# Patient Record
Sex: FEMALE | Race: WHITE | NOT HISPANIC OR LATINO | Employment: OTHER | URBAN - METROPOLITAN AREA
[De-identification: names, ages, dates, MRNs, and addresses within clinical notes are randomized per-mention and may not be internally consistent; named-entity substitution may affect disease eponyms.]

---

## 2017-01-05 ENCOUNTER — OFFICE VISIT (OUTPATIENT)
Dept: MEDICAL GROUP | Facility: CLINIC | Age: 67
End: 2017-01-05
Payer: MEDICARE

## 2017-01-05 VITALS
WEIGHT: 272 LBS | DIASTOLIC BLOOD PRESSURE: 80 MMHG | OXYGEN SATURATION: 94 % | RESPIRATION RATE: 16 BRPM | BODY MASS INDEX: 45.32 KG/M2 | TEMPERATURE: 96.8 F | HEIGHT: 65 IN | SYSTOLIC BLOOD PRESSURE: 130 MMHG | HEART RATE: 63 BPM

## 2017-01-05 DIAGNOSIS — M47.816 LUMBAR FACET ARTHROPATHY: ICD-10-CM

## 2017-01-05 DIAGNOSIS — M35.3 POLYMYALGIA RHEUMATICA (HCC): ICD-10-CM

## 2017-01-05 DIAGNOSIS — Z23 NEED FOR INFLUENZA VACCINATION: ICD-10-CM

## 2017-01-05 DIAGNOSIS — Z12.11 COLON CANCER SCREENING: ICD-10-CM

## 2017-01-05 DIAGNOSIS — K59.09 CHRONIC CONSTIPATION: ICD-10-CM

## 2017-01-05 DIAGNOSIS — M15.9 PRIMARY OSTEOARTHRITIS INVOLVING MULTIPLE JOINTS: ICD-10-CM

## 2017-01-05 DIAGNOSIS — G89.4 CHRONIC PAIN SYNDROME: ICD-10-CM

## 2017-01-05 DIAGNOSIS — K76.0 NONALCOHOLIC FATTY LIVER DISEASE: ICD-10-CM

## 2017-01-05 DIAGNOSIS — K21.9 GASTROESOPHAGEAL REFLUX DISEASE WITHOUT ESOPHAGITIS: ICD-10-CM

## 2017-01-05 DIAGNOSIS — M54.16 LUMBAR RADICULOPATHY: ICD-10-CM

## 2017-01-05 DIAGNOSIS — F33.41 RECURRENT MAJOR DEPRESSIVE DISORDER, IN PARTIAL REMISSION (HCC): ICD-10-CM

## 2017-01-05 DIAGNOSIS — I10 ESSENTIAL HYPERTENSION: ICD-10-CM

## 2017-01-05 DIAGNOSIS — G43.009 MIGRAINE WITHOUT AURA AND WITHOUT STATUS MIGRAINOSUS, NOT INTRACTABLE: ICD-10-CM

## 2017-01-05 DIAGNOSIS — E03.4 HYPOTHYROIDISM DUE TO ACQUIRED ATROPHY OF THYROID: ICD-10-CM

## 2017-01-05 DIAGNOSIS — G47.33 OBSTRUCTIVE SLEEP APNEA SYNDROME: ICD-10-CM

## 2017-01-05 DIAGNOSIS — G62.89 OTHER POLYNEUROPATHY: ICD-10-CM

## 2017-01-05 DIAGNOSIS — Z00.00 MEDICARE ANNUAL WELLNESS VISIT, SUBSEQUENT: ICD-10-CM

## 2017-01-05 DIAGNOSIS — G47.34 NOCTURNAL HYPOXIA: ICD-10-CM

## 2017-01-05 DIAGNOSIS — R70.0 ELEVATED SED RATE: ICD-10-CM

## 2017-01-05 DIAGNOSIS — M48.02 CERVICAL STENOSIS OF SPINAL CANAL: ICD-10-CM

## 2017-01-05 DIAGNOSIS — J96.11 CHRONIC RESPIRATORY FAILURE WITH HYPOXIA (HCC): ICD-10-CM

## 2017-01-05 DIAGNOSIS — Z23 NEED FOR PNEUMOCOCCAL VACCINATION: ICD-10-CM

## 2017-01-05 DIAGNOSIS — G56.22 ULNAR NEUROPATHY OF LEFT UPPER EXTREMITY: ICD-10-CM

## 2017-01-05 PROCEDURE — G0439 PPPS, SUBSEQ VISIT: HCPCS | Mod: 25 | Performed by: FAMILY MEDICINE

## 2017-01-05 PROCEDURE — G0009 ADMIN PNEUMOCOCCAL VACCINE: HCPCS | Performed by: FAMILY MEDICINE

## 2017-01-05 PROCEDURE — 90670 PCV13 VACCINE IM: CPT | Performed by: FAMILY MEDICINE

## 2017-01-05 PROCEDURE — 90662 IIV NO PRSV INCREASED AG IM: CPT | Performed by: FAMILY MEDICINE

## 2017-01-05 PROCEDURE — G0008 ADMIN INFLUENZA VIRUS VAC: HCPCS | Performed by: FAMILY MEDICINE

## 2017-01-05 RX ORDER — TRAMADOL HYDROCHLORIDE 50 MG/1
50-100 TABLET ORAL EVERY 4 HOURS PRN
Qty: 180 TAB | Refills: 5 | Status: SHIPPED | OUTPATIENT
Start: 2017-01-05 | End: 2017-06-30 | Stop reason: SDUPTHER

## 2017-01-05 ASSESSMENT — PATIENT HEALTH QUESTIONNAIRE - PHQ9: CLINICAL INTERPRETATION OF PHQ2 SCORE: 1

## 2017-01-05 NOTE — MR AVS SNAPSHOT
"        Ashly Meyer   2017 1:00 PM   Office Visit   MRN: [id number]    Department:  [facility]   Dept Phone:  [phone]    Description:  Female : 1950   Provider:  Connie Ahn M.D.           Reason for Visit     Annual Exam           Allergies as of 2017     Allergen Noted Reactions    Ace Inhibitors 2009       Cough      Butalbital-Acetaminophen 2016       Dizzy, can't walk, hard to focus    Cefaclor 2007       ceclor    Diphenhydramine 2007   Hives    Iodine 2009       Labored breathing    Lamictal 2011   Rash, Swelling    \"hot\", unable to function    Morphine 2011   Itching    redness    Penicillins 2007   Itching, Swelling    Savella [Milnacipran Hcl] 2013       Muscle aches    Sulfa Drugs 2007   Hives, Anxiety    Hard breathing    Tape 2016       Paper tape is ok    Tizanidine Hcl 2014       dizziness      You were diagnosed with     Medicare annual wellness visit, subsequent   [017166]       Chronic respiratory failure with hypoxia (HCC)   [828052]       Chronic pain syndrome   [338.4.ICD-9-CM]       Recurrent major depressive disorder, in partial remission (HCC)   [8466637]       Elevated sed rate   [420153]       Essential hypertension   [1560028]       Hypothyroidism due to acquired atrophy of thyroid   [8311726]       Lumbar facet arthropathy (HCC)   [147779]       Lumbar radiculopathy   [397460]       Migraine without aura and without status migrainosus, not intractable   [374227]       Nocturnal hypoxia   [209465]       Primary osteoarthritis involving multiple joints   [9036211]       Other polyneuropathy (HCC)   [3338937]       Polymyalgia rheumatica (HCC)   [725.ICD-9-CM]       Obstructive sleep apnea syndrome   [022830]       Need for pneumococcal vaccination   [551746]       Need for influenza vaccination   [936084]       Colon cancer screening   [391440]       Gastroesophageal reflux " "disease without esophagitis   [044764]       Chronic constipation   [031410]       Nonalcoholic fatty liver disease   [605128]       Ulnar neuropathy of left upper extremity   [725215]       Cervical stenosis of spinal canal   [628602]         Vital Signs     Blood Pressure Pulse Temperature Respirations Height Weight    130/80 mmHg 63 36 °C (96.8 °F) 16 1.651 m (5' 5\") 123.378 kg (272 lb)    Body Mass Index Oxygen Saturation Smoking Status             45.26 kg/m2 94% Former Smoker         Basic Information     Date Of Birth Sex Race Ethnicity Preferred Language    1950 Female White Non- English      Problem List              ICD-10-CM Priority Class Noted - Resolved    Essential hypertension I10   5/20/2009 - Present    Hypothyroidism due to acquired atrophy of thyroid E03.4   5/20/2009 - Present    Depression F32.9   5/20/2009 - Present    Eczema, dyshidrotic L30.1   8/3/2009 - Present    Osteoarthritis of multiple joints M15.9   4/13/2010 - Present    GERD (gastroesophageal reflux disease) K21.9   12/10/2010 - Present    Polymyalgia rheumatica (HCC) M35.3   5/6/2011 - Present    Seasonal allergies J30.2   7/8/2011 - Present    Carpal tunnel syndrome G56.00   9/5/2011 - Present    Ulnar neuropathy G56.20   9/5/2011 - Present    Migraine without aura G43.009   Unknown - Present    Cervical stenosis of spinal canal M48.02   11/1/2011 - Present    Lumbar radiculopathy M54.16   11/1/2011 - Present    Nocturnal hypoxia G47.34   Unknown - Present    Sleep apnea syndrome G47.30   Unknown - Present    Chronic constipation K59.09   10/2/2013 - Present    Rectocele N81.6   11/25/2013 - Present    Pelvic floor dysfunction N81.84   Unknown - Present    H/O fibromyalgia Z87.39   3/11/2014 - Present    Sleep apnea G47.30   3/11/2014 - Present    Obesity E66.9   3/11/2014 - Present    Menopause Z78.0   3/11/2014 - Present    Peripheral neuropathy (HCC) G62.9   Unknown - Present    Mild intermittent asthma without " complication J45.20   Unknown - Present    Elevated sed rate R70.0   12/9/2014 - Present    Candidal intertrigo B37.2   6/1/2015 - Present    Severe right groin pain R10.30   11/17/2015 - Present    Chronic pain G89.29   11/18/2015 - Present    Lumbar facet arthropathy (HCC) M12.88   12/9/2015 - Present    Anal fissure K60.2   4/14/2016 - Present    Menopausal symptoms N95.1   4/28/2016 - Present    Deviated nasal septum J34.2   8/1/2016 - Present    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   10/3/2016 - Present    Atrophic rhinitis J31.0   10/3/2016 - Present    Nonalcoholic fatty liver disease K76.0   1/5/2017 - Present      Health Maintenance        Date Due Completion Dates    IMM DTaP/Tdap/Td Vaccine (1 - Tdap) 5/4/1969 ---    IMM ZOSTER VACCINE 5/4/2010 ---    COLONOSCOPY 11/1/2016 11/1/2011 (Postponed)    Override on 11/1/2011: Postponed (money is very tight)    MAMMOGRAM 5/13/2018 5/13/2016, 10/24/2014, 5/28/2013, 5/24/2012, 1/28/2011, 10/16/2009, 10/16/2009, 3/24/2006    IMM PNEUMOCOCCAL 65+ (ADULT) LOW/MEDIUM RISK SERIES (2 of 2 - PPSV23) 1/16/2019 1/5/2017, 1/16/2014    BONE DENSITY 9/10/2020 9/10/2015            Current Immunizations     13-VALENT PCV PREVNAR 1/5/2017    Influenza TIV (IM) 1/16/2014, 11/20/2012    Influenza Vaccine Adult HD 1/5/2017    Influenza Vaccine Quad Inj (Pf) 10/15/2014    Pneumococcal polysaccharide vaccine (PPSV-23) 1/16/2014      Below and/or attached are the medications your provider expects you to take. Review all of your home medications and newly ordered medications with your provider and/or pharmacist. Follow medication instructions as directed by your provider and/or pharmacist. Please keep your medication list with you and share with your provider. Update the information when medications are discontinued, doses are changed, or new medications (including over-the-counter products) are added; and carry medication information at all times in the event of emergency situations      Allergies:  ACE INHIBITORS - (reactions not documented)     BUTALBITAL-ACETAMINOPHEN - (reactions not documented)     CEFACLOR - (reactions not documented)     DIPHENHYDRAMINE - Hives     IODINE - (reactions not documented)     LAMICTAL - Rash,Swelling     MORPHINE - Itching     PENICILLINS - Itching,Swelling     SAVELLA - (reactions not documented)     SULFA DRUGS - Hives,Anxiety     TAPE - (reactions not documented)     TIZANIDINE HCL - (reactions not documented)               Medications  Valid as of: January 05, 2017 -  5:03 PM    Generic Name Brand Name Tablet Size Instructions for use    Carvedilol (Tab) COREG 12.5 MG TAKE 1 TABLET BY MOUTH TWICE A DAY WITH MEALS        Fluocinolone Acetonide (Oil) Fluocinolone Acetonide 0.01 % Place  in ear 2 Times a Day.        Furosemide (Tab) LASIX 20 MG TAKE 1 TABLET BY MOUTH EVERY DAY        Levothyroxine Sodium (Tab) SYNTHROID 150 MCG TAKE 1 TABLET BY MOUTH EVERY DAY        Misc. Devices (Misc) Misc. Devices  This is an order for overnight pulse on on room air  Dx:   J96.11,  G47.33, G47.34         SYLVIE 99 months   NPI [id number]        Nabumetone (Tab) RELAFEN 750 MG TAKE 1 TABLET BY MOUTH TWICE A DAY WITH MEALS        Oxygen-Helium   Inhale 2-3 L by mouth as needed.        Pantoprazole Sodium (Tablet Delayed Response) PROTONIX 40 MG Take 1 Tab by mouth every day.        Polyethyl Glycol-Propyl Glycol   by Ophthalmic route 4 times a day as needed.        Potassium Chloride Klarissa CR (Tab CR) K-DUR 20 MEQ TAKE 1 TABLET BY MOUTH TWICE A DAY        Sertraline HCl (Tab) ZOLOFT 100 MG TAKE 1 TABLET BY MOUTH EVERY DAY        Spironolactone (Tab) ALDACTONE 50 MG Take 1 Tab by mouth every day.        Temazepam (Cap) RESTORIL 15 MG Take 1 Cap by mouth at bedtime as needed for Sleep.        TraMADol HCl (Tab) ULTRAM 50 MG Take 1-2 Tabs by mouth every four hours as needed for Moderate Pain.        .                 Medicines prescribed today were sent to:     [id number] -  [place], [place] - [address]    [address] [place] [address]    Phone: [phone] Fax: [phone]    Open 24 Hours?: No      Medication refill instructions:       If your prescription bottle indicates you have medication refills left, it is not necessary to call your provider’s office. Please contact your pharmacy and they will refill your medication.    If your prescription bottle indicates you do not have any refills left, you may request refills at any time through one of the following ways: The online [facility] system (except Urgent Care), by calling your provider’s office, or by asking your pharmacy to contact your provider’s office with a refill request. Medication refills are processed only during regular business hours and may not be available until the next business day. Your provider may request additional information or to have a follow-up visit with you prior to refilling your medication.   *Please Note: Medication refills are assigned a new Rx number when refilled electronically. Your pharmacy may indicate that no refills were authorized even though a new prescription for the same medication is available at the pharmacy. Please request the medicine by name with the pharmacy before contacting your provider for a refill.        Referral     A referral request has been sent to our patient care coordination department. Please allow 3-5 business days for us to process this request and contact you either by phone or mail. If you do not hear from us by the 5th business day, please call us at [phone].           [facility] Access Code: Activation code not generated  Current [facility] Status: Active

## 2017-01-05 NOTE — PROGRESS NOTES
Chief Complaint   Patient presents with   • Annual Exam         HPI:  Ashly is a 66 y.o. here for Medicare Annual Wellness Visit.  She is living alone.  Her daughter lives locally.  She is now on Medicare B and state skilled nursing, problematic insurance.      Patient Active Problem List    Diagnosis Date Noted   • Disease of accessory sinus 10/03/2016   • Atrophic rhinitis 10/03/2016   • Deviated nasal septum 08/01/2016   • Menopausal symptoms 04/28/2016   • Large stool 04/14/2016   • Anal fissure 04/14/2016   • Lumbar facet arthropathy (HCC) 12/09/2015   • Chronic pain 11/18/2015   • Severe right groin pain 11/17/2015   • Candidal intertrigo 06/01/2015   • Elevated sed rate 12/09/2014   • Mild intermittent asthma without complication    • H/O fibromyalgia 03/11/2014   • Sleep apnea 03/11/2014   • Obesity 03/11/2014   • Menopause 03/11/2014   • Peripheral neuropathy ([facility])    • Rectocele 11/25/2013   • Pelvic floor dysfunction    • Chronic constipation 10/02/2013   • Nocturnal hypoxia    • Sleep apnea syndrome    • Cervical stenosis of spinal canal 11/01/2011   • Lumbar radiculopathy 11/01/2011   • Migraine without aura    • Carpal tunnel syndrome 09/05/2011   • Ulnar neuropathy 09/05/2011   • Seasonal allergies 07/08/2011   • Polymyalgia rheumatica (HCC) 05/06/2011   • GERD (gastroesophageal reflux disease) 12/10/2010   • Osteoarthritis of multiple joints 04/13/2010   • Eczema, dyshidrotic 08/03/2009   • Essential hypertension 05/20/2009   • Hypothyroidism due to acquired atrophy of thyroid 05/20/2009   • Depression 05/20/2009       Current Outpatient Prescriptions   Medication Sig Dispense Refill   • sertraline (ZOLOFT) 100 MG Tab TAKE 1 TABLET BY MOUTH EVERY DAY 90 Tab 6   • nabumetone (RELAFEN) 750 MG Tab TAKE 1 TABLET BY MOUTH TWICE A DAY WITH MEALS 60 Tab 6   • levothyroxine (SYNTHROID) 150 MCG Tab TAKE 1 TABLET BY MOUTH EVERY DAY 90 Tab 2   • carvedilol (COREG) 12.5 MG Tab TAKE 1 TABLET BY MOUTH TWICE A DAY  WITH MEALS 180 Tab 2   • potassium chloride SA (K-DUR) 20 MEQ Tab CR TAKE 1 TABLET BY MOUTH TWICE A  Tab 2   • OXYGEN-HELIUM INH Inhale 2-3 L by mouth as needed.     • Fluocinolone Acetonide 0.01 % Oil Place  in ear 2 Times a Day.     • Polyethyl Glycol-Propyl Glycol (SYSTANE OP) by Ophthalmic route 4 times a day as needed.     • tramadol (ULTRAM) 50 MG Tab Take 1-2 Tabs by mouth every four hours as needed for Moderate Pain. 180 Tab 5   • furosemide (LASIX) 20 MG Tab TAKE 1 TABLET BY MOUTH EVERY DAY 90 Tab 3   • spironolactone (ALDACTONE) 50 MG Tab Take 1 Tab by mouth every day. 90 Tab 3   • pantoprazole (PROTONIX) 40 MG Tablet Delayed Response Take 1 Tab by mouth every day. 90 Tab 3   • temazepam (RESTORIL) 15 MG Cap Take 1 Cap by mouth at bedtime as needed for Sleep. 30 Cap 3     No current facility-administered medications for this visit.            Current supplements as per medication list.       Allergies: Ace inhibitors; Butalbital-acetaminophen; Cefaclor; Diphenhydramine; Iodine; Lamictal; Morphine; Penicillins; Savella; Sulfa drugs; Tape; and Tizanidine hcl    Current social contact/activities: limited by her numerous medical problems.     She  reports that she quit smoking about 9 years ago. She has never used smokeless tobacco. She reports that she does not drink alcohol or use illicit drugs.  Counseling given: Yes        DPA/Advanced directive: Patient do not have an advanced directive. If not on file, instructed to bring in a copy to scan into their chart. If no advanced directive exists, a packet and workshop information was given on advanced directives.  Completed one in the past, she will try to find it.    ROS:    Gait: Uses no assistive device, cane sometimes  Ostomy: no   Other tubes: no   Amputations: no   Chronic oxygen use yes, nocturnal  Last eye exam about a year, needs glasses but the eyes were healthy other than incipient cataracts   : Reports occasional stress urinary  incontinence.       Screening:  Discussed    Depression Screening    Little interest or pleasure in doing things?  0 - not at all  Feeling down, depressed , or hopeless? 1 - several days  Trouble falling or staying asleep, or sleeping too much?   2  Feeling tired or having little energy?   2  Poor appetite or overeating?   0  Feeling bad about yourself - or that you are a failure or have let yourself or your family down?  0  Trouble concentrating on things, such as reading the newspaper or watching television?  1  Moving or speaking so slowly that other people could have noticed.  Or the opposite - being so fidgety or restless that you have been moving around a lot more than usual?   0  Thoughts that you would be better off dead, or of hurting yourself?   0  Patient Health Questionnaire Score:  6    If depressive symptoms identified deferred to follow up visit unless specifically addressed in assesment and plan.      Screening for Cognitive Impairment    Three Minute Recall (banana, sunrise, fence)  2/3    Draw clock face with all 12 numbers set to the hand to show 10 minutes past 11 o'clock  1  5/5  Cognitive concerns identified defferred for follow up unless specifically addressed in assesment and plan.    Fall Risk Assessment    Has the patient had two or more falls in the last year or any fall with injury in the last year?  No    Safety Assessment    Throw rugs on floor.  No  Handrails on all stairs.  No  Good lighting in all hallways.  Yes  Difficulty hearing.  No  Patient counseled about all safety risks that were identified.    Functional Assessment ADLs    Are there any barriers preventing you from cooking for yourself or meeting nutritional needs?  No.    Are there any barriers preventing you from driving safely or obtaining transportation?  No.    Are there any barriers preventing you from using a telephone or calling for help?  No.    Are there any barriers preventing you from shopping?  No.    Are there  any barriers preventing you from taking care of your own finances?  No.    Are there any barriers preventing you from managing your medications?  No.    Are currently engaing any exercise or physical activity?  No.       Health Maintenance Summary                Annual Wellness Visit Overdue 1950     IMM DTaP/Tdap/Td Vaccine Overdue 5/4/1969     IMM ZOSTER VACCINE Overdue 5/4/2010     IMM PNEUMOCOCCAL 65+ (ADULT) LOW/MEDIUM RISK SERIES Overdue 5/4/2015      Done 1/16/2014 Imm Admin: Pneumococcal polysaccharide vaccine (PPSV-23)    IMM INFLUENZA Overdue 9/1/2016      Done 10/15/2014 Imm Admin: Influenza Vaccine Quad Inj (Pf)     Patient has more history with this topic...    COLONOSCOPY Overdue 11/1/2016      Postponed 11/1/2011 money is very tight    MAMMOGRAM Next Due 5/13/2018      Done 5/13/2016 MA DIAGNOSTIC MAMMO W/CAD-BILAT     Patient has more history with this topic...    BONE DENSITY Next Due 9/10/2020      Done 9/10/2015 DS-BONE DENSITY STUDY (DEXA)          Patient Care Team:  Connie Ahn M.D. as PCP - General   Hetal Ann M.D. as Consulting Physician (Rheumatology)  Jeffy Botello M.D. as Consulting Physician (Neurology)      Social History   Substance Use Topics   • Smoking status: Former Smoker     Quit date: 03/01/2007   • Smokeless tobacco: Never Used      Comment: 1 ppd >20yrs quit 12/07   • Alcohol Use: No     Family History   Problem Relation Age of Onset   • Arthritis Sister      gout and lupus   • Cancer Mother      bladder   • Heart Disease Mother    • Other Father      unknown   • GI Sister      celiac   • Cancer Maternal Aunt      bladder   • GI Mother      bile duct problem     She  has a past medical history of Degenerative disc disease; GOUT (5/20/2009); Fibromyalgia; Arthritis; GERD (gastroesophageal reflux disease); Pleomorphic adenoma (HCC); Hypothyroidism (5/20/2009); Mild intermittent asthma without complication; Hypertension; Carotid artery stenosis, unilateral;  "Migraine without aura, without mention of intractable migraine without mention of status migrainosus; Aphasia; Nocturnal hypoxia; Sleep apnea syndrome; Polymyalgia rheumatica (HCC); Pelvic floor dysfunction; Peripheral neuropathy (HCC); S/P tonsillectomy; H/O foot surgery; Seasonal allergies; Snoring; Hemorrhagic disorder (HCC); Cancer (HCC) (1990's); Hiatus hernia syndrome; Cataract; Urinary incontinence; Bowel habit changes; Indigestion; Heart burn; Breath shortness; Depression (5/20/2009); and Anesthesia.   Past Surgical History   Procedure Laterality Date   • [facility] stat fine needle aspiration  11/16/2009     Performed by DA CALLOWAY at ENDOSCOPY [facility] ORS   • Tonsillectomy  1953   • Bladder suspension  1983   • Other orthopedic surgery  1962/1980     foot surgery    • Other abdominal surgery       Cholysestectomy   • Hysterectomy, vaginal  1983     ovaries are present, excessive bleeding   • Colonoscopy  2006     ? unclear date   • Septoplasty N/A 8/1/2016     Procedure: SEPTOPLASTY;  Surgeon: PIETER Dickson M.D.;  Location: SURGERY SAME DAY [facility] ORS;  Service:    • Turbinoplasty Bilateral 8/1/2016     Procedure: TURBINOPLASTY;  Surgeon: PIETER Dickson M.D.;  Location: SURGERY SAME DAY [facility] ORS;  Service:    • Nasal fracture reduction open N/A 8/1/2016     Procedure: SEPTAL FRACTURE REDUCTION OPEN;  Surgeon: PIETER Dickson M.D.;  Location: SURGERY SAME DAY [facility] ORS;  Service:    • Septal reconstruction  10/3/2016     Procedure: SEPTAL RECONSTRUCTION with  grafts ;  Surgeon: PIETER Dickson M.D.;  Location: SURGERY SAME DAY [facility] ORS;  Service:        Exam:     Blood pressure 130/80, pulse 63, temperature 36 °C (96.8 °F), resp. rate 16, height 1.651 m (5' 5\"), weight 123.378 kg (272 lb), SpO2 94 %. Body mass index is 45.26 kg/(m^2).    Hearing good.    Dentition fair  Alert, oriented in no acute distress.  Eye contact is good, speech goal directed, affect " calm  EOMI, PERRLA, oropharynx is well-hydrated without exudate  Neck shows full range of motion with no JVD or carotid bruits appreciated. Thyroid is nonpalpable. No neck masses appreciated.  Lungs clear to auscultation A&P with good air movement.  Heart regular rate and rhythm normal S1-S2 without murmur appreciated  Abdomen very obese, soft, no hepatosplenomegaly or mass appreciated.  She has multiple tender points in her musculature. There is no question but that the left arm is quite tender to palpation and there seems to be hyperesthesia. No edema or heat is appreciated. Her feet are mildly swollen but nonpitting. There is slight dependent rubor.        Assessment and Plan. The following treatment and monitoring plan is recommended:      1. Medicare annual wellness visit, subsequent  She has multiple serious medical problems. She has seen numerous specialists. She is overall stable.    2. Chronic respiratory failure with hypoxia (HCC)  She continues her nocturnal oxygen for documented nocturnal hypoxemia. She is due for a repeat overnight pulse ox.  - Misc. Devices Misc; This is an order for overnight pulse on on room air  Dx:   J96.11,  G47.33, G47.34         SYLVIE 99 months   NPI [id number]  Dispense: 1 Each; Refill: 0    3. Chronic pain syndrome  She has multiple triggers for this including muscle aches and chronic neuropathy. The neuropathy has been very difficult to treat with poor response to medications. She had help from physical medicine and rehab in the past. Unfortunately quite stable.    4. Recurrent major depressive disorder, in partial remission (HCC)  She continues in partial remission. She feels the current regimen of sertraline is at least partly helpful. She did try to discontinue it and found that she was feeling a lot worse. She denies intrusive thoughts, suicidal thoughts or plan of self-harm. She denies delusions or hallucinations. Stable problem.    5. Elevated sed rate  This has been  quite high ranging from the 30s to the 60s or 80s. She has been treated with multiple medications with no success. She has seen rheumatology and neurology all agree that she has something going on and at one point she was on enormous doses of steroids to treat presumed polymyalgia rheumatica and her symptoms did not deya. Her symptoms have remained the same whether on or off the steroids. The Relafen has been only partially helpful. However, it is been the most helpful so far. The tramadol also helps her for her chronic pain. Stable problem.    6. Essential hypertension  Denies chest pain, chest pressure, palpitations or exertional shortness of breath. Her blood pressure remains fairly well controlled on the spironolactone and carvedilol combination. She states she checks her blood pressure about once a month and it is typically between the 120s to 130s. Stable problem.    7. Hypothyroidism due to acquired atrophy of thyroid  She continues her thyroid regimen. She feels her energy level has been stable on this. She denies increase in insomnia or tremor. Stable problem.    8. Lumbar facet arthropathy (HCC)  She has multiple level arthritis in the back. She did have some improvement many years ago from the physical medicine and rehabilitation specialist, Dr. Livingston. She would like to try this again as that is one of the few things she identifies that has been helpful. Referral is discussed and placed. Stable problem unfortunately.  - REFERRAL TO PHYSICIAL MEDICINE REHAB    9. Lumbar radiculopathy  She has persistent radiating lumbar pain. This waxes and wanes. She is assessing her furniture and seeing if this is contributing to the problem.  - REFERRAL TO PHYSICIAL MEDICINE REHAB    10. Migraine without aura and without status migrainosus, not intractable  She has continued episodic migraine. Butalbital, Lamictal and Savella have been not tolerable.  She feels the Relafen has been somewhat helpful. She had no relief  from the Imitrex. The tramadol is only mildly helpful. She has seen neurology for this. She is considering seeing them again. Stable problem.    11. Nocturnal hypoxia  She is due for a follow-up order. Discussed and placed  - Misc. Devices Misc; This is an order for overnight pulse on on room air  Dx:   J96.11,  G47.33, G47.34         SYLVIE 99 months   NPI [id number]  Dispense: 1 Each; Refill: 0    12. Primary osteoarthritis involving multiple joints  The tramadol continues to be somewhat helpful for the arthritis. She feels it is better tolerated than the other pain medication she is taking. She has far less somnolence on this. Stable regimen.  - tramadol (ULTRAM) 50 MG Tab; Take 1-2 Tabs by mouth every four hours as needed for Moderate Pain.  Dispense: 180 Tab; Refill: 5  - REFERRAL TO PHYSICIAL MEDICINE REHAB    13. Other polyneuropathy (HCC)  She has documented polyneuropathy but it is unclear what the etiology is. She did respond to Lamictal but got a very severe rash which made it impossible to continue. She states that she was unable to tolerate the gabapentin in the past to even though that is not well documented. Stable problem.    14. Polymyalgia rheumatica (HCC)  This has been a diagnosis from rheumatology. However, high-dose steroids did not help. She may indeed have a resistant form of this. Discussed increased exercise and stretching and weight loss. She is not confident that this is achievable. Discussed possible referrals to dietitian etc. She will consider. Stable.    15. Obstructive sleep apnea syndrome  She was indeed diagnosed with obstructive sleep apnea. I feel this is a contributor to many of her problems especially her migraines. She however was unable to tolerate C Pap and is unwilling to try it again. It appears she gave it up approximately a 3 month try with no ability to tolerate. Stable problem.  - Misc. Devices Misc; This is an order for overnight pulse on on room air  Dx:   J96.11,   G47.33, G47.34         SYLVIE 99 months   NPI [id number]  Dispense: 1 Each; Refill: 0    16. Need for pneumococcal vaccination  Administered today  - PNEUMOCOCCAL CONJUGATE VACCINE 13-VALENT    17. Need for influenza vaccination  Administered today  - INFLUENZA VACCINE, HIGH DOSE (65+ ONLY)    18. Colon cancer screening  Referral discussed and placed  - REFERRAL TO GASTROENTEROLOGY    19. Gastroesophageal reflux disease without esophagitis  Referral is discussed and placed. She does continue the pantoprazole. She denies dysphagia, hematemesis or vomiting. Denies blood or mucus in the stool. Stable persistent problem.  - REFERRAL TO GASTROENTEROLOGY    20. Chronic constipation  This is also stable persistent problem. She denied blood or mucus in the stool. She is due for follow-up.  - REFERRAL TO GASTROENTEROLOGY    21. Nonalcoholic fatty liver disease  Discussed lowPlease increase your exercise as much as possible. Please avoid starches and sugars. Excess starch and sugar in the diet that is not burned off with exercise increases triglyceride levels. Triglycerides are soluble fats in the blood stream that increase heart disease risk. They also contribute to fatty liver. An low sugar diet. She feels she follows this fairly well.  - REFERRAL TO GASTROENTEROLOGY    22. Ulnar neuropathy of left upper extremity  This was documented on EMG. She would like to see if there is a nerve block or nerve injection that could be helpful. Referral was placed.  - REFERRAL TO PHYSICIAL MEDICINE REHAB    23. Cervical stenosis of spinal canal  This is a chronic multilevel problems which may be contributing to many problems including her arm pain. Referral discussed and placed. Stable problem overall.  - REFERRAL TO PHYSICIAL MEDICINE REHAB      Services needed: as per orders if indicated.  Health Care Screening: Age-appropriate preventive services Medicare covers discussed today and ordered if indicated.    Referrals offered:  Community-based lifestyle interventions to reduce health risks and promote self-management and wellness, fall prevention, nutrition, physical activity, tobacco-use cessation, weight loss, and mental health services as per orders if indicated.    Discussion today about general wellness and lifestyle habits:  discussed, numerous challenges  · Prevent falls and reduce trip hazards; Cautioned about securing or removing rugs.  · Have a working fire alarm and carbon monoxide detector; discussed  · Engage in regular physical activity and social activities       Follow-up: three months

## 2017-01-06 DIAGNOSIS — G47.33 OBSTRUCTIVE SLEEP APNEA SYNDROME: ICD-10-CM

## 2017-01-06 DIAGNOSIS — R70.0 ELEVATED SED RATE: ICD-10-CM

## 2017-01-06 DIAGNOSIS — E78.5 DYSLIPIDEMIA, GOAL LDL BELOW 130: ICD-10-CM

## 2017-01-06 DIAGNOSIS — K21.9 GASTROESOPHAGEAL REFLUX DISEASE WITHOUT ESOPHAGITIS: ICD-10-CM

## 2017-01-06 DIAGNOSIS — G62.89 OTHER POLYNEUROPATHY: ICD-10-CM

## 2017-01-06 DIAGNOSIS — I10 ESSENTIAL HYPERTENSION: ICD-10-CM

## 2017-01-06 DIAGNOSIS — E03.4 HYPOTHYROIDISM DUE TO ACQUIRED ATROPHY OF THYROID: ICD-10-CM

## 2017-01-06 DIAGNOSIS — Z11.59 NEED FOR HEPATITIS C SCREENING TEST: ICD-10-CM

## 2017-01-26 RX ORDER — ACYCLOVIR 400 MG/1
800 TABLET ORAL 2 TIMES DAILY
Qty: 20 TAB | Refills: 0 | Status: SHIPPED | OUTPATIENT
Start: 2017-01-26 | End: 2017-01-31

## 2017-02-01 ENCOUNTER — HOSPITAL ENCOUNTER (OUTPATIENT)
Dept: LAB | Facility: MEDICAL CENTER | Age: 67
End: 2017-02-01
Attending: INTERNAL MEDICINE
Payer: MEDICARE

## 2017-02-01 PROCEDURE — 83516 IMMUNOASSAY NONANTIBODY: CPT | Mod: 91

## 2017-02-01 PROCEDURE — 36415 COLL VENOUS BLD VENIPUNCTURE: CPT

## 2017-02-01 PROCEDURE — 82784 ASSAY IGA/IGD/IGG/IGM EACH: CPT

## 2017-02-03 DIAGNOSIS — Z01.812 PRE-PROCEDURAL LABORATORY EXAMINATION: ICD-10-CM

## 2017-02-03 DIAGNOSIS — Z01.810 PRE-OPERATIVE CARDIOVASCULAR EXAMINATION: ICD-10-CM

## 2017-02-03 LAB
ANION GAP SERPL CALC-SCNC: 10 MMOL/L (ref 0–11.9)
BUN SERPL-MCNC: 20 MG/DL (ref 8–22)
CALCIUM SERPL-MCNC: 9.3 MG/DL (ref 8.5–10.5)
CHLORIDE SERPL-SCNC: 104 MMOL/L (ref 96–112)
CO2 SERPL-SCNC: 25 MMOL/L (ref 20–33)
CREAT SERPL-MCNC: 0.92 MG/DL (ref 0.5–1.4)
EKG IMPRESSION: NORMAL
ERYTHROCYTE [DISTWIDTH] IN BLOOD BY AUTOMATED COUNT: 50 FL (ref 35.9–50)
GFR SERPL CREATININE-BSD FRML MDRD: >60 ML/MIN/1.73 M 2
GLUCOSE SERPL-MCNC: 176 MG/DL (ref 65–99)
HCT VFR BLD AUTO: 40.9 % (ref 37–47)
HGB BLD-MCNC: 12.6 G/DL (ref 12–16)
IGA SERPL-MCNC: 308 MG/DL (ref 68–408)
MCH RBC QN AUTO: 26.7 PG (ref 27–33)
MCHC RBC AUTO-ENTMCNC: 30.8 G/DL (ref 33.6–35)
MCV RBC AUTO: 86.7 FL (ref 81.4–97.8)
PLATELET # BLD AUTO: 201 K/UL (ref 164–446)
PMV BLD AUTO: 10.9 FL (ref 9–12.9)
POTASSIUM SERPL-SCNC: 3.9 MMOL/L (ref 3.6–5.5)
RBC # BLD AUTO: 4.72 M/UL (ref 4.2–5.4)
SODIUM SERPL-SCNC: 139 MMOL/L (ref 135–145)
TTG IGA SER IA-ACNC: 1 U/ML (ref 0–3)
WBC # BLD AUTO: 11 K/UL (ref 4.8–10.8)

## 2017-02-03 PROCEDURE — 80048 BASIC METABOLIC PNL TOTAL CA: CPT

## 2017-02-03 PROCEDURE — 85027 COMPLETE CBC AUTOMATED: CPT

## 2017-02-03 PROCEDURE — 36415 COLL VENOUS BLD VENIPUNCTURE: CPT

## 2017-02-03 RX ORDER — POLYETHYLENE GLYCOL 3350 17 G/17G
17 POWDER, FOR SOLUTION ORAL 3 TIMES DAILY
COMMUNITY
End: 2018-03-29

## 2017-02-03 RX ORDER — KETOCONAZOLE 20 MG/G
CREAM TOPICAL DAILY
COMMUNITY
End: 2017-07-27 | Stop reason: SDUPTHER

## 2017-02-03 RX ORDER — METRONIDAZOLE 7.5 MG/G
GEL TOPICAL 2 TIMES DAILY
COMMUNITY
End: 2018-01-19 | Stop reason: SDUPTHER

## 2017-02-06 ENCOUNTER — HOSPITAL ENCOUNTER (OUTPATIENT)
Facility: MEDICAL CENTER | Age: 67
End: 2017-02-06
Attending: INTERNAL MEDICINE | Admitting: INTERNAL MEDICINE
Payer: MEDICARE

## 2017-02-06 VITALS
TEMPERATURE: 98.6 F | WEIGHT: 273.59 LBS | RESPIRATION RATE: 20 BRPM | HEIGHT: 66 IN | SYSTOLIC BLOOD PRESSURE: 160 MMHG | HEART RATE: 72 BPM | DIASTOLIC BLOOD PRESSURE: 89 MMHG | BODY MASS INDEX: 43.97 KG/M2 | OXYGEN SATURATION: 95 %

## 2017-02-06 PROBLEM — K21.9 GASTROESOPHAGEAL REFLUX DISEASE: Status: ACTIVE | Noted: 2017-02-06

## 2017-02-06 PROCEDURE — 160036 HCHG PACU - EA ADDL 30 MINS PHASE I: Performed by: INTERNAL MEDICINE

## 2017-02-06 PROCEDURE — 160002 HCHG RECOVERY MINUTES (STAT): Performed by: INTERNAL MEDICINE

## 2017-02-06 PROCEDURE — 160048 HCHG OR STATISTICAL LEVEL 1-5: Performed by: INTERNAL MEDICINE

## 2017-02-06 PROCEDURE — 160035 HCHG PACU - 1ST 60 MINS PHASE I: Performed by: INTERNAL MEDICINE

## 2017-02-06 PROCEDURE — 110371 HCHG SHELL REV 272: Performed by: INTERNAL MEDICINE

## 2017-02-06 PROCEDURE — A4606 OXYGEN PROBE USED W OXIMETER: HCPCS | Performed by: INTERNAL MEDICINE

## 2017-02-06 PROCEDURE — 700101 HCHG RX REV CODE 250

## 2017-02-06 PROCEDURE — 502240 HCHG MISC OR SUPPLY RC 0272: Performed by: INTERNAL MEDICINE

## 2017-02-06 PROCEDURE — 160209 HCHG ENDO MINUTES - EA ADDL 1 MIN LEVEL 5: Performed by: INTERNAL MEDICINE

## 2017-02-06 PROCEDURE — 88312 SPECIAL STAINS GROUP 1: CPT

## 2017-02-06 PROCEDURE — 700111 HCHG RX REV CODE 636 W/ 250 OVERRIDE (IP)

## 2017-02-06 PROCEDURE — 700102 HCHG RX REV CODE 250 W/ 637 OVERRIDE(OP)

## 2017-02-06 PROCEDURE — 500066 HCHG BITE BLOCK, ECT: Performed by: INTERNAL MEDICINE

## 2017-02-06 PROCEDURE — A9270 NON-COVERED ITEM OR SERVICE: HCPCS

## 2017-02-06 PROCEDURE — 88305 TISSUE EXAM BY PATHOLOGIST: CPT | Mod: 59

## 2017-02-06 PROCEDURE — B4082 ENTERAL NG TUBING W/O STYLET: HCPCS | Performed by: INTERNAL MEDICINE

## 2017-02-06 PROCEDURE — 110382 HCHG SHELL REV 271: Performed by: INTERNAL MEDICINE

## 2017-02-06 PROCEDURE — 160009 HCHG ANES TIME/MIN: Performed by: INTERNAL MEDICINE

## 2017-02-06 PROCEDURE — 160204 HCHG ENDO MINUTES - 1ST 30 MINS LEVEL 5: Performed by: INTERNAL MEDICINE

## 2017-02-06 RX ORDER — OXYCODONE HCL 5 MG/5 ML
SOLUTION, ORAL ORAL
Status: COMPLETED
Start: 2017-02-06 | End: 2017-02-06

## 2017-02-06 RX ORDER — TRAMADOL HYDROCHLORIDE 50 MG/1
TABLET ORAL
Status: COMPLETED
Start: 2017-02-06 | End: 2017-02-06

## 2017-02-06 RX ORDER — SODIUM CHLORIDE, SODIUM LACTATE, POTASSIUM CHLORIDE, CALCIUM CHLORIDE 600; 310; 30; 20 MG/100ML; MG/100ML; MG/100ML; MG/100ML
1000 INJECTION, SOLUTION INTRAVENOUS ONCE
Status: COMPLETED | OUTPATIENT
Start: 2017-02-06 | End: 2017-02-06

## 2017-02-06 RX ADMIN — TRAMADOL HYDROCHLORIDE 50 MG: 50 TABLET, FILM COATED ORAL at 14:07

## 2017-02-06 RX ADMIN — OXYCODONE HYDROCHLORIDE 10 MG: 5 SOLUTION ORAL at 12:05

## 2017-02-06 RX ADMIN — SODIUM CHLORIDE, SODIUM LACTATE, POTASSIUM CHLORIDE, CALCIUM CHLORIDE 1000 ML: 600; 310; 30; 20 INJECTION, SOLUTION INTRAVENOUS at 07:00

## 2017-02-06 ASSESSMENT — PAIN SCALES - GENERAL
PAINLEVEL_OUTOF10: 5
PAINLEVEL_OUTOF10: 0
PAINLEVEL_OUTOF10: 8
PAINLEVEL_OUTOF10: 2
PAINLEVEL_OUTOF10: 0
PAINLEVEL_OUTOF10: 5
PAINLEVEL_OUTOF10: 0
PAINLEVEL_OUTOF10: 8
PAINLEVEL_OUTOF10: 5

## 2017-02-06 NOTE — OR NURSING
RECEIVED FROM OR WITH DR CABRERA.  ORAL AIRWAY IN PLACE.  VSS  AIRWAY DC'D WITHOUT PROBLEM.  SLEEPY  1030  CONTINUES TO SLEEP  1040  DR CABRERA AT BEDSIDE TO ASSESS STATUS. DENIES PAIN/NAUSEA  1055 EASILY AWAKENED.  GIVEN SIPS OF WATER.  1145 AWAKE.  NEEDS TO VOID.  TAKEN TO THE BATHROOM VIA GUERNEY.

## 2017-02-06 NOTE — OR NURSING
RECEIVED FROM OR WITH DR CABRERA.  ORAL AIRWAY IN PLACE.  VSS  AIRWAY DC'D WITHOUT PROBLEM.  SLEEPY  1030  CONTINUES TO SLEEP  1040  DR CABRERA AT BEDSIDE TO ASSESS STATUS. DENIES PAIN/NAUSEA  1055 EASILY AWAKENED.  GIVEN SIPS OF WATER.  1145 AWAKE.  NEEDS TO VOID.  TAKEN TO THE BATHROOM VIA GUERNEY. VOID, DRESSED AND IN RECLINER.  C/O PAIN IN EARS AND HEAD 8/10  GIVEN CRACKERS AND PAIN MEDICATION.    1200  DAUGHTER CALLED RE STATUS.  GIVEN UPDATE.  WILL BE HERE IN ABOUT AN HOUR.  1245 ON ROOM AIR.  STILL CONTINUES TO HAVE HEADACHE AND EAR PAIN.  ICE PACK TO FOREHEAD.  1400 REQUESTS TRAMADOL.  ORDERS RECEIVED FROM DR CABRERA.  PATIENT GIVEN SAME.  DAUGHTER HERE.    1420 DISCHARGE INSTRUCTIONS TO PATIENT AND DAUGHTER.  ALL QUESTIONS ANSWERED.  DISCHARGED TO HOME.

## 2017-02-06 NOTE — DISCHARGE INSTRUCTIONS
GASTROSCOPY OR ERCP  1. Don't eat or drink anything for about an hour after the test. You can then resume your regular diet.   2. Don't drive or drink alcohol for 24 hours. The medication you received will make you too drowsy.  3. Don't take any coffee, tea, or aspirin products until after you see your doctor. These can harm the lining of your stomach.  4. If you begin to vomit bloody material, or develop black or bloody stools, call your doctor as soon as possible.   5. If you have any neck, chest, abdominal pain or temp over 100 degrees call your doctor.   6. See your doctor on: ***  7. Additional instructions:***  8. Prescriptions:***    COLONOSCOPY OR FLEXIBLE SIGMOIDOSCOPY  1. If you received a barium enema, take a mild laxative such as dulcolax to clean out the barium.   2. Drink plenty of fluids. Eat a diet high in fiber; such foods as whole-grain breads, fresh fruit and vegetables, nuts are recommended.  3. You may notice a few drops of blood with your first bowel movement. If you develop any large amount of bleeding, black stools, a fever, or abdominal pain, call your doctor right away.   4. Call your doctor for test results in *** days.  5. Don't drive or drink alcohol for 24 hours. The medication you received will make you too drowsy.   6. No heavy lifting, ASA products or ASA x 5 days ***      ACTIVITY: Rest and take it easy for the first 24 hours.  A responsible adult is recommended to remain with you during that time.  It is normal to feel sleepy.  We encourage you to not do anything that requires balance, judgment or coordination.    MILD FLU-LIKE SYMPTOMS ARE NORMAL. YOU MAY EXPERIENCE GENERALIZED MUSCLE ACHES, THROAT IRRITATION, HEADACHE AND/OR SOME NAUSEA.    FOR 24 HOURS DO NOT:  Drive, operate machinery or run household appliances.  Drink beer or alcoholic beverages.   Make important decisions or sign legal documents.    SPECIAL INSTRUCTIONS: *OXYCODONE GIVEN AT 12:05  OK  PAIN MEDICATION AT  4:00 P.M.  TRAMADOL TAKEN AT 2:OO P.M. MAY TAKE TRAMADOL AT 6:00 P.M.**    DIET: To avoid nausea, slowly advance diet as tolerated, avoiding spicy or greasy foods for the first day.  Add more substantial food to your diet according to your physician's instructions.  Babies can be fed formula or breast milk as soon as they are hungry.  INCREASE FLUIDS AND FIBER TO AVOID CONSTIPATION.    SURGICAL DRESSING/BATHING: ***    FOLLOW-UP APPOINTMENT:  A follow-up appointment should be arranged with your doctor; call to schedule.    You should CALL YOUR PHYSICIAN if you develop:  Fever greater than 101 degrees F.  Pain not relieved by medication, or persistent nausea or vomiting.  Excessive bleeding (blood soaking through dressing) or unexpected drainage from the wound.  Extreme redness or swelling around the incision site, drainage of pus or foul smelling drainage.  Inability to urinate or empty your bladder within 8 hours.  Problems with breathing or chest pain.    You should call 911 if you develop problems with breathing or chest pain.  If you are unable to contact your doctor or surgical center, you should go to the nearest emergency room or urgent care center.    Physician's telephone #: *[phone]**    If any questions arise, call your doctor.  If your doctor is not available, please feel free to call the Surgical Center at [phone].  The Center is open Monday through Friday from 7AM to 7PM.  You can also call the "[employer] HOTLINE open 24 hours/day, 7 days/week and speak to a nurse at [phone], or toll free at [phone].    A registered nurse may call you a few days after your surgery to see how you are doing after your procedure.    MEDICATIONS: Resume taking daily medication.  Take prescribed pain medication with food.  If no medication is prescribed, you may take non-aspirin pain medication if needed.  PAIN MEDICATION CAN BE VERY CONSTIPATING.  Take a stool softener or laxative such as senokot, pericolace,  or milk of magnesia if needed.        If your physician has prescribed pain medication that includes Acetaminophen (Tylenol), do not take additional Acetaminophen (Tylenol) while taking the prescribed medication.    Depression / Suicide Risk    As you are discharged from this [facility] facility, it is important to learn how to keep safe from harming yourself.    Recognize the warning signs:  · Abrupt changes in personality, positive or negative- including increase in energy   · Giving away possessions  · Change in eating patterns- significant weight changes-  positive or negative  · Change in sleeping patterns- unable to sleep or sleeping all the time   · Unwillingness or inability to communicate  · Depression  · Unusual sadness, discouragement and loneliness  · Talk of wanting to die  · Neglect of personal appearance   · Rebelliousness- reckless behavior  · Withdrawal from people/activities they love  · Confusion- inability to concentrate     If you or a loved one observes any of these behaviors or has concerns about self-harm, here's what you can do:  · Talk about it- your feelings and reasons for harming yourself  · Remove any means that you might use to hurt yourself (examples: pills, rope, extension cords, firearm)  · Get professional help from the community (Mental Health, Substance Abuse, psychological counseling)  · Do not be alone:Call your Safe Contact- someone whom you trust who will be there for you.  · Call your local CRISIS HOTLINE 105-1556 or 624-298-3327  · Call your local Children's Mobile Crisis Response Team [place] (492) 328-7675 or www.[employer]  · Call the toll free National Suicide Prevention Hotlines   · National Suicide Prevention Lifeline 528-475-OKZI (4454)  National Hope Line Network 800-SUICIDE (896-7173)

## 2017-02-06 NOTE — IP AVS SNAPSHOT
2/6/2017          Ashly Meyer  [address]  [place] [address]    Dear Ashly:    [facility] wants to ensure your discharge home is safe and you or your loved ones have had all your questions answered regarding your care after you leave the hospital.    You may receive a telephone call within two days of your discharge.  This call is to make certain you understand your discharge instructions as well as ensure we provided you with the best care possible during your stay with us.     The call will only last approximately 3-5 minutes and will be done by a nurse.    Once again, we want to ensure your discharge home is safe and that you have a clear understanding of any next steps in your care.  If you have any questions or concerns, please do not hesitate to contact us, we are here for you.  Thank you for choosing [facility] for your healthcare needs.    Sincerely,    Edgar Orourke    [facility]

## 2017-02-06 NOTE — IP AVS SNAPSHOT
" Home Care Instructions                                                                                                                Name:Ashly Meyer  Medical Record Number:[id number]  CSN: [id number]    YOB: 1950   Age: 66 y.o.  Sex: female  HT:1.676 m (5' 6\") WT: 124.1 kg (273 lb 9.5 oz)          Admit Date: 2/6/2017     Discharge Date:   Today's Date: 2/6/2017  Attending Doctor:  Pau Foster M.D.                  Allergies:  Ace inhibitors; Butalbital-acetaminophen; Cefaclor; Diphenhydramine; Iodine; Lamictal; Morphine; Penicillins; Savella; Sulfa drugs; Tape; and Tizanidine hcl                Discharge Instructions           GASTROSCOPY OR ERCP  1. Don't eat or drink anything for about an hour after the test. You can then resume your regular diet.   2. Don't drive or drink alcohol for 24 hours. The medication you received will make you too drowsy.  3. Don't take any coffee, tea, or aspirin products until after you see your doctor. These can harm the lining of your stomach.  4. If you begin to vomit bloody material, or develop black or bloody stools, call your doctor as soon as possible.   5. If you have any neck, chest, abdominal pain or temp over 100 degrees call your doctor.   6. See your doctor on: ***  7. Additional instructions:***  8. Prescriptions:***    COLONOSCOPY OR FLEXIBLE SIGMOIDOSCOPY  1. If you received a barium enema, take a mild laxative such as dulcolax to clean out the barium.   2. Drink plenty of fluids. Eat a diet high in fiber; such foods as whole-grain breads, fresh fruit and vegetables, nuts are recommended.  3. You may notice a few drops of blood with your first bowel movement. If you develop any large amount of bleeding, black stools, a fever, or abdominal pain, call your doctor right away.   4. Call your doctor for test results in *** days.  5. Don't drive or drink alcohol for 24 hours. The medication you received will make you too drowsy.   6. No heavy lifting, " ASA products or ASA x 5 days ***      ACTIVITY: Rest and take it easy for the first 24 hours.  A responsible adult is recommended to remain with you during that time.  It is normal to feel sleepy.  We encourage you to not do anything that requires balance, judgment or coordination.    MILD FLU-LIKE SYMPTOMS ARE NORMAL. YOU MAY EXPERIENCE GENERALIZED MUSCLE ACHES, THROAT IRRITATION, HEADACHE AND/OR SOME NAUSEA.    FOR 24 HOURS DO NOT:  Drive, operate machinery or run household appliances.  Drink beer or alcoholic beverages.   Make important decisions or sign legal documents.    SPECIAL INSTRUCTIONS: *OXYCODONE GIVEN AT 12:05  OK  PAIN MEDICATION AT 4:00 P.M.  TRAMADOL TAKEN AT 2:OO P.M. MAY TAKE TRAMADOL AT 6:00 P.M.**    DIET: To avoid nausea, slowly advance diet as tolerated, avoiding spicy or greasy foods for the first day.  Add more substantial food to your diet according to your physician's instructions.  Babies can be fed formula or breast milk as soon as they are hungry.  INCREASE FLUIDS AND FIBER TO AVOID CONSTIPATION.    SURGICAL DRESSING/BATHING: ***    FOLLOW-UP APPOINTMENT:  A follow-up appointment should be arranged with your doctor; call to schedule.    You should CALL YOUR PHYSICIAN if you develop:  Fever greater than 101 degrees F.  Pain not relieved by medication, or persistent nausea or vomiting.  Excessive bleeding (blood soaking through dressing) or unexpected drainage from the wound.  Extreme redness or swelling around the incision site, drainage of pus or foul smelling drainage.  Inability to urinate or empty your bladder within 8 hours.  Problems with breathing or chest pain.    You should call 911 if you develop problems with breathing or chest pain.  If you are unable to contact your doctor or surgical center, you should go to the nearest emergency room or urgent care center.    Physician's telephone #: *[phone]**    If any questions arise, call your doctor.  If your doctor is not available,  please feel free to call the Surgical Center at [phone].  The Center is open Monday through Friday from 7AM to 7PM.  You can also call the HEALTH HOTLINE open 24 hours/day, 7 days/week and speak to a nurse at [phone], or toll free at [phone].    A registered nurse may call you a few days after your surgery to see how you are doing after your procedure.    MEDICATIONS: Resume taking daily medication.  Take prescribed pain medication with food.  If no medication is prescribed, you may take non-aspirin pain medication if needed.  PAIN MEDICATION CAN BE VERY CONSTIPATING.  Take a stool softener or laxative such as senokot, pericolace, or milk of magnesia if needed.        If your physician has prescribed pain medication that includes Acetaminophen (Tylenol), do not take additional Acetaminophen (Tylenol) while taking the prescribed medication.    Depression / Suicide Risk    As you are discharged from this [facility] facility, it is important to learn how to keep safe from harming yourself.    Recognize the warning signs:  · Abrupt changes in personality, positive or negative- including increase in energy   · Giving away possessions  · Change in eating patterns- significant weight changes-  positive or negative  · Change in sleeping patterns- unable to sleep or sleeping all the time   · Unwillingness or inability to communicate  · Depression  · Unusual sadness, discouragement and loneliness  · Talk of wanting to die  · Neglect of personal appearance   · Rebelliousness- reckless behavior  · Withdrawal from people/activities they love  · Confusion- inability to concentrate     If you or a loved one observes any of these behaviors or has concerns about self-harm, here's what you can do:  · Talk about it- your feelings and reasons for harming yourself  · Remove any means that you might use to hurt yourself (examples: pills, rope, extension cords, firearm)  · Get professional help from the community (Mental  Health, Substance Abuse, psychological counseling)  · Do not be alone:Call your Safe Contact- someone whom you trust who will be there for you.  · Call your local CRISIS HOTLINE 570-4118 or 121-811-9979  · Call your local Children's Mobile Crisis Response Team [place] (085) 734-3655 or www.[employer]  · Call the toll free National Suicide Prevention Hotlines   · National Suicide Prevention Lifeline 506-686-ONCQ (6667)  [facility] Line Network 800-SUICIDE (924-1851)    Additional Information     Discharge Education for patients on KERRI (Obstructive Sleep Apnea) Protocol    Prior to receiving sedation or anesthesia, we screen all patients for Obstructive Sleep Apnea.  During your screening, you were identified as having suspected, but not confirmed Obstructive Sleep Apnea(KERRI).    What is Obstructive Sleep Apnea?  Sleep apnea (AP-ne-ah) is a common disorder which involves breathing pauses that occur during sleep.  These can last from 10 seconds to a minute or longer.  Normal breathing resumes often with a loud snort or choking sound.    Sleep apnea occurs in all age groups and both genders but is more common in men and people over 40 years of age.  It has been estimated that as many as 18 million Americans have sleep apnea.  Most people who have sleep apnea don’t know they have it because it only occurs during sleep.  A family member and/or bed partner may first notice the signs of sleep apnea.  Sleep apnea is a chronic (ongoing) condition that disrupts the quality and quantity of your sleep repeatedly throughout the night.  This often results in excessive daytime sleepiness or fatigue during the day.  It may also contribute to high blood pressure, heart problems, and complications following medications used for surgery and procedures.    To establish a definitive diagnosis, further testing from a specialist would be needed.  We recommend that you follow up with your primary care physician.    We  recommend that you should be with an adult observer for at least 24 hours after your sedation/anesthesia.  If you have a CPAP machine, you should wear it during any sleep period (day or night) for the week following your procedure.  We encourage you to sleep on your side or in a sitting position, even with napping.  Lying flat on your back increases the risk of apnea and airway obstruction during your post procedure recovery period.    It is important to prevent over-sedation that could increase your risk for apnea.  Please take all pain medication as directed by your physician.  If you are not getting pain relief, please contact your physician to discuss possible approaches to relieving pain while minimizing medications that can affect your breathing and oxygen levels.                 Medication List      ASK your doctor about these medications        Instructions    carvedilol 12.5 MG Tabs   Commonly known as:  COREG    TAKE 1 TABLET BY MOUTH TWICE A DAY WITH MEALS       FIBER SELECT GUMMIES PO    Take  by mouth every day. Pt takes 2 once a day.       Fluocinolone Acetonide 0.01 % Oil    Place  in ear 2 Times a Day.       furosemide 20 MG Tabs   Commonly known as:  LASIX    TAKE 1 TABLET BY MOUTH EVERY DAY       ketoconazole 2 % Crea   Commonly known as:  NIZORAL    Apply  to affected area(s) every day.       levothyroxine 150 MCG Tabs   Commonly known as:  SYNTHROID    TAKE 1 TABLET BY MOUTH EVERY DAY       metronidazole 0.75 % gel   Commonly known as:  METROGEL    Apply  to affected area(s) 2 times a day.       nabumetone 750 MG Tabs   Commonly known as:  RELAFEN    TAKE 1 TABLET BY MOUTH TWICE A DAY WITH MEALS       OXYGEN-HELIUM INH    Inhale 2-3 L by mouth as needed.   Dose:  2-3 L       pantoprazole 40 MG Tbec   Commonly known as:  PROTONIX    Take 1 Tab by mouth every day.   Dose:  40 mg       polyethylene glycol/lytes Pack   Commonly known as:  MIRALAX    Take 17 g by mouth 3 times a day.   Dose:  17 g         potassium chloride SA 20 MEQ Tbcr   Commonly known as:  Kdur    TAKE 1 TABLET BY MOUTH TWICE A DAY       sertraline 100 MG Tabs   Commonly known as:  ZOLOFT    TAKE 1 TABLET BY MOUTH EVERY DAY       spironolactone 50 MG Tabs   Commonly known as:  ALDACTONE    Take 1 Tab by mouth every day.   Dose:  50 mg       temazepam 15 MG Caps   Commonly known as:  RESTORIL    Doctor's comments:  She is now off all narcotics   Take 1 Cap by mouth at bedtime as needed for Sleep.   Dose:  15 mg       tramadol 50 MG Tabs   Commonly known as:  ULTRAM    Doctor's comments:  [facility] board of pharmacy interface checked today,  shows no inconsistencies.  Seen 1/5/17, renewal of chronic medication   Take 1-2 Tabs by mouth every four hours as needed for Moderate Pain.   Dose:   mg               Medication Information     Above and/or attached are the medications your physician expects you to take upon discharge. Review all of your home medications and newly ordered medications with your doctor and/or pharmacist. Follow medication instructions as directed by your doctor and/or pharmacist. Please keep your medication list with you and share with your physician. Update the information when medications are discontinued, doses are changed, or new medications (including over-the-counter products) are added; and carry medication information at all times in the event of emergency situations.        Resources     Quit Smoking / Tobacco Use:    I understand the use of any tobacco products increases my chance of suffering from future heart disease or stroke and could cause other illnesses which may shorten my life. Quitting the use of tobacco products is the single most important thing I can do to improve my health. For further information on smoking / tobacco cessation call a Toll Free Quit Line at 1-257.304.8608 (*[employer]) or 1-121.725.3470 (American Lung Association) or you can access the web based program at  www.lungusa.org.    [place] Tobacco Users Help Line:  (808) 195-9047       Toll Free: 1-479.737.1992  Quit Tobacco Program [facility] (064)446-0876    Crisis Hotline:    [facility] Crisis Hotline:  7-935-UJEKZOV or 1-650.249.1887    [place] Crisis Hotline:    1-955.111.7577 or 539-902-5958    Discharge Survey:   Thank you for choosing [facility]. We hope we did everything we could to make your hospital stay a pleasant one. You may be receiving a survey and we would appreciate your time and participation in answering the questions. Your input is very valuable to us in our efforts to improve our service to our patients and their families.            Signatures     My signature on this form indicates that:    1. I acknowledge receipt and understanding of these Home Care Instruction.  2. My questions regarding this information have been answered to my satisfaction.  3. I have formulated a plan with my discharge nurse to obtain my prescribed medications for home.    __________________________________      __________________________________                   Patient Signature                                 Guardian/Responsible Adult Signature      __________________________________                 __________       ________                       Nurse Signature                                               Date                 Time

## 2017-02-06 NOTE — OP REPORT
Operative Note    Service date: 2/6/2017     PreOp Diagnosis: GERD, Constipation, right abd pain    PostOp Diagnosis:   Gastritis  Duodenitis  Mild pan-diverticulosis  Sigmoid polyp X 1  Rectal polyps X 2    Procedure(s):  GASTROSCOPY -  COLONOSCOPY    Surgeon(s):  Pau Foster M.D.    Anesthesiologist/Type of Anesthesia:  Anesthesiologist: Timbo Black M.D./General    Surgical Staff:  Circulator: Liliana Quarles R.N.; Kelly Calabrese R.N.  Endoscopy Technician: Axel Augustin    Specimen:   1. Duodenal bx r/o Brunner's gland hyperplasia  2. Gastric antrum r/o gastritis  3. GE junction r/o esophagitis  4. Right Colon bx r/o colitis  5. Sigmoid polyp X 1 6 mm s/p biopsy polypectomy  6. Rectal polyp X 2 3 mm each s/p biopsy polypectomy    Estimated Blood Loss: minimal    Anesthesia/Medications:  see anesthesia note     COMPLICATIONS:  No immediate complications.    PROCEDURE IN DETAIL:      -Prior to the procedure, a History and Physical was performed, and patient medications and allergies were reviewed. The patient’s tolerance of previous anesthesia was also reviewed. The risks and benefits of the procedure and the sedation options and risks were discussed with the patient. All questions were answered, and informed consent was obtained. The patient was deemed in satisfactory condition to undergo the procedure.    -Prior Anticoagulants: the patient has taken no previous anticoagulant or antiplatelet agents.    -ASA Grade Assessment: III    -The patient was placed in the left lateral decubitus position. The scope was passed under direct vision. Continuous oxygen was provided via ET tube and intravenous sedation was administered in divided doses throughout the procedure. The patient’s blood pressure, pulse, and oxygen saturation were monitored continuously throughout the procedure.    -The gastroscope was gently advanced under direct visualization over the tongue, down the esophagus, through the stomach and  into the 2nd portion of the duodenum. The color, texture, mucosa and anatomy of esophagus, stomach and duodenum were carefully examined with the scope. The scope was then withdrawn from the patient and the procedure terminated.     -The colonoscope was gently introduced through the anus and advanced to the cecum, identified by appendiceal orifice & ileocecal valve. The scope was then fully withdrawn while examining the color, texture, anatomy and integrity of the mucosa from the cecum to the anal canal. The scope was completely retrieved upon exiting the anal canal and the procedure was terminated. The colonoscopy was performed with great difficulty due to redundant colon. Counter-pressure was applied. The patient tolerated the procedure well. The quality of the bowel preparation was good.    -The patient tolerated the procedure well and there were no immediate complications. The patient was then transferred to the recovery room in stable condition.    Findings and ENDOSCOPIC DIAGNOSIS:    EGD:  1. Duodenitis, mild, duodenal bulb, s/p biopsy at bulb and 2nd portion  2. Gastritis, antrum, mild, s/p biopsy  3. Normal GE junction at 37 cm, s/p biopsy    COL:   1. Slightly edematous right colon, s/p biopsy  2. Sigmoid colon polyp, 6 mm, s/p biopsy polypectomy  3. Rectal polyps X 2, 3 mm, s/p biopsy polypectomy    RECOMMENDATIONS:    1. Ok to discharge home  2. A letter will be sent in 2 weeks  3. Continue Miralax  4. F/U with Dr. Foster at [facility] in 6 weeks    2/6/2017 8:33 AM Pau Foster

## 2017-03-16 ENCOUNTER — HOSPITAL ENCOUNTER (OUTPATIENT)
Dept: RADIOLOGY | Facility: MEDICAL CENTER | Age: 67
End: 2017-03-16
Attending: PHYSICAL MEDICINE & REHABILITATION
Payer: MEDICARE

## 2017-03-16 VITALS
RESPIRATION RATE: 16 BRPM | OXYGEN SATURATION: 90 % | WEIGHT: 278 LBS | HEIGHT: 66 IN | TEMPERATURE: 98 F | DIASTOLIC BLOOD PRESSURE: 78 MMHG | BODY MASS INDEX: 44.68 KG/M2 | HEART RATE: 64 BPM | SYSTOLIC BLOOD PRESSURE: 167 MMHG

## 2017-03-16 DIAGNOSIS — M54.2 NECK PAIN: ICD-10-CM

## 2017-03-16 DIAGNOSIS — M54.6 THORACIC SPINE PAIN: ICD-10-CM

## 2017-03-16 DIAGNOSIS — M54.5 LOW BACK PAIN, UNSPECIFIED BACK PAIN LATERALITY, UNSPECIFIED CHRONICITY, WITH SCIATICA PRESENCE UNSPECIFIED: ICD-10-CM

## 2017-03-16 PROCEDURE — 72148 MRI LUMBAR SPINE W/O DYE: CPT

## 2017-03-16 PROCEDURE — 72141 MRI NECK SPINE W/O DYE: CPT

## 2017-03-16 PROCEDURE — 700111 HCHG RX REV CODE 636 W/ 250 OVERRIDE (IP)

## 2017-03-16 PROCEDURE — 01922 ANES N-INVAS IMG/RADJ THER: CPT

## 2017-03-16 PROCEDURE — 72146 MRI CHEST SPINE W/O DYE: CPT

## 2017-03-16 ASSESSMENT — PAIN SCALES - GENERAL
PAINLEVEL_OUTOF10: 0

## 2017-03-16 NOTE — IP AVS SNAPSHOT
" <p align=\"LEFT\"><IMG SRC=\"//[email or web address]\" alt=\"Image\" WIDTH=\"50%\" HEIGHT=\"200\" BORDER=\"\"></p>      `           Ashly Meyer   MRN: [id number]    Department:  [facility] [address]   Date of Visit:              `  Discharge Instructions       MRI ADULT DISCHARGE INSTRUCTIONS    You have been medicated today for your scan. Please follow the instructions below to ensure your safe recovery. If you have any questions or problems, feel free to call us at [phone] or [phone].     1.   Have someone stay with you to assist you as needed.    2.   Do not drive or operate any mechanical devices.    3.   Do not perform any activity that requires concentration. Make no major decisions over the next 24 hours.     4.   Be careful changing positions from laying down to sitting or standing, as you may become dizzy.     5.   Do not drink alcohol for 48 hours.    6.   There are no restrictions for eating your normal meals. Drink fluids.    7.   You may continue your usual medications for pain, tranquilizers, muscle relaxants or sedatives when awake.     8.   Tomorrow, you may continue your normal daily activities.     9.   Pressure dressing on 10 - 15 minutes. If swelling or bleeding occurs when removed, continue placing direct pressure on injection site for another 5 minutes, or until bleeding stops.     I have been informed of and understand the above discharge instructions.      `       Diet / Nutrition:    Follow any diet instructions given to you by your doctor or the dietician, including how much salt (sodium) you are allowed each day.    If you are overweight, talk to your doctor about a weight reduction plan.    Activity:    Remain physically active following your doctor's instructions about exercise and activity.    Rest often.     Any time you become even a little tired or short of breath, SIT DOWN and rest.    Worsening Symptoms:    Report any of the following signs and symptoms to the " doctor's office immediately:    *Pain of jaw, arm, or neck  *Chest pain not relieved by medication                               *Dizziness or loss of consciousness  *Difficulty breathing even when at rest   *More tired than usual                                       *Bleeding drainage or swelling of surgical site  *Swelling of feet, ankles, legs or stomach                 *Fever (>100ºF)  *Pink or blood tinged sputum  *Weight gain (3lbs/day or 5lbs /week)           *Shock from internal defibrillator (if applicable)  *Palpitations or irregular heartbeats                *Cool and/or numb extremities    Stroke Awareness    Common Risk Factors for Stroke include:    Age  Atrial Fibrillation  Carotid Artery Stenosis  Diabetes Mellitus  Excessive alcohol consumption  High blood pressure  Overweight   Physical inactivity  Smoking    Warning signs and symptoms of a stroke include:    *Sudden numbness or weakness of the face, arm or leg (especially on one side of the body).  *Sudden confusion, trouble speaking or understanding.  *Sudden trouble seeing in one or both eyes.  *Sudden trouble walking, dizziness, loss of balance or coordination.Sudden severe headache with no known cause.    It is very important to get treatment quickly when a stroke occurs. If you experience any of the above warning signs, call 911 immediately.                    `     Quit Smoking / Tobacco Use:    I understand the use of any tobacco products increases my chance of suffering from future heart disease or stroke and could cause other illnesses which may shorten my life. Quitting the use of tobacco products is the single most important thing I can do to improve my health. For further information on smoking / tobacco cessation call a Toll Free Quit Line at 1-274.784.3989 (*[employer] [place]) or 1-886.472.6931 (American Lung Association) or you can access the web based program at www.lungusa.org.    [place] Tobacco Users Help Line:  (985)  873-3956       Toll Free: 1-356-622-4361  Quit Tobacco Program [facility] (608)461-7338    Crisis Hotline:    [place] Crisis Hotline:  2-754-ILDRHEF or 1-252.550.2996    [place] Crisis Hotline:    1-410.766.7820 or 730-414-0192    Discharge Survey:   Thank you for choosing [facility]. We hope we did everything we could to make your hospital stay a pleasant one. You may be receiving a phone survey and we would appreciate your time and participation in answering the questions. Your input is very valuable to us in our efforts to improve our service to our patients and their families.        My signature on this form indicates that:    1. I have reviewed and understand the above information.  2. My questions regarding this information have been answered to my satisfaction.  3. I have formulated a plan with my discharge nurse to obtain my prescribed medications for home.                   `           Patient or Caregiver Signature:  ____________________________________________________________    Date:  ____________________________________________________________       `

## 2017-03-16 NOTE — DISCHARGE INSTRUCTIONS
MRI ADULT DISCHARGE INSTRUCTIONS    You have been medicated today for your scan. Please follow the instructions below to ensure your safe recovery. If you have any questions or problems, feel free to call us at [phone] or [phone].     1.   Have someone stay with you to assist you as needed.    2.   Do not drive or operate any mechanical devices.    3.   Do not perform any activity that requires concentration. Make no major decisions over the next 24 hours.     4.   Be careful changing positions from laying down to sitting or standing, as you may become dizzy.     5.   Do not drink alcohol for 48 hours.    6.   There are no restrictions for eating your normal meals. Drink fluids.    7.   You may continue your usual medications for pain, tranquilizers, muscle relaxants or sedatives when awake.     8.   Tomorrow, you may continue your normal daily activities.     9.   Pressure dressing on 10 - 15 minutes. If swelling or bleeding occurs when removed, continue placing direct pressure on injection site for another 5 minutes, or until bleeding stops.     I have been informed of and understand the above discharge instructions.

## 2017-03-30 ENCOUNTER — TELEPHONE (OUTPATIENT)
Dept: MEDICAL GROUP | Facility: CLINIC | Age: 67
End: 2017-03-30

## 2017-03-30 DIAGNOSIS — G47.33 OBSTRUCTIVE SLEEP APNEA SYNDROME: ICD-10-CM

## 2017-03-30 NOTE — TELEPHONE ENCOUNTER
"1. Caller Name: Ashly Meyer                      Call Back Number: [phone] (home)     2. Message: pt called her insurance requires her to have a \"titration sleep study\" to have her CPAP approve please order and let her know where she can go for it.     3. Patient approves office to leave a detailed voicemail/MyChart message: yes      "

## 2017-04-17 ENCOUNTER — TELEPHONE (OUTPATIENT)
Dept: MEDICAL GROUP | Facility: CLINIC | Age: 67
End: 2017-04-17

## 2017-04-17 ENCOUNTER — OFFICE VISIT (OUTPATIENT)
Dept: MEDICAL GROUP | Facility: CLINIC | Age: 67
End: 2017-04-17
Payer: MEDICARE

## 2017-04-17 VITALS
RESPIRATION RATE: 16 BRPM | SYSTOLIC BLOOD PRESSURE: 124 MMHG | HEIGHT: 66 IN | WEIGHT: 282 LBS | OXYGEN SATURATION: 92 % | BODY MASS INDEX: 45.32 KG/M2 | HEART RATE: 78 BPM | TEMPERATURE: 97.6 F | DIASTOLIC BLOOD PRESSURE: 70 MMHG

## 2017-04-17 DIAGNOSIS — L08.9 SKIN INFECTION: ICD-10-CM

## 2017-04-17 PROCEDURE — 3014F SCREEN MAMMO DOC REV: CPT | Performed by: NURSE PRACTITIONER

## 2017-04-17 PROCEDURE — 1036F TOBACCO NON-USER: CPT | Performed by: NURSE PRACTITIONER

## 2017-04-17 PROCEDURE — 99213 OFFICE O/P EST LOW 20 MIN: CPT | Performed by: NURSE PRACTITIONER

## 2017-04-17 PROCEDURE — 4040F PNEUMOC VAC/ADMIN/RCVD: CPT | Performed by: NURSE PRACTITIONER

## 2017-04-17 PROCEDURE — 1101F PT FALLS ASSESS-DOCD LE1/YR: CPT | Performed by: NURSE PRACTITIONER

## 2017-04-17 PROCEDURE — G8432 DEP SCR NOT DOC, RNG: HCPCS | Performed by: NURSE PRACTITIONER

## 2017-04-17 PROCEDURE — G8419 CALC BMI OUT NRM PARAM NOF/U: HCPCS | Performed by: NURSE PRACTITIONER

## 2017-04-17 RX ORDER — DOXYCYCLINE HYCLATE 100 MG
100 TABLET ORAL 2 TIMES DAILY
Qty: 14 TAB | Refills: 0 | Status: SHIPPED | OUTPATIENT
Start: 2017-04-17 | End: 2017-04-24

## 2017-04-17 RX ORDER — DULOXETIN HYDROCHLORIDE 60 MG/1
60 CAPSULE, DELAYED RELEASE ORAL 2 TIMES DAILY
COMMUNITY
End: 2017-07-21

## 2017-04-17 ASSESSMENT — ENCOUNTER SYMPTOMS
CHILLS: 0
FEVER: 0

## 2017-04-17 NOTE — MR AVS SNAPSHOT
"        Ashly Mckenzie Betsy   2017 3:20 PM   Office Visit   MRN: [id number]    Department:  [facility]   Dept Phone:  [phone]    Description:  Female : 1950   Provider:  VALARIE York           Reason for Visit     Toe Pain Right second toe pain and swelling x 4 days       Allergies as of 2017     Allergen Noted Reactions    Ace Inhibitors 2009       Cough      Butalbital-Acetaminophen 2016       Dizzy, can't walk, hard to focus    Cefaclor 2007       ceclor    Diphenhydramine 2007   Hives    Iodine 2009       Labored breathing    Lamictal 2011   Rash, Swelling    \"hot\", unable to function    Morphine 2011   Itching    redness    Penicillins 2007   Itching, Swelling    Savella [Milnacipran Hcl] 2013       Muscle aches    Sulfa Drugs 2007   Hives, Anxiety    Hard breathing    Tape 2016       Paper tape is ok    Tizanidine Hcl 2014       dizziness      You were diagnosed with     Skin infection   [908558]         Vital Signs     Blood Pressure Pulse Temperature Respirations Height Weight    124/70 mmHg 78 36.4 °C (97.6 °F) 16 1.676 m (5' 6\") 127.914 kg (282 lb)    Body Mass Index Oxygen Saturation Smoking Status             45.54 kg/m2 92% Former Smoker         Basic Information     Date Of Birth Sex Race Ethnicity Preferred Language    1950 Female White Non- English      Problem List              ICD-10-CM Priority Class Noted - Resolved    Essential hypertension I10   2009 - Present    Hypothyroidism due to acquired atrophy of thyroid E03.4   2009 - Present    Depression F32.9   2009 - Present    Eczema, dyshidrotic L30.1   8/3/2009 - Present    Osteoarthritis of multiple joints M15.9   2010 - Present    GERD (gastroesophageal reflux disease) K21.9   12/10/2010 - Present    Polymyalgia rheumatica (CMS-HCC) M35.3   2011 - Present    Seasonal allergies J30.2   2011 - " Present    Carpal tunnel syndrome G56.00   9/5/2011 - Present    Ulnar neuropathy G56.20   9/5/2011 - Present    Migraine without aura G43.009   Unknown - Present    Cervical stenosis of spinal canal M48.02   11/1/2011 - Present    Lumbar radiculopathy M54.16   11/1/2011 - Present    Nocturnal hypoxia G47.34   Unknown - Present    Sleep apnea syndrome G47.30   Unknown - Present    Chronic constipation K59.09   10/2/2013 - Present    Rectocele N81.6   11/25/2013 - Present    Pelvic floor dysfunction N81.84   Unknown - Present    H/O fibromyalgia Z87.39   3/11/2014 - Present    Sleep apnea G47.30   3/11/2014 - Present    Obesity E66.9   3/11/2014 - Present    Menopause Z78.0   3/11/2014 - Present    Peripheral neuropathy (CMS-HCC) G62.9   Unknown - Present    Mild intermittent asthma without complication J45.20   Unknown - Present    Elevated sed rate R70.0   12/9/2014 - Present    Candidal intertrigo B37.2   6/1/2015 - Present    Severe right groin pain R10.30   11/17/2015 - Present    Chronic pain G89.29   11/18/2015 - Present    Lumbar facet arthropathy (HCC) M12.88   12/9/2015 - Present    Anal fissure K60.2   4/14/2016 - Present    Menopausal symptoms N95.1   4/28/2016 - Present    Deviated nasal septum J34.2   8/1/2016 - Present    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   10/3/2016 - Present    Atrophic rhinitis J31.0   10/3/2016 - Present    Nonalcoholic fatty liver disease K76.0   1/5/2017 - Present    Dyslipidemia, goal LDL below 130 E78.5   1/6/2017 - Present    Gastroesophageal reflux disease K21.9   2/6/2017 - Present      Health Maintenance        Date Due Completion Dates    IMM DTaP/Tdap/Td Vaccine (1 - Tdap) 5/4/1969 ---    IMM ZOSTER VACCINE 5/4/2010 ---    MAMMOGRAM 5/13/2018 5/13/2016, 10/24/2014, 5/28/2013, 5/24/2012, 1/28/2011, 10/16/2009, 10/16/2009, 3/24/2006    IMM PNEUMOCOCCAL 65+ (ADULT) LOW/MEDIUM RISK SERIES (2 of 2 - PPSV23) 1/16/2019 1/5/2017, 1/16/2014    BONE DENSITY 9/10/2020 9/10/2015     COLONOSCOPY 2/10/2022 2/10/2017, 2/6/2017            Current Immunizations     13-VALENT PCV PREVNAR 1/5/2017    Influenza TIV (IM) 1/16/2014, 11/20/2012    Influenza Vaccine Adult HD 1/5/2017    Influenza Vaccine Quad Inj (Pf) 10/15/2014    Pneumococcal polysaccharide vaccine (PPSV-23) 1/16/2014      Below and/or attached are the medications your provider expects you to take. Review all of your home medications and newly ordered medications with your provider and/or pharmacist. Follow medication instructions as directed by your provider and/or pharmacist. Please keep your medication list with you and share with your provider. Update the information when medications are discontinued, doses are changed, or new medications (including over-the-counter products) are added; and carry medication information at all times in the event of emergency situations     Allergies:  ACE INHIBITORS - (reactions not documented)     BUTALBITAL-ACETAMINOPHEN - (reactions not documented)     CEFACLOR - (reactions not documented)     DIPHENHYDRAMINE - Hives     IODINE - (reactions not documented)     LAMICTAL - Rash,Swelling     MORPHINE - Itching     PENICILLINS - Itching,Swelling     SAVELLA - (reactions not documented)     SULFA DRUGS - Hives,Anxiety     TAPE - (reactions not documented)     TIZANIDINE HCL - (reactions not documented)               Medications  Valid as of: April 17, 2017 -  5:09 PM    Generic Name Brand Name Tablet Size Instructions for use    Carvedilol (Tab) COREG 12.5 MG TAKE 1 TABLET BY MOUTH TWICE A DAY WITH MEALS        Doxycycline Hyclate (Tab) VIBRAMYCIN 100 MG Take 1 Tab by mouth 2 times a day for 7 days. Start taking antibiotic if not feeling better in the next 1-2 days.        DULoxetine HCl (Cap DR Particles) CYMBALTA 60 MG Take 60 mg by mouth 2 times a day.        Fiber   Take  by mouth every day. Pt takes 2 once a day.        Fluocinolone Acetonide (Oil) Fluocinolone Acetonide 0.01 % Place  in ear 2  Times a Day.        Furosemide (Tab) LASIX 20 MG TAKE 1 TABLET BY MOUTH EVERY DAY        Ketoconazole (Cream) NIZORAL 2 % Apply  to affected area(s) every day.        Levothyroxine Sodium (Tab) SYNTHROID 150 MCG TAKE 1 TABLET BY MOUTH EVERY DAY        MetroNIDAZOLE (Gel) METROGEL 0.75 % Apply  to affected area(s) 2 times a day.        Nabumetone (Tab) RELAFEN 750 MG TAKE 1 TABLET BY MOUTH TWICE A DAY WITH MEALS        Oxygen-Helium   Inhale 2-3 L by mouth as needed.        Pantoprazole Sodium (Tablet Delayed Response) PROTONIX 40 MG Take 1 Tab by mouth every day.        Polyethylene Glycol 3350 (Pack) MIRALAX  Take 17 g by mouth 3 times a day.        Potassium Chloride Klarissa CR (Tab CR) Kdur 20 MEQ TAKE 1 TABLET BY MOUTH TWICE A DAY        Sertraline HCl (Tab) ZOLOFT 100 MG TAKE 1 TABLET BY MOUTH EVERY DAY        Spironolactone (Tab) ALDACTONE 50 MG Take 1 Tab by mouth every day.        Temazepam (Cap) RESTORIL 15 MG Take 1 Cap by mouth at bedtime as needed for Sleep.        TraMADol HCl (Tab) ULTRAM 50 MG Take 1-2 Tabs by mouth every four hours as needed for Moderate Pain.        .                 Medicines prescribed today were sent to:     [id number] [place], [place] - [address] [place] [id number]    Phone: [phone] Fax: [phone]    Open 24 Hours?: No      Medication refill instructions:       If your prescription bottle indicates you have medication refills left, it is not necessary to call your provider’s office. Please contact your pharmacy and they will refill your medication.    If your prescription bottle indicates you do not have any refills left, you may request refills at any time through one of the following ways: The online [employer] system (except Urgent Care), by calling your provider’s office, or by asking your pharmacy to contact your provider’s office with a refill request. Medication refills are processed only during regular business hours and may not  be available until the next business day. Your provider may request additional information or to have a follow-up visit with you prior to refilling your medication.   *Please Note: Medication refills are assigned a new Rx number when refilled electronically. Your pharmacy may indicate that no refills were authorized even though a new prescription for the same medication is available at the pharmacy. Please request the medicine by name with the pharmacy before contacting your provider for a refill.           [employer] Access Code: Activation code not generated  Current [employer] Status: Active

## 2017-04-17 NOTE — PROGRESS NOTES
Chief Complaint   Patient presents with   • Toe Pain     Right second toe pain and swelling x 4 days        HISTORY OF PRESENT ILLNESS: Patient is a 66 y.o. female established patient who presents today due to right second toe pain and swelling for 4 days. Pt reports that due to her neuropathy that she can not feel too much outside but she can feels the pain from inside of her right second toe. She reports improved swelling but redness has been the same and the pain seems to be worsening. Pt denied fever or chills. No open wound or discharge noted.       Patient Active Problem List    Diagnosis Date Noted   • Gastroesophageal reflux disease 02/06/2017   • Dyslipidemia, goal LDL below 130 01/06/2017   • Nonalcoholic fatty liver disease 01/05/2017   • Disease of accessory sinus 10/03/2016   • Atrophic rhinitis 10/03/2016   • Deviated nasal septum 08/01/2016   • Menopausal symptoms 04/28/2016   • Anal fissure 04/14/2016   • Lumbar facet arthropathy (HCC) 12/09/2015   • Chronic pain 11/18/2015   • Severe right groin pain 11/17/2015   • Candidal intertrigo 06/01/2015   • Elevated sed rate 12/09/2014   • Mild intermittent asthma without complication    • H/O fibromyalgia 03/11/2014   • Sleep apnea 03/11/2014   • Obesity 03/11/2014   • Menopause 03/11/2014   • Peripheral neuropathy (CMS-HCC)    • Rectocele 11/25/2013   • Pelvic floor dysfunction    • Chronic constipation 10/02/2013   • Nocturnal hypoxia    • Sleep apnea syndrome    • Cervical stenosis of spinal canal 11/01/2011   • Lumbar radiculopathy 11/01/2011   • Migraine without aura    • Carpal tunnel syndrome 09/05/2011   • Ulnar neuropathy 09/05/2011   • Seasonal allergies 07/08/2011   • Polymyalgia rheumatica (CMS-HCC) 05/06/2011   • GERD (gastroesophageal reflux disease) 12/10/2010   • Osteoarthritis of multiple joints 04/13/2010   • Eczema, dyshidrotic 08/03/2009   • Essential hypertension 05/20/2009   • Hypothyroidism due to acquired atrophy of thyroid  05/20/2009   • Depression 05/20/2009       Allergies:Ace inhibitors; Butalbital-acetaminophen; Cefaclor; Diphenhydramine; Iodine; Lamictal; Morphine; Penicillins; Savella; Sulfa drugs; Tape; and Tizanidine hcl    Current Outpatient Prescriptions   Medication Sig Dispense Refill   • duloxetine (CYMBALTA) 60 MG Cap DR Particles delayed-release capsule Take 60 mg by mouth 2 times a day.     • ketoconazole (NIZORAL) 2 % Cream Apply  to affected area(s) every day.     • metronidazole (METROGEL) 0.75 % gel Apply  to affected area(s) 2 times a day.     • polyethylene glycol/lytes (MIRALAX) Pack Take 17 g by mouth 3 times a day.     • tramadol (ULTRAM) 50 MG Tab Take 1-2 Tabs by mouth every four hours as needed for Moderate Pain. 180 Tab 5   • nabumetone (RELAFEN) 750 MG Tab TAKE 1 TABLET BY MOUTH TWICE A DAY WITH MEALS 60 Tab 6   • levothyroxine (SYNTHROID) 150 MCG Tab TAKE 1 TABLET BY MOUTH EVERY DAY 90 Tab 2   • carvedilol (COREG) 12.5 MG Tab TAKE 1 TABLET BY MOUTH TWICE A DAY WITH MEALS 180 Tab 2   • potassium chloride SA (K-DUR) 20 MEQ Tab CR TAKE 1 TABLET BY MOUTH TWICE A  Tab 2   • OXYGEN-HELIUM INH Inhale 2-3 L by mouth as needed.     • Fluocinolone Acetonide 0.01 % Oil Place  in ear 2 Times a Day.     • furosemide (LASIX) 20 MG Tab TAKE 1 TABLET BY MOUTH EVERY DAY 90 Tab 3   • spironolactone (ALDACTONE) 50 MG Tab Take 1 Tab by mouth every day. 90 Tab 3   • pantoprazole (PROTONIX) 40 MG Tablet Delayed Response Take 1 Tab by mouth every day. 90 Tab 3   • temazepam (RESTORIL) 15 MG Cap Take 1 Cap by mouth at bedtime as needed for Sleep. 30 Cap 3   • FIBER SELECT GUMMIES PO Take  by mouth every day. Pt takes 2 once a day.     • sertraline (ZOLOFT) 100 MG Tab TAKE 1 TABLET BY MOUTH EVERY DAY 90 Tab 6     No current facility-administered medications for this visit.       Social History   Substance Use Topics   • Smoking status: Former Smoker -- 2.00 packs/day for 42 years     Types: Cigarettes     Quit date:  "2007   • Smokeless tobacco: Never Used      Comment: 1 ppd >20yrs quit    • Alcohol Use: No       Family Status   Relation Status Death Age   • Mother     • Father       Family History   Problem Relation Age of Onset   • Arthritis Sister      gout and lupus   • Cancer Mother      bladder   • Heart Disease Mother    • Other Father      unknown   • GI Sister      celiac   • Cancer Maternal Aunt      bladder   • GI Mother      bile duct problem         ROS:  Review of Systems   Constitutional: Negative for fever and chills.   Skin:        Redness and swelling to her right second toe.         Objective:     Blood pressure 124/70, pulse 78, temperature 36.4 °C (97.6 °F), resp. rate 16, height 1.676 m (5' 6\"), weight 127.914 kg (282 lb), SpO2 92 %.     Physical Exam:  Physical Exam   Constitutional: She is oriented to person, place, and time and well-developed, well-nourished, and in no distress.   HENT:   Head: Normocephalic and atraumatic.   Eyes: Conjunctivae are normal.   Neck: Neck supple. No JVD present.   Cardiovascular: Normal rate.    Pulmonary/Chest: Effort normal.   Musculoskeletal: Normal range of motion. She exhibits no edema.   Neurological: She is alert and oriented to person, place, and time.   Skin: Skin is warm. There is erythema.   A very small blister noted   Vitals reviewed.        Assessment/Plan:  1. Skin infection to right second toe  - doxycycline (VIBRAMYCIN) 100 MG Tab; Take 1 Tab by mouth 2 times a day for 7 days. Start taking antibiotic if not feeling better in the next 1-2 days.  Dispense: 14 Tab; Refill: 0  - call if worsening symptoms after completing antibiotic or new symptoms. Pt verbalized understanding.     "

## 2017-04-17 NOTE — TELEPHONE ENCOUNTER
VOICEMAIL  1. Caller Name: Ashly Meyer                      Call Back Number: [phone] (home)       2. Message: Patient left voicemail on Saturday stating her rt second toe was swollen and was concern with possible neuropathy.  Called patient back and left message letting her know that she will need to be assess and call [phone] for same day appointments or UC if her toe was not better.    3. Patient approves office to leave a detailed voicemail/MyChart message: yes

## 2017-05-03 ENCOUNTER — TELEPHONE (OUTPATIENT)
Dept: MEDICAL GROUP | Facility: CLINIC | Age: 67
End: 2017-05-03

## 2017-05-03 NOTE — TELEPHONE ENCOUNTER
ESTABLISHED PATIENT PRE-VISIT PLANNING     Note: Patient will not be contacted if there is no indication to call.     1.  Reviewed note from last office visit with PCP and/or other med group provider: Yes    2.  If any orders were placed at last visit, do we have Results/Consult Notes?        •  Labs - Labs were not ordered at last office visit.       •  Imaging - Imaging was not ordered at last office visit.       •  Referrals - No referrals were ordered at last office visit.    3.  Immunizations were updated in [facility] using Web[facility]?: Yes       •  Web Iz Recommendations: HEPATITIS A  HEPATITIS B TDAP ZOSTAVAX (Shingles)    4.  Patient is due for the following Health Maintenance Topics:   Health Maintenance Due   Topic Date Due   • IMM DTaP/Tdap/Td Vaccine (1 - Tdap) 05/04/1969   • IMM ZOSTER VACCINE  05/04/2010           5.  Patient was not informed to arrive 15 min prior to their scheduled appointment and bring in their medication bottles.

## 2017-05-04 ENCOUNTER — OFFICE VISIT (OUTPATIENT)
Dept: MEDICAL GROUP | Facility: CLINIC | Age: 67
End: 2017-05-04
Payer: MEDICARE

## 2017-05-04 ENCOUNTER — HOSPITAL ENCOUNTER (OUTPATIENT)
Dept: LAB | Facility: MEDICAL CENTER | Age: 67
End: 2017-05-04
Attending: FAMILY MEDICINE
Payer: MEDICARE

## 2017-05-04 VITALS
HEIGHT: 66 IN | WEIGHT: 282 LBS | OXYGEN SATURATION: 93 % | HEART RATE: 66 BPM | DIASTOLIC BLOOD PRESSURE: 60 MMHG | SYSTOLIC BLOOD PRESSURE: 118 MMHG | TEMPERATURE: 96.6 F | BODY MASS INDEX: 45.32 KG/M2 | RESPIRATION RATE: 16 BRPM

## 2017-05-04 DIAGNOSIS — M15.9 PRIMARY OSTEOARTHRITIS INVOLVING MULTIPLE JOINTS: ICD-10-CM

## 2017-05-04 DIAGNOSIS — Z11.59 NEED FOR HEPATITIS C SCREENING TEST: ICD-10-CM

## 2017-05-04 DIAGNOSIS — M70.62 TROCHANTERIC BURSITIS OF BOTH HIPS: ICD-10-CM

## 2017-05-04 DIAGNOSIS — F51.04 PSYCHOPHYSIOLOGICAL INSOMNIA: ICD-10-CM

## 2017-05-04 DIAGNOSIS — R73.09 ELEVATED GLUCOSE: ICD-10-CM

## 2017-05-04 DIAGNOSIS — E03.4 HYPOTHYROIDISM DUE TO ACQUIRED ATROPHY OF THYROID: ICD-10-CM

## 2017-05-04 DIAGNOSIS — Z23 NEED FOR SHINGLES VACCINE: ICD-10-CM

## 2017-05-04 DIAGNOSIS — M70.61 TROCHANTERIC BURSITIS OF BOTH HIPS: ICD-10-CM

## 2017-05-04 LAB
ALBUMIN SERPL BCP-MCNC: 4.1 G/DL (ref 3.2–4.9)
ALBUMIN/GLOB SERPL: 1.1 G/DL
ALP SERPL-CCNC: 126 U/L (ref 30–99)
ALT SERPL-CCNC: 16 U/L (ref 2–50)
ANION GAP SERPL CALC-SCNC: 10 MMOL/L (ref 0–11.9)
AST SERPL-CCNC: 14 U/L (ref 12–45)
BILIRUB SERPL-MCNC: 0.5 MG/DL (ref 0.1–1.5)
BUN SERPL-MCNC: 21 MG/DL (ref 8–22)
CALCIUM SERPL-MCNC: 9.1 MG/DL (ref 8.5–10.5)
CHLORIDE SERPL-SCNC: 103 MMOL/L (ref 96–112)
CHOLEST SERPL-MCNC: 172 MG/DL (ref 100–199)
CO2 SERPL-SCNC: 25 MMOL/L (ref 20–33)
CREAT SERPL-MCNC: 1.05 MG/DL (ref 0.5–1.4)
EST. AVERAGE GLUCOSE BLD GHB EST-MCNC: 134 MG/DL
GFR SERPL CREATININE-BSD FRML MDRD: 52 ML/MIN/1.73 M 2
GLOBULIN SER CALC-MCNC: 3.8 G/DL (ref 1.9–3.5)
GLUCOSE SERPL-MCNC: 126 MG/DL (ref 65–99)
HBA1C MFR BLD: 6.3 % (ref 0–5.6)
HCV AB SER QL: NEGATIVE
HDLC SERPL-MCNC: 53 MG/DL
LDLC SERPL CALC-MCNC: 97 MG/DL
POTASSIUM SERPL-SCNC: 4.3 MMOL/L (ref 3.6–5.5)
PROT SERPL-MCNC: 7.9 G/DL (ref 6–8.2)
SODIUM SERPL-SCNC: 138 MMOL/L (ref 135–145)
T4 FREE SERPL-MCNC: 1.16 NG/DL (ref 0.53–1.43)
TRIGL SERPL-MCNC: 112 MG/DL (ref 0–149)
TSH SERPL DL<=0.005 MIU/L-ACNC: 6.51 UIU/ML (ref 0.3–3.7)

## 2017-05-04 PROCEDURE — 99214 OFFICE O/P EST MOD 30 MIN: CPT | Mod: 25 | Performed by: FAMILY MEDICINE

## 2017-05-04 PROCEDURE — 86803 HEPATITIS C AB TEST: CPT

## 2017-05-04 PROCEDURE — 90736 HZV VACCINE LIVE SUBQ: CPT | Performed by: FAMILY MEDICINE

## 2017-05-04 PROCEDURE — 1101F PT FALLS ASSESS-DOCD LE1/YR: CPT | Performed by: FAMILY MEDICINE

## 2017-05-04 PROCEDURE — 4040F PNEUMOC VAC/ADMIN/RCVD: CPT | Performed by: FAMILY MEDICINE

## 2017-05-04 PROCEDURE — 83036 HEMOGLOBIN GLYCOSYLATED A1C: CPT

## 2017-05-04 PROCEDURE — 3014F SCREEN MAMMO DOC REV: CPT | Performed by: FAMILY MEDICINE

## 2017-05-04 PROCEDURE — G8419 CALC BMI OUT NRM PARAM NOF/U: HCPCS | Performed by: FAMILY MEDICINE

## 2017-05-04 PROCEDURE — G8432 DEP SCR NOT DOC, RNG: HCPCS | Performed by: FAMILY MEDICINE

## 2017-05-04 PROCEDURE — 80061 LIPID PANEL: CPT | Mod: GA

## 2017-05-04 PROCEDURE — 84443 ASSAY THYROID STIM HORMONE: CPT

## 2017-05-04 PROCEDURE — 80053 COMPREHEN METABOLIC PANEL: CPT

## 2017-05-04 PROCEDURE — 90471 IMMUNIZATION ADMIN: CPT | Performed by: FAMILY MEDICINE

## 2017-05-04 PROCEDURE — 36415 COLL VENOUS BLD VENIPUNCTURE: CPT

## 2017-05-04 PROCEDURE — 84439 ASSAY OF FREE THYROXINE: CPT

## 2017-05-04 PROCEDURE — 1036F TOBACCO NON-USER: CPT | Performed by: FAMILY MEDICINE

## 2017-05-04 RX ORDER — NABUMETONE 750 MG/1
750 TABLET, FILM COATED ORAL 2 TIMES DAILY WITH MEALS
Qty: 60 TAB | Refills: 6 | Status: SHIPPED | OUTPATIENT
Start: 2017-05-04 | End: 2017-06-30 | Stop reason: SDUPTHER

## 2017-05-04 RX ORDER — TEMAZEPAM 15 MG/1
15 CAPSULE ORAL NIGHTLY PRN
Qty: 30 CAP | Refills: 3 | Status: SHIPPED | OUTPATIENT
Start: 2017-05-04 | End: 2020-01-23

## 2017-05-04 NOTE — MR AVS SNAPSHOT
"        Ashly Meyer   2017 9:20 AM   Office Visit   MRN: [id number]    Department:  [facility]   Dept Phone:  [phone]    Description:  Female : 1950   Provider:  Connie Ahn M.D.           Reason for Visit     Leg Pain     Hip Pain     Medication Management           Allergies as of 2017     Allergen Noted Reactions    Ace Inhibitors 2009       Cough      Butalbital-Acetaminophen 2016       Dizzy, can't walk, hard to focus    Cefaclor 2007       ceclor    Diphenhydramine 2007   Hives    Iodine 2009       Labored breathing    Lamictal 2011   Rash, Swelling    \"hot\", unable to function    Morphine 2011   Itching    redness    Penicillins 2007   Itching, Swelling    Savella [Milnacipran Hcl] 2013       Muscle aches    Sulfa Drugs 2007   Hives, Anxiety    Hard breathing    Tape 2016       Paper tape is ok    Tizanidine Hcl 2014       dizziness      You were diagnosed with     Elevated glucose   [244596]       Need for shingles vaccine   [224283]       Trochanteric bursitis of both hips   [192057]       Primary osteoarthritis involving multiple joints   [6089475]       Hypothyroidism due to acquired atrophy of thyroid   [5389854]       Need for hepatitis C screening test   [560347]       Psychophysiological insomnia   [419160]         Vital Signs     Blood Pressure Pulse Temperature Respirations Height Weight    118/60 mmHg 66 35.9 °C (96.6 °F) 16 1.676 m (5' 5.98\") 127.914 kg (282 lb)    Body Mass Index Oxygen Saturation Smoking Status             45.54 kg/m2 93% Former Smoker         Basic Information     Date Of Birth Sex Race Ethnicity Preferred Language    1950 Female White Non- English      Your appointments     May 18, 2017 10:20 AM   Established Patient with Connie Ahn M.D.   [address] [id number] [phone]           You " will be receiving a confirmation call a few days before your appointment from our automated call confirmation system.              Problem List              ICD-10-CM Priority Class Noted - Resolved    Essential hypertension I10   5/20/2009 - Present    Hypothyroidism due to acquired atrophy of thyroid E03.4   5/20/2009 - Present    Depression F32.9   5/20/2009 - Present    Eczema, dyshidrotic L30.1   8/3/2009 - Present    Osteoarthritis of multiple joints M15.9   4/13/2010 - Present    GERD (gastroesophageal reflux disease) K21.9   12/10/2010 - Present    Polymyalgia rheumatica (CMS-[facility]) M35.3   5/6/2011 - Present    Seasonal allergies J30.2   7/8/2011 - Present    Carpal tunnel syndrome G56.00   9/5/2011 - Present    Ulnar neuropathy G56.20   9/5/2011 - Present    Migraine without aura G43.009   Unknown - Present    Cervical stenosis of spinal canal M48.02   11/1/2011 - Present    Lumbar radiculopathy M54.16   11/1/2011 - Present    Nocturnal hypoxia G47.34   Unknown - Present    Sleep apnea syndrome G47.30   Unknown - Present    Chronic constipation K59.09   10/2/2013 - Present    Rectocele N81.6   11/25/2013 - Present    Pelvic floor dysfunction M62.89   Unknown - Present    H/O fibromyalgia Z87.39   3/11/2014 - Present    Sleep apnea G47.30   3/11/2014 - Present    Obesity E66.9   3/11/2014 - Present    Menopause Z78.0   3/11/2014 - Present    Peripheral neuropathy G62.9   Unknown - Present    Mild intermittent asthma without complication J45.20   Unknown - Present    Elevated sed rate R70.0   12/9/2014 - Present    Candidal intertrigo B37.2   6/1/2015 - Present    Severe right groin pain R10.30   11/17/2015 - Present    Chronic pain G89.29   11/18/2015 - Present    Lumbar facet arthropathy (HCC) M12.88   12/9/2015 - Present    Anal fissure K60.2   4/14/2016 - Present    Menopausal symptoms N95.1   4/28/2016 - Present    Deviated nasal septum J34.2   8/1/2016 - Present    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    10/3/2016 - Present    Atrophic rhinitis J31.0   10/3/2016 - Present    Nonalcoholic fatty liver disease K76.0   1/5/2017 - Present    Dyslipidemia, goal LDL below 130 E78.5   1/6/2017 - Present    Gastroesophageal reflux disease K21.9   2/6/2017 - Present      Health Maintenance        Date Due Completion Dates    IMM DTaP/Tdap/Td Vaccine (1 - Tdap) 5/4/1969 ---    MAMMOGRAM 5/13/2018 5/13/2016, 10/24/2014, 5/28/2013, 5/24/2012, 1/28/2011, 10/16/2009, 10/16/2009, 3/24/2006    IMM PNEUMOCOCCAL 65+ (ADULT) LOW/MEDIUM RISK SERIES (2 of 2 - PPSV23) 1/16/2019 1/5/2017, 1/16/2014    BONE DENSITY 9/10/2020 9/10/2015    COLONOSCOPY 2/10/2022 2/10/2017, 2/6/2017            Current Immunizations     13-VALENT PCV PREVNAR 1/5/2017    Influenza TIV (IM) 1/16/2014, 11/20/2012    Influenza Vaccine Adult HD 1/5/2017    Influenza Vaccine Quad Inj (Pf) 10/15/2014    Pneumococcal polysaccharide vaccine (PPSV-23) 1/16/2014    SHINGLES VACCINE 5/4/2017      Below and/or attached are the medications your provider expects you to take. Review all of your home medications and newly ordered medications with your provider and/or pharmacist. Follow medication instructions as directed by your provider and/or pharmacist. Please keep your medication list with you and share with your provider. Update the information when medications are discontinued, doses are changed, or new medications (including over-the-counter products) are added; and carry medication information at all times in the event of emergency situations     Allergies:  ACE INHIBITORS - (reactions not documented)     BUTALBITAL-ACETAMINOPHEN - (reactions not documented)     CEFACLOR - (reactions not documented)     DIPHENHYDRAMINE - Hives     IODINE - (reactions not documented)     LAMICTAL - Rash,Swelling     MORPHINE - Itching     PENICILLINS - Itching,Swelling     SAVELLA - (reactions not documented)     SULFA DRUGS - Hives,Anxiety     TAPE - (reactions not documented)      TIZANIDINE HCL - (reactions not documented)               Medications  Valid as of: May 04, 2017 -  9:48 AM    Generic Name Brand Name Tablet Size Instructions for use    Carvedilol (Tab) COREG 12.5 MG TAKE 1 TABLET BY MOUTH TWICE A DAY WITH MEALS        DULoxetine HCl (Cap DR Particles) CYMBALTA 60 MG Take 60 mg by mouth 2 times a day.        Fiber   Take  by mouth every day. Pt takes 2 once a day.        Fluocinolone Acetonide (Oil) Fluocinolone Acetonide 0.01 % Place  in ear 2 Times a Day.        Furosemide (Tab) LASIX 20 MG TAKE 1 TABLET BY MOUTH EVERY DAY        Ketoconazole (Cream) NIZORAL 2 % Apply  to affected area(s) every day.        Levothyroxine Sodium (Tab) SYNTHROID 150 MCG TAKE 1 TABLET BY MOUTH EVERY DAY        MetroNIDAZOLE (Gel) METROGEL 0.75 % Apply  to affected area(s) 2 times a day.        Nabumetone (Tab) RELAFEN 750 MG Take 1 Tab by mouth 2 times a day, with meals.        Oxygen-Helium   Inhale 2-3 L by mouth as needed.        Pantoprazole Sodium (Tablet Delayed Response) PROTONIX 40 MG Take 1 Tab by mouth every day.        Polyethylene Glycol 3350 (Pack) MIRALAX  Take 17 g by mouth 3 times a day.        Potassium Chloride Klarissa CR (Tab CR) Kdur 20 MEQ TAKE 1 TABLET BY MOUTH TWICE A DAY        Sertraline HCl (Tab) ZOLOFT 100 MG TAKE 1 TABLET BY MOUTH EVERY DAY        Spironolactone (Tab) ALDACTONE 50 MG Take 1 Tab by mouth every day.        Temazepam (Cap) RESTORIL 15 MG Take 1 Cap by mouth at bedtime as needed for Sleep.        TraMADol HCl (Tab) ULTRAM 50 MG Take 1-2 Tabs by mouth every four hours as needed for Moderate Pain.        .                 Medicines prescribed today were sent to:     [id number] - [place], [place] - [address] [place] [address]    Phone: [phone] Fax: [phone]    Open 24 Hours?: No      Medication refill instructions:       If your prescription bottle indicates you have medication refills left, it is not necessary to  call your provider’s office. Please contact your pharmacy and they will refill your medication.    If your prescription bottle indicates you do not have any refills left, you may request refills at any time through one of the following ways: The online [facility] system (except Urgent Care), by calling your provider’s office, or by asking your pharmacy to contact your provider’s office with a refill request. Medication refills are processed only during regular business hours and may not be available until the next business day. Your provider may request additional information or to have a follow-up visit with you prior to refilling your medication.   *Please Note: Medication refills are assigned a new Rx number when refilled electronically. Your pharmacy may indicate that no refills were authorized even though a new prescription for the same medication is available at the pharmacy. Please request the medicine by name with the pharmacy before contacting your provider for a refill.        Your To Do List     Future Labs/Procedures Complete By Expires    COMP METABOLIC PANEL  As directed 5/5/2018    HEMOGLOBIN A1C  As directed 5/5/2018    HEP C VIRUS ANTIBODY  As directed 5/4/2018    LIPID PROFILE  As directed 5/5/2018    TSH WITH REFLEX TO FT4  As directed 5/4/2018      Referral     A referral request has been sent to our patient care coordination department. Please allow 3-5 business days for us to process this request and contact you either by phone or mail. If you do not hear from us by the 5th business day, please call us at [phone].           [facility] Access Code: Activation code not generated  Current [facility] Status: Active

## 2017-05-04 NOTE — PROGRESS NOTES
CC: Arthritis, hip pain, hypothyroidism, elevated glucose, health maintenance    HPI:   Ashly presents today with the following.    1. Elevated glucose  This has been moderate, there is a question whether she is developing diabetes. She has had the neuropathy for very long time so I doubt that that is diabetes related. She feels the institution of [facility] by Dr. Rasmussen has been extremely helpful. Patient has not taken the pain away but has done a great deal for her.    2. Need for shingles vaccine  She is due    3. Trochanteric bursitis of both hips  She points to bilateral trochanteric bursae and says she has hip pain. Discussed with her the difference between this and hip joint pain. She does have a thick pad on her mattress which helped for a while but the pain has worsened. Her current regimen is not doing very much for that.    4. Primary osteoarthritis involving multiple joints  She does have arthritis of multiple joints.  She continues on the anti-inflammatory Relafen which she has been on for many years.  She feels this is somewhat helpful though she has stiff overall. She has continued to gain weight. She is working on a new weight loss regimen. Discussed that certainly a great deal of her joint pain is worsened by her overweight. She voices that she is well aware.    5. Hypothyroidism due to acquired atrophy of thyroid  She continues to take her thyroid medication regularly but she is taking it with all her morning meds. Discussed taking it separately on an empty stomach and she states that that will not be possible. She is due for a recheck of her medication and we will adjust the medication to the patient as needed.    6. Need for hepatitis C screening test  She falls in the age range    7. Psychophysiological insomnia  She has continued insomnia. The temazepam continues to be somewhat helpful and she asks for renewal.  Denies rebound anxiety from the medication or rebound insomnia.    States the  duloxetine prescribed by Dr. Rasmussen is helping a lot at 120 mg daily.      Patient Active Problem List    Diagnosis Date Noted   • Gastroesophageal reflux disease 02/06/2017   • Dyslipidemia, goal LDL below 130 01/06/2017   • Nonalcoholic fatty liver disease 01/05/2017   • Disease of accessory sinus 10/03/2016   • Atrophic rhinitis 10/03/2016   • Deviated nasal septum 08/01/2016   • Menopausal symptoms 04/28/2016   • Anal fissure 04/14/2016   • Lumbar facet arthropathy (HCC) 12/09/2015   • Chronic pain 11/18/2015   • Severe right groin pain 11/17/2015   • Candidal intertrigo 06/01/2015   • Elevated sed rate 12/09/2014   • Mild intermittent asthma without complication    • H/O fibromyalgia 03/11/2014   • Sleep apnea 03/11/2014   • Obesity 03/11/2014   • Menopause 03/11/2014   • Peripheral neuropathy    • Rectocele 11/25/2013   • Pelvic floor dysfunction    • Chronic constipation 10/02/2013   • Nocturnal hypoxia    • Sleep apnea syndrome    • Cervical stenosis of spinal canal 11/01/2011   • Lumbar radiculopathy 11/01/2011   • Migraine without aura    • Carpal tunnel syndrome 09/05/2011   • Ulnar neuropathy 09/05/2011   • Seasonal allergies 07/08/2011   • Polymyalgia rheumatica (CMS-HCC) 05/06/2011   • GERD (gastroesophageal reflux disease) 12/10/2010   • Osteoarthritis of multiple joints 04/13/2010   • Eczema, dyshidrotic 08/03/2009   • Essential hypertension 05/20/2009   • Hypothyroidism due to acquired atrophy of thyroid 05/20/2009   • Depression 05/20/2009       Current Outpatient Prescriptions   Medication Sig Dispense Refill   • nabumetone (RELAFEN) 750 MG Tab Take 1 Tab by mouth 2 times a day, with meals. 60 Tab 6   • temazepam (RESTORIL) 15 MG Cap Take 1 Cap by mouth at bedtime as needed for Sleep. 30 Cap 3   • duloxetine (CYMBALTA) 60 MG Cap DR Particles delayed-release capsule Take 60 mg by mouth 2 times a day.     • ketoconazole (NIZORAL) 2 % Cream Apply  to affected area(s) every day.     • metronidazole  "(METROGEL) 0.75 % gel Apply  to affected area(s) 2 times a day.     • polyethylene glycol/lytes (MIRALAX) Pack Take 17 g by mouth 3 times a day.     • FIBER SELECT GUMMIES PO Take  by mouth every day. Pt takes 2 once a day.     • tramadol (ULTRAM) 50 MG Tab Take 1-2 Tabs by mouth every four hours as needed for Moderate Pain. 180 Tab 5   • sertraline (ZOLOFT) 100 MG Tab TAKE 1 TABLET BY MOUTH EVERY DAY 90 Tab 6   • levothyroxine (SYNTHROID) 150 MCG Tab TAKE 1 TABLET BY MOUTH EVERY DAY 90 Tab 2   • carvedilol (COREG) 12.5 MG Tab TAKE 1 TABLET BY MOUTH TWICE A DAY WITH MEALS 180 Tab 2   • potassium chloride SA (K-DUR) 20 MEQ Tab CR TAKE 1 TABLET BY MOUTH TWICE A  Tab 2   • OXYGEN-HELIUM INH Inhale 2-3 L by mouth as needed.     • Fluocinolone Acetonide 0.01 % Oil Place  in ear 2 Times a Day.     • furosemide (LASIX) 20 MG Tab TAKE 1 TABLET BY MOUTH EVERY DAY 90 Tab 3   • spironolactone (ALDACTONE) 50 MG Tab Take 1 Tab by mouth every day. 90 Tab 3   • pantoprazole (PROTONIX) 40 MG Tablet Delayed Response Take 1 Tab by mouth every day. 90 Tab 3     No current facility-administered medications for this visit.         Allergies as of 05/04/2017 - Thomas as Reviewed 05/04/2017   Allergen Reaction Noted   • Ace inhibitors  05/20/2009   • Butalbital-acetaminophen  07/27/2016   • Cefaclor  08/03/2007   • Diphenhydramine Hives 08/03/2007   • Iodine  05/20/2009   • Lamictal Rash and Swelling 12/27/2011   • Morphine Itching 03/11/2011   • Penicillins Itching and Swelling 08/03/2007   • Savella [milnacipran hcl]  05/24/2013   • Sulfa drugs Hives and Anxiety 08/03/2007   • Tape  07/27/2016   • Tizanidine hcl  03/11/2014        ROS: As per HPI.    /60 mmHg  Pulse 66  Temp(Src) 35.9 °C (96.6 °F)  Resp 16  Ht 1.676 m (5' 5.98\")  Wt 127.914 kg (282 lb)  BMI 45.54 kg/m2  SpO2 93%    Physical Exam:  Gen:         Alert and oriented, No apparent distress.  Neck:        Range of motion is somewhat limited in extension and " flexion with moderate posterior cervical spasm. No cervical adenopathy appreciated. No neck masses appreciated. No thyromegaly or JVD appreciated.   Lungs:     Clear to auscultation bilaterally  CV:          Regular rate and rhythm. No murmurs, rubs or gallops.               Ext:          No clubbing, cyanosis, edema. Both trochanteric bursa are moderately tender to palpation. No heat is appreciated. No significant edema appreciated.      Assessment and Plan.   67 y.o. female with the following issues.    1. Elevated glucose  Laboratory studies discussed and placed  - COMP METABOLIC PANEL; Future  - LIPID PROFILE; Future  - HEMOGLOBIN A1C; Future    2. Need for shingles vaccine  Administered today  - VARICELLA ZOSTER VACCINE SQ    3. Trochanteric bursitis of both hips  Referral for bilateral trochanteric bursitis evaluation and treatment is placed  - REFERRAL TO ORTHOPEDICS    4. Primary osteoarthritis involving multiple joints  This continues to be helpful and relatively well-tolerated and is renewed.  - nabumetone (RELAFEN) 750 MG Tab; Take 1 Tab by mouth 2 times a day, with meals.  Dispense: 60 Tab; Refill: 6    5. Hypothyroidism due to acquired atrophy of thyroid  Laboratory discussed and placed  - TSH WITH REFLEX TO FT4; Future    6. Need for hepatitis C screening test  Order placed  - HEP C VIRUS ANTIBODY; Future    7. Psychophysiological insomnia  Renewal placed of medication which she tolerates quite well.  - temazepam (RESTORIL) 15 MG Cap; Take 1 Cap by mouth at bedtime as needed for Sleep.  Dispense: 30 Cap; Refill: 3

## 2017-05-06 ENCOUNTER — PATIENT MESSAGE (OUTPATIENT)
Dept: MEDICAL GROUP | Facility: CLINIC | Age: 67
End: 2017-05-06

## 2017-05-06 DIAGNOSIS — E03.4 HYPOTHYROIDISM DUE TO ACQUIRED ATROPHY OF THYROID: ICD-10-CM

## 2017-05-08 RX ORDER — LEVOTHYROXINE SODIUM 175 UG/1
175 TABLET ORAL
Qty: 90 TAB | Refills: 2 | Status: SHIPPED | OUTPATIENT
Start: 2017-05-08 | End: 2017-10-23 | Stop reason: SDUPTHER

## 2017-05-16 DIAGNOSIS — I10 ESSENTIAL HYPERTENSION: ICD-10-CM

## 2017-05-17 ENCOUNTER — TELEPHONE (OUTPATIENT)
Dept: MEDICAL GROUP | Facility: CLINIC | Age: 67
End: 2017-05-17

## 2017-05-17 RX ORDER — SPIRONOLACTONE 50 MG/1
TABLET, FILM COATED ORAL
Qty: 90 TAB | Refills: 3 | Status: SHIPPED | OUTPATIENT
Start: 2017-05-17 | End: 2018-06-05 | Stop reason: SDUPTHER

## 2017-05-17 NOTE — TELEPHONE ENCOUNTER
ESTABLISHED PATIENT PRE-VISIT PLANNING     Note: Patient will not be contacted if there is no indication to call.     1.  Reviewed note from last office visit with PCP and/or other med group provider: Yes    2.  If any orders were placed at last visit, do we have Results/Consult Notes?        •  Labs - Labs ordered, completed and results are in chart.       •  Imaging - Imaging was not ordered at last office visit.       •  Referrals - Referral ordered, patient has NOT been seen. SCHED TO BE SEEN 6/6/17  AT [facility]    3.  Immunizations were updated in [facility] using [facility]?: Yes       •  [employer] Recommendations: Hep B, adult Tdap Hep A, adult      4.  Patient is due for the following Health Maintenance Topics:   Health Maintenance Due   Topic Date Due   • IMM DTaP/Tdap/Td Vaccine (1 - Tdap) 05/04/1969           5.  Patient was not informed to arrive 15 min prior to their scheduled appointment and bring in their medication bottles.

## 2017-05-18 ENCOUNTER — OFFICE VISIT (OUTPATIENT)
Dept: MEDICAL GROUP | Facility: CLINIC | Age: 67
End: 2017-05-18
Payer: MEDICARE

## 2017-05-18 VITALS
BODY MASS INDEX: 45.8 KG/M2 | RESPIRATION RATE: 14 BRPM | OXYGEN SATURATION: 93 % | TEMPERATURE: 97.5 F | WEIGHT: 285 LBS | HEIGHT: 66 IN | SYSTOLIC BLOOD PRESSURE: 114 MMHG | HEART RATE: 68 BPM | DIASTOLIC BLOOD PRESSURE: 68 MMHG

## 2017-05-18 DIAGNOSIS — Z23 NEED FOR TDAP VACCINATION: ICD-10-CM

## 2017-05-18 DIAGNOSIS — I10 ESSENTIAL HYPERTENSION: ICD-10-CM

## 2017-05-18 DIAGNOSIS — M48.02 NEURAL FORAMINAL STENOSIS OF CERVICAL SPINE: ICD-10-CM

## 2017-05-18 DIAGNOSIS — H93.13 TINNITUS OF BOTH EARS: ICD-10-CM

## 2017-05-18 PROCEDURE — 90471 IMMUNIZATION ADMIN: CPT | Performed by: FAMILY MEDICINE

## 2017-05-18 PROCEDURE — 90715 TDAP VACCINE 7 YRS/> IM: CPT | Performed by: FAMILY MEDICINE

## 2017-05-18 PROCEDURE — 4040F PNEUMOC VAC/ADMIN/RCVD: CPT | Performed by: FAMILY MEDICINE

## 2017-05-18 PROCEDURE — G8432 DEP SCR NOT DOC, RNG: HCPCS | Performed by: FAMILY MEDICINE

## 2017-05-18 PROCEDURE — 99214 OFFICE O/P EST MOD 30 MIN: CPT | Mod: 25 | Performed by: FAMILY MEDICINE

## 2017-05-18 PROCEDURE — 1101F PT FALLS ASSESS-DOCD LE1/YR: CPT | Performed by: FAMILY MEDICINE

## 2017-05-18 PROCEDURE — 3014F SCREEN MAMMO DOC REV: CPT | Performed by: FAMILY MEDICINE

## 2017-05-18 PROCEDURE — G8419 CALC BMI OUT NRM PARAM NOF/U: HCPCS | Performed by: FAMILY MEDICINE

## 2017-05-18 PROCEDURE — 1036F TOBACCO NON-USER: CPT | Performed by: FAMILY MEDICINE

## 2017-05-18 RX ORDER — CARVEDILOL 6.25 MG/1
6.25 TABLET ORAL 2 TIMES DAILY WITH MEALS
Qty: 60 TAB | Refills: 6 | Status: SHIPPED | OUTPATIENT
Start: 2017-05-18 | End: 2017-12-28 | Stop reason: SDUPTHER

## 2017-05-18 RX ORDER — GABAPENTIN 100 MG/1
100 CAPSULE ORAL 2 TIMES DAILY
Qty: 60 CAP | Refills: 6 | Status: SHIPPED | OUTPATIENT
Start: 2017-05-18 | End: 2017-06-30 | Stop reason: SDUPTHER

## 2017-05-18 NOTE — PROGRESS NOTES
CC: Neck pain, arm pain, tinnitus, hypertension, health maintenance    HPI:   Ashly presents today with the following.    1. Neural foraminal stenosis of cervical spine  She has multiple level stenosis due to advanced arthritis and degenerative change in the cervical spine. She relates that at times her arms feel heavy and that there is pain traveling down the arms. She denies significant tremor or weakness though she has had tremor episodically in the past.    2. Tinnitus of both ears  She has bilateral relatively mild tinnitus. She has tried white noise in the past which she says is actually made things worse. Discussed possible audiology or ENT referral. She would like to consider this but would like to straighten out her insurance situation first.    3. Essential hypertension  Patient reports she is compliant with blood pressure medications. The medications are carvedilol, spironolactone and furosemide. She states her leg edema greatly increases if she discontinues the spironolactone or furosemide. Reports she monitors Blood pressures weekly with results of high 90s to 110 over 60s. She complains of some dizziness and unsteadiness. This reflects over control. Patient denies increased cramping or cough from the medications. Denies symptoms of chest pain or pressure, shortness of breath, orthopnea or lower extremity edema. Pertinent information as follows:  Exercise; she is not exercising.  Alcohol consumption zero drinks per week.      4. Need for Tdap vaccination  She is due        Patient Active Problem List    Diagnosis Date Noted   • Neural foraminal stenosis of cervical spine 05/18/2017   • Tinnitus of both ears 05/18/2017   • Gastroesophageal reflux disease 02/06/2017   • Dyslipidemia, goal LDL below 130 01/06/2017   • Nonalcoholic fatty liver disease 01/05/2017   • Disease of accessory sinus 10/03/2016   • Atrophic rhinitis 10/03/2016   • Deviated nasal septum 08/01/2016   • Menopausal symptoms  04/28/2016   • Anal fissure 04/14/2016   • Lumbar facet arthropathy (HCC) 12/09/2015   • Chronic pain 11/18/2015   • Candidal intertrigo 06/01/2015   • Elevated sed rate 12/09/2014   • Mild intermittent asthma without complication    • H/O fibromyalgia 03/11/2014   • Sleep apnea 03/11/2014   • Obesity 03/11/2014   • Menopause 03/11/2014   • Peripheral neuropathy    • Rectocele 11/25/2013   • Pelvic floor dysfunction    • Chronic constipation 10/02/2013   • Nocturnal hypoxia    • Sleep apnea syndrome    • Cervical stenosis of spinal canal 11/01/2011   • Lumbar radiculopathy 11/01/2011   • Migraine without aura    • Carpal tunnel syndrome 09/05/2011   • Ulnar neuropathy 09/05/2011   • Seasonal allergies 07/08/2011   • Polymyalgia rheumatica (CMS-HCC) 05/06/2011   • GERD (gastroesophageal reflux disease) 12/10/2010   • Osteoarthritis of multiple joints 04/13/2010   • Eczema, dyshidrotic 08/03/2009   • Essential hypertension 05/20/2009   • Hypothyroidism due to acquired atrophy of thyroid 05/20/2009   • Depression 05/20/2009       Current Outpatient Prescriptions   Medication Sig Dispense Refill   • polyethyl glycol-propyl glycol (SYSTANE) 0.4-0.3 % Solution Place 1 Drop in both eyes as needed.     • carvedilol (COREG) 6.25 MG Tab Take 1 Tab by mouth 2 times a day, with meals. 60 Tab 6   • spironolactone (ALDACTONE) 50 MG Tab TAKE 1 TABLET BY MOUTH EVERY DAY 90 Tab 3   • levothyroxine (SYNTHROID) 175 MCG Tab Take 1 Tab by mouth every day. 90 Tab 2   • nabumetone (RELAFEN) 750 MG Tab Take 1 Tab by mouth 2 times a day, with meals. 60 Tab 6   • temazepam (RESTORIL) 15 MG Cap Take 1 Cap by mouth at bedtime as needed for Sleep. 30 Cap 3   • duloxetine (CYMBALTA) 60 MG Cap DR Particles delayed-release capsule Take 60 mg by mouth 2 times a day.     • polyethylene glycol/lytes (MIRALAX) Pack Take 17 g by mouth 3 times a day.     • tramadol (ULTRAM) 50 MG Tab Take 1-2 Tabs by mouth every four hours as needed for Moderate Pain.  "180 Tab 5   • potassium chloride SA (K-DUR) 20 MEQ Tab CR TAKE 1 TABLET BY MOUTH TWICE A  Tab 2   • OXYGEN-HELIUM INH Inhale 2-3 L by mouth as needed.     • furosemide (LASIX) 20 MG Tab TAKE 1 TABLET BY MOUTH EVERY DAY 90 Tab 3   • pantoprazole (PROTONIX) 40 MG Tablet Delayed Response Take 1 Tab by mouth every day. 90 Tab 3   • ketoconazole (NIZORAL) 2 % Cream Apply  to affected area(s) every day.     • metronidazole (METROGEL) 0.75 % gel Apply  to affected area(s) 2 times a day.     • FIBER SELECT GUMMIES PO Take  by mouth every day. Pt takes 2 once a day.     • sertraline (ZOLOFT) 100 MG Tab TAKE 1 TABLET BY MOUTH EVERY DAY 90 Tab 6   • Fluocinolone Acetonide 0.01 % Oil Place  in ear 2 Times a Day.       No current facility-administered medications for this visit.         Allergies as of 05/18/2017 - Thomas as Reviewed 05/18/2017   Allergen Reaction Noted   • Ace inhibitors  05/20/2009   • Butalbital-acetaminophen  07/27/2016   • Cefaclor  08/03/2007   • Diphenhydramine Hives 08/03/2007   • Iodine  05/20/2009   • Lamictal Rash and Swelling 12/27/2011   • Morphine Itching 03/11/2011   • Penicillins Itching and Swelling 08/03/2007   • Savella [milnacipran hcl]  05/24/2013   • Sulfa drugs Hives and Anxiety 08/03/2007   • Tape  07/27/2016   • Tizanidine hcl  03/11/2014        ROS: As per HPI.    /68 mmHg  Pulse 68  Temp(Src) 36.4 °C (97.5 °F)  Resp 14  Ht 1.676 m (5' 5.98\")  Wt 129.275 kg (285 lb)  BMI 46.02 kg/m2  SpO2 93%    Physical Exam:  Gen:         Alert and oriented, No apparent distress.  Lucid and fluent.  HEENT:   EOMI, PERRLA.  Both canals and TMs appear normal to inspection.   Neck:       Marked posterior cervical spasm. Limitation to both extension and flexion. Moderate stable diffuse thyromegaly is appreciated without palpable nodularity. No cervical adenopathy appreciated.   Lungs:     Clear to auscultation bilaterally  CV:          Regular rate and rhythm. No murmurs, rubs or " gallops.               Ext:          No clubbing, cyanosis, no peripheral edema.      Assessment and Plan.   67 y.o. female with the following issues.    1. Neural foraminal stenosis of cervical spine  Discussed trying gabapentin again. Will start with low dose. The radicular symptoms are quite frustrating for the patient.  - gabapentin (NEURONTIN) 100 MG Cap; Take 1 Cap by mouth 2 Times a Day.  Dispense: 60 Cap; Refill: 6    2. Tinnitus of both ears  Discussed evaluation of hearing. She would like to get the insurance situation sorted out first.    3. Essential hypertension  May be a little over treated, reduced dose of the carvedilol. Discussed with patient  - carvedilol (COREG) 6.25 MG Tab; Take 1 Tab by mouth 2 times a day, with meals.  Dispense: 60 Tab; Refill: 6    4. Need for Tdap vaccination  Persistent confusion regarding her insurance. This is discussed with the patient. Patient aware of possible difficulties in coverage. Administered today.  - TDAP VACCINE =>8YO IM

## 2017-05-18 NOTE — MR AVS SNAPSHOT
"        Ashly DURAN Betsy   2017 10:20 AM   Office Visit   MRN: [id number]    Department:  [facility]   Dept Phone:  [phone]    Description:  Female : 1950   Provider:  Connie Ahn M.D.           Reason for Visit     Other balance problem    Hypertension     Ringing in Ear           Allergies as of 2017     Allergen Noted Reactions    Ace Inhibitors 2009       Cough      Butalbital-Acetaminophen 2016       Dizzy, can't walk, hard to focus    Cefaclor 2007       ceclor    Diphenhydramine 2007   Hives    Iodine 2009       Labored breathing    Lamictal 2011   Rash, Swelling    \"hot\", unable to function    Morphine 2011   Itching    redness    Penicillins 2007   Itching, Swelling    Savella [Milnacipran Hcl] 2013       Muscle aches    Sulfa Drugs 2007   Hives, Anxiety    Hard breathing    Tape 2016       Paper tape is ok    Tizanidine Hcl 2014       dizziness      You were diagnosed with     Neural foraminal stenosis of cervical spine   [801505]       Tinnitus of both ears   [863442]       Essential hypertension   [0411295]       Need for Tdap vaccination   [774108]         Vital Signs     Blood Pressure Pulse Temperature Respirations Height Weight    114/68 mmHg 68 36.4 °C (97.5 °F) 14 1.676 m (5' 5.98\") 129.275 kg (285 lb)    Body Mass Index Oxygen Saturation Smoking Status             46.02 kg/m2 93% Former Smoker         Basic Information     Date Of Birth Sex Race Ethnicity Preferred Language    1950 Female White Non- English      Your appointments     2017 11:20 AM   Established Patient with Connie Ahn M.D.   [address] [address] [phone]           You will be receiving a confirmation call a few days before your appointment from our automated call confirmation system.              Problem List              ICD-10-CM " Priority Class Noted - Resolved    Essential hypertension I10   5/20/2009 - Present    Hypothyroidism due to acquired atrophy of thyroid E03.4   5/20/2009 - Present    Depression F32.9   5/20/2009 - Present    Eczema, dyshidrotic L30.1   8/3/2009 - Present    Osteoarthritis of multiple joints M15.9   4/13/2010 - Present    GERD (gastroesophageal reflux disease) K21.9   12/10/2010 - Present    Polymyalgia rheumatica (CMS-HCC) M35.3   5/6/2011 - Present    Seasonal allergies J30.2   7/8/2011 - Present    Carpal tunnel syndrome G56.00   9/5/2011 - Present    Ulnar neuropathy G56.20   9/5/2011 - Present    Migraine without aura G43.009   Unknown - Present    Cervical stenosis of spinal canal M48.02   11/1/2011 - Present    Lumbar radiculopathy M54.16   11/1/2011 - Present    Nocturnal hypoxia G47.34   Unknown - Present    Sleep apnea syndrome G47.30   Unknown - Present    Chronic constipation K59.09   10/2/2013 - Present    Rectocele N81.6   11/25/2013 - Present    Pelvic floor dysfunction M62.89   Unknown - Present    H/O fibromyalgia Z87.39   3/11/2014 - Present    Sleep apnea G47.30   3/11/2014 - Present    Obesity E66.9   3/11/2014 - Present    Menopause Z78.0   3/11/2014 - Present    Peripheral neuropathy G62.9   Unknown - Present    Mild intermittent asthma without complication J45.20   Unknown - Present    Elevated sed rate R70.0   12/9/2014 - Present    Candidal intertrigo B37.2   6/1/2015 - Present    Chronic pain G89.29   11/18/2015 - Present    Lumbar facet arthropathy (HCC) M12.88   12/9/2015 - Present    Anal fissure K60.2   4/14/2016 - Present    Menopausal symptoms N95.1   4/28/2016 - Present    Deviated nasal septum J34.2   8/1/2016 - Present    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   10/3/2016 - Present    Atrophic rhinitis J31.0   10/3/2016 - Present    Nonalcoholic fatty liver disease K76.0   1/5/2017 - Present    Dyslipidemia, goal LDL below 130 E78.5   1/6/2017 - Present    Gastroesophageal reflux  disease K21.9   2/6/2017 - Present    Neural foraminal stenosis of cervical spine M99.81   5/18/2017 - Present    Tinnitus of both ears H93.13   5/18/2017 - Present      Health Maintenance        Date Due Completion Dates    IMM DTaP/Tdap/Td Vaccine (1 - Tdap) 5/4/1969 ---    MAMMOGRAM 5/13/2018 5/13/2016, 10/24/2014, 5/28/2013, 5/24/2012, 1/28/2011, 10/16/2009, 10/16/2009, 3/24/2006    IMM PNEUMOCOCCAL 65+ (ADULT) LOW/MEDIUM RISK SERIES (2 of 2 - PPSV23) 1/16/2019 1/5/2017, 1/16/2014    BONE DENSITY 9/10/2020 9/10/2015    COLONOSCOPY 2/10/2022 2/10/2017, 2/6/2017            Current Immunizations     13-VALENT PCV PREVNAR 1/5/2017    Influenza TIV (IM) 1/16/2014, 11/20/2012    Influenza Vaccine Adult HD 1/5/2017    Influenza Vaccine Quad Inj (Pf) 10/15/2014    Pneumococcal polysaccharide vaccine (PPSV-23) 1/16/2014    SHINGLES VACCINE 5/4/2017    Tdap Vaccine 5/18/2017      Below and/or attached are the medications your provider expects you to take. Review all of your home medications and newly ordered medications with your provider and/or pharmacist. Follow medication instructions as directed by your provider and/or pharmacist. Please keep your medication list with you and share with your provider. Update the information when medications are discontinued, doses are changed, or new medications (including over-the-counter products) are added; and carry medication information at all times in the event of emergency situations     Allergies:  ACE INHIBITORS - (reactions not documented)     BUTALBITAL-ACETAMINOPHEN - (reactions not documented)     CEFACLOR - (reactions not documented)     DIPHENHYDRAMINE - Hives     IODINE - (reactions not documented)     LAMICTAL - Rash,Swelling     MORPHINE - Itching     PENICILLINS - Itching,Swelling     SAVELLA - (reactions not documented)     SULFA DRUGS - Hives,Anxiety     TAPE - (reactions not documented)     TIZANIDINE HCL - (reactions not documented)               Medications   Valid as of: May 18, 2017 - 11:24 AM    Generic Name Brand Name Tablet Size Instructions for use    Carvedilol (Tab) COREG 6.25 MG Take 1 Tab by mouth 2 times a day, with meals.        DULoxetine HCl (Cap DR Particles) CYMBALTA 60 MG Take 60 mg by mouth 2 times a day.        Fiber   Take  by mouth every day. Pt takes 2 once a day.        Fluocinolone Acetonide (Oil) Fluocinolone Acetonide 0.01 % Place  in ear 2 Times a Day.        Furosemide (Tab) LASIX 20 MG TAKE 1 TABLET BY MOUTH EVERY DAY        Gabapentin (Cap) NEURONTIN 100 MG Take 1 Cap by mouth 2 Times a Day.        Ketoconazole (Cream) NIZORAL 2 % Apply  to affected area(s) every day.        Levothyroxine Sodium (Tab) SYNTHROID 175 MCG Take 1 Tab by mouth every day.        MetroNIDAZOLE (Gel) METROGEL 0.75 % Apply  to affected area(s) 2 times a day.        Nabumetone (Tab) RELAFEN 750 MG Take 1 Tab by mouth 2 times a day, with meals.        Oxygen-Helium   Inhale 2-3 L by mouth as needed.        Pantoprazole Sodium (Tablet Delayed Response) PROTONIX 40 MG Take 1 Tab by mouth every day.        Polyethyl Glycol-Propyl Glycol (Solution) SYSTANE 0.4-0.3 % Place 1 Drop in both eyes as needed.        Polyethylene Glycol 3350 (Pack) MIRALAX  Take 17 g by mouth 3 times a day.        Potassium Chloride Klarissa CR (Tab CR) Kdur 20 MEQ TAKE 1 TABLET BY MOUTH TWICE A DAY        Sertraline HCl (Tab) ZOLOFT 100 MG TAKE 1 TABLET BY MOUTH EVERY DAY        Spironolactone (Tab) ALDACTONE 50 MG TAKE 1 TABLET BY MOUTH EVERY DAY        Temazepam (Cap) RESTORIL 15 MG Take 1 Cap by mouth at bedtime as needed for Sleep.        TraMADol HCl (Tab) ULTRAM 50 MG Take 1-2 Tabs by mouth every four hours as needed for Moderate Pain.        .                 Medicines prescribed today were sent to:     [id number] - [place], [place] - [address] [address]    Phone: [phone] Fax: [phone]    Open 24 Hours?: No      Medication refill  instructions:       If your prescription bottle indicates you have medication refills left, it is not necessary to call your provider’s office. Please contact your pharmacy and they will refill your medication.    If your prescription bottle indicates you do not have any refills left, you may request refills at any time through one of the following ways: The online [employer] system (except Urgent Care), by calling your provider’s office, or by asking your pharmacy to contact your provider’s office with a refill request. Medication refills are processed only during regular business hours and may not be available until the next business day. Your provider may request additional information or to have a follow-up visit with you prior to refilling your medication.   *Please Note: Medication refills are assigned a new Rx number when refilled electronically. Your pharmacy may indicate that no refills were authorized even though a new prescription for the same medication is available at the pharmacy. Please request the medicine by name with the pharmacy before contacting your provider for a refill.           [employer] Access Code: Activation code not generated  Current [employer] Status: Active

## 2017-05-31 ENCOUNTER — OFFICE VISIT (OUTPATIENT)
Dept: PULMONOLOGY | Facility: HOSPICE | Age: 67
End: 2017-05-31
Payer: MEDICARE

## 2017-05-31 VITALS
BODY MASS INDEX: 45 KG/M2 | WEIGHT: 280 LBS | DIASTOLIC BLOOD PRESSURE: 88 MMHG | TEMPERATURE: 97.2 F | HEIGHT: 66 IN | RESPIRATION RATE: 16 BRPM | SYSTOLIC BLOOD PRESSURE: 128 MMHG | OXYGEN SATURATION: 93 % | HEART RATE: 95 BPM

## 2017-05-31 DIAGNOSIS — E66.2 OBESITY HYPOVENTILATION SYNDROME (HCC): ICD-10-CM

## 2017-05-31 PROCEDURE — G8419 CALC BMI OUT NRM PARAM NOF/U: HCPCS | Performed by: NURSE PRACTITIONER

## 2017-05-31 PROCEDURE — 3014F SCREEN MAMMO DOC REV: CPT | Performed by: NURSE PRACTITIONER

## 2017-05-31 PROCEDURE — G8432 DEP SCR NOT DOC, RNG: HCPCS | Performed by: NURSE PRACTITIONER

## 2017-05-31 PROCEDURE — 99213 OFFICE O/P EST LOW 20 MIN: CPT | Performed by: NURSE PRACTITIONER

## 2017-05-31 PROCEDURE — 4040F PNEUMOC VAC/ADMIN/RCVD: CPT | Performed by: NURSE PRACTITIONER

## 2017-05-31 PROCEDURE — 1101F PT FALLS ASSESS-DOCD LE1/YR: CPT | Performed by: NURSE PRACTITIONER

## 2017-05-31 PROCEDURE — 94761 N-INVAS EAR/PLS OXIMETRY MLT: CPT | Performed by: NURSE PRACTITIONER

## 2017-05-31 PROCEDURE — 1036F TOBACCO NON-USER: CPT | Performed by: NURSE PRACTITIONER

## 2017-05-31 NOTE — MR AVS SNAPSHOT
"        Ashly DURAN Betsy   2017 11:00 AM   Office Visit   MRN: [id number]    Department:  [facility]   Dept Phone:  [phone]    Description:  Female : 1950   Provider:  FIORELLA Grhaam           Reason for Visit     Establish Care Ref from Jimy/ KERRI/ Sleep Eval/ needs Titrations      Allergies as of 2017     Allergen Noted Reactions    Ace Inhibitors 2009       Cough      Butalbital-Acetaminophen 2016       Dizzy, can't walk, hard to focus    Cefaclor 2007       ceclor    Diphenhydramine 2007   Hives    Iodine 2009       Labored breathing    Lamictal 2011   Rash, Swelling    \"hot\", unable to function    Morphine 2011   Itching    redness    Penicillins 2007   Itching, Swelling    Savella [Milnacipran Hcl] 2013       Muscle aches    Sulfa Drugs 2007   Hives, Anxiety    Hard breathing    Tape 2016       Paper tape is ok    Tizanidine Hcl 2014       dizziness      You were diagnosed with     Obesity hypoventilation syndrome (CMS-HCC)   [278.03.ICD-9-CM]         Vital Signs     Blood Pressure Pulse Temperature Respirations Height Weight    128/88 mmHg 95 36.2 °C (97.2 °F) 16 1.676 m (5' 5.98\") 127.007 kg (280 lb)    Body Mass Index Oxygen Saturation Smoking Status             45.21 kg/m2 93% Former Smoker         Basic Information     Date Of Birth Sex Race Ethnicity Preferred Language    1950 Female White Non- English      Your appointments     2017  9:00 AM   PROCEDURE 30 with Alin Rasmussen M.D.   [facility] (--)    [address] [place] [id number]   [phone]           Your procedure is scheduled at Special Procedures at [facility] located at [address] [place] [address] just east of the main campus. Please check in at the front lobby desk 1 hour prior to your appointment time. For your safety, please have a ride home with a responsible adult.             2017  9:00 AM   "   PROCEDURE 30 with Alfredo Mercedes M.D.   [facility] (--)    [address] [facility]  [place] [address]   [phone]           Your procedure is scheduled at Special Procedures at [facility] located at [address] [facility] just east of the main campus. Please check in at the front lobby desk 1 hour prior to your appointment time. For your safety, please have a ride home with a responsible adult.             Jun 29, 2017  1:20 PM   Established Patient with Connie Ahn M.D.   [facility] ([facility])    [address] [facility] [address]  [place] [address]   [phone]           You will be receiving a confirmation call a few days before your appointment from our automated call confirmation system.            Jul 22, 2017  7:05 PM   Sleep Study with SLEEP TECH   [facility] Sleep [facility] (--)    [address] [facility] [address]  [place] [address] [phone]            Aug 01, 2017  1:20 PM   Established Patient Pul with FIORELLA Graham   [facility] Pulmonary Medicine (--)    [address]  [address]  [place] [address] [phone]              Problem List              ICD-10-CM Priority Class Noted - Resolved    Essential hypertension I10   5/20/2009 - Present    Hypothyroidism due to acquired atrophy of thyroid E03.4   5/20/2009 - Present    Depression F32.9   5/20/2009 - Present    Eczema, dyshidrotic L30.1   8/3/2009 - Present    Osteoarthritis of multiple joints M15.9   4/13/2010 - Present    GERD (gastroesophageal reflux disease) K21.9   12/10/2010 - Present    Polymyalgia rheumatica (CMS-[facility]) M35.3   5/6/2011 - Present    Seasonal allergies J30.2   7/8/2011 - Present    Carpal tunnel syndrome G56.00   9/5/2011 - Present    Ulnar neuropathy G56.20   9/5/2011 - Present    Migraine without aura G43.009   Unknown - Present    Cervical stenosis of spinal canal M48.02   11/1/2011 - Present    Lumbar radiculopathy M54.16   11/1/2011 - Present    Nocturnal hypoxia G47.34    Unknown - Present    Chronic constipation K59.09   10/2/2013 - Present    Rectocele N81.6   11/25/2013 - Present    Pelvic floor dysfunction M62.89   Unknown - Present    H/O fibromyalgia Z87.39   3/11/2014 - Present    Sleep apnea G47.30   3/11/2014 - Present    Obesity E66.9   3/11/2014 - Present    Menopause Z78.0   3/11/2014 - Present    Peripheral neuropathy G62.9   Unknown - Present    Mild intermittent asthma without complication J45.20   Unknown - Present    Elevated sed rate R70.0   12/9/2014 - Present    Candidal intertrigo B37.2   6/1/2015 - Present    Chronic pain G89.29   11/18/2015 - Present    Lumbar facet arthropathy (HCC) M12.88   12/9/2015 - Present    Anal fissure K60.2   4/14/2016 - Present    Menopausal symptoms N95.1   4/28/2016 - Present    Deviated nasal septum J34.2   8/1/2016 - Present    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   10/3/2016 - Present    Atrophic rhinitis J31.0   10/3/2016 - Present    Nonalcoholic fatty liver disease K76.0   1/5/2017 - Present    Dyslipidemia, goal LDL below 130 E78.5   1/6/2017 - Present    Gastroesophageal reflux disease K21.9   2/6/2017 - Present    Neural foraminal stenosis of cervical spine M99.81   5/18/2017 - Present    Tinnitus of both ears H93.13   5/18/2017 - Present    Obesity hypoventilation syndrome (CMS-HCC) E66.2   5/31/2017 - Present      Health Maintenance        Date Due Completion Dates    MAMMOGRAM 5/13/2018 5/13/2016, 10/24/2014, 5/28/2013, 5/24/2012, 1/28/2011, 10/16/2009, 10/16/2009, 3/24/2006    IMM PNEUMOCOCCAL 65+ (ADULT) LOW/MEDIUM RISK SERIES (2 of 2 - PPSV23) 1/16/2019 1/5/2017, 1/16/2014    BONE DENSITY 9/10/2020 9/10/2015    COLONOSCOPY 2/10/2022 2/10/2017, 2/6/2017    IMM DTaP/Tdap/Td Vaccine (2 - Td) 5/18/2027 5/18/2017            Current Immunizations     13-VALENT PCV PREVNAR 1/5/2017    Influenza TIV (IM) 1/16/2014, 11/20/2012    Influenza Vaccine Adult HD 1/5/2017    Influenza Vaccine Quad Inj (Pf) 10/15/2014    Pneumococcal  polysaccharide vaccine (PPSV-23) 1/16/2014    SHINGLES VACCINE 5/4/2017    Tdap Vaccine 5/18/2017      Below and/or attached are the medications your provider expects you to take. Review all of your home medications and newly ordered medications with your provider and/or pharmacist. Follow medication instructions as directed by your provider and/or pharmacist. Please keep your medication list with you and share with your provider. Update the information when medications are discontinued, doses are changed, or new medications (including over-the-counter products) are added; and carry medication information at all times in the event of emergency situations     Allergies:  ACE INHIBITORS - (reactions not documented)     BUTALBITAL-ACETAMINOPHEN - (reactions not documented)     CEFACLOR - (reactions not documented)     DIPHENHYDRAMINE - Hives     IODINE - (reactions not documented)     LAMICTAL - Rash,Swelling     MORPHINE - Itching     PENICILLINS - Itching,Swelling     SAVELLA - (reactions not documented)     SULFA DRUGS - Hives,Anxiety     TAPE - (reactions not documented)     TIZANIDINE HCL - (reactions not documented)               Medications  Valid as of: May 31, 2017 - 12:08 PM    Generic Name Brand Name Tablet Size Instructions for use    Carvedilol (Tab) COREG 6.25 MG Take 1 Tab by mouth 2 times a day, with meals.        DULoxetine HCl (Cap DR Particles) CYMBALTA 60 MG Take 60 mg by mouth 2 times a day.        Fiber   Take  by mouth every day. Pt takes 2 once a day.        Fluocinolone Acetonide (Oil) Fluocinolone Acetonide 0.01 % Place  in ear 2 Times a Day.        Furosemide (Tab) LASIX 20 MG TAKE 1 TABLET BY MOUTH EVERY DAY        Gabapentin (Cap) NEURONTIN 100 MG Take 1 Cap by mouth 2 Times a Day.        Ketoconazole (Cream) NIZORAL 2 % Apply  to affected area(s) every day.        Levothyroxine Sodium (Tab) SYNTHROID 175 MCG Take 1 Tab by mouth every day.        MetroNIDAZOLE (Gel) METROGEL 0.75 % Apply   to affected area(s) 2 times a day.        Nabumetone (Tab) RELAFEN 750 MG Take 1 Tab by mouth 2 times a day, with meals.        Oxygen-Helium   Inhale 2-3 L by mouth as needed.        Pantoprazole Sodium (Tablet Delayed Response) PROTONIX 40 MG Take 1 Tab by mouth every day.        Polyethyl Glycol-Propyl Glycol (Solution) SYSTANE 0.4-0.3 % Place 1 Drop in both eyes as needed.        Polyethylene Glycol 3350 (Pack) MIRALAX  Take 17 g by mouth 3 times a day.        Potassium Chloride Klarissa CR (Tab CR) Kdur 20 MEQ TAKE 1 TABLET BY MOUTH TWICE A DAY        Sertraline HCl (Tab) ZOLOFT 100 MG TAKE 1 TABLET BY MOUTH EVERY DAY        Spironolactone (Tab) ALDACTONE 50 MG TAKE 1 TABLET BY MOUTH EVERY DAY        Temazepam (Cap) RESTORIL 15 MG Take 1 Cap by mouth at bedtime as needed for Sleep.        TraMADol HCl (Tab) ULTRAM 50 MG Take 1-2 Tabs by mouth every four hours as needed for Moderate Pain.        .                 Medicines prescribed today were sent to:     [id number] - [place], [place] - [address] [address]    Phone: [phone] Fax: [phone]    Open 24 Hours?: No      Medication refill instructions:       If your prescription bottle indicates you have medication refills left, it is not necessary to call your provider’s office. Please contact your pharmacy and they will refill your medication.    If your prescription bottle indicates you do not have any refills left, you may request refills at any time through one of the following ways: The online [employer] system (except Urgent Care), by calling your provider’s office, or by asking your pharmacy to contact your provider’s office with a refill request. Medication refills are processed only during regular business hours and may not be available until the next business day. Your provider may request additional information or to have a follow-up visit with you prior to refilling your medication.   *Please Note:  Medication refills are assigned a new Rx number when refilled electronically. Your pharmacy may indicate that no refills were authorized even though a new prescription for the same medication is available at the pharmacy. Please request the medicine by name with the pharmacy before contacting your provider for a refill.        Your To Do List     Future Labs/Procedures Complete By Expires    AMB MULTIPLE OXIMETRY  As directed 5/31/2018    POLYSOMNOGRAPHY, 4 OR MORE  As directed 5/31/2018    Comments:    Patient previously on CPAP 10 cm with unknown amount of oxygen.      Instructions    1. Polysomnogram. Patient previously on CPAP 10 cm with unknown amount of oxygen.  2. Patient will bring on temazepam 15 mg tablets. Due to her severely erratic sleep cycle currently I would recommend starting with 2 tablets.  3. Patient is cautioned on driving and taking care of small children she is feeling sleepy.  4. Continue oxygen at 2 L/m with exertion and 4 L while sleeping until sleep study can be performed.  5. Recommend obtaining oximeter. Keep oxygen saturations 90-94 percent.               [email or web address] Access Code: Activation code not generated  Current [email or web address] Status: Active

## 2017-05-31 NOTE — PROCEDURES
Multi-Ox Readings  Multi Ox #1 Room air   O2 sat % at rest 88   O2 sat % on exertion 93   O2 sat average on exertion     Multi Ox #2 2 LPM   O2 sat % at rest 97   O2 sat % on exertion 93   O2 sat average on exertion       Oxygen Use 3.5   Oxygen Frequency With exertion and nocturnal with BIPAP   Duration of need     Is the patient mobile within the home?     CPAP Use? not using   BIPAP Use?     Servo Titration

## 2017-05-31 NOTE — Clinical Note
FIORELLA Graham  [facility] Pulmonary Medicine   [address] [place] [address] [place] [address]  Phone: [phone] - Fax: [phone]           Encounter Date: 5/31/2017  Provider: FIORELLA Graham  Location of Care: [facility] PUL[facility]      Patient:   Ashly Meyer   MR Number: [id number]   YOB: 1950     PROGRESS NOTE:  Chief Complaint   Patient presents with   • Establish Care     Ref from Jimy/ KERRI/ Sleep Eval/ needs Titrations         HPI:  This is a 67 y.o. female who was kindly referred by Connie Ahn MD for a history of obstructive sleep apnea and insomnia. Previous polysomnogram indicate AHI 34.4 and a minimum saturation 81%. The patient was placed on AutoPap and compliance card indicates an average pressure of 10 with adequate oxygenation on overnight oximetry. Patient was intolerant to CPAP and thus was started on oxygen. Patient reports being tired all the time. She has a history of snoring, witnessed apneas, significant insomnia, and possibly some early morning headaches. Her Mallampati airway is 3. Her BMI is 45.22. Patient reports she sleeps 7-22 hours in a 24-hour period that very disrupted. She sleeps more during the daytime. She does at night. In light of her symptoms and her sleeping patterns I feel she really needs stress restart airway pressure station therapy.    She currently uses oxygen. She has hypoxic today at 88% on room air with exertion, she was 92% at rest on room air. Patient was placed on 2 L of oxygen. Her oxygen recovered to 97% at rest and 93% with exertion. She currently uses oxygen 3.5 L/M at night. She will benefit from oxygen therapy.    The patient currently is taking temazepam 15 mg at night for insomnia. She only takes one or so pills every 6 months    Past Medical History   Diagnosis Date   • Degenerative disc disease    • GOUT 5/20/2009   • Fibromyalgia      confirmed by Dr. Ann   • GERD  (gastroesophageal reflux disease)      hiatal hernia   • Pleomorphic adenoma    • Hypothyroidism 5/20/2009   • Mild intermittent asthma without complication    • Hypertension    • Carotid artery stenosis, unilateral      moderate left carotid artery stenosis   • Migraine without aura, without mention of intractable migraine without mention of status migrainosus    • Aphasia      Written word recognition/useage   • Nocturnal hypoxia      severe, to 69% O2 sat   • Sleep apnea syndrome      she is not using CPAP, claustrophobia   • Polymyalgia rheumatica (CMS-HCC)      Dr. Ann   • Pelvic floor dysfunction    • Peripheral neuropathy    • S/P tonsillectomy    • H/O foot surgery    • Seasonal allergies    • Snoring    • Hemorrhagic disorder (CMS-HCC)      nose bleeds   • Cancer (CMS-HCC) 1990's     skin   • Hiatus hernia syndrome    • Urinary incontinence      will not wear a pad   • Depression 5/20/2009   • Incipient cataract of both eyes    • Mixed dyslipidemia    • Arthritis      Everywhere.    • Controlled type 2 diabetes mellitus without complication (CMS-HCC)    • Chickenpox    • Turkish measles    • Influenza    • Mumps    • Tonsillitis    • Obesity hypoventilation syndrome (CMS-HCC) 5/31/2017       Past Surgical History   Procedure Laterality Date   • [facility] stat fine needle aspiration  11/16/2009     Performed by DA CALLOWAY at ENDOSCOPY [facility] ORS   • Tonsillectomy  1953   • Bladder suspension  1983   • Other orthopedic surgery  1962/1980     foot surgery    • Other abdominal surgery       Cholysestectomy   • Hysterectomy, vaginal  1983     ovaries are present, excessive bleeding   • Colonoscopy  2006     ? unclear date   • Septoplasty N/A 8/1/2016     Procedure: SEPTOPLASTY;  Surgeon: PIETER Dickson M.D.;  Location: SURGERY SAME DAY [facility];  Service:    • Turbinoplasty Bilateral 8/1/2016     Procedure: TURBINOPLASTY;  Surgeon: PIETER Dickson M.D.;  Location: SURGERY SAME DAY  "[facility];  Service:    • Nasal fracture reduction open N/A 8/1/2016     Procedure: SEPTAL FRACTURE REDUCTION OPEN;  Surgeon: PIETER Dickson M.D.;  Location: SURGERY SAME DAY [facility];  Service:    • Septal reconstruction  10/3/2016     Procedure: SEPTAL RECONSTRUCTION with  grafts ;  Surgeon: PIETER Dickson M.D.;  Location: SURGERY SAME DAY [facility] ORS;  Service:    • Gastroscopy  2/6/2017     Procedure: GASTROSCOPY;  Surgeon: Pau Foster M.D.;  Location: SURGERY SAME DAY [facility];  Service:    • Colonoscopy  2/6/2017     Procedure: COLONOSCOPY;  Surgeon: Pau Foster M.D.;  Location: SURGERY SAME DAY [facility];  Service:        Social History   Substance Use Topics   • Smoking status: Former Smoker -- 2.00 packs/day for 42 years     Types: Cigarettes     Quit date: 03/01/2007   • Smokeless tobacco: Never Used      Comment: 1 ppd >20yrs quit 12/07   • Alcohol Use: No       ROS:   Constitutional: Denies fevers, chills, sweats, fatigue, and weight loss.  Eyes: Denies glasses.  Ears/nose/mouth/throat: Denies injury.  Cardiovascular: Denies chest pain, tightness.  Respiratory: Denies shortness of breath, cough, sputum, wheezing, hemoptysis.  GI: Denies heartburn, difficulty swallowing, nausea, and vomiting.  Neurological: Denies frequent headaches, dizziness, weakness.  Sleep: See history of present illness.    Vitals:  Filed Vitals:    05/31/17 1057   Height: 1.676 m (5' 5.98\")   Weight: 127.007 kg (280 lb)   Weight % change since last entry.: 0 %   BP: 128/88   Pulse: 95   BMI (Calculated): 45.22   Resp: 16   Temp: 36.2 °C (97.2 °F)   O2 sat % room air: 93 %   O2 Flow Rate (L/min): 3.5     Allergies:  Ace inhibitors; Butalbital-acetaminophen; Cefaclor; Diphenhydramine; Iodine; Lamictal; Morphine; Penicillins; Savella; Sulfa drugs; Tape; and Tizanidine hcl    Medications.  Current Outpatient Prescriptions   Medication Sig Dispense Refill   • polyethyl glycol-propyl glycol " (SYSTANE) 0.4-0.3 % Solution Place 1 Drop in both eyes as needed.     • carvedilol (COREG) 6.25 MG Tab Take 1 Tab by mouth 2 times a day, with meals. 60 Tab 6   • gabapentin (NEURONTIN) 100 MG Cap Take 1 Cap by mouth 2 Times a Day. 60 Cap 6   • spironolactone (ALDACTONE) 50 MG Tab TAKE 1 TABLET BY MOUTH EVERY DAY 90 Tab 3   • levothyroxine (SYNTHROID) 175 MCG Tab Take 1 Tab by mouth every day. 90 Tab 2   • nabumetone (RELAFEN) 750 MG Tab Take 1 Tab by mouth 2 times a day, with meals. 60 Tab 6   • temazepam (RESTORIL) 15 MG Cap Take 1 Cap by mouth at bedtime as needed for Sleep. 30 Cap 3   • duloxetine (CYMBALTA) 60 MG Cap DR Particles delayed-release capsule Take 60 mg by mouth 2 times a day.     • ketoconazole (NIZORAL) 2 % Cream Apply  to affected area(s) every day.     • metronidazole (METROGEL) 0.75 % gel Apply  to affected area(s) 2 times a day.     • polyethylene glycol/lytes (MIRALAX) Pack Take 17 g by mouth 3 times a day.     • FIBER SELECT GUMMIES PO Take  by mouth every day. Pt takes 2 once a day.     • tramadol (ULTRAM) 50 MG Tab Take 1-2 Tabs by mouth every four hours as needed for Moderate Pain. 180 Tab 5   • sertraline (ZOLOFT) 100 MG Tab TAKE 1 TABLET BY MOUTH EVERY DAY 90 Tab 6   • potassium chloride SA (K-DUR) 20 MEQ Tab CR TAKE 1 TABLET BY MOUTH TWICE A  Tab 2   • OXYGEN-HELIUM INH Inhale 2-3 L by mouth as needed.     • Fluocinolone Acetonide 0.01 % Oil Place  in ear 2 Times a Day.     • furosemide (LASIX) 20 MG Tab TAKE 1 TABLET BY MOUTH EVERY DAY 90 Tab 3   • pantoprazole (PROTONIX) 40 MG Tablet Delayed Response Take 1 Tab by mouth every day. 90 Tab 3     No current facility-administered medications for this visit.       PHYSICAL EXAM:  Appearance: Well-developed, well-nourished, no acute distress.  Eyes. PERRL.  Hearing: Grossly intact.  Oropharynx: Tongue normal, posterior pharynx without erythema or exudate.  Respiratory effort: No intercostal retractions or use of accessory  muscles.  Lung auscultation: No crackles, wheezing.  Heart auscultation: No murmur, gallop, or rub. Regular rate and rhythm.  Extremities: No cyanosis or edema.  Gait and Station: Normal  Orientation: Oriented to time, place, and person.    Assessment:  1. Obesity hypoventilation syndrome (CMS-HCC)  AMB MULTIPLE OXIMETRY    AMB MULTIPLE OXIMETRY         Plan:  1. Polysomnogram. Patient previously on CPAP 10 cm with unknown amount of oxygen.  2. Patient will bring on temazepam 15 mg tablets. Due to her severely erratic sleep cycle currently I would recommend starting with 2 tablets.  3. Patient is cautioned on driving and taking care of small children she is feeling sleepy.  4. Continue oxygen at 2 L/m with exertion and 4 L while sleeping until sleep study can be performed.  5. Recommend obtaining oximeter. Keep oxygen saturations 90-94%.     Return in about 6 weeks (around 7/12/2017) for With results, With ADRYAN Hicks.          Electronically signed by МАРИНА GrahamPCHAPARRITA  on 05/31/2017    Connie Ahn M.D.  [address] [facility] [id number]  [place] [address]  VIA In Basket

## 2017-05-31 NOTE — PROGRESS NOTES
Chief Complaint   Patient presents with   • Establish Care     Ref from [facility]y/ [facility]/ Sleep Eval/ needs Titrations         HPI:  This is a 67 y.o. female who was kindly referred by Connie Ahn MD for a history of obstructive sleep apnea and insomnia. Previous polysomnogram indicate AHI 34.4 and a minimum saturation 81%. The patient was placed on AutoPap and compliance card indicates an average pressure of 10 with adequate oxygenation on overnight oximetry. Patient was intolerant to CPAP and thus was started on oxygen. Patient reports being tired all the time. She has a history of snoring, witnessed apneas, significant insomnia, and possibly some early morning headaches. Her Mallampati airway is 3. Her BMI is 45.22. Patient reports she sleeps 7-22 hours in a 24-hour period that very disrupted. She sleeps more during the daytime. She does at night. In light of her symptoms and her sleeping patterns I feel she really needs stress restart airway pressure station therapy.    She currently uses oxygen. She has hypoxic today at 88% on room air with exertion, she was 92% at rest on room air. Patient was placed on 2 L of oxygen. Her oxygen recovered to 97% at rest and 93% with exertion. She currently uses oxygen 3.5 L/M at night. She will benefit from oxygen therapy.    The patient currently is taking temazepam 15 mg at night for insomnia. She only takes one or so pills every 6 months    Past Medical History   Diagnosis Date   • Degenerative disc disease    • GOUT 5/20/2009   • Fibromyalgia      confirmed by Dr. Ann   • GERD (gastroesophageal reflux disease)      hiatal hernia   • Pleomorphic adenoma    • Hypothyroidism 5/20/2009   • Mild intermittent asthma without complication    • Hypertension    • Carotid artery stenosis, unilateral      moderate left carotid artery stenosis   • Migraine without aura, without mention of intractable migraine without mention of status migrainosus    • Aphasia      Written word  recognition/useage   • Nocturnal hypoxia      severe, to 69% O2 sat   • Sleep apnea syndrome      she is not using CPAP, claustrophobia   • Polymyalgia rheumatica (CMS-HCC)      Dr. Ann   • Pelvic floor dysfunction    • Peripheral neuropathy    • S/P tonsillectomy    • H/O foot surgery    • Seasonal allergies    • Snoring    • Hemorrhagic disorder (CMS-HCC)      nose bleeds   • Cancer (CMS-HCC) 1990's     skin   • Hiatus hernia syndrome    • Urinary incontinence      will not wear a pad   • Depression 5/20/2009   • Incipient cataract of both eyes    • Mixed dyslipidemia    • Arthritis      Everywhere.    • Controlled type 2 diabetes mellitus without complication (CMS-HCC)    • Chickenpox    • Maltese measles    • Influenza    • Mumps    • Tonsillitis    • Obesity hypoventilation syndrome (CMS-HCC) 5/31/2017       Past Surgical History   Procedure Laterality Date   • [facility] stat fine needle aspiration  11/16/2009     Performed by DA CALLOWAY at ENDOSCOPY [facility] ORS   • Tonsillectomy  1953   • Bladder suspension  1983   • Other orthopedic surgery  1962/1980     foot surgery    • Other abdominal surgery       Cholysestectomy   • Hysterectomy, vaginal  1983     ovaries are present, excessive bleeding   • Colonoscopy  2006     ? unclear date   • Septoplasty N/A 8/1/2016     Procedure: SEPTOPLASTY;  Surgeon: PIETER Dickson M.D.;  Location: SURGERY SAME DAY [facility] ORS;  Service:    • Turbinoplasty Bilateral 8/1/2016     Procedure: TURBINOPLASTY;  Surgeon: PIETER Dickson M.D.;  Location: SURGERY SAME DAY [facility] ORS;  Service:    • Nasal fracture reduction open N/A 8/1/2016     Procedure: SEPTAL FRACTURE REDUCTION OPEN;  Surgeon: PIETER Dickson M.D.;  Location: SURGERY SAME DAY [facility] ORS;  Service:    • Septal reconstruction  10/3/2016     Procedure: SEPTAL RECONSTRUCTION with  grafts ;  Surgeon: PIETER Dickson M.D.;  Location: SURGERY SAME DAY [facility] ORS;  Service:    •  "Gastroscopy  2/6/2017     Procedure: GASTROSCOPY;  Surgeon: Pau Foster M.D.;  Location: SURGERY SAME DAY [facility] ORS;  Service:    • Colonoscopy  2/6/2017     Procedure: COLONOSCOPY;  Surgeon: Pau Foster M.D.;  Location: SURGERY SAME DAY [facility] ORS;  Service:        Social History   Substance Use Topics   • Smoking status: Former Smoker -- 2.00 packs/day for 42 years     Types: Cigarettes     Quit date: 03/01/2007   • Smokeless tobacco: Never Used      Comment: 1 ppd >20yrs quit 12/07   • Alcohol Use: No       ROS:   Constitutional: Denies fevers, chills, sweats, fatigue, and weight loss.  Eyes: Denies glasses.  Ears/nose/mouth/throat: Denies injury.  Cardiovascular: Denies chest pain, tightness.  Respiratory: Denies shortness of breath, cough, sputum, wheezing, hemoptysis.  GI: Denies heartburn, difficulty swallowing, nausea, and vomiting.  Neurological: Denies frequent headaches, dizziness, weakness.  Sleep: See history of present illness.    Vitals:  Filed Vitals:    05/31/17 1057   Height: 1.676 m (5' 5.98\")   Weight: 127.007 kg (280 lb)   Weight % change since last entry.: 0 %   BP: 128/88   Pulse: 95   BMI (Calculated): 45.22   Resp: 16   Temp: 36.2 °C (97.2 °F)   O2 sat % room air: 93 %   O2 Flow Rate (L/min): 3.5     Allergies:  Ace inhibitors; Butalbital-acetaminophen; Cefaclor; Diphenhydramine; Iodine; Lamictal; Morphine; Penicillins; Savella; Sulfa drugs; Tape; and Tizanidine hcl    Medications.  Current Outpatient Prescriptions   Medication Sig Dispense Refill   • polyethyl glycol-propyl glycol (SYSTANE) 0.4-0.3 % Solution Place 1 Drop in both eyes as needed.     • carvedilol (COREG) 6.25 MG Tab Take 1 Tab by mouth 2 times a day, with meals. 60 Tab 6   • gabapentin (NEURONTIN) 100 MG Cap Take 1 Cap by mouth 2 Times a Day. 60 Cap 6   • spironolactone (ALDACTONE) 50 MG Tab TAKE 1 TABLET BY MOUTH EVERY DAY 90 Tab 3   • levothyroxine (SYNTHROID) 175 MCG Tab Take 1 Tab by mouth every day. 90 Tab " 2   • nabumetone (RELAFEN) 750 MG Tab Take 1 Tab by mouth 2 times a day, with meals. 60 Tab 6   • temazepam (RESTORIL) 15 MG Cap Take 1 Cap by mouth at bedtime as needed for Sleep. 30 Cap 3   • duloxetine (CYMBALTA) 60 MG Cap DR Particles delayed-release capsule Take 60 mg by mouth 2 times a day.     • ketoconazole (NIZORAL) 2 % Cream Apply  to affected area(s) every day.     • metronidazole (METROGEL) 0.75 % gel Apply  to affected area(s) 2 times a day.     • polyethylene glycol/lytes (MIRALAX) Pack Take 17 g by mouth 3 times a day.     • FIBER SELECT GUMMIES PO Take  by mouth every day. Pt takes 2 once a day.     • tramadol (ULTRAM) 50 MG Tab Take 1-2 Tabs by mouth every four hours as needed for Moderate Pain. 180 Tab 5   • sertraline (ZOLOFT) 100 MG Tab TAKE 1 TABLET BY MOUTH EVERY DAY 90 Tab 6   • potassium chloride SA (K-DUR) 20 MEQ Tab CR TAKE 1 TABLET BY MOUTH TWICE A  Tab 2   • OXYGEN-HELIUM INH Inhale 2-3 L by mouth as needed.     • Fluocinolone Acetonide 0.01 % Oil Place  in ear 2 Times a Day.     • furosemide (LASIX) 20 MG Tab TAKE 1 TABLET BY MOUTH EVERY DAY 90 Tab 3   • pantoprazole (PROTONIX) 40 MG Tablet Delayed Response Take 1 Tab by mouth every day. 90 Tab 3     No current facility-administered medications for this visit.       PHYSICAL EXAM:  Appearance: Well-developed, well-nourished, no acute distress.  Eyes. PERRL.  Hearing: Grossly intact.  Oropharynx: Tongue normal, posterior pharynx without erythema or exudate.  Respiratory effort: No intercostal retractions or use of accessory muscles.  Lung auscultation: No crackles, wheezing.  Heart auscultation: No murmur, gallop, or rub. Regular rate and rhythm.  Extremities: No cyanosis or edema.  Gait and Station: Normal  Orientation: Oriented to time, place, and person.    Assessment:  1. Obesity hypoventilation syndrome (CMS-HCC)  AMB MULTIPLE OXIMETRY    AMB MULTIPLE OXIMETRY         Plan:  1. Polysomnogram. Patient previously on CPAP 10 cm  with unknown amount of oxygen.  2. Patient will bring on temazepam 15 mg tablets. Due to her severely erratic sleep cycle currently I would recommend starting with 2 tablets.  3. Patient is cautioned on driving and taking care of small children she is feeling sleepy.  4. Continue oxygen at 2 L/m with exertion and 4 L while sleeping until sleep study can be performed.  5. Recommend obtaining oximeter. Keep oxygen saturations 90-94%.     Return in about 6 weeks (around 7/12/2017) for With results, With ADRYAN Hicks.

## 2017-05-31 NOTE — PATIENT INSTRUCTIONS
1. Polysomnogram. Patient previously on CPAP 10 cm with unknown amount of oxygen.  2. Patient will bring on temazepam 15 mg tablets. Due to her severely erratic sleep cycle currently I would recommend starting with 2 tablets.  3. Patient is cautioned on driving and taking care of small children she is feeling sleepy.  4. Continue oxygen at 2 L/m with exertion and 4 L while sleeping until sleep study can be performed.  5. Recommend obtaining oximeter. Keep oxygen saturations 90-94 percent.

## 2017-06-02 ENCOUNTER — HOSPITAL ENCOUNTER (OUTPATIENT)
Dept: PAIN MANAGEMENT | Facility: REHABILITATION | Age: 67
End: 2017-06-02
Attending: PHYSICAL MEDICINE & REHABILITATION
Payer: MEDICARE

## 2017-06-02 ENCOUNTER — HOSPITAL ENCOUNTER (OUTPATIENT)
Dept: RADIOLOGY | Facility: REHABILITATION | Age: 67
End: 2017-06-02
Attending: PHYSICAL MEDICINE & REHABILITATION
Payer: MEDICARE

## 2017-06-02 VITALS
DIASTOLIC BLOOD PRESSURE: 77 MMHG | HEART RATE: 69 BPM | OXYGEN SATURATION: 92 % | SYSTOLIC BLOOD PRESSURE: 135 MMHG | RESPIRATION RATE: 18 BRPM | HEIGHT: 67 IN | WEIGHT: 279.98 LBS | BODY MASS INDEX: 43.94 KG/M2 | TEMPERATURE: 97.3 F

## 2017-06-02 PROCEDURE — 99152 MOD SED SAME PHYS/QHP 5/>YRS: CPT

## 2017-06-02 PROCEDURE — 700101 HCHG RX REV CODE 250

## 2017-06-02 PROCEDURE — 700117 HCHG RX CONTRAST REV CODE 255

## 2017-06-02 PROCEDURE — A9579 GAD-BASE MR CONTRAST NOS,1ML: HCPCS

## 2017-06-02 PROCEDURE — 700111 HCHG RX REV CODE 636 W/ 250 OVERRIDE (IP)

## 2017-06-02 PROCEDURE — 64493 INJ PARAVERT F JNT L/S 1 LEV: CPT

## 2017-06-02 PROCEDURE — 64494 INJ PARAVERT F JNT L/S 2 LEV: CPT

## 2017-06-02 RX ORDER — MIDAZOLAM HYDROCHLORIDE 1 MG/ML
INJECTION INTRAMUSCULAR; INTRAVENOUS
Status: COMPLETED
Start: 2017-06-02 | End: 2017-06-02

## 2017-06-02 RX ORDER — TRIAMCINOLONE ACETONIDE 40 MG/ML
INJECTION, SUSPENSION INTRA-ARTICULAR; INTRAMUSCULAR
Status: COMPLETED
Start: 2017-06-02 | End: 2017-06-02

## 2017-06-02 RX ORDER — LIDOCAINE HYDROCHLORIDE 20 MG/ML
INJECTION, SOLUTION EPIDURAL; INFILTRATION; INTRACAUDAL; PERINEURAL
Status: COMPLETED
Start: 2017-06-02 | End: 2017-06-02

## 2017-06-02 RX ADMIN — TRIAMCINOLONE ACETONIDE 80 MG: 40 INJECTION, SUSPENSION INTRA-ARTICULAR; INTRAMUSCULAR at 09:21

## 2017-06-02 RX ADMIN — GADODIAMIDE 4 ML: 287 INJECTION INTRAVENOUS at 09:17

## 2017-06-02 RX ADMIN — MIDAZOLAM HYDROCHLORIDE 1 MG: 1 INJECTION, SOLUTION INTRAMUSCULAR; INTRAVENOUS at 09:10

## 2017-06-02 RX ADMIN — LIDOCAINE HYDROCHLORIDE 1 ML: 20 INJECTION, SOLUTION EPIDURAL; INFILTRATION; INTRACAUDAL; PERINEURAL at 09:21

## 2017-06-02 RX ADMIN — FENTANYL CITRATE 50 MCG: 50 INJECTION, SOLUTION INTRAMUSCULAR; INTRAVENOUS at 09:10

## 2017-06-02 RX ADMIN — LIDOCAINE HYDROCHLORIDE 4 ML: 20 INJECTION, SOLUTION EPIDURAL; INFILTRATION; INTRACAUDAL; PERINEURAL at 09:12

## 2017-06-02 ASSESSMENT — PAIN SCALES - GENERAL
PAINLEVEL_OUTOF10: 0
PAINLEVEL_OUTOF10: 0
PAINLEVEL_OUTOF10: 8

## 2017-06-02 NOTE — PROGRESS NOTES
Patient positioned by RN  CNA. Ankle supported on pillow. Arms supported by stool at head of the bed.

## 2017-06-02 NOTE — PROGRESS NOTES
Current meds. See medication reconciliation form. Reviewed with pt. Pt denies taking ASA,other blood thinners or anti-inflammatories. Pt has a ride post-procedure (Adrianne harman is ). Printed and verbal discharge instructions given to pt who verbalized understanding.

## 2017-06-03 NOTE — PROCEDURES
DATE OF SERVICE:  06/02/2017    NAME OF PROCEDURE:  1.  Left L4-L5 and L5-S1 facet joint injections with 20 mg of Kenalog under   fluoroscopic guidance.  2.  Right L4-L5 and L5-S1 facet joint injections with 20 mg of Kenalog under   fluoroscopic guidance.  3.  Intravenous conscious sedation to improve safety by improving tolerance   and decreasing unwanted movement.    INDICATION:  The patient is a patient of mine with low back pain felt to be   due to facet joint inflammation.  For this reason, facet injections were   chosen for today's procedure.    DESCRIPTION OF PROCEDURE:  After appropriate informed consent was obtained,   the patient was placed prone on the table.  She received IV conscious sedation   in the form of 1 mL equal to 1 mg of midazolam along with 1 mL equal to 50   mcg of fentanyl throughout the course of procedure.  Her vital signs including   blood pressure, pulse and pulse oximetry were monitored throughout the   procedure by a trained staff member and these remained at acceptable ranges.    In addition, she remained conscious throughout the procedure, able to report   on her status, but relaxed with no unwanted movement.  Her skin was thoroughly   cleansed with a ChloraPrep swab, then the subcutaneous, intramuscular,   interligamentous region was anesthetized with lidocaine.  A 5-inch 22-gauge   spinal needle was directed under intermittent fluoroscopic guidance at left   L4-L5 and L5-S1 facet joints followed by the right L4-L5 and L5-S1 facet   joints.    ARTHROGRAM:  Once the joints were felt to have been cannulated, a small amount   of Omniscan was placed into the joints.  This confirmed needle tip placement   by darkening the joint lines.  A solution of 20 mg of Kenalog along with 2%   lidocaine for up to 1 mL of injected solution was placed into each of the   joints.  She tolerated the procedure well without procedure complications.    She will be referred to the recovery and followup in  the office in the next   week to monitor the response to injection.       ____________________________________     JENNIFER HILLMAN MD    AT / KO    DD:  06/02/2017 09:26:34  DT:  06/02/2017 20:24:57    D#:  [id number]  Job#:  [id number]

## 2017-06-09 RX ORDER — FUROSEMIDE 20 MG/1
TABLET ORAL
Qty: 90 TAB | Refills: 0 | Status: SHIPPED | OUTPATIENT
Start: 2017-06-09 | End: 2017-09-16 | Stop reason: SDUPTHER

## 2017-06-09 RX ORDER — POTASSIUM CHLORIDE 20 MEQ/1
TABLET, EXTENDED RELEASE ORAL
Qty: 180 TAB | Refills: 0 | Status: SHIPPED | OUTPATIENT
Start: 2017-06-09 | End: 2017-09-16 | Stop reason: SDUPTHER

## 2017-06-28 ENCOUNTER — TELEPHONE (OUTPATIENT)
Dept: MEDICAL GROUP | Facility: CLINIC | Age: 67
End: 2017-06-28

## 2017-06-28 NOTE — TELEPHONE ENCOUNTER
ESTABLISHED PATIENT PRE-VISIT PLANNING     Note: Patient will not be contacted if there is no indication to call.     1.  Reviewed notes from the last few office visits within the medical group: Yes    2.  If any orders were placed at last visit or intended to be done for this visit (i.e. 6 mos follow-up), do we have Results/Consult Notes?        •  Labs - Labs ordered, completed and results are in chart.       •  Imaging - Imaging was not ordered at last office visit.       •  Referrals - Referral ordered, patient was seen and consult notes are in chart. Care Teams updated  YES.    3. Is this appointment scheduled as a Hospital Follow-Up? No    4.  Immunizations were updated in Epic using WebIZ?: Epic matches WebIZ       •  Web Iz Recommendations: Td (adult), adsorbed Hep A, adult      5.  Patient is due for the following Health Maintenance Topics:   There are no preventive care reminders to display for this patient.        6.  Patient was NOT informed to arrive 15 min prior to their scheduled appointment and bring in their medication bottles.

## 2017-06-29 ENCOUNTER — HOSPITAL ENCOUNTER (OUTPATIENT)
Dept: PAIN MANAGEMENT | Facility: REHABILITATION | Age: 67
End: 2017-06-29
Attending: PHYSICAL MEDICINE & REHABILITATION
Payer: MEDICARE

## 2017-06-29 ENCOUNTER — HOSPITAL ENCOUNTER (OUTPATIENT)
Dept: RADIOLOGY | Facility: REHABILITATION | Age: 67
End: 2017-06-29
Attending: PHYSICAL MEDICINE & REHABILITATION
Payer: MEDICARE

## 2017-06-29 VITALS
TEMPERATURE: 97 F | OXYGEN SATURATION: 97 % | HEART RATE: 73 BPM | RESPIRATION RATE: 18 BRPM | SYSTOLIC BLOOD PRESSURE: 146 MMHG | DIASTOLIC BLOOD PRESSURE: 87 MMHG | WEIGHT: 270.73 LBS | BODY MASS INDEX: 42.49 KG/M2 | HEIGHT: 67 IN

## 2017-06-29 PROCEDURE — 700117 HCHG RX CONTRAST REV CODE 255

## 2017-06-29 PROCEDURE — 700111 HCHG RX REV CODE 636 W/ 250 OVERRIDE (IP)

## 2017-06-29 PROCEDURE — 64490 INJ PARAVERT F JNT C/T 1 LEV: CPT

## 2017-06-29 PROCEDURE — 99152 MOD SED SAME PHYS/QHP 5/>YRS: CPT

## 2017-06-29 PROCEDURE — A9579 GAD-BASE MR CONTRAST NOS,1ML: HCPCS

## 2017-06-29 PROCEDURE — 64491 INJ PARAVERT F JNT C/T 2 LEV: CPT

## 2017-06-29 PROCEDURE — 700101 HCHG RX REV CODE 250

## 2017-06-29 RX ORDER — LIDOCAINE HYDROCHLORIDE 10 MG/ML
INJECTION, SOLUTION EPIDURAL; INFILTRATION; INTRACAUDAL; PERINEURAL
Status: COMPLETED
Start: 2017-06-29 | End: 2017-06-29

## 2017-06-29 RX ORDER — MIDAZOLAM HYDROCHLORIDE 1 MG/ML
INJECTION INTRAMUSCULAR; INTRAVENOUS
Status: COMPLETED
Start: 2017-06-29 | End: 2017-06-29

## 2017-06-29 RX ORDER — DEXAMETHASONE SODIUM PHOSPHATE 10 MG/ML
INJECTION, SOLUTION INTRAMUSCULAR; INTRAVENOUS
Status: COMPLETED
Start: 2017-06-29 | End: 2017-06-29

## 2017-06-29 RX ORDER — LIDOCAINE HYDROCHLORIDE 20 MG/ML
INJECTION, SOLUTION EPIDURAL; INFILTRATION; INTRACAUDAL; PERINEURAL
Status: COMPLETED
Start: 2017-06-29 | End: 2017-06-29

## 2017-06-29 RX ADMIN — LIDOCAINE HYDROCHLORIDE 3 ML: 20 INJECTION, SOLUTION EPIDURAL; INFILTRATION; INTRACAUDAL; PERINEURAL at 09:15

## 2017-06-29 RX ADMIN — MIDAZOLAM HYDROCHLORIDE 1 MG: 1 INJECTION, SOLUTION INTRAMUSCULAR; INTRAVENOUS at 09:10

## 2017-06-29 RX ADMIN — SODIUM BICARBONATE 1 MEQ: 0.2 INJECTION, SOLUTION INTRAVENOUS at 09:15

## 2017-06-29 RX ADMIN — DEXAMETHASONE SODIUM PHOSPHATE 10 MG: 10 INJECTION, SOLUTION INTRAMUSCULAR; INTRAVENOUS at 09:25

## 2017-06-29 RX ADMIN — LIDOCAINE HYDROCHLORIDE 2 ML: 20 INJECTION, SOLUTION EPIDURAL; INFILTRATION; INTRACAUDAL; PERINEURAL at 09:25

## 2017-06-29 RX ADMIN — GADODIAMIDE 4 ML: 287 INJECTION INTRAVENOUS at 09:22

## 2017-06-29 RX ADMIN — FENTANYL CITRATE 50 MCG: 50 INJECTION, SOLUTION INTRAMUSCULAR; INTRAVENOUS at 09:11

## 2017-06-29 RX ADMIN — LIDOCAINE HYDROCHLORIDE 5 ML: 10 INJECTION, SOLUTION EPIDURAL; INFILTRATION; INTRACAUDAL; PERINEURAL at 09:15

## 2017-06-29 ASSESSMENT — PAIN SCALES - GENERAL
PAINLEVEL_OUTOF10: 2
PAINLEVEL_OUTOF10: 5

## 2017-06-29 NOTE — PROGRESS NOTES
Current meds. See medication reconciliation form. Reviewed with pt.Pt has been off Advil for 3 days Pt denies taking ASA,other blood thinners or anti-inflammatories. Dr. Mercedes made aware pre-procedure.Pt has a ride post-procedure (dtr Adrianne is ). Printed and verbal discharge instructions given to pt who verbalized understanding.

## 2017-06-29 NOTE — PROGRESS NOTES
Patient positioned by RN  CNA    X- ray Tech. Head supported on towel . Ankle supported on pillow. Arms tucked on both hips.

## 2017-06-30 ENCOUNTER — OFFICE VISIT (OUTPATIENT)
Dept: MEDICAL GROUP | Facility: CLINIC | Age: 67
End: 2017-06-30
Payer: MEDICARE

## 2017-06-30 VITALS
WEIGHT: 267 LBS | SYSTOLIC BLOOD PRESSURE: 130 MMHG | DIASTOLIC BLOOD PRESSURE: 80 MMHG | OXYGEN SATURATION: 91 % | HEIGHT: 66 IN | BODY MASS INDEX: 42.91 KG/M2 | RESPIRATION RATE: 14 BRPM | HEART RATE: 74 BPM | TEMPERATURE: 97.2 F

## 2017-06-30 DIAGNOSIS — M48.02 NEURAL FORAMINAL STENOSIS OF CERVICAL SPINE: ICD-10-CM

## 2017-06-30 DIAGNOSIS — E66.2 OBESITY HYPOVENTILATION SYNDROME (HCC): ICD-10-CM

## 2017-06-30 DIAGNOSIS — M15.9 PRIMARY OSTEOARTHRITIS INVOLVING MULTIPLE JOINTS: ICD-10-CM

## 2017-06-30 DIAGNOSIS — H61.91 SKIN LESION OF RIGHT EAR: ICD-10-CM

## 2017-06-30 DIAGNOSIS — E03.4 HYPOTHYROIDISM DUE TO ACQUIRED ATROPHY OF THYROID: ICD-10-CM

## 2017-06-30 DIAGNOSIS — E66.01 MORBID OBESITY WITH BMI OF 40.0-44.9, ADULT (HCC): ICD-10-CM

## 2017-06-30 DIAGNOSIS — K59.09 CHRONIC CONSTIPATION: ICD-10-CM

## 2017-06-30 PROCEDURE — 99214 OFFICE O/P EST MOD 30 MIN: CPT | Performed by: FAMILY MEDICINE

## 2017-06-30 RX ORDER — POLYETHYLENE GLYCOL 3350 17 G/17G
17 POWDER, FOR SOLUTION ORAL DAILY
Qty: 1 BOTTLE | Refills: 6 | Status: SHIPPED | OUTPATIENT
Start: 2017-06-30 | End: 2018-03-29 | Stop reason: SDUPTHER

## 2017-06-30 RX ORDER — NABUMETONE 750 MG/1
750 TABLET, FILM COATED ORAL 2 TIMES DAILY WITH MEALS
Qty: 180 TAB | Refills: 3 | Status: SHIPPED | OUTPATIENT
Start: 2017-06-30 | End: 2018-08-14 | Stop reason: SDUPTHER

## 2017-06-30 RX ORDER — TRAMADOL HYDROCHLORIDE 50 MG/1
50-100 TABLET ORAL EVERY 4 HOURS PRN
Qty: 180 TAB | Refills: 5 | Status: SHIPPED | OUTPATIENT
Start: 2017-06-30 | End: 2017-12-28 | Stop reason: SDUPTHER

## 2017-06-30 RX ORDER — GABAPENTIN 100 MG/1
100 CAPSULE ORAL 2 TIMES DAILY
Qty: 180 CAP | Refills: 3 | Status: SHIPPED | OUTPATIENT
Start: 2017-06-30 | End: 2017-07-21

## 2017-06-30 ASSESSMENT — PATIENT HEALTH QUESTIONNAIRE - PHQ9: CLINICAL INTERPRETATION OF PHQ2 SCORE: 0

## 2017-06-30 NOTE — PROGRESS NOTES
CC: Obesity, hypoventilation, hypothyroidism, chronic constipation, neural foraminal stenosis, osteoarthritis    HPI:   Ashly presents today with the following.    1. Morbid obesity with BMI of 40.0-44.9, adult (CMS-HCC)  Discussed referral to nutrition services. She has had good weight loss changing her diet and cutting out bread. However, she is not sure that these numbers are actually correct. She has been starting to journal her food intake. She is feeling better with the oxygen.  - REFERRAL TO NUTRITION SERVICES  - Patient identified as having weight management issue.  Appropriate orders and counseling given.    2. Obesity hypoventilation syndrome (CMS-HCC)  She is following with pulmonology, the oxygen has been helpful. Discussed today the need to have the oxygen checked on the ear or on a nail that has no polish    3. Hypothyroidism due to acquired atrophy of thyroid  Patient reports good energy level on the medication. Patient denies insomnia, tremor or change in appetite.  Patient is taking the medication on an empty stomach in the morning and waiting at least 30 minutes before eating.  Last TSH in May was above target. Dose adjustment was made and she needs a recheck. Have asked her to do this towards the end of July or early August.  - TSH; Future    4. Chronic constipation  The MiraLAX has been helpful but too helpful. She is now incontinent of stool and is having explosive diarrhea. She is using it twice daily. 3 times a day she could hardly get out of the house. Have discussed going down to once a day. If she is still having fecal incontinence and explosive diarrhea she then needs to reduce to 3 times a week.  - polyethylene glycol 3350 (MIRALAX) Powder; Take 17 g by mouth every day.  Dispense: 1 Bottle; Refill: 6    5. Neural foraminal stenosis of cervical spine  The gabapentin continues to be helpful for the pain. We are gradually increasing this as tolerated.  - gabapentin (NEURONTIN) 100 MG Cap;  Take 1 Cap by mouth 2 Times a Day.  Dispense: 180 Cap; Refill: 3    6. Primary osteoarthritis involving multiple joints  The Relafen has been helpful for very long time with the tramadol for rescue.  - nabumetone (RELAFEN) 750 MG Tab; Take 1 Tab by mouth 2 times a day, with meals.  Dispense: 180 Tab; Refill: 3  - tramadol (ULTRAM) 50 MG Tab; Take 1-2 Tabs by mouth every four hours as needed for Moderate Pain.  Dispense: 180 Tab; Refill: 5      Patient Active Problem List    Diagnosis Date Noted   • Obesity hypoventilation syndrome (CMS-HCC) 05/31/2017   • Neural foraminal stenosis of cervical spine 05/18/2017   • Tinnitus of both ears 05/18/2017   • Gastroesophageal reflux disease 02/06/2017   • Dyslipidemia, goal LDL below 130 01/06/2017   • Nonalcoholic fatty liver disease 01/05/2017   • Disease of accessory sinus 10/03/2016   • Atrophic rhinitis 10/03/2016   • Deviated nasal septum 08/01/2016   • Menopausal symptoms 04/28/2016   • Anal fissure 04/14/2016   • Lumbar facet arthropathy (HCC) 12/09/2015   • Chronic pain 11/18/2015   • Candidal intertrigo 06/01/2015   • Elevated sed rate 12/09/2014   • Mild intermittent asthma without complication    • H/O fibromyalgia 03/11/2014   • Sleep apnea 03/11/2014   • Obesity 03/11/2014   • Menopause 03/11/2014   • Peripheral neuropathy    • Rectocele 11/25/2013   • Pelvic floor dysfunction    • Chronic constipation 10/02/2013   • Nocturnal hypoxia    • Cervical stenosis of spinal canal 11/01/2011   • Lumbar radiculopathy 11/01/2011   • Migraine without aura    • Carpal tunnel syndrome 09/05/2011   • Ulnar neuropathy 09/05/2011   • Seasonal allergies 07/08/2011   • Polymyalgia rheumatica (CMS-HCC) 05/06/2011   • GERD (gastroesophageal reflux disease) 12/10/2010   • Osteoarthritis of multiple joints 04/13/2010   • Eczema, dyshidrotic 08/03/2009   • Essential hypertension 05/20/2009   • Hypothyroidism due to acquired atrophy of thyroid 05/20/2009   • Depression 05/20/2009        Current Outpatient Prescriptions   Medication Sig Dispense Refill   • potassium chloride SA (KDUR) 20 MEQ Tab CR TAKE 1 TABLET BY MOUTH TWICE A  Tab 0   • furosemide (LASIX) 20 MG Tab TAKE 1 TABLET BY MOUTH EVERY DAY 90 Tab 0   • polyethyl glycol-propyl glycol (SYSTANE) 0.4-0.3 % Solution Place 1 Drop in both eyes as needed.     • carvedilol (COREG) 6.25 MG Tab Take 1 Tab by mouth 2 times a day, with meals. 60 Tab 6   • gabapentin (NEURONTIN) 100 MG Cap Take 1 Cap by mouth 2 Times a Day. 60 Cap 6   • spironolactone (ALDACTONE) 50 MG Tab TAKE 1 TABLET BY MOUTH EVERY DAY 90 Tab 3   • levothyroxine (SYNTHROID) 175 MCG Tab Take 1 Tab by mouth every day. 90 Tab 2   • nabumetone (RELAFEN) 750 MG Tab Take 1 Tab by mouth 2 times a day, with meals. 60 Tab 6   • temazepam (RESTORIL) 15 MG Cap Take 1 Cap by mouth at bedtime as needed for Sleep. 30 Cap 3   • duloxetine (CYMBALTA) 60 MG Cap DR Particles delayed-release capsule Take 60 mg by mouth 2 times a day.     • ketoconazole (NIZORAL) 2 % Cream Apply  to affected area(s) every day.     • metronidazole (METROGEL) 0.75 % gel Apply  to affected area(s) 2 times a day.     • polyethylene glycol/lytes (MIRALAX) Pack Take 17 g by mouth 3 times a day.     • tramadol (ULTRAM) 50 MG Tab Take 1-2 Tabs by mouth every four hours as needed for Moderate Pain. 180 Tab 5   • sertraline (ZOLOFT) 100 MG Tab TAKE 1 TABLET BY MOUTH EVERY DAY 90 Tab 6   • OXYGEN-HELIUM INH Inhale 2-3 L by mouth as needed.     • Fluocinolone Acetonide 0.01 % Oil Place  in ear 2 Times a Day.     • pantoprazole (PROTONIX) 40 MG Tablet Delayed Response Take 1 Tab by mouth every day. (Patient taking differently: Take 40 mg by mouth 2 times a day.) 90 Tab 3     No current facility-administered medications for this visit.         Allergies as of 06/30/2017 - Thomas as Reviewed 06/30/2017   Allergen Reaction Noted   • Ace inhibitors  05/20/2009   • Butalbital-acetaminophen  07/27/2016   • Cefaclor  08/03/2007  "  • Diphenhydramine Hives 08/03/2007   • Iodine  05/20/2009   • Lamictal Rash and Swelling 12/27/2011   • Morphine Itching 03/11/2011   • Penicillins Itching and Swelling 08/03/2007   • Savella [milnacipran hcl]  05/24/2013   • Sulfa drugs Hives and Anxiety 08/03/2007   • Tape  07/27/2016   • Tizanidine hcl  03/11/2014        ROS: As per HPI.    /80 mmHg  Pulse 74  Temp(Src) 36.2 °C (97.2 °F)  Resp 14  Ht 1.689 m (5' 6.5\")  Wt 121.11 kg (267 lb)  BMI 42.45 kg/m2  SpO2 91% at rest after 20 minutes.  86% on room air walking after 3 minutes.    Physical Exam:  Gen:         Alert and oriented, No apparent distress.  Lucid and fluent.  Neck:       Posterior neck pain, no JVD or carotid bruits.  Mild retractions.   Lungs:     Clear to auscultation bilaterally  CV:          Regular rate and rhythm. No murmurs, rubs or gallops.               Ext:          No clubbing, cyanosis, no peripheral edema.      Assessment and Plan.   67 y.o. female with the following issues.      1. Morbid obesity with BMI of 40.0-44.9, adult (CMS-HCC)  She has reduced her carbohydrates a great deal.  Has increased vegetables.  - REFERRAL TO NUTRITION SERVICES  - Patient identified as having weight management issue.  Appropriate orders and counseling given.    2. Obesity hypoventilation syndrome (CMS-HCC)  She will continue oxygen 24 7 and follow with pulmonology    3. Hypothyroidism due to acquired atrophy of thyroid  She is due for recheck, tolerating well  - TSH; Future    4. Chronic constipation  Reduced as discussed above to once daily. If still incontinent of stool and having explosive diarrhea she will reduce to 3 times a week.  - polyethylene glycol 3350 (MIRALAX) Powder; Take 17 g by mouth every day.  Dispense: 1 Bottle; Refill: 6    5. Neural foraminal stenosis of cervical spine  The medication has been very helpful and well tolerated and is renewed  - gabapentin (NEURONTIN) 100 MG Cap; Take 1 Cap by mouth 2 Times a Day.  " Dispense: 180 Cap; Refill: 3    6. Primary osteoarthritis involving multiple joints  The tramadol continues to be helpful. The nabumetone has been helpful for many years and is renewed.  - nabumetone (RELAFEN) 750 MG Tab; Take 1 Tab by mouth 2 times a day, with meals.  Dispense: 180 Tab; Refill: 3  - tramadol (ULTRAM) 50 MG Tab; Take 1-2 Tabs by mouth every four hours as needed for Moderate Pain.  Dispense: 180 Tab; Refill: 5

## 2017-06-30 NOTE — MR AVS SNAPSHOT
"        Ashly Meyer   2017 10:00 AM   Office Visit   MRN: [id number]    Department:  [facility]   Dept Phone:  [phone]    Description:  Female : 1950   Provider:  Connie Ahn M.D.           Reason for Visit     Follow-Up     Neck Pain     Memory Loss     Dysuria           Allergies as of 2017     Allergen Noted Reactions    Ace Inhibitors 2009       Cough      Butalbital-Acetaminophen 2016       Dizzy, can't walk, hard to focus    Cefaclor 2007       ceclor    Diphenhydramine 2007   Hives    Iodine 2009       Labored breathing    Lamictal 2011   Rash, Swelling    \"hot\", unable to function    Morphine 2011   Itching    redness    Penicillins 2007   Itching, Swelling    Savella [Milnacipran Hcl] 2013       Muscle aches    Sulfa Drugs 2007   Hives, Anxiety    Hard breathing    Tape 2016       Paper tape is ok    Tizanidine Hcl 2014       dizziness      You were diagnosed with     Morbid obesity with BMI of 40.0-44.9, adult (CMS-[facility])   [881766]       Obesity hypoventilation syndrome (CMS-[facility])   [278.03.ICD-9-CM]       Hypothyroidism due to acquired atrophy of thyroid   [9626842]       Chronic constipation   [977262]       Neural foraminal stenosis of cervical spine   [050180]       Primary osteoarthritis involving multiple joints   [4202498]       Skin lesion of right ear   [0081786]         Vital Signs     Blood Pressure Pulse Temperature Respirations Height Weight    130/80 mmHg 74 36.2 °C (97.2 °F) 14 1.689 m (5' 6.5\") 121.11 kg (267 lb)    Body Mass Index Oxygen Saturation Smoking Status             42.45 kg/m2 91% Former Smoker         Basic Information     Date Of Birth Sex Race Ethnicity Preferred Language    1950 Female White Non- English      Your appointments     2017  7:05 PM   Sleep Study with [employer]   [facility] Sleep Medicine (--)    [address] [place] [address]  [place] [address]   [phone]            Aug 01, 2017  1:20 PM   Established Patient Pul with FIORELLA Graham   [facility] Pulmonary Medicine (--)    [address]  [address]  [place] [address]   [phone]              Problem List              ICD-10-CM Priority Class Noted - Resolved    Essential hypertension I10   5/20/2009 - Present    Hypothyroidism due to acquired atrophy of thyroid E03.4   5/20/2009 - Present    Depression F32.9   5/20/2009 - Present    Eczema, dyshidrotic L30.1   8/3/2009 - Present    Osteoarthritis of multiple joints M15.9   4/13/2010 - Present    GERD (gastroesophageal reflux disease) K21.9   12/10/2010 - Present    Polymyalgia rheumatica (CMS-HCC) M35.3   5/6/2011 - Present    Seasonal allergies J30.2   7/8/2011 - Present    Carpal tunnel syndrome G56.00   9/5/2011 - Present    Ulnar neuropathy G56.20   9/5/2011 - Present    Migraine without aura G43.009   Unknown - Present    Cervical stenosis of spinal canal M48.02   11/1/2011 - Present    Lumbar radiculopathy M54.16   11/1/2011 - Present    Nocturnal hypoxia G47.34   Unknown - Present    Chronic constipation K59.09   10/2/2013 - Present    Rectocele N81.6   11/25/2013 - Present    Pelvic floor dysfunction M62.89   Unknown - Present    H/O fibromyalgia Z87.39   3/11/2014 - Present    Sleep apnea G47.30   3/11/2014 - Present    Obesity E66.9   3/11/2014 - Present    Menopause Z78.0   3/11/2014 - Present    Peripheral neuropathy G62.9   Unknown - Present    Mild intermittent asthma without complication J45.20   Unknown - Present    Elevated sed rate R70.0   12/9/2014 - Present    Candidal intertrigo B37.2   6/1/2015 - Present    Chronic pain G89.29   11/18/2015 - Present    Lumbar facet arthropathy (HCC) M12.88   12/9/2015 - Present    Anal fissure K60.2   4/14/2016 - Present    Menopausal symptoms N95.1   4/28/2016 - Present    Deviated nasal septum J34.2   8/1/2016 - Present    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    10/3/2016 - Present    Atrophic rhinitis J31.0   10/3/2016 - Present    Nonalcoholic fatty liver disease K76.0   1/5/2017 - Present    Dyslipidemia, goal LDL below 130 E78.5   1/6/2017 - Present    Gastroesophageal reflux disease K21.9   2/6/2017 - Present    Neural foraminal stenosis of cervical spine M99.81   5/18/2017 - Present    Tinnitus of both ears H93.13   5/18/2017 - Present    Obesity hypoventilation syndrome (CMS-HCC) E66.2   5/31/2017 - Present      Health Maintenance        Date Due Completion Dates    MAMMOGRAM 5/13/2018 5/13/2016, 10/24/2014, 5/28/2013, 5/24/2012, 1/28/2011, 10/16/2009, 10/16/2009, 3/24/2006    IMM PNEUMOCOCCAL 65+ (ADULT) LOW/MEDIUM RISK SERIES (2 of 2 - PPSV23) 1/16/2019 1/5/2017, 1/16/2014    BONE DENSITY 9/10/2020 9/10/2015    COLONOSCOPY 2/10/2022 2/10/2017, 2/6/2017    IMM DTaP/Tdap/Td Vaccine (2 - Td) 5/18/2027 5/18/2017            Current Immunizations     13-VALENT PCV PREVNAR 1/5/2017    Influenza TIV (IM) 1/16/2014, 11/20/2012    Influenza Vaccine Adult HD 1/5/2017    Influenza Vaccine Quad Inj (Pf) 10/15/2014    Pneumococcal polysaccharide vaccine (PPSV-23) 1/16/2014    SHINGLES VACCINE 5/4/2017    Tdap Vaccine 5/18/2017      Below and/or attached are the medications your provider expects you to take. Review all of your home medications and newly ordered medications with your provider and/or pharmacist. Follow medication instructions as directed by your provider and/or pharmacist. Please keep your medication list with you and share with your provider. Update the information when medications are discontinued, doses are changed, or new medications (including over-the-counter products) are added; and carry medication information at all times in the event of emergency situations     Allergies:  ACE INHIBITORS - (reactions not documented)     BUTALBITAL-ACETAMINOPHEN - (reactions not documented)     CEFACLOR - (reactions not documented)     DIPHENHYDRAMINE - Hives     IODINE -  (reactions not documented)     LAMICTAL - Rash,Swelling     MORPHINE - Itching     PENICILLINS - Itching,Swelling     SAVELLA - (reactions not documented)     SULFA DRUGS - Hives,Anxiety     TAPE - (reactions not documented)     TIZANIDINE HCL - (reactions not documented)               Medications  Valid as of: June 30, 2017 - 11:31 AM    Generic Name Brand Name Tablet Size Instructions for use    Carvedilol (Tab) COREG 6.25 MG Take 1 Tab by mouth 2 times a day, with meals.        DULoxetine HCl (Cap DR Particles) CYMBALTA 60 MG Take 60 mg by mouth 2 times a day.        Fluocinolone Acetonide (Oil) Fluocinolone Acetonide 0.01 % Place  in ear 2 Times a Day.        Furosemide (Tab) LASIX 20 MG TAKE 1 TABLET BY MOUTH EVERY DAY        Gabapentin (Cap) NEURONTIN 100 MG Take 1 Cap by mouth 2 Times a Day.        Ketoconazole (Cream) NIZORAL 2 % Apply  to affected area(s) every day.        Levothyroxine Sodium (Tab) SYNTHROID 175 MCG Take 1 Tab by mouth every day.        MetroNIDAZOLE (Gel) METROGEL 0.75 % Apply  to affected area(s) 2 times a day.        Nabumetone (Tab) RELAFEN 750 MG Take 1 Tab by mouth 2 times a day, with meals.        Oxygen-Helium   Inhale 2-3 L by mouth as needed.        Pantoprazole Sodium (Tablet Delayed Response) PROTONIX 40 MG Take 1 Tab by mouth every day.        Polyethyl Glycol-Propyl Glycol (Solution) SYSTANE 0.4-0.3 % Place 1 Drop in both eyes as needed.        Polyethylene Glycol 3350 (Pack) MIRALAX  Take 17 g by mouth 3 times a day.        Polyethylene Glycol 3350 (Powder) MIRALAX  Take 17 g by mouth every day.        Potassium Chloride Klarissa CR (Tab CR) Kdur 20 MEQ TAKE 1 TABLET BY MOUTH TWICE A DAY        Sertraline HCl (Tab) ZOLOFT 100 MG TAKE 1 TABLET BY MOUTH EVERY DAY        Spironolactone (Tab) ALDACTONE 50 MG TAKE 1 TABLET BY MOUTH EVERY DAY        Temazepam (Cap) RESTORIL 15 MG Take 1 Cap by mouth at bedtime as needed for Sleep.        TraMADol HCl (Tab) ULTRAM 50 MG Take 1-2 Tabs  by mouth every four hours as needed for Moderate Pain.        .                 Medicines prescribed today were sent to:     [id number] - [place], [place] - [address] [place] [address]    Phone: [phone] Fax: [phone]    Open 24 Hours?: No      Medication refill instructions:       If your prescription bottle indicates you have medication refills left, it is not necessary to call your provider’s office. Please contact your pharmacy and they will refill your medication.    If your prescription bottle indicates you do not have any refills left, you may request refills at any time through one of the following ways: The online [employer] system (except Urgent Care), by calling your provider’s office, or by asking your pharmacy to contact your provider’s office with a refill request. Medication refills are processed only during regular business hours and may not be available until the next business day. Your provider may request additional information or to have a follow-up visit with you prior to refilling your medication.   *Please Note: Medication refills are assigned a new Rx number when refilled electronically. Your pharmacy may indicate that no refills were authorized even though a new prescription for the same medication is available at the pharmacy. Please request the medicine by name with the pharmacy before contacting your provider for a refill.        Your To Do List     Future Labs/Procedures Complete By Expires    TSH  As directed 7/1/2018      Referral     A referral request has been sent to our patient care coordination department. Please allow 3-5 business days for us to process this request and contact you either by phone or mail. If you do not hear from us by the 5th business day, please call us at [phone].           [employer] Access Code: Activation code not generated  Current [employer] Status: Active

## 2017-07-10 ENCOUNTER — OFFICE VISIT (OUTPATIENT)
Dept: MEDICAL GROUP | Facility: CLINIC | Age: 67
End: 2017-07-10
Payer: MEDICARE

## 2017-07-10 VITALS
SYSTOLIC BLOOD PRESSURE: 124 MMHG | BODY MASS INDEX: 42.14 KG/M2 | WEIGHT: 268.5 LBS | DIASTOLIC BLOOD PRESSURE: 74 MMHG | RESPIRATION RATE: 14 BRPM | HEART RATE: 74 BPM | HEIGHT: 67 IN | OXYGEN SATURATION: 94 % | TEMPERATURE: 97.5 F

## 2017-07-10 DIAGNOSIS — R23.3 ABNORMAL BRUISING: ICD-10-CM

## 2017-07-10 DIAGNOSIS — R31.0 GROSS HEMATURIA: ICD-10-CM

## 2017-07-10 LAB
APPEARANCE UR: NORMAL
BILIRUB UR STRIP-MCNC: NEGATIVE MG/DL
COLOR UR AUTO: YELLOW
GLUCOSE UR STRIP.AUTO-MCNC: NEGATIVE MG/DL
KETONES UR STRIP.AUTO-MCNC: NEGATIVE MG/DL
LEUKOCYTE ESTERASE UR QL STRIP.AUTO: NORMAL
NITRITE UR QL STRIP.AUTO: NORMAL
PH UR STRIP.AUTO: 7 [PH] (ref 5–8)
PROT UR QL STRIP: NORMAL MG/DL
RBC UR QL AUTO: NORMAL
SP GR UR STRIP.AUTO: 1
UROBILINOGEN UR STRIP-MCNC: NORMAL MG/DL

## 2017-07-10 PROCEDURE — 81002 URINALYSIS NONAUTO W/O SCOPE: CPT | Performed by: FAMILY MEDICINE

## 2017-07-10 PROCEDURE — 99213 OFFICE O/P EST LOW 20 MIN: CPT | Performed by: FAMILY MEDICINE

## 2017-07-10 RX ORDER — CIPROFLOXACIN 500 MG/1
500 TABLET, FILM COATED ORAL 2 TIMES DAILY
Qty: 10 TAB | Refills: 0 | Status: SHIPPED | OUTPATIENT
Start: 2017-07-10 | End: 2017-07-15

## 2017-07-10 NOTE — MR AVS SNAPSHOT
"        Ashly Meyer   7/10/2017 4:20 PM   Appointment   MRN: [id number]    Department:  [facility]   Dept Phone:  [phone]    Description:  Female : 1950   Provider:  Connie Ahn M.D.           Allergies as of 7/10/2017     Allergen Noted Reactions    Ace Inhibitors 2009       Cough      Butalbital-Acetaminophen 2016       Dizzy, can't walk, hard to focus    Cefaclor 2007       ceclor    Diphenhydramine 2007   Hives    Iodine 2009       Labored breathing    Lamictal 2011   Rash, Swelling    \"hot\", unable to function    Morphine 2011   Itching    redness    Penicillins 2007   Itching, Swelling    Savella [Milnacipran Hcl] 2013       Muscle aches    Sulfa Drugs 2007   Hives, Anxiety    Hard breathing    Tape 2016       Paper tape is ok    Tizanidine Hcl 2014       dizziness      Vital Signs     Smoking Status                   Former Smoker           Basic Information     Date Of Birth Sex Race Ethnicity Preferred Language    1950 Female White Non- English      Your appointments     2017  7:05 PM   Sleep Study with SLEEP TECH   [facility] Sleep Medicine (--)    [address] [place]  [place] [address] [phone]            Aug 01, 2017  1:20 PM   Established Patient Pul with FIORELLA Graham   [facility] Pulmonary Medicine (--)    [address]  [address]  [place] [address] [phone]            Sep 08, 2017  9:20 AM   Established Patient with Connie Ahn M.D.   [facility])    [address]  [place] [address] [phone]           You will be receiving a confirmation call a few days before your appointment from our automated call confirmation system.              Problem List              ICD-10-CM Priority Class Noted - Resolved    Essential hypertension I10   2009 - Present    Hypothyroidism due to acquired " atrophy of thyroid E03.4   5/20/2009 - Present    Depression F32.9   5/20/2009 - Present    Eczema, dyshidrotic L30.1   8/3/2009 - Present    Osteoarthritis of multiple joints M15.9   4/13/2010 - Present    GERD (gastroesophageal reflux disease) K21.9   12/10/2010 - Present    Polymyalgia rheumatica (CMS-HCC) M35.3   5/6/2011 - Present    Seasonal allergies J30.2   7/8/2011 - Present    Carpal tunnel syndrome G56.00   9/5/2011 - Present    Ulnar neuropathy G56.20   9/5/2011 - Present    Migraine without aura G43.009   Unknown - Present    Cervical stenosis of spinal canal M48.02   11/1/2011 - Present    Lumbar radiculopathy M54.16   11/1/2011 - Present    Nocturnal hypoxia G47.34   Unknown - Present    Chronic constipation K59.09   10/2/2013 - Present    Rectocele N81.6   11/25/2013 - Present    Pelvic floor dysfunction M62.89   Unknown - Present    H/O fibromyalgia Z87.39   3/11/2014 - Present    Sleep apnea G47.30   3/11/2014 - Present    Obesity E66.9   3/11/2014 - Present    Menopause Z78.0   3/11/2014 - Present    Peripheral neuropathy G62.9   Unknown - Present    Mild intermittent asthma without complication J45.20   Unknown - Present    Elevated sed rate R70.0   12/9/2014 - Present    Candidal intertrigo B37.2   6/1/2015 - Present    Chronic pain G89.29   11/18/2015 - Present    Lumbar facet arthropathy (HCC) M12.88   12/9/2015 - Present    Anal fissure K60.2   4/14/2016 - Present    Menopausal symptoms N95.1   4/28/2016 - Present    Deviated nasal septum J34.2   8/1/2016 - Present    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   10/3/2016 - Present    Atrophic rhinitis J31.0   10/3/2016 - Present    Nonalcoholic fatty liver disease K76.0   1/5/2017 - Present    Dyslipidemia, goal LDL below 130 E78.5   1/6/2017 - Present    Gastroesophageal reflux disease K21.9   2/6/2017 - Present    Neural foraminal stenosis of cervical spine M99.81   5/18/2017 - Present    Tinnitus of both ears H93.13   5/18/2017 - Present    Obesity  hypoventilation syndrome (CMS-[facility]) E66.2   5/31/2017 - Present      Health Maintenance        Date Due Completion Dates    IMM INFLUENZA (1) 9/1/2017 1/5/2017, 10/15/2014, 1/16/2014, 11/20/2012    MAMMOGRAM 5/13/2018 5/13/2016, 10/24/2014, 5/28/2013, 5/24/2012, 1/28/2011, 10/16/2009, 10/16/2009, 3/24/2006    IMM PNEUMOCOCCAL 65+ (ADULT) LOW/MEDIUM RISK SERIES (2 of 2 - PPSV23) 1/16/2019 1/5/2017, 1/16/2014    BONE DENSITY 9/10/2020 9/10/2015    COLONOSCOPY 2/10/2022 2/10/2017, 2/6/2017    IMM DTaP/Tdap/Td Vaccine (2 - Td) 5/18/2027 5/18/2017            Current Immunizations     13-VALENT PCV PREVNAR 1/5/2017    Influenza TIV (IM) 1/16/2014, 11/20/2012    Influenza Vaccine Adult HD 1/5/2017    Influenza Vaccine Quad Inj (Pf) 10/15/2014    Pneumococcal polysaccharide vaccine (PPSV-23) 1/16/2014    SHINGLES VACCINE 5/4/2017    Tdap Vaccine 5/18/2017      Below and/or attached are the medications your provider expects you to take. Review all of your home medications and newly ordered medications with your provider and/or pharmacist. Follow medication instructions as directed by your provider and/or pharmacist. Please keep your medication list with you and share with your provider. Update the information when medications are discontinued, doses are changed, or new medications (including over-the-counter products) are added; and carry medication information at all times in the event of emergency situations     Allergies:  ACE INHIBITORS - (reactions not documented)     BUTALBITAL-ACETAMINOPHEN - (reactions not documented)     CEFACLOR - (reactions not documented)     DIPHENHYDRAMINE - Hives     IODINE - (reactions not documented)     LAMICTAL - Rash,Swelling     MORPHINE - Itching     PENICILLINS - Itching,Swelling     SAVELLA - (reactions not documented)     SULFA DRUGS - Hives,Anxiety     TAPE - (reactions not documented)     TIZANIDINE HCL - (reactions not documented)               Medications  Valid as of: July 10,  2017 -  5:18 PM    Generic Name Brand Name Tablet Size Instructions for use    Carvedilol (Tab) COREG 6.25 MG Take 1 Tab by mouth 2 times a day, with meals.        DULoxetine HCl (Cap DR Particles) CYMBALTA 60 MG Take 60 mg by mouth 2 times a day.        Fluocinolone Acetonide (Oil) Fluocinolone Acetonide 0.01 % Place  in ear 2 Times a Day.        Furosemide (Tab) LASIX 20 MG TAKE 1 TABLET BY MOUTH EVERY DAY        Gabapentin (Cap) NEURONTIN 100 MG Take 1 Cap by mouth 2 Times a Day.        Ketoconazole (Cream) NIZORAL 2 % Apply  to affected area(s) every day.        Levothyroxine Sodium (Tab) SYNTHROID 175 MCG Take 1 Tab by mouth every day.        MetroNIDAZOLE (Gel) METROGEL 0.75 % Apply  to affected area(s) 2 times a day.        Nabumetone (Tab) RELAFEN 750 MG Take 1 Tab by mouth 2 times a day, with meals.        Oxygen-Helium   Inhale 2-3 L by mouth as needed.        Pantoprazole Sodium (Tablet Delayed Response) PROTONIX 40 MG Take 1 Tab by mouth every day.        Polyethyl Glycol-Propyl Glycol (Solution) SYSTANE 0.4-0.3 % Place 1 Drop in both eyes as needed.        Polyethylene Glycol 3350 (Pack) MIRALAX  Take 17 g by mouth 3 times a day.        Polyethylene Glycol 3350 (Powder) MIRALAX  Take 17 g by mouth every day.        Potassium Chloride Klarissa CR (Tab CR) Kdur 20 MEQ TAKE 1 TABLET BY MOUTH TWICE A DAY        Sertraline HCl (Tab) ZOLOFT 100 MG TAKE 1 TABLET BY MOUTH EVERY DAY        Spironolactone (Tab) ALDACTONE 50 MG TAKE 1 TABLET BY MOUTH EVERY DAY        Temazepam (Cap) RESTORIL 15 MG Take 1 Cap by mouth at bedtime as needed for Sleep.        TraMADol HCl (Tab) ULTRAM 50 MG Take 1-2 Tabs by mouth every four hours as needed for Moderate Pain.        .                 Medicines prescribed today were sent to:     [id number] - [place], [place] - [address] [address]    Phone: [phone] Fax: [phone]    Open 24 Hours?: No      Medication refill  instructions:       If your prescription bottle indicates you have medication refills left, it is not necessary to call your provider’s office. Please contact your pharmacy and they will refill your medication.    If your prescription bottle indicates you do not have any refills left, you may request refills at any time through one of the following ways: The online [employer] system (except Urgent Care), by calling your provider’s office, or by asking your pharmacy to contact your provider’s office with a refill request. Medication refills are processed only during regular business hours and may not be available until the next business day. Your provider may request additional information or to have a follow-up visit with you prior to refilling your medication.   *Please Note: Medication refills are assigned a new Rx number when refilled electronically. Your pharmacy may indicate that no refills were authorized even though a new prescription for the same medication is available at the pharmacy. Please request the medicine by name with the pharmacy before contacting your provider for a refill.           [employer] Access Code: Activation code not generated  Current [employer] Status: Active

## 2017-07-11 NOTE — PROGRESS NOTES
CC: hematuria, clots, bruise    HPI:   Ashly presents today with the following.    1. Gross hematuria  Clots, frequent burning urination.  Some aching.  Feels feverish but no fever now.  Had an episode of heavy sweats today.    2. Abnormal bruising  She has a large bruise left anterior abdomen.      Patient Active Problem List    Diagnosis Date Noted   • Obesity hypoventilation syndrome (CMS-HCC) 05/31/2017   • Neural foraminal stenosis of cervical spine 05/18/2017   • Tinnitus of both ears 05/18/2017   • Gastroesophageal reflux disease 02/06/2017   • Dyslipidemia, goal LDL below 130 01/06/2017   • Nonalcoholic fatty liver disease 01/05/2017   • Disease of accessory sinus 10/03/2016   • Atrophic rhinitis 10/03/2016   • Deviated nasal septum 08/01/2016   • Menopausal symptoms 04/28/2016   • Anal fissure 04/14/2016   • Lumbar facet arthropathy (HCC) 12/09/2015   • Chronic pain 11/18/2015   • Candidal intertrigo 06/01/2015   • Elevated sed rate 12/09/2014   • Mild intermittent asthma without complication    • H/O fibromyalgia 03/11/2014   • Sleep apnea 03/11/2014   • Obesity 03/11/2014   • Menopause 03/11/2014   • Peripheral neuropathy    • Rectocele 11/25/2013   • Pelvic floor dysfunction    • Chronic constipation 10/02/2013   • Nocturnal hypoxia    • Cervical stenosis of spinal canal 11/01/2011   • Lumbar radiculopathy 11/01/2011   • Migraine without aura    • Carpal tunnel syndrome 09/05/2011   • Ulnar neuropathy 09/05/2011   • Seasonal allergies 07/08/2011   • Polymyalgia rheumatica (CMS-HCC) 05/06/2011   • GERD (gastroesophageal reflux disease) 12/10/2010   • Osteoarthritis of multiple joints 04/13/2010   • Eczema, dyshidrotic 08/03/2009   • Essential hypertension 05/20/2009   • Hypothyroidism due to acquired atrophy of thyroid 05/20/2009   • Depression 05/20/2009       Current Outpatient Prescriptions   Medication Sig Dispense Refill   • polyethylene glycol 3350 (MIRALAX) Powder Take 17 g by mouth every day. 1  Bottle 6   • gabapentin (NEURONTIN) 100 MG Cap Take 1 Cap by mouth 2 Times a Day. 180 Cap 3   • nabumetone (RELAFEN) 750 MG Tab Take 1 Tab by mouth 2 times a day, with meals. 180 Tab 3   • tramadol (ULTRAM) 50 MG Tab Take 1-2 Tabs by mouth every four hours as needed for Moderate Pain. 180 Tab 5   • potassium chloride SA (KDUR) 20 MEQ Tab CR TAKE 1 TABLET BY MOUTH TWICE A  Tab 0   • furosemide (LASIX) 20 MG Tab TAKE 1 TABLET BY MOUTH EVERY DAY 90 Tab 0   • polyethyl glycol-propyl glycol (SYSTANE) 0.4-0.3 % Solution Place 1 Drop in both eyes as needed.     • carvedilol (COREG) 6.25 MG Tab Take 1 Tab by mouth 2 times a day, with meals. 60 Tab 6   • spironolactone (ALDACTONE) 50 MG Tab TAKE 1 TABLET BY MOUTH EVERY DAY 90 Tab 3   • levothyroxine (SYNTHROID) 175 MCG Tab Take 1 Tab by mouth every day. 90 Tab 2   • temazepam (RESTORIL) 15 MG Cap Take 1 Cap by mouth at bedtime as needed for Sleep. 30 Cap 3   • duloxetine (CYMBALTA) 60 MG Cap DR Particles delayed-release capsule Take 60 mg by mouth 2 times a day.     • ketoconazole (NIZORAL) 2 % Cream Apply  to affected area(s) every day.     • metronidazole (METROGEL) 0.75 % gel Apply  to affected area(s) 2 times a day.     • polyethylene glycol/lytes (MIRALAX) Pack Take 17 g by mouth 3 times a day.     • sertraline (ZOLOFT) 100 MG Tab TAKE 1 TABLET BY MOUTH EVERY DAY 90 Tab 6   • OXYGEN-HELIUM INH Inhale 2-3 L by mouth as needed.     • Fluocinolone Acetonide 0.01 % Oil Place  in ear 2 Times a Day.     • pantoprazole (PROTONIX) 40 MG Tablet Delayed Response Take 1 Tab by mouth every day. (Patient taking differently: Take 40 mg by mouth 2 times a day.) 90 Tab 3     No current facility-administered medications for this visit.         Allergies as of 07/10/2017 - Thomas as Reviewed 07/10/2017   Allergen Reaction Noted   • Ace inhibitors  05/20/2009   • Butalbital-acetaminophen  07/27/2016   • Cefaclor  08/03/2007   • Diphenhydramine Hives 08/03/2007   • Iodine  05/20/2009  "  • Lamictal Rash and Swelling 12/27/2011   • Morphine Itching 03/11/2011   • Penicillins Itching and Swelling 08/03/2007   • Savella [milnacipran hcl]  05/24/2013   • Sulfa drugs Hives and Anxiety 08/03/2007   • Tape  07/27/2016   • Tizanidine hcl  03/11/2014        ROS: As per HPI.    /74 mmHg  Pulse 74  Temp(Src) 36.4 °C (97.5 °F)  Resp 14  Ht 1.689 m (5' 6.5\")  Wt 121.791 kg (268 lb 8 oz)  BMI 42.69 kg/m2  SpO2 94%    Physical Exam:  Gen:         Alert and oriented, No apparent distress.  Lucid and fluent.  Neck:       No Lymphadenopathy or Bruits.  Lungs:     Clear to auscultation bilaterally  CV:          Regular rate and rhythm. No murmurs, rubs or gallops.               Ext:          No clubbing, cyanosis, no peripheral edema.  Skin:        4 cm bruise left anterior abomen    Nitrate positive, blood 3+, leukocyte positive      Assessment and Plan.   67 y.o. female with the following issues.    1. Gross hematuria  She is having symptoms consistent with UTI.   POC is positive for nitrate and leukocytes.  No culture performed because not enough urine.  Clinically I do not think it is prudent to wait for obtaining more urine to start the antibiotics. I like her to start the antibiotic right away.   - CBC WITH DIFFERENTIAL; Future  - ciprofloxacin (CIPRO) 500 MG Tab; Take 1 Tab by mouth 2 times a day for 5 days.  Dispense: 10 Tab; Refill: 0  - POCT Urinalysis    2. Abnormal bruising  Discussed blood tests ordered  - CBC WITH DIFFERENTIAL; Future        "

## 2017-07-21 ENCOUNTER — OFFICE VISIT (OUTPATIENT)
Dept: MEDICAL GROUP | Facility: CLINIC | Age: 67
End: 2017-07-21
Payer: MEDICARE

## 2017-07-21 VITALS
TEMPERATURE: 97.2 F | SYSTOLIC BLOOD PRESSURE: 122 MMHG | OXYGEN SATURATION: 99 % | WEIGHT: 274 LBS | DIASTOLIC BLOOD PRESSURE: 66 MMHG | HEIGHT: 67 IN | BODY MASS INDEX: 43 KG/M2 | HEART RATE: 80 BPM | RESPIRATION RATE: 16 BRPM

## 2017-07-21 DIAGNOSIS — R42 DIZZINESS: ICD-10-CM

## 2017-07-21 DIAGNOSIS — M48.02 NEURAL FORAMINAL STENOSIS OF CERVICAL SPINE: ICD-10-CM

## 2017-07-21 DIAGNOSIS — R11.0 NAUSEA: ICD-10-CM

## 2017-07-21 DIAGNOSIS — M62.838 CERVICAL PARASPINAL MUSCLE SPASM: ICD-10-CM

## 2017-07-21 DIAGNOSIS — G60.3 IDIOPATHIC PROGRESSIVE NEUROPATHY: ICD-10-CM

## 2017-07-21 PROCEDURE — 99213 OFFICE O/P EST LOW 20 MIN: CPT | Performed by: FAMILY MEDICINE

## 2017-07-21 NOTE — PROGRESS NOTES
CC: She has continued dizziness and nausea and neuropathic pain.    HPI:   Ashly presents today with the following.    1. Dizziness  The dizziness was initially attributed to her hypoxia. Her oxygen levels are now very good on 4 L but her dizziness is no better. She has gained another 6 pounds in the last 11 days. She feels there is something in her medication regimen that is making her ravenous. She thinks it might be the gabapentin. The gabapentin and Cymbalta were recently added by her physical medicine and rehabilitation physician. She states she feels shaky and somewhat sedated.  She is very frustrated to by her severe weight gain.    2. Nausea  She is having persistent nausea. She is taking both the sertraline and the Cymbalta. She is feeling quite shaky. She denies any seizures.    3. Idiopathic progressive neuropathy  She is having persistent pain from the neuropathy. She does not feel either the gabapentin or the Cymbalta has made any difference. She does not feel that injections have made any difference    4. Cervical paraspinal muscle spasm/neural foraminal stenosis of lumbar spine  She has chronic neck pain. She and Dr. Rasmussen have been working on this. Dr. Rasmussen has not done any injections in the neck yet though she says she will be doing that. Cervical MRI showed bilateral cervical foraminal stenosis.      Patient Active Problem List    Diagnosis Date Noted   • Obesity hypoventilation syndrome (CMS-HCC) 05/31/2017   • Neural foraminal stenosis of cervical spine 05/18/2017   • Tinnitus of both ears 05/18/2017   • Gastroesophageal reflux disease 02/06/2017   • Dyslipidemia, goal LDL below 130 01/06/2017   • Nonalcoholic fatty liver disease 01/05/2017   • Disease of accessory sinus 10/03/2016   • Atrophic rhinitis 10/03/2016   • Deviated nasal septum 08/01/2016   • Menopausal symptoms 04/28/2016   • Anal fissure 04/14/2016   • Lumbar facet arthropathy (HCC) 12/09/2015   • Chronic pain 11/18/2015   •  Candidal intertrigo 06/01/2015   • Elevated sed rate 12/09/2014   • Mild intermittent asthma without complication    • H/O fibromyalgia 03/11/2014   • Sleep apnea 03/11/2014   • Obesity 03/11/2014   • Menopause 03/11/2014   • Peripheral neuropathy    • Rectocele 11/25/2013   • Pelvic floor dysfunction    • Chronic constipation 10/02/2013   • Nocturnal hypoxia    • Cervical stenosis of spinal canal 11/01/2011   • Lumbar radiculopathy 11/01/2011   • Migraine without aura    • Carpal tunnel syndrome 09/05/2011   • Ulnar neuropathy 09/05/2011   • Seasonal allergies 07/08/2011   • Polymyalgia rheumatica (CMS-[facility]) 05/06/2011   • GERD (gastroesophageal reflux disease) 12/10/2010   • Osteoarthritis of multiple joints 04/13/2010   • Eczema, dyshidrotic 08/03/2009   • Essential hypertension 05/20/2009   • Hypothyroidism due to acquired atrophy of thyroid 05/20/2009   • Depression 05/20/2009       Current Outpatient Prescriptions   Medication Sig Dispense Refill   • polyethylene glycol 3350 (MIRALAX) Powder Take 17 g by mouth every day. 1 Bottle 6   • gabapentin (NEURONTIN) 100 MG Cap Take 1 Cap by mouth 2 Times a Day. 180 Cap 3   • nabumetone (RELAFEN) 750 MG Tab Take 1 Tab by mouth 2 times a day, with meals. 180 Tab 3   • tramadol (ULTRAM) 50 MG Tab Take 1-2 Tabs by mouth every four hours as needed for Moderate Pain. 180 Tab 5   • potassium chloride SA (KDUR) 20 MEQ Tab CR TAKE 1 TABLET BY MOUTH TWICE A  Tab 0   • furosemide (LASIX) 20 MG Tab TAKE 1 TABLET BY MOUTH EVERY DAY 90 Tab 0   • polyethyl glycol-propyl glycol (SYSTANE) 0.4-0.3 % Solution Place 1 Drop in both eyes as needed.     • carvedilol (COREG) 6.25 MG Tab Take 1 Tab by mouth 2 times a day, with meals. 60 Tab 6   • spironolactone (ALDACTONE) 50 MG Tab TAKE 1 TABLET BY MOUTH EVERY DAY 90 Tab 3   • levothyroxine (SYNTHROID) 175 MCG Tab Take 1 Tab by mouth every day. 90 Tab 2   • temazepam (RESTORIL) 15 MG Cap Take 1 Cap by mouth at bedtime as needed for  "Sleep. 30 Cap 3   • ketoconazole (NIZORAL) 2 % Cream Apply  to affected area(s) every day.     • metronidazole (METROGEL) 0.75 % gel Apply  to affected area(s) 2 times a day.     • polyethylene glycol/lytes (MIRALAX) Pack Take 17 g by mouth 3 times a day.     • sertraline (ZOLOFT) 100 MG Tab TAKE 1 TABLET BY MOUTH EVERY DAY 90 Tab 6   • OXYGEN-HELIUM INH Inhale 2-3 L by mouth as needed.     • Fluocinolone Acetonide 0.01 % Oil Place  in ear 2 Times a Day.     • pantoprazole (PROTONIX) 40 MG Tablet Delayed Response Take 1 Tab by mouth every day. (Patient taking differently: Take 40 mg by mouth 2 times a day.) 90 Tab 3     No current facility-administered medications for this visit.         Allergies as of 07/21/2017 - Thomas as Reviewed 07/21/2017   Allergen Reaction Noted   • Ace inhibitors  05/20/2009   • Butalbital-acetaminophen  07/27/2016   • Cefaclor  08/03/2007   • Diphenhydramine Hives 08/03/2007   • Iodine  05/20/2009   • Lamictal Rash and Swelling 12/27/2011   • Morphine Itching 03/11/2011   • Penicillins Itching and Swelling 08/03/2007   • Savella [milnacipran hcl]  05/24/2013   • Sulfa drugs Hives and Anxiety 08/03/2007   • Tape  07/27/2016   • Tizanidine hcl  03/11/2014        ROS: As per HPI.    /66 mmHg  Pulse 80  Temp(Src) 36.2 °C (97.2 °F)  Resp 16  Ht 1.689 m (5' 6.5\")  Wt 124.286 kg (274 lb)  BMI 43.57 kg/m2  SpO2 99% on 4 liters NP    Physical Exam:  Gen:         Alert and oriented, No apparent distress.  Lucid and fluent.  Wearing her oxygen.  Neck:       Marked posterior cervical spasm and tender cervical muscles.  Extension is limited and head is held very far forward.3  No JVD or carotid bruits appreciated.    Lungs:     Clear to auscultation bilaterally, good air movement  CV:          Regular rate and rhythm. No murmurs, rubs or gallops.               Ext:          No clubbing, cyanosis, no peripheral edema.      Assessment and Plan.   67 y.o. female with the following " issues.    1. Dizziness  I believe this may be medication related. We discussed the different medications. I am having her stop the Cymbalta as I feel that may be interacting with the sertraline as both are serotonin reuptake inhibitor compounds.  I am also having her stop the gabapentin. She has a follow-up with me in 6 weeks.    2. Nausea  She feels the nausea was much worse starting the Cymbalta. She has multiple possible reasons for the nausea. She is on chronic Relafen which has been consistently most helpful for her but could be causing gastritis. She continues the Protonix on a regular basis.    3. Idiopathic progressive neuropathy  She does indeed have progressive neuropathy which was exhaustively worked up by neurology. She responded very well to Lamictal which made a very good difference for her until it created a terrific rash which did not deya. We had to stop the medication. Reintroduction of the medication causes severe rash again and she is truly very allergic to this.    4. Cervical paraspinal muscle spasm  The muscle spasm as a severe component of her pain. She will continue to work with physiatry on this problem.    She will see me again in 6 weeks.

## 2017-07-21 NOTE — MR AVS SNAPSHOT
"        Ashly Meyer   2017 1:20 PM   Office Visit   MRN: [id number]    Department:  [facility]   Dept Phone:  [phone]    Description:  Female : 1950   Provider:  Connie Ahn M.D.           Reason for Visit     Dizziness     Nausea           Allergies as of 2017     Allergen Noted Reactions    Ace Inhibitors 2009       Cough      Butalbital-Acetaminophen 2016       Dizzy, can't walk, hard to focus    Cefaclor 2007       ceclor    Diphenhydramine 2007   Hives    Iodine 2009       Labored breathing    Lamictal 2011   Rash, Swelling    \"hot\", unable to function    Morphine 2011   Itching    redness    Penicillins 2007   Itching, Swelling    Savella [Milnacipran Hcl] 2013       Muscle aches    Sulfa Drugs 2007   Hives, Anxiety    Hard breathing    Tape 2016       Paper tape is ok    Tizanidine Hcl 2014       dizziness      You were diagnosed with     Dizziness   [671881]       Nausea   [749770]       Idiopathic progressive neuropathy   [821839]       Cervical paraspinal muscle spasm   [760346]         Vital Signs     Blood Pressure Pulse Temperature Respirations Height Weight    122/66 mmHg 80 36.2 °C (97.2 °F) 16 1.689 m (5' 6.5\") 124.286 kg (274 lb)    Body Mass Index Oxygen Saturation Smoking Status             43.57 kg/m2 99% Former Smoker         Basic Information     Date Of Birth Sex Race Ethnicity Preferred Language    1950 Female White Non- English      Your appointments     2017  7:05 PM   Sleep Study Diagnostic with SLEEP TECH   [facility] Sleep Medicine (--)    [address]  [place] [id number] [phone]            Aug 01, 2017  1:20 PM   Established Patient Pul with FIORELLA Graham   [facility] Pulmonary Medicine (--)    [address]  [address]  [place] [id number] [phone]            Aug 08, 2017  1:00 PM   Intensive Behavioral " Therapy with Etahn Sutherland RD   [facility])    [address]  [place] [address]   [phone]            Sep 08, 2017  9:20 AM   Established Patient with Connie Ahn M.D.   [facility])    [address]  [place] [address]   [phone]           You will be receiving a confirmation call a few days before your appointment from our automated call confirmation system.              Problem List              ICD-10-CM Priority Class Noted - Resolved    Essential hypertension I10   5/20/2009 - Present    Hypothyroidism due to acquired atrophy of thyroid E03.4   5/20/2009 - Present    Depression F32.9   5/20/2009 - Present    Eczema, dyshidrotic L30.1   8/3/2009 - Present    Osteoarthritis of multiple joints M15.9   4/13/2010 - Present    GERD (gastroesophageal reflux disease) K21.9   12/10/2010 - Present    Polymyalgia rheumatica (CMS-HCC) M35.3   5/6/2011 - Present    Seasonal allergies J30.2   7/8/2011 - Present    Carpal tunnel syndrome G56.00   9/5/2011 - Present    Ulnar neuropathy G56.20   9/5/2011 - Present    Migraine without aura G43.009   Unknown - Present    Cervical stenosis of spinal canal M48.02   11/1/2011 - Present    Lumbar radiculopathy M54.16   11/1/2011 - Present    Nocturnal hypoxia G47.34   Unknown - Present    Chronic constipation K59.09   10/2/2013 - Present    Rectocele N81.6   11/25/2013 - Present    Pelvic floor dysfunction M62.89   Unknown - Present    H/O fibromyalgia Z87.39   3/11/2014 - Present    Sleep apnea G47.30   3/11/2014 - Present    Obesity E66.9   3/11/2014 - Present    Menopause Z78.0   3/11/2014 - Present    Peripheral neuropathy G62.9   Unknown - Present    Mild intermittent asthma without complication J45.20   Unknown - Present    Elevated sed rate R70.0   12/9/2014 - Present    Candidal intertrigo B37.2   6/1/2015 - Present    Chronic pain G89.29   11/18/2015 - Present    Lumbar facet arthropathy (HCC)  M12.88   12/9/2015 - Present    Anal fissure K60.2   4/14/2016 - Present    Menopausal symptoms N95.1   4/28/2016 - Present    Deviated nasal septum J34.2   8/1/2016 - Present    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   10/3/2016 - Present    Atrophic rhinitis J31.0   10/3/2016 - Present    Nonalcoholic fatty liver disease K76.0   1/5/2017 - Present    Dyslipidemia, goal LDL below 130 E78.5   1/6/2017 - Present    Gastroesophageal reflux disease K21.9   2/6/2017 - Present    Neural foraminal stenosis of cervical spine M99.81   5/18/2017 - Present    Tinnitus of both ears H93.13   5/18/2017 - Present    Obesity hypoventilation syndrome (CMS-HCC) E66.2   5/31/2017 - Present    Idiopathic progressive neuropathy G60.3   7/21/2017 - Present      Health Maintenance        Date Due Completion Dates    IMM INFLUENZA (1) 9/1/2017 1/5/2017, 10/15/2014, 1/16/2014, 11/20/2012    MAMMOGRAM 5/13/2018 5/13/2016, 10/24/2014, 5/28/2013, 5/24/2012, 1/28/2011, 10/16/2009, 10/16/2009, 3/24/2006    IMM PNEUMOCOCCAL 65+ (ADULT) LOW/MEDIUM RISK SERIES (2 of 2 - PPSV23) 1/16/2019 1/5/2017, 1/16/2014    BONE DENSITY 9/10/2020 9/10/2015    COLONOSCOPY 2/10/2022 2/10/2017, 2/6/2017    IMM DTaP/Tdap/Td Vaccine (2 - Td) 5/18/2027 5/18/2017            Current Immunizations     13-VALENT PCV PREVNAR 1/5/2017    Influenza TIV (IM) 1/16/2014, 11/20/2012    Influenza Vaccine Adult HD 1/5/2017    Influenza Vaccine Quad Inj (Pf) 10/15/2014    Pneumococcal polysaccharide vaccine (PPSV-23) 1/16/2014    SHINGLES VACCINE 5/4/2017    Tdap Vaccine 5/18/2017      Below and/or attached are the medications your provider expects you to take. Review all of your home medications and newly ordered medications with your provider and/or pharmacist. Follow medication instructions as directed by your provider and/or pharmacist. Please keep your medication list with you and share with your provider. Update the information when medications are discontinued, doses are changed,  or new medications (including over-the-counter products) are added; and carry medication information at all times in the event of emergency situations     Allergies:  ACE INHIBITORS - (reactions not documented)     BUTALBITAL-ACETAMINOPHEN - (reactions not documented)     CEFACLOR - (reactions not documented)     DIPHENHYDRAMINE - Hives     IODINE - (reactions not documented)     LAMICTAL - Rash,Swelling     MORPHINE - Itching     PENICILLINS - Itching,Swelling     SAVELLA - (reactions not documented)     SULFA DRUGS - Hives,Anxiety     TAPE - (reactions not documented)     TIZANIDINE HCL - (reactions not documented)               Medications  Valid as of: July 21, 2017 -  3:37 PM    Generic Name Brand Name Tablet Size Instructions for use    Carvedilol (Tab) COREG 6.25 MG Take 1 Tab by mouth 2 times a day, with meals.        Fluocinolone Acetonide (Oil) Fluocinolone Acetonide 0.01 % Place  in ear 2 Times a Day.        Furosemide (Tab) LASIX 20 MG TAKE 1 TABLET BY MOUTH EVERY DAY        Ketoconazole (Cream) NIZORAL 2 % Apply  to affected area(s) every day.        Levothyroxine Sodium (Tab) SYNTHROID 175 MCG Take 1 Tab by mouth every day.        MetroNIDAZOLE (Gel) METROGEL 0.75 % Apply  to affected area(s) 2 times a day.        Nabumetone (Tab) RELAFEN 750 MG Take 1 Tab by mouth 2 times a day, with meals.        Oxygen-Helium   Inhale 2-3 L by mouth as needed.        Pantoprazole Sodium (Tablet Delayed Response) PROTONIX 40 MG Take 1 Tab by mouth every day.        Polyethyl Glycol-Propyl Glycol (Solution) SYSTANE 0.4-0.3 % Place 1 Drop in both eyes as needed.        Polyethylene Glycol 3350 (Pack) MIRALAX  Take 17 g by mouth 3 times a day.        Polyethylene Glycol 3350 (Powder) MIRALAX  Take 17 g by mouth every day.        Potassium Chloride Klarissa CR (Tab CR) Kdur 20 MEQ TAKE 1 TABLET BY MOUTH TWICE A DAY        Sertraline HCl (Tab) ZOLOFT 100 MG TAKE 1 TABLET BY MOUTH EVERY DAY        Spironolactone (Tab)  ALDACTONE 50 MG TAKE 1 TABLET BY MOUTH EVERY DAY        Temazepam (Cap) RESTORIL 15 MG Take 1 Cap by mouth at bedtime as needed for Sleep.        TraMADol HCl (Tab) ULTRAM 50 MG Take 1-2 Tabs by mouth every four hours as needed for Moderate Pain.        .                 Medicines prescribed today were sent to:     [id number] - [place], [place] - [address] [place] [address]    Phone: [phone] Fax: [phone]    Open 24 Hours?: No      Medication refill instructions:       If your prescription bottle indicates you have medication refills left, it is not necessary to call your provider’s office. Please contact your pharmacy and they will refill your medication.    If your prescription bottle indicates you do not have any refills left, you may request refills at any time through one of the following ways: The online [employer] system (except Urgent Care), by calling your provider’s office, or by asking your pharmacy to contact your provider’s office with a refill request. Medication refills are processed only during regular business hours and may not be available until the next business day. Your provider may request additional information or to have a follow-up visit with you prior to refilling your medication.   *Please Note: Medication refills are assigned a new Rx number when refilled electronically. Your pharmacy may indicate that no refills were authorized even though a new prescription for the same medication is available at the pharmacy. Please request the medicine by name with the pharmacy before contacting your provider for a refill.        Instructions    Stop the cymbalta.  Stop the gabapentin.  If the pain increases resume the cymbalta and let me know.            Matomy Markett Access Code: Activation code not generated  Current [employer] Status: Active

## 2017-07-21 NOTE — PATIENT INSTRUCTIONS
Stop the cymbalta.  Stop the gabapentin.  If the pain increases resume the cymbalta and let me know.

## 2017-07-22 ENCOUNTER — SLEEP STUDY (OUTPATIENT)
Dept: SLEEP MEDICINE | Facility: MEDICAL CENTER | Age: 67
End: 2017-07-22
Attending: NURSE PRACTITIONER
Payer: MEDICARE

## 2017-07-22 DIAGNOSIS — E66.2 OBESITY HYPOVENTILATION SYNDROME (HCC): ICD-10-CM

## 2017-07-22 PROCEDURE — 95811 POLYSOM 6/>YRS CPAP 4/> PARM: CPT | Performed by: INTERNAL MEDICINE

## 2017-07-25 ENCOUNTER — HOSPITAL ENCOUNTER (OUTPATIENT)
Dept: LAB | Facility: MEDICAL CENTER | Age: 67
End: 2017-07-25
Attending: FAMILY MEDICINE
Payer: MEDICARE

## 2017-07-25 DIAGNOSIS — R31.0 GROSS HEMATURIA: ICD-10-CM

## 2017-07-25 DIAGNOSIS — R23.3 ABNORMAL BRUISING: ICD-10-CM

## 2017-07-25 DIAGNOSIS — E03.4 HYPOTHYROIDISM DUE TO ACQUIRED ATROPHY OF THYROID: ICD-10-CM

## 2017-07-25 LAB
BASOPHILS # BLD AUTO: 0.6 % (ref 0–1.8)
BASOPHILS # BLD: 0.07 K/UL (ref 0–0.12)
EOSINOPHIL # BLD AUTO: 0.19 K/UL (ref 0–0.51)
EOSINOPHIL NFR BLD: 1.6 % (ref 0–6.9)
ERYTHROCYTE [DISTWIDTH] IN BLOOD BY AUTOMATED COUNT: 50.4 FL (ref 35.9–50)
HCT VFR BLD AUTO: 44.3 % (ref 37–47)
HGB BLD-MCNC: 14.3 G/DL (ref 12–16)
IMM GRANULOCYTES # BLD AUTO: 0.07 K/UL (ref 0–0.11)
IMM GRANULOCYTES NFR BLD AUTO: 0.6 % (ref 0–0.9)
LYMPHOCYTES # BLD AUTO: 2.35 K/UL (ref 1–4.8)
LYMPHOCYTES NFR BLD: 19.3 % (ref 22–41)
MCH RBC QN AUTO: 29.5 PG (ref 27–33)
MCHC RBC AUTO-ENTMCNC: 32.3 G/DL (ref 33.6–35)
MCV RBC AUTO: 91.5 FL (ref 81.4–97.8)
MONOCYTES # BLD AUTO: 0.97 K/UL (ref 0–0.85)
MONOCYTES NFR BLD AUTO: 8 % (ref 0–13.4)
NEUTROPHILS # BLD AUTO: 8.52 K/UL (ref 2–7.15)
NEUTROPHILS NFR BLD: 69.9 % (ref 44–72)
NRBC # BLD AUTO: 0 K/UL
NRBC BLD AUTO-RTO: 0 /100 WBC
PLATELET # BLD AUTO: 212 K/UL (ref 164–446)
PMV BLD AUTO: 11.6 FL (ref 9–12.9)
RBC # BLD AUTO: 4.84 M/UL (ref 4.2–5.4)
TSH SERPL DL<=0.005 MIU/L-ACNC: 0.98 UIU/ML (ref 0.3–3.7)
WBC # BLD AUTO: 12.2 K/UL (ref 4.8–10.8)

## 2017-07-25 PROCEDURE — 84443 ASSAY THYROID STIM HORMONE: CPT

## 2017-07-25 PROCEDURE — 36415 COLL VENOUS BLD VENIPUNCTURE: CPT

## 2017-07-25 PROCEDURE — 85025 COMPLETE CBC W/AUTO DIFF WBC: CPT

## 2017-07-26 NOTE — PROCEDURES
CLINICAL COMMENTS:  The patient underwent a split night polysomnogram with a CPAP titration using the standard montage for measurement of parameters of sleep, respiratory events, movement abnormalities, heart rate and rhythm. A microphone was used to monitor snoring.    ANALYSIS: The diagnostic recording time was 196.2 minutes with a sleep period of 173.3 minutes.  Total sleep time was 127.8 minutes with a sleep efficiency of 65.1%.  The sleep latency was 22.9 minutes, and REM latency was 10.0 minutes.  The patient had 44 arousals in total, for an arousal index of 20.7.        RESPIRATORY: The patient had 1 apneas in total.  Of these, 1 were obstructive apneas, and 0 were central apneas.  This resulted in an apnea index (AI) of 0.5.  The patient had 41 hypopneas in total, which resulted in a hypopnea index of 19.3.  The overall AHI was 19.7, while the AHI during REM was 41.9.  The supine AHI = 14.1.    OXIMETRY: Oxygen saturation monitoring showed a mean SpO2 of 91.7% for the diagnostic part of the study, with a minimum oxygen saturation of 74.0%.  Oxygen saturations were below 89% for 39.4% of sleep time.    CARDIAC: The highest heart rate for the first part of the study was 112.0 beats per minute.  The average heart rate during sleep was 78.9 bpm, while the highest heart rate was 94.0 bpm.    LIMB MOVEMENTS: There were a total of 2 periodic limb movements during sleep, of which 0 were PLMS arousals.  This resulted in a PLMS index of 0.9 and a PLMS arousal index of 0.0.    TREATMENT    Treatment recording time was 279.7 minutes with a total sleep time of 215.0min.  The patient had an arousal index of 10.9.      RESPIRATORY: The patient had 2 obstructive apneas, 15 central apneas, and 36 hypopneas for an overall AHI was 14.8.    OXIMETRY: The mean SpO2 during treatment was 91.4%, with a minimum oxygen saturation of 75.0%.        Interpretation:    This is a split-night study.    The diagnostic phase there is  significant fragmentation of sleep with increased wake after sleep onset time and an elevated arousal and awakening index.  Significant REM sleep time and slow-wave sleep time is recorded.  There are rare periodic limb movements that do not interfere with sleep continuity.  The apnea hypopnea index is 19.4 events per hour, consisting of hypopnea episodes with a single obstructive apnea.  Climbs to 41.9 events per hour in rem sleep and to 31.8 events per hour in supine sleep.  There are number of respiratory effort related arousals and the respiratory disturbance index is 23.9 events per hour.  The lowest arterial oxygen saturation is 74% on room air and she spends 30% of the diagnostic time with a saturation below 90%.    In the treatment phase there is some improvement in sleep continuity although the increased wake after sleep time persists.  CPAP was titrated across a pressure range of 5-12 cm water.  In the final 2 pressure stages, the apnea hypopnea index was 4.8 events per hour or less with a mean arterial oxygen saturation of 92% and a minimum saturation of 88% on room air.  Those steps in the titration included 30 minutes of REM sleep time and some time in the supine body position.    Assessment:   Moderate obstructive sleep apnea hypopnea with an apnea hypopnea index of 19.7 events per hour and a lowest arterial oxygen saturation of 74% on room air.  Fragmentation of sleep related in part to the breathing events, and improved on therapy.  There is an excellent response to CPAP therapy at a pressure of that higher than with the patient is currently using.    Recommendations:  CPAP at 12 cm water pressure.  She did best with a small Eson mask but might require a chinstrap.

## 2017-07-28 RX ORDER — KETOCONAZOLE 20 MG/G
CREAM TOPICAL
Qty: 30 G | Refills: 0 | Status: SHIPPED | OUTPATIENT
Start: 2017-07-28 | End: 2017-10-23

## 2017-08-01 ENCOUNTER — OFFICE VISIT (OUTPATIENT)
Dept: PULMONOLOGY | Facility: HOSPICE | Age: 67
End: 2017-08-01
Payer: MEDICARE

## 2017-08-01 VITALS
OXYGEN SATURATION: 95 % | TEMPERATURE: 97.3 F | RESPIRATION RATE: 16 BRPM | HEART RATE: 85 BPM | HEIGHT: 66 IN | BODY MASS INDEX: 45 KG/M2 | DIASTOLIC BLOOD PRESSURE: 70 MMHG | SYSTOLIC BLOOD PRESSURE: 116 MMHG | WEIGHT: 280 LBS

## 2017-08-01 DIAGNOSIS — J45.20 MILD INTERMITTENT ASTHMA WITHOUT COMPLICATION: ICD-10-CM

## 2017-08-01 DIAGNOSIS — E66.2 OBESITY HYPOVENTILATION SYNDROME (HCC): ICD-10-CM

## 2017-08-01 DIAGNOSIS — G47.33 OSA (OBSTRUCTIVE SLEEP APNEA): ICD-10-CM

## 2017-08-01 PROCEDURE — 99213 OFFICE O/P EST LOW 20 MIN: CPT | Performed by: NURSE PRACTITIONER

## 2017-08-01 NOTE — PROGRESS NOTES
Chief Complaint   Patient presents with   • Apnea     Sleep Study results         HPI:  This is a 67 y.o. female with a history of obstructive sleep apnea.  Polysomnogram indicates AHI 19.7 and minimum saturation 74%. The patient was tested on 5-12 cm. at 11 and 12 cm the patient had REM sleep and AHI was less than 5. Basal saturation was over 90%. The patient had been using CPAP 10 cm. She found it difficult to tolerate the mask the night of the study. She is going to continue using her older machine with a mask fit and then new supplies area and once she has acclimated to therapy and meeting complaints we can order a new machine if she would prefer.    Past Medical History   Diagnosis Date   • Degenerative disc disease    • GOUT 5/20/2009   • Fibromyalgia      confirmed by Dr. Ann   • GERD (gastroesophageal reflux disease)      hiatal hernia   • Pleomorphic adenoma    • Hypothyroidism 5/20/2009   • Mild intermittent asthma without complication    • Hypertension    • Carotid artery stenosis, unilateral      moderate left carotid artery stenosis   • Migraine without aura, without mention of intractable migraine without mention of status migrainosus    • Aphasia      Written word recognition/useage   • Nocturnal hypoxia      severe, to 69% O2 sat   • Sleep apnea syndrome      she is not using CPAP, claustrophobia   • Polymyalgia rheumatica (CMS-HCC)      Dr. Ann   • Pelvic floor dysfunction    • Peripheral neuropathy    • S/P tonsillectomy    • H/O foot surgery    • Seasonal allergies    • Snoring    • Hemorrhagic disorder (CMS-HCC)      nose bleeds   • Cancer (CMS-HCC) 1990's     skin   • Hiatus hernia syndrome    • Urinary incontinence      will not wear a pad   • Depression 5/20/2009   • Incipient cataract of both eyes    • Mixed dyslipidemia    • Arthritis      Everywhere.    • Controlled type 2 diabetes mellitus without complication (CMS-HCC)    • Chickenpox    • [place] measles    • Influenza    • Mumps     • Tonsillitis    • Obesity hypoventilation syndrome (CMS-HCC) 5/31/2017   • KERRI (obstructive sleep apnea) 3/11/2014     A.  CPAP         Past Surgical History   Procedure Laterality Date   • [facility] stat fine needle aspiration  11/16/2009     Performed by DA CALLOWAY at ENDOSCOPY [facility] ORS   • Tonsillectomy  1953   • Bladder suspension  1983   • Other orthopedic surgery  1962/1980     foot surgery    • Other abdominal surgery       Cholysestectomy   • Hysterectomy, vaginal  1983     ovaries are present, excessive bleeding   • Colonoscopy  2006     ? unclear date   • Septoplasty N/A 8/1/2016     Procedure: SEPTOPLASTY;  Surgeon: PIETER Dickson M.D.;  Location: SURGERY SAME DAY [facility] ORS;  Service:    • Turbinoplasty Bilateral 8/1/2016     Procedure: TURBINOPLASTY;  Surgeon: PIETER Dickson M.D.;  Location: SURGERY SAME DAY [facility] ORS;  Service:    • Nasal fracture reduction open N/A 8/1/2016     Procedure: SEPTAL FRACTURE REDUCTION OPEN;  Surgeon: PIETER Dickson M.D.;  Location: SURGERY SAME DAY [facility] ORS;  Service:    • Septal reconstruction  10/3/2016     Procedure: SEPTAL RECONSTRUCTION with  grafts ;  Surgeon: PIETER Dickson M.D.;  Location: SURGERY SAME DAY [facility] ORS;  Service:    • Gastroscopy  2/6/2017     Procedure: GASTROSCOPY;  Surgeon: Pau Foster M.D.;  Location: SURGERY SAME DAY [facility] ORS;  Service:    • Colonoscopy  2/6/2017     Procedure: COLONOSCOPY;  Surgeon: Pau Foster M.D.;  Location: SURGERY SAME DAY [facility] ORS;  Service:        Social History   Substance Use Topics   • Smoking status: Former Smoker -- 2.00 packs/day for 42 years     Types: Cigarettes     Quit date: 03/01/2007   • Smokeless tobacco: Never Used      Comment: 1 ppd >20yrs quit 12/07   • Alcohol Use: No       ROS:   Constitutional: Denies fevers, chills, sweats, fatigue, and weight loss.  Eyes: Denies glasses.  Ears/nose/mouth/throat: Denies injury.  Cardiovascular:  "Denies chest pain, tightness.  Respiratory: Denies shortness of breath, cough, sputum, wheezing, hemoptysis.  GI: Denies heartburn, difficulty swallowing, nausea, and vomiting.  Neurological: Denies frequent headaches, dizziness, weakness.  Sleep: See history of present illness.    Vitals:  Filed Vitals:    08/01/17 1324   Height: 1.676 m (5' 5.98\")   Weight: 127.007 kg (280 lb)   Weight % change since last entry.: 0 %   BP: 116/70   Pulse: 85   BMI (Calculated): 45.22   Resp: 16   Temp: 36.3 °C (97.3 °F)   O2 sat % on O2: 94 %   O2 Flow Rate (L/min): 4     Allergies:  Ace inhibitors; Butalbital-acetaminophen; Cefaclor; Diphenhydramine; Iodine; Lamictal; Morphine; Penicillins; Savella; Sulfa drugs; Tape; and Tizanidine hcl    Medications.  Current Outpatient Prescriptions   Medication Sig Dispense Refill   • ketoconazole (NIZORAL) 2 % Cream APPLY ONCE DAILY AT BEDTIME, INTERTRIGINOUS REGIONS 30 g 0   • polyethylene glycol 3350 (MIRALAX) Powder Take 17 g by mouth every day. 1 Bottle 6   • nabumetone (RELAFEN) 750 MG Tab Take 1 Tab by mouth 2 times a day, with meals. 180 Tab 3   • tramadol (ULTRAM) 50 MG Tab Take 1-2 Tabs by mouth every four hours as needed for Moderate Pain. 180 Tab 5   • potassium chloride SA (KDUR) 20 MEQ Tab CR TAKE 1 TABLET BY MOUTH TWICE A  Tab 0   • furosemide (LASIX) 20 MG Tab TAKE 1 TABLET BY MOUTH EVERY DAY 90 Tab 0   • polyethyl glycol-propyl glycol (SYSTANE) 0.4-0.3 % Solution Place 1 Drop in both eyes as needed.     • carvedilol (COREG) 6.25 MG Tab Take 1 Tab by mouth 2 times a day, with meals. 60 Tab 6   • spironolactone (ALDACTONE) 50 MG Tab TAKE 1 TABLET BY MOUTH EVERY DAY 90 Tab 3   • levothyroxine (SYNTHROID) 175 MCG Tab Take 1 Tab by mouth every day. 90 Tab 2   • temazepam (RESTORIL) 15 MG Cap Take 1 Cap by mouth at bedtime as needed for Sleep. 30 Cap 3   • metronidazole (METROGEL) 0.75 % gel Apply  to affected area(s) 2 times a day.     • polyethylene glycol/lytes (MIRALAX) " Pack Take 17 g by mouth 3 times a day.     • sertraline (ZOLOFT) 100 MG Tab TAKE 1 TABLET BY MOUTH EVERY DAY 90 Tab 6   • OXYGEN-HELIUM INH Inhale 2-3 L by mouth as needed.     • Fluocinolone Acetonide 0.01 % Oil Place  in ear 2 Times a Day.     • pantoprazole (PROTONIX) 40 MG Tablet Delayed Response Take 1 Tab by mouth every day. (Patient taking differently: Take 40 mg by mouth 2 times a day.) 90 Tab 3     No current facility-administered medications for this visit.       PHYSICAL EXAM:  Appearance: Well-developed, well-nourished, no acute distress.  Eyes. PERRL.  Hearing: Grossly intact.  Oropharynx: Tongue normal, posterior pharynx without erythema or exudate.  Respiratory effort: No intercostal retractions or use of accessory muscles.  Lung auscultation: No crackles, wheezing.  Heart auscultation: No murmur, gallop, or rub. Regular rate and rhythm.  Extremities: No cyanosis or edema.  Gait and Station: Normal  Orientation: Oriented to time, place, and person.    Assessment:  1. Mild intermittent asthma without complication     2. Obesity hypoventilation syndrome (CMS-HCC)     3. KERRI (obstructive sleep apnea)           Plan:  1. [place] CPAP at 12 cm with heated humidification.  2. Mask fit and mask and supplies to [facility].  3. Continue oxygen at 2 L/m continuous flow and 2 L on pulsed oxygen.    Return in about 2 months (around 10/1/2017) for With ADRYAN Hicks.

## 2017-08-01 NOTE — PATIENT INSTRUCTIONS
1. [place] CPAP at 12 cm with heated humidification.  2. Mask fit and mask and supplies to [facility].  3. Continue oxygen at 2 L/m continuous flow and pulse flow.

## 2017-08-01 NOTE — MR AVS SNAPSHOT
"        Ashly Meyer   2017 1:20 PM   Office Visit   MRN: [id number]    Department:  [facility]   Dept Phone:  [phone]    Description:  Female : 1950   Provider:  FIORELLA Graham           Reason for Visit     Apnea Sleep Study results      Allergies as of 2017     Allergen Noted Reactions    Ace Inhibitors 2009       Cough      Butalbital-Acetaminophen 2016       Dizzy, can't walk, hard to focus    Cefaclor 2007       ceclor    Diphenhydramine 2007   Hives    Iodine 2009       Labored breathing    Lamictal 2011   Rash, Swelling    \"hot\", unable to function    Morphine 2011   Itching    redness    Penicillins 2007   Itching, Swelling    Savella [Milnacipran Hcl] 2013       Muscle aches    Sulfa Drugs 2007   Hives, Anxiety    Hard breathing    Tape 2016       Paper tape is ok    Tizanidine Hcl 2014       dizziness      You were diagnosed with     Mild intermittent asthma without complication   [533132]       Obesity hypoventilation syndrome (CMS-HCC)   [278.03.ICD-9-CM]       KERRI (obstructive sleep apnea)   [073900]         Vital Signs     Blood Pressure Pulse Temperature Respirations Height Weight    116/70 mmHg 85 36.3 °C (97.3 °F) 16 1.676 m (5' 5.98\") 127.007 kg (280 lb)    Body Mass Index Oxygen Saturation Smoking Status             45.21 kg/m2 95% Former Smoker         Basic Information     Date Of Birth Sex Race Ethnicity Preferred Language    1950 Female White Non- English      Your appointments     Aug 08, 2017  1:00 PM   Intensive Behavioral Therapy with Ethan Sutherland RD   [address]  [place] [id number] [phone]            Sep 08, 2017  9:20 AM   Established Patient with Connie Ahn M.D.   [address]  [place] [id number] [phone]           You will be receiving a " confirmation call a few days before your appointment from our automated call confirmation system.            Oct 06, 2017 10:20 AM   Established Patient Pul with FIORELLA Graham   [facility] (--)    [address]  [address]  [place] [id number] [phone]              Problem List              ICD-10-CM Priority Class Noted - Resolved    Essential hypertension I10   5/20/2009 - Present    Hypothyroidism due to acquired atrophy of thyroid E03.4   5/20/2009 - Present    Depression F32.9   5/20/2009 - Present    Eczema, dyshidrotic L30.1   8/3/2009 - Present    Osteoarthritis of multiple joints M15.9   4/13/2010 - Present    GERD (gastroesophageal reflux disease) K21.9   12/10/2010 - Present    Polymyalgia rheumatica (CMS-HCC) M35.3   5/6/2011 - Present    Seasonal allergies J30.2   7/8/2011 - Present    Carpal tunnel syndrome G56.00   9/5/2011 - Present    Ulnar neuropathy G56.20   9/5/2011 - Present    Migraine without aura G43.009   Unknown - Present    Cervical stenosis of spinal canal M48.02   11/1/2011 - Present    Lumbar radiculopathy M54.16   11/1/2011 - Present    Nocturnal hypoxia G47.34   Unknown - Present    Chronic constipation K59.09   10/2/2013 - Present    Rectocele N81.6   11/25/2013 - Present    Pelvic floor dysfunction M62.89   Unknown - Present    H/O fibromyalgia Z87.39   3/11/2014 - Present    KERRI (obstructive sleep apnea) G47.33   3/11/2014 - Present    Obesity E66.9   3/11/2014 - Present    Menopause Z78.0   3/11/2014 - Present    Peripheral neuropathy G62.9   Unknown - Present    Mild intermittent asthma without complication J45.20   Unknown - Present    Elevated sed rate R70.0   12/9/2014 - Present    Candidal intertrigo B37.2   6/1/2015 - Present    Chronic pain G89.29   11/18/2015 - Present    Lumbar facet arthropathy (HCC) M12.88   12/9/2015 - Present    Anal fissure K60.2   4/14/2016 - Present    Menopausal symptoms N95.1   4/28/2016 - Present    Deviated  nasal septum J34.2   8/1/2016 - Present    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   10/3/2016 - Present    Atrophic rhinitis J31.0   10/3/2016 - Present    Nonalcoholic fatty liver disease K76.0   1/5/2017 - Present    Dyslipidemia, goal LDL below 130 E78.5   1/6/2017 - Present    Gastroesophageal reflux disease K21.9   2/6/2017 - Present    Neural foraminal stenosis of cervical spine M99.81   5/18/2017 - Present    Tinnitus of both ears H93.13   5/18/2017 - Present    Obesity hypoventilation syndrome (CMS-HCC) E66.2   5/31/2017 - Present    Idiopathic progressive neuropathy G60.3   7/21/2017 - Present      Health Maintenance        Date Due Completion Dates    IMM INFLUENZA (1) 9/1/2017 1/5/2017, 10/15/2014, 1/16/2014, 11/20/2012    MAMMOGRAM 5/13/2018 5/13/2016, 10/24/2014, 5/28/2013, 5/24/2012, 1/28/2011, 10/16/2009, 10/16/2009, 3/24/2006    IMM PNEUMOCOCCAL 65+ (ADULT) LOW/MEDIUM RISK SERIES (2 of 2 - PPSV23) 1/16/2019 1/5/2017, 1/16/2014    BONE DENSITY 9/10/2020 9/10/2015    COLONOSCOPY 2/10/2022 2/10/2017, 2/6/2017    IMM DTaP/Tdap/Td Vaccine (2 - Td) 5/18/2027 5/18/2017            Current Immunizations     13-VALENT PCV PREVNAR 1/5/2017    Influenza TIV (IM) 1/16/2014, 11/20/2012    Influenza Vaccine Adult HD 1/5/2017    Influenza Vaccine Quad Inj (Pf) 10/15/2014    Pneumococcal polysaccharide vaccine (PPSV-23) 1/16/2014    SHINGLES VACCINE 5/4/2017    Tdap Vaccine 5/18/2017      Below and/or attached are the medications your provider expects you to take. Review all of your home medications and newly ordered medications with your provider and/or pharmacist. Follow medication instructions as directed by your provider and/or pharmacist. Please keep your medication list with you and share with your provider. Update the information when medications are discontinued, doses are changed, or new medications (including over-the-counter products) are added; and carry medication information at all times in the event of  emergency situations     Allergies:  ACE INHIBITORS - (reactions not documented)     BUTALBITAL-ACETAMINOPHEN - (reactions not documented)     CEFACLOR - (reactions not documented)     DIPHENHYDRAMINE - Hives     IODINE - (reactions not documented)     LAMICTAL - Rash,Swelling     MORPHINE - Itching     PENICILLINS - Itching,Swelling     SAVELLA - (reactions not documented)     SULFA DRUGS - Hives,Anxiety     TAPE - (reactions not documented)     TIZANIDINE HCL - (reactions not documented)               Medications  Valid as of: August 01, 2017 -  2:00 PM    Generic Name Brand Name Tablet Size Instructions for use    Carvedilol (Tab) COREG 6.25 MG Take 1 Tab by mouth 2 times a day, with meals.        Fluocinolone Acetonide (Oil) Fluocinolone Acetonide 0.01 % Place  in ear 2 Times a Day.        Furosemide (Tab) LASIX 20 MG TAKE 1 TABLET BY MOUTH EVERY DAY        Ketoconazole (Cream) NIZORAL 2 % APPLY ONCE DAILY AT BEDTIME, INTERTRIGINOUS REGIONS        Levothyroxine Sodium (Tab) SYNTHROID 175 MCG Take 1 Tab by mouth every day.        MetroNIDAZOLE (Gel) METROGEL 0.75 % Apply  to affected area(s) 2 times a day.        Nabumetone (Tab) RELAFEN 750 MG Take 1 Tab by mouth 2 times a day, with meals.        Oxygen-Helium   Inhale 2-3 L by mouth as needed.        Pantoprazole Sodium (Tablet Delayed Response) PROTONIX 40 MG Take 1 Tab by mouth every day.        Polyethyl Glycol-Propyl Glycol (Solution) SYSTANE 0.4-0.3 % Place 1 Drop in both eyes as needed.        Polyethylene Glycol 3350 (Pack) MIRALAX  Take 17 g by mouth 3 times a day.        Polyethylene Glycol 3350 (Powder) MIRALAX  Take 17 g by mouth every day.        Potassium Chloride Klarissa CR (Tab CR) Kdur 20 MEQ TAKE 1 TABLET BY MOUTH TWICE A DAY        Sertraline HCl (Tab) ZOLOFT 100 MG TAKE 1 TABLET BY MOUTH EVERY DAY        Spironolactone (Tab) ALDACTONE 50 MG TAKE 1 TABLET BY MOUTH EVERY DAY        Temazepam (Cap) RESTORIL 15 MG Take 1 Cap by mouth at bedtime as  needed for Sleep.        TraMADol HCl (Tab) ULTRAM 50 MG Take 1-2 Tabs by mouth every four hours as needed for Moderate Pain.        .                 Medicines prescribed today were sent to:     [id number] - [place], [place] - [id number] [facility] [place] [address]    Phone: [phone] Fax: [phone]    Open 24 Hours?: No      Medication refill instructions:       If your prescription bottle indicates you have medication refills left, it is not necessary to call your provider’s office. Please contact your pharmacy and they will refill your medication.    If your prescription bottle indicates you do not have any refills left, you may request refills at any time through one of the following ways: The online [employer] system (except Urgent Care), by calling your provider’s office, or by asking your pharmacy to contact your provider’s office with a refill request. Medication refills are processed only during regular business hours and may not be available until the next business day. Your provider may request additional information or to have a follow-up visit with you prior to refilling your medication.   *Please Note: Medication refills are assigned a new Rx number when refilled electronically. Your pharmacy may indicate that no refills were authorized even though a new prescription for the same medication is available at the pharmacy. Please request the medicine by name with the pharmacy before contacting your provider for a refill.        Instructions    1. [place] CPAP at 12 cm with heated humidification.  2. Mask fit and mask and supplies to [facility].  3. Continue oxygen at 2 L/m continuous flow and pulse flow.                [employer] Access Code: Activation code not generated  Current [employer] Status: Active

## 2017-08-08 ENCOUNTER — NON-PROVIDER VISIT (OUTPATIENT)
Dept: MEDICAL GROUP | Facility: CLINIC | Age: 67
End: 2017-08-08
Payer: MEDICARE

## 2017-08-08 VITALS — BODY MASS INDEX: 43.82 KG/M2 | WEIGHT: 279.2 LBS | HEIGHT: 67 IN

## 2017-08-08 PROCEDURE — G0447 BEHAVIOR COUNSEL OBESITY 15M: HCPCS | Performed by: FAMILY MEDICINE

## 2017-08-08 NOTE — MR AVS SNAPSHOT
"Ashly Meyer   2017 1:00 PM   Appointment   MRN: [id number]    Department:  [facility]   Dept Phone:  [phone]    Description:  Female : 1950   Provider:  Ethan Sutherland RD           Allergies as of 2017     Allergen Noted Reactions    Ace Inhibitors 2009       Cough      Butalbital-Acetaminophen 2016       Dizzy, can't walk, hard to focus    Cefaclor 2007       ceclor    Diphenhydramine 2007   Hives    Iodine 2009       Labored breathing    Lamictal 2011   Rash, Swelling    \"hot\", unable to function    Morphine 2011   Itching    redness    Penicillins 2007   Itching, Swelling    Savella [Milnacipran Hcl] 2013       Muscle aches    Sulfa Drugs 2007   Hives, Anxiety    Hard breathing    Tape 2016       Paper tape is ok    Tizanidine Hcl 2014       dizziness      Vital Signs     Smoking Status                   Former Smoker           Basic Information     Date Of Birth Sex Race Ethnicity Preferred Language    1950 Female White Non- English      Your appointments     Aug 15, 2017  1:00 PM   Intensive Behavioral Therapy with Ethan Sutherland RD   [address] [address]   [phone]            Aug 18, 2017  9:30 AM   PROCEDURE 30 with Tawanda Dumont M.D.   PAIN MANAGEMENT  (--)    [address] [place] [id number]   [phone]           Your procedure is scheduled at [facility] at [facility] located at [address] [place] [address] just east of the main [place]. Please check in at the front lobby desk 1 hour prior to your appointment time. For your safety, please have a ride home with a responsible adult.             Aug 29, 2017  1:00 PM   Intensive Behavioral Therapy with Ethan Sutherland RD   [address] [address]   [phone]            Sep 01, 2017 10:45 AM   MR BRAIN " W/O 30 with [address] [facility] 2   [facility] - MRI - [address]    [address]  [place] [address]   [phone]            Sep 05, 2017  2:40 PM   Intensive Behavioral Therapy with Ethan Sutherland RD   [facility])    [address]  [place] [address] [phone]            Sep 08, 2017  9:20 AM   Established Patient with Connie Ahn M.D.   [facility])    [address]  [place] [address] [phone]           You will be receiving a confirmation call a few days before your appointment from our automated call confirmation system.            Oct 06, 2017 10:20 AM   Established Patient Pul with FIORELLA Graham   [facility] (--)    [address]  [address]  [place] [address] [phone]              Problem List              ICD-10-CM Priority Class Noted - Resolved    Essential hypertension I10   5/20/2009 - Present    Hypothyroidism due to acquired atrophy of thyroid E03.4   5/20/2009 - Present    Depression F32.9   5/20/2009 - Present    Eczema, dyshidrotic L30.1   8/3/2009 - Present    Osteoarthritis of multiple joints M15.9   4/13/2010 - Present    GERD (gastroesophageal reflux disease) K21.9   12/10/2010 - Present    Polymyalgia rheumatica (CMS-[facility]) M35.3   5/6/2011 - Present    Seasonal allergies J30.2   7/8/2011 - Present    Carpal tunnel syndrome G56.00   9/5/2011 - Present    Ulnar neuropathy G56.20   9/5/2011 - Present    Migraine without aura G43.009   Unknown - Present    Cervical stenosis of spinal canal M48.02   11/1/2011 - Present    Lumbar radiculopathy M54.16   11/1/2011 - Present    Nocturnal hypoxia G47.34   Unknown - Present    Chronic constipation K59.09   10/2/2013 - Present    Rectocele N81.6   11/25/2013 - Present    Pelvic floor dysfunction M62.89   Unknown - Present    H/O fibromyalgia Z87.39   3/11/2014 - Present    KERRI (obstructive sleep apnea) G47.33    3/11/2014 - Present    Obesity E66.9   3/11/2014 - Present    Menopause Z78.0   3/11/2014 - Present    Peripheral neuropathy G62.9   Unknown - Present    Mild intermittent asthma without complication J45.20   Unknown - Present    Elevated sed rate R70.0   12/9/2014 - Present    Candidal intertrigo B37.2   6/1/2015 - Present    Chronic pain G89.29   11/18/2015 - Present    Lumbar facet arthropathy (HCC) M12.88   12/9/2015 - Present    Anal fissure K60.2   4/14/2016 - Present    Menopausal symptoms N95.1   4/28/2016 - Present    Deviated nasal septum J34.2   8/1/2016 - Present    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   10/3/2016 - Present    Atrophic rhinitis J31.0   10/3/2016 - Present    Nonalcoholic fatty liver disease K76.0   1/5/2017 - Present    Dyslipidemia, goal LDL below 130 E78.5   1/6/2017 - Present    Gastroesophageal reflux disease K21.9   2/6/2017 - Present    Neural foraminal stenosis of cervical spine M99.81   5/18/2017 - Present    Tinnitus of both ears H93.13   5/18/2017 - Present    Obesity hypoventilation syndrome (CMS-HCC) E66.2   5/31/2017 - Present    Idiopathic progressive neuropathy G60.3   7/21/2017 - Present      Health Maintenance        Date Due Completion Dates    IMM INFLUENZA (1) 9/1/2017 1/5/2017, 10/15/2014, 1/16/2014, 11/20/2012    MAMMOGRAM 5/13/2018 5/13/2016, 10/24/2014, 5/28/2013, 5/24/2012, 1/28/2011, 10/16/2009, 10/16/2009, 3/24/2006    IMM PNEUMOCOCCAL 65+ (ADULT) LOW/MEDIUM RISK SERIES (2 of 2 - PPSV23) 1/16/2019 1/5/2017, 1/16/2014    BONE DENSITY 9/10/2020 9/10/2015    COLONOSCOPY 2/10/2022 2/10/2017, 2/6/2017    IMM DTaP/Tdap/Td Vaccine (2 - Td) 5/18/2027 5/18/2017            Current Immunizations     13-VALENT PCV PREVNAR 1/5/2017    Influenza TIV (IM) 1/16/2014, 11/20/2012    Influenza Vaccine Adult HD 1/5/2017    Influenza Vaccine Quad Inj (Pf) 10/15/2014    Pneumococcal polysaccharide vaccine (PPSV-23) 1/16/2014    SHINGLES VACCINE 5/4/2017    Tdap Vaccine 5/18/2017         Below and/or attached are the medications your provider expects you to take. Review all of your home medications and newly ordered medications with your provider and/or pharmacist. Follow medication instructions as directed by your provider and/or pharmacist. Please keep your medication list with you and share with your provider. Update the information when medications are discontinued, doses are changed, or new medications (including over-the-counter products) are added; and carry medication information at all times in the event of emergency situations     Allergies:  ACE INHIBITORS - (reactions not documented)     BUTALBITAL-ACETAMINOPHEN - (reactions not documented)     CEFACLOR - (reactions not documented)     DIPHENHYDRAMINE - Hives     IODINE - (reactions not documented)     LAMICTAL - Rash,Swelling     MORPHINE - Itching     PENICILLINS - Itching,Swelling     SAVELLA - (reactions not documented)     SULFA DRUGS - Hives,Anxiety     TAPE - (reactions not documented)     TIZANIDINE HCL - (reactions not documented)               Medications  Valid as of: August 08, 2017 -  1:59 PM    Generic Name Brand Name Tablet Size Instructions for use    Carvedilol (Tab) COREG 6.25 MG Take 1 Tab by mouth 2 times a day, with meals.        Fluocinolone Acetonide (Oil) Fluocinolone Acetonide 0.01 % Place  in ear 2 Times a Day.        Furosemide (Tab) LASIX 20 MG TAKE 1 TABLET BY MOUTH EVERY DAY        Ketoconazole (Cream) NIZORAL 2 % APPLY ONCE DAILY AT BEDTIME, INTERTRIGINOUS REGIONS        Levothyroxine Sodium (Tab) SYNTHROID 175 MCG Take 1 Tab by mouth every day.        MetroNIDAZOLE (Gel) METROGEL 0.75 % Apply  to affected area(s) 2 times a day.        Nabumetone (Tab) RELAFEN 750 MG Take 1 Tab by mouth 2 times a day, with meals.        Oxygen-Helium   Inhale 2-3 L by mouth as needed.        Pantoprazole Sodium (Tablet Delayed Response) PROTONIX 40 MG Take 1 Tab by mouth every day.        Polyethyl Glycol-Propyl Glycol  (Solution) SYSTANE 0.4-0.3 % Place 1 Drop in both eyes as needed.        Polyethylene Glycol 3350 (Pack) MIRALAX  Take 17 g by mouth 3 times a day.        Polyethylene Glycol 3350 (Powder) MIRALAX  Take 17 g by mouth every day.        Potassium Chloride Klarissa CR (Tab CR) Kdur 20 MEQ TAKE 1 TABLET BY MOUTH TWICE A DAY        Sertraline HCl (Tab) ZOLOFT 100 MG TAKE 1 TABLET BY MOUTH EVERY DAY        Spironolactone (Tab) ALDACTONE 50 MG TAKE 1 TABLET BY MOUTH EVERY DAY        Temazepam (Cap) RESTORIL 15 MG Take 1 Cap by mouth at bedtime as needed for Sleep.        TraMADol HCl (Tab) ULTRAM 50 MG Take 1-2 Tabs by mouth every four hours as needed for Moderate Pain.        .                 Medicines prescribed today were sent to:     [id number] - [place], [place] - [address] [address]    Phone: [phone] Fax: [phone]    Open 24 Hours?: No      Medication refill instructions:       If your prescription bottle indicates you have medication refills left, it is not necessary to call your provider’s office. Please contact your pharmacy and they will refill your medication.    If your prescription bottle indicates you do not have any refills left, you may request refills at any time through one of the following ways: The online [employer] system (except Urgent Care), by calling your provider’s office, or by asking your pharmacy to contact your provider’s office with a refill request. Medication refills are processed only during regular business hours and may not be available until the next business day. Your provider may request additional information or to have a follow-up visit with you prior to refilling your medication.   *Please Note: Medication refills are assigned a new Rx number when refilled electronically. Your pharmacy may indicate that no refills were authorized even though a new prescription for the same medication is available at the pharmacy. Please request the  medicine by name with the pharmacy before contacting your provider for a refill.           MyChart Access Code: Activation code not generated  Current MyChart Status: Active

## 2017-08-08 NOTE — PROGRESS NOTES
"IBT INITIAL ASSESMENT    Author: Ethan Sutherland Date & Time created: 8/8/2017  2:34 PM   Visit #: 1  Referring Provider: Connie Ahn M.D.  Patient Age: 67 y.o.  Time in/Out:  6326 - 8641    ASSESS:  Filed Vitals:    08/08/17 1433   Height: 1.689 m (5' 6.5\")   Weight: 126.644 kg (279 lb 3.2 oz)    Body mass index is 44.39 kg/(m^2). Goal Weight:  180#  The patient states improved health as a motivator for weight loss and has tried diets for weight loss in the past with no lasting success.  Comorbidities include Hypothyroidism, KERRI, HTN, Dyslipidemia, DM.       Current Dietary/Exercise Habits  1.  How many servings of fruits and vegetables do you eat in a typical day?  6-10 (mostly fruit)  2. How many servings of whole grains do you eat in a typical day?  0-1  3. How many times in a typical week do you eat foods high in fat or eat at a fast food restaurant?  1-2  4. How many times in a typical week do you eat red meat, pork, or processed meat?  5-7  5. How many days a week do you participate in moderately intense physical activity for 30 minutes?  0  6. How many days a week do you participate in vigorously intense physical activity for 20 minutes?  0  7. How many days a week do you do strength training exercise?  0  Estimated Stage of Change   Preparationas evidenced by the patient stating she would like to change.    ADVISE:    Physical Activity:  Ashly would like to exercise and states that she does not have much stamina so exercising is difficult for her.  We will discuss how she can start exercising to build stamina because she knows that by being inactive it will only get worse.     Dietary Guidelines:  She is trying to follow a low/no CHO diet, per patient, and struggles to follow it because she does not feel well on it.  She states she was told to stop eating any kind of starch and has been eating eggs, meat, some vegetables, cheese, and nuts only.  She is tired of following this and wants to do " something different.    The patient has been advised of how weight management and physical activity impacts their health and will help to reduce complications and health risk factors.    AGREE:  Visit #2 goal:   1.  Keep a 3-5 day food diary of all foods/drinks consumed, the amount you consumed (approximately), and the time it was when consumed.     2.  Eat CHO.    ASSIST:  Following a low/no CHO diet is why she does not feel well and is not helping her with weight loss so she is going to stop following those recommendations.  We will be discussing the basics of nutrition so she understands better why she felt so poor, although we already discussed that BG is energy.  We will be moving into portion control shortly thereafter so she understands how much she can eat.    ARRANGE:     Return for follow-up in 1 week   The patient was assisted in making follow-up appointment per orders.

## 2017-08-15 ENCOUNTER — NON-PROVIDER VISIT (OUTPATIENT)
Dept: MEDICAL GROUP | Facility: CLINIC | Age: 67
End: 2017-08-15
Payer: MEDICARE

## 2017-08-15 VITALS — HEIGHT: 67 IN | WEIGHT: 278.5 LBS | BODY MASS INDEX: 43.71 KG/M2

## 2017-08-15 PROCEDURE — G0447 BEHAVIOR COUNSEL OBESITY 15M: HCPCS | Performed by: FAMILY MEDICINE

## 2017-08-15 NOTE — PROGRESS NOTES
"8/15/2017     Visit #2       Connie Ahn M.D.      67 y.o.           Time in/Out:  1302 - 1319    ASSESS:    Filed Vitals:    08/15/17 1348   Height: 1.689 m (5' 6.5\")   Weight: 126.327 kg (278 lb 8 oz)            Wt Readings from Last 2 Encounters:   08/15/17 126.327 kg (278 lb 8 oz)   08/08/17 126.644 kg (279 lb 3.2 oz)            Body mass index is 44.28 kg/(m^2).       Difference:  - 0.7#     Starting weight:  179.2#     Difference:  - 0.7#     Current Dietary/Exercise Habits:  Ashly states that she started eating CHO and her stomach started hurting, especially after eating breads/pasta.  She is eating one consistent meal per day (breakfast) and then snacks the rest of the day mostly.      Estimated Stage of Change:    Action as evidenced by the patient eating CHO once again.    ADVISE:    Physical Activity:  No goal at this time.  We will go over exercises she can do while seated to build her exercise tolerance.     Dietary Guidelines:  I want Ashly to avoid wheat products for the next two weeks because it sounds like she may some gluten intolerance going on; her sister has a hx of celiac disease so she understands the foods that contain gluten.  I want to eliminate gluten to see if her GI problems clear up, and if they do, we will go over some gluten free options she can try.  I also have challenged her to try to eat two set meals consistently daily rather than breakfast only.  We reviewed the basics of nutrition so she can start to understand what she is putting in her mouth better.    The patient has been advised of how weight management and physical activity impacts their health and will help to reduce complications and health risk factors.      AGREE:  Visit #3 goal:   1.  Eat as many vegetables as you would like (except corn, peas, and potatoes).     2.  Limit added fats to no more than 1-2 tablespoons at each meal.     3.  Eliminate wheat products for the next two weeks.     4.  Eat two " consistent meals daily.    ASSIST:  Ashly knows that her current habits led her to gain weight and she also knows that she is going to need to make changes if she wants to lose weight.  At next f/u we will discuss portions as well as how her gluten-free trial went.  I will also give her 4-5 exercises to do at home while seated to start building her exercise tolerance.    ARRANGE:     Return for follow-up in 2 weeks (schedule)     The patient was assisted in making follow-up appointment per orders.

## 2017-08-15 NOTE — MR AVS SNAPSHOT
"        Ashly DURAN Betsy   8/15/2017 1:00 PM   Non-Provider Visit   MRN: [id number]    Department:  [facility]   Dept Phone:  [phone]    Description:  Female : 1950   Provider:  Ethan Sutherland RD           Reason for Visit     Obesity           Allergies as of 8/15/2017     Allergen Noted Reactions    Ace Inhibitors 2009       Cough      Butalbital-Acetaminophen 2016       Dizzy, can't walk, hard to focus    Cefaclor 2007       ceclor    Diphenhydramine 2007   Hives    Iodine 2009       Labored breathing    Lamictal 2011   Rash, Swelling    \"hot\", unable to function    Morphine 2011   Itching    redness    Penicillins 2007   Itching, Swelling    Savella [Milnacipran Hcl] 2013       Muscle aches    Sulfa Drugs 2007   Hives, Anxiety    Hard breathing    Tape 2016       Paper tape is ok    Tizanidine Hcl 2014       dizziness      You were diagnosed with     Body mass index 40.0-44.9, adult (CMS-HCC)   [V85.41.ICD-9-CM]         Vital Signs     Height Weight Body Mass Index Smoking Status          1.689 m (5' 6.5\") 126.327 kg (278 lb 8 oz) 44.28 kg/m2 Former Smoker        Basic Information     Date Of Birth Sex Race Ethnicity Preferred Language    1950 Female White Non- English      Your appointments     Aug 18, 2017  9:30 AM   PROCEDURE 30 with Tawanda Dumont M.D.   [facility] (--)    [address] [place] [address] [place] [address]   [phone]           Your procedure is scheduled at Special Procedures at [facility] located at [address] [place] [address] just east of the main campus. Please check in at the front lobby desk 1 hour prior to your appointment time. For your safety, please have a ride home with a responsible adult.             Aug 29, 2017  1:00 PM   Intensive Behavioral Therapy with Ethan Sutherland RD   [facility])    [address] [place] [address] [address] [place], [place] [address] [place] [id number] [phone]           " Sep 01, 2017 10:45 AM   MR BRAIN W/O 30 with [facility] MRI 2   [facility] - MRI - [address])    [address]  [place] [address]   [phone]            Sep 05, 2017  2:40 PM   Intensive Behavioral Therapy with Ethan Sutherland RD   [facility])    [address]  [place] [address]   [phone]            Sep 08, 2017  9:20 AM   Established Patient with Connie Ahn M.D.   [facility])    [address]  [place] [address] [phone]           You will be receiving a confirmation call a few days before your appointment from our automated call confirmation system.            Oct 06, 2017 10:20 AM   Established Patient Pul with FIORELLA Graham   [facility] (--)    [address]  [address]  [place] [id number] [phone]              Problem List              ICD-10-CM Priority Class Noted - Resolved    Essential hypertension I10   5/20/2009 - Present    Hypothyroidism due to acquired atrophy of thyroid E03.4   5/20/2009 - Present    Depression F32.9   5/20/2009 - Present    Eczema, dyshidrotic L30.1   8/3/2009 - Present    Osteoarthritis of multiple joints M15.9   4/13/2010 - Present    GERD (gastroesophageal reflux disease) K21.9   12/10/2010 - Present    Polymyalgia rheumatica (CMS-[facility]) M35.3   5/6/2011 - Present    Seasonal allergies J30.2   7/8/2011 - Present    Carpal tunnel syndrome G56.00   9/5/2011 - Present    Ulnar neuropathy G56.20   9/5/2011 - Present    Migraine without aura G43.009   Unknown - Present    Cervical stenosis of spinal canal M48.02   11/1/2011 - Present    Lumbar radiculopathy M54.16   11/1/2011 - Present    Nocturnal hypoxia G47.34   Unknown - Present    Chronic constipation K59.09   10/2/2013 - Present    Rectocele N81.6   11/25/2013 - Present    Pelvic floor dysfunction M62.89   Unknown - Present    H/O fibromyalgia Z87.39   3/11/2014 - Present    KERRI  (obstructive sleep apnea) G47.33   3/11/2014 - Present    Obesity E66.9   3/11/2014 - Present    Menopause Z78.0   3/11/2014 - Present    Peripheral neuropathy G62.9   Unknown - Present    Mild intermittent asthma without complication J45.20   Unknown - Present    Elevated sed rate R70.0   12/9/2014 - Present    Candidal intertrigo B37.2   6/1/2015 - Present    Chronic pain G89.29   11/18/2015 - Present    Lumbar facet arthropathy (HCC) M12.88   12/9/2015 - Present    Anal fissure K60.2   4/14/2016 - Present    Menopausal symptoms N95.1   4/28/2016 - Present    Deviated nasal septum J34.2   8/1/2016 - Present    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   10/3/2016 - Present    Atrophic rhinitis J31.0   10/3/2016 - Present    Nonalcoholic fatty liver disease K76.0   1/5/2017 - Present    Dyslipidemia, goal LDL below 130 E78.5   1/6/2017 - Present    Gastroesophageal reflux disease K21.9   2/6/2017 - Present    Neural foraminal stenosis of cervical spine M99.81   5/18/2017 - Present    Tinnitus of both ears H93.13   5/18/2017 - Present    Obesity hypoventilation syndrome (CMS-HCC) E66.2   5/31/2017 - Present    Idiopathic progressive neuropathy G60.3   7/21/2017 - Present      Health Maintenance        Date Due Completion Dates    IMM INFLUENZA (1) 9/1/2017 1/5/2017, 10/15/2014, 1/16/2014, 11/20/2012    MAMMOGRAM 5/13/2018 5/13/2016, 10/24/2014, 5/28/2013, 5/24/2012, 1/28/2011, 10/16/2009, 10/16/2009, 3/24/2006    IMM PNEUMOCOCCAL 65+ (ADULT) LOW/MEDIUM RISK SERIES (2 of 2 - PPSV23) 1/16/2019 1/5/2017, 1/16/2014    BONE DENSITY 9/10/2020 9/10/2015    COLONOSCOPY 2/10/2022 2/10/2017, 2/6/2017    IMM DTaP/Tdap/Td Vaccine (2 - Td) 5/18/2027 5/18/2017            Current Immunizations     13-VALENT PCV PREVNAR 1/5/2017    Influenza TIV (IM) 1/16/2014, 11/20/2012    Influenza Vaccine Adult HD 1/5/2017    Influenza Vaccine Quad Inj (Pf) 10/15/2014    Pneumococcal polysaccharide vaccine (PPSV-23) 1/16/2014    SHINGLES VACCINE  5/4/2017    Tdap Vaccine 5/18/2017      Below and/or attached are the medications your provider expects you to take. Review all of your home medications and newly ordered medications with your provider and/or pharmacist. Follow medication instructions as directed by your provider and/or pharmacist. Please keep your medication list with you and share with your provider. Update the information when medications are discontinued, doses are changed, or new medications (including over-the-counter products) are added; and carry medication information at all times in the event of emergency situations     Allergies:  ACE INHIBITORS - (reactions not documented)     BUTALBITAL-ACETAMINOPHEN - (reactions not documented)     CEFACLOR - (reactions not documented)     DIPHENHYDRAMINE - Hives     IODINE - (reactions not documented)     LAMICTAL - Rash,Swelling     MORPHINE - Itching     PENICILLINS - Itching,Swelling     SAVELLA - (reactions not documented)     SULFA DRUGS - Hives,Anxiety     TAPE - (reactions not documented)     TIZANIDINE HCL - (reactions not documented)               Medications  Valid as of: August 15, 2017 -  2:42 PM    Generic Name Brand Name Tablet Size Instructions for use    Carvedilol (Tab) COREG 6.25 MG Take 1 Tab by mouth 2 times a day, with meals.        Fluocinolone Acetonide (Oil) Fluocinolone Acetonide 0.01 % Place  in ear 2 Times a Day.        Furosemide (Tab) LASIX 20 MG TAKE 1 TABLET BY MOUTH EVERY DAY        Ketoconazole (Cream) NIZORAL 2 % APPLY ONCE DAILY AT BEDTIME, INTERTRIGINOUS REGIONS        Levothyroxine Sodium (Tab) SYNTHROID 175 MCG Take 1 Tab by mouth every day.        MetroNIDAZOLE (Gel) METROGEL 0.75 % Apply  to affected area(s) 2 times a day.        Nabumetone (Tab) RELAFEN 750 MG Take 1 Tab by mouth 2 times a day, with meals.        Oxygen-Helium   Inhale 2-3 L by mouth as needed.        Pantoprazole Sodium (Tablet Delayed Response) PROTONIX 40 MG Take 1 Tab by mouth every day.         Polyethyl Glycol-Propyl Glycol (Solution) SYSTANE 0.4-0.3 % Place 1 Drop in both eyes as needed.        Polyethylene Glycol 3350 (Pack) MIRALAX  Take 17 g by mouth 3 times a day.        Polyethylene Glycol 3350 (Powder) MIRALAX  Take 17 g by mouth every day.        Potassium Chloride Klarissa CR (Tab CR) Kdur 20 MEQ TAKE 1 TABLET BY MOUTH TWICE A DAY        Sertraline HCl (Tab) ZOLOFT 100 MG TAKE 1 TABLET BY MOUTH EVERY DAY        Spironolactone (Tab) ALDACTONE 50 MG TAKE 1 TABLET BY MOUTH EVERY DAY        Temazepam (Cap) RESTORIL 15 MG Take 1 Cap by mouth at bedtime as needed for Sleep.        TraMADol HCl (Tab) ULTRAM 50 MG Take 1-2 Tabs by mouth every four hours as needed for Moderate Pain.        .                 Medicines prescribed today were sent to:     [id number] - [place], [place] - [address] [address]    Phone: [phone] Fax: [phone]    Open 24 Hours?: No      Medication refill instructions:       If your prescription bottle indicates you have medication refills left, it is not necessary to call your provider’s office. Please contact your pharmacy and they will refill your medication.    If your prescription bottle indicates you do not have any refills left, you may request refills at any time through one of the following ways: The online [employer] system (except Urgent Care), by calling your provider’s office, or by asking your pharmacy to contact your provider’s office with a refill request. Medication refills are processed only during regular business hours and may not be available until the next business day. Your provider may request additional information or to have a follow-up visit with you prior to refilling your medication.   *Please Note: Medication refills are assigned a new Rx number when refilled electronically. Your pharmacy may indicate that no refills were authorized even though a new prescription for the same medication is available  at the pharmacy. Please request the medicine by name with the pharmacy before contacting your provider for a refill.           MyChart Access Code: Activation code not generated  Current MyChart Status: Active

## 2017-08-18 ENCOUNTER — HOSPITAL ENCOUNTER (OUTPATIENT)
Dept: PAIN MANAGEMENT | Facility: REHABILITATION | Age: 67
End: 2017-08-18
Attending: PHYSICAL MEDICINE & REHABILITATION
Payer: MEDICARE

## 2017-08-18 RX ORDER — MIDAZOLAM HYDROCHLORIDE 1 MG/ML
INJECTION INTRAMUSCULAR; INTRAVENOUS
Status: COMPLETED
Start: 2017-08-18 | End: 2017-08-18

## 2017-08-18 RX ORDER — DEXAMETHASONE SODIUM PHOSPHATE 10 MG/ML
INJECTION, SOLUTION INTRAMUSCULAR; INTRAVENOUS
Status: COMPLETED
Start: 2017-08-18 | End: 2017-08-18

## 2017-08-18 RX ORDER — LIDOCAINE HYDROCHLORIDE 10 MG/ML
INJECTION, SOLUTION EPIDURAL; INFILTRATION; INTRACAUDAL; PERINEURAL
Status: COMPLETED
Start: 2017-08-18 | End: 2017-08-18

## 2017-08-18 RX ORDER — LIDOCAINE HYDROCHLORIDE 20 MG/ML
INJECTION, SOLUTION EPIDURAL; INFILTRATION; INTRACAUDAL; PERINEURAL
Status: COMPLETED
Start: 2017-08-18 | End: 2017-08-18

## 2017-08-29 ENCOUNTER — NON-PROVIDER VISIT (OUTPATIENT)
Dept: MEDICAL GROUP | Facility: CLINIC | Age: 67
End: 2017-08-29
Payer: MEDICARE

## 2017-08-29 VITALS — WEIGHT: 280.7 LBS | HEIGHT: 67 IN | BODY MASS INDEX: 44.06 KG/M2

## 2017-08-29 PROCEDURE — G0447 BEHAVIOR COUNSEL OBESITY 15M: HCPCS | Performed by: FAMILY MEDICINE

## 2017-08-29 NOTE — PROGRESS NOTES
"8/29/2017     Visit #3       Connie Ahn M.D.      67 y.o.           Time in/Out:  1301 - 1318    ASSESS:    Vitals:    08/29/17 1325   Weight: (!) 127.3 kg (280 lb 11.2 oz)   Height: 1.689 m (5' 6.5\")            Wt Readings from Last 2 Encounters:   08/29/17 (!) 127.3 kg (280 lb 11.2 oz)   08/15/17 (!) 126.3 kg (278 lb 8 oz)            Body mass index is 44.63 kg/m².       Difference:  + 2.2#     Starting weight:  279.2#     Difference:  + 1.5#     Current Dietary/Exercise Habits:  Ashly did not do well with limiting her gluten-containing foods, as she states \"I don't care.\"  She has a tooth infection that is bothering her and she states she is getting it fixed soon.    Estimated Stage of Change:    Preparation as evidenced by the patient stating she would like to change.    ADVISE:    Physical Activity:  No goal at this time.  She has a lot going on right now and adding an exercise goal at this time would only work to overwhelm her.     Dietary Guidelines:  We reviewed the plate method to help her with portions and balance at meals and she is going to use it to help with her meal planning as well.  I want to move her into eating set meals while getting her away from her grazing, so we have set a goal to eat a set breakfast and a set dinner on a few days/week.    The patient has been advised of how weight management and physical activity impacts their health and will help to reduce complications and health risk factors.      AGREE:  Visit #4 goal:   1.  Use the plate method to help with balance and portions at meals.     2.  Eat a breakfast within one hour of waking on four days/week.      3.  Breakfast should contain CHO with protein.     4.  Eat a dinner by 1900 on three days this week.       ASSIST:  Ashly continued to tell me that she does not eat like how I am asking her to, and I tried to explain to her that her current habits were not leading her to weight loss so she has to make a change if she " wants to lose weight.  My hope is that in a few weeks she is eating more consistently and the grazing is improved; I believe this will help her with eating dinner because she occasionally does not eat dinner because she is not hungry, likely d/t her grazing.    ARRANGE:     Return for follow-up in 1 week     The patient was assisted in making follow-up appointment per orders.

## 2017-09-01 ENCOUNTER — HOSPITAL ENCOUNTER (OUTPATIENT)
Dept: PAIN MANAGEMENT | Facility: REHABILITATION | Age: 67
End: 2017-09-01
Attending: PHYSICAL MEDICINE & REHABILITATION
Payer: MEDICARE

## 2017-09-01 ENCOUNTER — HOSPITAL ENCOUNTER (OUTPATIENT)
Dept: RADIOLOGY | Facility: MEDICAL CENTER | Age: 67
End: 2017-09-01
Attending: PHYSICAL MEDICINE & REHABILITATION
Payer: MEDICARE

## 2017-09-01 DIAGNOSIS — R42 DIZZINESS: ICD-10-CM

## 2017-09-01 PROCEDURE — 70551 MRI BRAIN STEM W/O DYE: CPT

## 2017-09-05 ENCOUNTER — NON-PROVIDER VISIT (OUTPATIENT)
Dept: MEDICAL GROUP | Facility: CLINIC | Age: 67
End: 2017-09-05
Payer: MEDICARE

## 2017-09-05 VITALS — WEIGHT: 280.3 LBS | HEIGHT: 67 IN | BODY MASS INDEX: 43.99 KG/M2

## 2017-09-05 PROCEDURE — G0447 BEHAVIOR COUNSEL OBESITY 15M: HCPCS | Performed by: FAMILY MEDICINE

## 2017-09-05 NOTE — PROGRESS NOTES
"9/5/2017     Visit #4       Connie Ahn M.D.      67 y.o.           Time in/Out:  1442 - 1458    ASSESS:    Vitals:    09/05/17 1517   Weight: (!) 127.1 kg (280 lb 4.8 oz)   Height: 1.689 m (5' 6.5\")            Wt Readings from Last 2 Encounters:   09/05/17 (!) 127.1 kg (280 lb 4.8 oz)   08/29/17 (!) 127.3 kg (280 lb 11.2 oz)            Body mass index is 44.56 kg/m².       Difference:  - 0.4#     Starting weight:  279.2#     Difference:  + 1.1#     Current Dietary/Exercise Habits:  Aslhy states that her mouth hurts a lot and she has been having some GI issues d/t being on Cipro for her mouth.  She is seeing the dentist today and is hoping for some resolution.  She has been eating a set breakfast and set dinner daily now, although breakfast timing varies d/t her sleeping later on different days.      Estimated Stage of Change:    Action as evidenced by the patient eating within one hour of getting up and a set dinner by 1930.    ADVISE:    Physical Activity:  No goal at this time d/t the patient's back \"always killing me.\"     Dietary Guidelines:  I want Ashly to continue doing what she is doing because it is working so far.  She was wearing heavy shoes today (hiking boots) that she does not normally wear and she still lost weight, giving me hope that we have found something that will work for her to lose some weight.    The patient has been advised of how weight management and physical activity impacts their health and will help to reduce complications and health risk factors.      AGREE:  Visit #5 goal:   1.  Follow the meal plan given for ideas.     2.  Continue to eat breakfast within one hour of getting up and dinner by 1730.     3.  Try eating a daily yogurt while on Cipro.    ASSIST:  I think that while Ashly is taking Cipro it would be a good idea for her to eat a daily yogurt so she can consume some probiotics, which may help with her GI problems.  We are going two weeks between appointments so " I gave her an 1800 kcal meal plan to help with some ideas on what to eat and how much to eat.  We went over that the portions are what matter and that she can substitute other foods into the plan if she does not want what the meal plan tells her to eat.  I will see how things went for her at our next appointment.    ARRANGE:     Return for follow-up in 2 weeks     The patient was assisted in making follow-up appointment per orders.

## 2017-09-07 ENCOUNTER — TELEPHONE (OUTPATIENT)
Dept: MEDICAL GROUP | Facility: CLINIC | Age: 67
End: 2017-09-07

## 2017-09-07 NOTE — TELEPHONE ENCOUNTER
Future Appointments       Provider Department Center    9/8/2017 9:20 AM Connie Ahn M.D. [facility]    9/19/2017 1:00 PM Ethan Sutherland, Registered Dietician [facility]    10/6/2017 10:20 AM FIORELLA Graham [facility]           ESTABLISHED PATIENT PRE-VISIT PLANNING     Note: Patient will not be contacted if there is no indication to call. PT was not Contacted.    1.    Reviewed note from last office visit with PCP: YES Last office visit: 7/21/17    2.  If any orders were placed at last visit, do we have Results/Consult Notes?        •  Labs - Labs were not ordered at last office visit.        •  Imaging - Imaging was not ordered at last office visit.        •  Referrals - No referrals were ordered at last office visit.     3.  Immunizations were updated in Epic using WebIZ?: Epic matches WebIZ       •  Web Iz Recommendations:   Td (adult), adsorbed   Influenza w/preserv.         4.  Patient is due for the following Health Maintenance Topics:   Health Maintenance Due   Topic Date Due   • IMM INFLUENZA (1) 09/01/2017           5.  Patient was not informed to arrive 15 min prior to their scheduled appointment and bring in their medication bottles.

## 2017-09-08 ENCOUNTER — OFFICE VISIT (OUTPATIENT)
Dept: MEDICAL GROUP | Facility: CLINIC | Age: 67
End: 2017-09-08
Payer: MEDICARE

## 2017-09-08 VITALS
OXYGEN SATURATION: 95 % | BODY MASS INDEX: 43.95 KG/M2 | HEART RATE: 72 BPM | DIASTOLIC BLOOD PRESSURE: 86 MMHG | HEIGHT: 67 IN | SYSTOLIC BLOOD PRESSURE: 134 MMHG | RESPIRATION RATE: 14 BRPM | TEMPERATURE: 96.6 F | WEIGHT: 280 LBS

## 2017-09-08 DIAGNOSIS — M10.071 ACUTE IDIOPATHIC GOUT OF RIGHT FOOT: ICD-10-CM

## 2017-09-08 DIAGNOSIS — Z23 NEED FOR IMMUNIZATION AGAINST INFLUENZA: ICD-10-CM

## 2017-09-08 PROBLEM — G60.3 IDIOPATHIC PROGRESSIVE NEUROPATHY: Status: RESOLVED | Noted: 2017-07-21 | Resolved: 2017-09-08

## 2017-09-08 PROCEDURE — 90662 IIV NO PRSV INCREASED AG IM: CPT | Performed by: FAMILY MEDICINE

## 2017-09-08 PROCEDURE — 99213 OFFICE O/P EST LOW 20 MIN: CPT | Mod: 25 | Performed by: FAMILY MEDICINE

## 2017-09-08 PROCEDURE — G0008 ADMIN INFLUENZA VIRUS VAC: HCPCS | Performed by: FAMILY MEDICINE

## 2017-09-08 RX ORDER — CIPROFLOXACIN 500 MG/1
TABLET, FILM COATED ORAL
COMMUNITY
Start: 2017-08-29 | End: 2017-10-13

## 2017-09-08 RX ORDER — INDOMETHACIN 50 MG/1
50 CAPSULE ORAL
Qty: 6 CAP | Refills: 0 | Status: SHIPPED | OUTPATIENT
Start: 2017-09-08 | End: 2017-09-10

## 2017-09-08 NOTE — PROGRESS NOTES
Chief Complaint   Patient presents with   • Follow-Up       Subjective:     HPI:   Ashly Meyer presents today with the followin. Acute idiopathic gout of right foot  She has had right great toe, ankle and foot swelling the last week.  There is pain deep inside.  Discussed options.  Start indomethacin for two days.  Stop the nabumetone while taking this.  Discussed resuming allopurinol.  She wants to stay off if possible.    2. Need for immunization against influenza  She is due, needs to be administered today.          Patient Active Problem List    Diagnosis Date Noted   • Obesity hypoventilation syndrome (CMS-HCC) 2017   • Neural foraminal stenosis of cervical spine 2017   • Tinnitus of both ears 2017   • Gastroesophageal reflux disease 2017   • Dyslipidemia, goal LDL below 130 2017   • Nonalcoholic fatty liver disease 2017   • Disease of accessory sinus 10/03/2016   • Atrophic rhinitis 10/03/2016   • Deviated nasal septum 2016   • Menopausal symptoms 2016   • Anal fissure 2016   • Lumbar facet arthropathy (HCC) 2015   • Chronic pain 2015   • Candidal intertrigo 2015   • Elevated sed rate 2014   • Mild intermittent asthma without complication    • H/O fibromyalgia 2014   • KERRI (obstructive sleep apnea) 2014   • Acute idiopathic gout of right foot 2014   • Obesity 2014   • Menopause 2014   • Peripheral neuropathy    • Rectocele 2013   • Pelvic floor dysfunction    • Chronic constipation 10/02/2013   • Nocturnal hypoxia    • Cervical stenosis of spinal canal 2011   • Lumbar radiculopathy 2011   • Migraine without aura    • Carpal tunnel syndrome 2011   • Ulnar neuropathy 2011   • Seasonal allergies 2011   • Polymyalgia rheumatica (CMS-HCC) 2011   • GERD (gastroesophageal reflux disease) 12/10/2010   • Osteoarthritis of multiple joints 2010   •  Eczema, dyshidrotic 08/03/2009   • Essential hypertension 05/20/2009   • Hypothyroidism due to acquired atrophy of thyroid 05/20/2009   • Depression 05/20/2009       Current medicines (including changes today)  Current Outpatient Prescriptions   Medication Sig Dispense Refill   • indomethacin (INDOCIN) 50 MG Cap Take 1 Cap by mouth 3 times a day, with meals for 2 days. 6 Cap 0   • ketoconazole (NIZORAL) 2 % Cream APPLY ONCE DAILY AT BEDTIME, INTERTRIGINOUS REGIONS 30 g 0   • polyethylene glycol 3350 (MIRALAX) Powder Take 17 g by mouth every day. 1 Bottle 6   • nabumetone (RELAFEN) 750 MG Tab Take 1 Tab by mouth 2 times a day, with meals. 180 Tab 3   • tramadol (ULTRAM) 50 MG Tab Take 1-2 Tabs by mouth every four hours as needed for Moderate Pain. 180 Tab 5   • potassium chloride SA (KDUR) 20 MEQ Tab CR TAKE 1 TABLET BY MOUTH TWICE A  Tab 0   • furosemide (LASIX) 20 MG Tab TAKE 1 TABLET BY MOUTH EVERY DAY 90 Tab 0   • carvedilol (COREG) 6.25 MG Tab Take 1 Tab by mouth 2 times a day, with meals. 60 Tab 6   • levothyroxine (SYNTHROID) 175 MCG Tab Take 1 Tab by mouth every day. 90 Tab 2   • pantoprazole (PROTONIX) 40 MG Tablet Delayed Response Take 1 Tab by mouth every day. (Patient taking differently: Take 40 mg by mouth 2 times a day.) 90 Tab 3   • ciprofloxacin (CIPRO) 500 MG Tab      • polyethyl glycol-propyl glycol (SYSTANE) 0.4-0.3 % Solution Place 1 Drop in both eyes as needed.     • spironolactone (ALDACTONE) 50 MG Tab TAKE 1 TABLET BY MOUTH EVERY DAY 90 Tab 3   • temazepam (RESTORIL) 15 MG Cap Take 1 Cap by mouth at bedtime as needed for Sleep. 30 Cap 3   • metronidazole (METROGEL) 0.75 % gel Apply  to affected area(s) 2 times a day.     • polyethylene glycol/lytes (MIRALAX) Pack Take 17 g by mouth 3 times a day.     • OXYGEN-HELIUM INH Inhale 2-3 L by mouth as needed.     • Fluocinolone Acetonide 0.01 % Oil Place  in ear 2 Times a Day.       No current facility-administered medications for this visit.   "      Allergies   Allergen Reactions   • Ace Inhibitors      Cough     • Butalbital-Acetaminophen      Dizzy, can't walk, hard to focus   • Cefaclor      ceclor   • Diphenhydramine Hives   • Iodine      Labored breathing   • Lamictal Rash and Swelling     \"hot\", unable to function   • Morphine Itching     redness   • Penicillins Itching and Swelling   • Savella [Milnacipran Hcl]      Muscle aches   • Sulfa Drugs Hives and Anxiety     Hard breathing   • Tape      Paper tape is ok   • Tizanidine Hcl      dizziness       ROS: As per HPI       Objective:     Blood pressure 134/86, pulse 72, temperature 35.9 °C (96.6 °F), resp. rate 14, height 1.689 m (5' 6.5\"), weight (!) 127 kg (280 lb), SpO2 95 %. Body mass index is 44.52 kg/m².    Physical Exam:  Constitutional: Well-developed and well-nourished. Not diaphoretic. No distress. Lucid and fluent.  Skin: Skin is warm and dry. No rash noted.  Head: Atraumatic without lesions.  Eyes: Conjunctivae and extraocular motions are normal. Pupils are equal, round, and reactive to light. No scleral icterus.   Ears:  External ears unremarkable. Tympanic membranes clear and intact.  Nose: Nares patent. Mucosa without edema or erythema. No discharge. No facial tenderness.  Mouth/Throat: Tongue normal. Oropharynx is clear and moist. Posterior pharynx without erythema or exudates.  Neck: Supple, trachea midline. No thyromegaly present. No cervical or supraclavicular lymphadenopathy. No JVD or carotid bruits appreciated  Cardiovascular: Regular rate and rhythm.  Normal S1, S2 without murmur appreciated.  Chest: Effort normal. Clear to auscultation throughout. No adventitious sounds.   Extremities: No cyanosis, clubbing, erythema, nor edema. Right great toe, foot and ankle with mild violaceous change.  No heat or drainage.  Neurological: Alert and oriented x 3. DTRs 2+/3 and symmetric. No cranial nerve deficit. 5/5 myotomes.  Psychiatric:  Behavior, mood, and affect are appropriate.     "   Assessment and Plan:     67 y.o. female with the following issues:    1. Acute idiopathic gout of right foot  indomethacin (INDOCIN) 50 MG Cap   2. Need for immunization against influenza  INFLUENZA VACCINE, HIGH DOSE (65+ ONLY)     Flu vaccine administered today.  Medication for gout flare sent to pharmacy    Followup: two months

## 2017-09-11 PROBLEM — D22.71 MELANOCYTIC NEVI OF RIGHT LOWER LIMB, INCLUDING HIP: Status: ACTIVE | Noted: 2017-09-11

## 2017-09-11 PROBLEM — D22.61 MELANOCYTIC NEVI OF RIGHT UPPER LIMB, INCLUDING SHOULDER: Status: ACTIVE | Noted: 2017-09-11

## 2017-09-11 PROBLEM — D22.5 MELANOCYTIC NEVI OF TRUNK: Status: ACTIVE | Noted: 2017-09-11

## 2017-09-18 RX ORDER — POTASSIUM CHLORIDE 20 MEQ/1
TABLET, EXTENDED RELEASE ORAL
Qty: 180 TAB | Refills: 0 | Status: SHIPPED | OUTPATIENT
Start: 2017-09-18 | End: 2017-12-26 | Stop reason: SDUPTHER

## 2017-09-18 RX ORDER — FUROSEMIDE 20 MG/1
TABLET ORAL
Qty: 90 TAB | Refills: 0 | Status: SHIPPED | OUTPATIENT
Start: 2017-09-18 | End: 2017-12-20 | Stop reason: SDUPTHER

## 2017-09-19 ENCOUNTER — NON-PROVIDER VISIT (OUTPATIENT)
Dept: MEDICAL GROUP | Facility: CLINIC | Age: 67
End: 2017-09-19
Payer: MEDICARE

## 2017-09-19 VITALS — WEIGHT: 282.7 LBS | HEIGHT: 67 IN | BODY MASS INDEX: 44.37 KG/M2

## 2017-09-19 PROCEDURE — G0447 BEHAVIOR COUNSEL OBESITY 15M: HCPCS | Performed by: FAMILY MEDICINE

## 2017-09-19 NOTE — PROGRESS NOTES
"9/19/2017     Visit #5       Connie Ahn M.D.      67 y.o.           Time in/Out:  1304 - 1318    ASSESS:    Vitals:    09/19/17 1324   Weight: (!) 128.2 kg (282 lb 11.2 oz)   Height: 1.689 m (5' 6.5\")            Wt Readings from Last 2 Encounters:   09/19/17 (!) 128.2 kg (282 lb 11.2 oz)   09/08/17 (!) 127 kg (280 lb)            Body mass index is 44.95 kg/m².       Difference:  + 2.4#     Starting weight:  279.2#     Difference:  + 3.5#     Current Dietary/Exercise Habits:  Ashly is struggling with eating set meals because that is not in her hx and she is not used to doing.  She is frustrated because she thinks I expect her to do it perfectly.  She is not doing anything outside of her ADL's for exercise at this time.    Estimated Stage of Change:    Action as evidenced by the patient trying to eat more regularly.    ADVISE:    Physical Activity:  Ashly and I discussed some small exercises she can do while seated to help increase her physical activity.  She will use cans of food or water bottles as weights and she will also perform leg lifts for exercise at this time.  She understands that she needs to increase her activity to burn more kcal, preserve/build LBM, and improve her pulmonary function.       Dietary Guidelines:  I do not expect Ashly to be perfect ever, and I explained that to her.  We are working on improving the consistency of her meals because her hx of skipping meals was not leading her to improved health nor weight loss.  She understands this better and will continue to work on improving her meal timing.    The patient has been advised of how weight management and physical activity impacts their health and will help to reduce complications and health risk factors.      AGREE:  Visit #6 goal:   1.  Consume dinner by 1900 on 3-4 days each week.     2.  Consume breakfast by 900 or within one hour of waking up daily.     3.  Eat protein with all breakfasts.     4.  Exercise while seated on " at least two days each week.    ASSIST:  Ashly also states that she is constipated at this time, which could be contributing to some weight gain.  As she continues to eat more regularly I believe her metabolism will start burning kcal more efficiently and she will start losing weight with that and improved exercise.    ARRANGE:     Return for follow-up in 2 weeks     The patient was assisted in making follow-up appointment per orders.

## 2017-10-03 ENCOUNTER — NON-PROVIDER VISIT (OUTPATIENT)
Dept: MEDICAL GROUP | Facility: CLINIC | Age: 67
End: 2017-10-03
Payer: MEDICARE

## 2017-10-03 VITALS — BODY MASS INDEX: 44.07 KG/M2 | WEIGHT: 280.8 LBS | HEIGHT: 67 IN

## 2017-10-03 PROCEDURE — G0447 BEHAVIOR COUNSEL OBESITY 15M: HCPCS | Performed by: FAMILY MEDICINE

## 2017-10-03 NOTE — PROGRESS NOTES
"10/3/2017     Visit #6       Colton Ahn M.D.      67 y.o.           Time in/Out:  1320 - 1336    ASSESS:    Vitals:    10/03/17 1338   Weight: (!) 127.4 kg (280 lb 12.8 oz)   Height: 1.689 m (5' 6.5\")            Wt Readings from Last 2 Encounters:   10/03/17 (!) 127.4 kg (280 lb 12.8 oz)   09/19/17 (!) 128.2 kg (282 lb 11.2 oz)            Body mass index is 44.64 kg/m².       Difference:  - 1.9#     Starting weight:  279.2#     Difference:  + 1.6#     Current Dietary/Exercise Habits:  Ashly states that her neck has been very sore so she has not been exercising at all, despite agreeing to do it.  She states that she has been doing a lot of yard work and walks for exercise at the store.  She has been eating two meals per day on most days, although she reports three meals on at least two days each week.  She is eating protein with all breakfasts now and is trying to eat within one hour of waking up.    Estimated Stage of Change:    Action as evidenced by the patient eating within one hour of waking up.    ADVISE:    Physical Activity:  I informed Ashly that walking at the grocery store is not exercise and it is an ADL instead.  She was surprised and surmised that she is \"wasting\" her time by shopping; I redirected her thinking that she is not wasting her time and is instead living her life normally.       Dietary Guidelines:  I reminded Ashly that she is looking for progress and not perfection and she is making progress.  She saw a reasonable amount of weight loss this appointment, which could be d/t eating protein in the morning and eating a little more often.  She is also remembering that she does not need to eat at [id number] like she used to do.    The patient has been advised of how weight management and physical activity impacts their health and will help to reduce complications and health risk factors.      AGREE:  Visit #7 goal:   1.  Continue to eat within one hour of waking up, or by 900.     2.  " Continue to increase the frequency of your meals.     3.  Eat protein with all meals/snacks.     4.  Exercise as tolerated.    ASSIST:  Ashly is using her neck as her excuse for not exercising, so I have only asked her to exercise if she feels able to do so.  I will continue to revisit exercise with her as we continue to meet.  At next f/u I want to have her perform a 24 hour recall to see how she is doing and so I can point out where she can do better.    ARRANGE:     Return for follow-up in 3 weeks     The patient was assisted in making follow-up appointment per orders.

## 2017-10-06 ENCOUNTER — APPOINTMENT (OUTPATIENT)
Dept: PULMONOLOGY | Facility: HOSPICE | Age: 67
End: 2017-10-06
Payer: MEDICARE

## 2017-10-13 ENCOUNTER — HOSPITAL ENCOUNTER (OUTPATIENT)
Dept: RADIOLOGY | Facility: REHABILITATION | Age: 67
End: 2017-10-13
Attending: PHYSICAL MEDICINE & REHABILITATION
Payer: MEDICARE

## 2017-10-13 ENCOUNTER — HOSPITAL ENCOUNTER (OUTPATIENT)
Dept: PAIN MANAGEMENT | Facility: REHABILITATION | Age: 67
End: 2017-10-13
Attending: PHYSICAL MEDICINE & REHABILITATION
Payer: MEDICARE

## 2017-10-13 VITALS
HEART RATE: 60 BPM | TEMPERATURE: 97 F | BODY MASS INDEX: 44.93 KG/M2 | SYSTOLIC BLOOD PRESSURE: 150 MMHG | OXYGEN SATURATION: 95 % | HEIGHT: 66 IN | DIASTOLIC BLOOD PRESSURE: 77 MMHG | WEIGHT: 279.54 LBS | RESPIRATION RATE: 16 BRPM

## 2017-10-13 PROCEDURE — 700117 HCHG RX CONTRAST REV CODE 255

## 2017-10-13 PROCEDURE — 700111 HCHG RX REV CODE 636 W/ 250 OVERRIDE (IP)

## 2017-10-13 PROCEDURE — 700101 HCHG RX REV CODE 250

## 2017-10-13 PROCEDURE — 64491 INJ PARAVERT F JNT C/T 2 LEV: CPT

## 2017-10-13 PROCEDURE — 99153 MOD SED SAME PHYS/QHP EA: CPT

## 2017-10-13 PROCEDURE — 64490 INJ PARAVERT F JNT C/T 1 LEV: CPT

## 2017-10-13 PROCEDURE — A9579 GAD-BASE MR CONTRAST NOS,1ML: HCPCS

## 2017-10-13 PROCEDURE — 99152 MOD SED SAME PHYS/QHP 5/>YRS: CPT

## 2017-10-13 RX ORDER — LIDOCAINE HYDROCHLORIDE 20 MG/ML
INJECTION, SOLUTION EPIDURAL; INFILTRATION; INTRACAUDAL; PERINEURAL
Status: COMPLETED
Start: 2017-10-13 | End: 2017-10-13

## 2017-10-13 RX ORDER — LIDOCAINE HYDROCHLORIDE 10 MG/ML
INJECTION, SOLUTION EPIDURAL; INFILTRATION; INTRACAUDAL; PERINEURAL
Status: COMPLETED
Start: 2017-10-13 | End: 2017-10-13

## 2017-10-13 RX ORDER — DEXAMETHASONE SODIUM PHOSPHATE 10 MG/ML
INJECTION, SOLUTION INTRAMUSCULAR; INTRAVENOUS
Status: COMPLETED
Start: 2017-10-13 | End: 2017-10-13

## 2017-10-13 RX ORDER — MIDAZOLAM HYDROCHLORIDE 1 MG/ML
INJECTION INTRAMUSCULAR; INTRAVENOUS
Status: COMPLETED
Start: 2017-10-13 | End: 2017-10-13

## 2017-10-13 RX ADMIN — FENTANYL CITRATE 50 MCG: 50 INJECTION, SOLUTION INTRAMUSCULAR; INTRAVENOUS at 10:46

## 2017-10-13 RX ADMIN — LIDOCAINE HYDROCHLORIDE 5 ML: 20 INJECTION, SOLUTION EPIDURAL; INFILTRATION; INTRACAUDAL; PERINEURAL at 10:52

## 2017-10-13 RX ADMIN — MIDAZOLAM HYDROCHLORIDE 0.5 MG: 1 INJECTION, SOLUTION INTRAMUSCULAR; INTRAVENOUS at 10:56

## 2017-10-13 RX ADMIN — MIDAZOLAM HYDROCHLORIDE 1 MG: 1 INJECTION, SOLUTION INTRAMUSCULAR; INTRAVENOUS at 10:46

## 2017-10-13 RX ADMIN — DEXAMETHASONE SODIUM PHOSPHATE 10 MG: 10 INJECTION, SOLUTION INTRAMUSCULAR; INTRAVENOUS at 11:02

## 2017-10-13 RX ADMIN — FENTANYL CITRATE 25 MCG: 50 INJECTION, SOLUTION INTRAMUSCULAR; INTRAVENOUS at 10:56

## 2017-10-13 RX ADMIN — GADODIAMIDE 4 ML: 287 INJECTION INTRAVENOUS at 10:58

## 2017-10-13 ASSESSMENT — PAIN SCALES - GENERAL
PAINLEVEL_OUTOF10: 3
PAINLEVEL_OUTOF10: 8
PAINLEVEL_OUTOF10: 4

## 2017-10-13 NOTE — PROGRESS NOTES
Current medications reviewed with pt, see medications reconciliation form. Pt moe taking ASA or other blood thinners or anti-inflammatories.  Pt has a ride post-procedure(Marty to drive).  Printed and verbal discharge instructions given to pt who verbalized understanding.

## 2017-10-13 NOTE — PROCEDURES
DATE OF SERVICE:  10/13/2017    PROCEDURES PERFORMED:  1.  Left C2-C3, C3-C4 facet (zygapophyseal) joint injections with 2.5 mg of   Decadron under fluoroscopic guidance.  2.  Right C2-C3, C3-C4 facet (zygapophyseal) joint injections with 2.5 mg of   Decadron under fluoroscopic guidance.  3.  IV conscious sedation to improve safety by improving tolerance and   decreasing unwanted movement.    INDICATIONS:  The patient is a patient of [facility] with neck   pain and headaches which are felt to be facet mediated.  For this reason,   facet joint injections were chosen for today's procedure.    DESCRIPTION OF PROCEDURE:  After appropriate informed consent was obtained,   she was placed prone on the table.    She received IV conscious sedation in the form of 1.5 mL equivalent to 1.5 mg   of midazolam along with 1.5 mL equivalent to 75 mcg of fentanyl throughout the   course of the procedure.    Her vital signs including blood pressure, pulse and pulse oximetry were   monitored throughout the procedure by a trained staff member and remained   within acceptable limits.    In addition, she remained conscious throughout the procedure, able to report   on her status, but relaxed with no unwanted movement.    Her skin was thoroughly cleansed with ChloraPrep.    Following this, the subcutaneous, intramuscular, and interligamentous region   was anesthetized with lidocaine.    A 5-inch 22-gauge spinal needle was directed under intermittent fluoroscopic   guidance to the bilateral C2-C3 and C3-C4 facet joints.    ARTHROGRAM:  Once the joints were felt to have been cannulated, a small amount   of Omniscan was placed into the joint, which confirmed needle tip placement   by darkening of the joint line.    Decadron 2.5 mg along with 0.25 mL of 2% lidocaine was placed into the C2-C3   and C3-C4 joints bilaterally.    She tolerated the procedure well without procedure complications.    She was referred to the recovery area  and was doing well.  She did feel   improvement in her right-sided neck region pain in particular following the   procedure.  She will follow up with Dr. Hillman in the office in the next 2-3   weeks to monitor response to the injection.       ____________________________________     MD JOE Caceres / KO    DD:  10/13/2017 11:22:16  DT:  10/13/2017 11:45:39    D#:  [id number]  Job#:  [id number]    cc: JENNIFER HILLMAN MD, JAYLON MAY MD

## 2017-10-23 ENCOUNTER — OFFICE VISIT (OUTPATIENT)
Dept: MEDICAL GROUP | Facility: CLINIC | Age: 67
End: 2017-10-23
Payer: MEDICARE

## 2017-10-23 VITALS
OXYGEN SATURATION: 90 % | HEART RATE: 66 BPM | BODY MASS INDEX: 46 KG/M2 | SYSTOLIC BLOOD PRESSURE: 130 MMHG | WEIGHT: 285 LBS | TEMPERATURE: 96.6 F | RESPIRATION RATE: 16 BRPM | DIASTOLIC BLOOD PRESSURE: 80 MMHG

## 2017-10-23 DIAGNOSIS — B37.2 CANDIDAL INTERTRIGO: ICD-10-CM

## 2017-10-23 DIAGNOSIS — G47.34 NOCTURNAL HYPOXIA: ICD-10-CM

## 2017-10-23 DIAGNOSIS — E03.4 HYPOTHYROIDISM DUE TO ACQUIRED ATROPHY OF THYROID: ICD-10-CM

## 2017-10-23 DIAGNOSIS — M54.16 LUMBAR RADICULOPATHY: ICD-10-CM

## 2017-10-23 DIAGNOSIS — E66.2 OBESITY HYPOVENTILATION SYNDROME (HCC): ICD-10-CM

## 2017-10-23 DIAGNOSIS — F33.41 RECURRENT MAJOR DEPRESSIVE DISORDER, IN PARTIAL REMISSION (HCC): ICD-10-CM

## 2017-10-23 DIAGNOSIS — M48.02 CERVICAL STENOSIS OF SPINAL CANAL: ICD-10-CM

## 2017-10-23 DIAGNOSIS — M47.816 LUMBAR FACET ARTHROPATHY: ICD-10-CM

## 2017-10-23 PROCEDURE — 99214 OFFICE O/P EST MOD 30 MIN: CPT | Performed by: FAMILY MEDICINE

## 2017-10-23 RX ORDER — GABAPENTIN 300 MG/1
300 CAPSULE ORAL 3 TIMES DAILY
Qty: 90 CAP | Refills: 6 | Status: SHIPPED | OUTPATIENT
Start: 2017-10-23 | End: 2017-12-28 | Stop reason: SDUPTHER

## 2017-10-23 RX ORDER — SERTRALINE HYDROCHLORIDE 100 MG/1
100 TABLET, FILM COATED ORAL
Qty: 90 TAB | Refills: 3 | Status: SHIPPED | OUTPATIENT
Start: 2017-10-23 | End: 2018-10-29 | Stop reason: SDUPTHER

## 2017-10-23 RX ORDER — LEVOTHYROXINE SODIUM 0.2 MG/1
200 TABLET ORAL
Qty: 90 TAB | Refills: 3 | Status: SHIPPED | OUTPATIENT
Start: 2017-10-23 | End: 2018-10-29 | Stop reason: SDUPTHER

## 2017-10-23 RX ORDER — KETOCONAZOLE 20 MG/G
CREAM TOPICAL
Qty: 30 G | Refills: 2 | Status: SHIPPED | OUTPATIENT
Start: 2017-10-23 | End: 2019-03-27 | Stop reason: SDUPTHER

## 2017-10-23 NOTE — PROGRESS NOTES
Chief Complaint   Patient presents with   • Back Pain   • Headache   • Rash     lower abd       Subjective:     HPI:   Ashly Meyer presents today with the followin. Lumbar facet arthropathy  Multiple level arthritis of the spine.  She continues on relafen.  Pain medication is only mildly helpful.  However, narcotics in the past have not been really helpful.    2. Nocturnal hypoxia  Unable to use her CPAP due to dental problems where the strap hits.  Discussed, she is pursuing treatment with dentist.      3. Obesity hypoventilation syndrome (CMS-HCC)  She is holding her breath as she speaks and walks. She does have to stop and reevaluate. If she remembers to breathe as she walks she gets much less short of breath. When she continues her seat She is less short of breath and less fatigued overall. However, she has not been able to use this in months due to the above dental issues.    4. Hypothyroidism due to acquired atrophy of thyroid  Last TSH in July on 175 was over 6.  Still tired.  Discussed increasing the dose.  Wakes up tired.    5. Recurrent major depressive disorder, in partial remission (CMS-[facility])  Stopping the antidepressant did not help.  She is much more depressed.  The cymbalta has not been helpful for pain or depression.  Discussed resuming the sertraline.  The pain is a little worse off the cymbalta but not enough to matter.    6. Candidal intertrigo  Having redness and itching under her panniculus.      7. Lumbar radiculopathy  Gabapentin did not do well in the past but spent only a small amount of time at full dose.  Discussed trying again.  A lot of her pain appears to be neurological.    8. Cervical stenosis of spinal canal  Recent injection only helped for three days.  Trial of gabapentin again.  She will be seeing Dr. Rasmussen.        Patient Active Problem List    Diagnosis Date Noted   • Obesity hypoventilation syndrome (CMS-HCC) 2017   • Neural foraminal stenosis of cervical  spine 05/18/2017   • Tinnitus of both ears 05/18/2017   • Gastroesophageal reflux disease 02/06/2017   • Dyslipidemia, goal LDL below 130 01/06/2017   • Nonalcoholic fatty liver disease 01/05/2017   • Disease of accessory sinus 10/03/2016   • Atrophic rhinitis 10/03/2016   • Deviated nasal septum 08/01/2016   • Menopausal symptoms 04/28/2016   • Anal fissure 04/14/2016   • Lumbar facet arthropathy 12/09/2015   • Chronic pain 11/18/2015   • Candidal intertrigo 06/01/2015   • Elevated sed rate 12/09/2014   • Mild intermittent asthma without complication    • H/O fibromyalgia 03/11/2014   • KERRI (obstructive sleep apnea) 03/11/2014   • Acute idiopathic gout of right foot 03/11/2014   • Obesity 03/11/2014   • Menopause 03/11/2014   • Peripheral neuropathy (CMS-HCC)    • Rectocele 11/25/2013   • Pelvic floor dysfunction    • Chronic constipation 10/02/2013   • Nocturnal hypoxia    • Cervical stenosis of spinal canal 11/01/2011   • Lumbar radiculopathy 11/01/2011   • Migraine without aura    • Carpal tunnel syndrome 09/05/2011   • Ulnar neuropathy 09/05/2011   • Seasonal allergies 07/08/2011   • Polymyalgia rheumatica (CMS-HCC) 05/06/2011   • GERD (gastroesophageal reflux disease) 12/10/2010   • Osteoarthritis of multiple joints 04/13/2010   • Eczema, dyshidrotic 08/03/2009   • Essential hypertension 05/20/2009   • Hypothyroidism due to acquired atrophy of thyroid 05/20/2009   • Depression 05/20/2009       Current medicines (including changes today)  Current Outpatient Prescriptions   Medication Sig Dispense Refill   • sertraline (ZOLOFT) 100 MG Tab Take 1 Tab by mouth every day. 90 Tab 3   • levothyroxine (SYNTHROID) 200 MCG Tab Take 1 Tab by mouth every day. 90 Tab 3   • ketoconazole (NIZORAL) 2 % Cream Apply to affected area daily 30 g 2   • gabapentin (NEURONTIN) 300 MG Cap Take 1 Cap by mouth 3 times a day. 90 Cap 6   • furosemide (LASIX) 20 MG Tab TAKE 1 TABLET BY MOUTH EVERY DAY 90 Tab 0   • potassium chloride SA  "(KDUR) 20 MEQ Tab CR TAKE 1 TABLET BY MOUTH TWICE A  Tab 0   • polyethylene glycol 3350 (MIRALAX) Powder Take 17 g by mouth every day. 1 Bottle 6   • nabumetone (RELAFEN) 750 MG Tab Take 1 Tab by mouth 2 times a day, with meals. 180 Tab 3   • tramadol (ULTRAM) 50 MG Tab Take 1-2 Tabs by mouth every four hours as needed for Moderate Pain. 180 Tab 5   • polyethyl glycol-propyl glycol (SYSTANE) 0.4-0.3 % Solution Place 1 Drop in both eyes as needed.     • carvedilol (COREG) 6.25 MG Tab Take 1 Tab by mouth 2 times a day, with meals. 60 Tab 6   • spironolactone (ALDACTONE) 50 MG Tab TAKE 1 TABLET BY MOUTH EVERY DAY 90 Tab 3   • temazepam (RESTORIL) 15 MG Cap Take 1 Cap by mouth at bedtime as needed for Sleep. 30 Cap 3   • metronidazole (METROGEL) 0.75 % gel Apply  to affected area(s) 2 times a day.     • polyethylene glycol/lytes (MIRALAX) Pack Take 17 g by mouth 3 times a day.     • OXYGEN-HELIUM INH Inhale 2-3 L by mouth as needed.     • Fluocinolone Acetonide 0.01 % Oil Place  in ear 2 Times a Day.     • pantoprazole (PROTONIX) 40 MG Tablet Delayed Response Take 1 Tab by mouth every day. (Patient taking differently: Take 40 mg by mouth 2 times a day.) 90 Tab 3     No current facility-administered medications for this visit.        Allergies   Allergen Reactions   • Ace Inhibitors      Cough     • Butalbital-Acetaminophen      Dizzy, can't walk, hard to focus   • Cefaclor      ceclor   • Diphenhydramine Hives   • Iodine      Labored breathing   • Lamictal Rash and Swelling     \"hot\", unable to function   • Morphine Itching     redness   • Penicillins Itching and Swelling   • Savella [Milnacipran Hcl]      Muscle aches   • Sulfa Drugs Hives and Anxiety     Hard breathing   • Tape      Paper tape is ok   • Tizanidine Hcl      dizziness       ROS: As per HPI       Objective:     Blood pressure 130/80, pulse 66, temperature 35.9 °C (96.6 °F), resp. rate 16, weight (!) 129.3 kg (285 lb), SpO2 90 %. Body mass index is " 46 kg/m².    Physical Exam:  Constitutional: Well-developed and well-nourished. Not diaphoretic. No distress. Lucid and fluent.  Skin: Skin is warm and dry. Rash under panniculus, mild erythema.  Head: Atraumatic without lesions.  Eyes: Conjunctivae and extraocular motions are normal. Pupils are equal, round, and reactive to light. No scleral icterus.   Ears:  External ears unremarkable. Tympanic membranes clear and intact.  Mouth/Throat: Tongue normal. Oropharynx is clear and moist. Posterior pharynx without erythema or exudates.  Neck: good ROM, very tender cervical musculature, especially on the left, trachea midline. No thyromegaly present. No cervical or supraclavicular lymphadenopathy. No JVD or carotid bruits appreciated  Cardiovascular: Regular rate and rhythm.  Normal S1, S2 without murmur appreciated.  Chest: Effort normal. Clear to auscultation throughout. No adventitious sounds.   Extremities: No cyanosis, clubbing, erythema, nor edema.   Neurological: Alert and oriented x 3. DTRs 2+/3 and symmetric. No cranial nerve deficit.   Psychiatric:  Behavior, mood, and affect are appropriate.       Assessment and Plan:     67 y.o. female with the following issues:    1. Lumbar facet arthropathy     2. Nocturnal hypoxia     3. Obesity hypoventilation syndrome (CMS-HCC)     4. Hypothyroidism due to acquired atrophy of thyroid  levothyroxine (SYNTHROID) 200 MCG Tab   5. Recurrent major depressive disorder, in partial remission (CMS-HCC)  sertraline (ZOLOFT) 100 MG Tab   6. Candidal intertrigo  ketoconazole (NIZORAL) 2 % Cream   7. Lumbar radiculopathy  gabapentin (NEURONTIN) 300 MG Cap   8. Cervical stenosis of spinal canal  gabapentin (NEURONTIN) 300 MG Cap         Followup: Return in about 2 months (around 12/23/2017), or if symptoms worsen or fail to improve.

## 2017-12-06 ENCOUNTER — OFFICE VISIT (OUTPATIENT)
Dept: MEDICAL GROUP | Facility: CLINIC | Age: 67
End: 2017-12-06
Payer: MEDICARE

## 2017-12-06 VITALS
OXYGEN SATURATION: 90 % | SYSTOLIC BLOOD PRESSURE: 140 MMHG | HEIGHT: 66 IN | HEART RATE: 83 BPM | BODY MASS INDEX: 45 KG/M2 | TEMPERATURE: 98.1 F | DIASTOLIC BLOOD PRESSURE: 88 MMHG | WEIGHT: 280 LBS

## 2017-12-06 DIAGNOSIS — L03.031 CELLULITIS OF SECOND TOE OF RIGHT FOOT: ICD-10-CM

## 2017-12-06 DIAGNOSIS — R73.03 PREDIABETES: ICD-10-CM

## 2017-12-06 PROCEDURE — 99213 OFFICE O/P EST LOW 20 MIN: CPT | Performed by: FAMILY MEDICINE

## 2017-12-06 RX ORDER — DOXYCYCLINE HYCLATE 100 MG
100 TABLET ORAL 2 TIMES DAILY
Qty: 14 TAB | Refills: 0 | Status: SHIPPED | OUTPATIENT
Start: 2017-12-06 | End: 2018-02-21 | Stop reason: SDUPTHER

## 2017-12-06 ASSESSMENT — PAIN SCALES - GENERAL: PAINLEVEL: 6=MODERATE PAIN

## 2017-12-07 NOTE — PROGRESS NOTES
Chief Complaint   Patient presents with   • Foot Problem       Subjective:     HPI:   Ashly Meyer presents today with the followin. Prediabetes  Discussed this.  Is trying a new diet.  This involves high vegetable soup twice a day and a normal breakfast.  Discussed.    2. Cellulitis of second toe of right foot  Swelling of foot for two to three days.  The right 2 nd toe is red and swollen.        Patient Active Problem List    Diagnosis Date Noted   • Prediabetes 2017   • Obesity hypoventilation syndrome (CMS-HCC) 2017   • Neural foraminal stenosis of cervical spine 2017   • Tinnitus of both ears 2017   • Gastroesophageal reflux disease 2017   • Dyslipidemia, goal LDL below 130 2017   • Nonalcoholic fatty liver disease 2017   • Disease of accessory sinus 10/03/2016   • Atrophic rhinitis 10/03/2016   • Deviated nasal septum 2016   • Menopausal symptoms 2016   • Anal fissure 2016   • Lumbar facet arthropathy 2015   • Chronic pain 2015   • Candidal intertrigo 2015   • Elevated sed rate 2014   • Mild intermittent asthma without complication    • H/O fibromyalgia 2014   • KERRI (obstructive sleep apnea) 2014   • Acute idiopathic gout of right foot 2014   • Obesity 2014   • Menopause 2014   • Peripheral neuropathy (CMS-HCC)    • Rectocele 2013   • Pelvic floor dysfunction    • Chronic constipation 10/02/2013   • Nocturnal hypoxia    • Cervical stenosis of spinal canal 2011   • Lumbar radiculopathy 2011   • Migraine without aura    • Carpal tunnel syndrome 2011   • Ulnar neuropathy 2011   • Seasonal allergies 2011   • Polymyalgia rheumatica (CMS-HCC) 2011   • GERD (gastroesophageal reflux disease) 12/10/2010   • Osteoarthritis of multiple joints 2010   • Eczema, dyshidrotic 2009   • Essential hypertension 2009   • Hypothyroidism due to  acquired atrophy of thyroid 05/20/2009   • Depression 05/20/2009       Current medicines (including changes today)  Current Outpatient Prescriptions   Medication Sig Dispense Refill   • doxycycline (VIBRAMYCIN) 100 MG Tab Take 1 Tab by mouth 2 times a day for 7 days. 14 Tab 0   • sertraline (ZOLOFT) 100 MG Tab Take 1 Tab by mouth every day. 90 Tab 3   • levothyroxine (SYNTHROID) 200 MCG Tab Take 1 Tab by mouth every day. 90 Tab 3   • ketoconazole (NIZORAL) 2 % Cream Apply to affected area daily 30 g 2   • gabapentin (NEURONTIN) 300 MG Cap Take 1 Cap by mouth 3 times a day. 90 Cap 6   • furosemide (LASIX) 20 MG Tab TAKE 1 TABLET BY MOUTH EVERY DAY 90 Tab 0   • potassium chloride SA (KDUR) 20 MEQ Tab CR TAKE 1 TABLET BY MOUTH TWICE A  Tab 0   • polyethylene glycol 3350 (MIRALAX) Powder Take 17 g by mouth every day. 1 Bottle 6   • nabumetone (RELAFEN) 750 MG Tab Take 1 Tab by mouth 2 times a day, with meals. 180 Tab 3   • tramadol (ULTRAM) 50 MG Tab Take 1-2 Tabs by mouth every four hours as needed for Moderate Pain. 180 Tab 5   • polyethyl glycol-propyl glycol (SYSTANE) 0.4-0.3 % Solution Place 1 Drop in both eyes as needed.     • carvedilol (COREG) 6.25 MG Tab Take 1 Tab by mouth 2 times a day, with meals. 60 Tab 6   • spironolactone (ALDACTONE) 50 MG Tab TAKE 1 TABLET BY MOUTH EVERY DAY 90 Tab 3   • temazepam (RESTORIL) 15 MG Cap Take 1 Cap by mouth at bedtime as needed for Sleep. 30 Cap 3   • metronidazole (METROGEL) 0.75 % gel Apply  to affected area(s) 2 times a day.     • polyethylene glycol/lytes (MIRALAX) Pack Take 17 g by mouth 3 times a day.     • OXYGEN-HELIUM INH Inhale 2-3 L by mouth as needed.     • Fluocinolone Acetonide 0.01 % Oil Place  in ear 2 Times a Day.     • pantoprazole (PROTONIX) 40 MG Tablet Delayed Response Take 1 Tab by mouth every day. (Patient taking differently: Take 40 mg by mouth 2 times a day.) 90 Tab 3     No current facility-administered medications for this visit.   "      Allergies   Allergen Reactions   • Ace Inhibitors      Cough     • Butalbital-Acetaminophen      Dizzy, can't walk, hard to focus   • Cefaclor      ceclor   • Diphenhydramine Hives   • Iodine      Labored breathing   • Lamictal Rash and Swelling     \"hot\", unable to function   • Morphine Itching     redness   • Penicillins Itching and Swelling   • Savella [Milnacipran Hcl]      Muscle aches   • Sulfa Drugs Hives and Anxiety     Hard breathing   • Tape      Paper tape is ok   • Tizanidine Hcl      dizziness       ROS: As per HPI       Objective:     Blood pressure 140/88, pulse 83, temperature 36.7 °C (98.1 °F), height 1.676 m (5' 6\"), weight (!) 127 kg (280 lb), SpO2 90 %, not currently breastfeeding. Body mass index is 45.19 kg/m².    Physical Exam:  Constitutional: Well-developed and well-nourished. Not diaphoretic. No distress. Lucid and fluent.  Skin: Skin is warm and dry. No rash noted.  Head: Atraumatic without lesions.  Eyes: Conjunctivae and extraocular motions are normal. Pupils are equal, round, and reactive to light. No scleral icterus. .  Nose: Nares patent. Mucosa without edema or erythema. No discharge. No facial tenderness.  Mouth/Throat: Tongue normal. Oropharynx is clear and moist. Posterior pharynx without erythema or exudates.  Neck: Supple, trachea midline. No thyromegaly present. No cervical or supraclavicular lymphadenopathy. No JVD or carotid bruits appreciated  Cardiovascular: Regular rate and rhythm.  Normal S1, S2 without murmur appreciated.  Chest: Effort normal. Clear to auscultation throughout. No adventitious sounds.   Abdomen: Soft, non tender, and without distention. Active bowel sounds in all four quadrants. No rebound, guarding, masses or hepatosplenomegaly.  Extremities: No cyanosis, clubbing, erythema, nor edema. Right foot swollen, only mild erythema of midfoot but quite swollen.  Right foot second toe is swollen and moderately erythematous.  No ulceration of the toe or " foot appreciated.  Warm, good CP pulses.  Neurological: Alert and oriented x 3.   Psychiatric:  Behavior, mood, and affect are appropriate.       Assessment and Plan:     67 y.o. female with the following issues:    1. Prediabetes     2. Cellulitis of second toe of right foot  doxycycline (VIBRAMYCIN) 100 MG Tab         Followup: Return in about 3 weeks (around 12/28/2017), or if symptoms worsen or fail to improve.

## 2017-12-20 RX ORDER — FUROSEMIDE 20 MG/1
TABLET ORAL
Qty: 90 TAB | Refills: 2 | Status: SHIPPED | OUTPATIENT
Start: 2017-12-20 | End: 2018-10-02 | Stop reason: SDUPTHER

## 2017-12-26 RX ORDER — POTASSIUM CHLORIDE 20 MEQ/1
TABLET, EXTENDED RELEASE ORAL
Qty: 180 TAB | Refills: 0 | Status: SHIPPED | OUTPATIENT
Start: 2017-12-26 | End: 2017-12-28 | Stop reason: SDUPTHER

## 2017-12-28 ENCOUNTER — OFFICE VISIT (OUTPATIENT)
Dept: MEDICAL GROUP | Facility: CLINIC | Age: 67
End: 2017-12-28
Payer: MEDICARE

## 2017-12-28 VITALS
WEIGHT: 281.1 LBS | HEART RATE: 74 BPM | OXYGEN SATURATION: 91 % | RESPIRATION RATE: 18 BRPM | DIASTOLIC BLOOD PRESSURE: 74 MMHG | SYSTOLIC BLOOD PRESSURE: 130 MMHG | TEMPERATURE: 97.5 F | HEIGHT: 66 IN | BODY MASS INDEX: 45.17 KG/M2

## 2017-12-28 DIAGNOSIS — I10 ESSENTIAL HYPERTENSION: ICD-10-CM

## 2017-12-28 DIAGNOSIS — E87.6 HYPOKALEMIA: ICD-10-CM

## 2017-12-28 DIAGNOSIS — E66.01 MORBID OBESITY WITH BMI OF 45.0-49.9, ADULT (HCC): ICD-10-CM

## 2017-12-28 DIAGNOSIS — E55.9 VITAMIN D DEFICIENCY: ICD-10-CM

## 2017-12-28 DIAGNOSIS — M15.9 PRIMARY OSTEOARTHRITIS INVOLVING MULTIPLE JOINTS: ICD-10-CM

## 2017-12-28 DIAGNOSIS — R05.3 PERSISTENT COUGH: ICD-10-CM

## 2017-12-28 DIAGNOSIS — M35.3 POLYMYALGIA RHEUMATICA (HCC): ICD-10-CM

## 2017-12-28 DIAGNOSIS — M48.02 CERVICAL STENOSIS OF SPINAL CANAL: ICD-10-CM

## 2017-12-28 DIAGNOSIS — R73.03 PREDIABETES: ICD-10-CM

## 2017-12-28 DIAGNOSIS — M54.16 LUMBAR RADICULOPATHY: ICD-10-CM

## 2017-12-28 PROCEDURE — 99214 OFFICE O/P EST MOD 30 MIN: CPT | Performed by: FAMILY MEDICINE

## 2017-12-28 RX ORDER — CARVEDILOL 6.25 MG/1
6.25 TABLET ORAL 2 TIMES DAILY WITH MEALS
Qty: 180 TAB | Refills: 3 | Status: SHIPPED | OUTPATIENT
Start: 2017-12-28 | End: 2019-01-16 | Stop reason: SDUPTHER

## 2017-12-28 RX ORDER — ALBUTEROL SULFATE 90 UG/1
2 AEROSOL, METERED RESPIRATORY (INHALATION) EVERY 6 HOURS PRN
Qty: 8.5 G | Refills: 6 | Status: SHIPPED | OUTPATIENT
Start: 2017-12-28 | End: 2019-01-29 | Stop reason: SDUPTHER

## 2017-12-28 RX ORDER — POTASSIUM CHLORIDE 20 MEQ/1
20 TABLET, EXTENDED RELEASE ORAL 2 TIMES DAILY
Qty: 180 TAB | Refills: 3 | Status: SHIPPED | OUTPATIENT
Start: 2017-12-28 | End: 2018-06-05

## 2017-12-28 RX ORDER — GABAPENTIN 300 MG/1
300 CAPSULE ORAL 3 TIMES DAILY
Qty: 270 CAP | Refills: 3 | Status: SHIPPED | OUTPATIENT
Start: 2017-12-28 | End: 2018-12-03 | Stop reason: SDUPTHER

## 2017-12-28 RX ORDER — TRAMADOL HYDROCHLORIDE 50 MG/1
50-100 TABLET ORAL EVERY 4 HOURS PRN
Qty: 180 TAB | Refills: 5 | Status: SHIPPED | OUTPATIENT
Start: 2017-12-28 | End: 2018-09-15 | Stop reason: SDUPTHER

## 2017-12-28 ASSESSMENT — PAIN SCALES - GENERAL: PAINLEVEL: 5=MODERATE PAIN

## 2017-12-28 NOTE — PROGRESS NOTES
Chief Complaint   Patient presents with   • Arthritis   • Muscle Pain       Subjective:     HPI:   Ashly Meyer presents today with the followin. Primary osteoarthritis involving multiple joints  She has chronic pain from this.  States that the tramadol takes her pain from an 8 to around a 5.  This allows her to complete her ADLs.  Treatment plan discussed.  She is working on weight loss.  Has lost a few pounds so far.  Encouraged to persevere.    2. Polymyalgia rheumatica (CMS-HCC)  Is having continued muscle pain from her polymyalgia. States the tramadol helps this pain as well. Oxygen level continues to be low and I have represented with the patient that it is important for her to wear the nighttime oxygen. She does not typically do that. I believe the low oxygen does contribute to her muscular pain. Patient is in disagreement.    3. Morbid obesity with BMI of 45.0-49.9, adult (CMS-HCC)  Treatment plan discussed.  She is working on weight loss.  Has lost a few pounds so far.  Encouraged to persevere.    4. Persistent cough  She is having persistent coughing and wheezing the last few weeks.  States it began with a cold.  Rare chest tightness but no SOB.  Has run out of her albuterol inhaler.    5. Hypokalemia  She has persistent hypokalemia despite the use of spironolactone. This is partly due to her use of furosemide which she needs to continue. When she tries to stop the furosemide the edema becomes uncontrollable. Potassium prescription is renewed.    6. Essential hypertension  HTN - Chronic condition stable. Currently taking all meds as directed.   She is intermittently taking baby aspirin daily.   She is monitoring BP at home. At times, cuff is small, it does not fit.  Gets systolics of 150s.  Denies symptoms low BP: light-headed, tunnel-vision, unusual fatigue.   Denies symptoms high BP:pounding headache, visual changes, palpitations, flushed face.   Denies medicine side effects: unusual fatigue,  slow heartbeat, foot/leg swelling, cough.    7. Lumbar radiculopathy  The gabapentin continues to be helpful. She is taking this 3 times a day. It no longer makes her sleepy or tired.    8. Cervical stenosis of spinal canal  She has multiple level cervical stenosis. This is mild. She has declined surgery. This does cause weakness and pain. Patient has had precautions from neurosurgery. We have discussed this.        Patient Active Problem List    Diagnosis Date Noted   • Persistent cough 12/28/2017   • Vitamin D deficiency 12/28/2017   • Morbid obesity with BMI of 45.0-49.9, adult (CMS-HCC)    • Prediabetes 12/06/2017   • Obesity hypoventilation syndrome (CMS-HCC) 05/31/2017   • Neural foraminal stenosis of cervical spine 05/18/2017   • Tinnitus of both ears 05/18/2017   • Gastroesophageal reflux disease 02/06/2017   • Dyslipidemia, goal LDL below 130 01/06/2017   • Nonalcoholic fatty liver disease 01/05/2017   • Disease of accessory sinus 10/03/2016   • Atrophic rhinitis 10/03/2016   • Deviated nasal septum 08/01/2016   • Menopausal symptoms 04/28/2016   • Anal fissure 04/14/2016   • Lumbar facet arthropathy 12/09/2015   • Chronic pain 11/18/2015   • Candidal intertrigo 06/01/2015   • Elevated sed rate 12/09/2014   • Mild intermittent asthma without complication    • H/O fibromyalgia 03/11/2014   • KERRI (obstructive sleep apnea) 03/11/2014   • Acute idiopathic gout of right foot 03/11/2014   • Obesity 03/11/2014   • Menopause 03/11/2014   • Peripheral neuropathy (CMS-HCC)    • Rectocele 11/25/2013   • Pelvic floor dysfunction    • Chronic constipation 10/02/2013   • Nocturnal hypoxia    • Cervical stenosis of spinal canal 11/01/2011   • Lumbar radiculopathy 11/01/2011   • Migraine without aura    • Carpal tunnel syndrome 09/05/2011   • Ulnar neuropathy 09/05/2011   • Seasonal allergies 07/08/2011   • Polymyalgia rheumatica (CMS-HCC) 05/06/2011   • GERD (gastroesophageal reflux disease) 12/10/2010   • Osteoarthritis  of multiple joints 04/13/2010   • Eczema, dyshidrotic 08/03/2009   • Essential hypertension 05/20/2009   • Hypothyroidism due to acquired atrophy of thyroid 05/20/2009   • Depression 05/20/2009       Current medicines (including changes today)  Current Outpatient Prescriptions   Medication Sig Dispense Refill   • tramadol (ULTRAM) 50 MG Tab Take 1-2 Tabs by mouth every four hours as needed for Moderate Pain for up to 180 days. 180 Tab 5   • albuterol (PROAIR HFA) 108 (90 Base) MCG/ACT Aero Soln inhalation aerosol Inhale 2 Puffs by mouth every 6 hours as needed for Shortness of Breath. 8.5 g 6   • potassium chloride SA (KDUR) 20 MEQ Tab CR Take 1 Tab by mouth 2 times a day. 180 Tab 3   • carvedilol (COREG) 6.25 MG Tab Take 1 Tab by mouth 2 times a day, with meals. 180 Tab 3   • gabapentin (NEURONTIN) 300 MG Cap Take 1 Cap by mouth 3 times a day. 270 Cap 3   • furosemide (LASIX) 20 MG Tab TAKE 1 TABLET BY MOUTH EVERY DAY 90 Tab 2   • sertraline (ZOLOFT) 100 MG Tab Take 1 Tab by mouth every day. 90 Tab 3   • levothyroxine (SYNTHROID) 200 MCG Tab Take 1 Tab by mouth every day. 90 Tab 3   • ketoconazole (NIZORAL) 2 % Cream Apply to affected area daily 30 g 2   • polyethylene glycol 3350 (MIRALAX) Powder Take 17 g by mouth every day. 1 Bottle 6   • nabumetone (RELAFEN) 750 MG Tab Take 1 Tab by mouth 2 times a day, with meals. 180 Tab 3   • polyethyl glycol-propyl glycol (SYSTANE) 0.4-0.3 % Solution Place 1 Drop in both eyes as needed.     • spironolactone (ALDACTONE) 50 MG Tab TAKE 1 TABLET BY MOUTH EVERY DAY 90 Tab 3   • temazepam (RESTORIL) 15 MG Cap Take 1 Cap by mouth at bedtime as needed for Sleep. 30 Cap 3   • metronidazole (METROGEL) 0.75 % gel Apply  to affected area(s) 2 times a day.     • polyethylene glycol/lytes (MIRALAX) Pack Take 17 g by mouth 3 times a day.     • OXYGEN-HELIUM INH Inhale 2-3 L by mouth as needed.     • Fluocinolone Acetonide 0.01 % Oil Place  in ear 2 Times a Day.     • pantoprazole  "(PROTONIX) 40 MG Tablet Delayed Response Take 1 Tab by mouth every day. (Patient taking differently: Take 40 mg by mouth 2 times a day.) 90 Tab 3     No current facility-administered medications for this visit.        Allergies   Allergen Reactions   • Ace Inhibitors      Cough     • Butalbital-Acetaminophen      Dizzy, can't walk, hard to focus   • Cefaclor      ceclor   • Diphenhydramine Hives   • Iodine      Labored breathing   • Lamictal Rash and Swelling     \"hot\", unable to function   • Morphine Itching     redness   • Penicillins Itching and Swelling   • Savella [Milnacipran Hcl]      Muscle aches   • Sulfa Drugs Hives and Anxiety     Hard breathing   • Tape      Paper tape is ok   • Tizanidine Hcl      dizziness       ROS: As per HPI       Objective:     Blood pressure 130/74, pulse 74, temperature 36.4 °C (97.5 °F), resp. rate 18, height 1.676 m (5' 6\"), weight (!) 127.5 kg (281 lb 1.6 oz), SpO2 91 %, not currently breastfeeding. Body mass index is 45.37 kg/m².    Physical Exam:  Constitutional: Well-developed and well-nourished. Not diaphoretic. No distress. Lucid and fluent.  Skin: Skin is warm and dry. No rash noted.  Head: Atraumatic without lesions.  Eyes: Conjunctivae and extraocular motions are normal. Pupils are equal, round, and reactive to light. No scleral icterus.   Ears:  External ears unremarkable. Tympanic membranes clear and intact.  Nose: Nares patent. Mucosa without edema or erythema. No discharge. No facial tenderness.  Mouth/Throat: Tongue normal. Oropharynx is clear and moist. Posterior pharynx without erythema or exudates.  Neck:  Moderate cervical spasm trachea midline. No thyromegaly present. No cervical or supraclavicular lymphadenopathy. No JVD or carotid bruits appreciated  Cardiovascular: Regular rate and rhythm.  Normal S1, S2 without murmur appreciated.  Chest: Effort normal. Scattered wheezes all lung fields.    Extremities: No cyanosis, clubbing, erythema, nor edema. "   Neurological: Alert and oriented x 3.   Psychiatric:  Behavior, mood, and affect are appropriate.       Assessment and Plan:     67 y.o. female with the following issues:    1. Primary osteoarthritis involving multiple joints  tramadol (ULTRAM) 50 MG Tab    Controlled Substance Treatment Agreement    COMP METABOLIC PANEL   2. Polymyalgia rheumatica (CMS-[facility])  tramadol (ULTRAM) 50 MG Tab    Controlled Substance Treatment Agreement    COMP METABOLIC PANEL    WESTERGREN SED RATE   3. Morbid obesity with BMI of 45.0-49.9, adult (CMS-[facility])  Patient identified as having weight management issue.  Appropriate orders and counseling given.   4. Persistent cough  albuterol (PROAIR HFA) 108 (90 Base) MCG/ACT Aero Soln inhalation aerosol    CBC WITHOUT DIFFERENTIAL    COMP METABOLIC PANEL   5. Hypokalemia  potassium chloride SA (KDUR) 20 MEQ Tab CR    COMP METABOLIC PANEL   6. Essential hypertension  carvedilol (COREG) 6.25 MG Tab    COMP METABOLIC PANEL    LIPID PROFILE   7. Lumbar radiculopathy  tramadol (ULTRAM) 50 MG Tab    gabapentin (NEURONTIN) 300 MG Cap   8. Cervical stenosis of spinal canal  tramadol (ULTRAM) 50 MG Tab    gabapentin (NEURONTIN) 300 MG Cap   9. Prediabetes  COMP METABOLIC PANEL    HEMOGLOBIN A1C   10. Vitamin D deficiency  VITAMIN D,25 HYDROXY         Followup: Return in about 3 months (around 3/28/2018), or if symptoms worsen or fail to improve.

## 2018-01-04 ENCOUNTER — HOSPITAL ENCOUNTER (OUTPATIENT)
Dept: LAB | Facility: MEDICAL CENTER | Age: 68
End: 2018-01-04
Attending: FAMILY MEDICINE
Payer: MEDICARE

## 2018-01-04 DIAGNOSIS — R05.3 PERSISTENT COUGH: ICD-10-CM

## 2018-01-04 DIAGNOSIS — E55.9 VITAMIN D DEFICIENCY: ICD-10-CM

## 2018-01-04 DIAGNOSIS — E78.5 DYSLIPIDEMIA, GOAL LDL BELOW 130: ICD-10-CM

## 2018-01-04 DIAGNOSIS — R73.03 PREDIABETES: ICD-10-CM

## 2018-01-04 DIAGNOSIS — M35.3 POLYMYALGIA RHEUMATICA (HCC): ICD-10-CM

## 2018-01-04 DIAGNOSIS — I10 ESSENTIAL HYPERTENSION: ICD-10-CM

## 2018-01-04 LAB
25(OH)D3 SERPL-MCNC: 13 NG/ML (ref 30–100)
ALBUMIN SERPL BCP-MCNC: 3.7 G/DL (ref 3.2–4.9)
ALBUMIN/GLOB SERPL: 1 G/DL
ALP SERPL-CCNC: 109 U/L (ref 30–99)
ALT SERPL-CCNC: 19 U/L (ref 2–50)
ANION GAP SERPL CALC-SCNC: 9 MMOL/L (ref 0–11.9)
AST SERPL-CCNC: 19 U/L (ref 12–45)
BILIRUB SERPL-MCNC: 0.7 MG/DL (ref 0.1–1.5)
BUN SERPL-MCNC: 21 MG/DL (ref 8–22)
CALCIUM SERPL-MCNC: 9.5 MG/DL (ref 8.5–10.5)
CHLORIDE SERPL-SCNC: 103 MMOL/L (ref 96–112)
CHOLEST SERPL-MCNC: 162 MG/DL (ref 100–199)
CO2 SERPL-SCNC: 25 MMOL/L (ref 20–33)
CREAT SERPL-MCNC: 0.77 MG/DL (ref 0.5–1.4)
ERYTHROCYTE [DISTWIDTH] IN BLOOD BY AUTOMATED COUNT: 49.3 FL (ref 35.9–50)
ERYTHROCYTE [SEDIMENTATION RATE] IN BLOOD BY WESTERGREN METHOD: 76 MM/HOUR (ref 0–30)
EST. AVERAGE GLUCOSE BLD GHB EST-MCNC: 134 MG/DL
GFR SERPL CREATININE-BSD FRML MDRD: >60 ML/MIN/1.73 M 2
GLOBULIN SER CALC-MCNC: 3.8 G/DL (ref 1.9–3.5)
GLUCOSE SERPL-MCNC: 117 MG/DL (ref 65–99)
HBA1C MFR BLD: 6.3 % (ref 0–5.6)
HCT VFR BLD AUTO: 41 % (ref 37–47)
HDLC SERPL-MCNC: 48 MG/DL
HGB BLD-MCNC: 13 G/DL (ref 12–16)
LDLC SERPL CALC-MCNC: 91 MG/DL
MCH RBC QN AUTO: 28 PG (ref 27–33)
MCHC RBC AUTO-ENTMCNC: 31.7 G/DL (ref 33.6–35)
MCV RBC AUTO: 88.2 FL (ref 81.4–97.8)
PLATELET # BLD AUTO: 200 K/UL (ref 164–446)
PMV BLD AUTO: 11.2 FL (ref 9–12.9)
POTASSIUM SERPL-SCNC: 4.2 MMOL/L (ref 3.6–5.5)
PROT SERPL-MCNC: 7.5 G/DL (ref 6–8.2)
RBC # BLD AUTO: 4.65 M/UL (ref 4.2–5.4)
SODIUM SERPL-SCNC: 137 MMOL/L (ref 135–145)
TRIGL SERPL-MCNC: 114 MG/DL (ref 0–149)
WBC # BLD AUTO: 12.2 K/UL (ref 4.8–10.8)

## 2018-01-04 PROCEDURE — 82306 VITAMIN D 25 HYDROXY: CPT

## 2018-01-04 PROCEDURE — 83036 HEMOGLOBIN GLYCOSYLATED A1C: CPT | Mod: GA

## 2018-01-04 PROCEDURE — 85027 COMPLETE CBC AUTOMATED: CPT

## 2018-01-04 PROCEDURE — 85652 RBC SED RATE AUTOMATED: CPT

## 2018-01-04 PROCEDURE — 36415 COLL VENOUS BLD VENIPUNCTURE: CPT | Mod: GA

## 2018-01-04 PROCEDURE — 80061 LIPID PANEL: CPT

## 2018-01-04 PROCEDURE — 80053 COMPREHEN METABOLIC PANEL: CPT

## 2018-01-19 RX ORDER — METRONIDAZOLE 7.5 MG/G
GEL TOPICAL
Qty: 1 TUBE | Refills: 0 | Status: SHIPPED | OUTPATIENT
Start: 2018-01-19 | End: 2019-08-22

## 2018-01-31 ENCOUNTER — TELEPHONE (OUTPATIENT)
Dept: URGENT CARE | Facility: CLINIC | Age: 68
End: 2018-01-31

## 2018-01-31 NOTE — TELEPHONE ENCOUNTER
"VOICEMAIL  Today at 1: 48pm  1. Caller Name: Ashly Meyer                                         Call Back Number: [phone] (home)       Patient approves a detailed voicemail message: yes    2. What are the patient's symptoms (location & severity)? [place] Vision today ongoing for the last 2 hrs     3. Is this a new symptom Yes    4. When did it start? Today 2 hrs prior to calling us.     5. Action taken per Active Symptom Guide:pt called the nursing line was advise to go to Emergency Department recommended. Per pt she told them she will go but she rather have dr. odom do something for her over the phone. \"put her two cents\".     6. Patient does not agree to recommended action per active symptom guide. Patient was advised again of recommendation and verbalized understanding.      "

## 2018-02-01 NOTE — TELEPHONE ENCOUNTER
Patient returned call and was advised of Dr Ahn's message. Patient said the symptoms have resolved but if they happen again she will go straight to ER.

## 2018-02-21 ENCOUNTER — TELEPHONE (OUTPATIENT)
Dept: MEDICAL GROUP | Facility: CLINIC | Age: 68
End: 2018-02-21

## 2018-02-21 DIAGNOSIS — L03.031 CELLULITIS OF SECOND TOE OF RIGHT FOOT: ICD-10-CM

## 2018-02-21 RX ORDER — DOXYCYCLINE HYCLATE 100 MG
100 TABLET ORAL 2 TIMES DAILY
Qty: 14 TAB | Refills: 0 | Status: SHIPPED | OUTPATIENT
Start: 2018-02-21 | End: 2019-11-14 | Stop reason: SDUPTHER

## 2018-02-21 NOTE — TELEPHONE ENCOUNTER
1. Caller Name: Ashly Meyer                                         Call Back Number: [phone] (home)       Patient approves a detailed voicemail message: yes    Patient states her toes, ankles, and foot  is swollen again. She is having pains.   She is asking if she should make an appointment or just ask  for a prescription.     Please Advise

## 2018-03-29 ENCOUNTER — OFFICE VISIT (OUTPATIENT)
Dept: MEDICAL GROUP | Facility: CLINIC | Age: 68
End: 2018-03-29
Payer: MEDICARE

## 2018-03-29 VITALS
DIASTOLIC BLOOD PRESSURE: 76 MMHG | OXYGEN SATURATION: 91 % | WEIGHT: 277 LBS | BODY MASS INDEX: 44.52 KG/M2 | HEIGHT: 66 IN | RESPIRATION RATE: 18 BRPM | TEMPERATURE: 97.5 F | SYSTOLIC BLOOD PRESSURE: 142 MMHG | HEART RATE: 64 BPM

## 2018-03-29 DIAGNOSIS — G62.9 PERIPHERAL POLYNEUROPATHY: ICD-10-CM

## 2018-03-29 DIAGNOSIS — K59.09 CHRONIC CONSTIPATION: ICD-10-CM

## 2018-03-29 DIAGNOSIS — G47.34 NOCTURNAL HYPOXIA: ICD-10-CM

## 2018-03-29 DIAGNOSIS — R70.0 SEDIMENTATION RATE ELEVATION: ICD-10-CM

## 2018-03-29 DIAGNOSIS — Z99.81 CHRONIC RESPIRATORY FAILURE WITH HYPOXIA, ON HOME O2 THERAPY (HCC): ICD-10-CM

## 2018-03-29 DIAGNOSIS — E66.2 OBESITY HYPOVENTILATION SYNDROME (HCC): ICD-10-CM

## 2018-03-29 DIAGNOSIS — E66.01 MORBID OBESITY WITH BMI OF 40.0-44.9, ADULT (HCC): ICD-10-CM

## 2018-03-29 DIAGNOSIS — R73.03 PREDIABETES: ICD-10-CM

## 2018-03-29 DIAGNOSIS — E55.9 VITAMIN D DEFICIENCY: ICD-10-CM

## 2018-03-29 DIAGNOSIS — F33.41 RECURRENT MAJOR DEPRESSIVE DISORDER, IN PARTIAL REMISSION (HCC): ICD-10-CM

## 2018-03-29 DIAGNOSIS — J96.11 CHRONIC RESPIRATORY FAILURE WITH HYPOXIA, ON HOME O2 THERAPY (HCC): ICD-10-CM

## 2018-03-29 PROCEDURE — 99214 OFFICE O/P EST MOD 30 MIN: CPT | Performed by: FAMILY MEDICINE

## 2018-03-29 RX ORDER — POLYETHYLENE GLYCOL 3350 17 G/17G
17 POWDER, FOR SOLUTION ORAL DAILY
Qty: 1 BOTTLE | Refills: 11 | Status: SHIPPED | OUTPATIENT
Start: 2018-03-29 | End: 2018-03-29 | Stop reason: CLARIF

## 2018-03-29 RX ORDER — MULTIVIT-MIN/IRON/FOLIC ACID/K 18-600-40
1 CAPSULE ORAL DAILY
Qty: 30 CAP | Refills: 6 | COMMUNITY
Start: 2018-03-29 | End: 2019-01-30

## 2018-03-29 RX ORDER — POLYETHYLENE GLYCOL 3350 17 G/17G
17 POWDER, FOR SOLUTION ORAL 2 TIMES DAILY
Qty: 1 BOTTLE | Refills: 11 | Status: SHIPPED | OUTPATIENT
Start: 2018-03-29 | End: 2019-01-30

## 2018-03-29 ASSESSMENT — PAIN SCALES - GENERAL: PAINLEVEL: 6=MODERATE PAIN

## 2018-03-29 NOTE — PROGRESS NOTES
Chief Complaint   Patient presents with   • Neurological Problem   • Arthritis   • Muscle Ache   • Breathing Problem       Subjective:     HPI:   Ashyl Meyer presents today with the followin. Peripheral polyneuropathy (CMS-[facility])  Long-standing peripheral polyneuropathy with no good explanation. Patient has had a very thorough and extensive workup with neurology with no definitive answer. Patient has chronic inflammation but it is difficult to determine the source. Her neuropathy symptoms did respond extremely well to Lamictal and then she developed unfortunately a very severe reaction to the medication.    2. Recurrent major depressive disorder, in partial remission (CMS-[facility])  Patient states her regimen keeps her from being suicidal. She does get occasional morbid thoughts. She is frustrated and feels negative all the time. Patient is not working with therapy at this time. Patient states the medications such as the sertraline or only partially effective. Discussed with the patient that medications are always only partially effective and therapy, exercise, the companionship of family and animals are also important. Patient voices understanding.    3. Chronic respiratory failure with hypoxia, on home O2 therapy (CMS-[facility])/Nocturnal hypoxia/Obesity hypoventilation syndrome (CMS-[facility])  Continues to be oxygen dependent throughout the night.  It is unclear with her history of severe sleep apnea if this is actually adequate. I suspect it is not especially since her daytime oxygen levels remain overall relatively low. Have discussed with her seeing pulmonology again and if anything else can be done. Patient rejects this option as she has gone down that road before.    4. Morbid obesity with BMI of 40.0-44.9, adult (CMS-HCC)  Patient has been working hard on trying to lose weight. She has lost a few pounds. She is congratulated. Her daughter keeps trying to help her with this. She has not been having any exercise  as she is in so much pain.    5.  Chronic constipation  Patient has long-standing chronic constipation. States the tramadol does make this a little bit worse but is much more tolerable than the hydrocodone. She feels the tramadol works almost as well for pain for her as the other medications did and has fewer side effects. She has a current prescription, it is not renewed today.    6. Vitamin D deficiency  Discussed the importance of vitamin D supplementation. I do feel it will help a little bit with her muscle pain and weakness if she starts taking it regularly as I have advised. Discussed with patient that multiple things are happening and no one treatment can be expected to really make a difference. She needs to do them all.    7. Sedimentation rate elevation  Her case has been very frustrating for many years. Of course, the frustration is most prominent for the patient. However, she underwent extremely thorough workup both with rheumatology in the past and more recently with neurology. She continues to have elevated sedimentation rate. Many different treatments have been tried. She does have some partial response to anti-inflammatories but has lot of side effects to medication. Her neuropathy itself responded very well to Lamictal and then she developed very severe rash and allergic reactions to the medication. She complains of tender muscles, especially the neck.  The neck is very tender and stiff to palpation. Have discussed with the patient again that we do not know exactly what this is. However, I do feel treatment of her sleep apnea might be helpful overall. Patient cannot tolerate a mask or nasal pillows as they make her feel as if she is suffocating    Discussed at length with patient that she does have prediabetes. Discussed that I really want her to find some way to move even if it's just putting some music on at home and moving around a little bit to it. Discussed not sitting for more than 20 minutes  at a time. Patient is frustrated with her situation.    Patient Active Problem List    Diagnosis Date Noted   • Recurrent major depressive disorder, in partial remission (CMS-HCC) 03/29/2018   • Persistent cough 12/28/2017   • Vitamin D deficiency 12/28/2017   • Morbid obesity with BMI of 40.0-44.9, adult (CMS-HCC)    • Prediabetes 12/06/2017   • Obesity hypoventilation syndrome (CMS-HCC) 05/31/2017   • Neural foraminal stenosis of cervical spine 05/18/2017   • Tinnitus of both ears 05/18/2017   • Gastroesophageal reflux disease 02/06/2017   • Dyslipidemia, goal LDL below 130 01/06/2017   • Nonalcoholic fatty liver disease 01/05/2017   • Disease of accessory sinus 10/03/2016   • Atrophic rhinitis 10/03/2016   • Deviated nasal septum 08/01/2016   • Menopausal symptoms 04/28/2016   • Anal fissure 04/14/2016   • Lumbar facet arthropathy 12/09/2015   • Chronic pain 11/18/2015   • Candidal intertrigo 06/01/2015   • Elevated sed rate 12/09/2014   • Mild intermittent asthma without complication    • H/O fibromyalgia 03/11/2014   • KERRI (obstructive sleep apnea) 03/11/2014   • Acute idiopathic gout of right foot 03/11/2014   • Obesity 03/11/2014   • Menopause 03/11/2014   • Peripheral neuropathy (CMS-HCC)    • Rectocele 11/25/2013   • Pelvic floor dysfunction    • Chronic constipation 10/02/2013   • Nocturnal hypoxia    • Cervical stenosis of spinal canal 11/01/2011   • Lumbar radiculopathy 11/01/2011   • Migraine without aura    • Carpal tunnel syndrome 09/05/2011   • Ulnar neuropathy 09/05/2011   • Seasonal allergies 07/08/2011   • Polymyalgia rheumatica (CMS-HCC) 05/06/2011   • GERD (gastroesophageal reflux disease) 12/10/2010   • Osteoarthritis of multiple joints 04/13/2010   • Eczema, dyshidrotic 08/03/2009   • Essential hypertension 05/20/2009   • Hypothyroidism due to acquired atrophy of thyroid 05/20/2009   • Depression 05/20/2009       Current medicines (including changes today)  Current Outpatient Prescriptions    Medication Sig Dispense Refill   • polyethylene glycol 3350 (MIRALAX) Powder Take 17 g by mouth 2 times a day. 1 Bottle 11   • Cholecalciferol (VITAMIN D) 2000 units Cap Take 1 Cap by mouth every day. 30 Cap 6   • metronidazole (METROGEL) 0.75 % gel APPLY TO AFFECTED AREA TWICE A DAY 1 Tube 0   • tramadol (ULTRAM) 50 MG Tab Take 1-2 Tabs by mouth every four hours as needed for Moderate Pain for up to 180 days. 180 Tab 5   • albuterol (PROAIR HFA) 108 (90 Base) MCG/ACT Aero Soln inhalation aerosol Inhale 2 Puffs by mouth every 6 hours as needed for Shortness of Breath. 8.5 g 6   • potassium chloride SA (KDUR) 20 MEQ Tab CR Take 1 Tab by mouth 2 times a day. 180 Tab 3   • carvedilol (COREG) 6.25 MG Tab Take 1 Tab by mouth 2 times a day, with meals. 180 Tab 3   • gabapentin (NEURONTIN) 300 MG Cap Take 1 Cap by mouth 3 times a day. 270 Cap 3   • furosemide (LASIX) 20 MG Tab TAKE 1 TABLET BY MOUTH EVERY DAY 90 Tab 2   • sertraline (ZOLOFT) 100 MG Tab Take 1 Tab by mouth every day. 90 Tab 3   • levothyroxine (SYNTHROID) 200 MCG Tab Take 1 Tab by mouth every day. 90 Tab 3   • ketoconazole (NIZORAL) 2 % Cream Apply to affected area daily 30 g 2   • nabumetone (RELAFEN) 750 MG Tab Take 1 Tab by mouth 2 times a day, with meals. 180 Tab 3   • polyethyl glycol-propyl glycol (SYSTANE) 0.4-0.3 % Solution Place 1 Drop in both eyes as needed.     • spironolactone (ALDACTONE) 50 MG Tab TAKE 1 TABLET BY MOUTH EVERY DAY 90 Tab 3   • temazepam (RESTORIL) 15 MG Cap Take 1 Cap by mouth at bedtime as needed for Sleep. 30 Cap 3   • OXYGEN-HELIUM INH Inhale 2-3 L by mouth as needed.     • Fluocinolone Acetonide 0.01 % Oil Place  in ear 2 Times a Day.     • pantoprazole (PROTONIX) 40 MG Tablet Delayed Response Take 1 Tab by mouth every day. (Patient taking differently: Take 40 mg by mouth 2 times a day.) 90 Tab 3     No current facility-administered medications for this visit.        Allergies   Allergen Reactions   • Ace Inhibitors       "Cough     • Butalbital-Acetaminophen      Dizzy, can't walk, hard to focus   • Cefaclor      ceclor   • Diphenhydramine Hives   • Iodine      Labored breathing   • Lamictal Rash and Swelling     \"hot\", unable to function   • Morphine Itching     redness   • Penicillins Itching and Swelling   • Savella [Milnacipran Hcl]      Muscle aches   • Sulfa Drugs Hives and Anxiety     Hard breathing   • Tape      Paper tape is ok   • Tizanidine Hcl      dizziness       ROS: As per HPI       Objective:     Blood pressure 142/76, pulse 64, temperature 36.4 °C (97.5 °F), resp. rate 18, height 1.676 m (5' 6\"), weight (!) 125.6 kg (277 lb), SpO2 91 %, not currently breastfeeding. Body mass index is 44.71 kg/m².    Physical Exam:  Constitutional: Well-developed and well-nourished. Not diaphoretic. No distress. Lucid and fluent.  Skin: Skin is warm and dry. No rash noted.  Head: Atraumatic without lesions.  Eyes: Conjunctivae and extraocular motions are normal. Pupils are equal, round, and reactive to light. No scleral icterus.   Mouth/Throat: Tongue normal. Oropharynx is clear and moist. Posterior pharynx without erythema or exudates.  Neck: Limited range of motion with marked neck stiffness and tenderness especially laterally on both sides. Her trachea is midline. No thyromegaly present. No cervical or supraclavicular lymphadenopathy. No JVD or carotid bruits appreciated  Cardiovascular: Regular rate and rhythm.  Normal S1, S2 without murmur appreciated.  Chest: Effort normal. Clear to auscultation throughout. No adventitious sounds.   Abdomen: Soft, non tender, and without distention. Active bowel sounds in all four quadrants. No rebound, guarding, masses or hepatosplenomegaly.  Extremities: No cyanosis, clubbing, erythema, nor edema.   Neurological: Alert and oriented x 3. Mild bilateral tremor, mostly an intention tremor. Sensation variable, many areas of hypersensitivity.  Feet have reduced sensation to touch on both " sides.  Psychiatric:  Behavior, mood, and affect are appropriate.    1/4/18 lab results are reviewed and discussed.     Assessment and Plan:     67 y.o. female with the following issues:    1. Peripheral polyneuropathy (CMS-[facility])     2. Recurrent major depressive disorder, in partial remission (CMS-[facility])     3. Chronic respiratory failure with hypoxia, on home O2 therapy (CMS-[facility])     4. Morbid obesity with BMI of 40.0-44.9, adult (CMS-[facility])     5. Nocturnal hypoxia     6. Obesity hypoventilation syndrome (CMS-[facility])     7. Chronic constipation  polyethylene glycol 3350 (MIRALAX) Powder    DISCONTINUED: polyethylene glycol 3350 (MIRALAX) Powder   8. Vitamin D deficiency  Cholecalciferol (VITAMIN D) 2000 units Cap   9. Sedimentation rate elevation     10. Prediabetes           Followup: Return in about 3 months (around 6/29/2018), or if symptoms worsen or fail to improve.

## 2018-03-30 RX ORDER — POTASSIUM CHLORIDE 20 MEQ/1
TABLET, EXTENDED RELEASE ORAL
Qty: 180 TAB | Refills: 0 | Status: SHIPPED | OUTPATIENT
Start: 2018-03-30 | End: 2018-06-28 | Stop reason: SDUPTHER

## 2018-04-27 DIAGNOSIS — K21.9 GASTROESOPHAGEAL REFLUX DISEASE WITHOUT ESOPHAGITIS: ICD-10-CM

## 2018-04-27 RX ORDER — PANTOPRAZOLE SODIUM 40 MG/1
40 TABLET, DELAYED RELEASE ORAL 2 TIMES DAILY
Qty: 180 TAB | Refills: 0 | Status: SHIPPED | OUTPATIENT
Start: 2018-04-27 | End: 2018-08-06 | Stop reason: SDUPTHER

## 2018-05-30 ENCOUNTER — OFFICE VISIT (OUTPATIENT)
Dept: PULMONOLOGY | Facility: HOSPICE | Age: 68
End: 2018-05-30
Payer: MEDICARE

## 2018-05-30 VITALS
SYSTOLIC BLOOD PRESSURE: 138 MMHG | OXYGEN SATURATION: 94 % | TEMPERATURE: 96.8 F | DIASTOLIC BLOOD PRESSURE: 92 MMHG | HEIGHT: 66 IN | BODY MASS INDEX: 44.2 KG/M2 | RESPIRATION RATE: 16 BRPM | HEART RATE: 62 BPM | WEIGHT: 275 LBS

## 2018-05-30 DIAGNOSIS — G47.33 OSA (OBSTRUCTIVE SLEEP APNEA): ICD-10-CM

## 2018-05-30 DIAGNOSIS — Z87.891 FORMER SMOKER: ICD-10-CM

## 2018-05-30 DIAGNOSIS — G47.34 NOCTURNAL HYPOXIA: ICD-10-CM

## 2018-05-30 DIAGNOSIS — R06.02 SHORTNESS OF BREATH: ICD-10-CM

## 2018-05-30 DIAGNOSIS — E66.2 OBESITY HYPOVENTILATION SYNDROME (HCC): ICD-10-CM

## 2018-05-30 DIAGNOSIS — J96.11 CHRONIC RESPIRATORY FAILURE WITH HYPOXIA (HCC): ICD-10-CM

## 2018-05-30 PROCEDURE — 99214 OFFICE O/P EST MOD 30 MIN: CPT | Performed by: NURSE PRACTITIONER

## 2018-05-30 NOTE — PROGRESS NOTES
Chief Complaint   Patient presents with   • Follow-Up       HPI:  Ashly Meyer is a 68 y.o. year old female here today for follow-up on compliance with oxygen use. She was previously started on daytime oxygen by Niki CORNELIUS. She will continue to benefit from 2-4L oxygen on exertion.     She has only been followed for sleep apnea by our practice. She has a history of KERRI with most recent PSG split night indicating an AHI of 19.7 and a minimum oxygen saturation 74%. Patient is recommended using CPAP 12 cm H2O. Oxygen saturations were adequate on this setting. Prior to this study, PSG indicated AHI 34 and rusty of 81%. She declines using the device due to claustrophobia with mask. We reviewed the pathophysiology of untreated sleep apnea and increased risk of cardiac and neurologic side effects. She is currently using 4L oxygen at night.     Today she notes dyspnea with any exertion and bending over. She notes mild cough with phlegm but no wheezing. She has had a sore throat x1 week and some hoarseness. She believes this is from allergies and PND. She has not seen PCP for this. BMI 44.    ROS: As per HPI and otherwise negative if not stated.    Past Medical History:   Diagnosis Date   • Aphasia     Written word recognition/useage   • Arthritis     Everywhere.    • Cancer (HCC) 1990's    skin   • Carotid artery stenosis, unilateral     moderate left carotid artery stenosis   • Chickenpox    • Controlled type 2 diabetes mellitus without complication (HCC)    • Degenerative disc disease    • Depression 5/20/2009   • Fibromyalgia     confirmed by Dr. Ann   • GERD (gastroesophageal reflux disease)     hiatal hernia   • Mohawk measles    • GOUT 5/20/2009   • H/O foot surgery    • Hemorrhagic disorder (HCC)     nose bleeds   • Hiatus hernia syndrome    • Hypertension    • Hypothyroidism 5/20/2009   • Incipient cataract of both eyes    • Influenza    • Migraine without aura, without mention of intractable migraine  without mention of status migrainosus    • Mild intermittent asthma without complication    • Mixed dyslipidemia    • Morbid obesity with BMI of 45.0-49.9, adult ([facility])    • Mumps    • Nocturnal hypoxia     severe, to 69% O2 sat   • Obesity hypoventilation syndrome ([facility]) 5/31/2017   • KERRI (obstructive sleep apnea) 3/11/2014    A.  CPAP     • Pelvic floor dysfunction    • Peripheral neuropathy ([facility])    • Pleomorphic adenoma    • Polymyalgia rheumatica ([facility])     Dr. Ann   • S/P tonsillectomy    • Seasonal allergies    • Sleep apnea syndrome     she is not using CPAP, claustrophobia   • Snoring    • Tonsillitis    • Urinary incontinence     will not wear a pad       Past Surgical History:   Procedure Laterality Date   • GASTROSCOPY  2/6/2017    Procedure: GASTROSCOPY;  Surgeon: Pau Foster M.D.;  Location: SURGERY SAME DAY [facility];  Service:    • COLONOSCOPY  2/6/2017    Procedure: COLONOSCOPY;  Surgeon: Pau Foster M.D.;  Location: SURGERY SAME DAY [facility];  Service:    • SEPTAL RECONSTRUCTION  10/3/2016    Procedure: SEPTAL RECONSTRUCTION with  grafts ;  Surgeon: PIETER Dickson M.D.;  Location: SURGERY SAME DAY [facility];  Service:    • SEPTOPLASTY N/A 8/1/2016    Procedure: SEPTOPLASTY;  Surgeon: PIETER Dickson M.D.;  Location: SURGERY SAME DAY [facility];  Service:    • TURBINOPLASTY Bilateral 8/1/2016    Procedure: TURBINOPLASTY;  Surgeon: PIETER Dickson M.D.;  Location: SURGERY SAME DAY [facility];  Service:    • NASAL FRACTURE REDUCTION OPEN N/A 8/1/2016    Procedure: SEPTAL FRACTURE REDUCTION OPEN;  Surgeon: PIETER Dickson M.D.;  Location: SURGERY SAME DAY [facility];  Service:    • FINE NEEDLE ASPIRATION  11/16/2009    Performed by DA CALLOWAY at ENDOSCOPY [facility]   • COLONOSCOPY  2006    ? unclear date   • BLADDER SUSPENSION  1983   • HYSTERECTOMY, VAGINAL  1983    ovaries are present, excessive bleeding   • TONSILLECTOMY  1953   •  "OTHER ABDOMINAL SURGERY      Cholysestectomy   • OTHER ORTHOPEDIC SURGERY  1962/1980    foot surgery        Family History   Problem Relation Age of Onset   • Cancer Mother      bladder   • Heart Disease Mother    • GI Mother      bile duct problem   • Other Father      unknown   • Arthritis Sister      gout and lupus   • GI Sister      celiac   • Cancer Maternal Aunt      bladder       Social History     Social History   • Marital status:      Spouse name: N/A   • Number of children: N/A   • Years of education: N/A     Occupational History   • Not on file.     Social History Main Topics   • Smoking status: Former Smoker     Packs/day: 2.00     Years: 42.00     Types: Cigarettes     Quit date: 3/1/2007   • Smokeless tobacco: Never Used      Comment: 1 ppd >20yrs quit 12/07   • Alcohol use 0.0 oz/week      Comment: occ   • Drug use: No   • Sexual activity: No     Other Topics Concern   • Not on file     Social History Narrative   • No narrative on file       Allergies as of 05/30/2018 - Reviewed 05/30/2018   Allergen Reaction Noted   • Ace inhibitors  05/20/2009   • Butalbital-acetaminophen  07/27/2016   • Cefaclor  08/03/2007   • Diphenhydramine Hives 08/03/2007   • Iodine  05/20/2009   • Lamictal Rash and Swelling 12/27/2011   • Morphine Itching 03/11/2011   • Penicillins Itching and Swelling 08/03/2007   • Savella [milnacipran hcl]  05/24/2013   • Sulfa drugs Hives and Anxiety 08/03/2007   • Tape  07/27/2016   • Tizanidine hcl  03/11/2014        @Vital signs for this encounter:  Vitals:    05/30/18 1515   Height: 1.676 m (5' 6\")   Weight: 124.7 kg (275 lb)   Weight % change since last entry.: 0 %   BP: 138/92   Pulse: 62   BMI (Calculated): 44.39   Resp: 16   Temp: 36 °C (96.8 °F)   O2 sat % room air: 94 %       Current medications as of today   Current Outpatient Prescriptions   Medication Sig Dispense Refill   • pantoprazole (PROTONIX) 40 MG Tablet Delayed Response Take 1 Tab by mouth 2 times a day. 180 " Tab 0   • potassium chloride SA (KDUR) 20 MEQ Tab CR TAKE 1 TABLET BY MOUTH TWICE A  Tab 0   • polyethylene glycol 3350 (MIRALAX) Powder Take 17 g by mouth 2 times a day. 1 Bottle 11   • Cholecalciferol (VITAMIN D) 2000 units Cap Take 1 Cap by mouth every day. 30 Cap 6   • metronidazole (METROGEL) 0.75 % gel APPLY TO AFFECTED AREA TWICE A DAY 1 Tube 0   • tramadol (ULTRAM) 50 MG Tab Take 1-2 Tabs by mouth every four hours as needed for Moderate Pain for up to 180 days. 180 Tab 5   • albuterol (PROAIR HFA) 108 (90 Base) MCG/ACT Aero Soln inhalation aerosol Inhale 2 Puffs by mouth every 6 hours as needed for Shortness of Breath. 8.5 g 6   • potassium chloride SA (KDUR) 20 MEQ Tab CR Take 1 Tab by mouth 2 times a day. 180 Tab 3   • carvedilol (COREG) 6.25 MG Tab Take 1 Tab by mouth 2 times a day, with meals. 180 Tab 3   • gabapentin (NEURONTIN) 300 MG Cap Take 1 Cap by mouth 3 times a day. 270 Cap 3   • furosemide (LASIX) 20 MG Tab TAKE 1 TABLET BY MOUTH EVERY DAY 90 Tab 2   • sertraline (ZOLOFT) 100 MG Tab Take 1 Tab by mouth every day. 90 Tab 3   • levothyroxine (SYNTHROID) 200 MCG Tab Take 1 Tab by mouth every day. 90 Tab 3   • ketoconazole (NIZORAL) 2 % Cream Apply to affected area daily 30 g 2   • nabumetone (RELAFEN) 750 MG Tab Take 1 Tab by mouth 2 times a day, with meals. 180 Tab 3   • polyethyl glycol-propyl glycol (SYSTANE) 0.4-0.3 % Solution Place 1 Drop in both eyes as needed.     • spironolactone (ALDACTONE) 50 MG Tab TAKE 1 TABLET BY MOUTH EVERY DAY 90 Tab 3   • temazepam (RESTORIL) 15 MG Cap Take 1 Cap by mouth at bedtime as needed for Sleep. 30 Cap 3   • OXYGEN-HELIUM INH Inhale 2-3 L by mouth as needed.     • Fluocinolone Acetonide 0.01 % Oil Place  in ear 2 Times a Day.       No current facility-administered medications for this visit.          Physical Exam:   Gen:           Alert and oriented, No apparent distress. Mood and affect appropriate, normal interaction with examiner.  Eyes:           PERRL, EOM intact, sclere white, conjunctive moist. Glasses.  Ears:          Not examined.   Hearing:     Grossly intact.  Nose:          Normal, no lesions or deformities.  Dentition:    Good dentition.  Oropharynx:   Tongue normal, posterior pharynx without erythema or exudate.  Mallampati Classification: 3  Neck:        Supple, trachea midline, no masses.  Respiratory Effort: No intercostal retractions or use of accessory muscles.   Lung Auscultation:      Clear to auscultation bilaterally; no rales, rhonchi or wheezing.  CV:            Regular rate and rhythm. No murmurs, rubs or gallops.  Abd:           Not examined.  Lymphadenopathy: Not examined.  Gait and Station: Normal.  Digits and Nails: No clubbing, cyanosis, petechiae, or nodes.   Cranial Nerves: II-XII grossly intact.  Skin:        No rashes, lesions or ulcers noted.               Ext:           No cyanosis or edema.      Assessment:  1. Obesity hypoventilation syndrome (HCC)     2. KERRI (obstructive sleep apnea)     3. Nocturnal hypoxia     4. Former smoker  AMB PULMONARY FUNCTION TEST/LAB    DX-CHEST-2 VIEWS   5. BMI 40.0-44.9, adult (HCC)     6. Shortness of breath  AMB PULMONARY FUNCTION TEST/LAB    DX-CHEST-2 VIEWS   7. Chronic respiratory failure with hypoxia (HCC)  AMB PULMONARY FUNCTION TEST/LAB    DX-CHEST-2 VIEWS       Immunizations:    Flu:9/2017  Pneumovax 23:2014  Prevnar 13:2017    Plan:  1. Patient will continue to benefit from daytime oxygen. Continue 2-4 L oxygen on exertion.  2. Patient is strongly advised restart CPAP therapy but she declines at this time.  3. Encourage weight loss.  4. Discussed sleep hygiene.  5. Patient like to have a formal pulmonary evaluation. We will set the patient with an evaluation for this including a chest x-ray and PFT to be complete at that time.  6. Follow-up sleep center as needed due to patient declining CPAP use. Patient may follow-up with PCP to discuss this.  Follow-up at pulmonary clinic with  new patient evaluation.

## 2018-06-28 RX ORDER — POTASSIUM CHLORIDE 20 MEQ/1
TABLET, EXTENDED RELEASE ORAL
Qty: 180 TAB | Refills: 0 | Status: SHIPPED | OUTPATIENT
Start: 2018-06-28 | End: 2018-10-04 | Stop reason: SDUPTHER

## 2018-06-29 ENCOUNTER — OFFICE VISIT (OUTPATIENT)
Dept: MEDICAL GROUP | Facility: CLINIC | Age: 68
End: 2018-06-29
Payer: MEDICARE

## 2018-06-29 VITALS
BODY MASS INDEX: 45.98 KG/M2 | OXYGEN SATURATION: 93 % | TEMPERATURE: 96.2 F | DIASTOLIC BLOOD PRESSURE: 74 MMHG | WEIGHT: 276 LBS | HEIGHT: 65 IN | RESPIRATION RATE: 16 BRPM | SYSTOLIC BLOOD PRESSURE: 128 MMHG | HEART RATE: 61 BPM

## 2018-06-29 DIAGNOSIS — M35.3 POLYMYALGIA RHEUMATICA (HCC): ICD-10-CM

## 2018-06-29 DIAGNOSIS — M54.16 LUMBAR RADICULOPATHY: ICD-10-CM

## 2018-06-29 DIAGNOSIS — Z12.31 BREAST CANCER SCREENING BY MAMMOGRAM: ICD-10-CM

## 2018-06-29 DIAGNOSIS — E03.4 HYPOTHYROIDISM DUE TO ACQUIRED ATROPHY OF THYROID: ICD-10-CM

## 2018-06-29 DIAGNOSIS — K76.0 NONALCOHOLIC FATTY LIVER DISEASE: ICD-10-CM

## 2018-06-29 DIAGNOSIS — J45.20 MILD INTERMITTENT ASTHMA WITHOUT COMPLICATION: ICD-10-CM

## 2018-06-29 DIAGNOSIS — E55.9 VITAMIN D DEFICIENCY: ICD-10-CM

## 2018-06-29 DIAGNOSIS — Z91.81 AT RISK FOR FALLS: ICD-10-CM

## 2018-06-29 DIAGNOSIS — M48.02 CERVICAL STENOSIS OF SPINAL CANAL: ICD-10-CM

## 2018-06-29 DIAGNOSIS — E66.2 OBESITY HYPOVENTILATION SYNDROME (HCC): ICD-10-CM

## 2018-06-29 DIAGNOSIS — G47.34 NOCTURNAL HYPOXIA: ICD-10-CM

## 2018-06-29 DIAGNOSIS — M47.816 LUMBAR FACET ARTHROPATHY: ICD-10-CM

## 2018-06-29 DIAGNOSIS — R70.0 ELEVATED SED RATE: ICD-10-CM

## 2018-06-29 DIAGNOSIS — M15.9 PRIMARY OSTEOARTHRITIS INVOLVING MULTIPLE JOINTS: ICD-10-CM

## 2018-06-29 DIAGNOSIS — F33.41 RECURRENT MAJOR DEPRESSIVE DISORDER, IN PARTIAL REMISSION (HCC): ICD-10-CM

## 2018-06-29 DIAGNOSIS — G47.33 OSA (OBSTRUCTIVE SLEEP APNEA): ICD-10-CM

## 2018-06-29 DIAGNOSIS — H93.13 TINNITUS OF BOTH EARS: ICD-10-CM

## 2018-06-29 DIAGNOSIS — E66.01 MORBID OBESITY WITH BMI OF 45.0-49.9, ADULT (HCC): ICD-10-CM

## 2018-06-29 DIAGNOSIS — Z00.00 MEDICARE ANNUAL WELLNESS VISIT, SUBSEQUENT: ICD-10-CM

## 2018-06-29 DIAGNOSIS — E78.5 DYSLIPIDEMIA, GOAL LDL BELOW 130: ICD-10-CM

## 2018-06-29 DIAGNOSIS — I10 ESSENTIAL HYPERTENSION: ICD-10-CM

## 2018-06-29 DIAGNOSIS — G62.9 PERIPHERAL POLYNEUROPATHY: ICD-10-CM

## 2018-06-29 PROBLEM — K21.9 GASTROESOPHAGEAL REFLUX DISEASE: Status: RESOLVED | Noted: 2017-02-06 | Resolved: 2018-06-29

## 2018-06-29 PROCEDURE — G0439 PPPS, SUBSEQ VISIT: HCPCS | Performed by: FAMILY MEDICINE

## 2018-06-29 RX ORDER — TRAMADOL HYDROCHLORIDE 50 MG/1
50 TABLET ORAL EVERY 4 HOURS PRN
COMMUNITY
End: 2018-09-15

## 2018-06-29 ASSESSMENT — PATIENT HEALTH QUESTIONNAIRE - PHQ9
SUM OF ALL RESPONSES TO PHQ QUESTIONS 1-9: 23
CLINICAL INTERPRETATION OF PHQ2 SCORE: 6
5. POOR APPETITE OR OVEREATING: 3 - NEARLY EVERY DAY

## 2018-06-29 ASSESSMENT — ACTIVITIES OF DAILY LIVING (ADL): BATHING_REQUIRES_ASSISTANCE: 1

## 2018-06-29 ASSESSMENT — ENCOUNTER SYMPTOMS: GENERAL WELL-BEING: POOR

## 2018-06-29 NOTE — PROGRESS NOTES
Chief Complaint   Patient presents with   • Annual Wellness Visit         HPI:  Ashly is a 68 y.o. here for Medicare Annual Wellness Visit    Patient Active Problem List    Diagnosis Date Noted   • Former smoker 05/30/2018   • Recurrent major depressive disorder, in partial remission ([facility]) 03/29/2018   • Persistent cough 12/28/2017   • Vitamin D deficiency 12/28/2017   • Morbid obesity with BMI of 45.0-49.9, adult ([facility])    • Prediabetes 12/06/2017   • Obesity hypoventilation syndrome ([facility]) 05/31/2017   • Neural foraminal stenosis of cervical spine 05/18/2017   • Tinnitus of both ears 05/18/2017   • Dyslipidemia, goal LDL below 130 01/06/2017   • Nonalcoholic fatty liver disease 01/05/2017   • Disease of accessory sinus 10/03/2016   • Atrophic rhinitis 10/03/2016   • Deviated nasal septum 08/01/2016   • Menopausal symptoms 04/28/2016   • Anal fissure 04/14/2016   • Lumbar facet arthropathy ([facility]) 12/09/2015   • Chronic pain 11/18/2015   • Candidal intertrigo 06/01/2015   • Elevated sed rate 12/09/2014   • Mild intermittent asthma without complication    • H/O fibromyalgia 03/11/2014   • KERRI (obstructive sleep apnea) 03/11/2014   • Acute idiopathic gout of right foot 03/11/2014   • Menopause 03/11/2014   • Peripheral neuropathy ([facility])    • Rectocele 11/25/2013   • Pelvic floor dysfunction    • Chronic constipation 10/02/2013   • Nocturnal hypoxia    • Cervical stenosis of spinal canal 11/01/2011   • Lumbar radiculopathy 11/01/2011   • Migraine without aura    • Carpal tunnel syndrome 09/05/2011   • Ulnar neuropathy 09/05/2011   • Seasonal allergies 07/08/2011   • Polymyalgia rheumatica (HCC) 05/06/2011   • GERD (gastroesophageal reflux disease) 12/10/2010   • Osteoarthritis of multiple joints 04/13/2010   • Eczema, dyshidrotic 08/03/2009   • Essential hypertension 05/20/2009   • Hypothyroidism due to acquired atrophy of thyroid 05/20/2009       Current Outpatient Prescriptions   Medication Sig Dispense Refill   •  tramadol (ULTRAM) 50 MG Tab Take 50 mg by mouth every four hours as needed.     • potassium chloride SA (KDUR) 20 MEQ Tab CR TAKE 1 TABLET BY MOUTH TWICE A  Tab 0   • spironolactone (ALDACTONE) 50 MG Tab TAKE 1 TABLET BY MOUTH EVERY DAY 90 Tab 3   • pantoprazole (PROTONIX) 40 MG Tablet Delayed Response Take 1 Tab by mouth 2 times a day. 180 Tab 0   • Cholecalciferol (VITAMIN D) 2000 units Cap Take 1 Cap by mouth every day. 30 Cap 6   • albuterol (PROAIR HFA) 108 (90 Base) MCG/ACT Aero Soln inhalation aerosol Inhale 2 Puffs by mouth every 6 hours as needed for Shortness of Breath. 8.5 g 6   • carvedilol (COREG) 6.25 MG Tab Take 1 Tab by mouth 2 times a day, with meals. 180 Tab 3   • gabapentin (NEURONTIN) 300 MG Cap Take 1 Cap by mouth 3 times a day. 270 Cap 3   • furosemide (LASIX) 20 MG Tab TAKE 1 TABLET BY MOUTH EVERY DAY 90 Tab 2   • sertraline (ZOLOFT) 100 MG Tab Take 1 Tab by mouth every day. 90 Tab 3   • levothyroxine (SYNTHROID) 200 MCG Tab Take 1 Tab by mouth every day. 90 Tab 3   • nabumetone (RELAFEN) 750 MG Tab Take 1 Tab by mouth 2 times a day, with meals. 180 Tab 3   • temazepam (RESTORIL) 15 MG Cap Take 1 Cap by mouth at bedtime as needed for Sleep. 30 Cap 3   • OXYGEN-HELIUM INH Inhale 2-3 L by mouth as needed.     • polyethylene glycol 3350 (MIRALAX) Powder Take 17 g by mouth 2 times a day. 1 Bottle 11   • metronidazole (METROGEL) 0.75 % gel APPLY TO AFFECTED AREA TWICE A DAY 1 Tube 0   • ketoconazole (NIZORAL) 2 % Cream Apply to affected area daily 30 g 2   • polyethyl glycol-propyl glycol (SYSTANE) 0.4-0.3 % Solution Place 1 Drop in both eyes as needed.     • Fluocinolone Acetonide 0.01 % Oil Place  in ear 2 Times a Day.       No current facility-administered medications for this visit.         Patient is taking medications as noted in medication list.  Current supplements as per medication list.     Allergies: Ace inhibitors; Butalbital-acetaminophen; Cefaclor; Diphenhydramine; Iodine;  Lamictal; Morphine; Penicillins; Savella [milnacipran hcl]; Sulfa drugs; Tape; and Tizanidine hcl    Current social contact/activities: PT reports she lives alone and occasionally visits with family    Is patient current with immunizations? No, due for SHINGRIX (Shingles). Patient is interested in receiving NONE today.    She  reports that she quit smoking about 11 years ago. Her smoking use included Cigarettes. She has a 42.00 pack-year smoking history. She has never used smokeless tobacco. She reports that she does not drink alcohol or use drugs.  Counseling given: Yes        DPA/Advanced directive: Patient does not have an Advanced Directive.  A packet and workshop information was given on Advanced Directives.    ROS:    Gait: Uses a walls and furniture   Ostomy: No   Other tubes: No   Amputations: No   Chronic oxygen use Yes nocturnal 2-4 L PRN during day   Last eye exam 2 years ago   Wears hearing aids: No   : Reports urinary leakage during the last 6 months that has interfered a lot with their daily activities or sleep.   Denies chest pain, pressure, palpitations or exertional SOB.  Denies abdominal pain, pressure.  Denies change in stool pattern.  Denies blood in stool.  Denies blood in urine.  States having intermittent high pitched noises in her ears.  Also acknowledges that her hearing is reducing.    Depression Screening  DEPRESSION     Is patient seeing a counselor, psychiatrist or other healthcare provider regarding their mental health? No, and not interested.      Little interest or pleasure in doing things?  3 - nearly every day  Feeling down, depressed, or hopeless? 3 - nearly every day  Trouble falling or staying asleep, or sleeping too much?  3 - nearly every day  Feeling tired or having little energy?  3 - nearly every day  Poor appetite or overeating?  3 - nearly every day  Feeling bad about yourself - or that you are a failure or have let yourself or your family down? 3 - nearly every  day  Trouble concentrating on things, such as reading the newspaper or watching television? 2 - more than half the days  Moving or speaking so slowly that other people could have noticed.  Or the opposite - being so fidgety or restless that you have been moving around a lot more than usual?  3 - nearly every day  Thoughts that you would be better off dead, or of hurting yourself?  0 - not at all  Patient Health Questionnaire Score: 23      If depressive symptoms identified deferred to follow up visit unless specifically addressed in assessment and plan.    Depression Screen (PHQ-2/PHQ-9) 1/5/2017 6/30/2017 6/29/2018   PHQ-2 Total Score - - -   PHQ-2 Total Score 1 0 6   PHQ-9 Total Score - - -   PHQ-9 Total Score - - -   PHQ-9 Total Score - - 23       Interpretation of PHQ-9 Total Score   Score Severity   1-4 No Depression   5-9 Mild Depression   10-14 Moderate Depression   15-19 Moderately Severe Depression   20-27 Severe Depression    Screening for Cognitive Impairment    Three Minute Recall (leader, season, table)  1/3    Draw clock face with all 12 numbers and set the hands to show 10 past 11.  Yes 4/5  If cognitive concerns identified, deferred for follow up unless specifically addressed in assessment and plan.    Fall Risk Assessment    Has the patient had two or more falls in the last year or any fall with injury in the last year?  Yes  If fall risk identified, deferred for follow up unless specifically addressed in assessment and plan.      Safety Assessment    Throw rugs on floor.  No  Handrails on all stairs.  Yes  Good lighting in all hallways.  Yes  Difficulty hearing.  No  Patient counseled about all safety risks that were identified.    Functional Assessment ADLs    Are there any barriers preventing you from cooking for yourself or meeting nutritional needs?  No.    Are there any barriers preventing you from driving safely or obtaining transportation?  No.    Are there any barriers preventing you from  using a telephone or calling for help?  No.    Are there any barriers preventing you from shopping?  No.    Are there any barriers preventing you from taking care of your own finances?  No.    Are there any barriers preventing you from managing your medications?    No.    Are there any barriers preventing you from showering, bathing or dressing yourself?  Yes. PT reports lack of interest in showering or bathing often goes 1wk before showering. She also states needing to hold the wall for support and balance.   Are you currently engaging in any exercise or physical activity?  No.     What is your perception of your health?  Poor.    Health Maintenance Summary                Annual Wellness Visit Overdue 1/6/2018      Done 1/5/2017 Visit Dx: Medicare annual wellness visit, subsequent    MAMMOGRAM Overdue 5/13/2018      Done 5/13/2016 MA DIAGNOSTIC MAMMO W/CAD-BILAT     Patient has more history with this topic...    IMM PNEUMOCOCCAL 65+ (ADULT) LOW/MEDIUM RISK SERIES Next Due 1/16/2019      Done 1/5/2017 Imm Admin: Pneumococcal Conjugate Vaccine (Prevnar/PCV-13)     Patient has more history with this topic...    BONE DENSITY Next Due 9/10/2020      Done 9/10/2015 DS-BONE DENSITY STUDY (DEXA)    COLONOSCOPY Next Due 2/10/2022      Done 2/10/2017 REFERRAL TO GI FOR COLONOSCOPY     Patient has more history with this topic...    IMM DTaP/Tdap/Td Vaccine Next Due 5/18/2027      Done 5/18/2017 Imm Admin: Tdap Vaccine          Patient Care Team:  Colton Ahn M.D. as PCP - General   Hetal Ann M.D. (Inactive) as Consulting Physician (Rheumatology)  Jeffy Botello M.D. as Consulting Physician (Neurology)  Alin Rasmussen M.D. as Consulting Physician (Phys Med and Rehab)  [facility] as Respiratory  Sam Fontaine M.D. as Consulting Physician (Orthopaedics)      Social History   Substance Use Topics   • Smoking status: Former Smoker     Packs/day: 1.00     Years: 42.00     Types: Cigarettes     Quit date: 3/1/2007    • Smokeless tobacco: Never Used      Comment: Started smoking at age 15   • Alcohol use No     Family History   Problem Relation Age of Onset   • Heart Disease Mother      Arotic vaulve replacement   • GI Mother      bile duct problem   • Bladder cancer Mother    • GI Sister      celiac   • Arthritis Sister    • Other Sister      gout and lupus   • Arthritis Sister    • Kidney Disease Sister    • GI Sister    • Bladder cancer Maternal Aunt    • Arthritis Maternal Grandmother    • Hypertension Maternal Grandmother    • Heart Disease Maternal Grandmother    • Hypertension Maternal Grandfather    • Heart Disease Maternal Grandfather    • Arthritis Maternal Grandfather    • No Known Problems Paternal Grandmother    • No Known Problems Paternal Grandfather    • Cancer Brother      Larynx   • Cancer Daughter      non hopskins limphoma   • GI Daughter    • Bipolar disorder Daughter    • Seizures Daughter    • Bipolar disorder Daughter      She  has a past medical history of Aphasia; Arthritis; Cancer ([facility]) (1990's); Carotid artery stenosis, unilateral; Chickenpox; Controlled type 2 diabetes mellitus without complication ([facility]); Degenerative disc disease; Depression (5/20/2009); Fibromyalgia; GERD (gastroesophageal reflux disease); Guine[place] measles; GOUT (5/20/2009); H/O foot surgery; Hemorrhagic disorder ([facility]); Hiatus hernia syndrome; Hypertension; Hypothyroidism (5/20/2009); Incipient cataract of both eyes; Influenza; Migraine without aura, without mention of intractable migraine without mention of status migrainosus; Mild intermittent asthma without complication; Mixed dyslipidemia; Morbid obesity with BMI of 45.0-49.9, adult ([facility]); Mumps; Nocturnal hypoxia; Obesity hypoventilation syndrome ([facility]) (5/31/2017); KERRI (obstructive sleep apnea) (3/11/2014); Pelvic floor dysfunction; Peripheral neuropathy ([facility]); Pleomorphic adenoma; Polymyalgia rheumatica ([facility]); S/P tonsillectomy; Seasonal allergies; Sleep apnea syndrome;  "Snoring; Tonsillitis; and Urinary incontinence.   Past Surgical History:   Procedure Laterality Date   • GASTROSCOPY  2/6/2017    Procedure: GASTROSCOPY;  Surgeon: Pau Foster M.D.;  Location: SURGERY SAME DAY [facility] ORS;  Service:    • COLONOSCOPY  2/6/2017    Procedure: COLONOSCOPY;  Surgeon: Pau Foster M.D.;  Location: SURGERY SAME DAY [facility] ORS;  Service:    • SEPTAL RECONSTRUCTION  10/3/2016    Procedure: SEPTAL RECONSTRUCTION with  grafts ;  Surgeon: PIETER Dickson M.D.;  Location: SURGERY SAME DAY [facility] ORS;  Service:    • SEPTOPLASTY N/A 8/1/2016    Procedure: SEPTOPLASTY;  Surgeon: PIETER Dickson M.D.;  Location: SURGERY SAME DAY [facility] ORS;  Service:    • TURBINOPLASTY Bilateral 8/1/2016    Procedure: TURBINOPLASTY;  Surgeon: PIETER Dickson M.D.;  Location: SURGERY SAME DAY [facility] ORS;  Service:    • NASAL FRACTURE REDUCTION OPEN N/A 8/1/2016    Procedure: SEPTAL FRACTURE REDUCTION OPEN;  Surgeon: PIETER Dickson M.D.;  Location: SURGERY SAME DAY [facility] ORS;  Service:    • FINE NEEDLE ASPIRATION  11/16/2009    Performed by DA CALLOWAY at ENDOSCOPY [facility]S   • COLONOSCOPY  2006    ? unclear date   • BLADDER SUSPENSION  1983   • HYSTERECTOMY, VAGINAL  1983    ovaries are present, excessive bleeding   • TONSILLECTOMY  1953   • OTHER ABDOMINAL SURGERY      Cholysestectomy   • OTHER ORTHOPEDIC SURGERY  1962/1980    foot surgery          Exam:     Blood pressure 128/74, pulse 61, temperature (!) 35.7 °C (96.2 °F), resp. rate 16, height 1.651 m (5' 5\"), weight (!) 125.2 kg (276 lb), SpO2 93 %. Body mass index is 45.93 kg/m².    Hearing fair.    Dentition fair  Alert, oriented in no acute distress.  Eye contact is good, speech goal directed, affect calm  HEENT:   EOMI, PERRLA.   Oropharynx is well hydrated with normal soft palate motion. No exudate or ulcerations noted. Ear canals are patent, normal cerumen, TMs are pearly grey and quiet in " appearance.  Neck:       ROM is reduced with stiffness and pain, severe posterior cervical spasm and left lateral spasm. No JVD or carotid bruits appreciated. No cervical adenopathy appreciated. No thyromegaly or neck masses appreciated.  No retractions appreciated.  Lungs:     Clear to auscultation A&P with good air movement.   Heart:      Regular rate and rhythm normal S1 and S2 without murmur appreciated.  Strong and symmetric radial and DP pulses.  Abd:        Soft, bowel sounds positive, nontender. No bloating noted. No hepatosplenomegaly or mass appreciated. No pulsatile mass appreciated.  Left pudendal region is quite tender with a diffuse 4 cm fullness.  Ext:         Extremities show symmetric and full range of motion with normal strength. No cyanosis or clubbing appreciated. No peripheral edema appreciated.  Neuro:    Gait is normal. Patient is lucid, fluent and appropriate. No significant tremor appreciated.  Skin:       Exhibit no rashes, pigmented lesions or ulcerations.        Assessment and Plan. The following treatment and monitoring plan is recommended:      1. Medicare annual wellness visit, subsequent  Patient has multiple medical problems which are all generally stable    2. Breast cancer screening by mammogram  She is due for mammogram, order discussed and placed  - MA-MAMMO SCREENING BILAT W/MARIAN W/CAD; Future    3. Polymyalgia rheumatica (HCC)/Elevated sed rate  Patient has had a very thorough rheumatologic workup in the past with Dr. Ann.  She was treated with prednisone for polymyalgia rheumatica.  This greatly improved her symptoms for a while.  Her sedimentation rate normalized.  This was an unusual presentation as her creatine kinase did not elevate.  She had a temporal lobe biopsy to rule out temporal arteritis that was negative.  Unfortunately the prednisone came with very severe weight gain which exacerbated any of her other problems.  She has had a return of the muscle pain now for  the last couple of years with higher sed rate but declines further prednisone as she felt it only worked temporarily and the drawbacks were too high.  Unfortunately this is a stable problem and I believe it is probably still active.  Patient no longer follows with rheumatology.    5. Peripheral polyneuropathy (HCC)  Patient has chronic painful peripheral polyneuropathy.  This is a major source of pain and discomfort in general.  She has had significant testing and multiple attempted treatments for this.  She had an exhaustive workup with the neurologist Dr. Zuñiga.  Unfortunately, no definitive diagnosis could be made.  Treatment with Lamictal was attempted and was initially very successful.  However, she developed a severe progressive rash which unfortunately necessitated cessation of the Lamictal.  In my personal opinion I believe this is somehow tied to her untreated sleep apnea.  I admit I cannot guarantee that.  This problem is unremitting and stable.    6. Lumbar facet arthropathy (HCC)/lumbar radiculopathy  Patient has multilevel arthritis of the lumbar spine.  This is also a source of pain.  Patient has been on numerous treatments in the past including narcotics.  High level narcotics do not help anymore than low-level narcotics.  Therefore she is on tramadol for use as needed.  She uses this episodically only and states she has adequate medication at this time.  It takes her pain from a 9 or 10 down to around a 7 or 6 along with the use of gabapentin for the radicular pain.  Denies somnolence or confusion from the regimen.  Patient denies any change in bowel or bladder continence.  Stable problem overall.    7. Nonalcoholic fatty liver disease  Patient has history of nonalcoholic fatty liver disease.  Biopsy has not been performed.  Patient is negative for hepatitis C. patient reports no alcohol use.  This is likely associated with her obesity and her diabetes.    8. Dyslipidemia, goal LDL below  130  Patient denies chest pain, chest pressure, palpitations or exertional shortness of breath. Patient is not on lipid-lowering medication.  She did have mildly elevated lipids in the past but the last few tests have been superb. Patient is a former smoker, quit over 10 years. Patient takes no aspirin daily. Patient has no history of myocardial infarction, stroke or PVD.  The problem is clinically stable.    9. Mild intermittent asthma without complication  Stable. Currently using inhalers as prescribed.  She denies side effects.  Denies recent or current exacerbation.   Denies increase in current shortness of breath, chest pain, or cough.  She is on oxygen therapy.  This is nighttime and during the day as needed for shortness of breath.    10. KERRI (obstructive sleep apnea)/Obesity hypoventilation syndrome (HCC)  Patient is seeing pulmonology concerning her shortness of breath, asthma and her obstructive sleep apnea.  Have discussed with patient that many of the massive change and there are now some smaller mass that are primarily nasal that may be appropriate for her.  She has follow-up appointments with them and will pursue these problems with pulmonology.  Stable.    11. Nocturnal hypoxia  Patient does have nocturnal hypoxia which is in large part due to apnea.  She is being treated with nasal oxygen which has been inadequate.  Patient was offered various CPAP regimens but was unable to tolerate.  She is revisiting this with pulmonology.  Stable.    12. Primary osteoarthritis involving multiple joints  Patient does have arthritis at multiple levels in the spine and has involvement of her wrists, knees, ankles and hips.  This is a source of chronic pain.    13. Essential hypertension  HTN - Chronic condition stable. Currently taking all meds as directed.   She is not taking baby aspirin daily.   She is monitoring BP at home.  This is episodic.  States her systolic pressures are in the 120s-130s most of the  time.  Denies symptoms low BP: light-headed, tunnel-vision, unusual fatigue.   Denies symptoms high BP: visual changes, palpitations, flushed face.   Denies medicine side effects: unusual fatigue, slow heartbeat, foot/leg swelling, cough.  She does have frequent left temporal headache on awakening.  This is felt to be due to the sleep apnea or associated with it.    14. Hypothyroidism due to acquired atrophy of thyroid  Patient reports good energy level on the medication. Patient denies insomnia, tremor or change in appetite.  Patient is taking the medication on an empty stomach in the morning and waiting at least 30 minutes before eating.  Last TSH in July last year was at target.  Follow-up labs have been placed.  Stable problem.    15. Morbid obesity with BMI of 45.0-49.9, adult ([facility])  Her morbid obesity is a major problem for her.  She did have a meeting with the dietitian but when he began advising her to reduce or eliminate some of her favorite foods she decided she was not going to cooperate with this.  Discussed with patient that I feel that this is very important to pursue.  Patient voices frustration and does not want to address further.  - Patient identified as having weight management issue.  Appropriate orders and counseling given.    16. Recurrent major depressive disorder, in partial remission (HCC)  Patient has chronic depression.  States she feels the sertraline is controlling this adequately but has persistent significant depressive symptoms.  Discussed possibly seeing psychiatry or psychology.  Patient will consider.  Stable.  Patient denies suicidal ideation, thoughts of self-harm or morbid thoughts.  Stable.  - Patient has been identified as being depressed and appropriate orders and counseling have been given    17. At risk for falls  Patient is having increased balance problems.  She is often having headache and fatigue during the day.  I believe some of these problems are related to her sleep  apnea but may also be related to her cervical spinal stenosis.  She is pursuing follow-up with pulmonology.  Declines neurosurgery evaluation or follow-up at this time.  Stable.  - Patient identified as fall risk.  Appropriate orders and counseling given.    18. Cervical stenosis of spinal canal  Patient does have a history of cervical spinal stenosis.  Patient declines further workup with neurology at this time.  Patient does have tingling and pain in her arms and weakness arms and legs.  Denies trouble breathing.  Denies change in urine or bowel continence.  Stable likely slowly progressive.    19. Vitamin D deficiency  Patient does continue vitamin D supplementation.  No history of pathologic fracture.  Stable problem.    21. Tinnitus of both ears  Patient is having high-pitched ringing in whooshing sounds in the ears.  Discussed that this may be due to hearing loss.  We have discussed this before.  Patient states this is not worsened.  She does not want to pursue evaluation.  Stable.    22.  Prediabetes  Patient does have long-standing moderately elevated glycohemoglobin and mildly elevated fasting glucose.  She fits the definition for prediabetes.  Discussed the role of her morbid obesity and this.  Patient acknowledges.  She is not interested in a bariatric consultation at this time.  Unfortunately stable.      Services suggested: No services needed at this time  Health Care Screening recommendations as per orders if indicated.  Referrals offered: PT/OT/Nutrition counseling/Behavioral Health/Smoking cessation as per orders if indicated.    Discussion today about general wellness and lifestyle habits: discussed at length again  · Prevent falls and reduce trip hazards; Cautioned about securing or removing rugs.  · Have a working fire alarm and carbon monoxide detector; yes  · Engage in regular physical activity and social activities       Follow-up: Return in about 4 months (around 10/29/2018), or if symptoms  worsen or fail to improve.

## 2018-07-20 ENCOUNTER — HOSPITAL ENCOUNTER (OUTPATIENT)
Dept: RADIOLOGY | Facility: MEDICAL CENTER | Age: 68
End: 2018-07-20
Attending: FAMILY MEDICINE
Payer: MEDICARE

## 2018-07-20 DIAGNOSIS — Z12.31 BREAST CANCER SCREENING BY MAMMOGRAM: ICD-10-CM

## 2018-07-20 PROCEDURE — 77067 SCR MAMMO BI INCL CAD: CPT

## 2018-07-26 ENCOUNTER — OFFICE VISIT (OUTPATIENT)
Dept: PULMONOLOGY | Facility: HOSPICE | Age: 68
End: 2018-07-26
Payer: MEDICARE

## 2018-07-26 ENCOUNTER — APPOINTMENT (OUTPATIENT)
Dept: RADIOLOGY | Facility: IMAGING CENTER | Age: 68
End: 2018-07-26
Attending: NURSE PRACTITIONER
Payer: MEDICARE

## 2018-07-26 ENCOUNTER — NON-PROVIDER VISIT (OUTPATIENT)
Dept: PULMONOLOGY | Facility: HOSPICE | Age: 68
End: 2018-07-26
Payer: MEDICARE

## 2018-07-26 VITALS
SYSTOLIC BLOOD PRESSURE: 132 MMHG | TEMPERATURE: 98.1 F | BODY MASS INDEX: 46.65 KG/M2 | RESPIRATION RATE: 16 BRPM | HEART RATE: 80 BPM | DIASTOLIC BLOOD PRESSURE: 66 MMHG | HEIGHT: 65 IN | WEIGHT: 280 LBS | OXYGEN SATURATION: 94 %

## 2018-07-26 VITALS — WEIGHT: 280 LBS | HEIGHT: 65 IN | BODY MASS INDEX: 46.65 KG/M2

## 2018-07-26 DIAGNOSIS — R06.02 SHORTNESS OF BREATH: ICD-10-CM

## 2018-07-26 DIAGNOSIS — Z87.891 FORMER SMOKER: ICD-10-CM

## 2018-07-26 DIAGNOSIS — J96.11 CHRONIC RESPIRATORY FAILURE WITH HYPOXIA (HCC): ICD-10-CM

## 2018-07-26 DIAGNOSIS — G47.33 OSA (OBSTRUCTIVE SLEEP APNEA): ICD-10-CM

## 2018-07-26 DIAGNOSIS — R09.02 HYPOXEMIA: ICD-10-CM

## 2018-07-26 PROCEDURE — 94729 DIFFUSING CAPACITY: CPT | Performed by: INTERNAL MEDICINE

## 2018-07-26 PROCEDURE — 99204 OFFICE O/P NEW MOD 45 MIN: CPT | Mod: 25 | Performed by: INTERNAL MEDICINE

## 2018-07-26 PROCEDURE — 94726 PLETHYSMOGRAPHY LUNG VOLUMES: CPT | Performed by: INTERNAL MEDICINE

## 2018-07-26 PROCEDURE — 71046 X-RAY EXAM CHEST 2 VIEWS: CPT | Mod: TC,FY | Performed by: NURSE PRACTITIONER

## 2018-07-26 PROCEDURE — 94060 EVALUATION OF WHEEZING: CPT | Performed by: INTERNAL MEDICINE

## 2018-07-26 RX ORDER — ALBUTEROL SULFATE 90 UG/1
2 AEROSOL, METERED RESPIRATORY (INHALATION) EVERY 6 HOURS PRN
COMMUNITY
End: 2020-01-23

## 2018-07-26 ASSESSMENT — PULMONARY FUNCTION TESTS
FEV1/FVC_PERCENT_CHANGE: 86
FVC: 2.22
FEV1/FVC_PERCENT_PREDICTED: 114
FEV1/FVC_PERCENT_PREDICTED: 112
FVC_LLN: 2.66
FEV1/FVC: 86
FEV1/FVC_PERCENT_PREDICTED: 113
FEV1_PREDICTED: 2.42
FEV1/FVC_PERCENT_LLN: 63
FEV1/FVC: 86
FVC_PERCENT_PREDICTED: 69
FEV1/FVC_PERCENT_CHANGE: 0
FEV1_PERCENT_CHANGE: 6
FEV1_PERCENT_CHANGE: 7
FVC_PREDICTED: 3.18
FEV1/FVC: 85.71
FEV1/FVC_PERCENT_PREDICTED: 114
FEV1_PERCENT_PREDICTED: 84
FEV1: 1.91
FEV1: 2.04
FEV1/FVC: 86
FEV1_LLN: 2.02
FEV1/FVC_PERCENT_PREDICTED: 76
FEV1/FVC_PREDICTED: 76
FEV1_PERCENT_PREDICTED: 79
FVC: 2.38
FVC_PERCENT_PREDICTED: 74

## 2018-07-26 NOTE — PROCEDURES
Multi-Ox Readings  Multi Ox #1 Room air   O2 sat % at rest 95   O2 sat % on exertion 88   O2 sat average on exertion 91   Multi Ox #2 3 LPM   O2 sat % at rest 96   O2 sat % on exertion 93   O2 sat average on exertion 94     Oxygen Use 3   Oxygen Frequency With exertion and nocturnal   Duration of need     Is the patient mobile within the home?     CPAP Use?     BIPAP Use?     Servo Titration

## 2018-07-26 NOTE — PROCEDURES
Technician: NATALIA Raman    Technician Comment:  Good patient effort & cooperation.  The results of this test meet the ATS/ERS standards for acceptability & reproducibility.  Test was performed on the AAMPP Body Plethysmograph-Elite DX system.  Predicted values were [facility]-3 for spirometry, [facility] for DLCO, ITS for Lung Volumes.  The DLCO was uncorrected for Hgb.  A bronchodilator of Ventolin HFA -2puffs via spacer administered.  DLCO performed during dilation period.    Interpretation:    FEV1 is 1.91 L which is 79% predicted.  The FEV1 FVC ratio is 86%.  MVV is 81% of predicted.  There is a 6% improvement in FEV1 after an inhaled bronchodilator.  Total lung capacity is 88% of predicted.  DLCO is 101% of predicted.  Airway resistance is normal.    The mild improvement in FEV1 after an inhaled bronchodilator suggests the possibility of obstructive lung disease with a reversible component.  The FEV1 and FVC may be reduced secondary to the patient's BMI of 46.6.

## 2018-07-26 NOTE — PROGRESS NOTES
Chief Complaint   Patient presents with   • New Patient     Asthma evaluation       HPI:  The patient is a 68-year-old woman with a history of dyspnea with any exertion.  She says that doing the laundry or any work around the house causes her to be very short of breath.  The patient has a history of smoking 1 pack a day for 42 years.  She quit smoking about 11 years ago.  She has multiple allergies and is allergic to cats.  She may have a component of asthma.  She does use albuterol on occasion.  She does not use it daily.  The patient does have obstructive sleep apnea with an AHI of 19.7 and oxygen desaturation down to 74%.  She could not tolerate CPAP.  The patient has been on supplemental oxygen at night for a number of years.  The patient does have multiple other medical problems including diabetes, fibromyalgia, gastroesophageal reflux.  Her BMI is 46.6.    Her echocardiogram has shown diastolic dysfunction with an RVSP of 27-30 mmHg.    In the office today for oxygen saturation was 95% at rest but dropped rapidly to 88% with walking.  She required 3 L of oxygen to achieve a saturation of 93% while walking.  Pulmonary function studies today M stated an FEV1 of 1.91 L which was 79% of predicted.  Her FEV1 FVC ratio is 86%.  MVV was 81% of predicted.  She had a 6% improvement in FEV1 after an inhaled bronchodilator.  Total lung capacity was 88%.  DLCO was 101% of predicted.  Airway resistance was normal.  The mild improvement after an inhaled bronchodilator may indicate some evidence of obstructive lung disease.  The reduction in FEV1 and FVC are most likely due to the patient's BMI of 46.6.    Past Medical History:   Diagnosis Date   • Abdominal pain    • Aphasia     Written word recognition/useage   • Arthritis     Everywhere.    • Back pain    • Bronchitis    • Cancer (HCC) 1990's    skin   • Carotid artery stenosis, unilateral     moderate left carotid artery stenosis   • Chest tightness    • Chickenpox    •  Constipation    • Controlled type 2 diabetes mellitus without complication ([facility])    • Daytime sleepiness    • Degenerative disc disease    • Depression 5/20/2009   • Diarrhea    • Dizziness    • Double vision    • Eye drainage    • Eye pain    • Fatigue    • Fibromyalgia     confirmed by Dr. Ann   • Frequent headaches    • Frequent urination    • GERD (gastroesophageal reflux disease)     hiatal hernia   • Stateless measles    • GOUT 5/20/2009   • H/O foot surgery    • Hearing difficulty    • Heartburn    • Hemorrhagic disorder ([facility])     nose bleeds   • Hiatus hernia syndrome    • Hypertension    • Hypothyroidism 5/20/2009   • Incipient cataract of both eyes    • Influenza    • Insomnia    • Lumps on the skin    • Migraine without aura, without mention of intractable migraine without mention of status migrainosus    • Mild intermittent asthma without complication    • Mixed dyslipidemia    • Morbid obesity with BMI of 45.0-49.9, adult ([facility])    • Morning headache    • Mumps    • Nasal drainage    • Nocturnal hypoxia     severe, to 69% O2 sat   • Obesity    • Obesity hypoventilation syndrome ([facility]) 5/31/2017   • KERRI (obstructive sleep apnea) 3/11/2014    A.  CPAP     • Painful joint    • Pelvic floor dysfunction    • Peripheral neuropathy ([facility])    • Pleomorphic adenoma    • Polymyalgia rheumatica ([facility])     Dr. Ann   • Rash    • Restless leg syndrome    • Ringing in ears    • S/P tonsillectomy    • Seasonal allergies    • Shortness of breath    • Skin change    • Sleep apnea syndrome     she is not using CPAP, claustrophobia   • Snoring    • Sore muscles    • Sweat, sweating, excessive    • Swelling of lower extremity    • Tonsillitis    • Urinary incontinence     will not wear a pad   • Vision loss    • Weakness    • Wears glasses        ROS:   Constitutional: Denies fevers, chills, night sweats, fatigue or weight loss  Eyes: Denies vision loss, pain, drainage, double vision  Ears, Nose, Throat: Denies earache,  tinnitus, hoarseness  Cardiovascular: Denies chest pain, tightness, palpitations  Respiratory: Denies  sputum production, cough, hemoptysis  Sleep: See HPI  GI: Denies abdominal pain, nausea, vomiting, diarrhea  : Denies frequent urination, hematuria, painful urination  Musculoskeletal: Denies back pain, painful joints, sore muscles  Neurological: Denies headaches, seizures  Skin: Denies rashes, color changes  Psychiatric: Denies depression or thoughts of suicide  Hematologic: Denies bleeding tendency or clotting tendency  Allergic/Immunologic: Denies rhinitis, skin sensitivity    Social History     Social History   • Marital status:      Spouse name: N/A   • Number of children: N/A   • Years of education: N/A     Occupational History   • Not on file.     Social History Main Topics   • Smoking status: Former Smoker     Packs/day: 1.00     Years: 42.00     Types: Cigarettes     Quit date: 3/1/2007   • Smokeless tobacco: Never Used      Comment: Started smoking at age 15   • Alcohol use No      Comment: no longer drinks   • Drug use: No      Comment: CBD topical pain cream   • Sexual activity: No     Other Topics Concern   • Not on file     Social History Narrative   • No narrative on file     Butalbital-acetaminophen; Cefaclor; Diphenhydramine; Iodine; Lamictal; Morphine; Penicillins; Savella [milnacipran hcl]; Sulfa drugs; Tizanidine hcl; Ace inhibitors; and Tape  Current Outpatient Prescriptions on File Prior to Visit   Medication Sig Dispense Refill   • tramadol (ULTRAM) 50 MG Tab Take 50 mg by mouth every four hours as needed.     • potassium chloride SA (KDUR) 20 MEQ Tab CR TAKE 1 TABLET BY MOUTH TWICE A  Tab 0   • spironolactone (ALDACTONE) 50 MG Tab TAKE 1 TABLET BY MOUTH EVERY DAY 90 Tab 3   • pantoprazole (PROTONIX) 40 MG Tablet Delayed Response Take 1 Tab by mouth 2 times a day. 180 Tab 0   • polyethylene glycol 3350 (MIRALAX) Powder Take 17 g by mouth 2 times a day. 1 Bottle 11   •  "Cholecalciferol (VITAMIN D) 2000 units Cap Take 1 Cap by mouth every day. 30 Cap 6   • metronidazole (METROGEL) 0.75 % gel APPLY TO AFFECTED AREA TWICE A DAY 1 Tube 0   • carvedilol (COREG) 6.25 MG Tab Take 1 Tab by mouth 2 times a day, with meals. 180 Tab 3   • gabapentin (NEURONTIN) 300 MG Cap Take 1 Cap by mouth 3 times a day. 270 Cap 3   • furosemide (LASIX) 20 MG Tab TAKE 1 TABLET BY MOUTH EVERY DAY 90 Tab 2   • sertraline (ZOLOFT) 100 MG Tab Take 1 Tab by mouth every day. 90 Tab 3   • levothyroxine (SYNTHROID) 200 MCG Tab Take 1 Tab by mouth every day. 90 Tab 3   • ketoconazole (NIZORAL) 2 % Cream Apply to affected area daily 30 g 2   • nabumetone (RELAFEN) 750 MG Tab Take 1 Tab by mouth 2 times a day, with meals. 180 Tab 3   • temazepam (RESTORIL) 15 MG Cap Take 1 Cap by mouth at bedtime as needed for Sleep. 30 Cap 3   • albuterol (PROAIR HFA) 108 (90 Base) MCG/ACT Aero Soln inhalation aerosol Inhale 2 Puffs by mouth every 6 hours as needed for Shortness of Breath. 8.5 g 6   • polyethyl glycol-propyl glycol (SYSTANE) 0.4-0.3 % Solution Place 1 Drop in both eyes as needed.     • OXYGEN-HELIUM INH Inhale 2-3 L by mouth as needed.     • Fluocinolone Acetonide 0.01 % Oil Place  in ear 2 Times a Day.       No current facility-administered medications on file prior to visit.      Blood pressure 132/66, pulse 80, temperature 36.7 °C (98.1 °F), resp. rate 16, height 1.651 m (5' 5\"), weight (!) 127 kg (280 lb), SpO2 94 %.  Family History   Problem Relation Age of Onset   • Heart Disease Mother         Arotic vaulve replacement   • GI Mother         bile duct problem   • Bladder cancer Mother    • GI Sister         celiac   • Arthritis Sister    • Other Sister         gout and lupus   • Arthritis Sister    • Kidney Disease Sister    • GI Sister    • Bladder cancer Maternal Aunt    • Arthritis Maternal Grandmother    • Hypertension Maternal Grandmother    • Heart Disease Maternal Grandmother    • Hypertension Maternal " Grandfather    • Heart Disease Maternal Grandfather    • Arthritis Maternal Grandfather    • No Known Problems Paternal Grandmother    • No Known Problems Paternal Grandfather    • Cancer Brother         Larynx   • Cancer Daughter         non hopskins limphoma   • GI Daughter    • Bipolar disorder Daughter    • Seizures Daughter    • Bipolar disorder Daughter        Physical Exam:  BMI 46.6  HEENT: PERRLA, EOMI, no scleral icterus, no nasal or oral lesions  Neck: No thyromegaly, no adenopathy, no bruits  Mallampatti: Grade IV  Lungs: Equal breath sounds, no wheezes or crackles  Heart: Regular rate and rhythm, no gallops or murmurs  Abdomen: Soft, benign, no organomegaly  Extremities: No clubbing, cyanosis, or edema  Neurologic: Cranial nerve, motor, and sensory exam are normal    1. Shortness of breath    2. Former smoker    3. Hypoxemia    4. KERRI (obstructive sleep apnea)    5. BMI 40.0-44.9, adult (HCC)        This woman's dyspnea on exertion is multifactorial.  She has some degree of COPD with a reversible component.  She has some restriction because of her BMI.  She has diastolic dysfunction.  She is deconditioned.  She has at least mild pulmonary hypertension.  I do not believe that the addition of an inhaled steroid or long-acting bronchodilators would help her significantly.  I doubt that she will be able to lose significant weight.  We will continue her on albuterol as needed.  Obviously she needs to continue on supplemental oxygen.  She states that she would rather be followed in pulmonary clinic than sleep clinic.  We will see her back in 6 months or sooner if she has issues.

## 2018-09-14 DIAGNOSIS — M54.16 LUMBAR RADICULOPATHY: ICD-10-CM

## 2018-09-14 DIAGNOSIS — M35.3 POLYMYALGIA RHEUMATICA (HCC): ICD-10-CM

## 2018-09-14 DIAGNOSIS — M15.9 PRIMARY OSTEOARTHRITIS INVOLVING MULTIPLE JOINTS: ICD-10-CM

## 2018-09-14 DIAGNOSIS — M48.02 CERVICAL STENOSIS OF SPINAL CANAL: ICD-10-CM

## 2018-09-15 RX ORDER — TRAMADOL HYDROCHLORIDE 50 MG/1
TABLET ORAL
Qty: 180 TAB | Refills: 0
Start: 2018-09-15 | End: 2018-10-15

## 2018-09-15 RX ORDER — TRAMADOL HYDROCHLORIDE 50 MG/1
50-100 TABLET ORAL EVERY 4 HOURS PRN
Qty: 180 TAB | Refills: 0 | Status: SHIPPED
Start: 2018-09-15 | End: 2018-10-02 | Stop reason: SDUPTHER

## 2018-09-15 RX ORDER — TRAMADOL HYDROCHLORIDE 50 MG/1
50-100 TABLET ORAL EVERY 4 HOURS PRN
Qty: 180 TAB | Refills: 5 | Status: SHIPPED | OUTPATIENT
Start: 2018-09-15 | End: 2018-09-15 | Stop reason: SDUPTHER

## 2018-10-02 ENCOUNTER — HOSPITAL ENCOUNTER (OUTPATIENT)
Dept: LAB | Facility: MEDICAL CENTER | Age: 68
End: 2018-10-02
Attending: FAMILY MEDICINE
Payer: MEDICARE

## 2018-10-02 ENCOUNTER — OFFICE VISIT (OUTPATIENT)
Dept: MEDICAL GROUP | Facility: MEDICAL CENTER | Age: 68
End: 2018-10-02
Payer: MEDICARE

## 2018-10-02 VITALS
BODY MASS INDEX: 46.82 KG/M2 | RESPIRATION RATE: 16 BRPM | OXYGEN SATURATION: 91 % | DIASTOLIC BLOOD PRESSURE: 60 MMHG | HEIGHT: 65 IN | SYSTOLIC BLOOD PRESSURE: 120 MMHG | TEMPERATURE: 96.7 F | HEART RATE: 69 BPM | WEIGHT: 281 LBS

## 2018-10-02 DIAGNOSIS — M60.9: ICD-10-CM

## 2018-10-02 DIAGNOSIS — R70.0 ELEVATED SEDIMENTATION RATE: ICD-10-CM

## 2018-10-02 DIAGNOSIS — E03.4 HYPOTHYROIDISM DUE TO ACQUIRED ATROPHY OF THYROID: ICD-10-CM

## 2018-10-02 DIAGNOSIS — I10 ESSENTIAL HYPERTENSION: ICD-10-CM

## 2018-10-02 DIAGNOSIS — E78.5 DYSLIPIDEMIA, GOAL LDL BELOW 130: ICD-10-CM

## 2018-10-02 DIAGNOSIS — M35.3 POLYMYALGIA RHEUMATICA (HCC): ICD-10-CM

## 2018-10-02 DIAGNOSIS — M15.9 PRIMARY OSTEOARTHRITIS INVOLVING MULTIPLE JOINTS: ICD-10-CM

## 2018-10-02 DIAGNOSIS — R60.9 PERIPHERAL EDEMA: ICD-10-CM

## 2018-10-02 DIAGNOSIS — R73.03 PREDIABETES: ICD-10-CM

## 2018-10-02 DIAGNOSIS — E55.9 VITAMIN D DEFICIENCY: ICD-10-CM

## 2018-10-02 DIAGNOSIS — K21.9 GASTROESOPHAGEAL REFLUX DISEASE WITHOUT ESOPHAGITIS: ICD-10-CM

## 2018-10-02 DIAGNOSIS — M54.16 LUMBAR RADICULOPATHY: ICD-10-CM

## 2018-10-02 DIAGNOSIS — G89.29 CHRONIC RIGHT SHOULDER PAIN: ICD-10-CM

## 2018-10-02 DIAGNOSIS — K76.0 NONALCOHOLIC FATTY LIVER DISEASE: ICD-10-CM

## 2018-10-02 DIAGNOSIS — Z23 NEED FOR IMMUNIZATION AGAINST INFLUENZA: ICD-10-CM

## 2018-10-02 DIAGNOSIS — M48.02 CERVICAL STENOSIS OF SPINAL CANAL: ICD-10-CM

## 2018-10-02 DIAGNOSIS — M25.511 CHRONIC RIGHT SHOULDER PAIN: ICD-10-CM

## 2018-10-02 LAB
25(OH)D3 SERPL-MCNC: 21 NG/ML (ref 30–100)
ALBUMIN SERPL BCP-MCNC: 4.1 G/DL (ref 3.2–4.9)
ALBUMIN/GLOB SERPL: 1 G/DL
ALP SERPL-CCNC: 120 U/L (ref 30–99)
ALT SERPL-CCNC: 28 U/L (ref 2–50)
ANION GAP SERPL CALC-SCNC: 10 MMOL/L (ref 0–11.9)
AST SERPL-CCNC: 26 U/L (ref 12–45)
BILIRUB SERPL-MCNC: 0.6 MG/DL (ref 0.1–1.5)
BUN SERPL-MCNC: 21 MG/DL (ref 8–22)
CALCIUM SERPL-MCNC: 9.8 MG/DL (ref 8.5–10.5)
CHLORIDE SERPL-SCNC: 105 MMOL/L (ref 96–112)
CHOLEST SERPL-MCNC: 181 MG/DL (ref 100–199)
CO2 SERPL-SCNC: 26 MMOL/L (ref 20–33)
CREAT SERPL-MCNC: 0.92 MG/DL (ref 0.5–1.4)
ERYTHROCYTE [DISTWIDTH] IN BLOOD BY AUTOMATED COUNT: 50.2 FL (ref 35.9–50)
ERYTHROCYTE [SEDIMENTATION RATE] IN BLOOD BY WESTERGREN METHOD: 58 MM/HOUR (ref 0–30)
GLOBULIN SER CALC-MCNC: 4.1 G/DL (ref 1.9–3.5)
GLUCOSE SERPL-MCNC: 105 MG/DL (ref 65–99)
HCT VFR BLD AUTO: 43.7 % (ref 37–47)
HDLC SERPL-MCNC: 44 MG/DL
HGB BLD-MCNC: 13.4 G/DL (ref 12–16)
IRON SATN MFR SERPL: 15 % (ref 15–55)
IRON SERPL-MCNC: 62 UG/DL (ref 40–170)
LDLC SERPL CALC-MCNC: 104 MG/DL
MCH RBC QN AUTO: 27.6 PG (ref 27–33)
MCHC RBC AUTO-ENTMCNC: 30.7 G/DL (ref 33.6–35)
MCV RBC AUTO: 89.9 FL (ref 81.4–97.8)
PLATELET # BLD AUTO: 196 K/UL (ref 164–446)
PMV BLD AUTO: 11.3 FL (ref 9–12.9)
POTASSIUM SERPL-SCNC: 3.9 MMOL/L (ref 3.6–5.5)
PROT SERPL-MCNC: 8.2 G/DL (ref 6–8.2)
RBC # BLD AUTO: 4.86 M/UL (ref 4.2–5.4)
SODIUM SERPL-SCNC: 141 MMOL/L (ref 135–145)
TIBC SERPL-MCNC: 413 UG/DL (ref 250–450)
TRIGL SERPL-MCNC: 165 MG/DL (ref 0–149)
TSH SERPL DL<=0.005 MIU/L-ACNC: 1.68 UIU/ML (ref 0.38–5.33)
VIT B12 SERPL-MCNC: 292 PG/ML (ref 211–911)
WBC # BLD AUTO: 11.3 K/UL (ref 4.8–10.8)

## 2018-10-02 PROCEDURE — 82306 VITAMIN D 25 HYDROXY: CPT

## 2018-10-02 PROCEDURE — 90662 IIV NO PRSV INCREASED AG IM: CPT | Performed by: FAMILY MEDICINE

## 2018-10-02 PROCEDURE — G0008 ADMIN INFLUENZA VIRUS VAC: HCPCS | Performed by: FAMILY MEDICINE

## 2018-10-02 PROCEDURE — 85652 RBC SED RATE AUTOMATED: CPT

## 2018-10-02 PROCEDURE — 83550 IRON BINDING TEST: CPT

## 2018-10-02 PROCEDURE — 82607 VITAMIN B-12: CPT

## 2018-10-02 PROCEDURE — 83036 HEMOGLOBIN GLYCOSYLATED A1C: CPT | Mod: GA

## 2018-10-02 PROCEDURE — 84443 ASSAY THYROID STIM HORMONE: CPT

## 2018-10-02 PROCEDURE — 99214 OFFICE O/P EST MOD 30 MIN: CPT | Mod: 25 | Performed by: FAMILY MEDICINE

## 2018-10-02 PROCEDURE — 80061 LIPID PANEL: CPT

## 2018-10-02 PROCEDURE — 85027 COMPLETE CBC AUTOMATED: CPT

## 2018-10-02 PROCEDURE — 80053 COMPREHEN METABOLIC PANEL: CPT

## 2018-10-02 PROCEDURE — 83540 ASSAY OF IRON: CPT

## 2018-10-02 PROCEDURE — 36415 COLL VENOUS BLD VENIPUNCTURE: CPT

## 2018-10-02 RX ORDER — TRAMADOL HYDROCHLORIDE 50 MG/1
50 TABLET ORAL EVERY 4 HOURS PRN
Qty: 180 TAB | Refills: 2 | Status: SHIPPED | OUTPATIENT
Start: 2018-10-12 | End: 2019-04-01 | Stop reason: SDUPTHER

## 2018-10-02 RX ORDER — FUROSEMIDE 20 MG/1
20 TABLET ORAL
Qty: 90 TAB | Refills: 2 | Status: SHIPPED | OUTPATIENT
Start: 2018-10-02 | End: 2019-04-01 | Stop reason: SDUPTHER

## 2018-10-02 NOTE — PROGRESS NOTES
Chief Complaint   Patient presents with   • Shoulder Pain   • Muscle Pain   • Angioedema   • Hypertension       Subjective:     HPI:   Ashly Meyer presents today with the followin. Chronic right shoulder pain  Patient continues to have chronic musculoskeletal symptoms including right shoulder pain.  She has a history of elevated sed rate and a past diagnosis by rheumatology of  polymyalgia rheumatica.    Her patient in the right shoulder is continuing to worsen.  This is now bothering her a great deal at nighttime interfering with sleep.  She is finding it very difficult to reach for anything overhead, to the side or behind her.  These symptoms have been going on for several months but are  worsening.  Discussed reimaging and reevaluation of the shoulder.  MRI order discussed and placed.    2. Myositis of right upper extremity, unspecified myositis type  Patient has myositis episodically and currently her brachial radialis muscle on the right is quite swollen and tender.  Sedimentation rate order is discussed and renewed    3. Elevated sedimentation rate  Follow-up labs discussed and order placed.    4. Polymyalgia rheumatica (HCC)  Needs follow-up on her lab testing and possible retreatment with prednisone though she did not tolerate that well before.  Her creatine kinase was actually normal in the past.  The diagnosis is somewhat in doubt.    5. Need for immunization against influenza  Administered today    6. Essential hypertension  HTN - Chronic condition stable. Currently taking all meds as directed.  Blood pressures when checked here in the office have been in very good control.    She is not taking baby aspirin daily.   She is not monitoring BP at home.   Denies symptoms low BP: light-headed, tunnel-vision, unusual fatigue.   Denies symptoms high BP:pounding headache, visual changes, palpitations, flushed face.   Denies medicine side effects: unusual fatigue, slow heartbeat, foot/leg swelling,  cough.  Lab orders are discussed and placed.    7. Hypothyroidism due to acquired atrophy of thyroid  She is due for recheck, lab orders discussed and placed.  States she is always tired and cannot really tell whether the thyroid is working well for her or not.    8. Gastroesophageal reflux disease without esophagitis  She feels the current medication regimen of pantoprazole is controlling the gastroesophageal reflux symptoms well most of the time. Denies dysphagia, reflux symptoms, acidity, abdominal pain or visible blood or mucus in the stool. Denies vomiting or hematemesis.  She continues to have burping or abdominal bloating. Patient still takes Relafen twice daily.  Takes this with food and water.  Finds she cannot function well if she avoids it. Avoids heavy meals or eating within 2 hours of bedtime.      9. Peripheral edema  This is moderate but persistent.  Continues the furosemide.  Needs follow-up lab orders which are discussed and placed.      10. Primary osteoarthritis involving multiple joints  She has osteoarthritic involvement of multiple joints including knees, spine and shoulders.  She takes Relafen daily to treat the pain but it is insufficient.  She also uses tramadol as needed.  Review of [facility] board of pharmacy interface shows no inconsistencies.  The medication is renewed.    11. Lumbar radiculopathy  Patient has long-standing lumbar radiculopathy and is treating with gabapentin.  Denies somnolence or confusion from the medication.    12. Cervical stenosis of spinal canal  Patient has cervical spinal stenosis but denies any weakness in the arms at this time though she does have considerable pain in the right shoulder and right forearm.  This does not sound radicular but it could be.  Patient denies positional vertigo.    13. Dyslipidemia, goal LDL below 130  Patient denies chest pain, chest pressure, palpitations or exertional shortness of breath. Patient is not on lipid-lowering medication.   Lipid levels have been mildly elevated only.  Risk ratio has been reasonable.  Patient is a former smoker. Patient takes no aspirin daily. Patient has no history of myocardial infarction, stroke or PVD.  Lab orders are discussed and placed.    14. Vitamin D deficiency  Patient is not taking the vitamin D consistently though she has started doing that the last few weeks.  Discussed that I feel this is important overall to her especially with a history of fatty liver.  Needs follow-up lab testing on this, order discussed and placed.    15. Prediabetes  Lab orders are discussed and placed.  Possible dietary changes are discussed.    16. Nonalcoholic fatty liver disease  Dietary changes are discussed.  Orders discussed and placed        Patient Active Problem List    Diagnosis Date Noted   • Former smoker 05/30/2018   • Recurrent major depressive disorder, in partial remission (HCC) 03/29/2018   • Persistent cough 12/28/2017   • Vitamin D deficiency 12/28/2017   • Morbid obesity with BMI of 45.0-49.9, adult (HCC)    • Prediabetes 12/06/2017   • Obesity hypoventilation syndrome (HCC) 05/31/2017   • Neural foraminal stenosis of cervical spine 05/18/2017   • Tinnitus of both ears 05/18/2017   • Dyslipidemia, goal LDL below 130 01/06/2017   • Nonalcoholic fatty liver disease 01/05/2017   • Disease of accessory sinus 10/03/2016   • Atrophic rhinitis 10/03/2016   • Deviated nasal septum 08/01/2016   • Menopausal symptoms 04/28/2016   • Anal fissure 04/14/2016   • Lumbar facet arthropathy 12/09/2015   • Chronic pain 11/18/2015   • Candidal intertrigo 06/01/2015   • Elevated sed rate 12/09/2014   • Mild intermittent asthma without complication    • H/O fibromyalgia 03/11/2014   • KERRI (obstructive sleep apnea) 03/11/2014   • Acute idiopathic gout of right foot 03/11/2014   • Menopause 03/11/2014   • Peripheral neuropathy    • Rectocele 11/25/2013   • Pelvic floor dysfunction    • Chronic constipation 10/02/2013   • Nocturnal  hypoxia    • Cervical stenosis of spinal canal 11/01/2011   • Lumbar radiculopathy 11/01/2011   • Migraine without aura    • Carpal tunnel syndrome 09/05/2011   • Ulnar neuropathy 09/05/2011   • Seasonal allergies 07/08/2011   • Polymyalgia rheumatica (HCC) 05/06/2011   • GERD (gastroesophageal reflux disease) 12/10/2010   • Osteoarthritis of multiple joints 04/13/2010   • Eczema, dyshidrotic 08/03/2009   • Essential hypertension 05/20/2009   • Hypothyroidism due to acquired atrophy of thyroid 05/20/2009       Current medicines (including changes today)  Current Outpatient Prescriptions   Medication Sig Dispense Refill   • furosemide (LASIX) 20 MG Tab Take 1 Tab by mouth every day. 90 Tab 2   • [START ON 10/12/2018] tramadol (ULTRAM) 50 MG Tab Take 1 Tab by mouth every four hours as needed for Moderate Pain for up to 90 days. 180 Tab 2   • nabumetone (RELAFEN) 750 MG Tab TAKE 1 TABLET BY MOUTH TWICE A DAY WITH MEALS 180 Tab 2   • pantoprazole (PROTONIX) 40 MG Tablet Delayed Response TAKE 1 TABLET BY MOUTH TWICE A  Tab 0   • albuterol (VENTOLIN HFA) 108 (90 Base) MCG/ACT Aero Soln inhalation aerosol Inhale 2 Puffs by mouth every 6 hours as needed for Shortness of Breath.     • potassium chloride SA (KDUR) 20 MEQ Tab CR TAKE 1 TABLET BY MOUTH TWICE A  Tab 0   • spironolactone (ALDACTONE) 50 MG Tab TAKE 1 TABLET BY MOUTH EVERY DAY 90 Tab 3   • Cholecalciferol (VITAMIN D) 2000 units Cap Take 1 Cap by mouth every day. 30 Cap 6   • metronidazole (METROGEL) 0.75 % gel APPLY TO AFFECTED AREA TWICE A DAY 1 Tube 0   • carvedilol (COREG) 6.25 MG Tab Take 1 Tab by mouth 2 times a day, with meals. 180 Tab 3   • gabapentin (NEURONTIN) 300 MG Cap Take 1 Cap by mouth 3 times a day. 270 Cap 3   • sertraline (ZOLOFT) 100 MG Tab Take 1 Tab by mouth every day. 90 Tab 3   • levothyroxine (SYNTHROID) 200 MCG Tab Take 1 Tab by mouth every day. 90 Tab 3   • ketoconazole (NIZORAL) 2 % Cream Apply to affected area daily 30 g 2  "  • polyethyl glycol-propyl glycol (SYSTANE) 0.4-0.3 % Solution Place 1 Drop in both eyes as needed.     • temazepam (RESTORIL) 15 MG Cap Take 1 Cap by mouth at bedtime as needed for Sleep. 30 Cap 3   • OXYGEN-HELIUM INH Inhale 2-3 L by mouth as needed.     • Fluocinolone Acetonide 0.01 % Oil Place  in ear 2 Times a Day.     • polyethylene glycol 3350 (MIRALAX) Powder Take 17 g by mouth 2 times a day. 1 Bottle 11   • albuterol (PROAIR HFA) 108 (90 Base) MCG/ACT Aero Soln inhalation aerosol Inhale 2 Puffs by mouth every 6 hours as needed for Shortness of Breath. 8.5 g 6     No current facility-administered medications for this visit.        Allergies   Allergen Reactions   • Butalbital-Acetaminophen Unspecified     Dizzy, can't walk, hard to focus   • Cefaclor Unspecified     Ceclor causes light headedness and dizziness   • Diphenhydramine Hives     Full body hives   • Iodine Unspecified     Labored breathing   • Lamictal Rash and Swelling     \"hot\", unable to function   • Morphine Itching     redness   • Penicillins Itching and Swelling     Skin itching and redness   • Savella [Milnacipran Hcl] Unspecified     Muscle aches, feet swelling   • Sulfa Drugs Hives and Anxiety     Hard breathing   • Tizanidine Hcl Unspecified     dizziness   • Ace Inhibitors Cough     Cough     • Tape Unspecified     Paper tape is ok       ROS: As per HPI       Objective:     Blood pressure 120/60, pulse 69, temperature 35.9 °C (96.7 °F), resp. rate 16, height 1.651 m (5' 5\"), weight (!) 127.5 kg (281 lb), SpO2 91 %, not currently breastfeeding. Body mass index is 46.76 kg/m².    Physical Exam:  Constitutional: Well-developed and well-nourished. Not diaphoretic. No distress. Lucid and fluent.  Skin: Skin is warm and dry. No rash noted.  Head: Atraumatic without lesions.  Eyes: Conjunctivae and extraocular motions are normal. Pupils are equal, round, and reactive to light. No scleral icterus.   Ears:  External ears unremarkable. Tympanic " membranes clear and intact.  Nose: Nares patent. Mucosa without edema or erythema. No discharge. No facial tenderness.  Mouth/Throat: Tongue normal. Oropharynx is clear and moist. Posterior pharynx without erythema or exudates.  Neck: Supple, trachea midline. No thyromegaly present. No cervical or supraclavicular lymphadenopathy. No JVD or carotid bruits appreciated  Cardiovascular: Regular rate and rhythm.  Normal S1, S2 without murmur appreciated.  Chest: Effort normal. Clear to auscultation throughout. No adventitious sounds.   Abdomen: Soft, non tender, and without distention. Active bowel sounds in all four quadrants. No rebound, guarding, masses or hepatosplenomegaly.  Extremities: No cyanosis, clubbing, erythema, nor edema.  Muscles are tender even to gentle touch virtually everywhere.  This  Neurological: Alert and oriented x 3.  Gait is very broad-based and somewhat rolling.  Patient does lurch and seems mildly unsteady to me though she says she does not feel unsteady.  Psychiatric:  Behavior, mood, and affect are appropriate.       Assessment and Plan:     68 y.o. female with the following issues:    1. Chronic right shoulder pain  MR-SHOULDER-W/O RIGHT   2. Myositis of right upper extremity, unspecified myositis type  WESTERGREN SED RATE    CBC WITHOUT DIFFERENTIAL   3. Elevated sedimentation rate  CBC WITHOUT DIFFERENTIAL   4. Polymyalgia rheumatica (HCC)  tramadol (ULTRAM) 50 MG Tab    WESTERGREN SED RATE    COMP METABOLIC PANEL   5. Need for immunization against influenza  Influenza Vaccine, High Dose (65+ Only)   6. Essential hypertension  COMP METABOLIC PANEL   7. Hypothyroidism due to acquired atrophy of thyroid  TSH   8. Gastroesophageal reflux disease without esophagitis  IRON/TOTAL IRON BIND    VITAMIN B12    COMP METABOLIC PANEL    CBC WITHOUT DIFFERENTIAL   9. Peripheral edema  furosemide (LASIX) 20 MG Tab    COMP METABOLIC PANEL    CBC WITHOUT DIFFERENTIAL   10. Primary osteoarthritis  involving multiple joints  tramadol (ULTRAM) 50 MG Tab    Consent for Opiate Prescription   11. Lumbar radiculopathy  tramadol (ULTRAM) 50 MG Tab    Consent for Opiate Prescription   12. Cervical stenosis of spinal canal  tramadol (ULTRAM) 50 MG Tab    Consent for Opiate Prescription   13. Dyslipidemia, goal LDL below 130  LIPID PROFILE   14. Vitamin D deficiency  VITAMIN D,25 HYDROXY   15. Prediabetes  HEMOGLOBIN A1C   16. Nonalcoholic fatty liver disease  IRON/TOTAL IRON BIND    COMP METABOLIC PANEL         Followup: Return in about 3 months (around 1/2/2019), or if symptoms worsen or fail to improve.

## 2018-10-03 LAB
EST. AVERAGE GLUCOSE BLD GHB EST-MCNC: 151 MG/DL
HBA1C MFR BLD: 6.9 % (ref 0–5.6)

## 2018-10-05 RX ORDER — POTASSIUM CHLORIDE 20 MEQ/1
20 TABLET, EXTENDED RELEASE ORAL 2 TIMES DAILY
Qty: 180 TAB | Refills: 3 | Status: SHIPPED | OUTPATIENT
Start: 2018-10-05 | End: 2019-10-23 | Stop reason: SDUPTHER

## 2018-10-05 RX ORDER — POTASSIUM CHLORIDE 20 MEQ/1
20 TABLET, EXTENDED RELEASE ORAL 2 TIMES DAILY
Qty: 180 TAB | Refills: 2 | Status: SHIPPED | OUTPATIENT
Start: 2018-10-05 | End: 2018-12-21

## 2018-10-05 NOTE — TELEPHONE ENCOUNTER
From: Ashly Meyer  Sent: 10/4/2018 11:22 PM PDT  Subject: Medication Renewal Request    Ashly Meyer would like a refill of the following medications:     potassium chloride SA (KDUR) 20 MEQ Tab CR [Colton Ahn M.D.]    Preferred pharmacy: [id number] [place], [place] - [address]

## 2018-10-06 ENCOUNTER — HOSPITAL ENCOUNTER (OUTPATIENT)
Dept: RADIOLOGY | Facility: MEDICAL CENTER | Age: 68
End: 2018-10-06
Attending: FAMILY MEDICINE
Payer: MEDICARE

## 2018-10-06 DIAGNOSIS — G89.29 CHRONIC RIGHT SHOULDER PAIN: ICD-10-CM

## 2018-10-06 DIAGNOSIS — M25.511 CHRONIC RIGHT SHOULDER PAIN: ICD-10-CM

## 2018-10-06 PROCEDURE — 73221 MRI JOINT UPR EXTREM W/O DYE: CPT | Mod: RT

## 2018-10-10 ENCOUNTER — OFFICE VISIT (OUTPATIENT)
Dept: MEDICAL GROUP | Facility: MEDICAL CENTER | Age: 68
End: 2018-10-10
Payer: MEDICARE

## 2018-10-10 ENCOUNTER — HOSPITAL ENCOUNTER (OUTPATIENT)
Facility: MEDICAL CENTER | Age: 68
End: 2018-10-10
Attending: FAMILY MEDICINE
Payer: MEDICARE

## 2018-10-10 VITALS
RESPIRATION RATE: 14 BRPM | DIASTOLIC BLOOD PRESSURE: 84 MMHG | WEIGHT: 277.34 LBS | OXYGEN SATURATION: 94 % | HEART RATE: 64 BPM | BODY MASS INDEX: 46.21 KG/M2 | SYSTOLIC BLOOD PRESSURE: 148 MMHG | TEMPERATURE: 97.2 F | HEIGHT: 65 IN

## 2018-10-10 DIAGNOSIS — N34.3 DYSURIA-FREQUENCY SYNDROME: ICD-10-CM

## 2018-10-10 PROCEDURE — 87077 CULTURE AEROBIC IDENTIFY: CPT

## 2018-10-10 PROCEDURE — 87086 URINE CULTURE/COLONY COUNT: CPT

## 2018-10-10 PROCEDURE — 87186 SC STD MICRODIL/AGAR DIL: CPT

## 2018-10-10 PROCEDURE — 99213 OFFICE O/P EST LOW 20 MIN: CPT | Performed by: FAMILY MEDICINE

## 2018-10-10 RX ORDER — CIPROFLOXACIN 500 MG/1
500 TABLET, FILM COATED ORAL 2 TIMES DAILY
Qty: 14 TAB | Refills: 0 | Status: SHIPPED | OUTPATIENT
Start: 2018-10-10 | End: 2018-10-17

## 2018-10-11 NOTE — PROGRESS NOTES
Chief Complaint   Patient presents with   • Dysuria   • Urinary Frequency       Subjective:     HPI:   Ashly Meyer presents today with the followin. Dysuria-frequency syndrome  This began 10/5/18, burning on urination, urgency, feels generally fatigued and unwell.  She denies fever or chills.  Mild left flank pain, intermittent.        Patient Active Problem List    Diagnosis Date Noted   • Elevated sedimentation rate    • Former smoker 2018   • Recurrent major depressive disorder, in partial remission ([facility]) 2018   • Persistent cough 2017   • Vitamin D deficiency 2017   • Morbid obesity with BMI of 45.0-49.9, adult ([facility])    • Prediabetes 2017   • Obesity hypoventilation syndrome ([facility]) 2017   • Neural foraminal stenosis of cervical spine 2017   • Tinnitus of both ears 2017   • Dyslipidemia, goal LDL below 130 2017   • Nonalcoholic fatty liver disease 2017   • Disease of accessory sinus 10/03/2016   • Atrophic rhinitis 10/03/2016   • Deviated nasal septum 2016   • Menopausal symptoms 2016   • Anal fissure 2016   • Lumbar facet arthropathy 2015   • Chronic pain 2015   • Candidal intertrigo 2015   • Elevated sed rate 2014   • Mild intermittent asthma without complication    • H/O fibromyalgia 2014   • KERRI (obstructive sleep apnea) 2014   • Acute idiopathic gout of right foot 2014   • Menopause 2014   • Peripheral neuropathy    • Rectocele 2013   • Pelvic floor dysfunction    • Chronic constipation 10/02/2013   • Nocturnal hypoxia    • Cervical stenosis of spinal canal 2011   • Lumbar radiculopathy 2011   • Migraine without aura    • Carpal tunnel syndrome 2011   • Ulnar neuropathy 2011   • Seasonal allergies 2011   • Polymyalgia rheumatica (HCC) 2011   • GERD (gastroesophageal reflux disease) 12/10/2010   • Osteoarthritis of multiple joints  04/13/2010   • Eczema, dyshidrotic 08/03/2009   • Essential hypertension 05/20/2009   • Hypothyroidism due to acquired atrophy of thyroid 05/20/2009       Current medicines (including changes today)  Current Outpatient Prescriptions   Medication Sig Dispense Refill   • ciprofloxacin (CIPRO) 500 MG Tab Take 1 Tab by mouth 2 times a day for 7 days. 14 Tab 0   • potassium chloride SA (KDUR) 20 MEQ Tab CR Take 1 Tab by mouth 2 times a day. 180 Tab 3   • potassium chloride SA (KDUR) 20 MEQ Tab CR Take 1 Tab by mouth 2 times a day. 180 Tab 2   • furosemide (LASIX) 20 MG Tab Take 1 Tab by mouth every day. 90 Tab 2   • [START ON 10/12/2018] tramadol (ULTRAM) 50 MG Tab Take 1 Tab by mouth every four hours as needed for Moderate Pain for up to 90 days. 180 Tab 2   • nabumetone (RELAFEN) 750 MG Tab TAKE 1 TABLET BY MOUTH TWICE A DAY WITH MEALS 180 Tab 2   • pantoprazole (PROTONIX) 40 MG Tablet Delayed Response TAKE 1 TABLET BY MOUTH TWICE A  Tab 0   • albuterol (VENTOLIN HFA) 108 (90 Base) MCG/ACT Aero Soln inhalation aerosol Inhale 2 Puffs by mouth every 6 hours as needed for Shortness of Breath.     • spironolactone (ALDACTONE) 50 MG Tab TAKE 1 TABLET BY MOUTH EVERY DAY 90 Tab 3   • polyethylene glycol 3350 (MIRALAX) Powder Take 17 g by mouth 2 times a day. 1 Bottle 11   • Cholecalciferol (VITAMIN D) 2000 units Cap Take 1 Cap by mouth every day. 30 Cap 6   • metronidazole (METROGEL) 0.75 % gel APPLY TO AFFECTED AREA TWICE A DAY 1 Tube 0   • albuterol (PROAIR HFA) 108 (90 Base) MCG/ACT Aero Soln inhalation aerosol Inhale 2 Puffs by mouth every 6 hours as needed for Shortness of Breath. 8.5 g 6   • carvedilol (COREG) 6.25 MG Tab Take 1 Tab by mouth 2 times a day, with meals. 180 Tab 3   • gabapentin (NEURONTIN) 300 MG Cap Take 1 Cap by mouth 3 times a day. 270 Cap 3   • sertraline (ZOLOFT) 100 MG Tab Take 1 Tab by mouth every day. 90 Tab 3   • levothyroxine (SYNTHROID) 200 MCG Tab Take 1 Tab by mouth every day. 90 Tab 3  "  • ketoconazole (NIZORAL) 2 % Cream Apply to affected area daily 30 g 2   • polyethyl glycol-propyl glycol (SYSTANE) 0.4-0.3 % Solution Place 1 Drop in both eyes as needed.     • temazepam (RESTORIL) 15 MG Cap Take 1 Cap by mouth at bedtime as needed for Sleep. 30 Cap 3   • OXYGEN-HELIUM INH Inhale 2-3 L by mouth as needed.     • Fluocinolone Acetonide 0.01 % Oil Place  in ear 2 Times a Day.       No current facility-administered medications for this visit.        Allergies   Allergen Reactions   • Butalbital-Acetaminophen Unspecified     Dizzy, can't walk, hard to focus   • Cefaclor Unspecified     Ceclor causes light headedness and dizziness   • Diphenhydramine Hives     Full body hives   • Iodine Unspecified     Labored breathing   • Lamictal Rash and Swelling     \"hot\", unable to function   • Morphine Itching     redness   • Penicillins Itching and Swelling     Skin itching and redness   • Savella [Milnacipran Hcl] Unspecified     Muscle aches, feet swelling   • Sulfa Drugs Hives and Anxiety     Hard breathing   • Tizanidine Hcl Unspecified     dizziness   • Ace Inhibitors Cough     Cough     • Tape Unspecified     Paper tape is ok       ROS: As per HPI       Objective:     Blood pressure 148/84, pulse 64, temperature 36.2 °C (97.2 °F), resp. rate 14, height 1.651 m (5' 5\"), weight (!) 125.8 kg (277 lb 5.4 oz), SpO2 94 %, not currently breastfeeding. Body mass index is 46.15 kg/m².    Physical Exam:  Constitutional: Well-developed and well-nourished. Not diaphoretic. No distress. Lucid and fluent.  Skin: Skin is warm and dry. No rash noted.  Head: Atraumatic without lesions.  Eyes: Conjunctivae and extraocular motions are normal. Pupils are equal, round, and reactive to light. No scleral icterus.   Neck: Supple, trachea midline. No thyromegaly present. No cervical or supraclavicular lymphadenopathy. No JVD or carotid bruits appreciated  Cardiovascular: Regular rate and rhythm.  Normal S1, S2 without murmur " appreciated.  Chest: Effort normal. Clear to auscultation throughout. No adventitious sounds.  Left CVAT, mild  Extremities: No cyanosis, clubbing, erythema, nor edema.   Neurological: Alert and oriented x 3.   Psychiatric:  Behavior, mood, and affect are appropriate.    Glucose negative, bilirubin negative, ketones negative, Sp Gravity 1.020, blood trace intact.  PH 6.0, protein negative, urobilinogen 0.2, nitrite positive, leukocytes small.        Assessment and Plan:     68 y.o. female with the following issues:    1. Dysuria-frequency syndrome  POCT Urinalysis    URINE CULTURE(NEW)    ciprofloxacin (CIPRO) 500 MG Tab         Followup: Return in about 3 months (around 1/10/2019), or if symptoms worsen or fail to improve.

## 2018-10-13 LAB
BACTERIA UR CULT: ABNORMAL
BACTERIA UR CULT: ABNORMAL
SIGNIFICANT IND 70042: ABNORMAL
SITE SITE: ABNORMAL
SOURCE SOURCE: ABNORMAL

## 2018-10-29 DIAGNOSIS — E03.4 HYPOTHYROIDISM DUE TO ACQUIRED ATROPHY OF THYROID: ICD-10-CM

## 2018-10-29 DIAGNOSIS — F33.41 RECURRENT MAJOR DEPRESSIVE DISORDER, IN PARTIAL REMISSION (HCC): ICD-10-CM

## 2018-10-29 RX ORDER — LEVOTHYROXINE SODIUM 0.2 MG/1
TABLET ORAL
Qty: 90 TAB | Refills: 2 | Status: SHIPPED | OUTPATIENT
Start: 2018-10-29 | End: 2019-04-01 | Stop reason: SDUPTHER

## 2018-10-29 RX ORDER — SERTRALINE HYDROCHLORIDE 100 MG/1
TABLET, FILM COATED ORAL
Qty: 90 TAB | Refills: 2 | Status: SHIPPED | OUTPATIENT
Start: 2018-10-29 | End: 2018-12-03 | Stop reason: SDUPTHER

## 2018-11-14 DIAGNOSIS — K21.9 GASTROESOPHAGEAL REFLUX DISEASE WITHOUT ESOPHAGITIS: ICD-10-CM

## 2018-11-14 RX ORDER — PANTOPRAZOLE SODIUM 40 MG/1
TABLET, DELAYED RELEASE ORAL
Qty: 180 TAB | Refills: 2 | Status: SHIPPED | OUTPATIENT
Start: 2018-11-14 | End: 2019-08-28 | Stop reason: SDUPTHER

## 2018-12-03 ENCOUNTER — TELEPHONE (OUTPATIENT)
Dept: PULMONOLOGY | Facility: HOSPICE | Age: 68
End: 2018-12-03

## 2018-12-03 ENCOUNTER — OFFICE VISIT (OUTPATIENT)
Dept: MEDICAL GROUP | Facility: MEDICAL CENTER | Age: 68
End: 2018-12-03
Payer: MEDICARE

## 2018-12-03 VITALS
BODY MASS INDEX: 44.2 KG/M2 | HEART RATE: 60 BPM | SYSTOLIC BLOOD PRESSURE: 128 MMHG | RESPIRATION RATE: 12 BRPM | DIASTOLIC BLOOD PRESSURE: 64 MMHG | TEMPERATURE: 97.8 F | WEIGHT: 275 LBS | HEIGHT: 66 IN | OXYGEN SATURATION: 94 %

## 2018-12-03 DIAGNOSIS — M79.631 RIGHT FOREARM PAIN: ICD-10-CM

## 2018-12-03 DIAGNOSIS — M54.16 LUMBAR RADICULOPATHY: ICD-10-CM

## 2018-12-03 DIAGNOSIS — K21.9 GASTROESOPHAGEAL REFLUX DISEASE WITHOUT ESOPHAGITIS: ICD-10-CM

## 2018-12-03 DIAGNOSIS — F33.41 RECURRENT MAJOR DEPRESSIVE DISORDER, IN PARTIAL REMISSION (HCC): ICD-10-CM

## 2018-12-03 DIAGNOSIS — R70.0 ELEVATED SEDIMENTATION RATE: ICD-10-CM

## 2018-12-03 DIAGNOSIS — Z01.818 PREOPERATIVE CLEARANCE: ICD-10-CM

## 2018-12-03 DIAGNOSIS — M48.02 CERVICAL STENOSIS OF SPINAL CANAL: ICD-10-CM

## 2018-12-03 DIAGNOSIS — E66.01 MORBID OBESITY WITH BMI OF 45.0-49.9, ADULT (HCC): ICD-10-CM

## 2018-12-03 DIAGNOSIS — R73.03 PREDIABETES: ICD-10-CM

## 2018-12-03 DIAGNOSIS — G47.33 OSA (OBSTRUCTIVE SLEEP APNEA): ICD-10-CM

## 2018-12-03 PROBLEM — R05.3 PERSISTENT COUGH: Status: RESOLVED | Noted: 2017-12-28 | Resolved: 2018-12-03

## 2018-12-03 PROCEDURE — 99214 OFFICE O/P EST MOD 30 MIN: CPT | Performed by: FAMILY MEDICINE

## 2018-12-03 RX ORDER — SERTRALINE HYDROCHLORIDE 100 MG/1
50 TABLET, FILM COATED ORAL
Qty: 90 TAB | Refills: 0
Start: 2018-12-03 | End: 2019-03-07

## 2018-12-03 RX ORDER — GABAPENTIN 300 MG/1
600 CAPSULE ORAL 3 TIMES DAILY
Qty: 540 CAP | Refills: 3 | Status: SHIPPED | OUTPATIENT
Start: 2018-12-03 | End: 2019-12-08 | Stop reason: SDUPTHER

## 2018-12-03 RX ORDER — VENLAFAXINE HYDROCHLORIDE 37.5 MG/1
37.5 CAPSULE, EXTENDED RELEASE ORAL DAILY
Qty: 30 CAP | Refills: 2 | Status: SHIPPED | OUTPATIENT
Start: 2018-12-03 | End: 2019-03-06 | Stop reason: SDUPTHER

## 2018-12-03 NOTE — TELEPHONE ENCOUNTER
Pt came into the office and dropped off a form for surgical clearance.  Advised her she will get a call from from us to either pick the form or make an appointment for surgical clearance. Pt has an appointment on 1/25/18 that says BRO per DANISH. The form is in the MA tray at the .

## 2018-12-03 NOTE — PROGRESS NOTES
Chief Complaint   Patient presents with   • Medical Clearance   • Elevated Glucose   • Leg Pain   • Apnea       Subjective:     HPI:   Ashly Meyer presents today with the followin. Preoperative clearance  A right shoulder arthroscopy is planned due to her chronic degenerative shoulder problems which have worsened to constant pain.  She also has carpal tunnel on that side and will have her her carpal tunnel surgery performed at the same time.  She will be getting a preop cardiology clearance.    2. Prediabetes  Most recent A1c is 6.9%, certainly a diabetic level but her fasting glucose continues to be below 126.  She needs a glycohemoglobin less than 8 for surgery and she is at that level.  There is no contraindication from a diabetic standpoint.    3. KERRI (obstructive sleep apnea)  Patient has severe obstructive sleep apnea but has been unable to tolerate any of the CPAP mask or nasal pillows.  She refuses to try again.  This does put her at increased risk for surgery especially anesthesia.  Patient voices understanding.  She has had several surgeries in the past and believes she will be fine.     4. Gastroesophageal reflux disease without esophagitis  She feels the current medication regimen pantoprazole is controlling the gastroesophageal reflux symptoms well. Denies dysphagia, reflux symptoms, acidity, abdominal pain or visible blood or mucus in the stool. Denies vomiting or hematemesis. Denies burping or abdominal bloating. Patient avoids nonsteroidal anti-inflammatory drugs. Avoids heavy meals or eating within 2 hours of bedtime.    5. Morbid obesity with BMI of 45.0-49.9, adult (HCC)  This is a long-standing problem.  Patient continues to gradually lose weight.  Her daughter has been helping her with diet.    6. Elevated sedimentation rate  Patient has chronic high elevated sed rate.  She was felt by Dr. Ann to probably have polymyalgia rheumatica but CPK was not consistently elevated and she  did not respond to conventional treatment.  Dr. Ann did a thorough workup and Dr. Zuñiga the neurologist did an extremely thorough workup.  We have been unable to pin this down.  She was given a trial of Lamictal which helped some of her neurologic symptoms considerably but patient developed a very severe rash.  The patient does not have temporal arteritis, biopsy was negative.    7. Recurrent major depressive disorder, in partial remission ([facility])  Patient does have long-standing depression.  States the current SSRI she is on is not effective.  We will wean her down on the sertraline and start a trial of venlafaxine.  Patient denies suicidal ideation or recurring thoughts of self-harm.  Patient denies plan of suicide or self-harm.    8. Right forearm pain  Patient has significant right forearm pain in a region where she leans the forearm on a chair arm.  Patient insists we need to do an x-ray.  I think that is not unreasonable in the order is put.    9. Lumbar radiculopathy  Patient has long-standing lumbar radiculopathy.  The gabapentin is somewhat helpful but she would like to increase the dose.  She is still at relatively low dose and the increase is placed.    10. Cervical stenosis of spinal canal  Patient has multiple level degenerative cervical pain and has cervical stenosis with episodic radiculopathy and myelopathy of the arms.  Planning to do        Patient Active Problem List    Diagnosis Date Noted   • Dry eye syndrome, bilateral    • Rotator cuff syndrome of right shoulder and allied disorders 10/10/2018   • Elevated sedimentation rate    • Former smoker 05/30/2018   • Recurrent major depressive disorder, in partial remission ([facility]) 03/29/2018   • Vitamin D deficiency 12/28/2017   • Morbid obesity with BMI of 45.0-49.9, adult ([facility])    • Obesity hypoventilation syndrome ([facility]) 05/31/2017   • Neural foraminal stenosis of cervical spine 05/18/2017   • Tinnitus of both ears 05/18/2017   • Dyslipidemia, goal  LDL below 130 01/06/2017   • Nonalcoholic fatty liver disease 01/05/2017   • Disease of accessory sinus 10/03/2016   • Atrophic rhinitis 10/03/2016   • Deviated nasal septum 08/01/2016   • Menopausal symptoms 04/28/2016   • Anal fissure 04/14/2016   • Lumbar facet arthropathy 12/09/2015   • Chronic pain 11/18/2015   • Candidal intertrigo 06/01/2015   • Elevated sed rate 12/09/2014   • Mild intermittent asthma without complication    • H/O fibromyalgia 03/11/2014   • KERRI (obstructive sleep apnea) 03/11/2014   • Acute idiopathic gout of right foot 03/11/2014   • Menopause 03/11/2014   • Peripheral neuropathy    • Rectocele 11/25/2013   • Pelvic floor dysfunction    • Chronic constipation 10/02/2013   • Nocturnal hypoxia    • Cervical stenosis of spinal canal 11/01/2011   • Lumbar radiculopathy 11/01/2011   • Migraine without aura    • Carpal tunnel syndrome 09/05/2011   • Ulnar neuropathy 09/05/2011   • Seasonal allergies 07/08/2011   • Polymyalgia rheumatica (HCC) 05/06/2011   • GERD (gastroesophageal reflux disease) 12/10/2010   • Osteoarthritis of multiple joints 04/13/2010   • Eczema, dyshidrotic 08/03/2009   • Essential hypertension 05/20/2009   • Hypothyroidism due to acquired atrophy of thyroid 05/20/2009       Current medicines (including changes today)  Current Outpatient Prescriptions   Medication Sig Dispense Refill   • venlafaxine XR (EFFEXOR XR) 37.5 MG CAPSULE SR 24 HR Take 1 Cap by mouth every day. 30 Cap 2   • gabapentin (NEURONTIN) 300 MG Cap Take 2 Caps by mouth 3 times a day. 540 Cap 3   • sertraline (ZOLOFT) 100 MG Tab Take 0.5 Tabs by mouth every day. 90 Tab 0   • pantoprazole (PROTONIX) 40 MG Tablet Delayed Response TAKE ONE TABLET BY MOUTH TWICE DAILY  180 Tab 2   • levothyroxine (SYNTHROID) 200 MCG Tab TAKE 1 TABLET BY MOUTH EVERY DAY 90 Tab 2   • potassium chloride SA (KDUR) 20 MEQ Tab CR Take 1 Tab by mouth 2 times a day. 180 Tab 3   • potassium chloride SA (KDUR) 20 MEQ Tab CR Take 1 Tab by  "mouth 2 times a day. 180 Tab 2   • furosemide (LASIX) 20 MG Tab Take 1 Tab by mouth every day. 90 Tab 2   • tramadol (ULTRAM) 50 MG Tab Take 1 Tab by mouth every four hours as needed for Moderate Pain for up to 90 days. 180 Tab 2   • nabumetone (RELAFEN) 750 MG Tab TAKE 1 TABLET BY MOUTH TWICE A DAY WITH MEALS 180 Tab 2   • albuterol (VENTOLIN HFA) 108 (90 Base) MCG/ACT Aero Soln inhalation aerosol Inhale 2 Puffs by mouth every 6 hours as needed for Shortness of Breath.     • spironolactone (ALDACTONE) 50 MG Tab TAKE 1 TABLET BY MOUTH EVERY DAY 90 Tab 3   • polyethylene glycol 3350 (MIRALAX) Powder Take 17 g by mouth 2 times a day. 1 Bottle 11   • Cholecalciferol (VITAMIN D) 2000 units Cap Take 1 Cap by mouth every day. 30 Cap 6   • metronidazole (METROGEL) 0.75 % gel APPLY TO AFFECTED AREA TWICE A DAY 1 Tube 0   • albuterol (PROAIR HFA) 108 (90 Base) MCG/ACT Aero Soln inhalation aerosol Inhale 2 Puffs by mouth every 6 hours as needed for Shortness of Breath. 8.5 g 6   • carvedilol (COREG) 6.25 MG Tab Take 1 Tab by mouth 2 times a day, with meals. 180 Tab 3   • ketoconazole (NIZORAL) 2 % Cream Apply to affected area daily 30 g 2   • polyethyl glycol-propyl glycol (SYSTANE) 0.4-0.3 % Solution Place 1 Drop in both eyes as needed.     • temazepam (RESTORIL) 15 MG Cap Take 1 Cap by mouth at bedtime as needed for Sleep. 30 Cap 3   • OXYGEN-HELIUM INH Inhale 2-3 L by mouth as needed.     • Fluocinolone Acetonide 0.01 % Oil Place  in ear 2 Times a Day.       No current facility-administered medications for this visit.        Allergies   Allergen Reactions   • Butalbital-Acetaminophen Unspecified     Dizzy, can't walk, hard to focus   • Cefaclor Unspecified     Ceclor causes light headedness and dizziness   • Diphenhydramine Hives     Full body hives   • Iodine Unspecified     Labored breathing   • Lamictal Rash and Swelling     \"hot\", unable to function   • Morphine Itching     redness   • Penicillins Itching and Swelling " "    Skin itching and redness   • Savella [Milnacipran Hcl] Unspecified     Muscle aches, feet swelling   • Sulfa Drugs Hives and Anxiety     Hard breathing   • Tizanidine Hcl Unspecified     dizziness   • Ace Inhibitors Cough     Cough     • Tape Unspecified     Paper tape is ok       ROS: As per HPI       Objective:     Blood pressure 128/64, pulse 60, temperature 36.6 °C (97.8 °F), resp. rate 12, height 1.683 m (5' 6.25\"), weight 124.7 kg (275 lb), SpO2 94 %, not currently breastfeeding. Body mass index is 44.05 kg/m².    Physical Exam:  Constitutional: Well-developed and well-nourished. Not diaphoretic. No distress. Lucid and fluent.  Skin: Skin is warm and dry. No rash noted.  Head: Atraumatic without lesions.  Eyes: Conjunctivae and extraocular motions are normal. Pupils are equal, round, and reactive to light. No scleral icterus.   Nose: Nares patent. Mucosa without edema or erythema. No discharge. No facial tenderness.  Mouth/Throat: Tongue normal. Oropharynx is clear and moist. Posterior pharynx without erythema or exudates.  Neck: Supple, trachea midline. No thyromegaly present. No cervical or supraclavicular lymphadenopathy. No JVD or carotid bruits appreciated  Cardiovascular: Regular rate and rhythm.  Normal S1, S2 without murmur appreciated.  Chest: Effort normal. Clear to auscultation throughout. No adventitious sounds.   Abdomen: Soft, non tender, and without distention. Active bowel sounds in all four quadrants. No rebound, guarding, masses or hepatosplenomegaly.  Extremities: No cyanosis, clubbing, erythema, nor edema.   Neurological: Alert and oriented x 3.  Gait continues to be relatively normal.  Psychiatric:  Behavior, mood, and affect are appropriate.       Assessment and Plan:     68 y.o. female with the following issues:    1. Preoperative clearance     2. Prediabetes     3. KERRI (obstructive sleep apnea)     4. Gastroesophageal reflux disease without esophagitis     5. Morbid obesity with BMI " of 45.0-49.9, adult (HCC)     6. Elevated sedimentation rate     7. Recurrent major depressive disorder, in partial remission (HCC)  venlafaxine XR (EFFEXOR XR) 37.5 MG CAPSULE SR 24 HR    sertraline (ZOLOFT) 100 MG Tab   8. Right forearm pain  DX-FOREARM RIGHT   9. Lumbar radiculopathy  gabapentin (NEURONTIN) 300 MG Cap   10. Cervical stenosis of spinal canal  gabapentin (NEURONTIN) 300 MG Cap     Form is completed for renal orthopedics and faxed.    Followup: Return if symptoms worsen or fail to improve.

## 2018-12-04 NOTE — TELEPHONE ENCOUNTER
Signed form. She follows up for KERRI prn now. She declines therapy. Will continue to have pulmonary follow.

## 2018-12-06 ENCOUNTER — HOSPITAL ENCOUNTER (OUTPATIENT)
Dept: RADIOLOGY | Facility: MEDICAL CENTER | Age: 68
End: 2018-12-06
Attending: FAMILY MEDICINE
Payer: MEDICARE

## 2018-12-06 DIAGNOSIS — M79.631 RIGHT FOREARM PAIN: ICD-10-CM

## 2018-12-06 PROCEDURE — 73090 X-RAY EXAM OF FOREARM: CPT | Mod: RT

## 2018-12-12 ENCOUNTER — TELEPHONE (OUTPATIENT)
Dept: RADIOLOGY | Facility: IMAGING CENTER | Age: 68
End: 2018-12-12

## 2018-12-12 ENCOUNTER — TELEPHONE (OUTPATIENT)
Dept: PULMONOLOGY | Facility: HOSPICE | Age: 68
End: 2018-12-12

## 2018-12-12 DIAGNOSIS — Z01.818 PREOPERATIVE CLEARANCE: ICD-10-CM

## 2018-12-12 NOTE — TELEPHONE ENCOUNTER
1. Caller Name: Ashly Meyer                 Call Back Number: [phone] (home)       Patient approves a detailed voicemail message: yes    2.  What is the procedure: Shoulder surgery, (doesn't know what procedure is called).  Carpal Tunnel Surgery on the same arm.    3.  When is the procedure scheduled: not scheduled    4.  Who is the Surgeon: Damaris Knutson    5. Has the patient completed any screening tests for the procedure: yes    6. Has [facility] received a written request from Surgeon: yes -     7. Patient scheduled for an appointment for this clearance?:  no    8. Last OV: 7/26/2018    9. Next OV: 1/25/2019    10. Last CXR: 7/26/2018    11. Last PFT: 7/26/2018    12. Last CT none    Current Medications  Current Outpatient Prescriptions   Medication Sig Dispense Refill   • venlafaxine XR (EFFEXOR XR) 37.5 MG CAPSULE SR 24 HR Take 1 Cap by mouth every day. 30 Cap 2   • gabapentin (NEURONTIN) 300 MG Cap Take 2 Caps by mouth 3 times a day. 540 Cap 3   • sertraline (ZOLOFT) 100 MG Tab Take 0.5 Tabs by mouth every day. 90 Tab 0   • pantoprazole (PROTONIX) 40 MG Tablet Delayed Response TAKE ONE TABLET BY MOUTH TWICE DAILY  180 Tab 2   • levothyroxine (SYNTHROID) 200 MCG Tab TAKE 1 TABLET BY MOUTH EVERY DAY 90 Tab 2   • potassium chloride SA (KDUR) 20 MEQ Tab CR Take 1 Tab by mouth 2 times a day. 180 Tab 3   • potassium chloride SA (KDUR) 20 MEQ Tab CR Take 1 Tab by mouth 2 times a day. 180 Tab 2   • furosemide (LASIX) 20 MG Tab Take 1 Tab by mouth every day. 90 Tab 2   • tramadol (ULTRAM) 50 MG Tab Take 1 Tab by mouth every four hours as needed for Moderate Pain for up to 90 days. 180 Tab 2   • nabumetone (RELAFEN) 750 MG Tab TAKE 1 TABLET BY MOUTH TWICE A DAY WITH MEALS 180 Tab 2   • albuterol (VENTOLIN HFA) 108 (90 Base) MCG/ACT Aero Soln inhalation aerosol Inhale 2 Puffs by mouth every 6 hours as needed for Shortness of Breath.     • spironolactone (ALDACTONE) 50 MG Tab TAKE 1 TABLET BY MOUTH  EVERY DAY 90 Tab 3   • polyethylene glycol 3350 (MIRALAX) Powder Take 17 g by mouth 2 times a day. 1 Bottle 11   • Cholecalciferol (VITAMIN D) 2000 units Cap Take 1 Cap by mouth every day. 30 Cap 6   • metronidazole (METROGEL) 0.75 % gel APPLY TO AFFECTED AREA TWICE A DAY 1 Tube 0   • albuterol (PROAIR HFA) 108 (90 Base) MCG/ACT Aero Soln inhalation aerosol Inhale 2 Puffs by mouth every 6 hours as needed for Shortness of Breath. 8.5 g 6   • carvedilol (COREG) 6.25 MG Tab Take 1 Tab by mouth 2 times a day, with meals. 180 Tab 3   • ketoconazole (NIZORAL) 2 % Cream Apply to affected area daily 30 g 2   • polyethyl glycol-propyl glycol (SYSTANE) 0.4-0.3 % Solution Place 1 Drop in both eyes as needed.     • temazepam (RESTORIL) 15 MG Cap Take 1 Cap by mouth at bedtime as needed for Sleep. 30 Cap 3   • OXYGEN-HELIUM INH Inhale 2-3 L by mouth as needed.     • Fluocinolone Acetonide 0.01 % Oil Place  in ear 2 Times a Day.       No current facility-administered medications for this visit.        Please advise.

## 2018-12-13 ENCOUNTER — APPOINTMENT (OUTPATIENT)
Dept: RADIOLOGY | Facility: IMAGING CENTER | Age: 68
End: 2018-12-13
Payer: MEDICARE

## 2018-12-13 ENCOUNTER — APPOINTMENT (OUTPATIENT)
Dept: PULMONOLOGY | Facility: HOSPICE | Age: 68
End: 2018-12-13
Payer: MEDICARE

## 2018-12-21 ENCOUNTER — OFFICE VISIT (OUTPATIENT)
Dept: CARDIOLOGY | Facility: MEDICAL CENTER | Age: 68
End: 2018-12-21
Payer: MEDICARE

## 2018-12-21 VITALS
BODY MASS INDEX: 46.48 KG/M2 | WEIGHT: 279 LBS | OXYGEN SATURATION: 92 % | HEART RATE: 60 BPM | DIASTOLIC BLOOD PRESSURE: 64 MMHG | HEIGHT: 65 IN | SYSTOLIC BLOOD PRESSURE: 112 MMHG

## 2018-12-21 DIAGNOSIS — Z87.891 FORMER SMOKER: ICD-10-CM

## 2018-12-21 DIAGNOSIS — Z01.810 PRE-OPERATIVE CARDIOVASCULAR EXAMINATION: ICD-10-CM

## 2018-12-21 DIAGNOSIS — G47.33 OSA (OBSTRUCTIVE SLEEP APNEA): ICD-10-CM

## 2018-12-21 DIAGNOSIS — E66.2 OBESITY HYPOVENTILATION SYNDROME (HCC): ICD-10-CM

## 2018-12-21 DIAGNOSIS — I10 ESSENTIAL HYPERTENSION: ICD-10-CM

## 2018-12-21 DIAGNOSIS — I20.89 STABLE ANGINA PECTORIS: ICD-10-CM

## 2018-12-21 DIAGNOSIS — I10 ESSENTIAL HYPERTENSION, BENIGN: ICD-10-CM

## 2018-12-21 DIAGNOSIS — R70.0 ELEVATED SED RATE: ICD-10-CM

## 2018-12-21 DIAGNOSIS — E78.5 DYSLIPIDEMIA, GOAL LDL BELOW 130: ICD-10-CM

## 2018-12-21 DIAGNOSIS — R70.0 ELEVATED SEDIMENTATION RATE: ICD-10-CM

## 2018-12-21 DIAGNOSIS — K76.0 NONALCOHOLIC FATTY LIVER DISEASE: ICD-10-CM

## 2018-12-21 LAB — EKG IMPRESSION: NORMAL

## 2018-12-21 PROCEDURE — 93000 ELECTROCARDIOGRAM COMPLETE: CPT | Performed by: INTERNAL MEDICINE

## 2018-12-21 PROCEDURE — 99204 OFFICE O/P NEW MOD 45 MIN: CPT | Performed by: INTERNAL MEDICINE

## 2018-12-21 ASSESSMENT — ENCOUNTER SYMPTOMS
CARDIOVASCULAR NEGATIVE: 1
RESPIRATORY NEGATIVE: 1
BRUISES/BLEEDS EASILY: 0
CLAUDICATION: 0
GASTROINTESTINAL NEGATIVE: 1
COUGH: 0
MUSCULOSKELETAL NEGATIVE: 1
SPUTUM PRODUCTION: 0
FEVER: 0
NEUROLOGICAL NEGATIVE: 1
LOSS OF CONSCIOUSNESS: 0
DIZZINESS: 0
HEMOPTYSIS: 0
EYES NEGATIVE: 1
ORTHOPNEA: 0
STRIDOR: 0
WEAKNESS: 0
SORE THROAT: 0
PND: 0
SHORTNESS OF BREATH: 0
WHEEZING: 0
CHILLS: 0
CONSTITUTIONAL NEGATIVE: 1
PALPITATIONS: 0

## 2018-12-21 NOTE — PROGRESS NOTES
Chief Complaint   Patient presents with   • Medical Clearance     NEW PATIENT       Subjective:   Ashly Meyer is a 68 y.o. female who presents today For preop risk medication prior to a shoulder surgery.  She is a 60-year-old female with a past medical history of morbid obesity hypertension hyperlipidemia.  She does not take medications for lipids.  She is having no chest pain palpitations PND orthopnea.  She is very physically deconditioned and cannot complete greater than 4 mets.  She is never had any cardiac issues.  Her ECG today is normal.    Past Medical History:   Diagnosis Date   • Aphasia     Written word recognition/useage   • Arthritis     Everywhere.    • Back pain    • Carotid artery stenosis, unilateral     moderate left carotid artery stenosis   • Constipation    • Controlled type 2 diabetes mellitus without complication ([facility])    • Daytime sleepiness    • Degenerative disc disease    • Depression 5/20/2009   • Diarrhea    • Dizziness    • Dry eye syndrome, bilateral    • Elevated sedimentation rate     history of negative temporal artery biopsy around 2006   • Fatigue    • Fibromyalgia     confirmed by Dr. Ann   • Frequent headaches    • GERD (gastroesophageal reflux disease)     hiatal hernia   • GOUT 5/20/2009   • H/O foot surgery    • Hearing difficulty    • Hiatus hernia syndrome    • Hypertension    • Hypothyroidism 5/20/2009   • Incipient cataract of both eyes    • Insomnia    • Migraine without aura, without mention of intractable migraine without mention of status migrainosus    • Mild intermittent asthma without complication    • Mixed dyslipidemia    • Morbid obesity with BMI of 45.0-49.9, adult ([facility])    • Morning headache    • Mumps    • Nasal drainage    • Nocturnal hypoxia     severe, to 69% O2 sat   • Obesity    • Obesity hypoventilation syndrome (HCC) 5/31/2017   • Pelvic floor dysfunction    • Peripheral neuropathy    • Pleomorphic adenoma    • Polymyalgia rheumatica ([facility])      Dr. Ann   • Restless leg syndrome    • Ringing in ears    • Rotator cuff syndrome of right shoulder and allied disorders 10/10/2018   • S/P tonsillectomy    • Seasonal allergies    • Skin cancer 1990's    skin   • Sleep apnea syndrome     she is not using CPAP, claustrophobia   • Snoring    • Sore muscles    • Sweat, sweating, excessive    • Swelling of lower extremity    • Urinary incontinence     will not wear a pad   • Vision loss    • Weakness      Past Surgical History:   Procedure Laterality Date   • GASTROSCOPY  2/6/2017    Procedure: GASTROSCOPY;  Surgeon: Pau Foster M.D.;  Location: SURGERY SAME DAY [facility] ORS;  Service:    • COLONOSCOPY  2/6/2017    Procedure: COLONOSCOPY;  Surgeon: Pau Foster M.D.;  Location: SURGERY SAME DAY [facility] ORS;  Service:    • SEPTAL RECONSTRUCTION  10/3/2016    Procedure: SEPTAL RECONSTRUCTION with  grafts ;  Surgeon: PIETER Dickson M.D.;  Location: SURGERY SAME DAY [facility] OR[facility];  Service:    • SEPTOPLASTY N/A 8/1/2016    Procedure: SEPTOPLASTY;  Surgeon: PIETER Dickson M.D.;  Location: SURGERY SAME DAY [facility] OR[facility];  Service:    • TURBINOPLASTY Bilateral 8/1/2016    Procedure: TURBINOPLASTY;  Surgeon: PIETER Dickson M.D.;  Location: SURGERY SAME DAY [facility];  Service:    • NASAL FRACTURE REDUCTION OPEN N/A 8/1/2016    Procedure: SEPTAL FRACTURE REDUCTION OPEN;  Surgeon: PIETER Dickson M.D.;  Location: SURGERY SAME DAY [facility];  Service:    • FINE NEEDLE ASPIRATION  11/16/2009    Performed by DA CALLOWAY at ENDOSCOPY [facility]S   • COLONOSCOPY  2006    ? unclear date   • BLADDER SUSPENSION  1983   • HYSTERECTOMY, VAGINAL  1983    ovaries are present, excessive bleeding   • TONSILLECTOMY  1953   • HYSTERECTOMY LAPAROSCOPY     • OTHER ABDOMINAL SURGERY      Cholysestectomy   • OTHER ORTHOPEDIC SURGERY  1962/1980    foot surgery    • TONSILLECTOMY       Family History   Problem Relation Age of Onset   • Heart  "Disease Mother         Arotic vaulve replacement   • GI Mother         bile duct problem   • Bladder cancer Mother    • GI Sister         celiac   • Arthritis Sister    • Other Sister         gout and lupus   • Arthritis Sister    • Kidney Disease Sister    • GI Sister    • Bladder cancer Maternal Aunt    • Arthritis Maternal Grandmother    • Hypertension Maternal Grandmother    • Heart Disease Maternal Grandmother    • Hypertension Maternal Grandfather    • Heart Disease Maternal Grandfather    • Arthritis Maternal Grandfather    • No Known Problems Paternal Grandmother    • No Known Problems Paternal Grandfather    • Cancer Brother         Larynx   • Cancer Daughter         non hopskins limphoma   • GI Daughter    • Bipolar disorder Daughter    • Seizures Daughter    • Bipolar disorder Daughter      Social History     Social History   • Marital status:      Spouse name: N/A   • Number of children: N/A   • Years of education: N/A     Occupational History   • Not on file.     Social History Main Topics   • Smoking status: Former Smoker     Packs/day: 1.00     Years: 42.00     Types: Cigarettes     Quit date: 3/1/2007   • Smokeless tobacco: Never Used      Comment: Started smoking at age 15   • Alcohol use No      Comment: no longer drinks   • Drug use: No      Comment: CBD topical pain cream   • Sexual activity: No     Other Topics Concern   • Not on file     Social History Narrative   • No narrative on file     Allergies   Allergen Reactions   • Butalbital-Acetaminophen Unspecified     Dizzy, can't walk, hard to focus   • Cefaclor Unspecified     Ceclor causes light headedness and dizziness   • Diphenhydramine Hives     Full body hives   • Iodine Unspecified     Labored breathing   • Lamictal Rash and Swelling     \"hot\", unable to function   • Morphine Itching     redness   • Penicillins Itching and Swelling     Skin itching and redness   • Savella [Milnacipran Hcl] Unspecified     Muscle aches, feet " swelling   • Sulfa Drugs Hives and Anxiety     Hard breathing   • Tizanidine Hcl Unspecified     dizziness   • Ace Inhibitors Cough     Cough     • Tape Unspecified     Paper tape is ok     Outpatient Encounter Prescriptions as of 12/21/2018   Medication Sig Dispense Refill   • venlafaxine XR (EFFEXOR XR) 37.5 MG CAPSULE SR 24 HR Take 1 Cap by mouth every day. 30 Cap 2   • gabapentin (NEURONTIN) 300 MG Cap Take 2 Caps by mouth 3 times a day. 540 Cap 3   • sertraline (ZOLOFT) 100 MG Tab Take 0.5 Tabs by mouth every day. 90 Tab 0   • pantoprazole (PROTONIX) 40 MG Tablet Delayed Response TAKE ONE TABLET BY MOUTH TWICE DAILY  180 Tab 2   • levothyroxine (SYNTHROID) 200 MCG Tab TAKE 1 TABLET BY MOUTH EVERY DAY 90 Tab 2   • potassium chloride SA (KDUR) 20 MEQ Tab CR Take 1 Tab by mouth 2 times a day. 180 Tab 3   • furosemide (LASIX) 20 MG Tab Take 1 Tab by mouth every day. 90 Tab 2   • tramadol (ULTRAM) 50 MG Tab Take 1 Tab by mouth every four hours as needed for Moderate Pain for up to 90 days. 180 Tab 2   • nabumetone (RELAFEN) 750 MG Tab TAKE 1 TABLET BY MOUTH TWICE A DAY WITH MEALS 180 Tab 2   • spironolactone (ALDACTONE) 50 MG Tab TAKE 1 TABLET BY MOUTH EVERY DAY 90 Tab 3   • carvedilol (COREG) 6.25 MG Tab Take 1 Tab by mouth 2 times a day, with meals. 180 Tab 3   • OXYGEN-HELIUM INH Inhale 2-3 L by mouth as needed.     • aspirin EC (ECOTRIN) 81 MG Tablet Delayed Response Take 81 mg by mouth every day.     • [DISCONTINUED] potassium chloride SA (KDUR) 20 MEQ Tab CR Take 1 Tab by mouth 2 times a day. (Patient not taking: Reported on 12/21/2018) 180 Tab 2   • albuterol (VENTOLIN HFA) 108 (90 Base) MCG/ACT Aero Soln inhalation aerosol Inhale 2 Puffs by mouth every 6 hours as needed for Shortness of Breath.     • polyethylene glycol 3350 (MIRALAX) Powder Take 17 g by mouth 2 times a day. (Patient not taking: Reported on 12/21/2018) 1 Bottle 11   • Cholecalciferol (VITAMIN D) 2000 units Cap Take 1 Cap by mouth every day.  "30 Cap 6   • metronidazole (METROGEL) 0.75 % gel APPLY TO AFFECTED AREA TWICE A DAY 1 Tube 0   • albuterol (PROAIR HFA) 108 (90 Base) MCG/ACT Aero Soln inhalation aerosol Inhale 2 Puffs by mouth every 6 hours as needed for Shortness of Breath. 8.5 g 6   • ketoconazole (NIZORAL) 2 % Cream Apply to affected area daily 30 g 2   • polyethyl glycol-propyl glycol (SYSTANE) 0.4-0.3 % Solution Place 1 Drop in both eyes as needed.     • temazepam (RESTORIL) 15 MG Cap Take 1 Cap by mouth at bedtime as needed for Sleep. 30 Cap 3   • Fluocinolone Acetonide 0.01 % Oil Place  in ear 2 Times a Day.       No facility-administered encounter medications on file as of 12/21/2018.      Review of Systems   Constitutional: Negative.  Negative for chills, fever and malaise/fatigue.   HENT: Negative.  Negative for sore throat.    Eyes: Negative.    Respiratory: Negative.  Negative for cough, hemoptysis, sputum production, shortness of breath, wheezing and stridor.    Cardiovascular: Negative.  Negative for chest pain, palpitations, orthopnea, claudication, leg swelling and PND.   Gastrointestinal: Negative.    Genitourinary: Negative.    Musculoskeletal: Negative.    Skin: Negative.    Neurological: Negative.  Negative for dizziness, loss of consciousness and weakness.   Endo/Heme/Allergies: Negative.  Does not bruise/bleed easily.   All other systems reviewed and are negative.       Objective:   /64 (BP Location: Left arm, Patient Position: Sitting, BP Cuff Size: Large adult)   Pulse 60   Ht 1.651 m (5' 5\")   Wt (!) 126.6 kg (279 lb)   SpO2 92%   BMI 46.43 kg/m²     Physical Exam   Constitutional: She appears well-developed and well-nourished. No distress.   HENT:   Head: Normocephalic and atraumatic.   Right Ear: External ear normal.   Left Ear: External ear normal.   Nose: Nose normal.   Mouth/Throat: No oropharyngeal exudate.   Eyes: Pupils are equal, round, and reactive to light. Conjunctivae and EOM are normal. Right eye " exhibits no discharge. Left eye exhibits no discharge. No scleral icterus.   Neck: Neck supple. No JVD present.   Cardiovascular: Normal rate, regular rhythm and intact distal pulses.  Exam reveals no gallop and no friction rub.    No murmur heard.  Pulmonary/Chest: Effort normal. No stridor. No respiratory distress. She has no wheezes. She has no rales. She exhibits no tenderness.   Abdominal: Soft. She exhibits no distension. There is no guarding.   Musculoskeletal: Normal range of motion. She exhibits no edema, tenderness or deformity.   Neurological: She is alert. She has normal reflexes. She displays normal reflexes. No cranial nerve deficit. She exhibits normal muscle tone. Coordination normal.   Skin: Skin is warm and dry. No rash noted. She is not diaphoretic. No erythema. No pallor.   Psychiatric: She has a normal mood and affect. Her behavior is normal. Judgment and thought content normal.   Nursing note and vitals reviewed.    EKG: Personally viewed by myself showing normal sinus rhythm otherwise normal EKG.  Assessment:     1. Essential hypertension, benign  EKG   2. Pre-operative cardiovascular examination     3. Nonalcoholic fatty liver disease     4. Obesity hypoventilation syndrome (HCC)     5. KERRI (obstructive sleep apnea)     6. Former smoker     7. Essential hypertension     8. Elevated sedimentation rate     9. Elevated sed rate     10. Dyslipidemia, goal LDL below 130     11. Stable angina pectoris (HCC)  NM-CARDIAC STRESS TEST       Medical Decision Making:  Today's Assessment / Status / Plan:   68-year-old female for preoperative stratification.  At this point I think she is going to moderate risk for moderate risk surgery.  We will get a nuclear stress test that she cannot complete greater than 4 METs of physical activity.  If this is unremarkable or low risk we will simply give her a moderate risk otherwise we can talk to her further.    Thank for you allowing me to take part in your  patient's care, please call should you have any questions or would like to discuss this patient.

## 2018-12-21 NOTE — LETTER
[facility]   [address], [facility] [address]  [place] [address]  Phone: [phone]  Fax: [phone]              Ashly Meyer  1950    Encounter Date: 12/21/2018    Gonsalo Delaney M.D.          PROGRESS NOTE:  Chief Complaint   Patient presents with   • Medical Clearance     NEW PATIENT       Subjective:   Ashly Meyer is a 68 y.o. female who presents today For preop risk medication prior to a shoulder surgery.  She is a 60-year-old female with a past medical history of morbid obesity hypertension hyperlipidemia.  She does not take medications for lipids.  She is having no chest pain palpitations PND orthopnea.  She is very physically deconditioned and cannot complete greater than 4 mets.  She is never had any cardiac issues.  Her ECG today is normal.    Past Medical History:   Diagnosis Date   • Aphasia     Written word recognition/useage   • Arthritis     Everywhere.    • Back pain    • Carotid artery stenosis, unilateral     moderate left carotid artery stenosis   • Constipation    • Controlled type 2 diabetes mellitus without complication ([facility])    • Daytime sleepiness    • Degenerative disc disease    • Depression 5/20/2009   • Diarrhea    • Dizziness    • Dry eye syndrome, bilateral    • Elevated sedimentation rate     history of negative temporal artery biopsy around 2006   • Fatigue    • Fibromyalgia     confirmed by Dr. Ann   • Frequent headaches    • GERD (gastroesophageal reflux disease)     hiatal hernia   • GOUT 5/20/2009   • H/O foot surgery    • Hearing difficulty    • Hiatus hernia syndrome    • Hypertension    • Hypothyroidism 5/20/2009   • Incipient cataract of both eyes    • Insomnia    • Migraine without aura, without mention of intractable migraine without mention of status migrainosus    • Mild intermittent asthma without complication    • Mixed dyslipidemia    • Morbid obesity with BMI of 45.0-49.9, adult ([facility])    • Morning  headache    • Mumps    • Nasal drainage    • Nocturnal hypoxia     severe, to 69% O2 sat   • Obesity    • Obesity hypoventilation syndrome ([facility]) 5/31/2017   • Pelvic floor dysfunction    • Peripheral neuropathy    • Pleomorphic adenoma    • Polymyalgia rheumatica ([facility])     Dr. Ann   • Restless leg syndrome    • Ringing in ears    • Rotator cuff syndrome of right shoulder and allied disorders 10/10/2018   • S/P tonsillectomy    • Seasonal allergies    • Skin cancer 1990's    skin   • Sleep apnea syndrome     she is not using CPAP, claustrophobia   • Snoring    • Sore muscles    • Sweat, sweating, excessive    • Swelling of lower extremity    • Urinary incontinence     will not wear a pad   • Vision loss    • Weakness      Past Surgical History:   Procedure Laterality Date   • GASTROSCOPY  2/6/2017    Procedure: GASTROSCOPY;  Surgeon: Pau Foster M.D.;  Location: SURGERY SAME DAY [facility];  Service:    • COLONOSCOPY  2/6/2017    Procedure: COLONOSCOPY;  Surgeon: Pau Foster M.D.;  Location: SURGERY SAME DAY [facility];  Service:    • SEPTAL RECONSTRUCTION  10/3/2016    Procedure: SEPTAL RECONSTRUCTION with  grafts ;  Surgeon: PIETER Dickson M.D.;  Location: SURGERY SAME DAY [facility];  Service:    • SEPTOPLASTY N/A 8/1/2016    Procedure: SEPTOPLASTY;  Surgeon: PIETER Dickson M.D.;  Location: SURGERY SAME DAY [facility];  Service:    • TURBINOPLASTY Bilateral 8/1/2016    Procedure: TURBINOPLASTY;  Surgeon: PIETER Dicskon M.D.;  Location: SURGERY SAME DAY [facility];  Service:    • NASAL FRACTURE REDUCTION OPEN N/A 8/1/2016    Procedure: SEPTAL FRACTURE REDUCTION OPEN;  Surgeon: PIETER Dickson M.D.;  Location: SURGERY SAME DAY [facility];  Service:    • FINE NEEDLE ASPIRATION  11/16/2009    Performed by DA CALLOWAY at ENDOSCOPY [facility] ORS   • COLONOSCOPY  2006    ? unclear date   • BLADDER SUSPENSION  1983   • HYSTERECTOMY, VAGINAL  1983    ovaries are  present, excessive bleeding   • TONSILLECTOMY  1953   • HYSTERECTOMY LAPAROSCOPY     • OTHER ABDOMINAL SURGERY      Cholysestectomy   • OTHER ORTHOPEDIC SURGERY  1962/1980    foot surgery    • TONSILLECTOMY       Family History   Problem Relation Age of Onset   • Heart Disease Mother         Arotic vaulve replacement   • GI Mother         bile duct problem   • Bladder cancer Mother    • GI Sister         celiac   • Arthritis Sister    • Other Sister         gout and lupus   • Arthritis Sister    • Kidney Disease Sister    • GI Sister    • Bladder cancer Maternal Aunt    • Arthritis Maternal Grandmother    • Hypertension Maternal Grandmother    • Heart Disease Maternal Grandmother    • Hypertension Maternal Grandfather    • Heart Disease Maternal Grandfather    • Arthritis Maternal Grandfather    • No Known Problems Paternal Grandmother    • No Known Problems Paternal Grandfather    • Cancer Brother         Larynx   • Cancer Daughter         non hopskins limphoma   • GI Daughter    • Bipolar disorder Daughter    • Seizures Daughter    • Bipolar disorder Daughter      Social History     Social History   • Marital status:      Spouse name: N/A   • Number of children: N/A   • Years of education: N/A     Occupational History   • Not on file.     Social History Main Topics   • Smoking status: Former Smoker     Packs/day: 1.00     Years: 42.00     Types: Cigarettes     Quit date: 3/1/2007   • Smokeless tobacco: Never Used      Comment: Started smoking at age 15   • Alcohol use No      Comment: no longer drinks   • Drug use: No      Comment: CBD topical pain cream   • Sexual activity: No     Other Topics Concern   • Not on file     Social History Narrative   • No narrative on file     Allergies   Allergen Reactions   • Butalbital-Acetaminophen Unspecified     Dizzy, can't walk, hard to focus   • Cefaclor Unspecified     Ceclor causes light headedness and dizziness   • Diphenhydramine Hives     Full body hives   •  "Iodine Unspecified     Labored breathing   • Lamictal Rash and Swelling     \"hot\", unable to function   • Morphine Itching     redness   • Penicillins Itching and Swelling     Skin itching and redness   • Savella [Milnacipran Hcl] Unspecified     Muscle aches, feet swelling   • Sulfa Drugs Hives and Anxiety     Hard breathing   • Tizanidine Hcl Unspecified     dizziness   • Ace Inhibitors Cough     Cough     • Tape Unspecified     Paper tape is ok     Outpatient Encounter Prescriptions as of 12/21/2018   Medication Sig Dispense Refill   • venlafaxine XR (EFFEXOR XR) 37.5 MG CAPSULE SR 24 HR Take 1 Cap by mouth every day. 30 Cap 2   • gabapentin (NEURONTIN) 300 MG Cap Take 2 Caps by mouth 3 times a day. 540 Cap 3   • sertraline (ZOLOFT) 100 MG Tab Take 0.5 Tabs by mouth every day. 90 Tab 0   • pantoprazole (PROTONIX) 40 MG Tablet Delayed Response TAKE ONE TABLET BY MOUTH TWICE DAILY  180 Tab 2   • levothyroxine (SYNTHROID) 200 MCG Tab TAKE 1 TABLET BY MOUTH EVERY DAY 90 Tab 2   • potassium chloride SA (KDUR) 20 MEQ Tab CR Take 1 Tab by mouth 2 times a day. 180 Tab 3   • furosemide (LASIX) 20 MG Tab Take 1 Tab by mouth every day. 90 Tab 2   • tramadol (ULTRAM) 50 MG Tab Take 1 Tab by mouth every four hours as needed for Moderate Pain for up to 90 days. 180 Tab 2   • nabumetone (RELAFEN) 750 MG Tab TAKE 1 TABLET BY MOUTH TWICE A DAY WITH MEALS 180 Tab 2   • spironolactone (ALDACTONE) 50 MG Tab TAKE 1 TABLET BY MOUTH EVERY DAY 90 Tab 3   • carvedilol (COREG) 6.25 MG Tab Take 1 Tab by mouth 2 times a day, with meals. 180 Tab 3   • OXYGEN-HELIUM INH Inhale 2-3 L by mouth as needed.     • aspirin EC (ECOTRIN) 81 MG Tablet Delayed Response Take 81 mg by mouth every day.     • [DISCONTINUED] potassium chloride SA (KDUR) 20 MEQ Tab CR Take 1 Tab by mouth 2 times a day. (Patient not taking: Reported on 12/21/2018) 180 Tab 2   • albuterol (VENTOLIN HFA) 108 (90 Base) MCG/ACT Aero Soln inhalation aerosol Inhale 2 Puffs by mouth " "every 6 hours as needed for Shortness of Breath.     • polyethylene glycol 3350 (MIRALAX) Powder Take 17 g by mouth 2 times a day. (Patient not taking: Reported on 12/21/2018) 1 Bottle 11   • Cholecalciferol (VITAMIN D) 2000 units Cap Take 1 Cap by mouth every day. 30 Cap 6   • metronidazole (METROGEL) 0.75 % gel APPLY TO AFFECTED AREA TWICE A DAY 1 Tube 0   • albuterol (PROAIR HFA) 108 (90 Base) MCG/ACT Aero Soln inhalation aerosol Inhale 2 Puffs by mouth every 6 hours as needed for Shortness of Breath. 8.5 g 6   • ketoconazole (NIZORAL) 2 % Cream Apply to affected area daily 30 g 2   • polyethyl glycol-propyl glycol (SYSTANE) 0.4-0.3 % Solution Place 1 Drop in both eyes as needed.     • temazepam (RESTORIL) 15 MG Cap Take 1 Cap by mouth at bedtime as needed for Sleep. 30 Cap 3   • Fluocinolone Acetonide 0.01 % Oil Place  in ear 2 Times a Day.       No facility-administered encounter medications on file as of 12/21/2018.      Review of Systems   Constitutional: Negative.  Negative for chills, fever and malaise/fatigue.   HENT: Negative.  Negative for sore throat.    Eyes: Negative.    Respiratory: Negative.  Negative for cough, hemoptysis, sputum production, shortness of breath, wheezing and stridor.    Cardiovascular: Negative.  Negative for chest pain, palpitations, orthopnea, claudication, leg swelling and PND.   Gastrointestinal: Negative.    Genitourinary: Negative.    Musculoskeletal: Negative.    Skin: Negative.    Neurological: Negative.  Negative for dizziness, loss of consciousness and weakness.   Endo/Heme/Allergies: Negative.  Does not bruise/bleed easily.   All other systems reviewed and are negative.       Objective:   /64 (BP Location: Left arm, Patient Position: Sitting, BP Cuff Size: Large adult)   Pulse 60   Ht 1.651 m (5' 5\")   Wt (!) 126.6 kg (279 lb)   SpO2 92%   BMI 46.43 kg/m²      Physical Exam   Constitutional: She appears well-developed and well-nourished. No distress.   HENT:   "   Head: Normocephalic and atraumatic.   Right Ear: External ear normal.   Left Ear: External ear normal.   Nose: Nose normal.   Mouth/Throat: No oropharyngeal exudate.   Eyes: Pupils are equal, round, and reactive to light. Conjunctivae and EOM are normal. Right eye exhibits no discharge. Left eye exhibits no discharge. No scleral icterus.   Neck: Neck supple. No JVD present.   Cardiovascular: Normal rate, regular rhythm and intact distal pulses.  Exam reveals no gallop and no friction rub.    No murmur heard.  Pulmonary/Chest: Effort normal. No stridor. No respiratory distress. She has no wheezes. She has no rales. She exhibits no tenderness.   Abdominal: Soft. She exhibits no distension. There is no guarding.   Musculoskeletal: Normal range of motion. She exhibits no edema, tenderness or deformity.   Neurological: She is alert. She has normal reflexes. She displays normal reflexes. No cranial nerve deficit. She exhibits normal muscle tone. Coordination normal.   Skin: Skin is warm and dry. No rash noted. She is not diaphoretic. No erythema. No pallor.   Psychiatric: She has a normal mood and affect. Her behavior is normal. Judgment and thought content normal.   Nursing note and vitals reviewed.    EKG: Personally viewed by myself showing normal sinus rhythm otherwise normal EKG.  Assessment:     1. Essential hypertension, benign  EKG   2. Pre-operative cardiovascular examination     3. Nonalcoholic fatty liver disease     4. Obesity hypoventilation syndrome (HCC)     5. KERRI (obstructive sleep apnea)     6. Former smoker     7. Essential hypertension     8. Elevated sedimentation rate     9. Elevated sed rate     10. Dyslipidemia, goal LDL below 130     11. Stable angina pectoris (HCC)  NM-CARDIAC STRESS TEST       Medical Decision Making:  Today's Assessment / Status / Plan:   68-year-old female for preoperative stratification.  At this point I think she is going to moderate risk for moderate risk surgery.  We  will get a nuclear stress test that she cannot complete greater than 4 METs of physical activity.  If this is unremarkable or low risk we will simply give her a moderate risk otherwise we can talk to her further.    Thank for you allowing me to take part in your patient's care, please call should you have any questions or would like to discuss this patient.        Colton Ahn M.D.  [address] [place] [id number]  VIA In Basket

## 2018-12-31 ENCOUNTER — HOSPITAL ENCOUNTER (OUTPATIENT)
Dept: RADIOLOGY | Facility: MEDICAL CENTER | Age: 68
End: 2018-12-31
Attending: INTERNAL MEDICINE
Payer: MEDICARE

## 2018-12-31 DIAGNOSIS — I20.89 STABLE ANGINA PECTORIS: ICD-10-CM

## 2018-12-31 PROCEDURE — 78452 HT MUSCLE IMAGE SPECT MULT: CPT | Mod: 26 | Performed by: INTERNAL MEDICINE

## 2018-12-31 PROCEDURE — 93018 CV STRESS TEST I&R ONLY: CPT | Performed by: INTERNAL MEDICINE

## 2018-12-31 PROCEDURE — 700111 HCHG RX REV CODE 636 W/ 250 OVERRIDE (IP)

## 2018-12-31 PROCEDURE — A9502 TC99M TETROFOSMIN: HCPCS

## 2018-12-31 RX ORDER — REGADENOSON 0.08 MG/ML
INJECTION, SOLUTION INTRAVENOUS
Status: COMPLETED
Start: 2018-12-31 | End: 2018-12-31

## 2018-12-31 RX ADMIN — REGADENOSON 0.4 MG: 0.08 INJECTION, SOLUTION INTRAVENOUS at 11:53

## 2019-01-16 DIAGNOSIS — I10 ESSENTIAL HYPERTENSION: ICD-10-CM

## 2019-01-17 RX ORDER — CARVEDILOL 6.25 MG/1
TABLET ORAL
Qty: 180 TAB | Refills: 2 | Status: SHIPPED | OUTPATIENT
Start: 2019-01-17 | End: 2019-11-01 | Stop reason: SDUPTHER

## 2019-01-19 ENCOUNTER — HOSPITAL ENCOUNTER (OUTPATIENT)
Dept: RADIOLOGY | Facility: MEDICAL CENTER | Age: 69
End: 2019-01-19
Attending: NURSE PRACTITIONER
Payer: MEDICARE

## 2019-01-19 ENCOUNTER — OFFICE VISIT (OUTPATIENT)
Dept: URGENT CARE | Facility: PHYSICIAN GROUP | Age: 69
End: 2019-01-19
Payer: MEDICARE

## 2019-01-19 VITALS
WEIGHT: 274 LBS | HEIGHT: 67 IN | OXYGEN SATURATION: 94 % | TEMPERATURE: 98.3 F | HEART RATE: 61 BPM | DIASTOLIC BLOOD PRESSURE: 80 MMHG | RESPIRATION RATE: 16 BRPM | BODY MASS INDEX: 43 KG/M2 | SYSTOLIC BLOOD PRESSURE: 140 MMHG

## 2019-01-19 DIAGNOSIS — S99.912A LEFT ANKLE INJURY, INITIAL ENCOUNTER: ICD-10-CM

## 2019-01-19 DIAGNOSIS — S93.402A SPRAIN OF LEFT ANKLE, UNSPECIFIED LIGAMENT, INITIAL ENCOUNTER: ICD-10-CM

## 2019-01-19 PROCEDURE — 99214 OFFICE O/P EST MOD 30 MIN: CPT | Performed by: NURSE PRACTITIONER

## 2019-01-19 PROCEDURE — 73610 X-RAY EXAM OF ANKLE: CPT | Mod: LT

## 2019-01-19 ASSESSMENT — ENCOUNTER SYMPTOMS
WEAKNESS: 0
BRUISES/BLEEDS EASILY: 0
CHILLS: 0
MYALGIAS: 1
TINGLING: 0
FALLS: 0
SENSORY CHANGE: 0

## 2019-01-19 NOTE — PROGRESS NOTES
"Subjective:      Ashly Meyer is a 68 y.o. female who presents with Ankle Pain (L ankle pain & swelling x 3days)            HPI  States slipped on snow \"twice\" 3 days ago. States left ankle pain, swelling, pain with weight bearing and non weight bearing. Using Tramadol at night. H/o neuropathy.    PMH:  has a past medical history of Aphasia; Arthritis; Back pain; Carotid artery stenosis, unilateral; Constipation; Controlled type 2 diabetes mellitus without complication ([facility]); Daytime sleepiness; Degenerative disc disease; Depression (5/20/2009); Diarrhea; Dizziness; Dry eye syndrome, bilateral; Elevated sedimentation rate; Fatigue; Fibromyalgia; Frequent headaches; GERD (gastroesophageal reflux disease); GOUT (5/20/2009); H/O foot surgery; Hearing difficulty; Hiatus hernia syndrome; Hypertension; Hypothyroidism (5/20/2009); Incipient cataract of both eyes; Insomnia; Migraine without aura, without mention of intractable migraine without mention of status migrainosus; Mild intermittent asthma without complication; Mixed dyslipidemia; Morbid obesity with BMI of 45.0-49.9, adult ([facility]); Morning headache; Mumps; Nasal drainage; Nocturnal hypoxia; Obesity; Obesity hypoventilation syndrome (HCC) (5/31/2017); Pelvic floor dysfunction; Peripheral neuropathy; Pleomorphic adenoma; Polymyalgia rheumatica ([facility]); Restless leg syndrome; Ringing in ears; Rotator cuff syndrome of right shoulder and allied disorders (10/10/2018); S/P tonsillectomy; Seasonal allergies; Skin cancer (1990's); Sleep apnea syndrome; Snoring; Sore muscles; Sweat, sweating, excessive; Swelling of lower extremity; Urinary incontinence; Vision loss; and Weakness.  MEDS:   Current Outpatient Prescriptions:   •  carvedilol (COREG) 6.25 MG Tab, TAKE 1 TABLET BY MOUTH TWICE A DAY WITH MEALS, Disp: 180 Tab, Rfl: 2  •  venlafaxine XR (EFFEXOR XR) 37.5 MG CAPSULE SR 24 HR, Take 1 Cap by mouth every day., Disp: 30 Cap, Rfl: 2  •  gabapentin (NEURONTIN) 300 MG Cap, " Take 2 Caps by mouth 3 times a day., Disp: 540 Cap, Rfl: 3  •  sertraline (ZOLOFT) 100 MG Tab, Take 0.5 Tabs by mouth every day., Disp: 90 Tab, Rfl: 0  •  pantoprazole (PROTONIX) 40 MG Tablet Delayed Response, TAKE ONE TABLET BY MOUTH TWICE DAILY , Disp: 180 Tab, Rfl: 2  •  levothyroxine (SYNTHROID) 200 MCG Tab, TAKE 1 TABLET BY MOUTH EVERY DAY, Disp: 90 Tab, Rfl: 2  •  potassium chloride SA (KDUR) 20 MEQ Tab CR, Take 1 Tab by mouth 2 times a day., Disp: 180 Tab, Rfl: 3  •  furosemide (LASIX) 20 MG Tab, Take 1 Tab by mouth every day., Disp: 90 Tab, Rfl: 2  •  nabumetone (RELAFEN) 750 MG Tab, TAKE 1 TABLET BY MOUTH TWICE A DAY WITH MEALS, Disp: 180 Tab, Rfl: 2  •  spironolactone (ALDACTONE) 50 MG Tab, TAKE 1 TABLET BY MOUTH EVERY DAY, Disp: 90 Tab, Rfl: 3  •  metronidazole (METROGEL) 0.75 % gel, APPLY TO AFFECTED AREA TWICE A DAY, Disp: 1 Tube, Rfl: 0  •  ketoconazole (NIZORAL) 2 % Cream, Apply to affected area daily, Disp: 30 g, Rfl: 2  •  polyethyl glycol-propyl glycol (SYSTANE) 0.4-0.3 % Solution, Place 1 Drop in both eyes as needed., Disp: , Rfl:   •  OXYGEN-HELIUM INH, Inhale 2-3 L by mouth as needed., Disp: , Rfl:   •  aspirin EC (ECOTRIN) 81 MG Tablet Delayed Response, Take 81 mg by mouth every day., Disp: , Rfl:   •  albuterol (VENTOLIN HFA) 108 (90 Base) MCG/ACT Aero Soln inhalation aerosol, Inhale 2 Puffs by mouth every 6 hours as needed for Shortness of Breath., Disp: , Rfl:   •  polyethylene glycol 3350 (MIRALAX) Powder, Take 17 g by mouth 2 times a day. (Patient not taking: Reported on 12/21/2018), Disp: 1 Bottle, Rfl: 11  •  Cholecalciferol (VITAMIN D) 2000 units Cap, Take 1 Cap by mouth every day., Disp: 30 Cap, Rfl: 6  •  albuterol (PROAIR HFA) 108 (90 Base) MCG/ACT Aero Soln inhalation aerosol, Inhale 2 Puffs by mouth every 6 hours as needed for Shortness of Breath., Disp: 8.5 g, Rfl: 6  •  temazepam (RESTORIL) 15 MG Cap, Take 1 Cap by mouth at bedtime as needed for Sleep., Disp: 30 Cap, Rfl: 3  •   "Fluocinolone Acetonide 0.01 % Oil, Place  in ear 2 Times a Day., Disp: , Rfl:   ALLERGIES:   Allergies   Allergen Reactions   • Butalbital-Acetaminophen Unspecified     Dizzy, can't walk, hard to focus   • Cefaclor Unspecified     Ceclor causes light headedness and dizziness   • Diphenhydramine Hives     Full body hives   • Iodine Unspecified     Labored breathing   • Lamictal Rash and Swelling     \"hot\", unable to function   • Morphine Itching     redness   • Penicillins Itching and Swelling     Skin itching and redness   • Savella [Milnacipran Hcl] Unspecified     Muscle aches, feet swelling   • Sulfa Drugs Hives and Anxiety     Hard breathing   • Tizanidine Hcl Unspecified     dizziness   • Ace Inhibitors Cough     Cough     • Tape Unspecified     Paper tape is ok     SURGHX:   Past Surgical History:   Procedure Laterality Date   • GASTROSCOPY  2/6/2017    Procedure: GASTROSCOPY;  Surgeon: Pau Foster M.D.;  Location: SURGERY SAME DAY [facility];  Service:    • COLONOSCOPY  2/6/2017    Procedure: COLONOSCOPY;  Surgeon: Pau Foster M.D.;  Location: SURGERY SAME DAY [facility]S;  Service:    • SEPTAL RECONSTRUCTION  10/3/2016    Procedure: SEPTAL RECONSTRUCTION with  grafts ;  Surgeon: PIETER Dickson M.D.;  Location: SURGERY SAME DAY [facility] ORS;  Service:    • SEPTOPLASTY N/A 8/1/2016    Procedure: SEPTOPLASTY;  Surgeon: PIETER Dickson M.D.;  Location: SURGERY SAME DAY [facility] ORS;  Service:    • TURBINOPLASTY Bilateral 8/1/2016    Procedure: TURBINOPLASTY;  Surgeon: PIETER Dickson M.D.;  Location: SURGERY SAME DAY [facility] ORS;  Service:    • NASAL FRACTURE REDUCTION OPEN N/A 8/1/2016    Procedure: SEPTAL FRACTURE REDUCTION OPEN;  Surgeon: PIETER Dickson M.D.;  Location: SURGERY SAME DAY [facility] ORS;  Service:    • FINE NEEDLE ASPIRATION  11/16/2009    Performed by DA CALLOWAY at ENDOSCOPY [facility] ORS   • COLONOSCOPY  2006    ? unclear date   • BLADDER " "SUSPENSION  1983   • HYSTERECTOMY, VAGINAL  1983    ovaries are present, excessive bleeding   • TONSILLECTOMY  1953   • HYSTERECTOMY LAPAROSCOPY     • OTHER ABDOMINAL SURGERY      Cholysestectomy   • OTHER ORTHOPEDIC SURGERY  1962/1980    foot surgery    • TONSILLECTOMY       SOCHX:  reports that she quit smoking about 11 years ago. Her smoking use included Cigarettes. She has a 42.00 pack-year smoking history. She has never used smokeless tobacco. She reports that she does not drink alcohol or use drugs.  FH: Family history was reviewed, no pertinent findings to report    Review of Systems   Constitutional: Negative for chills and malaise/fatigue.   Cardiovascular: Negative for leg swelling.   Musculoskeletal: Positive for joint pain and myalgias. Negative for falls.   Skin: Negative for itching and rash.   Neurological: Negative for tingling, sensory change and weakness.   Endo/Heme/Allergies: Does not bruise/bleed easily.   All other systems reviewed and are negative.         Objective:     /80 (BP Location: Left arm, Patient Position: Sitting, BP Cuff Size: Adult)   Pulse 61   Temp 36.8 °C (98.3 °F) (Temporal)   Resp 16   Ht 1.689 m (5' 6.5\")   Wt 124.3 kg (274 lb)   SpO2 94%   BMI 43.56 kg/m²      Physical Exam   Constitutional: She is oriented to person, place, and time. Vital signs are normal. She appears well-developed and well-nourished. She is active and cooperative.  Non-toxic appearance. She does not have a sickly appearance. She does not appear ill. No distress.   HENT:   Head: Normocephalic.   Eyes: Pupils are equal, round, and reactive to light. EOM are normal.   Cardiovascular: Normal rate.    Pulmonary/Chest: Effort normal.   Musculoskeletal: She exhibits edema and tenderness. She exhibits no deformity.        Left ankle: She exhibits decreased range of motion and swelling. She exhibits no ecchymosis, no deformity, no laceration and normal pulse. Tenderness. Lateral malleolus and AITFL " tenderness found. Achilles tendon normal.        Left foot: There is decreased range of motion. There is no deformity.   Feet:   Left Foot:   Protective Sensation: 10 sites tested. 10 sites sensed.   Skin Integrity: Positive for callus and dry skin. Negative for ulcer, blister, skin breakdown, erythema or warmth.   Neurological: She is alert and oriented to person, place, and time.   Skin: Skin is warm and dry. No rash noted. She is not diaphoretic. No erythema.   Vitals reviewed.    Ankle xray:FINDINGS:  There is no evidence of fracture or dislocation.  The ankle mortise is well-maintained. The talar dome is preserved.  There is soft tissue swelling about the ankle. There is minimal spurring of the calcaneus at the insertion of the Achilles tendon.     MA applied walking boot   Declines in clinic IM pain med  Assessment/Plan:     1. Left ankle injury, initial encounter    - DX-ANKLE 3+ VIEWS LEFT; Future  - REFERRAL TO ORTHOPEDICS    2. Sprain of left ankle, unspecified ligament, initial encounter    - REFERRAL TO ORTHOPEDICS    May use NSAID prn for pain/swelling  May use cool compresses for swelling prn  May utilize RICE method prn  Avoid excessive weight bearing to avoid further injury  May apply topical analgesics prn  Perform proper body mechanics with lifting, twisting, bending and walking  Monitor for deformity, numbness/tingling in toes, decreased ROM with weight bearing- need re-evaluation  May f/u with your established ortho

## 2019-01-22 ENCOUNTER — TELEPHONE (OUTPATIENT)
Dept: CARDIOLOGY | Facility: MEDICAL CENTER | Age: 69
End: 2019-01-22

## 2019-01-23 NOTE — TELEPHONE ENCOUNTER
----- Message from Ricardo Hendricks, Med Ass't sent at 1/22/2019  4:19 PM PST -----  Regarding: Surgical clearance form   CHARBEL Hoffman pt calling to find out the status of her Surgical Clearance form for shoulder/carple tunnel surgery with Dr Knutson. She would like a call back at the earliest convenience.     Thanks!  Ricardo

## 2019-01-23 NOTE — TELEPHONE ENCOUNTER
Clearance letter created per Dr. Delaney's last office note and faxed to [facility] at [phone].

## 2019-01-25 ENCOUNTER — OFFICE VISIT (OUTPATIENT)
Dept: PULMONOLOGY | Facility: HOSPICE | Age: 69
End: 2019-01-25
Payer: MEDICARE

## 2019-01-25 ENCOUNTER — APPOINTMENT (OUTPATIENT)
Dept: PULMONOLOGY | Facility: HOSPICE | Age: 69
End: 2019-01-25
Payer: MEDICARE

## 2019-01-25 VITALS
TEMPERATURE: 97.8 F | HEIGHT: 66 IN | OXYGEN SATURATION: 93 % | BODY MASS INDEX: 44.84 KG/M2 | SYSTOLIC BLOOD PRESSURE: 126 MMHG | WEIGHT: 279 LBS | RESPIRATION RATE: 16 BRPM | HEART RATE: 61 BPM | DIASTOLIC BLOOD PRESSURE: 70 MMHG

## 2019-01-25 DIAGNOSIS — G47.34 NOCTURNAL HYPOXIA: ICD-10-CM

## 2019-01-25 DIAGNOSIS — K21.9 GASTROESOPHAGEAL REFLUX DISEASE WITHOUT ESOPHAGITIS: ICD-10-CM

## 2019-01-25 DIAGNOSIS — G47.33 OSA (OBSTRUCTIVE SLEEP APNEA): ICD-10-CM

## 2019-01-25 PROCEDURE — 99213 OFFICE O/P EST LOW 20 MIN: CPT | Performed by: PHYSICIAN ASSISTANT

## 2019-01-25 NOTE — PATIENT INSTRUCTIONS
1-continue albuterol inhaler as needed, spacer provided     2-uses pantoprazole 2 x day  3-do not recline for 2-3 hours after meal   4-follow up appointment  6 months, consider ambulating oxygen check

## 2019-01-29 ENCOUNTER — OFFICE VISIT (OUTPATIENT)
Dept: MEDICAL GROUP | Facility: MEDICAL CENTER | Age: 69
End: 2019-01-29
Payer: MEDICARE

## 2019-01-29 VITALS
BODY MASS INDEX: 43.63 KG/M2 | DIASTOLIC BLOOD PRESSURE: 70 MMHG | RESPIRATION RATE: 12 BRPM | HEIGHT: 67 IN | TEMPERATURE: 99.3 F | WEIGHT: 278 LBS | OXYGEN SATURATION: 95 % | HEART RATE: 65 BPM | SYSTOLIC BLOOD PRESSURE: 128 MMHG

## 2019-01-29 DIAGNOSIS — S96.912D STRAIN OF LEFT ANKLE, SUBSEQUENT ENCOUNTER: ICD-10-CM

## 2019-01-29 DIAGNOSIS — J45.20 MILD INTERMITTENT ASTHMA WITHOUT COMPLICATION: ICD-10-CM

## 2019-01-29 PROCEDURE — 99213 OFFICE O/P EST LOW 20 MIN: CPT | Performed by: FAMILY MEDICINE

## 2019-01-29 RX ORDER — ALBUTEROL SULFATE 90 UG/1
2 AEROSOL, METERED RESPIRATORY (INHALATION) EVERY 6 HOURS PRN
Qty: 1 INHALER | Refills: 6 | Status: SHIPPED | OUTPATIENT
Start: 2019-01-29 | End: 2020-01-30 | Stop reason: SDUPTHER

## 2019-01-29 NOTE — PROGRESS NOTES
CC: Shortness of breath    HPI:  Ashly Meyer is a 68 y.o. year old female here today for follow-up on shortness of breath.  Patient does use her rescue inhaler about 10 times a month.  Reports being under a lot of stress.  Patient is a former smoker who quit in 2007 with 43-pack-year history.  Continued abstinence advised.  Reviewed in office vitals including blood pressure 126/70, heart rate of 60, O2 sat 93 room air, BMI of 45.03.  Reports upcoming surgery on her right shoulder and right wrist.  Patient had requested an order for x-ray in December for surgical clearance, however did not request clearance at this time and did not obtain updated chest x-ray.  Last PFT was in July 2027 FEV1 of 1.91 L or 79% predicted, FEV1/FVC ratio is 86%, total lung capacity of 8% predicted and DLCO of 101% predicted.  Normal airway received.  Mild improvement in FEV1 after inhaled bronchodilator possibility of obstructive lung disease with a reversible component.  Reduced FEV1 and FVC may be secondary to patient BMI of 46.6.    Last x-ray obtained 7/26/2018 demonstrated slight hypoinflation, mild bibasilar atelectasis, no effusion.  She did have been ambulating multi oximetry check in July as well and needed 3 L oxygen with exertion.  Patient had a sleep study in 7/22/2017 with CPAP of 12 advised.  Patient is describing breakthrough symptoms for GERD on pantoprazole 2 times a day.  However she likes to lie down after eating which is contributory.  Advised not to recline for 2-3 hours during her meal.      ROS:   Constitutional: Reports day and night sweats, increased edema fever chills unintentional weight loss  Skin: No rashes, hair or nail changes, lumps or sores  Eyes: Wears glasses, no new or sudden vision changes  ENT: History of nasal surgery x2, history of lung, sinus infections, persistent hoarseness, intermittent sore throat, earaches, nasal congestion and runny nose, no dentures  GI: History of heartburn/reflux on  Protonix, breakthrough symptoms, no trouble swallowing  Respiratory: Primarily dry cough occasionally productive of yellow sputum, occasional wheezing, mild shortness of breath at rest moderate + Shortness of breath with activity, history of bronchitis, no history of pneumonia, TB or occupational exposure  CV: Reports heart is okay, no murmurs, pressure, irregular heartbeat or orthopnea, edema  Sleep: Patient reports daytime sleepiness and morning headache    Past Medical History:   Diagnosis Date   • Aphasia     Written word recognition/useage   • Arthritis     Everywhere.    • Back pain    • Carotid artery stenosis, unilateral     moderate left carotid artery stenosis   • Constipation    • Controlled type 2 diabetes mellitus without complication ([facility])    • Daytime sleepiness    • Degenerative disc disease    • Depression 5/20/2009   • Diarrhea    • Dizziness    • Dry eye syndrome, bilateral    • Elevated sedimentation rate     history of negative temporal artery biopsy around 2006   • Fatigue    • Fibromyalgia     confirmed by Dr. Ann   • Frequent headaches    • GERD (gastroesophageal reflux disease)     hiatal hernia   • GOUT 5/20/2009   • H/O foot surgery    • Hearing difficulty    • Hiatus hernia syndrome    • Hypertension    • Hypothyroidism 5/20/2009   • Incipient cataract of both eyes    • Insomnia    • Migraine without aura, without mention of intractable migraine without mention of status migrainosus    • Mild intermittent asthma without complication    • Mixed dyslipidemia    • Morbid obesity with BMI of 45.0-49.9, adult ([facility])    • Morning headache    • Mumps    • Nasal drainage    • Nocturnal hypoxia     severe, to 69% O2 sat   • Obesity    • Obesity hypoventilation syndrome ([facility]) 5/31/2017   • Pelvic floor dysfunction    • Peripheral neuropathy    • Pleomorphic adenoma    • Polymyalgia rheumatica ([facility])     Dr. Ann   • Restless leg syndrome    • Ringing in ears    • Rotator cuff syndrome of right  shoulder and allied disorders 10/10/2018   • S/P tonsillectomy    • Seasonal allergies    • Skin cancer 1990's    skin   • Sleep apnea syndrome     she is not using CPAP, claustrophobia   • Snoring    • Sore muscles    • Sweat, sweating, excessive    • Swelling of lower extremity    • Urinary incontinence     will not wear a pad   • Vision loss    • Weakness        Past Surgical History:   Procedure Laterality Date   • GASTROSCOPY  2/6/2017    Procedure: GASTROSCOPY;  Surgeon: Pau Foster M.D.;  Location: SURGERY SAME DAY [facility] ORS;  Service:    • COLONOSCOPY  2/6/2017    Procedure: COLONOSCOPY;  Surgeon: Pau Foster M.D.;  Location: SURGERY SAME DAY [facility];  Service:    • SEPTAL RECONSTRUCTION  10/3/2016    Procedure: SEPTAL RECONSTRUCTION with  grafts ;  Surgeon: PIETER Dickson M.D.;  Location: SURGERY SAME DAY [facility];  Service:    • SEPTOPLASTY N/A 8/1/2016    Procedure: SEPTOPLASTY;  Surgeon: PIETER Dickson M.D.;  Location: SURGERY SAME DAY [facility];  Service:    • TURBINOPLASTY Bilateral 8/1/2016    Procedure: TURBINOPLASTY;  Surgeon: PIETER Dickson M.D.;  Location: SURGERY SAME DAY [facility];  Service:    • NASAL FRACTURE REDUCTION OPEN N/A 8/1/2016    Procedure: SEPTAL FRACTURE REDUCTION OPEN;  Surgeon: PIETER Dickson M.D.;  Location: SURGERY SAME DAY [facility];  Service:    • FINE NEEDLE ASPIRATION  11/16/2009    Performed by DA CALLOWAY at ENDOSCOPY [facility]   • COLONOSCOPY  2006    ? unclear date   • BLADDER SUSPENSION  1983   • HYSTERECTOMY, VAGINAL  1983    ovaries are present, excessive bleeding   • TONSILLECTOMY  1953   • HYSTERECTOMY LAPAROSCOPY     • OTHER ABDOMINAL SURGERY      Cholysestectomy   • OTHER ORTHOPEDIC SURGERY  1962/1980    foot surgery    • TONSILLECTOMY         Family History   Problem Relation Age of Onset   • Heart Disease Mother         Arotic vaulve replacement   • GI Mother         bile duct problem   •  Bladder cancer Mother    • GI Sister         celiac   • Arthritis Sister    • Other Sister         gout and lupus   • Arthritis Sister    • Kidney Disease Sister    • GI Sister    • Bladder cancer Maternal Aunt    • Arthritis Maternal Grandmother    • Hypertension Maternal Grandmother    • Heart Disease Maternal Grandmother    • Hypertension Maternal Grandfather    • Heart Disease Maternal Grandfather    • Arthritis Maternal Grandfather    • No Known Problems Paternal Grandmother    • No Known Problems Paternal Grandfather    • Cancer Brother         Larynx   • Cancer Daughter         non hopskins limphoma   • GI Daughter    • Bipolar disorder Daughter    • Seizures Daughter    • Bipolar disorder Daughter        Social History     Social History   • Marital status:      Spouse name: N/A   • Number of children: N/A   • Years of education: N/A     Occupational History   • Not on file.     Social History Main Topics   • Smoking status: Former Smoker     Packs/day: 1.00     Years: 43.00     Types: Cigarettes     Start date: 1962     Quit date: 3/1/2007   • Smokeless tobacco: Never Used      Comment: Started smoking at age 15, continued abstinance    • Alcohol use No      Comment: no longer drinks   • Drug use: No      Comment: CBD topical pain cream   • Sexual activity: No     Other Topics Concern   • Not on file     Social History Narrative   • No narrative on file       Allergies as of 01/25/2019 - Reviewed 01/25/2019   Allergen Reaction Noted   • Butalbital-acetaminophen Unspecified 07/27/2016   • Cefaclor Unspecified 08/03/2007   • Diphenhydramine Hives 08/03/2007   • Iodine Unspecified 05/20/2009   • Lamictal Rash and Swelling 12/27/2011   • Morphine Itching 03/11/2011   • Penicillins Itching and Swelling 08/03/2007   • Savella [milnacipran hcl] Unspecified 05/24/2013   • Sulfa drugs Hives and Anxiety 08/03/2007   • Tizanidine hcl Unspecified 03/11/2014   • Ace inhibitors Cough 05/20/2009   • Tape  "Unspecified 07/27/2016        @Vital signs for this encounter:  Vitals:    01/25/19 1136 01/25/19 1139   Height: 1.676 m (5' 6\")    Weight: (!) 126.6 kg (279 lb)    Weight % change since last entry.: 0 %    BP: 126/70    Pulse: 61    BMI (Calculated): 45.03    Resp: 16    Temp: 36.6 °C (97.8 °F)    TempSrc: Oral    O2 sat % room air:  93 %       Current medications as of today   Current Outpatient Prescriptions   Medication Sig Dispense Refill   • carvedilol (COREG) 6.25 MG Tab TAKE 1 TABLET BY MOUTH TWICE A DAY WITH MEALS 180 Tab 2   • aspirin EC (ECOTRIN) 81 MG Tablet Delayed Response Take 81 mg by mouth every day.     • venlafaxine XR (EFFEXOR XR) 37.5 MG CAPSULE SR 24 HR Take 1 Cap by mouth every day. 30 Cap 2   • gabapentin (NEURONTIN) 300 MG Cap Take 2 Caps by mouth 3 times a day. 540 Cap 3   • sertraline (ZOLOFT) 100 MG Tab Take 0.5 Tabs by mouth every day. 90 Tab 0   • pantoprazole (PROTONIX) 40 MG Tablet Delayed Response TAKE ONE TABLET BY MOUTH TWICE DAILY  180 Tab 2   • levothyroxine (SYNTHROID) 200 MCG Tab TAKE 1 TABLET BY MOUTH EVERY DAY 90 Tab 2   • potassium chloride SA (KDUR) 20 MEQ Tab CR Take 1 Tab by mouth 2 times a day. 180 Tab 3   • furosemide (LASIX) 20 MG Tab Take 1 Tab by mouth every day. 90 Tab 2   • nabumetone (RELAFEN) 750 MG Tab TAKE 1 TABLET BY MOUTH TWICE A DAY WITH MEALS 180 Tab 2   • albuterol (VENTOLIN HFA) 108 (90 Base) MCG/ACT Aero Soln inhalation aerosol Inhale 2 Puffs by mouth every 6 hours as needed for Shortness of Breath.     • spironolactone (ALDACTONE) 50 MG Tab TAKE 1 TABLET BY MOUTH EVERY DAY 90 Tab 3   • polyethylene glycol 3350 (MIRALAX) Powder Take 17 g by mouth 2 times a day. (Patient not taking: Reported on 12/21/2018) 1 Bottle 11   • Cholecalciferol (VITAMIN D) 2000 units Cap Take 1 Cap by mouth every day. 30 Cap 6   • metronidazole (METROGEL) 0.75 % gel APPLY TO AFFECTED AREA TWICE A DAY 1 Tube 0   • albuterol (PROAIR HFA) 108 (90 Base) MCG/ACT Aero Soln inhalation " aerosol Inhale 2 Puffs by mouth every 6 hours as needed for Shortness of Breath. 8.5 g 6   • ketoconazole (NIZORAL) 2 % Cream Apply to affected area daily 30 g 2   • polyethyl glycol-propyl glycol (SYSTANE) 0.4-0.3 % Solution Place 1 Drop in both eyes as needed.     • temazepam (RESTORIL) 15 MG Cap Take 1 Cap by mouth at bedtime as needed for Sleep. 30 Cap 3   • OXYGEN-HELIUM INH Inhale 2-3 L by mouth as needed.     • Fluocinolone Acetonide 0.01 % Oil Place  in ear 2 Times a Day.       No current facility-administered medications for this visit.          Physical Exam:  Gen:           Alert and oriented, No apparent distress. Mood and affect appropriate, normal interaction with provider.  Eyes:          sclere white, conjunctive moist.  Hearing:     Grossly intact.  Dentition:    Good dentition.  Oropharynx:   Tongue normal, posterior pharynx without erythema or exudate.  Mallampati Classification: III  Neck:        Supple, trachea midline, no masses.  Respiratory Effort: No intercostal retractions or use of accessory muscles.   Lung Auscultation:     Crackle bases otherwise clear; no rales, rhonchi or wheezing.  CV:            Regular rate and rhythm. No murmurs, rubs or gallops appreciated, 2+ pretibial edema  Digits, Nails, Ext: No clubbing, cyanosis, petechiae, or nodes.   Skin:        No rashes, lesions or ulcers noted on echo exposed skin surfaces                    Assessment:  1. Nocturnal hypoxia   continue oxygen at night and with exertion   2. KERRI (obstructive sleep apnea)   continue CPAP   3. BMI 45.0-49.9, adult ([facility])   advise him to activity, portion control and judicious menu choices   4. Gastroesophageal reflux disease without esophagitis   continue pantoprazole, reflux precautions       Immunizations:    Flu: 10/2/2018  Pneumovax 23: 1/16/2014  Prevnar 13: 1/5/2017    Plan:    1-continue albuterol inhaler as needed, spacer provided     2-uses pantoprazole 2 x day  3-do not recline for 2-3 hours  after meal   4-follow up appointment  6 months, consider ambulating oxygen check   This dictation was created using voice recognition software. The accuracy of the dictation is limited to the abilities of the software. I expect there may be some errors of grammar and possibly content.

## 2019-01-30 DIAGNOSIS — Z01.812 PRE-OPERATIVE LABORATORY EXAMINATION: ICD-10-CM

## 2019-01-30 LAB
ALBUMIN SERPL BCP-MCNC: 4.3 G/DL (ref 3.2–4.9)
ALBUMIN/GLOB SERPL: 1.1 G/DL
ALP SERPL-CCNC: 108 U/L (ref 30–99)
ALT SERPL-CCNC: 19 U/L (ref 2–50)
ANION GAP SERPL CALC-SCNC: 9 MMOL/L (ref 0–11.9)
AST SERPL-CCNC: 21 U/L (ref 12–45)
BILIRUB SERPL-MCNC: 0.5 MG/DL (ref 0.1–1.5)
BUN SERPL-MCNC: 24 MG/DL (ref 8–22)
CALCIUM SERPL-MCNC: 9.5 MG/DL (ref 8.5–10.5)
CHLORIDE SERPL-SCNC: 104 MMOL/L (ref 96–112)
CO2 SERPL-SCNC: 25 MMOL/L (ref 20–33)
CREAT SERPL-MCNC: 0.9 MG/DL (ref 0.5–1.4)
ERYTHROCYTE [DISTWIDTH] IN BLOOD BY AUTOMATED COUNT: 51.6 FL (ref 35.9–50)
GLOBULIN SER CALC-MCNC: 3.8 G/DL (ref 1.9–3.5)
GLUCOSE SERPL-MCNC: 101 MG/DL (ref 65–99)
HCT VFR BLD AUTO: 45.7 % (ref 37–47)
HGB BLD-MCNC: 13.8 G/DL (ref 12–16)
MCH RBC QN AUTO: 28.2 PG (ref 27–33)
MCHC RBC AUTO-ENTMCNC: 30.2 G/DL (ref 33.6–35)
MCV RBC AUTO: 93.3 FL (ref 81.4–97.8)
PLATELET # BLD AUTO: 197 K/UL (ref 164–446)
PMV BLD AUTO: 11 FL (ref 9–12.9)
POTASSIUM SERPL-SCNC: 4.7 MMOL/L (ref 3.6–5.5)
PROT SERPL-MCNC: 8.1 G/DL (ref 6–8.2)
RBC # BLD AUTO: 4.9 M/UL (ref 4.2–5.4)
SODIUM SERPL-SCNC: 138 MMOL/L (ref 135–145)
WBC # BLD AUTO: 11.9 K/UL (ref 4.8–10.8)

## 2019-01-30 PROCEDURE — 85027 COMPLETE CBC AUTOMATED: CPT

## 2019-01-30 PROCEDURE — 80053 COMPREHEN METABOLIC PANEL: CPT

## 2019-01-30 PROCEDURE — 36415 COLL VENOUS BLD VENIPUNCTURE: CPT

## 2019-01-30 RX ORDER — IBUPROFEN 200 MG
400-600 TABLET ORAL EVERY 6 HOURS PRN
COMMUNITY
End: 2019-07-22

## 2019-01-30 RX ORDER — TRAMADOL HYDROCHLORIDE 50 MG/1
100 TABLET ORAL EVERY 4 HOURS PRN
COMMUNITY
End: 2019-04-30

## 2019-01-30 NOTE — PROGRESS NOTES
Chief Complaint   Patient presents with   • Ankle Injury   • Asthma       Subjective:     HPI:   Ashly Meyer presents today with the followin. Strain of left ankle, subsequent encounter  Swelling and pain are better, not yet gone.  Walking better.  Only used the boot a couple of days she says.    2. Mild intermittent asthma without complication  Rare wheezing, generally doing well she feels.  Good air movement.  Needs albuterol renewed, sent to pharmacy.  Denies fever or chills.  Denies productive cough.  Having surgery this Friday.  Declines pneumonia vaccine today.        Patient Active Problem List    Diagnosis Date Noted   • Pre-operative cardiovascular examination 2018   • Dry eye syndrome, bilateral    • Rotator cuff syndrome of right shoulder and allied disorders 10/10/2018   • Elevated sedimentation rate    • Former smoker 2018   • Recurrent major depressive disorder, in partial remission (HCC) 2018   • Vitamin D deficiency 2017   • Morbid obesity with BMI of 45.0-49.9, adult ([facility])    • Obesity hypoventilation syndrome ([facility]) 2017   • Neural foraminal stenosis of cervical spine 2017   • Tinnitus of both ears 2017   • Dyslipidemia, goal LDL below 130 2017   • Nonalcoholic fatty liver disease 2017   • Disease of accessory sinus 10/03/2016   • Atrophic rhinitis 10/03/2016   • Deviated nasal septum 2016   • Menopausal symptoms 2016   • Anal fissure 2016   • Lumbar facet arthropathy 2015   • Chronic pain 2015   • Candidal intertrigo 2015   • Elevated sed rate 2014   • Mild intermittent asthma without complication    • H/O fibromyalgia 2014   • KERRI (obstructive sleep apnea) 2014   • Acute idiopathic gout of right foot 2014   • Menopause 2014   • Peripheral neuropathy    • Rectocele 2013   • Pelvic floor dysfunction    • Chronic constipation 10/02/2013   • Nocturnal hypoxia     • Cervical stenosis of spinal canal 11/01/2011   • Lumbar radiculopathy 11/01/2011   • Migraine without aura    • Carpal tunnel syndrome 09/05/2011   • Ulnar neuropathy 09/05/2011   • Seasonal allergies 07/08/2011   • Polymyalgia rheumatica (HCC) 05/06/2011   • GERD (gastroesophageal reflux disease) 12/10/2010   • Osteoarthritis of multiple joints 04/13/2010   • Eczema, dyshidrotic 08/03/2009   • Essential hypertension 05/20/2009   • Hypothyroidism due to acquired atrophy of thyroid 05/20/2009       Current medicines (including changes today)  Current Outpatient Prescriptions   Medication Sig Dispense Refill   • albuterol (PROAIR HFA) 108 (90 Base) MCG/ACT Aero Soln inhalation aerosol Inhale 2 Puffs by mouth every 6 hours as needed for Shortness of Breath. 1 Inhaler 6   • carvedilol (COREG) 6.25 MG Tab TAKE 1 TABLET BY MOUTH TWICE A DAY WITH MEALS 180 Tab 2   • aspirin EC (ECOTRIN) 81 MG Tablet Delayed Response Take 81 mg by mouth every day.     • venlafaxine XR (EFFEXOR XR) 37.5 MG CAPSULE SR 24 HR Take 1 Cap by mouth every day. 30 Cap 2   • gabapentin (NEURONTIN) 300 MG Cap Take 2 Caps by mouth 3 times a day. 540 Cap 3   • sertraline (ZOLOFT) 100 MG Tab Take 0.5 Tabs by mouth every day. 90 Tab 0   • pantoprazole (PROTONIX) 40 MG Tablet Delayed Response TAKE ONE TABLET BY MOUTH TWICE DAILY  180 Tab 2   • levothyroxine (SYNTHROID) 200 MCG Tab TAKE 1 TABLET BY MOUTH EVERY DAY 90 Tab 2   • potassium chloride SA (KDUR) 20 MEQ Tab CR Take 1 Tab by mouth 2 times a day. 180 Tab 3   • furosemide (LASIX) 20 MG Tab Take 1 Tab by mouth every day. 90 Tab 2   • nabumetone (RELAFEN) 750 MG Tab TAKE 1 TABLET BY MOUTH TWICE A DAY WITH MEALS 180 Tab 2   • albuterol (VENTOLIN HFA) 108 (90 Base) MCG/ACT Aero Soln inhalation aerosol Inhale 2 Puffs by mouth every 6 hours as needed for Shortness of Breath.     • spironolactone (ALDACTONE) 50 MG Tab TAKE 1 TABLET BY MOUTH EVERY DAY 90 Tab 3   • polyethylene glycol 3350 (MIRALAX) Powder  "Take 17 g by mouth 2 times a day. (Patient not taking: Reported on 12/21/2018) 1 Bottle 11   • Cholecalciferol (VITAMIN D) 2000 units Cap Take 1 Cap by mouth every day. 30 Cap 6   • metronidazole (METROGEL) 0.75 % gel APPLY TO AFFECTED AREA TWICE A DAY 1 Tube 0   • ketoconazole (NIZORAL) 2 % Cream Apply to affected area daily 30 g 2   • polyethyl glycol-propyl glycol (SYSTANE) 0.4-0.3 % Solution Place 1 Drop in both eyes as needed.     • temazepam (RESTORIL) 15 MG Cap Take 1 Cap by mouth at bedtime as needed for Sleep. 30 Cap 3   • OXYGEN-HELIUM INH Inhale 2-3 L by mouth as needed.     • Fluocinolone Acetonide 0.01 % Oil Place  in ear 2 Times a Day.       No current facility-administered medications for this visit.        Allergies   Allergen Reactions   • Butalbital-Acetaminophen Unspecified     Dizzy, can't walk, hard to focus   • Cefaclor Unspecified     Ceclor causes light headedness and dizziness   • Diphenhydramine Hives     Full body hives   • Iodine Unspecified     Labored breathing   • Lamictal Rash and Swelling     \"hot\", unable to function   • Morphine Itching     redness   • Penicillins Itching and Swelling     Skin itching and redness   • Savella [Milnacipran Hcl] Unspecified     Muscle aches, feet swelling   • Sulfa Drugs Hives and Anxiety     Hard breathing   • Tizanidine Hcl Unspecified     dizziness   • Ace Inhibitors Cough     Cough     • Tape Unspecified     Paper tape is ok       ROS: As per HPI       Objective:     Blood pressure 128/70, pulse 65, temperature 37.4 °C (99.3 °F), resp. rate 12, height 1.689 m (5' 6.5\"), weight (!) 126.1 kg (278 lb), SpO2 95 %, not currently breastfeeding. Body mass index is 44.2 kg/m².    Physical Exam:  Constitutional: Well-developed and well-nourished. Not diaphoretic. No distress. Lucid and fluent.  Skin: Skin is warm and dry. No rash noted.  Head: Atraumatic without lesions.  Eyes: Conjunctivae and extraocular motions are normal. Pupils are equal, round, and " reactive to light. No scleral icterus.   Mouth/Throat: Tongue normal. Oropharynx is clear and moist. Posterior pharynx without erythema or exudates.  Neck: Supple, trachea midline. No thyromegaly present. No cervical or supraclavicular lymphadenopathy. No JVD or carotid bruits appreciated  Cardiovascular: Regular rate and rhythm.  Normal S1, S2 without murmur appreciated.  Chest: Effort normal. Clear to auscultation throughout. No adventitious sounds. No rales today.  Good air movement.  Extremities: No cyanosis, clubbing, erythema, nor edema. Left ankle still violaceous and swollen, much less swelling than the pictures she shows me.  Neurological: Alert and oriented x 3.   Psychiatric:  Behavior, mood, and affect are appropriate.       Assessment and Plan:     68 y.o. female with the following issues:    1. Strain of left ankle, subsequent encounter     2. Mild intermittent asthma without complication  albuterol (PROAIR HFA) 108 (90 Base) MCG/ACT Aero Soln inhalation aerosol         Followup: Return in about 3 months (around 4/29/2019), or if symptoms worsen or fail to improve.

## 2019-02-01 ENCOUNTER — HOSPITAL ENCOUNTER (OUTPATIENT)
Facility: MEDICAL CENTER | Age: 69
End: 2019-02-01
Attending: ORTHOPAEDIC SURGERY | Admitting: ORTHOPAEDIC SURGERY
Payer: MEDICARE

## 2019-02-01 VITALS
BODY MASS INDEX: 45.71 KG/M2 | RESPIRATION RATE: 18 BRPM | SYSTOLIC BLOOD PRESSURE: 142 MMHG | HEIGHT: 66 IN | WEIGHT: 284.39 LBS | OXYGEN SATURATION: 96 % | HEART RATE: 65 BPM | TEMPERATURE: 97.2 F | DIASTOLIC BLOOD PRESSURE: 70 MMHG

## 2019-02-01 LAB — GLUCOSE BLD-MCNC: 129 MG/DL (ref 65–99)

## 2019-02-01 PROCEDURE — 700101 HCHG RX REV CODE 250

## 2019-02-01 PROCEDURE — 160041 HCHG SURGERY MINUTES - EA ADDL 1 MIN LEVEL 4: Performed by: ORTHOPAEDIC SURGERY

## 2019-02-01 PROCEDURE — 502576 HCHG PACK, HAND: Performed by: ORTHOPAEDIC SURGERY

## 2019-02-01 PROCEDURE — A6222 GAUZE <=16 IN NO W/SAL W/O B: HCPCS | Performed by: ORTHOPAEDIC SURGERY

## 2019-02-01 PROCEDURE — 82962 GLUCOSE BLOOD TEST: CPT

## 2019-02-01 PROCEDURE — 501838 HCHG SUTURE GENERAL: Performed by: ORTHOPAEDIC SURGERY

## 2019-02-01 PROCEDURE — 160036 HCHG PACU - EA ADDL 30 MINS PHASE I: Performed by: ORTHOPAEDIC SURGERY

## 2019-02-01 PROCEDURE — 160009 HCHG ANES TIME/MIN: Performed by: ORTHOPAEDIC SURGERY

## 2019-02-01 PROCEDURE — 700102 HCHG RX REV CODE 250 W/ 637 OVERRIDE(OP): Performed by: ANESTHESIOLOGY

## 2019-02-01 PROCEDURE — 160046 HCHG PACU - 1ST 60 MINS PHASE II: Performed by: ORTHOPAEDIC SURGERY

## 2019-02-01 PROCEDURE — A9270 NON-COVERED ITEM OR SERVICE: HCPCS | Performed by: ANESTHESIOLOGY

## 2019-02-01 PROCEDURE — 160025 RECOVERY II MINUTES (STATS): Performed by: ORTHOPAEDIC SURGERY

## 2019-02-01 PROCEDURE — 160048 HCHG OR STATISTICAL LEVEL 1-5: Performed by: ORTHOPAEDIC SURGERY

## 2019-02-01 PROCEDURE — 160035 HCHG PACU - 1ST 60 MINS PHASE I: Performed by: ORTHOPAEDIC SURGERY

## 2019-02-01 PROCEDURE — 160029 HCHG SURGERY MINUTES - 1ST 30 MINS LEVEL 4: Performed by: ORTHOPAEDIC SURGERY

## 2019-02-01 PROCEDURE — A4450 NON-WATERPROOF TAPE: HCPCS | Performed by: ORTHOPAEDIC SURGERY

## 2019-02-01 PROCEDURE — 500423 HCHG DRESSING, ABD COMBINE: Performed by: ORTHOPAEDIC SURGERY

## 2019-02-01 PROCEDURE — 502581 HCHG PACK, SHOULDER ARTHROSCOPY: Performed by: ORTHOPAEDIC SURGERY

## 2019-02-01 PROCEDURE — 700111 HCHG RX REV CODE 636 W/ 250 OVERRIDE (IP)

## 2019-02-01 PROCEDURE — 700111 HCHG RX REV CODE 636 W/ 250 OVERRIDE (IP): Performed by: ANESTHESIOLOGY

## 2019-02-01 PROCEDURE — 160002 HCHG RECOVERY MINUTES (STAT): Performed by: ORTHOPAEDIC SURGERY

## 2019-02-01 RX ORDER — EPINEPHRINE 1 MG/ML(1)
AMPUL (ML) INJECTION
Status: DISCONTINUED | OUTPATIENT
Start: 2019-02-01 | End: 2019-02-01 | Stop reason: HOSPADM

## 2019-02-01 RX ORDER — HYDROMORPHONE HYDROCHLORIDE 1 MG/ML
0.2 INJECTION, SOLUTION INTRAMUSCULAR; INTRAVENOUS; SUBCUTANEOUS
Status: DISCONTINUED | OUTPATIENT
Start: 2019-02-01 | End: 2019-02-01 | Stop reason: HOSPADM

## 2019-02-01 RX ORDER — MEPERIDINE HYDROCHLORIDE 25 MG/ML
6.25 INJECTION INTRAMUSCULAR; INTRAVENOUS; SUBCUTANEOUS
Status: DISCONTINUED | OUTPATIENT
Start: 2019-02-01 | End: 2019-02-01 | Stop reason: HOSPADM

## 2019-02-01 RX ORDER — HYDRALAZINE HYDROCHLORIDE 20 MG/ML
5 INJECTION INTRAMUSCULAR; INTRAVENOUS
Status: DISCONTINUED | OUTPATIENT
Start: 2019-02-01 | End: 2019-02-01 | Stop reason: HOSPADM

## 2019-02-01 RX ORDER — HYDROMORPHONE HYDROCHLORIDE 1 MG/ML
0.4 INJECTION, SOLUTION INTRAMUSCULAR; INTRAVENOUS; SUBCUTANEOUS
Status: DISCONTINUED | OUTPATIENT
Start: 2019-02-01 | End: 2019-02-01 | Stop reason: HOSPADM

## 2019-02-01 RX ORDER — ONDANSETRON 2 MG/ML
4 INJECTION INTRAMUSCULAR; INTRAVENOUS
Status: DISCONTINUED | OUTPATIENT
Start: 2019-02-01 | End: 2019-02-01 | Stop reason: HOSPADM

## 2019-02-01 RX ORDER — OXYCODONE HYDROCHLORIDE AND ACETAMINOPHEN 5; 325 MG/1; MG/1
2 TABLET ORAL
Status: COMPLETED | OUTPATIENT
Start: 2019-02-01 | End: 2019-02-01

## 2019-02-01 RX ORDER — SODIUM CHLORIDE, SODIUM LACTATE, POTASSIUM CHLORIDE, CALCIUM CHLORIDE 600; 310; 30; 20 MG/100ML; MG/100ML; MG/100ML; MG/100ML
INJECTION, SOLUTION INTRAVENOUS CONTINUOUS
Status: DISCONTINUED | OUTPATIENT
Start: 2019-02-01 | End: 2019-02-01 | Stop reason: HOSPADM

## 2019-02-01 RX ORDER — BUPIVACAINE HYDROCHLORIDE AND EPINEPHRINE 2.5; 5 MG/ML; UG/ML
INJECTION, SOLUTION EPIDURAL; INFILTRATION; INTRACAUDAL; PERINEURAL
Status: DISCONTINUED | OUTPATIENT
Start: 2019-02-01 | End: 2019-02-01 | Stop reason: HOSPADM

## 2019-02-01 RX ORDER — OXYCODONE HYDROCHLORIDE AND ACETAMINOPHEN 5; 325 MG/1; MG/1
1 TABLET ORAL
Status: COMPLETED | OUTPATIENT
Start: 2019-02-01 | End: 2019-02-01

## 2019-02-01 RX ORDER — HALOPERIDOL 5 MG/ML
1 INJECTION INTRAMUSCULAR
Status: DISCONTINUED | OUTPATIENT
Start: 2019-02-01 | End: 2019-02-01 | Stop reason: HOSPADM

## 2019-02-01 RX ORDER — HYDROMORPHONE HYDROCHLORIDE 1 MG/ML
0.1 INJECTION, SOLUTION INTRAMUSCULAR; INTRAVENOUS; SUBCUTANEOUS
Status: DISCONTINUED | OUTPATIENT
Start: 2019-02-01 | End: 2019-02-01 | Stop reason: HOSPADM

## 2019-02-01 RX ORDER — SODIUM CHLORIDE, SODIUM LACTATE, POTASSIUM CHLORIDE, CALCIUM CHLORIDE 600; 310; 30; 20 MG/100ML; MG/100ML; MG/100ML; MG/100ML
1000 INJECTION, SOLUTION INTRAVENOUS CONTINUOUS
Status: DISCONTINUED | OUTPATIENT
Start: 2019-02-01 | End: 2019-02-01 | Stop reason: HOSPADM

## 2019-02-01 RX ADMIN — OXYCODONE HYDROCHLORIDE AND ACETAMINOPHEN 1 TABLET: 5; 325 TABLET ORAL at 09:29

## 2019-02-01 RX ADMIN — FENTANYL CITRATE 25 MCG: 50 INJECTION, SOLUTION INTRAMUSCULAR; INTRAVENOUS at 09:47

## 2019-02-01 RX ADMIN — FENTANYL CITRATE 25 MCG: 50 INJECTION, SOLUTION INTRAMUSCULAR; INTRAVENOUS at 09:29

## 2019-02-01 NOTE — DISCHARGE INSTRUCTIONS
ACTIVITY: Rest and take it easy for the first 24 hours.  A responsible adult is recommended to remain with you during that time.  It is normal to feel sleepy.  We encourage you to not do anything that requires balance, judgment or coordination.    MILD FLU-LIKE SYMPTOMS ARE NORMAL. YOU MAY EXPERIENCE GENERALIZED MUSCLE ACHES, THROAT IRRITATION, HEADACHE AND/OR SOME NAUSEA.    FOR 24 HOURS DO NOT:  Drive, operate machinery or run household appliances.  Drink beer or alcoholic beverages.   Make important decisions or sign legal documents.    SPECIAL INSTRUCTIONS:     Post-Operative Shoulder Instructions     Dressing and wound care: Keep your shoulder dressing clean and dry after surgery.  Be aware that some leaking of blood or fluid from your dressing can occur and is normal. You may remove your dressing 3 days after the operation.  Notice that you have a single incision and/or several small incisions that have been closed with stitches.  Cover each of these incisions with a light dressing or band-aids.  This keeps the surgical incisions clean, as well as preventing your clothes from spotting with blood or fluid.  Change band-aids or light dressing daily.     ** KEEP CARPAL TUNNEL SPLINT ON AND DRY.  Cover it for showers.  It will be removed at your first post-op appointment.     Shower / bathing: Keep the shoulder dry for 3 days after your surgery.  Then, you may shower. You may let soap and water run over skin incisions, but do not immerse your shoulder in water.  No swimming pools, hot tubs, or baths are recommended until at least 3 weeks after surgery.     ** If you have had a shoulder replacement, then please wait until your staples are removed at 7-14 days post-op before allowing your incision to get wet.       Ice: Apply an ice pack to your shoulder (15 minutes on the shoulder, 15 minutes off the shoulder), as you feel necessary to help with the pain and swelling.         Sling / Shoulder Immobilizer: The  sling should be on at all times, except when bathing and doing your demonstrated exercises.     Activity: It is important to move your shoulder, as well as your elbow, wrist, and hand several times daily, starting the day after surgery.  You may do pendulum exercises with your operative arm starting the day after surgery.  Pendulum (dangling Kenaitze) exercises are encouraged 2-3 times daily.  The sling will need to be removed for pendulum exercises.     Pain medication: Take your pain medicine, as needed and prescribed.  Do not drive or operate machinery while taking narcotic pain medication.   You may start or resume anti-inflammatory medication (i.e. ibuprofen, naproxen) anytime after surgery, which should be taken with food to avoid stomach irritation.     Problems:     If you are having problems with your shoulder (unexpected pain, excessive bleeding or discharge from your incisions, fevers/chills) do not hesitate to call the office or visit the nearest emergency room for evaluation.     Follow-up:     Make sure that you have an appointment 7-14 days following surgery.  Your stitches will be removed, and your procedure/rehabilitation will be discussed at that time.   Physical therapy may be prescribed at that time.     Damaris Knutson MD   [facility]   [phone]    DIET: To avoid nausea, slowly advance diet as tolerated, avoiding spicy or greasy foods for the first day.  Add more substantial food to your diet according to your physician's instructions.  INCREASE FLUIDS AND FIBER TO AVOID CONSTIPATION.    FOLLOW-UP APPOINTMENT:  A follow-up appointment should be arranged with your doctor in 7-14 days; call to schedule.    You should CALL YOUR PHYSICIAN if you develop:  Fever greater than 101 degrees F.  Pain not relieved by medication, or persistent nausea or vomiting.  Excessive bleeding (blood soaking through dressing) or unexpected drainage from the wound.  Extreme redness or swelling around the  incision site, drainage of pus or foul smelling drainage.  Inability to urinate or empty your bladder within 8 hours.  Problems with breathing or chest pain.    You should call 911 if you develop problems with breathing or chest pain.  If you are unable to contact your doctor or surgical center, you should go to the nearest emergency room or urgent care center.  Dr. Knutson's telephone #: [phone]    If any questions arise, call your doctor.  If your doctor is not available, please feel free to call the Surgical Center at [phone].  The Center is open Monday through Friday from 7AM to 7PM.  You can also call the HEALTH HOTLINE open 24 hours/day, 7 days/week and speak to a nurse at [phone], or toll free at [phone].    A registered nurse may call you a few days after your surgery to see how you are doing after your procedure.    MEDICATIONS: Resume taking daily medication.  Take prescribed pain medication with food.  If no medication is prescribed, you may take non-aspirin pain medication if needed.  PAIN MEDICATION CAN BE VERY CONSTIPATING.  Take a stool softener or laxative such as senokot, pericolace, or milk of magnesia if needed.    Prescription given for pre-operatively.  Last pain medication given at 9:29am 5/325mg Percocet.    If your physician has prescribed pain medication that includes Acetaminophen (Tylenol), do not take additional Acetaminophen (Tylenol) while taking the prescribed medication.    Depression / Suicide Risk    As you are discharged from this [facility] facility, it is important to learn how to keep safe from harming yourself.    Recognize the warning signs:  · Abrupt changes in personality, positive or negative- including increase in energy   · Giving away possessions  · Change in eating patterns- significant weight changes-  positive or negative  · Change in sleeping patterns- unable to sleep or sleeping all the time   · Unwillingness or inability to  communicate  · Depression  · Unusual sadness, discouragement and loneliness  · Talk of wanting to die  · Neglect of personal appearance   · Rebelliousness- reckless behavior  · Withdrawal from people/activities they love  · Confusion- inability to concentrate     If you or a loved one observes any of these behaviors or has concerns about self-harm, here's what you can do:  · Talk about it- your feelings and reasons for harming yourself  · Remove any means that you might use to hurt yourself (examples: pills, rope, extension cords, firearm)  · Get professional help from the community (Mental Health, Substance Abuse, psychological counseling)  · Do not be alone:Call your Safe Contact- someone whom you trust who will be there for you.  · Call your local CRISIS HOTLINE 710-1608 or 570-264-7830  · Call your local Children's Mobile Crisis Response Team [place] (683) 183-4698 or www.[employer]  · Call the toll free National Suicide Prevention Hotlines   · National Suicide Prevention Lifeline 176-488-VCUS (1953)  · National Hope Line Network 800-SUICIDE (973-9019)

## 2019-02-01 NOTE — OR SURGEON
Immediate Post OP Note    PreOp Diagnosis:RIGHT shoulder impingement, possible SLAP, possible RCT and RIGHT CTS    PostOp Diagnosis: RIGHT shoulder impingement, global labral fraying and RIGHT CTS     Procedure(s):  RIGHT   SHOULDER DECOMPRESSION ARTHROSCOPIC - SUBACROMIAL, LABRAL DEBRIDEMENT - Wound Class: Clean  SHOULDER ARTHROSCOPY W/ BICEPS TENOTOMY - Wound Class: Clean  RIGHT CARPAL TUNNEL RELEASE - Wound Class: Clean    Surgeon(s):  Damaris Knutson M.D.    Anesthesiologist/Type of Anesthesia:  Anesthesiologist: Joesph Majano M.D./General    Surgical Staff:  Circulator: Parul Fry R.N.  Scrub Person: Joan Archibald  Private Scrub: Joesph Petty CFranklinNMICHAEL    Specimens removed if any:  * No specimens in log *    Estimated Blood Loss: min    Findings: as above    Complications: none    TT 10         2/1/2019 8:43 AM Damaris Knutson M.D.

## 2019-02-01 NOTE — OP REPORT
DATE OF SERVICE:  02/01/2019    PREOPERATIVE DIAGNOSES:  1.  Right shoulder impingement syndrome, possible SLAP lesion, possible   rotator cuff tear.  2.  Right carpal tunnel syndrome.    POSTOPERATIVE DIAGNOSES:  1.  Right shoulder impingement syndrome, global labral fraying, with proximal   biceps tendinopathy.  2.  Right carpal tunnel syndrome.    PROCEDURES PERFORMED:  1.  Right shoulder arthroscopy, subacromial decompression, biceps tenotomy,   labral debridement.  2.  Right carpal tunnel release.    SURGEON:  Damaris Knutson MD    ASSISTANT:  Joesph Petty CST FA    SECOND ASSISTANT:  Ang Chauhan MD, (orthopedic fellow)    ANESTHESIOLOGIST:  Joesph Majano MD    TYPE OF ANESTHESIA:  General.    IV FLUIDS:  1 liter crystalloid.    ESTIMATED BLOOD LOSS:  Minimal.    DRAINS:  None.    SPECIMENS:  None.    COMPLICATIONS:  None.    IMPLANTS:  None.    REASON FOR PROCEDURE:  The patient is a 68-year-old female with right shoulder   pain that has been bothering her intermittently, and has thus far not   responded well to nonoperative treatment measures.  Also, she had an EMG, with   persistent findings of carpal tunnel syndrome.  We decided to proceed with a   right shoulder arthroscopy as well as a right carpal tunnel release.    DESCRIPTION OF OPERATION:  The patient was taken to the operating room.  She   was not given an interscalene nerve block due to her lung capacity and oxygen   requirement.  First, she was placed supine on the operating room table.  A   nonsterile tourniquet was placed around her right upper arm.  The right upper   extremity was then prepped with ChloraPrep and draped in standard sterile   fashion.  An Esmarch was used and the tourniquet was inflated to 250 mmHg.  An   incision was made in line with the radial border of the fourth metacarpal.  A   skin incision was made.  The palmar fascia was dissected bluntly with a pair   of Metzenbaum scissors.  An inside knife was then used to  localize the distal   fibers of the transverse carpal ligament.  These were incised, revealing the   underlying median nerve.  A [place] elevator was then placed with the knife used   to cut down on the freer.  The wrist was held in slight flexion, and a pair   of blunt-tipped scissors was used to release the carpal tunnel proximal to the   wrist crease.  The wound was irrigated.  The skin was then closed with 4-0   nylon.  A sterile dressing was applied and the tourniquet was deflated.  Next,   the patient was placed in the lateral decubitus position.  The right upper   extremity was then prepped with ChloraPrep and draped in standard sterile   fashion.  Notably, it was examined before the prep and drape, with good range   of motion and no instability noted.  The right upper extremity was prepped   with ChloraPrep and draped in standard sterile fashion.  Bony landmarks were   drawn and a standard posterior portal was established.  The arthroscope was   then inserted into the glenohumeral joint.  An anterosuperior cannula was   placed in the rotator interval, just beneath the biceps tendon.  A probe was   inserted.  There were minimal degenerative changes noted on the humeral head   and glenoid.  There was global labral fraying.  There was synovitis.  The   subscapularis tendon was noted to be intact.  A global labral debridement took   place followed by a biceps tenotomy in order to remove the biceps as a pain   generator from the joint.  The undersurface of the rotator cuff was inspected.    There was some yellowish degeneration noted, but no significant footprint   peel off or partial tear.  Portals were switched and the posterior labrum was   debrided.  The arthroscope was inserted into the subacromial space.  A lateral   portal was established.  There was a copious amount of thickened, inflamed   bursal tissue.  This was removed with the 4.0 aggressive shaver.  The borders   of the acromion were defined.  The 5.5  mm resector was used to remove the   anterior curve as well as lateral edge.  Portals were switched.  Using a   standard cutting block technique from posterior to anterior, a subacromial   decompression was completed.  This created a nice smooth surface to the   undersurface of the acromion.  Attention was turned to the rotator cuff below.    There were some erythematous spots on the rotator cuff consistent with   impingement, but no significant tear.  It was probed extensively.  A near   complete subdeltoid bursectomy took place.  The lateral aspect of the   acromioplasty was completed.  All instruments were then removed.  Portals were   closed with 3-0 nylon suture.  A sterile dressing was applied.  All drapes   were removed.  The arm was carefully taken out of traction and placed into a   simple sling.  Also, a small volar splint was placed.  A total of 50 mL of   0.25% Marcaine with epinephrine was injected into the subacromial space and   portals prior to application of the dressing.  With the patient in a simple   sling, she was placed supine on a stretcher and taken to the recovery room in   stable condition.       ____________________________________     MD RADHA CHRISTIANSEN / KO    DD:  02/01/2019 08:42:46  DT:  02/01/2019 09:02:30    D#:  [id number]  Job#:  [id number]

## 2019-02-20 ENCOUNTER — PHYSICAL THERAPY (OUTPATIENT)
Dept: PHYSICAL THERAPY | Facility: REHABILITATION | Age: 69
End: 2019-02-20
Attending: ORTHOPAEDIC SURGERY
Payer: MEDICARE

## 2019-02-20 DIAGNOSIS — Z98.890 S/P ARTHROSCOPY OF RIGHT SHOULDER: ICD-10-CM

## 2019-02-20 DIAGNOSIS — Z98.890 STATUS POST CARPAL TUNNEL RELEASE: ICD-10-CM

## 2019-02-20 PROCEDURE — 97162 PT EVAL MOD COMPLEX 30 MIN: CPT

## 2019-02-20 PROCEDURE — 97110 THERAPEUTIC EXERCISES: CPT

## 2019-02-20 PROCEDURE — 97014 ELECTRIC STIMULATION THERAPY: CPT

## 2019-02-20 SDOH — ECONOMIC STABILITY: GENERAL: QUALITY OF LIFE: GOOD

## 2019-02-20 ASSESSMENT — ENCOUNTER SYMPTOMS
QUALITY: SHARP
PAIN TIMING: CONSTANT
PAIN SCALE: 2
PAIN SCALE AT HIGHEST: 10
QUALITY: ACHING
PAIN SCALE AT LOWEST: 2
EXACERBATED BY: ACTIVITY
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
## 2019-02-25 ENCOUNTER — PHYSICAL THERAPY (OUTPATIENT)
Dept: PHYSICAL THERAPY | Facility: REHABILITATION | Age: 69
End: 2019-02-25
Attending: ORTHOPAEDIC SURGERY
Payer: MEDICARE

## 2019-02-25 DIAGNOSIS — Z98.890 S/P ARTHROSCOPY OF RIGHT SHOULDER: ICD-10-CM

## 2019-02-25 DIAGNOSIS — Z98.890 STATUS POST CARPAL TUNNEL RELEASE: ICD-10-CM

## 2019-02-25 PROCEDURE — 97014 ELECTRIC STIMULATION THERAPY: CPT

## 2019-02-25 PROCEDURE — 97110 THERAPEUTIC EXERCISES: CPT

## 2019-02-25 NOTE — OP THERAPY DAILY TREATMENT
Outpatient Physical Therapy  DAILY TREATMENT     [facility] [address].  [address]  [place] [address]  Phone:  [phone]  Fax:  [phone]    Date: 02/25/2019    Patient: Ashly Meyer  YOB: 1950  MRN: [id number]     Time Calculation  Start time: 1030  Stop time: 1115 Time Calculation (min): 45 minutes     Chief Complaint: No chief complaint on file.    Visit #: 2    SUBJECTIVE:  Feels like her shoulder is less stiff.     OBJECTIVE:  Current objective measures:     L shoulder AROM  Flexion 0-90 deg  Abduction 0-50 deg  ER WFL with pain          Therapeutic Exercises (CPT 61507):     1. Flexion pulley, 5 min    2. Wall wall walk shoulder flexion AAROM, 10 x 2, added to HEP    3. Seated tendon gliding, reviewed from HEP    4. Scapular adduction/depression, 10 x 2, 5 sec. hold added to HEP    5. Wall walk shoudler scaption AROM, Pain at 30 deg., not added to program    6. PROM R shoulder abduction , 5 min      Therapeutic Exercise Summary: Therapeutic Exercises (CPT 59234):     2. Wrist flex/ext AROM, x10 ea    3. Tendon glides, 10x0     5. Putty , yellow: , pulling/feathering    Therapeutic Exercise Summary: Pt provided with handout and putty, reviewed handout for proper completion    Therapeutic Treatments and Modalities:     1. E Stim Unattended (CPT 71159), R shoulder IFC w/mhp 15'    Time-based treatments/modalities:  Therapeutic exercise minutes (CPT 47170): 25 minutes         ASSESSMENT:   Response to treatment: Steadily improving ROM. Patient is independent with HEP at end of session. R scapular elevation with UE activity contributes to RUT pain as well as symptom provocation with abduction>flexion.    PLAN/RECOMMENDATIONS:   Plan for treatment: therapy treatment to continue next visit.  Planned interventions for next visit: continue with current treatment.

## 2019-02-28 ENCOUNTER — PHYSICAL THERAPY (OUTPATIENT)
Dept: PHYSICAL THERAPY | Facility: REHABILITATION | Age: 69
End: 2019-02-28
Attending: ORTHOPAEDIC SURGERY
Payer: MEDICARE

## 2019-02-28 DIAGNOSIS — Z98.890 STATUS POST CARPAL TUNNEL RELEASE: ICD-10-CM

## 2019-02-28 DIAGNOSIS — Z98.890 S/P ARTHROSCOPY OF RIGHT SHOULDER: ICD-10-CM

## 2019-02-28 PROCEDURE — 97110 THERAPEUTIC EXERCISES: CPT

## 2019-03-01 NOTE — OP THERAPY DAILY TREATMENT
Outpatient Physical Therapy  DAILY TREATMENT     [facility] [address]  [address]  [place] [address]  Phone:  [phone]  Fax:  [phone]    Date: 02/28/2019    Patient: Ashly Meyer  YOB: 1950  MRN: [id number]     Time Calculation  Start time: 1600  Stop time: 1630 Time Calculation (min): 30 minutes     Chief Complaint: No chief complaint on file.    Visit #: 3    SUBJECTIVE:  Has been performing the exercises. Used her R arm quite a bit the other day cleaning up a large spill of liquid laundry detergent. Arm was sore afterwards.     OBJECTIVE:  Current objective measures:   R shoulder  AROM Flexion/Abduction 0-80 deg with pain  PROM Flexion/Abduction 0-150 deg with pain          Therapeutic Exercises (CPT 78119):     1. Flexion pulley, 5 min    2. Wall wall walk shoulder flexion AAROM, 10 x 2    3. Scapular adduction/depression, 20 x 2    5. Side laying shoulder abduction AROM, 10 x 2    6. Supine shoulder ER AROM, 10 x 2      Therapeutic Exercise Summary: Therapeutic Exercises (CPT 28975):     2. Wrist flex/ext AROM, x10 ea    3. Tendon glides, 10x0     5. Putty , yellow: , pulling/feathering    Therapeutic Exercise Summary: Pt provided with handout and putty, reviewed handout for proper completion      Time-based treatments/modalities:  Therapeutic exercise minutes (CPT 32951): 28 minutes           ASSESSMENT:   Response to treatment: PROM/AAROM continues to improve. Continue with ROM focus    PLAN/RECOMMENDATIONS:   Plan for treatment: therapy treatment to continue next visit.  Planned interventions for next visit: continue with current treatment. Further address wrist/hand. Isometric strengthening.

## 2019-03-04 NOTE — OP THERAPY DAILY TREATMENT
"  Outpatient Physical Therapy  DAILY TREATMENT     [facility] [address]  [address]  [place] [address]  Phone:  [phone]  Fax:  [phone]    Date: 03/06/2019    Patient: Ashly Meyer  YOB: 1950  MRN: [id number]     Time Calculation  Start time: 1338 (patient arrived late)  Stop time: 1420 Time Calculation (min): 42 minutes     Chief Complaint: No chief complaint on file.    Visit #: 4    SUBJECTIVE:  Did not perform the side laying activities but did perform the others. No pain at baseline during the day (e.g. When resting and with low level activites) but pain at night and with \"wrong\" movement.     OBJECTIVE:  Current objective measures:   AROM R shoulder  Flexion 0 - 120 deg (40 deg. Improvement)  Abduction 0 - 80 deg (limited by pain at pectoralis and anterior proximal arm) after wall walks - 0-110 deg.  ER at 0 deg abduction 0-60 deg.  Extension 0-65 deg          Therapeutic Exercises (CPT 26844):     1. Performed all HEP as below:      Therapeutic Exercise Summary: Access Code: [email or web address]   URL: [email or web address]   Date: 03/06/2019   Prepared by: Ericka Alvarez      Exercises  · Standing Shoulder Scaption Wall Walk - 10 reps - 1 sets - 5x daily - 7x weekly  · Seated Shoulder Flexion Full Range - 10 reps - 3 sets - 1x daily - 7x weekly  · Seated Shoulder External Rotation - 10 reps - 1 sets - 5x daily - 7x weekly  · Standing Scapular Depression - 10 reps - 3 sets - 1x daily - 7x weekly  · Seated Scapular Depression and Elevation with Arm Overhead - 10 reps - 3 sets - 1x daily - 7x weekly  · Wrist Flexion Extension AROM - Palms Down - 10 reps - 1 sets - 5x daily - 7x weekly  · Wrist AROM Radial and Ulnar deviation - 10 reps - 1 sets - 5x daily - 7x weekly  · Wrist Tendon Gliding - 10 reps - 1 sets - 5x daily - 7x weekly        Therapeutic Treatments and Modalities:     1. E Stim Unattended (CPT 93658), R shoulder and mid trap ifc w/mhp " 15'    Time-based treatments/modalities:  Therapeutic exercise minutes (CPT 13322): 23 minutes             ASSESSMENT:   Response to treatment: Steadily improving AROM. Patient is progressing well.     PLAN/RECOMMENDATIONS:   Plan for treatment: therapy treatment to continue next visit.  Planned interventions for next visit: continue with current treatment.

## 2019-03-06 ENCOUNTER — PHYSICAL THERAPY (OUTPATIENT)
Dept: PHYSICAL THERAPY | Facility: REHABILITATION | Age: 69
End: 2019-03-06
Attending: ORTHOPAEDIC SURGERY
Payer: MEDICARE

## 2019-03-06 DIAGNOSIS — F33.41 RECURRENT MAJOR DEPRESSIVE DISORDER, IN PARTIAL REMISSION (HCC): ICD-10-CM

## 2019-03-06 DIAGNOSIS — Z98.890 S/P ARTHROSCOPY OF RIGHT SHOULDER: ICD-10-CM

## 2019-03-06 DIAGNOSIS — Z98.890 STATUS POST CARPAL TUNNEL RELEASE: ICD-10-CM

## 2019-03-06 PROCEDURE — 97110 THERAPEUTIC EXERCISES: CPT

## 2019-03-06 PROCEDURE — 97014 ELECTRIC STIMULATION THERAPY: CPT

## 2019-03-07 RX ORDER — VENLAFAXINE HYDROCHLORIDE 37.5 MG/1
CAPSULE, EXTENDED RELEASE ORAL
Qty: 30 CAP | Refills: 2 | Status: SHIPPED | OUTPATIENT
Start: 2019-03-07 | End: 2019-04-01 | Stop reason: SDUPTHER

## 2019-03-08 ENCOUNTER — HOSPITAL ENCOUNTER (OUTPATIENT)
Dept: LAB | Facility: MEDICAL CENTER | Age: 69
End: 2019-03-08
Attending: INTERNAL MEDICINE
Payer: MEDICARE

## 2019-03-08 ENCOUNTER — PHYSICAL THERAPY (OUTPATIENT)
Dept: PHYSICAL THERAPY | Facility: REHABILITATION | Age: 69
End: 2019-03-08
Attending: ORTHOPAEDIC SURGERY
Payer: MEDICARE

## 2019-03-08 ENCOUNTER — OFFICE VISIT (OUTPATIENT)
Dept: MEDICAL GROUP | Facility: MEDICAL CENTER | Age: 69
End: 2019-03-08
Payer: MEDICARE

## 2019-03-08 VITALS
BODY MASS INDEX: 45 KG/M2 | HEART RATE: 66 BPM | DIASTOLIC BLOOD PRESSURE: 82 MMHG | WEIGHT: 280 LBS | SYSTOLIC BLOOD PRESSURE: 130 MMHG | TEMPERATURE: 98.7 F | OXYGEN SATURATION: 93 % | HEIGHT: 66 IN | RESPIRATION RATE: 16 BRPM

## 2019-03-08 DIAGNOSIS — M79.671 RIGHT FOOT PAIN: ICD-10-CM

## 2019-03-08 DIAGNOSIS — Z98.890 S/P ARTHROSCOPY OF RIGHT SHOULDER: ICD-10-CM

## 2019-03-08 DIAGNOSIS — Z98.890 STATUS POST CARPAL TUNNEL RELEASE: ICD-10-CM

## 2019-03-08 PROBLEM — Z01.810 PRE-OPERATIVE CARDIOVASCULAR EXAMINATION: Status: RESOLVED | Noted: 2018-12-21 | Resolved: 2019-03-08

## 2019-03-08 LAB — URATE SERPL-MCNC: 9.9 MG/DL (ref 1.9–8.2)

## 2019-03-08 PROCEDURE — 97014 ELECTRIC STIMULATION THERAPY: CPT

## 2019-03-08 PROCEDURE — 99214 OFFICE O/P EST MOD 30 MIN: CPT | Performed by: INTERNAL MEDICINE

## 2019-03-08 PROCEDURE — 97110 THERAPEUTIC EXERCISES: CPT

## 2019-03-08 PROCEDURE — 36415 COLL VENOUS BLD VENIPUNCTURE: CPT

## 2019-03-08 PROCEDURE — 84550 ASSAY OF BLOOD/URIC ACID: CPT

## 2019-03-08 RX ORDER — METHYLPREDNISOLONE 4 MG/1
TABLET ORAL
Qty: 21 TAB | Refills: 0 | Status: SHIPPED | OUTPATIENT
Start: 2019-03-08 | End: 2019-04-01

## 2019-03-08 NOTE — PROGRESS NOTES
CC: Foot pain and swelling    HPI:   Ashly presents today with the following.    1. Right foot pain  Presents with a questionable history of gout complaining of right foot pain and swelling.  She was seen in urgent care a month ago x-rays did not show anything but soft tissue swelling.  She reports pain over her anterior foot and great toe somewhat rating up to the ankle.  Denies any recent injury.  She did improve after urgent care only for symptoms to return in the last week.  Pain is 6 out of 10 in intensity difficulty walking she does report some redness.  No fevers or chills and again no swelling of the leg no chest pain or shortness of breath.      Patient Active Problem List    Diagnosis Date Noted   • Pre-operative cardiovascular examination 12/21/2018   • Dry eye syndrome, bilateral    • Rotator cuff syndrome of right shoulder and allied disorders 10/10/2018   • Elevated sedimentation rate    • Former smoker 05/30/2018   • Recurrent major depressive disorder, in partial remission (Formerly [facility]) 03/29/2018   • Vitamin D deficiency 12/28/2017   • Morbid obesity with BMI of 45.0-49.9, adult (Formerly [facility])    • Obesity hypoventilation syndrome (Formerly [facility]) 05/31/2017   • Neural foraminal stenosis of cervical spine 05/18/2017   • Tinnitus of both ears 05/18/2017   • Dyslipidemia, goal LDL below 130 01/06/2017   • Nonalcoholic fatty liver disease 01/05/2017   • Disease of accessory sinus 10/03/2016   • Atrophic rhinitis 10/03/2016   • Deviated nasal septum 08/01/2016   • Menopausal symptoms 04/28/2016   • Anal fissure 04/14/2016   • Lumbar facet arthropathy 12/09/2015   • Chronic pain 11/18/2015   • Candidal intertrigo 06/01/2015   • Elevated sed rate 12/09/2014   • Mild intermittent asthma without complication    • H/O fibromyalgia 03/11/2014   • KERRI (obstructive sleep apnea) 03/11/2014   • Acute idiopathic gout of right foot 03/11/2014   • Menopause 03/11/2014   • Peripheral neuropathy    • Rectocele 11/25/2013   • Pelvic floor  dysfunction    • Chronic constipation 10/02/2013   • Nocturnal hypoxia    • Cervical stenosis of spinal canal 11/01/2011   • Lumbar radiculopathy 11/01/2011   • Migraine without aura    • Carpal tunnel syndrome 09/05/2011   • Ulnar neuropathy 09/05/2011   • Seasonal allergies 07/08/2011   • Polymyalgia rheumatica (HCC) 05/06/2011   • GERD (gastroesophageal reflux disease) 12/10/2010   • Osteoarthritis of multiple joints 04/13/2010   • Eczema, dyshidrotic 08/03/2009   • Essential hypertension 05/20/2009   • Hypothyroidism due to acquired atrophy of thyroid 05/20/2009       Current Outpatient Prescriptions   Medication Sig Dispense Refill   • MethylPREDNISolone (MEDROL DOSEPAK) 4 MG Tablet Therapy Pack Per package insert. 21 Tab 0   • venlafaxine XR (EFFEXOR XR) 37.5 MG CAPSULE SR 24 HR TAKE ONE CAPSULE BY MOUTH ONE TIME DAILY  30 Cap 2   • ibuprofen (MOTRIN) 200 MG Tab Take 400-600 mg by mouth every 6 hours as needed.     • Polyethyl Glycol-Propyl Glycol (SYSTANE OP) 1 Drop by Ophthalmic route 4 times a day.     • tramadol (ULTRAM) 50 MG Tab Take 100 mg by mouth every four hours as needed.     • albuterol (PROAIR HFA) 108 (90 Base) MCG/ACT Aero Soln inhalation aerosol Inhale 2 Puffs by mouth every 6 hours as needed for Shortness of Breath. 1 Inhaler 6   • carvedilol (COREG) 6.25 MG Tab TAKE 1 TABLET BY MOUTH TWICE A DAY WITH MEALS 180 Tab 2   • gabapentin (NEURONTIN) 300 MG Cap Take 2 Caps by mouth 3 times a day. 540 Cap 3   • pantoprazole (PROTONIX) 40 MG Tablet Delayed Response TAKE ONE TABLET BY MOUTH TWICE DAILY  180 Tab 2   • levothyroxine (SYNTHROID) 200 MCG Tab TAKE 1 TABLET BY MOUTH EVERY DAY 90 Tab 2   • potassium chloride SA (KDUR) 20 MEQ Tab CR Take 1 Tab by mouth 2 times a day. 180 Tab 3   • furosemide (LASIX) 20 MG Tab Take 1 Tab by mouth every day. 90 Tab 2   • nabumetone (RELAFEN) 750 MG Tab TAKE 1 TABLET BY MOUTH TWICE A DAY WITH MEALS 180 Tab 2   • albuterol (VENTOLIN HFA) 108 (90 Base) MCG/ACT Aero  "Soln inhalation aerosol Inhale 2 Puffs by mouth every 6 hours as needed for Shortness of Breath.     • spironolactone (ALDACTONE) 50 MG Tab TAKE 1 TABLET BY MOUTH EVERY DAY 90 Tab 3   • metronidazole (METROGEL) 0.75 % gel APPLY TO AFFECTED AREA TWICE A DAY 1 Tube 0   • ketoconazole (NIZORAL) 2 % Cream Apply to affected area daily 30 g 2   • polyethyl glycol-propyl glycol (SYSTANE) 0.4-0.3 % Solution Place 1 Drop in both eyes as needed.     • temazepam (RESTORIL) 15 MG Cap Take 1 Cap by mouth at bedtime as needed for Sleep. 30 Cap 3   • OXYGEN-HELIUM INH Inhale 2-3 L by mouth as needed.     • Fluocinolone Acetonide 0.01 % Oil Place  in ear 2 Times a Day.       No current facility-administered medications for this visit.          Allergies as of 03/08/2019 - Reviewed 03/08/2019   Allergen Reaction Noted   • Butalbital-acetaminophen Unspecified 07/27/2016   • Cefaclor Unspecified 08/03/2007   • Diphenhydramine Hives 08/03/2007   • Iodine Unspecified 05/20/2009   • Lamictal Rash and Swelling 12/27/2011   • Morphine Itching 03/11/2011   • Penicillins Itching and Swelling 08/03/2007   • Savella [milnacipran hcl] Unspecified 05/24/2013   • Sulfa drugs Hives and Anxiety 08/03/2007   • Tizanidine hcl Unspecified 03/11/2014   • Amlactin Rash 01/30/2019   • Savella [kdc:milnacipran+ci pigment blue 63]  01/30/2019   • Ace inhibitors Cough 05/20/2009   • Tape Unspecified 07/27/2016        ROS: Denies Chest pain, SOB, LE edema.    /82 (BP Location: Left arm)   Pulse 66   Temp 37.1 °C (98.7 °F)   Resp 16   Ht 1.676 m (5' 6\")   Wt (!) 127 kg (280 lb)   SpO2 93% Comment: 3-4 ltrs at night, 3 during the day PRN  BMI 45.19 kg/m²     Physical Exam:  Gen:         Alert and oriented, No apparent distress.  Neck:        No Lymphadenopathy or Bruits.  Lungs:     Clear to auscultation bilaterally  CV:          Regular rate and rhythm. No murmurs, rubs or gallops.               Ext:          No clubbing, cyanosis, " edema.      Assessment and Plan.   68 y.o. female with the following issues.    1. Right foot pain  Swelling but no edema above the ankle.  Suspicion for possible gout have placed on a Medrol Dosepak and sent for uric acid level.  She does have an orthopedist she will be seen next week to discuss will await blood work if should be a recurrent theme will consider going back on allopurinol.  - URIC ACID; Future  - REFERRAL TO ORTHOPEDICS  - MethylPREDNISolone (MEDROL DOSEPAK) 4 MG Tablet Therapy Pack; Per package insert.  Dispense: 21 Tab; Refill: 0

## 2019-03-08 NOTE — OP THERAPY DAILY TREATMENT
Outpatient Physical Therapy  DAILY TREATMENT     [address]  [address]  [place] [address]  Phone:  [phone]  Fax:  [phone]    Date: 03/08/2019    Patient: Ashly Meyer  YOB: 1950  MRN: [id number]     Time Calculation  Start time: 1050  Stop time: 1150 Time Calculation (min): 60 minutes     Chief Complaint: Shoulder Problem; Wrist Problem; and Post-Op Pain    Visit #: 5    SUBJECTIVE:  Saw her  Today about her foot pain. May be starting a dose pack for this.     OBJECTIVE:  Current objective measures:   AROM R shoulder  Flexion 0 - 120 deg (40 deg. Improvement)  ER at 0 deg abduction 0-60 deg.  Extension 0-65 deg    Wrist AROM  And PROM Symmetric in flexion/radial deviation/ulnar deviation, slightly decreased R wrist in extension.  strength grossly symmetric. Test with dynamometer at next follow up.           Therapeutic Exercises (CPT 65773):     1. Seated, Scapular adduction/depression 10 x 2, with white band:, ER partial range 15 x 2, HEP, Flexion to below 90 8 x 2, HEP, Elbow flexion 15 x 2, HEP    6. Hooklying, Isometric flexion, extension, horizontal abduction, horizontal adduction at 90 deg. shoudler elevation resistance form therapist 10 ea. w/5 sec. hold, 0 deg. elevation in neutral rotation: ER/IR isometric hold 10 with 5 sec. ea. with resistance from therapist      Therapeutic Exercise Summary: Access Code: [email or web address]   URL: [email or web address]         Therapeutic Treatments and Modalities:     1. E Stim Unattended (CPT 38067), R shoulder ifc w/mhp 15'    Time-based treatments/modalities:  Therapeutic exercise minutes (CPT 03469): 42 minutes             ASSESSMENT:   Response to treatment: Patient tolerated progression to light loading  - isometric and partial range. Progressing well in PT.     PLAN/RECOMMENDATIONS:   Plan for treatment: therapy treatment to continue next visit.  Planned interventions for next visit:  continue with current treatment.

## 2019-03-12 ENCOUNTER — PHYSICAL THERAPY (OUTPATIENT)
Dept: PHYSICAL THERAPY | Facility: REHABILITATION | Age: 69
End: 2019-03-12
Attending: SURGERY
Payer: MEDICARE

## 2019-03-12 DIAGNOSIS — Z98.890 S/P ARTHROSCOPY OF RIGHT SHOULDER: ICD-10-CM

## 2019-03-12 DIAGNOSIS — Z98.890 STATUS POST CARPAL TUNNEL RELEASE: ICD-10-CM

## 2019-03-12 PROCEDURE — 97110 THERAPEUTIC EXERCISES: CPT

## 2019-03-12 PROCEDURE — 97014 ELECTRIC STIMULATION THERAPY: CPT

## 2019-03-12 PROCEDURE — 97140 MANUAL THERAPY 1/> REGIONS: CPT

## 2019-03-12 NOTE — OP THERAPY DAILY TREATMENT
Outpatient Physical Therapy  DAILY TREATMENT     [facility] Physical [address].  [address]  [place] [address]  Phone:  [phone]  Fax:  [phone]    Date: 03/12/2019    Patient: Ashly Meyer  YOB: 1950  MRN: [id number]     Time Calculation  Start time: 1430  Stop time: 1515 Time Calculation (min): 45 minutes     Chief Complaint: Shoulder Problem; Wrist Problem; and Post-Op Pain    Visit #: 6    SUBJECTIVE:  R shoulder and forearm a bit sore today.     OBJECTIVE:  Current objective measures:                  Therapeutic Exercises (CPT 65843):     6. Hooklying, Isometric flexion, extension, horizontal abduction, horizontal adduction at 90 deg. shoudler elevation resistance form therapist 10 ea. w/5 sec. hold, 0 deg. elevation in neutral rotation: ER/IR isometric hold 10 with 5 sec. ea. with resistance from therapist    8. Elbow flexion in neutral and suppinated forearm, 10 ea. w/1#    9. Supine shoulder flexion AROM to 90 deg., 10 x 1 no weight pain at anterior shoulder      Therapeutic Exercise Summary: Access Code: [email or web address]   URL: [email or web address]         Therapeutic Treatments and Modalities:     1. E Stim Unattended (CPT 49701), R shoulder ifc w/cold pack 15'    2. Manual Therapy (CPT 61973), IASTM L deltoid, biceps    Time-based treatments/modalities:  Manual therapy minutes (CPT 64536): 10 minutes  Therapeutic exercise minutes (CPT 96532): 18 minutes             ASSESSMENT:   Response to treatment: Regressed light loading to tolerance. Will perform without band at home as AROM.     PLAN/RECOMMENDATIONS:   Plan for treatment: therapy treatment to continue next visit.  Planned interventions for next visit: continue with current treatment.

## 2019-03-15 ENCOUNTER — PHYSICAL THERAPY (OUTPATIENT)
Dept: PHYSICAL THERAPY | Facility: REHABILITATION | Age: 69
End: 2019-03-15
Attending: ORTHOPAEDIC SURGERY
Payer: MEDICARE

## 2019-03-15 DIAGNOSIS — Z98.890 S/P ARTHROSCOPY OF RIGHT SHOULDER: ICD-10-CM

## 2019-03-15 DIAGNOSIS — Z98.890 STATUS POST CARPAL TUNNEL RELEASE: ICD-10-CM

## 2019-03-15 PROCEDURE — 97110 THERAPEUTIC EXERCISES: CPT

## 2019-03-15 PROCEDURE — 97014 ELECTRIC STIMULATION THERAPY: CPT

## 2019-03-15 NOTE — OP THERAPY DAILY TREATMENT
Outpatient Physical Therapy  DAILY TREATMENT     [facility] Physical [address]  [address]  [place] [address]  Phone:  [phone]  Fax:  [phone]    Date: 03/15/2019    Patient: Ashly Meyer  YOB: 1950  MRN: [id number]     Time Calculation  Start time: 1100  Stop time: 1145 Time Calculation (min): 45 minutes     Chief Complaint: Shoulder Problem; Wrist Problem; and Post-Op Pain    Visit #: 7    SUBJECTIVE:  Saw the surgeon this morning.      OBJECTIVE:  Current objective measures:   AROM R shoulder flexion/abduction 0-110 deg.  PROM WFL               Therapeutic Exercises (CPT 53032):     1. Scapular adduction/retraction, 10 x 2    2. Cervical retraction to balloon at wall, 10 with 5 sec. hold    3. Isometric cervical retraction hold to balloon with AROM R shoulder, Flexion 10 x 2, Scaption 10 x 2    5. Education: scar massage L hand       Therapeutic Exercise Summary: Access Code: [email or web address]   URL: [email or web address]         Therapeutic Treatments and Modalities:     1. E Stim Unattended (CPT 35554), R shoulder ifc w/cold pack 15'    2. Manual Therapy (CPT 01111), Grade II long axis distraction, posterior glide, PROM ER with glide, reduced anterior shoulder pain with glide    Time-based treatments/modalities:  Therapeutic exercise minutes (CPT 54236): 28 minutes             ASSESSMENT:   Response to treatment: Soreness from last visit reduced with d/c of band for elevation activities. Improving form with AROM with corresponding reduced UT overuse and reduced R side neck pain.     PLAN/RECOMMENDATIONS:   Plan for treatment: therapy treatment to continue next visit.  Planned interventions for next visit: continue with current treatment.

## 2019-03-19 ENCOUNTER — PHYSICAL THERAPY (OUTPATIENT)
Dept: PHYSICAL THERAPY | Facility: REHABILITATION | Age: 69
End: 2019-03-19
Attending: ORTHOPAEDIC SURGERY
Payer: MEDICARE

## 2019-03-19 DIAGNOSIS — Z98.890 S/P ARTHROSCOPY OF RIGHT SHOULDER: ICD-10-CM

## 2019-03-19 DIAGNOSIS — Z98.890 STATUS POST CARPAL TUNNEL RELEASE: ICD-10-CM

## 2019-03-19 PROCEDURE — 97110 THERAPEUTIC EXERCISES: CPT

## 2019-03-19 PROCEDURE — 97014 ELECTRIC STIMULATION THERAPY: CPT

## 2019-03-19 PROCEDURE — 97140 MANUAL THERAPY 1/> REGIONS: CPT

## 2019-03-19 NOTE — OP THERAPY DAILY TREATMENT
Outpatient Physical Therapy  DAILY TREATMENT     [facility] Physical [address].  [address]  [place] [address]  Phone:  [phone]  Fax:  [phone]    Date: 03/19/2019    Patient: Ashly Meyer  YOB: 1950  MRN: [id number]     Time Calculation  Start time: 1130  Stop time: 1215 Time Calculation (min): 45 minutes     Chief Complaint: No chief complaint on file.    Visit #: 8    SUBJECTIVE:  Increased generalized soreness in shoulder and hand after washing her dogs yesterday.     OBJECTIVE:  Current objective measures:   R shoulder AROM  Flexion 0-150 deg  Abduction 0-130 deg                 Therapeutic Exercises (CPT 16819):     1. Scapular adduction/retraction, 10 x 2    2. Supine AAROM flexion , 20 x 1    3. Supine AAROM abduction, 20 x 1    4. Seated AROM Flexion , 5 x 2, 0-150 deg.    5. Seated AROM Abduction, 5 x 2, 0-140 deg.    6. Composite finger flexion (pillowcase pull), 15 x 1    7. Unilateral finger extension , 5 x 1 ea.    8. Wrist extensor stretch, 30 sec. x 2    9. Closed chain AP rock flexion/extension, hands on plinth 10 x 2      Therapeutic Exercise Summary: Access Code: [email or web address]   URL: [email or web address]         Therapeutic Treatments and Modalities:     1. E Stim Unattended (CPT 26577), R a/l/p deltoid Russian 10/10, cold pack R forearm 2 layers 15'    2. Manual Therapy (CPT 97334), IASTM R palmar surface hand    Time-based treatments/modalities:  Manual therapy minutes (CPT 80786): 8 minutes  Therapeutic exercise minutes (CPT 17643): 20 minutes             ASSESSMENT:   Response to treatment: Additional improvement in AROM R shoulder from last visit. Partial symptom relief hand pain with IASTM. Closed chain and additional stability progression at next visit.     PLAN/RECOMMENDATIONS:   Plan for treatment: therapy treatment to continue next visit.  Planned interventions for next visit: continue with current treatment.

## 2019-03-21 ENCOUNTER — PHYSICAL THERAPY (OUTPATIENT)
Dept: PHYSICAL THERAPY | Facility: REHABILITATION | Age: 69
End: 2019-03-21
Attending: ORTHOPAEDIC SURGERY
Payer: MEDICARE

## 2019-03-21 DIAGNOSIS — Z98.890 S/P ARTHROSCOPY OF RIGHT SHOULDER: ICD-10-CM

## 2019-03-21 PROCEDURE — 97110 THERAPEUTIC EXERCISES: CPT

## 2019-03-21 PROCEDURE — 97014 ELECTRIC STIMULATION THERAPY: CPT

## 2019-03-21 PROCEDURE — 97140 MANUAL THERAPY 1/> REGIONS: CPT

## 2019-03-21 NOTE — OP THERAPY DAILY TREATMENT
Outpatient Physical Therapy  DAILY TREATMENT     [facility] Physical [address].  [address]  [place] [address]  Phone:  [phone]  Fax:  [phone]    Date: 03/21/2019    Patient: Ashly Meyer  YOB: 1950  MRN: [id number]     Time Calculation  Start time: 1105  Stop time: 1150 Time Calculation (min): 45 minutes     Chief Complaint: No chief complaint on file.    Visit #: 9    SUBJECTIVE:  Feeling sore everywhere today, with flare up of gout and fibromyalgia.     OBJECTIVE:  Current objective measures:               Therapeutic Exercises (CPT 44601):     1. Scapular adduction/retraction, 10 x 2    2. Supine pendulums, 20 x 1    3. Supine Isometric ER, 20 x 1    4. Supine Isometric IR, 20 x 1    5. Supine isometric flexion, 20 x 2    6. Supine AAROM flexion, abduction, 20 x 1    7. Flexion pulleys, 4 min      Therapeutic Exercise Summary: Access Code: [email or web address]   URL: [email or web address]         Therapeutic Treatments and Modalities:     1. E Stim Unattended (CPT 77731), R a/l/p deltoid Russian 10/10, cold pack R forearm 2 layers 15'    2. Manual Therapy (CPT 40367), Grade II-III GH distraction, contract relax ER/IR at 45 deg. abduction in supine    Time-based treatments/modalities:  Manual therapy minutes (CPT 97602): 15 minutes  Therapeutic exercise minutes (CPT 24357): 12 minutes       ASSESSMENT:   Response to treatment: Reduced tolerance to loading at this session due to more generalized myalgias. Will continue to perform HEP over weekend. Progress at next visit.     PLAN/RECOMMENDATIONS:   Plan for treatment: therapy treatment to continue next visit.  Planned interventions for next visit: continue with current treatment. Table closed chain. Ball [place] at wall. Serratus slide on roller. ER with band. AROM seated.

## 2019-03-25 ENCOUNTER — TELEPHONE (OUTPATIENT)
Dept: SCHEDULING | Facility: IMAGING CENTER | Age: 69
End: 2019-03-25

## 2019-03-26 ENCOUNTER — APPOINTMENT (OUTPATIENT)
Dept: PHYSICAL THERAPY | Facility: REHABILITATION | Age: 69
End: 2019-03-26
Attending: ORTHOPAEDIC SURGERY
Payer: MEDICARE

## 2019-03-27 DIAGNOSIS — B37.2 CANDIDAL INTERTRIGO: ICD-10-CM

## 2019-03-28 ENCOUNTER — APPOINTMENT (OUTPATIENT)
Dept: PHYSICAL THERAPY | Facility: REHABILITATION | Age: 69
End: 2019-03-28
Attending: ORTHOPAEDIC SURGERY
Payer: MEDICARE

## 2019-03-28 RX ORDER — KETOCONAZOLE 20 MG/G
CREAM TOPICAL
Qty: 30 G | Refills: 1 | Status: SHIPPED | OUTPATIENT
Start: 2019-03-28 | End: 2020-01-05

## 2019-04-01 ENCOUNTER — OFFICE VISIT (OUTPATIENT)
Dept: MEDICAL GROUP | Facility: MEDICAL CENTER | Age: 69
End: 2019-04-01
Payer: MEDICARE

## 2019-04-01 VITALS
WEIGHT: 280 LBS | BODY MASS INDEX: 43.95 KG/M2 | OXYGEN SATURATION: 92 % | HEART RATE: 65 BPM | SYSTOLIC BLOOD PRESSURE: 126 MMHG | HEIGHT: 67 IN | RESPIRATION RATE: 12 BRPM | TEMPERATURE: 99.6 F | DIASTOLIC BLOOD PRESSURE: 78 MMHG

## 2019-04-01 DIAGNOSIS — E03.4 HYPOTHYROIDISM DUE TO ACQUIRED ATROPHY OF THYROID: ICD-10-CM

## 2019-04-01 DIAGNOSIS — M54.16 LUMBAR RADICULOPATHY: ICD-10-CM

## 2019-04-01 DIAGNOSIS — M48.02 CERVICAL STENOSIS OF SPINAL CANAL: ICD-10-CM

## 2019-04-01 DIAGNOSIS — M15.9 PRIMARY OSTEOARTHRITIS INVOLVING MULTIPLE JOINTS: ICD-10-CM

## 2019-04-01 DIAGNOSIS — M35.3 POLYMYALGIA RHEUMATICA (HCC): ICD-10-CM

## 2019-04-01 DIAGNOSIS — F33.41 RECURRENT MAJOR DEPRESSIVE DISORDER, IN PARTIAL REMISSION (HCC): ICD-10-CM

## 2019-04-01 DIAGNOSIS — R60.9 PERIPHERAL EDEMA: ICD-10-CM

## 2019-04-01 PROCEDURE — 99213 OFFICE O/P EST LOW 20 MIN: CPT | Performed by: FAMILY MEDICINE

## 2019-04-01 RX ORDER — FUROSEMIDE 20 MG/1
20 TABLET ORAL
Qty: 90 TAB | Refills: 3 | Status: SHIPPED | OUTPATIENT
Start: 2019-04-01 | End: 2020-03-19 | Stop reason: SDUPTHER

## 2019-04-01 RX ORDER — VENLAFAXINE HYDROCHLORIDE 75 MG/1
75 CAPSULE, EXTENDED RELEASE ORAL DAILY
Qty: 30 CAP | Refills: 3 | Status: SHIPPED | OUTPATIENT
Start: 2019-04-01 | End: 2019-04-30 | Stop reason: SDUPTHER

## 2019-04-01 RX ORDER — TRAMADOL HYDROCHLORIDE 50 MG/1
50 TABLET ORAL EVERY 4 HOURS PRN
Qty: 180 TAB | Refills: 2 | Status: SHIPPED | OUTPATIENT
Start: 2019-04-01 | End: 2019-06-30

## 2019-04-01 RX ORDER — NABUMETONE 750 MG/1
750 TABLET, FILM COATED ORAL 2 TIMES DAILY WITH MEALS
Qty: 180 TAB | Refills: 3 | Status: SHIPPED | OUTPATIENT
Start: 2019-04-01 | End: 2020-03-19 | Stop reason: SDUPTHER

## 2019-04-01 RX ORDER — LEVOTHYROXINE SODIUM 0.2 MG/1
200 TABLET ORAL
Qty: 90 TAB | Refills: 3 | Status: SHIPPED | OUTPATIENT
Start: 2019-04-01 | End: 2020-03-19 | Stop reason: SDUPTHER

## 2019-04-02 NOTE — PROGRESS NOTES
Chief Complaint   Patient presents with   • Arthritis   • Muscle Pain   • Neurological Problem   • Depression   • Hypothyroidism       Subjective:     HPI:   Ashly Meyer presents today with the followin. Primary osteoarthritis involving multiple joints  She continues to have frequent joint pain and stiffness.  The tramadol does help somewhat per patient.  Denies significant sedation. Tramadol is renewed.  No aberrant or addictive use of medications has been observed.  No adverse events have been reported.  Patient counseled to not add Tylenol to current regimen.  Patient counseled to keep medications locked up or under personal control.  [facility] [employer] [facility] of pharmacy interface is reviewed.  No inconsistencies are found.  Her maximum MME is 30.  This is within CDC guideline for chronic pain prescribing by primary care.    2. Polymyalgia rheumatica (HCC)  She has this diagnosis, has not responded to steroid treatment.    3. Lumbar radiculopathy  This is a long-standing problem which has been very difficult to treat.  She was diagnosed by neurology but has been intolerant of many medications.  The one medication that did seem to work quite well she was severely allergic to.  She continues gabapentin which is somewhat helpful.    4. Cervical stenosis of spinal canal  Multiple level degenerative neck disease.  Continues the nabumetone.      5. Recurrent major depressive disorder, in partial remission (HCC)  Feels the venlafaxine is not doing much.  Continues to have sed thoughts.  Denies suicidal ideation or plan.  Discussed, have increased the dose of the venlafaxine.      6. Hypothyroidism due to acquired atrophy of thyroid  Patient reports good energy level on the medication. Patient denies insomnia, tremor or change in appetite.  Patient is taking the medication on an empty stomach in the morning and waiting at least 30 minutes before eating.  Last TSH in October was at target.    7. Peripheral edema  This  continues to be problematic off and on.  It is actually not too bad today at all.  The furosemide continues to be available to her for use as needed.        Patient Active Problem List    Diagnosis Date Noted   • Dry eye syndrome, bilateral    • Rotator cuff syndrome of right shoulder and allied disorders 10/10/2018   • Elevated sedimentation rate    • Former smoker 05/30/2018   • Recurrent major depressive disorder, in partial remission ([facility]) 03/29/2018   • Vitamin D deficiency 12/28/2017   • Morbid obesity with BMI of 45.0-49.9, adult ([facility])    • Obesity hypoventilation syndrome ([facility]) 05/31/2017   • Neural foraminal stenosis of cervical spine 05/18/2017   • Tinnitus of both ears 05/18/2017   • Dyslipidemia, goal LDL below 130 01/06/2017   • Nonalcoholic fatty liver disease 01/05/2017   • Disease of accessory sinus 10/03/2016   • Atrophic rhinitis 10/03/2016   • Deviated nasal septum 08/01/2016   • Menopausal symptoms 04/28/2016   • Anal fissure 04/14/2016   • Lumbar facet arthropathy 12/09/2015   • Chronic pain 11/18/2015   • Candidal intertrigo 06/01/2015   • Elevated sed rate 12/09/2014   • Mild intermittent asthma without complication    • H/O fibromyalgia 03/11/2014   • KERRI (obstructive sleep apnea) 03/11/2014   • Acute idiopathic gout of right foot 03/11/2014   • Menopause 03/11/2014   • Peripheral neuropathy    • Rectocele 11/25/2013   • Pelvic floor dysfunction    • Chronic constipation 10/02/2013   • Nocturnal hypoxia    • Cervical stenosis of spinal canal 11/01/2011   • Lumbar radiculopathy 11/01/2011   • Migraine without aura    • Carpal tunnel syndrome 09/05/2011   • Ulnar neuropathy 09/05/2011   • Seasonal allergies 07/08/2011   • Polymyalgia rheumatica (HCC) 05/06/2011   • GERD (gastroesophageal reflux disease) 12/10/2010   • Osteoarthritis of multiple joints 04/13/2010   • Eczema, dyshidrotic 08/03/2009   • Essential hypertension 05/20/2009   • Hypothyroidism due to acquired atrophy of thyroid  05/20/2009       Current medicines (including changes today)  Current Outpatient Prescriptions   Medication Sig Dispense Refill   • venlafaxine XR (EFFEXOR XR) 75 MG CAPSULE SR 24 HR Take 1 Cap by mouth every day. 30 Cap 3   • tramadol (ULTRAM) 50 MG Tab Take 1 Tab by mouth every four hours as needed for Moderate Pain for up to 90 days. 180 Tab 2   • levothyroxine (SYNTHROID) 200 MCG Tab Take 1 Tab by mouth Every morning on an empty stomach. 90 Tab 3   • nabumetone (RELAFEN) 750 MG Tab Take 1 Tab by mouth 2 times a day, with meals. 180 Tab 3   • furosemide (LASIX) 20 MG Tab Take 1 Tab by mouth every day. 90 Tab 3   • ketoconazole (NIZORAL) 2 % Cream APPLY TO AFFECTED AREA DAILY 30 g 1   • ibuprofen (MOTRIN) 200 MG Tab Take 400-600 mg by mouth every 6 hours as needed.     • Polyethyl Glycol-Propyl Glycol (SYSTANE OP) 1 Drop by Ophthalmic route 4 times a day.     • tramadol (ULTRAM) 50 MG Tab Take 100 mg by mouth every four hours as needed.     • albuterol (PROAIR HFA) 108 (90 Base) MCG/ACT Aero Soln inhalation aerosol Inhale 2 Puffs by mouth every 6 hours as needed for Shortness of Breath. 1 Inhaler 6   • carvedilol (COREG) 6.25 MG Tab TAKE 1 TABLET BY MOUTH TWICE A DAY WITH MEALS 180 Tab 2   • gabapentin (NEURONTIN) 300 MG Cap Take 2 Caps by mouth 3 times a day. 540 Cap 3   • pantoprazole (PROTONIX) 40 MG Tablet Delayed Response TAKE ONE TABLET BY MOUTH TWICE DAILY  180 Tab 2   • potassium chloride SA (KDUR) 20 MEQ Tab CR Take 1 Tab by mouth 2 times a day. 180 Tab 3   • albuterol (VENTOLIN HFA) 108 (90 Base) MCG/ACT Aero Soln inhalation aerosol Inhale 2 Puffs by mouth every 6 hours as needed for Shortness of Breath.     • spironolactone (ALDACTONE) 50 MG Tab TAKE 1 TABLET BY MOUTH EVERY DAY 90 Tab 3   • metronidazole (METROGEL) 0.75 % gel APPLY TO AFFECTED AREA TWICE A DAY 1 Tube 0   • polyethyl glycol-propyl glycol (SYSTANE) 0.4-0.3 % Solution Place 1 Drop in both eyes as needed.     • temazepam (RESTORIL) 15 MG  "Cap Take 1 Cap by mouth at bedtime as needed for Sleep. 30 Cap 3   • OXYGEN-HELIUM INH Inhale 2-3 L by mouth as needed.     • Fluocinolone Acetonide 0.01 % Oil Place  in ear 2 Times a Day.       No current facility-administered medications for this visit.        Allergies   Allergen Reactions   • Butalbital-Acetaminophen Unspecified     Dizzy, can't walk, hard to focus   • Cefaclor Unspecified     Ceclor causes light headedness and dizziness   • Diphenhydramine Hives     Full body hives   • Iodine Unspecified     Labored breathing   • Lamictal Rash and Swelling     \"hot\", unable to function   • Morphine Itching     redness   • Penicillins Itching and Swelling     Skin itching and redness   • Savella [Milnacipran Hcl] Unspecified     Muscle aches, feet swelling   • Sulfa Drugs Hives and Anxiety     Hard breathing   • Tizanidine Hcl Unspecified     dizziness   • Amlactin Rash     Lotion causes itching, redness and burnng   • Savella [Kdc:Milnacipran+Ci Pigment Blue 63]      Swelling, bone and muscle pain, sweating, nausea, dizziness, sleeplessness, anxiety   • Ace Inhibitors Cough     Cough     • Tape Unspecified     Paper tape is ok       ROS: As per HPI       Objective:     Blood pressure 126/78, pulse 65, temperature 37.6 °C (99.6 °F), resp. rate 12, height 1.689 m (5' 6.5\"), weight (!) 127 kg (280 lb), SpO2 92 %, not currently breastfeeding. Body mass index is 44.52 kg/m².    Physical Exam:  Constitutional: Well-developed and well-nourished. Not diaphoretic. No distress. Lucid and fluent.  Skin: Skin is warm and dry. No rash noted.  Head: Atraumatic without lesions.  Eyes: Conjunctivae and extraocular motions are normal. Pupils are equal, round, and reactive to light. No scleral icterus.   Ears:  External ears unremarkable. Tympanic membranes clear and intact.  Normal to inspection.  Nose: Nares patent. Mucosa without edema or erythema. No discharge. No facial tenderness.  Mouth/Throat: Tongue normal. Oropharynx " is clear and moist. Posterior pharynx without erythema or exudates.  Neck: Supple, trachea midline. No thyromegaly present. No cervical or supraclavicular lymphadenopathy. No JVD or carotid bruits appreciated  Cardiovascular: Regular rate and rhythm.  Normal S1, S2 without murmur appreciated.  Chest: Effort normal. Clear to auscultation throughout. No adventitious sounds.   Abdomen: Soft, non tender, and without distention. Active bowel sounds in all four quadrants. No rebound, guarding, masses or hepatosplenomegaly.  Extremities: No cyanosis, clubbing, erythema, nor edema.   Neurological: Alert and oriented x 3. No significant tremor appreciated.  Psychiatric:  Behavior, mood, and affect are appropriate.       Assessment and Plan:     68 y.o. female with the following issues:    1. Primary osteoarthritis involving multiple joints  tramadol (ULTRAM) 50 MG Tab    nabumetone (RELAFEN) 750 MG Tab   2. Polymyalgia rheumatica (HCC)  tramadol (ULTRAM) 50 MG Tab   3. Lumbar radiculopathy  tramadol (ULTRAM) 50 MG Tab   4. Cervical stenosis of spinal canal  tramadol (ULTRAM) 50 MG Tab   5. Recurrent major depressive disorder, in partial remission (HCC)  venlafaxine XR (EFFEXOR XR) 75 MG CAPSULE SR 24 HR   6. Hypothyroidism due to acquired atrophy of thyroid  levothyroxine (SYNTHROID) 200 MCG Tab   7. Peripheral edema  furosemide (LASIX) 20 MG Tab         Followup: Return in about 3 months (around 7/1/2019), or if symptoms worsen or fail to improve.

## 2019-04-30 ENCOUNTER — OFFICE VISIT (OUTPATIENT)
Dept: MEDICAL GROUP | Facility: MEDICAL CENTER | Age: 69
End: 2019-04-30
Payer: MEDICARE

## 2019-04-30 VITALS
TEMPERATURE: 97.9 F | HEART RATE: 62 BPM | BODY MASS INDEX: 45.16 KG/M2 | RESPIRATION RATE: 16 BRPM | OXYGEN SATURATION: 92 % | WEIGHT: 281 LBS | SYSTOLIC BLOOD PRESSURE: 118 MMHG | DIASTOLIC BLOOD PRESSURE: 72 MMHG | HEIGHT: 66 IN

## 2019-04-30 DIAGNOSIS — H53.8 BLURRED VISION, BILATERAL: ICD-10-CM

## 2019-04-30 DIAGNOSIS — R63.1 POLYDIPSIA: ICD-10-CM

## 2019-04-30 DIAGNOSIS — F33.41 RECURRENT MAJOR DEPRESSIVE DISORDER, IN PARTIAL REMISSION (HCC): ICD-10-CM

## 2019-04-30 DIAGNOSIS — R73.09 ELEVATED GLUCOSE LEVEL: ICD-10-CM

## 2019-04-30 DIAGNOSIS — M1A.0790 CHRONIC GOUT OF FOOT, UNSPECIFIED CAUSE, UNSPECIFIED LATERALITY: ICD-10-CM

## 2019-04-30 DIAGNOSIS — E03.4 HYPOTHYROIDISM DUE TO ACQUIRED ATROPHY OF THYROID: ICD-10-CM

## 2019-04-30 DIAGNOSIS — E55.9 VITAMIN D DEFICIENCY: ICD-10-CM

## 2019-04-30 DIAGNOSIS — K21.9 GASTROESOPHAGEAL REFLUX DISEASE WITHOUT ESOPHAGITIS: ICD-10-CM

## 2019-04-30 DIAGNOSIS — K76.0 NONALCOHOLIC FATTY LIVER DISEASE: ICD-10-CM

## 2019-04-30 DIAGNOSIS — E78.5 DYSLIPIDEMIA, GOAL LDL BELOW 130: ICD-10-CM

## 2019-04-30 PROCEDURE — 99214 OFFICE O/P EST MOD 30 MIN: CPT | Performed by: FAMILY MEDICINE

## 2019-04-30 RX ORDER — ALLOPURINOL 300 MG/1
300 TABLET ORAL DAILY
Qty: 90 TAB | Refills: 3 | Status: SHIPPED | OUTPATIENT
Start: 2019-04-30 | End: 2020-03-19 | Stop reason: SDUPTHER

## 2019-04-30 RX ORDER — VENLAFAXINE HYDROCHLORIDE 150 MG/1
150 CAPSULE, EXTENDED RELEASE ORAL DAILY
Qty: 30 CAP | Refills: 6 | Status: SHIPPED | OUTPATIENT
Start: 2019-04-30 | End: 2019-10-29

## 2019-04-30 NOTE — PROGRESS NOTES
Chief Complaint   Patient presents with   • Polydipsia   • Elevated Glucose   • Blurred Vision   • Hyperlipidemia   • Hypothyroidism       Subjective:     HPI:   Ashly Meyer presents today with the followin. Elevated glucose level/Polydipsia/Blurred vision, bilateral  Patient is having increased thirst.  She notes she is urinating more often.  She also notices bilateral blurred vision and wonders if she is developing diabetes.  She has been prediabetic for a while.  Lab orders are discussed and placed.    2. Nonalcoholic fatty liver disease  I discussed with the patient again that a high-fiber high vegetable more healthy diet would probably be of assistance in terms of her fatty liver disease.  Follow-up lab orders discussed and placed.    3. Dyslipidemia, goal LDL below 130  Patient denies chest pain, chest pressure, palpitations or exertional shortness of breath. Patient is not on lipid-lowering medication.  Lipid levels have been generally favorable with good risk ratio and sometimes mild triglyceride elevation. Patient is a former smoker. Patient takes no aspirin daily. Patient has no history of myocardial infarction, stroke or PVD.  Lab orders discussed and placed.    4. Vitamin D deficiency  No history of pathologic fracture, follow-up lab order discussed and placed    5. Hypothyroidism due to acquired atrophy of thyroid  Patient reports good energy level on the medication. Patient denies insomnia, tremor or change in appetite.  Patient is taking the medication on an empty stomach in the morning and waiting at least 30 minutes before eating.  Last TSH in October last year was at target.  She is very fatigued, follow-up lab order discussed and placed.    6. Gastroesophageal reflux disease without esophagitis  Patient feels the current medication regimen of pantoprazole is controlling the gastroesophageal reflux symptoms well. Denies dysphagia, reflux symptoms, acidity, abdominal pain or visible  blood or mucus in the stool. Denies vomiting or hematemesis. Denies burping or abdominal bloating. Patient is still taking daily nabumetone.  She does add episodic ibuprofen though that is not recommended. Avoids heavy meals or eating within 2 hours of bedtime.    7. Recurrent major depressive disorder, in partial remission (HCC)  Patient states the 75 mg venlafaxine has been moderately helpful.  She would like to increase the dose.  She feels she is more energetic and is getting more accomplished.  She states her family notes a difference as well.  Denies suicidal ideation or plan or thoughts of self-harm.  Dose is increased to 150 mg    8. Chronic gout of foot, unspecified cause, unspecified laterality  Uric acid level has generally been high.  Sometimes her foot pain has features of gout though most of her foot pain is actually neuropathic.  Discussed starting allopurinol.  Prescription is sent to the pharmacy.        Patient Active Problem List    Diagnosis Date Noted   • Elevated glucose level 04/30/2019   • Chronic gout of foot 04/30/2019   • Dry eye syndrome, bilateral    • Rotator cuff syndrome of right shoulder and allied disorders 10/10/2018   • Elevated sedimentation rate    • Former smoker 05/30/2018   • Recurrent major depressive disorder, in partial remission (HCC) 03/29/2018   • Vitamin D deficiency 12/28/2017   • Morbid obesity with BMI of 45.0-49.9, adult ([facility])    • Obesity hypoventilation syndrome ([facility]) 05/31/2017   • Neural foraminal stenosis of cervical spine 05/18/2017   • Tinnitus of both ears 05/18/2017   • Dyslipidemia, goal LDL below 130 01/06/2017   • Nonalcoholic fatty liver disease 01/05/2017   • Disease of accessory sinus 10/03/2016   • Atrophic rhinitis 10/03/2016   • Deviated nasal septum 08/01/2016   • Menopausal symptoms 04/28/2016   • Anal fissure 04/14/2016   • Lumbar facet arthropathy 12/09/2015   • Chronic pain 11/18/2015   • Candidal intertrigo 06/01/2015   • Elevated sed rate  12/09/2014   • Mild intermittent asthma without complication    • H/O fibromyalgia 03/11/2014   • KERRI (obstructive sleep apnea) 03/11/2014   • Acute idiopathic gout of right foot 03/11/2014   • Menopause 03/11/2014   • Peripheral neuropathy    • Rectocele 11/25/2013   • Pelvic floor dysfunction    • Chronic constipation 10/02/2013   • Nocturnal hypoxia    • Cervical stenosis of spinal canal 11/01/2011   • Lumbar radiculopathy 11/01/2011   • Migraine without aura    • Carpal tunnel syndrome 09/05/2011   • Ulnar neuropathy 09/05/2011   • Seasonal allergies 07/08/2011   • Polymyalgia rheumatica (HCC) 05/06/2011   • GERD (gastroesophageal reflux disease) 12/10/2010   • Osteoarthritis of multiple joints 04/13/2010   • Eczema, dyshidrotic 08/03/2009   • Essential hypertension 05/20/2009   • Hypothyroidism due to acquired atrophy of thyroid 05/20/2009       Current medicines (including changes today)  Current Outpatient Prescriptions   Medication Sig Dispense Refill   • venlafaxine XR (EFFEXOR-XR) 150 MG extended-release capsule Take 1 Cap by mouth every day. 30 Cap 6   • allopurinol (ZYLOPRIM) 300 MG Tab Take 1 Tab by mouth every day. 90 Tab 3   • tramadol (ULTRAM) 50 MG Tab Take 1 Tab by mouth every four hours as needed for Moderate Pain for up to 90 days. 180 Tab 2   • levothyroxine (SYNTHROID) 200 MCG Tab Take 1 Tab by mouth Every morning on an empty stomach. 90 Tab 3   • nabumetone (RELAFEN) 750 MG Tab Take 1 Tab by mouth 2 times a day, with meals. 180 Tab 3   • furosemide (LASIX) 20 MG Tab Take 1 Tab by mouth every day. 90 Tab 3   • ketoconazole (NIZORAL) 2 % Cream APPLY TO AFFECTED AREA DAILY 30 g 1   • ibuprofen (MOTRIN) 200 MG Tab Take 400-600 mg by mouth every 6 hours as needed.     • Polyethyl Glycol-Propyl Glycol (SYSTANE OP) 1 Drop by Ophthalmic route 4 times a day.     • albuterol (PROAIR HFA) 108 (90 Base) MCG/ACT Aero Soln inhalation aerosol Inhale 2 Puffs by mouth every 6 hours as needed for Shortness of  "Breath. 1 Inhaler 6   • carvedilol (COREG) 6.25 MG Tab TAKE 1 TABLET BY MOUTH TWICE A DAY WITH MEALS 180 Tab 2   • gabapentin (NEURONTIN) 300 MG Cap Take 2 Caps by mouth 3 times a day. 540 Cap 3   • pantoprazole (PROTONIX) 40 MG Tablet Delayed Response TAKE ONE TABLET BY MOUTH TWICE DAILY  180 Tab 2   • potassium chloride SA (KDUR) 20 MEQ Tab CR Take 1 Tab by mouth 2 times a day. 180 Tab 3   • albuterol (VENTOLIN HFA) 108 (90 Base) MCG/ACT Aero Soln inhalation aerosol Inhale 2 Puffs by mouth every 6 hours as needed for Shortness of Breath.     • spironolactone (ALDACTONE) 50 MG Tab TAKE 1 TABLET BY MOUTH EVERY DAY 90 Tab 3   • metronidazole (METROGEL) 0.75 % gel APPLY TO AFFECTED AREA TWICE A DAY 1 Tube 0   • polyethyl glycol-propyl glycol (SYSTANE) 0.4-0.3 % Solution Place 1 Drop in both eyes as needed.     • temazepam (RESTORIL) 15 MG Cap Take 1 Cap by mouth at bedtime as needed for Sleep. 30 Cap 3   • Fluocinolone Acetonide 0.01 % Oil Place  in ear 2 Times a Day.       No current facility-administered medications for this visit.        Allergies   Allergen Reactions   • Butalbital-Acetaminophen Unspecified     Dizzy, can't walk, hard to focus   • Cefaclor Unspecified     Ceclor causes light headedness and dizziness   • Diphenhydramine Hives     Full body hives   • Iodine Unspecified     Labored breathing   • Lamictal Rash and Swelling     \"hot\", unable to function   • Morphine Itching     redness   • Penicillins Itching and Swelling     Skin itching and redness   • Savella [Milnacipran Hcl] Unspecified     Muscle aches, feet swelling   • Sulfa Drugs Hives and Anxiety     Hard breathing   • Tizanidine Hcl Unspecified     dizziness   • Amlactin Rash     Lotion causes itching, redness and burnng   • Savella [Kdc:Milnacipran+Ci Pigment Blue 63]      Swelling, bone and muscle pain, sweating, nausea, dizziness, sleeplessness, anxiety   • Ace Inhibitors Cough     Cough     • Tape Unspecified     Paper tape is ok " "      ROS: As per HPI       Objective:     /72   Pulse 62   Temp 36.6 °C (97.9 °F)   Resp 16   Ht 1.676 m (5' 6\")   Wt (!) 127.5 kg (281 lb)   SpO2 92%  Body mass index is 45.35 kg/m².    Physical Exam:  Constitutional: Well-developed and well-nourished. Not diaphoretic. No distress. Lucid and fluent.  Skin: Skin is warm and dry. No rash noted.  Head: Atraumatic without lesions.  Eyes: Conjunctivae and extraocular motions are normal. Pupils are equal, round, and reactive to light. No scleral icterus.   Nose: Nares patent. Mucosa without edema or erythema. No discharge. No facial tenderness.  Mouth/Throat: Tongue normal. Oropharynx is clear and moist. Posterior pharynx without erythema or exudates.  Neck: Supple, trachea midline. No thyromegaly present. No cervical or supraclavicular lymphadenopathy. No JVD or carotid bruits appreciated  Cardiovascular: Regular rate and rhythm.  Normal S1, S2 without murmur appreciated.  Chest: Effort normal. Clear to auscultation throughout. No adventitious sounds.   Abdomen: Soft, epigastrium tender, without distention. Active bowel sounds in all four quadrants. No rebound, guarding, masses or hepatosplenomegaly.  Extremities: No cyanosis, clubbing, erythema, nor edema.   Neurological: Alert and oriented x 3. Gait broad based.  Currently no tremor.  Psychiatric:  Behavior, mood, and affect are appropriate.       Assessment and Plan:     68 y.o. female with the following issues:    1. Elevated glucose level  HEMOGLOBIN A1C    Comp Metabolic Panel   2. Polydipsia  Comp Metabolic Panel   3. Blurred vision, bilateral  Comp Metabolic Panel   4. Nonalcoholic fatty liver disease  Comp Metabolic Panel    CBC WITHOUT DIFFERENTIAL   5. Dyslipidemia, goal LDL below 130  Comp Metabolic Panel    Lipid Profile   6. Vitamin D deficiency  VITAMIN D,25 HYDROXY   7. Hypothyroidism due to acquired atrophy of thyroid  TSH   8. Gastroesophageal reflux disease without esophagitis  CBC " WITHOUT DIFFERENTIAL   9. Recurrent major depressive disorder, in partial remission (HCC)  venlafaxine XR (EFFEXOR-XR) 150 MG extended-release capsule   10. Chronic gout of foot, unspecified cause, unspecified laterality  allopurinol (ZYLOPRIM) 300 MG Tab         Followup: Return in about 3 months (around 7/30/2019), or if symptoms worsen or fail to improve.

## 2019-05-01 ENCOUNTER — HOSPITAL ENCOUNTER (OUTPATIENT)
Dept: LAB | Facility: MEDICAL CENTER | Age: 69
End: 2019-05-01
Attending: FAMILY MEDICINE
Payer: MEDICARE

## 2019-05-01 DIAGNOSIS — R73.09 ELEVATED GLUCOSE LEVEL: ICD-10-CM

## 2019-05-01 DIAGNOSIS — R63.1 POLYDIPSIA: ICD-10-CM

## 2019-05-01 DIAGNOSIS — K76.0 NONALCOHOLIC FATTY LIVER DISEASE: ICD-10-CM

## 2019-05-01 DIAGNOSIS — H53.8 BLURRED VISION, BILATERAL: ICD-10-CM

## 2019-05-01 DIAGNOSIS — E78.5 DYSLIPIDEMIA, GOAL LDL BELOW 130: ICD-10-CM

## 2019-05-01 DIAGNOSIS — E03.4 HYPOTHYROIDISM DUE TO ACQUIRED ATROPHY OF THYROID: ICD-10-CM

## 2019-05-01 DIAGNOSIS — K21.9 GASTROESOPHAGEAL REFLUX DISEASE WITHOUT ESOPHAGITIS: ICD-10-CM

## 2019-05-01 DIAGNOSIS — E55.9 VITAMIN D DEFICIENCY: ICD-10-CM

## 2019-05-01 LAB
ALBUMIN SERPL BCP-MCNC: 4 G/DL (ref 3.2–4.9)
ALBUMIN/GLOB SERPL: 1.2 G/DL
ALP SERPL-CCNC: 127 U/L (ref 30–99)
ALT SERPL-CCNC: 21 U/L (ref 2–50)
ANION GAP SERPL CALC-SCNC: 10 MMOL/L (ref 0–11.9)
AST SERPL-CCNC: 15 U/L (ref 12–45)
BILIRUB SERPL-MCNC: 0.5 MG/DL (ref 0.1–1.5)
BUN SERPL-MCNC: 26 MG/DL (ref 8–22)
CALCIUM SERPL-MCNC: 9.4 MG/DL (ref 8.5–10.5)
CHLORIDE SERPL-SCNC: 105 MMOL/L (ref 96–112)
CHOLEST SERPL-MCNC: 180 MG/DL (ref 100–199)
CO2 SERPL-SCNC: 25 MMOL/L (ref 20–33)
CREAT SERPL-MCNC: 0.78 MG/DL (ref 0.5–1.4)
ERYTHROCYTE [DISTWIDTH] IN BLOOD BY AUTOMATED COUNT: 49.9 FL (ref 35.9–50)
EST. AVERAGE GLUCOSE BLD GHB EST-MCNC: 148 MG/DL
FASTING STATUS PATIENT QL REPORTED: NORMAL
GLOBULIN SER CALC-MCNC: 3.4 G/DL (ref 1.9–3.5)
GLUCOSE SERPL-MCNC: 138 MG/DL (ref 65–99)
HBA1C MFR BLD: 6.8 % (ref 0–5.6)
HCT VFR BLD AUTO: 44 % (ref 37–47)
HDLC SERPL-MCNC: 46 MG/DL
HGB BLD-MCNC: 13.8 G/DL (ref 12–16)
LDLC SERPL CALC-MCNC: 107 MG/DL
MCH RBC QN AUTO: 28.5 PG (ref 27–33)
MCHC RBC AUTO-ENTMCNC: 31.4 G/DL (ref 33.6–35)
MCV RBC AUTO: 90.9 FL (ref 81.4–97.8)
PLATELET # BLD AUTO: 193 K/UL (ref 164–446)
PMV BLD AUTO: 11.2 FL (ref 9–12.9)
POTASSIUM SERPL-SCNC: 4.1 MMOL/L (ref 3.6–5.5)
PROT SERPL-MCNC: 7.4 G/DL (ref 6–8.2)
RBC # BLD AUTO: 4.84 M/UL (ref 4.2–5.4)
SODIUM SERPL-SCNC: 140 MMOL/L (ref 135–145)
TRIGL SERPL-MCNC: 134 MG/DL (ref 0–149)
WBC # BLD AUTO: 9.3 K/UL (ref 4.8–10.8)

## 2019-05-01 PROCEDURE — 36415 COLL VENOUS BLD VENIPUNCTURE: CPT | Mod: GA

## 2019-05-01 PROCEDURE — 83036 HEMOGLOBIN GLYCOSYLATED A1C: CPT | Mod: GA

## 2019-05-01 PROCEDURE — 84443 ASSAY THYROID STIM HORMONE: CPT

## 2019-05-01 PROCEDURE — 85027 COMPLETE CBC AUTOMATED: CPT

## 2019-05-01 PROCEDURE — 82306 VITAMIN D 25 HYDROXY: CPT

## 2019-05-01 PROCEDURE — 80053 COMPREHEN METABOLIC PANEL: CPT

## 2019-05-01 PROCEDURE — 80061 LIPID PANEL: CPT

## 2019-05-02 LAB
25(OH)D3 SERPL-MCNC: 14 NG/ML (ref 30–100)
TSH SERPL DL<=0.005 MIU/L-ACNC: 0.38 UIU/ML (ref 0.38–5.33)

## 2019-05-02 NOTE — TELEPHONE ENCOUNTER
Closure 4 Information: This tab is for additional flaps and grafts above and beyond our usual structured repairs. Please note if you enter information here it will not currently bill and you will need to add the billing information manually. Surgery Clearance is from [facility] for right shoulder arthroscopy and carpel tunnel   Pt was last seen as a New Pt for Asthma 7/26/18, and an Xray and PFT was taken same day. Pt has an apt w/ Carlos 1/25/19. Please advise if pt is cleared for surgery or needs OV. Form scanned in to media   We also see pt for KERRI last seen by Carlos 5/30/18   Stage 4: Number Of Blocks?: 0 Repair Type: Referred to plastics for closure H Plasty Text: Given the location of the defect, shape of the defect and the proximity to free margins a H-plasty was deemed most appropriate for repair. Using a sterile surgical marker, the appropriate advancement arms of the H-plasty were drawn incorporating the defect and placing the expected incisions within the relaxed skin tension lines where possible. The area thus outlined was incised deep to adipose tissue with a #15 scalpel blade. The skin margins were undermined to an appropriate distance in all directions utilizing iris scissors. The opposing advancement arms were then advanced into place in opposite direction and anchored with interrupted buried subcutaneous sutures. Epidermal Closure: simple interrupted Date Of Previous Biopsy (Optional): 4-12-19 Composite Graft Text: The defect edges were debeveled with a #15 scalpel blade. Given the location of the defect, shape of the defect, the proximity to free margins and the fact the defect was full thickness a composite graft was deemed most appropriate. The defect was outline and then transferred to the donor site. A full thickness graft was then excised from the donor site. The graft was then placed in the primary defect, oriented appropriately and then sutured into place. The secondary defect was then repaired using a primary closure. Use Subsequent Stages Verbiage?: No Wound Care (No Sutures): Petrolatum Stage 10: Additional Anesthesia Type: 1% lidocaine with epinephrine Consent 2/Introductory Paragraph: Mohs surgery was explained to the patient and consent was obtained. The risks, benefits and alternatives to therapy were discussed in detail. Specifically, the risks of infection, scarring, bleeding, prolonged wound healing, incomplete removal, allergy to anesthesia, nerve injury and recurrence were addressed. Prior to the procedure, the treatment site was clearly identified and confirmed by the patient. All components of Universal Protocol/PAUSE Rule completed. Referred To Plastics For Closure Text (Leave Blank If You Do Not Want): After obtaining clear surgical margins the patient was sent to plastics for surgical repair. The patient understands they will receive post-surgical care and follow-up from the referring physician's office. No Repair - Repaired With Adjacent Surgical Defect Text (Leave Blank If You Do Not Want): After obtaining clear surgical margins the defect was repaired concurrently with another surgical defect which was in close approximation. Mucosal Advancement Flap Text: Given the location of the defect, shape of the defect and the proximity to free margins a mucosal advancement flap was deemed most appropriate. Incisions were made with a 15 blade scalpel in the appropriate fashion along the cutaneous vermilion border and the mucosal lip. The remaining actinically damaged mucosal tissue was excised. The mucosal advancement flap was then elevated to the gingival sulcus with care taken to preserve the neurovascular structures and advanced into the primary defect. Care was taken to ensure that precise realignment of the vermilion border was achieved. Hemostasis: Electrocautery Show Previous Accession Variable: Yes Postop Diagnosis: same Bcc Histology Text: Beneath an irregular epidermis, there are small nodular masses of basaloid neoplastic cells with nuclear pleomorphism attached to the basal layer and surrounded by a loose fibrous stroma and mild inflammation in the dermis. The findings are typical for a basal cell carcinoma. Asc Procedure Text (F): After obtaining clear surgical margins the patient was sent to an [facility] for surgical repair. The patient understands they will receive post-surgical care and follow-up from the [facility] physician. Plastic Surgeon Procedure Text (B): After obtaining clear surgical margins the patient was sent to plastics for surgical repair.  The patient understands they will receive post-surgical care and follow-up from the referring physician's office. Referring Physician (Optional): Tg Lovett Trilobed Flap Text: The defect edges were debeveled with a #15 scalpel blade. Given the location of the defect and the proximity to free margins a trilobed flap was deemed most appropriate. Using a sterile surgical marker, an appropriate trilobed flap drawn around the defect. The area thus outlined was incised deep to adipose tissue with a #15 scalpel blade. The skin margins were undermined to an appropriate distance in all directions utilizing iris scissors. Complex Repair Preamble Text (Leave Blank If You Do Not Want): Extensive wide undermining was performed. Suture Removal: 7 days Tissue Cultured Epidermal Autograft Text: The defect edges were debeveled with a #15 scalpel blade. Given the location of the defect, shape of the defect and the proximity to free margins a tissue cultured epidermal autograft was deemed most appropriate. The graft was then trimmed to fit the size of the defect. The graft was then placed in the primary defect and oriented appropriately. Cheek-To-Nose Interpolation Flap Text: A decision was made to reconstruct the defect utilizing an interpolation axial flap and a staged reconstruction. A telfa template was made of the defect. This telfa template was then used to outline the Cheek-To-Nose Interpolation flap. The donor area for the pedicle flap was then injected with anesthesia. The flap was excised through the skin and subcutaneous tissue down to the layer of the underlying musculature. The interpolation flap was carefully excised within this deep plane to maintain its blood supply. The edges of the donor site were undermined. The donor site was closed in a primary fashion. The pedicle was then rotated into position and sutured. Once the tube was sutured into place, adequate blood supply was confirmed with blanching and refill. The pedicle was then wrapped with xeroform gauze and dressed appropriately with a telfa and gauze bandage to ensure continued blood supply and protect the attached pedicle. Advancement Flap (Single) Text: The defect edges were debeveled with a #15 scalpel blade. Given the location of the defect and the proximity to free margins a single advancement flap was deemed most appropriate. Using a sterile surgical marker, an appropriate advancement flap was drawn incorporating the defect and placing the expected incisions within the relaxed skin tension lines where possible. The area thus outlined was incised deep to adipose tissue with a #15 scalpel blade. The skin margins were undermined to an appropriate distance in all directions utilizing iris scissors. Consent (Spinal Accessory)/Introductory Paragraph: The rationale for Mohs was explained to the patient and consent was obtained. The risks, benefits and alternatives to therapy were discussed in detail. Specifically, the risks of damage to the spinal accessory nerve, infection, scarring, bleeding, prolonged wound healing, incomplete removal, allergy to anesthesia, and recurrence were addressed. Prior to the procedure, the treatment site was clearly identified and confirmed by the patient. All components of Universal Protocol/PAUSE Rule completed. Scc Histology Text: There is hyperkeratosis, acanthosis, and cytologic atypia of keratinocytes with hyperchromatic and pleomorphic nuclei throughout the epidermis. No dermal invasion is seen. Chronic inflammation is present in the dermis. Mid-Level Procedure Text (A): After obtaining clear surgical margins the patient was sent to a mid-level provider for surgical repair. The patient understands they will receive post-surgical care and follow-up from the mid-level provider. Oculoplastic Surgeon Procedure Text (C): After obtaining clear surgical margins the patient was sent to oculoplastics for surgical repair. The patient understands they will receive post-surgical care and follow-up from the referring physician's office. Star Wedge Flap Text: The defect edges were debeveled with a #15 scalpel blade. Given the location of the defect, shape of the defect and the proximity to free margins a star wedge flap was deemed most appropriate. Using a sterile surgical marker, an appropriate rotation flap was drawn incorporating the defect and placing the expected incisions within the relaxed skin tension lines where possible. The area thus outlined was incised deep to adipose tissue with a #15 scalpel blade. The skin margins were undermined to an appropriate distance in all directions utilizing iris scissors. Bcc  Morpheaform/Sclerosing Histology Text: irregular nests of pleomorphic, hyperchromatic basaloid cells with peripheral palisading infiltrate the tissue in a diffuse fashion in association with a mucoid stroma and dense dermal sclerosis.  The tumor infiltrates in thin strands and single cells among the sclerotic collagen fibers in a pattern of morpheaform variant of basal cell carcinoma Oculoplastic Surgeon Procedure Text (D): After obtaining clear surgical margins the patient was sent to oculoplastics for surgical repair.  The patient understands they will receive post-surgical care and follow-up from the referring physician's office. Presence Of Scar Tissue (For Histology): absent Paramedian Forehead Flap Text: A decision was made to reconstruct the defect utilizing an interpolation axial flap and a staged reconstruction. A telfa template was made of the defect. This telfa template was then used to outline the paramedian forehead pedicle flap. The donor area for the pedicle flap was then injected with anesthesia. The flap was excised through the skin and subcutaneous tissue down to the layer of the underlying musculature. The pedicle flap was carefully excised within this deep plane to maintain its blood supply. The edges of the donor site were undermined. The donor site was closed in a primary fashion. The pedicle was then rotated into position and sutured. Once the tube was sutured into place, adequate blood supply was confirmed with blanching and refill. The pedicle was then wrapped with xeroform gauze and dressed appropriately with a telfa and gauze bandage to ensure continued blood supply and protect the attached pedicle. Retention Suture Bite Size: 3 mm Double Island Pedicle Flap Text: The defect edges were debeveled with a #15 scalpel blade. Given the location of the defect, shape of the defect and the proximity to free margins a double island pedicle advancement flap was deemed most appropriate. Using a sterile surgical marker, an appropriate advancement flap was drawn incorporating the defect, outlining the appropriate donor tissue and placing the expected incisions within the relaxed skin tension lines where possible. The area thus outlined was incised deep to adipose tissue with a #15 scalpel blade. The skin margins were undermined to an appropriate distance in all directions around the primary defect and laterally outward around the island pedicle utilizing iris scissors. There was minimal undermining beneath the pedicle flap. Partial Purse String (Intermediate) Text: Given the location of the defect and the characteristics of the surrounding skin an intermediate purse string closure was deemed most appropriate. Undermining was performed circumfirentially around the surgical defect. A purse string suture was then placed and tightened. Wound tension only allowed a partial closure of the circular defect. Provider Procedure Text (C): After obtaining clear surgical margins the defect was repaired by another provider. O-T Advancement Flap Text: The defect edges were debeveled with a #15 scalpel blade. Given the location of the defect, shape of the defect and the proximity to free margins an O-T advancement flap was deemed most appropriate. Using a sterile surgical marker, an appropriate advancement flap was drawn incorporating the defect and placing the expected incisions within the relaxed skin tension lines where possible. The area thus outlined was incised deep to adipose tissue with a #15 scalpel blade. The skin margins were undermined to an appropriate distance in all directions utilizing iris scissors. Same Histology In Subsequent Stages Text: The pattern and morphology of the tumor is as described in the first stage. Rhomboid Transposition Flap Text: The defect edges were debeveled with a #15 scalpel blade. Given the location of the defect and the proximity to free margins a rhomboid transposition flap was deemed most appropriate. Using a sterile surgical marker, an appropriate rhomboid flap was drawn incorporating the defect. The area thus outlined was incised deep to adipose tissue with a #15 scalpel blade. The skin margins were undermined to an appropriate distance in all directions utilizing iris scissors. Surgical Defect Length In Cm (Optional): 2 Consent (Scalp)/Introductory Paragraph: The rationale for Mohs was explained to the patient and consent was obtained. The risks, benefits and alternatives to therapy were discussed in detail. Specifically, the risks of changes in hair growth pattern secondary to repair, infection, scarring, bleeding, prolonged wound healing, incomplete removal, allergy to anesthesia, nerve injury and recurrence were addressed. Prior to the procedure, the treatment site was clearly identified and confirmed by the patient. All components of Universal Protocol/PAUSE Rule completed. O-Z Plasty Text: The defect edges were debeveled with a #15 scalpel blade. Given the location of the defect, shape of the defect and the proximity to free margins an O-Z plasty (double transposition flap) was deemed most appropriate. Using a sterile surgical marker, the appropriate transposition flaps were drawn incorporating the defect and placing the expected incisions within the relaxed skin tension lines where possible. The area thus outlined was incised deep to adipose tissue with a #15 scalpel blade. The skin margins were undermined to an appropriate distance in all directions utilizing iris scissors. Hemostasis was achieved with electrocautery. The flaps were then transposed into place, one clockwise and the other counterclockwise, and anchored with interrupted buried subcutaneous sutures. Dressing: dry sterile dressing Complex Repair And Graft Additional Text (Will Appearing After The Standard Complex Repair Text): The complex repair was not sufficient to completely close the primary defect. The remaining additional defect was repaired with the graft mentioned below. Asc Procedure Text (B): After obtaining clear surgical margins the patient was sent to an [facility] for surgical repair.  The patient understands they will receive post-surgical care and follow-up from the ASC physician. Full Thickness Lip Wedge Repair (Flap) Text: Given the location of the defect and the proximity to free margins a full thickness wedge repair was deemed most appropriate. Using a sterile surgical marker, the appropriate repair was drawn incorporating the defect and placing the expected incisions perpendicular to the vermilion border. The vermilion border was also meticulously outlined to ensure appropriate reapproximation during the repair. The area thus outlined was incised through and through with a #15 scalpel blade. The muscularis and dermis were reaproximated with deep sutures following hemostasis. Care was taken to realign the vermilion border before proceeding with the superficial closure. Once the vermilion was realigned the superfical and mucosal closure was finished. Area H Indication Text: Tumors in this location are included in Area H (eyelids, eyebrows, nose, lips, chin, ear, pre-auricular, post-auricular, temple, genitalia, hands, feet, ankles and areola). Tissue conservation is critical in these anatomic locations. Bcc Superficial Histology Text: Beneath an irregular epidermis, there are small nodular masses of basaloid neoplastic cells with nuclear pleomorphism attached to the basal layer and surrounded by a loose fibrous stroma and mild inflammation in the superficial dermis. The findings are typical for a superficial type of basal cell carcinoma. V-Y Flap Text: The defect edges were debeveled with a #15 scalpel blade. Given the location of the defect, shape of the defect and the proximity to free margins a V-Y flap was deemed most appropriate. Using a sterile surgical marker, an appropriate advancement flap was drawn incorporating the defect and placing the expected incisions within the relaxed skin tension lines where possible. The area thus outlined was incised deep to adipose tissue with a #15 scalpel blade. The skin margins were undermined to an appropriate distance in all directions utilizing iris scissors. Otolaryngologist Procedure Text (C): After obtaining clear surgical margins the patient was sent to otolaryngology for surgical repair. The patient understands they will receive post-surgical care and follow-up from the referring physician's office. Mid-Level Procedure Text (F): After obtaining clear surgical margins the patient was sent to a mid-level provider for surgical repair.  The patient understands they will receive post-surgical care and follow-up from the mid-level provider. Anesthesia Volume In Cc: 6 Double M-Plasty Intermediate Repair Preamble Text (Leave Blank If You Do Not Want): Undermining was performed with blunt dissection. Ear Star Wedge Flap Text: The defect edges were debeveled with a #15 blade scalpel. Given the location of the defect and the proximity to free margins (helical rim) an ear star wedge flap was deemed most appropriate. Using a sterile surgical marker, the appropriate flap was drawn incorporating the defect and placing the expected incisions between the helical rim and antihelix where possible. The area thus outlined was incised through and through with a #15 scalpel blade. Scc In Situ Histology Text: There is hyperkeratosis, acanthosis, and cytologic atypia of keratinocytes with hyperchromatic and pleomorphic nuclei throughout the full thickness of the epidermis. No dermal invasion is seen. Chronic inflammation is present in the dermis. Advancement-Rotation Flap Text: The defect edges were debeveled with a #15 scalpel blade. Given the location of the defect, shape of the defect and the proximity to free margins an advancement-rotation flap was deemed most appropriate. Using a sterile surgical marker, an appropriate flap was drawn incorporating the defect and placing the expected incisions within the relaxed skin tension lines where possible. The area thus outlined was incised deep to adipose tissue with a #15 scalpel blade. The skin margins were undermined to an appropriate distance in all directions utilizing iris scissors. Banner Transposition Flap Text: The defect edges were debeveled with a #15 scalpel blade. Given the location of the defect and the proximity to free margins a Banner transposition flap was deemed most appropriate. Using a sterile surgical marker, an appropriate flap drawn around the defect. The area thus outlined was incised deep to adipose tissue with a #15 scalpel blade. The skin margins were undermined to an appropriate distance in all directions utilizing iris scissors. Previous Accession (Optional): [id number] Plasty Text: The lesion was extirpated to the level of the fat with a #15 scalpel blade. Given the location of the defect, shape of the defect and the proximity to free margins a W-plasty was deemed most appropriate for repair. Using a sterile surgical marker, the appropriate transposition arms of the W-plasty were drawn incorporating the defect and placing the expected incisions within the relaxed skin tension lines where possible. The area thus outlined was incised deep to adipose tissue with a #15 scalpel blade. The skin margins were undermined to an appropriate distance in all directions utilizing iris scissors. The opposing transposition arms were then transposed into place in opposite direction and anchored with interrupted buried subcutaneous sutures. Epidermal Autograft Text: The defect edges were debeveled with a #15 scalpel blade. Given the location of the defect, shape of the defect and the proximity to free margins an epidermal autograft was deemed most appropriate. Using a sterile surgical marker, the primary defect shape was transferred to the donor site. The epidermal graft was then harvested. The skin graft was then placed in the primary defect and oriented appropriately. Consent 3/Introductory Paragraph: I gave the patient a chance to ask questions they had about the procedure. Following this I explained the Mohs procedure and consent was obtained. The risks, benefits and alternatives to therapy were discussed in detail. Specifically, the risks of infection, scarring, bleeding, prolonged wound healing, incomplete removal, allergy to anesthesia, nerve injury and recurrence were addressed. Prior to the procedure, the treatment site was clearly identified and confirmed by the patient. All components of Universal Protocol/PAUSE Rule completed. Hatchet Flap Text: The defect edges were debeveled with a #15 scalpel blade. Given the location of the defect, shape of the defect and the proximity to free margins a hatchet flap was deemed most appropriate. Using a sterile surgical marker, an appropriate hatchet flap was drawn incorporating the defect and placing the expected incisions within the relaxed skin tension lines where possible. The area thus outlined was incised deep to adipose tissue with a #15 scalpel blade. The skin margins were undermined to an appropriate distance in all directions utilizing iris scissors. Bcc Infiltrative Histology Text: There were numerous aggregates of basaloid cells demonstrating an infiltrative pattern Consent Type: Consent 1 (Standard) Interpolation Flap Text: A decision was made to reconstruct the defect utilizing an interpolation axial flap and a staged reconstruction. A telfa template was made of the defect. This telfa template was then used to outline the interpolation flap. The donor area for the pedicle flap was then injected with anesthesia. The flap was excised through the skin and subcutaneous tissue down to the layer of the underlying musculature. The interpolation flap was carefully excised within this deep plane to maintain its blood supply. The edges of the donor site were undermined. The donor site was closed in a primary fashion. The pedicle was then rotated into position and sutured. Once the tube was sutured into place, adequate blood supply was confirmed with blanching and refill. The pedicle was then wrapped with xeroform gauze and dressed appropriately with a telfa and gauze bandage to ensure continued blood supply and protect the attached pedicle. Dorsal Nasal Flap Text: The defect edges were debeveled with a #15 scalpel blade. Given the location of the defect and the proximity to free margins a dorsal nasal flap was deemed most appropriate. Using a sterile surgical marker, an appropriate dorsal nasal flap was drawn around the defect. The area thus outlined was incised deep to adipose tissue with a #15 scalpel blade. The skin margins were undermined to an appropriate distance in all directions utilizing iris scissors. Depth Of Tumor Invasion (For Histology): dermis Suturegard Intro: Intraoperative tissue expansion was performed, utilizing the SUTUREGARD device, in order to reduce wound tension. Xenograft Text: The defect edges were debeveled with a #15 scalpel blade. Given the location of the defect, shape of the defect and the proximity to free margins a xenograft was deemed most appropriate. The graft was then trimmed to fit the size of the defect. The graft was then placed in the primary defect and oriented appropriately. Surgeon: Paul Rose MD Manual Repair Warning Statement: We plan on removing the manually selected variable below in favor of our much easier automatic structured text blocks found in the previous tab. We decided to do this to help make the flow better and give you the full power of structured data. Manual selection is never going to be ideal in our platform and I would encourage you to avoid using manual selection from this point on, especially since I will be sunsetting this feature. It is important that you do one of two things with the customized text below. First, you can save all of the text in a word file so you can have it for future reference. Second, transfer the text to the appropriate area in the Library tab. Lastly, if there is a flap or graft type which we do not have you need to let us know right away so I can add it in before the variable is hidden. No need to panic, we plan to give you roughly 6 months to make the change. Deep Sutures: 5-0 Vicryl Advancement Flap (Double) Text: The defect edges were debeveled with a #15 scalpel blade. Given the location of the defect and the proximity to free margins a double advancement flap was deemed most appropriate. Using a sterile surgical marker, the appropriate advancement flaps were drawn incorporating the defect and placing the expected incisions within the relaxed skin tension lines where possible. The area thus outlined was incised deep to adipose tissue with a #15 scalpel blade. The skin margins were undermined to an appropriate distance in all directions utilizing iris scissors. Transposition Flap Text: The defect edges were debeveled with a #15 scalpel blade. Given the location of the defect and the proximity to free margins a transposition flap was deemed most appropriate. Using a sterile surgical marker, an appropriate transposition flap was drawn incorporating the defect. The area thus outlined was incised deep to adipose tissue with a #15 scalpel blade. The skin margins were undermined to an appropriate distance in all directions utilizing iris scissors. Afx Histology Text: there is a poorly differentiated spindled cell neoplasm composed of fascicles of atypical spindled and epithelioid cells, infiltrating and replacing papillary and reticular dermis. Mitotic activity is brisk, including atypical forms. The lesion is present on a sun-damaged skin.  This pattern supports the diagnosis of atypical fibrous xanthoma Consent (Near Eyelid Margin)/Introductory Paragraph: The rationale for Mohs was explained to the patient and consent was obtained. The risks, benefits and alternatives to therapy were discussed in detail. Specifically, the risks of ectropion or eyelid deformity, infection, scarring, bleeding, prolonged wound healing, incomplete removal, allergy to anesthesia, nerve injury and recurrence were addressed. Prior to the procedure, the treatment site was clearly identified and confirmed by the patient. All components of Universal Protocol/PAUSE Rule completed. Burow's Advancement Flap Text: The defect edges were debeveled with a #15 scalpel blade. Given the location of the defect and the proximity to free margins a Burow's advancement flap was deemed most appropriate. Using a sterile surgical marker, the appropriate advancement flap was drawn incorporating the defect and placing the expected incisions within the relaxed skin tension lines where possible. The area thus outlined was incised deep to adipose tissue with a #15 scalpel blade. The skin margins were undermined to an appropriate distance in all directions utilizing iris scissors. Consent (Ear)/Introductory Paragraph: The rationale for Mohs was explained to the patient and consent was obtained. The risks, benefits and alternatives to therapy were discussed in detail. Specifically, the risks of ear deformity, infection, scarring, bleeding, prolonged wound healing, incomplete removal, allergy to anesthesia, nerve injury and recurrence were addressed. Prior to the procedure, the treatment site was clearly identified and confirmed by the patient. All components of Universal Protocol/PAUSE Rule completed. Location Indication Override (Is Already Calculated Based On Selected Body Location): Area L Muscle Hinge Flap Text: The defect edges were debeveled with a #15 scalpel blade. Given the size, depth and location of the defect and the proximity to free margins a muscle hinge flap was deemed most appropriate. Using a sterile surgical marker, an appropriate hinge flap was drawn incorporating the defect. The area thus outlined was incised with a #15 scalpel blade. The skin margins were undermined to an appropriate distance in all directions utilizing iris scissors. Island Pedicle Flap-Requiring Vessel Identification Text: The defect edges were debeveled with a #15 scalpel blade. Given the location of the defect, shape of the defect and the proximity to free margins an island pedicle advancement flap was deemed most appropriate. Using a sterile surgical marker, an appropriate advancement flap was drawn, based on the axial vessel mentioned above, incorporating the defect, outlining the appropriate donor tissue and placing the expected incisions within the relaxed skin tension lines where possible. The area thus outlined was incised deep to adipose tissue with a #15 scalpel blade. The skin margins were undermined to an appropriate distance in all directions around the primary defect and laterally outward around the island pedicle utilizing iris scissors. There was minimal undermining beneath the pedicle flap. Localized Dermabrasion Text: The patient was draped in routine manner. Localized dermabrasion using 3 x 17 mm wire brush was performed in routine manner to papillary dermis. This spot dermabrasion is being performed to complete skin cancer reconstruction. It also will eliminate the other sun damaged precancerous cells that are known to be part of the regional effect of a lifetime's worth of sun exposure. This localized dermabrasion is therapeutic and should not be considered cosmetic in any regard. Cheiloplasty (Less Than 50%) Text: A decision was made to reconstruct the defect with a  cheiloplasty. The defect was undermined extensively. Additional obicularis oris muscle was excised with a 15 blade scalpel. The defect was converted into a full thickness wedge, of less than 50% of the vertical height of the lip, to facilite a better cosmetic result. Small vessels were then tied off with 5-0 monocyrl. The obicularis oris, superficial fascia, adipose and dermis were then reapproximated. After the deeper layers were approximated the epidermis was reapproximated with particular care given to realign the vermilion border. Bcc  Nodular Histology Text: There are uniform nodular masses of basaloid neoplastic cells with scanty cytoplasm and peripheral palisading with a loose fibrous stroma. The appearance is typical for a nodular basal cell carcinoma. Information: Selecting Yes will display possible errors in your note based on the variables you have selected. This validation is only offered as a suggestion for you. PLEASE NOTE THAT THE VALIDATION TEXT WILL BE REMOVED WHEN YOU FINALIZE YOUR NOTE. IF YOU WANT TO FAX A PRELIMINARY NOTE YOU WILL NEED TO TOGGLE THIS TO 'NO' IF YOU DO NOT WANT IT IN YOUR FAXED NOTE. No Residual Tumor Seen Histology Text: There were no malignant cells seen in the sections examined. O-L Flap Text: The defect edges were debeveled with a #15 scalpel blade. Given the location of the defect, shape of the defect and the proximity to free margins an O-L flap was deemed most appropriate. Using a sterile surgical marker, an appropriate advancement flap was drawn incorporating the defect and placing the expected incisions within the relaxed skin tension lines where possible. The area thus outlined was incised deep to adipose tissue with a #15 scalpel blade. The skin margins were undermined to an appropriate distance in all directions utilizing iris scissors. Scc Ka Subtype Histology Text: there is a cup-shaped exoendophytic epithelial neoplasm characterized by proliferations of keratinocytes with abundant eosinophilic glossy cytoplasm and nuclei with open chromatin in the papillary and upper reticular dermis. Multiple inclusions containing elastic material are seen within the cytoplasm. The features are of squamous cell carcinoma, keratoacanthoma type. Surgical Defect Width In Cm (Optional): 1.6 Detail Level: Detailed Closure 3 Information: This tab is for additional flaps and grafts above and beyond our usual structured repairs.  Please note if you enter information here it will not currently bill and you will need to add the billing information manually. Body Location Override (Optional - Billing Will Still Be Based On Selected Body Map Location If Applicable): Sternal Notch Bi-Rhombic Flap Text: The defect edges were debeveled with a #15 scalpel blade. Given the location of the defect and the proximity to free margins a bi-rhombic flap was deemed most appropriate. Using a sterile surgical marker, an appropriate rhombic flap was drawn incorporating the defect. The area thus outlined was incised deep to adipose tissue with a #15 scalpel blade. The skin margins were undermined to an appropriate distance in all directions utilizing iris scissors. Area M Indication Text: Tumors in this location are included in Area M (cheek, forehead, scalp, neck, jawline and pretibial skin). Mohs surgery is indicated for tumors in these anatomic locations. Double O-Z Plasty Text: The defect edges were debeveled with a #15 scalpel blade. Given the location of the defect, shape of the defect and the proximity to free margins a Double O-Z plasty (double transposition flap) was deemed most appropriate. Using a sterile surgical marker, the appropriate transposition flaps were drawn incorporating the defect and placing the expected incisions within the relaxed skin tension lines where possible. The area thus outlined was incised deep to adipose tissue with a #15 scalpel blade. The skin margins were undermined to an appropriate distance in all directions utilizing iris scissors. Hemostasis was achieved with electrocautery. The flaps were then transposed into place, one clockwise and the other counterclockwise, and anchored with interrupted buried subcutaneous sutures. Ftsg Text: The defect edges were debeveled with a #15 scalpel blade. Given the location of the defect, shape of the defect and the proximity to free margins a full thickness skin graft was deemed most appropriate. Using a sterile surgical marker, the primary defect shape was transferred to the donor site. The area thus outlined was incised deep to adipose tissue with a #15 scalpel blade. The harvested graft was then trimmed of adipose tissue until only dermis and epidermis was left. The skin margins of the secondary defect were undermined to an appropriate distance in all directions utilizing iris scissors. The secondary defect was closed with interrupted buried subcutaneous sutures. The skin edges were then re-apposed with running  sutures. The skin graft was then placed in the primary defect and oriented appropriately. Post-Care Instructions: I reviewed with the patient in detail post-care instructions. Patient is not to engage in any heavy lifting, exercise, or swimming for the next 14 days. Should the patient develop any fevers, chills, bleeding, severe pain patient will contact the office immediately. Number Of Stages: 1 Mohs Method Verbiage: An incision at a 45 degree angle following the standard Mohs approach was done and the specimen was harvested as a microscopic controlled layer. Non-Graft Cartilage Fenestration Text: The cartilage was fenestrated with a 2mm punch biopsy to help facilitate healing. Otolaryngologist Procedure Text (D): After obtaining clear surgical margins the patient was sent to otolaryngology for surgical repair.  The patient understands they will receive post-surgical care and follow-up from the referring physician's office. Medical Necessity Statement: Based on my medical judgement; after reviewing the pertinent pathology report, physical exam findings and the various treatment options for skin cancer treatment; standard excision and/or destruction technique methods are not clinically sufficient treatment options. To prevent the risk of compromising surgical cure and reconstruction; prompt microscopic examination of the surgical margins is necessary. Based on the given indication(s), maximum conservation of healthy tissue, and high cure rate, Mohs surgery is the most appropriate treatment for this cancer. Mercedes Flap Text: The defect edges were debeveled with a #15 scalpel blade. Given the location of the defect, shape of the defect and the proximity to free margins a Mercedes flap was deemed most appropriate. Using a sterile surgical marker, an appropriate advancement flap was drawn incorporating the defect and placing the expected incisions within the relaxed skin tension lines where possible. The area thus outlined was incised deep to adipose tissue with a #15 scalpel blade. The skin margins were undermined to an appropriate distance in all directions utilizing iris scissors. Alternatives Discussed Intro (Do Not Add Period): I discussed alternative treatments to Mohs surgery and specifically discussed the risks and benefits of Graft Cartilage Fenestration Text: The cartilage was fenestrated with a 2mm punch biopsy to help facilitate graft survival and healing. Z Plasty Text: The lesion was extirpated to the level of the fat with a #15 scalpel blade. Given the location of the defect, shape of the defect and the proximity to free margins a Z-plasty was deemed most appropriate for repair. Using a sterile surgical marker, the appropriate transposition arms of the Z-plasty were drawn incorporating the defect and placing the expected incisions within the relaxed skin tension lines where possible. The area thus outlined was incised deep to adipose tissue with a #15 scalpel blade. The skin margins were undermined to an appropriate distance in all directions utilizing iris scissors. The opposing transposition arms were then transposed into place in opposite direction and anchored with interrupted buried subcutaneous sutures. Subsequent Stages Histo Method Verbiage: Using a similar technique to that described above, a thin layer of tissue was removed from all areas where tumor was visible on the previous stage. The tissue was again oriented, mapped, dyed, and processed as above. Bilobed Flap Text: The defect edges were debeveled with a #15 scalpel blade. Given the location of the defect and the proximity to free margins a bilobe flap was deemed most appropriate. Using a sterile surgical marker, an appropriate bilobe flap drawn around the defect. The area thus outlined was incised deep to adipose tissue with a #15 scalpel blade. The skin margins were undermined to an appropriate distance in all directions utilizing iris scissors. Dermal Autograft Text: The defect edges were debeveled with a #15 scalpel blade. Given the location of the defect, shape of the defect and the proximity to free margins a dermal autograft was deemed most appropriate. Using a sterile surgical marker, the primary defect shape was transferred to the donor site. The area thus outlined was incised deep to adipose tissue with a #15 scalpel blade. The harvested graft was then trimmed of adipose and epidermal tissue until only dermis was left. The skin graft was then placed in the primary defect and oriented appropriately. Mauc Instructions: By selecting yes to the question below the [facility] number will be added into the note. This will be calculated automatically based on the diagnosis chosen, the size entered, the body zone selected (H,M,L) and the specific indications you chose. You will also have the option to override the Mohs AUC if you disagree with the automatically calculated number and this option is found in the Case Summary tab. Consent (Temporal Branch)/Introductory Paragraph: The rationale for Mohs was explained to the patient and consent was obtained. The risks, benefits and alternatives to therapy were discussed in detail. Specifically, the risks of damage to the temporal branch of the facial nerve, infection, scarring, bleeding, prolonged wound healing, incomplete removal, allergy to anesthesia, and recurrence were addressed. Prior to the procedure, the treatment site was clearly identified and confirmed by the patient. All components of Universal Protocol/PAUSE Rule completed. Rotation Flap Text: The defect edges were debeveled with a #15 scalpel blade. Given the location of the defect, shape of the defect and the proximity to free margins a rotation flap was deemed most appropriate. Using a sterile surgical marker, an appropriate rotation flap was drawn incorporating the defect and placing the expected incisions within the relaxed skin tension lines where possible. The area thus outlined was incised deep to adipose tissue with a #15 scalpel blade. The skin margins were undermined to an appropriate distance in all directions utilizing iris scissors. Closure 2 Information: This tab is for additional flaps and grafts, including complex repair and grafts and complex repair and flaps. You can also specify a different location for the additional defect, if the location is the same you do not need to select a new one. We will insert the automated text for the repair you select below just as we do for solitary flaps and grafts. Please note that at this time if you select a location with a different insurance zone you will need to override the ICD10 and CPT if appropriate. Island Pedicle Flap Text: The defect edges were debeveled with a #15 scalpel blade. Given the location of the defect, shape of the defect and the proximity to free margins an island pedicle advancement flap was deemed most appropriate. Using a sterile surgical marker, an appropriate advancement flap was drawn incorporating the defect, outlining the appropriate donor tissue and placing the expected incisions within the relaxed skin tension lines where possible. The area thus outlined was incised deep to adipose tissue with a #15 scalpel blade. The skin margins were undermined to an appropriate distance in all directions around the primary defect and laterally outward around the island pedicle utilizing iris scissors. There was minimal undermining beneath the pedicle flap. Suturegard Body: The suture ends were repeatedly re-tightened and re-clamped to achieve the desired tissue expansion. Purse String (Simple) Text: Given the location of the defect and the characteristics of the surrounding skin a pursestring closure was deemed most appropriate. Undermining was performed circumfirentially around the surgical defect. A purstring suture was then placed and tightened. Estimated Blood Loss (Cc): minimal Melolabial Interpolation Flap Text: A decision was made to reconstruct the defect utilizing an interpolation axial flap and a staged reconstruction. A telfa template was made of the defect. This telfa template was then used to outline the melolabial interpolation flap. The donor area for the pedicle flap was then injected with anesthesia. The flap was excised through the skin and subcutaneous tissue down to the layer of the underlying musculature. The pedicle flap was carefully excised within this deep plane to maintain its blood supply. The edges of the donor site were undermined. The donor site was closed in a primary fashion. The pedicle was then rotated into position and sutured. Once the tube was sutured into place, adequate blood supply was confirmed with blanching and refill. The pedicle was then wrapped with xeroform gauze and dressed appropriately with a telfa and gauze bandage to ensure continued blood supply and protect the attached pedicle. Plastic Surgeon (A): Vaishali Valdez MD Scc Poorly Differentiated Histology Text: there are strands and nests of pleomorphic cells present in the infiltrative pattern. Mitotic activity is brisk. There is vascular proliferation and acute and chronic inflammation in the dermis. The findings are those of a poorly differentiated squamous cell carcinoma. Melanoma In Situ Histology Text: On a sun-damaged skin, there is a broad and asymmetrical proliferation of enlarged and atypical melanocytes present continuously as single cells and in small irregular coalescing nests along the dermoepidermal junction. The melanocytes extend into the superficial portions of epithelial structures of adnexa. Pagetoid spread of melanocytes is appreciated. Occasional mitotic figures and individually necrotic melanocytes are also noted. In the underlying papillary dermis, there is mild lymphocytic inflammatory infiltrate with prominent dermal fibroplasia and melanophages. Crescentic Advancement Flap Text: The defect edges were debeveled with a #15 scalpel blade. Given the location of the defect and the proximity to free margins a crescentic advancement flap was deemed most appropriate. Using a sterile surgical marker, the appropriate advancement flap was drawn incorporating the defect and placing the expected incisions within the relaxed skin tension lines where possible. The area thus outlined was incised deep to adipose tissue with a #15 scalpel blade. The skin margins were undermined to an appropriate distance in all directions utilizing iris scissors. Melolabial Transposition Flap Text: The defect edges were debeveled with a #15 scalpel blade. Given the location of the defect and the proximity to free margins a melolabial flap was deemed most appropriate. Using a sterile surgical marker, an appropriate melolabial transposition flap was drawn incorporating the defect. The area thus outlined was incised deep to adipose tissue with a #15 scalpel blade. The skin margins were undermined to an appropriate distance in all directions utilizing iris scissors. Consent (Nose)/Introductory Paragraph: The rationale for Mohs was explained to the patient and consent was obtained. The risks, benefits and alternatives to therapy were discussed in detail. Specifically, the risks of nasal deformity, changes in the flow of air through the nose, infection, scarring, bleeding, prolonged wound healing, incomplete removal, allergy to anesthesia, nerve injury and recurrence were addressed. Prior to the procedure, the treatment site was clearly identified and confirmed by the patient. All components of Universal Protocol/PAUSE Rule completed. Surgeon Performing Repair (Optional): Dania Henlsey MD Tarsorrhaphy Text: A tarsorrhaphy was performed using Frost sutures. Cheiloplasty (Complex) Text: A decision was made to reconstruct the defect with a  cheiloplasty. The defect was undermined extensively. Additional obicularis oris muscle was excised with a 15 blade scalpel. The defect was converted into a full thickness wedge to facilite a better cosmetic result. Small vessels were then tied off with 5-0 monocyrl. The obicularis oris, superficial fascia, adipose and dermis were then reapproximated. After the deeper layers were approximated the epidermis was reapproximated with particular care given to realign the vermilion border. Epidermal Sutures: 6-0 Ethilon Keystone Flap Text: The defect edges were debeveled with a #15 scalpel blade. Given the location of the defect, shape of the defect a keystone flap was deemed most appropriate. Using a sterile surgical marker, an appropriate keystone flap was drawn incorporating the defect, outlining the appropriate donor tissue and placing the expected incisions within the relaxed skin tension lines where possible. The area thus outlined was incised deep to adipose tissue with a #15 scalpel blade. The skin margins were undermined to an appropriate distance in all directions around the primary defect and laterally outward around the flap utilizing iris scissors. Home Suture Removal Text: Patient was provided instructions on removing sutures and will remove their sutures at home. If they have any questions or difficulties they will call the office. Mid-Level (A): Kiara Cook PA-C Length To Time In Minutes Device Was In Place: 10 O-Z Flap Text: The defect edges were debeveled with a #15 scalpel blade. Given the location of the defect, shape of the defect and the proximity to free margins an O-Z flap was deemed most appropriate. Using a sterile surgical marker, an appropriate transposition flap was drawn incorporating the defect and placing the expected incisions within the relaxed skin tension lines where possible. The area thus outlined was incised deep to adipose tissue with a #15 scalpel blade. The skin margins were undermined to an appropriate distance in all directions utilizing iris scissors. Helical Rim Advancement Flap Text: The defect edges were debeveled with a #15 blade scalpel. Given the location of the defect and the proximity to free margins (helical rim) a double helical rim advancement flap was deemed most appropriate. Using a sterile surgical marker, the appropriate advancement flaps were drawn incorporating the defect and placing the expected incisions between the helical rim and antihelix where possible. The area thus outlined was incised through and through with a #15 scalpel blade. With a skin hook and iris scissors, the flaps were gently and sharply undermined and freed up. Inflammation Suggestive Of Cancer Camouflage Histology Text: There was a dense lymphocytic infiltrate which prevented adequate histologic evaluation of adjacent structures. S Plasty Text: Given the location and shape of the defect, and the orientation of relaxed skin tension lines, an S-plasty was deemed most appropriate for repair. Using a sterile surgical marker, the appropriate outline of the S-plasty was drawn, incorporating the defect and placing the expected incisions within the relaxed skin tension lines where possible. The area thus outlined was incised deep to adipose tissue with a #15 scalpel blade. The skin margins were undermined to an appropriate distance in all directions utilizing iris scissors. The skin flaps were advanced over the defect. The opposing margins were then approximated with interrupted buried subcutaneous sutures. Split-Thickness Skin Graft Text: The defect edges were debeveled with a #15 scalpel blade. Given the location of the defect, shape of the defect and the proximity to free margins a split thickness skin graft was deemed most appropriate. Using a sterile surgical marker, the primary defect shape was transferred to the donor site. The split thickness graft was then harvested. The skin graft was then placed in the primary defect and oriented appropriately. Area L Indication Text: Tumors in this location are included in Area L (trunk and extremities). Mohs surgery is indicated for larger tumors, or tumors with aggressive histologic features, in these anatomic locations. Surgeon/Pathologist Verbiage (Will Incorporate Name Of Surgeon From Intro If Not Blank): operated in two distinct and integrated capacities as the surgeon and pathologist. Cartilage Graft Text: The defect edges were debeveled with a #15 scalpel blade. Given the location of the defect, shape of the defect, the fact the defect involved a full thickness cartilage defect a cartilage graft was deemed most appropriate. An appropriate donor site was identified, cleansed, and anesthetized. The cartilage graft was then harvested and transferred to the recipient site, oriented appropriately and then sutured into place. The secondary defect was then repaired using a primary closure. Mixed Nodular And Infiltrative Bcc Histology Text: Irregular nests of pleomorphic, hyperchromatic basaloid cells with peripheral palisading infiltrate the tissue in a diffuse fashion in association with sclerotic stroma Mohs Histo Method Verbiage: Each section was then chromacoded and processed in the Mohs lab using the Mohs protocol and submitted for frozen section. Secondary Intention Text (Leave Blank If You Do Not Want): The defect will heal with secondary intention. Modified Advancement Flap Text: The defect edges were debeveled with a #15 scalpel blade. Given the location of the defect, shape of the defect and the proximity to free margins a modified advancement flap was deemed most appropriate. Using a sterile surgical marker, an appropriate advancement flap was drawn incorporating the defect and placing the expected incisions within the relaxed skin tension lines where possible. The area thus outlined was incised deep to adipose tissue with a #15 scalpel blade. The skin margins were undermined to an appropriate distance in all directions utilizing iris scissors. Consent 1/Introductory Paragraph: Various treatment options for skin cancer treatment; including but not limited to no treatment, cryosurgery or cryotherapy, excision, radiation therapy, electrodessication and curettage, topical therapeutic agents and light therapy were discussed in depth with the patient. The rationale for Mohs was explained to the patient and consent was obtained. The risks, benefits and alternatives to therapy were discussed in detail. Specifically, the risks of infection, scarring, bleeding, prolonged wound healing, incomplete removal, allergy to anesthesia, nerve injury and recurrence were addressed. Prior to the procedure, the treatment site was clearly identified and confirmed by the patient and the patient agreed that Mohs surgery is the best treatment option for their lesion. No guarantees were made or implied; close follow-up was stressed out to the patient. All components of Universal Protocol/PAUSE Rule completed. treatment site was clearly identified and confirmed by the patient. All components of Universal Protocol/PAUSE Rule completed. Graft Donor Site Bandage (Optional-Leave Blank If You Don't Want In Note): Steri-strips and a pressure bandage were applied to the donor site. Bilobed Transposition Flap Text: The defect edges were debeveled with a #15 scalpel blade. Given the location of the defect and the proximity to free margins a bilobed transposition flap was deemed most appropriate. Using a sterile surgical marker, an appropriate bilobe flap drawn around the defect. The area thus outlined was incised deep to adipose tissue with a #15 scalpel blade. The skin margins were undermined to an appropriate distance in all directions utilizing iris scissors. Cheek Interpolation Flap Text: A decision was made to reconstruct the defect utilizing an interpolation axial flap and a staged reconstruction. A telfa template was made of the defect. This telfa template was then used to outline the Cheek Interpolation flap. The donor area for the pedicle flap was then injected with anesthesia. The flap was excised through the skin and subcutaneous tissue down to the layer of the underlying musculature. The interpolation flap was carefully excised within this deep plane to maintain its blood supply. The edges of the donor site were undermined. The donor site was closed in a primary fashion. The pedicle was then rotated into position and sutured. Once the tube was sutured into place, adequate blood supply was confirmed with blanching and refill. The pedicle was then wrapped with xeroform gauze and dressed appropriately with a telfa and gauze bandage to ensure continued blood supply and protect the attached pedicle. Skin Substitute Text: The defect edges were debeveled with a #15 scalpel blade. Given the location of the defect, shape of the defect and the proximity to free margins a skin substitute graft was deemed most appropriate. The graft material was trimmed to fit the size of the defect. The graft was then placed in the primary defect and oriented appropriately. Mohs Rapid Report Verbiage: The area of clinically evident tumor was marked with skin marking ink and appropriately hatched. The initial incision was made following the Mohs approach through the skin. The specimen was taken to the lab, divided into the necessary number of pieces, chromacoded and processed according to the Mohs protocol. This was repeated in successive stages until a tumor free defect was achieved. Spiral Flap Text: The defect edges were debeveled with a #15 scalpel blade. Given the location of the defect, shape of the defect and the proximity to free margins a spiral flap was deemed most appropriate. Using a sterile surgical marker, an appropriate rotation flap was drawn incorporating the defect and placing the expected incisions within the relaxed skin tension lines where possible. The area thus outlined was incised deep to adipose tissue with a #15 scalpel blade. The skin margins were undermined to an appropriate distance in all directions utilizing iris scissors. Consent (Marginal Mandibular)/Introductory Paragraph: The rationale for Mohs was explained to the patient and consent was obtained. The risks, benefits and alternatives to therapy were discussed in detail. Specifically, the risks of damage to the marginal mandibular branch of the facial nerve, infection, scarring, bleeding, prolonged wound healing, incomplete removal, allergy to anesthesia, and recurrence were addressed. Prior to the procedure, the treatment site was clearly identified and confirmed by the patient. All components of Universal Protocol/PAUSE Rule completed. Island Pedicle Flap With Canthal Suspension Text: The defect edges were debeveled with a #15 scalpel blade. Given the location of the defect, shape of the defect and the proximity to free margins an island pedicle advancement flap was deemed most appropriate. Using a sterile surgical marker, an appropriate advancement flap was drawn incorporating the defect, outlining the appropriate donor tissue and placing the expected incisions within the relaxed skin tension lines where possible. The area thus outlined was incised deep to adipose tissue with a #15 scalpel blade. The skin margins were undermined to an appropriate distance in all directions around the primary defect and laterally outward around the island pedicle utilizing iris scissors. There was minimal undermining beneath the pedicle flap. A suspension suture was placed in the canthal tendon to prevent tension and prevent ectropion. Donor Site Anesthesia Type: same as repair anesthesia Mastoid Interpolation Flap Text: A decision was made to reconstruct the defect utilizing an interpolation axial flap and a staged reconstruction. A telfa template was made of the defect. This telfa template was then used to outline the mastoid interpolation flap. The donor area for the pedicle flap was then injected with anesthesia. The flap was excised through the skin and subcutaneous tissue down to the layer of the underlying musculature. The pedicle flap was carefully excised within this deep plane to maintain its blood supply. The edges of the donor site were undermined. The donor site was closed in a primary fashion. The pedicle was then rotated into position and sutured. Once the tube was sutured into place, adequate blood supply was confirmed with blanching and refill. The pedicle was then wrapped with xeroform gauze and dressed appropriately with a telfa and gauze bandage to ensure continued blood supply and protect the attached pedicle. Suturegard Retention Suture: 2-0 Nylon Purse String (Intermediate) Text: Given the location of the defect and the characteristics of the surrounding skin a pursestring intermediate closure was deemed most appropriate. Undermining was performed circumfirentially around the surgical defect. A purstring suture was then placed and tightened. Alar Island Pedicle Flap Text: The defect edges were debeveled with a #15 scalpel blade. Given the location of the defect, shape of the defect and the proximity to the alar rim an island pedicle advancement flap was deemed most appropriate. Using a sterile surgical marker, an appropriate advancement flap was drawn incorporating the defect, outlining the appropriate donor tissue and placing the expected incisions within the nasal ala running parallel to the alar rim. The area thus outlined was incised with a #15 scalpel blade. The skin margins were undermined minimally to an appropriate distance in all directions around the primary defect and laterally outward around the island pedicle utilizing iris scissors. There was minimal undermining beneath the pedicle flap. Partial Purse String (Simple) Text: Given the location of the defect and the characteristics of the surrounding skin a simple purse string closure was deemed most appropriate. Undermining was performed circumfirentially around the surgical defect. A purse string suture was then placed and tightened. Wound tension only allowed a partial closure of the circular defect. Posterior Auricular Interpolation Flap Text: A decision was made to reconstruct the defect utilizing an interpolation axial flap and a staged reconstruction. A telfa template was made of the defect. This telfa template was then used to outline the posterior auricular interpolation flap. The donor area for the pedicle flap was then injected with anesthesia. The flap was excised through the skin and subcutaneous tissue down to the layer of the underlying musculature. The pedicle flap was carefully excised within this deep plane to maintain its blood supply. The edges of the donor site were undermined. The donor site was closed in a primary fashion. The pedicle was then rotated into position and sutured. Once the tube was sutured into place, adequate blood supply was confirmed with blanching and refill. The pedicle was then wrapped with xeroform gauze and dressed appropriately with a telfa and gauze bandage to ensure continued blood supply and protect the attached pedicle. A-T Advancement Flap Text: The defect edges were debeveled with a #15 scalpel blade. Given the location of the defect, shape of the defect and the proximity to free margins an A-T advancement flap was deemed most appropriate. Using a sterile surgical marker, an appropriate advancement flap was drawn incorporating the defect and placing the expected incisions within the relaxed skin tension lines where possible. The area thus outlined was incised deep to adipose tissue with a #15 scalpel blade. The skin margins were undermined to an appropriate distance in all directions utilizing iris scissors. Consent (Lip)/Introductory Paragraph: The rationale for Mohs was explained to the patient and consent was obtained. The risks, benefits and alternatives to therapy were discussed in detail. Specifically, the risks of lip deformity, changes in the oral aperture, infection, scarring, bleeding, prolonged wound healing, incomplete removal, allergy to anesthesia, nerve injury and recurrence were addressed. Prior to the procedure, the treatment site was clearly identified and confirmed by the patient. All components of Universal Protocol/PAUSE Rule completed. Rhombic Flap Text: The defect edges were debeveled with a #15 scalpel blade. Given the location of the defect and the proximity to free margins a rhombic flap was deemed most appropriate. Using a sterile surgical marker, an appropriate rhombic flap was drawn incorporating the defect. The area thus outlined was incised deep to adipose tissue with a #15 scalpel blade. The skin margins were undermined to an appropriate distance in all directions utilizing iris scissors. O-T Plasty Text: The defect edges were debeveled with a #15 scalpel blade. Given the location of the defect, shape of the defect and the proximity to free margins an O-T plasty was deemed most appropriate. Using a sterile surgical marker, an appropriate O-T plasty was drawn incorporating the defect and placing the expected incisions within the relaxed skin tension lines where possible. The area thus outlined was incised deep to adipose tissue with a #15 scalpel blade. The skin margins were undermined to an appropriate distance in all directions utilizing iris scissors. Ear Wedge Repair Text: A wedge excision was completed by carrying down an excision through the full thickness of the ear and cartilage with an inward facing Burow's triangle. The wound was then closed in a layered fashion. Complex Repair And Flap Additional Text (Will Appearing After The Standard Complex Repair Text): The complex repair was not sufficient to completely close the primary defect. The remaining additional defect was repaired with the flap mentioned below. Double O-Z Flap Text: The defect edges were debeveled with a #15 scalpel blade. Given the location of the defect, shape of the defect and the proximity to free margins a Double O-Z flap was deemed most appropriate. Using a sterile surgical marker, an appropriate transposition flap was drawn incorporating the defect and placing the expected incisions within the relaxed skin tension lines where possible. The area thus outlined was incised deep to adipose tissue with a #15 scalpel blade. The skin margins were undermined to an appropriate distance in all directions utilizing iris scissors. Mohs Case Number: [address] Bilateral Helical Rim Advancement Flap Text: The defect edges were debeveled with a #15 blade scalpel. Given the location of the defect and the proximity to free margins (helical rim) a bilateral helical rim advancement flap was deemed most appropriate. Using a sterile surgical marker, the appropriate advancement flaps were drawn incorporating the defect and placing the expected incisions between the helical rim and antihelix where possible. The area thus outlined was incised through and through with a #15 scalpel blade. With a skin hook and iris scissors, the flaps were gently and sharply undermined and freed up. Tumor Debulked?: not debulked V-Y Plasty Text: The defect edges were debeveled with a #15 scalpel blade. Given the location of the defect, shape of the defect and the proximity to free margins an V-Y advancement flap was deemed most appropriate. Using a sterile surgical marker, an appropriate advancement flap was drawn incorporating the defect and placing the expected incisions within the relaxed skin tension lines where possible. The area thus outlined was incised deep to adipose tissue with a #15 scalpel blade. The skin margins were undermined to an appropriate distance in all directions utilizing iris scissors. Modified Advancement Flap Text: The defect edges were debeveled with a #15 scalpel blade.  Given the location of the defect, shape of the defect and the proximity to free margins a modified advancement flap was deemed most appropriate.  Using a sterile surgical marker, an appropriate advancement flap was drawn incorporating the defect and placing the expected incisions within the relaxed skin tension lines where possible.    The area thus outlined was incised deep to adipose tissue with a #15 scalpel blade.  The skin margins were undermined to an appropriate distance in all directions utilizing iris scissors. Mauc Instructions: By selecting yes to the question below the MAUC number will be added into the note.  This will be calculated automatically based on the diagnosis chosen, the size entered, the body zone selected (H,M,L) and the specific indications you chose. You will also have the option to override the Mohs AUC if you disagree with the automatically calculated number and this option is found in the Case Summary tab. Body Location Override (Optional - Billing Will Still Be Based On Selected Body Map Location If Applicable): Right Central Malar Cheek Bilateral Helical Rim Advancement Flap Text: The defect edges were debeveled with a #15 blade scalpel.  Given the location of the defect and the proximity to free margins (helical rim) a bilateral helical rim advancement flap was deemed most appropriate.  Using a sterile surgical marker, the appropriate advancement flaps were drawn incorporating the defect and placing the expected incisions between the helical rim and antihelix where possible.  The area thus outlined was incised through and through with a #15 scalpel blade.  With a skin hook and iris scissors, the flaps were gently and sharply undermined and freed up. Split-Thickness Skin Graft Text: The defect edges were debeveled with a #15 scalpel blade.  Given the location of the defect, shape of the defect and the proximity to free margins a split thickness skin graft was deemed most appropriate.  Using a sterile surgical marker, the primary defect shape was transferred to the donor site. The split thickness graft was then harvested.  The skin graft was then placed in the primary defect and oriented appropriately. Plastic Surgeon (A): Anant Willoughby MD Previous Accession (Optional): [id number] V-Y Plasty Text: The defect edges were debeveled with a #15 scalpel blade.  Given the location of the defect, shape of the defect and the proximity to free margins an V-Y advancement flap was deemed most appropriate.  Using a sterile surgical marker, an appropriate advancement flap was drawn incorporating the defect and placing the expected incisions within the relaxed skin tension lines where possible.    The area thus outlined was incised deep to adipose tissue with a #15 scalpel blade.  The skin margins were undermined to an appropriate distance in all directions utilizing iris scissors. Bilobed Transposition Flap Text: The defect edges were debeveled with a #15 scalpel blade.  Given the location of the defect and the proximity to free margins a bilobed transposition flap was deemed most appropriate.  Using a sterile surgical marker, an appropriate bilobe flap drawn around the defect.    The area thus outlined was incised deep to adipose tissue with a #15 scalpel blade.  The skin margins were undermined to an appropriate distance in all directions utilizing iris scissors. Dermal Autograft Text: The defect edges were debeveled with a #15 scalpel blade.  Given the location of the defect, shape of the defect and the proximity to free margins a dermal autograft was deemed most appropriate.  Using a sterile surgical marker, the primary defect shape was transferred to the donor site. The area thus outlined was incised deep to adipose tissue with a #15 scalpel blade.  The harvested graft was then trimmed of adipose and epidermal tissue until only dermis was left.  The skin graft was then placed in the primary defect and oriented appropriately. Crescentic Advancement Flap Text: The defect edges were debeveled with a #15 scalpel blade.  Given the location of the defect and the proximity to free margins a crescentic advancement flap was deemed most appropriate.  Using a sterile surgical marker, the appropriate advancement flap was drawn incorporating the defect and placing the expected incisions within the relaxed skin tension lines where possible.    The area thus outlined was incised deep to adipose tissue with a #15 scalpel blade.  The skin margins were undermined to an appropriate distance in all directions utilizing iris scissors. Spiral Flap Text: The defect edges were debeveled with a #15 scalpel blade.  Given the location of the defect, shape of the defect and the proximity to free margins a spiral flap was deemed most appropriate.  Using a sterile surgical marker, an appropriate rotation flap was drawn incorporating the defect and placing the expected incisions within the relaxed skin tension lines where possible. The area thus outlined was incised deep to adipose tissue with a #15 scalpel blade.  The skin margins were undermined to an appropriate distance in all directions utilizing iris scissors. Home Suture Removal Text: Patient was provided instructions on removing sutures and will remove their sutures at home.  If they have any questions or difficulties they will call the office. Surgical Defect Length In Cm (Optional): 1.4 Star Wedge Flap Text: The defect edges were debeveled with a #15 scalpel blade.  Given the location of the defect, shape of the defect and the proximity to free margins a star wedge flap was deemed most appropriate.  Using a sterile surgical marker, an appropriate rotation flap was drawn incorporating the defect and placing the expected incisions within the relaxed skin tension lines where possible. The area thus outlined was incised deep to adipose tissue with a #15 scalpel blade.  The skin margins were undermined to an appropriate distance in all directions utilizing iris scissors. Surgical Defect Length In Cm (Optional): 1.1 Area H Indication Text: Tumors in this location are included in Area H (eyelids, eyebrows, nose, lips, chin, ear, pre-auricular, post-auricular, temple, genitalia, hands, feet, ankles and areola).  Tissue conservation is critical in these anatomic locations. Cheek-To-Nose Interpolation Flap Text: A decision was made to reconstruct the defect utilizing an interpolation axial flap and a staged reconstruction.  A telfa template was made of the defect.  This telfa template was then used to outline the Cheek-To-Nose Interpolation flap.  The donor area for the pedicle flap was then injected with anesthesia.  The flap was excised through the skin and subcutaneous tissue down to the layer of the underlying musculature.  The interpolation flap was carefully excised within this deep plane to maintain its blood supply.  The edges of the donor site were undermined.   The donor site was closed in a primary fashion.  The pedicle was then rotated into position and sutured.  Once the tube was sutured into place, adequate blood supply was confirmed with blanching and refill.  The pedicle was then wrapped with xeroform gauze and dressed appropriately with a telfa and gauze bandage to ensure continued blood supply and protect the attached pedicle. Subsequent Stages Histo Method Verbiage: Using a similar technique to that described above, a thin layer of tissue was removed from all areas where tumor was visible on the previous stage.  The tissue was again oriented, mapped, dyed, and processed as above. Double O-Z Flap Text: The defect edges were debeveled with a #15 scalpel blade.  Given the location of the defect, shape of the defect and the proximity to free margins a Double O-Z flap was deemed most appropriate.  Using a sterile surgical marker, an appropriate transposition flap was drawn incorporating the defect and placing the expected incisions within the relaxed skin tension lines where possible. The area thus outlined was incised deep to adipose tissue with a #15 scalpel blade.  The skin margins were undermined to an appropriate distance in all directions utilizing iris scissors. Consent 1/Introductory Paragraph: Various treatment options for skin cancer treatment; including but not limited to no treatment, cryosurgery or cryotherapy, excision, radiation therapy, electrodessication and curettage, topical therapeutic agents and light therapy were discussed in depth with the patient.  The rationale for Mohs was explained to the patient and consent was obtained. The risks, benefits and alternatives to therapy were discussed in detail. Specifically, the risks of infection, scarring, bleeding, prolonged wound healing, incomplete removal, allergy to anesthesia, nerve injury and recurrence were addressed. Prior to the procedure, the treatment site was clearly identified and confirmed by the patient and the patient agreed that Mohs surgery is the best treatment option for their lesion. No guarantees were made or implied; close follow-up was stressed out to the patient.  All components of Universal Protocol/PAUSE Rule completed.treatment site was clearly identified and confirmed by the patient. All components of Universal Protocol/PAUSE Rule completed. Alar Island Pedicle Flap Text: The defect edges were debeveled with a #15 scalpel blade.  Given the location of the defect, shape of the defect and the proximity to the alar rim an island pedicle advancement flap was deemed most appropriate.  Using a sterile surgical marker, an appropriate advancement flap was drawn incorporating the defect, outlining the appropriate donor tissue and placing the expected incisions within the nasal ala running parallel to the alar rim. The area thus outlined was incised with a #15 scalpel blade.  The skin margins were undermined minimally to an appropriate distance in all directions around the primary defect and laterally outward around the island pedicle utilizing iris scissors.  There was minimal undermining beneath the pedicle flap. Purse String (Intermediate) Text: Given the location of the defect and the characteristics of the surrounding skin a pursestring intermediate closure was deemed most appropriate.  Undermining was performed circumfirentially around the surgical defect.  A purstring suture was then placed and tightened. Rhombic Flap Text: The defect edges were debeveled with a #15 scalpel blade.  Given the location of the defect and the proximity to free margins a rhombic flap was deemed most appropriate.  Using a sterile surgical marker, an appropriate rhombic flap was drawn incorporating the defect.    The area thus outlined was incised deep to adipose tissue with a #15 scalpel blade.  The skin margins were undermined to an appropriate distance in all directions utilizing iris scissors. Mid-Level (A): Keisha Dia PA-C Surgeon: Genia Rosenberg MD O-T Plasty Text: The defect edges were debeveled with a #15 scalpel blade.  Given the location of the defect, shape of the defect and the proximity to free margins an O-T plasty was deemed most appropriate.  Using a sterile surgical marker, an appropriate O-T plasty was drawn incorporating the defect and placing the expected incisions within the relaxed skin tension lines where possible.    The area thus outlined was incised deep to adipose tissue with a #15 scalpel blade.  The skin margins were undermined to an appropriate distance in all directions utilizing iris scissors. Posterior Auricular Interpolation Flap Text: A decision was made to reconstruct the defect utilizing an interpolation axial flap and a staged reconstruction.  A telfa template was made of the defect.  This telfa template was then used to outline the posterior auricular interpolation flap.  The donor area for the pedicle flap was then injected with anesthesia.  The flap was excised through the skin and subcutaneous tissue down to the layer of the underlying musculature.  The pedicle flap was carefully excised within this deep plane to maintain its blood supply.  The edges of the donor site were undermined.   The donor site was closed in a primary fashion.  The pedicle was then rotated into position and sutured.  Once the tube was sutured into place, adequate blood supply was confirmed with blanching and refill.  The pedicle was then wrapped with xeroform gauze and dressed appropriately with a telfa and gauze bandage to ensure continued blood supply and protect the attached pedicle. Mucosal Advancement Flap Text: Given the location of the defect, shape of the defect and the proximity to free margins a mucosal advancement flap was deemed most appropriate. Incisions were made with a 15 blade scalpel in the appropriate fashion along the cutaneous vermilion border and the mucosal lip. The remaining actinically damaged mucosal tissue was excised.  The mucosal advancement flap was then elevated to the gingival sulcus with care taken to preserve the neurovascular structures and advanced into the primary defect. Care was taken to ensure that precise realignment of the vermilion border was achieved. O-Z Plasty Text: The defect edges were debeveled with a #15 scalpel blade.  Given the location of the defect, shape of the defect and the proximity to free margins an O-Z plasty (double transposition flap) was deemed most appropriate.  Using a sterile surgical marker, the appropriate transposition flaps were drawn incorporating the defect and placing the expected incisions within the relaxed skin tension lines where possible.    The area thus outlined was incised deep to adipose tissue with a #15 scalpel blade.  The skin margins were undermined to an appropriate distance in all directions utilizing iris scissors.  Hemostasis was achieved with electrocautery.  The flaps were then transposed into place, one clockwise and the other counterclockwise, and anchored with interrupted buried subcutaneous sutures. Ear Star Wedge Flap Text: The defect edges were debeveled with a #15 blade scalpel.  Given the location of the defect and the proximity to free margins (helical rim) an ear star wedge flap was deemed most appropriate.  Using a sterile surgical marker, the appropriate flap was drawn incorporating the defect and placing the expected incisions between the helical rim and antihelix where possible.  The area thus outlined was incised through and through with a #15 scalpel blade. Advancement Flap (Single) Text: The defect edges were debeveled with a #15 scalpel blade.  Given the location of the defect and the proximity to free margins a single advancement flap was deemed most appropriate.  Using a sterile surgical marker, an appropriate advancement flap was drawn incorporating the defect and placing the expected incisions within the relaxed skin tension lines where possible.    The area thus outlined was incised deep to adipose tissue with a #15 scalpel blade.  The skin margins were undermined to an appropriate distance in all directions utilizing iris scissors. Cartilage Graft Text: The defect edges were debeveled with a #15 scalpel blade.  Given the location of the defect, shape of the defect, the fact the defect involved a full thickness cartilage defect a cartilage graft was deemed most appropriate.  An appropriate donor site was identified, cleansed, and anesthetized. The cartilage graft was then harvested and transferred to the recipient site, oriented appropriately and then sutured into place.  The secondary defect was then repaired using a primary closure. H Plasty Text: Given the location of the defect, shape of the defect and the proximity to free margins a H-plasty was deemed most appropriate for repair.  Using a sterile surgical marker, the appropriate advancement arms of the H-plasty were drawn incorporating the defect and placing the expected incisions within the relaxed skin tension lines where possible. The area thus outlined was incised deep to adipose tissue with a #15 scalpel blade. The skin margins were undermined to an appropriate distance in all directions utilizing iris scissors.  The opposing advancement arms were then advanced into place in opposite direction and anchored with interrupted buried subcutaneous sutures. Trilobed Flap Text: The defect edges were debeveled with a #15 scalpel blade.  Given the location of the defect and the proximity to free margins a trilobed flap was deemed most appropriate.  Using a sterile surgical marker, an appropriate trilobed flap drawn around the defect.    The area thus outlined was incised deep to adipose tissue with a #15 scalpel blade.  The skin margins were undermined to an appropriate distance in all directions utilizing iris scissors. A-T Advancement Flap Text: The defect edges were debeveled with a #15 scalpel blade.  Given the location of the defect, shape of the defect and the proximity to free margins an A-T advancement flap was deemed most appropriate.  Using a sterile surgical marker, an appropriate advancement flap was drawn incorporating the defect and placing the expected incisions within the relaxed skin tension lines where possible.    The area thus outlined was incised deep to adipose tissue with a #15 scalpel blade.  The skin margins were undermined to an appropriate distance in all directions utilizing iris scissors. Skin Substitute Text: The defect edges were debeveled with a #15 scalpel blade.  Given the location of the defect, shape of the defect and the proximity to free margins a skin substitute graft was deemed most appropriate.  The graft material was trimmed to fit the size of the defect. The graft was then placed in the primary defect and oriented appropriately. O-T Advancement Flap Text: The defect edges were debeveled with a #15 scalpel blade.  Given the location of the defect, shape of the defect and the proximity to free margins an O-T advancement flap was deemed most appropriate.  Using a sterile surgical marker, an appropriate advancement flap was drawn incorporating the defect and placing the expected incisions within the relaxed skin tension lines where possible.    The area thus outlined was incised deep to adipose tissue with a #15 scalpel blade.  The skin margins were undermined to an appropriate distance in all directions utilizing iris scissors. Tissue Cultured Epidermal Autograft Text: The defect edges were debeveled with a #15 scalpel blade.  Given the location of the defect, shape of the defect and the proximity to free margins a tissue cultured epidermal autograft was deemed most appropriate.  The graft was then trimmed to fit the size of the defect.  The graft was then placed in the primary defect and oriented appropriately. Dorsal Nasal Flap Text: The defect edges were debeveled with a #15 scalpel blade.  Given the location of the defect and the proximity to free margins a dorsal nasal flap was deemed most appropriate.  Using a sterile surgical marker, an appropriate dorsal nasal flap was drawn around the defect.    The area thus outlined was incised deep to adipose tissue with a #15 scalpel blade.  The skin margins were undermined to an appropriate distance in all directions utilizing iris scissors. Transposition Flap Text: The defect edges were debeveled with a #15 scalpel blade.  Given the location of the defect and the proximity to free margins a transposition flap was deemed most appropriate.  Using a sterile surgical marker, an appropriate transposition flap was drawn incorporating the defect.    The area thus outlined was incised deep to adipose tissue with a #15 scalpel blade.  The skin margins were undermined to an appropriate distance in all directions utilizing iris scissors. Mohs Rapid Report Verbiage: The area of clinically evident tumor was marked with skin marking ink and appropriately hatched.  The initial incision was made following the Mohs approach through the skin.  The specimen was taken to the lab, divided into the necessary number of pieces, chromacoded and processed according to the Mohs protocol.  This was repeated in successive stages until a tumor free defect was achieved. Interpolation Flap Text: A decision was made to reconstruct the defect utilizing an interpolation axial flap and a staged reconstruction.  A telfa template was made of the defect.  This telfa template was then used to outline the interpolation flap.  The donor area for the pedicle flap was then injected with anesthesia.  The flap was excised through the skin and subcutaneous tissue down to the layer of the underlying musculature.  The interpolation flap was carefully excised within this deep plane to maintain its blood supply.  The edges of the donor site were undermined.   The donor site was closed in a primary fashion.  The pedicle was then rotated into position and sutured.  Once the tube was sutured into place, adequate blood supply was confirmed with blanching and refill.  The pedicle was then wrapped with xeroform gauze and dressed appropriately with a telfa and gauze bandage to ensure continued blood supply and protect the attached pedicle. Afx Histology Text: there is a poorly differentiated spindled cell neoplasm composed of fascicles of atypical spindled and epithelioid cells, infiltrating and replacing papillary and reticular dermis. Mitotic activity is brisk, including atypical forms. The lesion is present on a sun-damaged skin. This pattern supports the diagnosis of atypical fibrous xanthoma Area M Indication Text: Tumors in this location are included in Area M (cheek, forehead, scalp, neck, jawline and pretibial skin).  Mohs surgery is indicated for tumors in these anatomic locations. Double Island Pedicle Flap Text: The defect edges were debeveled with a #15 scalpel blade.  Given the location of the defect, shape of the defect and the proximity to free margins a double island pedicle advancement flap was deemed most appropriate.  Using a sterile surgical marker, an appropriate advancement flap was drawn incorporating the defect, outlining the appropriate donor tissue and placing the expected incisions within the relaxed skin tension lines where possible.    The area thus outlined was incised deep to adipose tissue with a #15 scalpel blade.  The skin margins were undermined to an appropriate distance in all directions around the primary defect and laterally outward around the island pedicle utilizing iris scissors.  There was minimal undermining beneath the pedicle flap. Rhomboid Transposition Flap Text: The defect edges were debeveled with a #15 scalpel blade.  Given the location of the defect and the proximity to free margins a rhomboid transposition flap was deemed most appropriate.  Using a sterile surgical marker, an appropriate rhomboid flap was drawn incorporating the defect.    The area thus outlined was incised deep to adipose tissue with a #15 scalpel blade.  The skin margins were undermined to an appropriate distance in all directions utilizing iris scissors. Partial Purse String (Simple) Text: Given the location of the defect and the characteristics of the surrounding skin a simple purse string closure was deemed most appropriate.  Undermining was performed circumfirentially around the surgical defect.  A purse string suture was then placed and tightened. Wound tension only allowed a partial closure of the circular defect. V-Y Flap Text: The defect edges were debeveled with a #15 scalpel blade.  Given the location of the defect, shape of the defect and the proximity to free margins a V-Y flap was deemed most appropriate.  Using a sterile surgical marker, an appropriate advancement flap was drawn incorporating the defect and placing the expected incisions within the relaxed skin tension lines where possible.    The area thus outlined was incised deep to adipose tissue with a #15 scalpel blade.  The skin margins were undermined to an appropriate distance in all directions utilizing iris scissors. Surgeon Performing Repair (Optional): Toribio Garcia MD Paramedian Forehead Flap Text: A decision was made to reconstruct the defect utilizing an interpolation axial flap and a staged reconstruction.  A telfa template was made of the defect.  This telfa template was then used to outline the paramedian forehead pedicle flap.  The donor area for the pedicle flap was then injected with anesthesia.  The flap was excised through the skin and subcutaneous tissue down to the layer of the underlying musculature.  The pedicle flap was carefully excised within this deep plane to maintain its blood supply.  The edges of the donor site were undermined.   The donor site was closed in a primary fashion.  The pedicle was then rotated into position and sutured.  Once the tube was sutured into place, adequate blood supply was confirmed with blanching and refill.  The pedicle was then wrapped with xeroform gauze and dressed appropriately with a telfa and gauze bandage to ensure continued blood supply and protect the attached pedicle. Full Thickness Lip Wedge Repair (Flap) Text: Given the location of the defect and the proximity to free margins a full thickness wedge repair was deemed most appropriate.  Using a sterile surgical marker, the appropriate repair was drawn incorporating the defect and placing the expected incisions perpendicular to the vermilion border.  The vermilion border was also meticulously outlined to ensure appropriate reapproximation during the repair.  The area thus outlined was incised through and through with a #15 scalpel blade.  The muscularis and dermis were reaproximated with deep sutures following hemostasis. Care was taken to realign the vermilion border before proceeding with the superficial closure.  Once the vermilion was realigned the superfical and mucosal closure was finished. Cheiloplasty (Less Than 50%) Text: A decision was made to reconstruct the defect with a  cheiloplasty.  The defect was undermined extensively.  Additional obicularis oris muscle was excised with a 15 blade scalpel.  The defect was converted into a full thickness wedge, of less than 50% of the vertical height of the lip, to facilite a better cosmetic result.  Small vessels were then tied off with 5-0 monocyrl. The obicularis oris, superficial fascia, adipose and dermis were then reapproximated.  After the deeper layers were approximated the epidermis was reapproximated with particular care given to realign the vermilion border. Hatchet Flap Text: The defect edges were debeveled with a #15 scalpel blade.  Given the location of the defect, shape of the defect and the proximity to free margins a hatchet flap was deemed most appropriate.  Using a sterile surgical marker, an appropriate hatchet flap was drawn incorporating the defect and placing the expected incisions within the relaxed skin tension lines where possible.    The area thus outlined was incised deep to adipose tissue with a #15 scalpel blade.  The skin margins were undermined to an appropriate distance in all directions utilizing iris scissors. Mohs Case Number:  Double O-Z Plasty Text: The defect edges were debeveled with a #15 scalpel blade.  Given the location of the defect, shape of the defect and the proximity to free margins a Double O-Z plasty (double transposition flap) was deemed most appropriate.  Using a sterile surgical marker, the appropriate transposition flaps were drawn incorporating the defect and placing the expected incisions within the relaxed skin tension lines where possible. The area thus outlined was incised deep to adipose tissue with a #15 scalpel blade.  The skin margins were undermined to an appropriate distance in all directions utilizing iris scissors.  Hemostasis was achieved with electrocautery.  The flaps were then transposed into place, one clockwise and the other counterclockwise, and anchored with interrupted buried subcutaneous sutures. Banner Transposition Flap Text: The defect edges were debeveled with a #15 scalpel blade.  Given the location of the defect and the proximity to free margins a Banner transposition flap was deemed most appropriate.  Using a sterile surgical marker, an appropriate flap drawn around the defect. The area thus outlined was incised deep to adipose tissue with a #15 scalpel blade.  The skin margins were undermined to an appropriate distance in all directions utilizing iris scissors. Composite Graft Text: The defect edges were debeveled with a #15 scalpel blade.  Given the location of the defect, shape of the defect, the proximity to free margins and the fact the defect was full thickness a composite graft was deemed most appropriate.  The defect was outline and then transferred to the donor site.  A full thickness graft was then excised from the donor site. The graft was then placed in the primary defect, oriented appropriately and then sutured into place.  The secondary defect was then repaired using a primary closure. Advancement Flap (Double) Text: The defect edges were debeveled with a #15 scalpel blade.  Given the location of the defect and the proximity to free margins a double advancement flap was deemed most appropriate.  Using a sterile surgical marker, the appropriate advancement flaps were drawn incorporating the defect and placing the expected incisions within the relaxed skin tension lines where possible.    The area thus outlined was incised deep to adipose tissue with a #15 scalpel blade.  The skin margins were undermined to an appropriate distance in all directions utilizing iris scissors. Stage 2: Additional Anesthesia Volume In Cc: 2.5 W Plasty Text: The lesion was extirpated to the level of the fat with a #15 scalpel blade.  Given the location of the defect, shape of the defect and the proximity to free margins a W-plasty was deemed most appropriate for repair.  Using a sterile surgical marker, the appropriate transposition arms of the W-plasty were drawn incorporating the defect and placing the expected incisions within the relaxed skin tension lines where possible.    The area thus outlined was incised deep to adipose tissue with a #15 scalpel blade.  The skin margins were undermined to an appropriate distance in all directions utilizing iris scissors.  The opposing transposition arms were then transposed into place in opposite direction and anchored with interrupted buried subcutaneous sutures. Referring Physician (Optional): Marilee Casillas Z Plasty Text: The lesion was extirpated to the level of the fat with a #15 scalpel blade.  Given the location of the defect, shape of the defect and the proximity to free margins a Z-plasty was deemed most appropriate for repair.  Using a sterile surgical marker, the appropriate transposition arms of the Z-plasty were drawn incorporating the defect and placing the expected incisions within the relaxed skin tension lines where possible.    The area thus outlined was incised deep to adipose tissue with a #15 scalpel blade.  The skin margins were undermined to an appropriate distance in all directions utilizing iris scissors.  The opposing transposition arms were then transposed into place in opposite direction and anchored with interrupted buried subcutaneous sutures. O-L Flap Text: The defect edges were debeveled with a #15 scalpel blade.  Given the location of the defect, shape of the defect and the proximity to free margins an O-L flap was deemed most appropriate.  Using a sterile surgical marker, an appropriate advancement flap was drawn incorporating the defect and placing the expected incisions within the relaxed skin tension lines where possible.    The area thus outlined was incised deep to adipose tissue with a #15 scalpel blade.  The skin margins were undermined to an appropriate distance in all directions utilizing iris scissors. Medical Necessity Statement: Based on my medical judgement; after reviewing the pertinent pathology report, physical exam findings and the various treatment options for skin cancer treatment; standard excision and/or destruction technique methods are not clinically sufficient treatment options.  To prevent the risk of compromising surgical cure and reconstruction; prompt microscopic examination of the surgical margins is necessary.  Based on the given indication(s), maximum conservation of healthy tissue, and high cure rate, Mohs surgery is the most appropriate treatment for this cancer. Island Pedicle Flap Text: The defect edges were debeveled with a #15 scalpel blade.  Given the location of the defect, shape of the defect and the proximity to free margins an island pedicle advancement flap was deemed most appropriate.  Using a sterile surgical marker, an appropriate advancement flap was drawn incorporating the defect, outlining the appropriate donor tissue and placing the expected incisions within the relaxed skin tension lines where possible.    The area thus outlined was incised deep to adipose tissue with a #15 scalpel blade.  The skin margins were undermined to an appropriate distance in all directions around the primary defect and laterally outward around the island pedicle utilizing iris scissors.  There was minimal undermining beneath the pedicle flap. Xenograft Text: The defect edges were debeveled with a #15 scalpel blade.  Given the location of the defect, shape of the defect and the proximity to free margins a xenograft was deemed most appropriate.  The graft was then trimmed to fit the size of the defect.  The graft was then placed in the primary defect and oriented appropriately. Muscle Hinge Flap Text: The defect edges were debeveled with a #15 scalpel blade.  Given the size, depth and location of the defect and the proximity to free margins a muscle hinge flap was deemed most appropriate.  Using a sterile surgical marker, an appropriate hinge flap was drawn incorporating the defect. The area thus outlined was incised with a #15 scalpel blade.  The skin margins were undermined to an appropriate distance in all directions utilizing iris scissors. Area L Indication Text: Tumors in this location are included in Area L (trunk and extremities).  Mohs surgery is indicated for larger tumors, or tumors with aggressive histologic features, in these anatomic locations. Melolabial Interpolation Flap Text: A decision was made to reconstruct the defect utilizing an interpolation axial flap and a staged reconstruction.  A telfa template was made of the defect.  This telfa template was then used to outline the melolabial interpolation flap.  The donor area for the pedicle flap was then injected with anesthesia.  The flap was excised through the skin and subcutaneous tissue down to the layer of the underlying musculature.  The pedicle flap was carefully excised within this deep plane to maintain its blood supply.  The edges of the donor site were undermined.   The donor site was closed in a primary fashion.  The pedicle was then rotated into position and sutured.  Once the tube was sutured into place, adequate blood supply was confirmed with blanching and refill.  The pedicle was then wrapped with xeroform gauze and dressed appropriately with a telfa and gauze bandage to ensure continued blood supply and protect the attached pedicle. Island Pedicle Flap-Requiring Vessel Identification Text: The defect edges were debeveled with a #15 scalpel blade.  Given the location of the defect, shape of the defect and the proximity to free margins an island pedicle advancement flap was deemed most appropriate.  Using a sterile surgical marker, an appropriate advancement flap was drawn, based on the axial vessel mentioned above, incorporating the defect, outlining the appropriate donor tissue and placing the expected incisions within the relaxed skin tension lines where possible.    The area thus outlined was incised deep to adipose tissue with a #15 scalpel blade.  The skin margins were undermined to an appropriate distance in all directions around the primary defect and laterally outward around the island pedicle utilizing iris scissors.  There was minimal undermining beneath the pedicle flap. Consent 3/Introductory Paragraph: I gave the patient a chance to ask questions they had about the procedure.  Following this I explained the Mohs procedure and consent was obtained. The risks, benefits and alternatives to therapy were discussed in detail. Specifically, the risks of infection, scarring, bleeding, prolonged wound healing, incomplete removal, allergy to anesthesia, nerve injury and recurrence were addressed. Prior to the procedure, the treatment site was clearly identified and confirmed by the patient. All components of Universal Protocol/PAUSE Rule completed. Partial Purse String (Intermediate) Text: Given the location of the defect and the characteristics of the surrounding skin an intermediate purse string closure was deemed most appropriate.  Undermining was performed circumfirentially around the surgical defect.  A purse string suture was then placed and tightened. Wound tension only allowed a partial closure of the circular defect. Bi-Rhombic Flap Text: The defect edges were debeveled with a #15 scalpel blade.  Given the location of the defect and the proximity to free margins a bi-rhombic flap was deemed most appropriate.  Using a sterile surgical marker, an appropriate rhombic flap was drawn incorporating the defect. The area thus outlined was incised deep to adipose tissue with a #15 scalpel blade.  The skin margins were undermined to an appropriate distance in all directions utilizing iris scissors. Advancement-Rotation Flap Text: The defect edges were debeveled with a #15 scalpel blade.  Given the location of the defect, shape of the defect and the proximity to free margins an advancement-rotation flap was deemed most appropriate.  Using a sterile surgical marker, an appropriate flap was drawn incorporating the defect and placing the expected incisions within the relaxed skin tension lines where possible. The area thus outlined was incised deep to adipose tissue with a #15 scalpel blade.  The skin margins were undermined to an appropriate distance in all directions utilizing iris scissors. Bcc  Morpheaform/Sclerosing Histology Text: irregular nests of pleomorphic, hyperchromatic basaloid cells with peripheral palisading infiltrate the tissue in a diffuse fashion in association with a mucoid stroma and dense dermal sclerosis. The tumor infiltrates in thin strands and single cells among the sclerotic collagen fibers in a pattern of morpheaform variant of basal cell carcinoma Helical Rim Advancement Flap Text: The defect edges were debeveled with a #15 blade scalpel.  Given the location of the defect and the proximity to free margins (helical rim) a double helical rim advancement flap was deemed most appropriate.  Using a sterile surgical marker, the appropriate advancement flaps were drawn incorporating the defect and placing the expected incisions between the helical rim and antihelix where possible.  The area thus outlined was incised through and through with a #15 scalpel blade.  With a skin hook and iris scissors, the flaps were gently and sharply undermined and freed up. Location Indication Override (Is Already Calculated Based On Selected Body Location): Area M Ftsg Text: The defect edges were debeveled with a #15 scalpel blade.  Given the location of the defect, shape of the defect and the proximity to free margins a full thickness skin graft was deemed most appropriate.  Using a sterile surgical marker, the primary defect shape was transferred to the donor site. The area thus outlined was incised deep to adipose tissue with a #15 scalpel blade.  The harvested graft was then trimmed of adipose tissue until only dermis and epidermis was left.  The skin margins of the secondary defect were undermined to an appropriate distance in all directions utilizing iris scissors.  The secondary defect was closed with interrupted buried subcutaneous sutures.  The skin edges were then re-apposed with running  sutures.  The skin graft was then placed in the primary defect and oriented appropriately. Cheiloplasty (Complex) Text: A decision was made to reconstruct the defect with a  cheiloplasty.  The defect was undermined extensively.  Additional obicularis oris muscle was excised with a 15 blade scalpel.  The defect was converted into a full thickness wedge to facilite a better cosmetic result.  Small vessels were then tied off with 5-0 monocyrl. The obicularis oris, superficial fascia, adipose and dermis were then reapproximated.  After the deeper layers were approximated the epidermis was reapproximated with particular care given to realign the vermilion border. S Plasty Text: Given the location and shape of the defect, and the orientation of relaxed skin tension lines, an S-plasty was deemed most appropriate for repair.  Using a sterile surgical marker, the appropriate outline of the S-plasty was drawn, incorporating the defect and placing the expected incisions within the relaxed skin tension lines where possible.  The area thus outlined was incised deep to adipose tissue with a #15 scalpel blade.  The skin margins were undermined to an appropriate distance in all directions utilizing iris scissors. The skin flaps were advanced over the defect.  The opposing margins were then approximated with interrupted buried subcutaneous sutures. Rotation Flap Text: The defect edges were debeveled with a #15 scalpel blade.  Given the location of the defect, shape of the defect and the proximity to free margins a rotation flap was deemed most appropriate.  Using a sterile surgical marker, an appropriate rotation flap was drawn incorporating the defect and placing the expected incisions within the relaxed skin tension lines where possible.    The area thus outlined was incised deep to adipose tissue with a #15 scalpel blade.  The skin margins were undermined to an appropriate distance in all directions utilizing iris scissors. Date Of Previous Biopsy (Optional): 4/12/19 Bilobed Flap Text: The defect edges were debeveled with a #15 scalpel blade.  Given the location of the defect and the proximity to free margins a bilobe flap was deemed most appropriate.  Using a sterile surgical marker, an appropriate bilobe flap drawn around the defect.    The area thus outlined was incised deep to adipose tissue with a #15 scalpel blade.  The skin margins were undermined to an appropriate distance in all directions utilizing iris scissors. Burow's Advancement Flap Text: The defect edges were debeveled with a #15 scalpel blade.  Given the location of the defect and the proximity to free margins a Burow's advancement flap was deemed most appropriate.  Using a sterile surgical marker, the appropriate advancement flap was drawn incorporating the defect and placing the expected incisions within the relaxed skin tension lines where possible.    The area thus outlined was incised deep to adipose tissue with a #15 scalpel blade.  The skin margins were undermined to an appropriate distance in all directions utilizing iris scissors. Epidermal Autograft Text: The defect edges were debeveled with a #15 scalpel blade.  Given the location of the defect, shape of the defect and the proximity to free margins an epidermal autograft was deemed most appropriate.  Using a sterile surgical marker, the primary defect shape was transferred to the donor site. The epidermal graft was then harvested.  The skin graft was then placed in the primary defect and oriented appropriately. Cheek Interpolation Flap Text: A decision was made to reconstruct the defect utilizing an interpolation axial flap and a staged reconstruction.  A telfa template was made of the defect.  This telfa template was then used to outline the Cheek Interpolation flap.  The donor area for the pedicle flap was then injected with anesthesia.  The flap was excised through the skin and subcutaneous tissue down to the layer of the underlying musculature.  The interpolation flap was carefully excised within this deep plane to maintain its blood supply.  The edges of the donor site were undermined.   The donor site was closed in a primary fashion.  The pedicle was then rotated into position and sutured.  Once the tube was sutured into place, adequate blood supply was confirmed with blanching and refill.  The pedicle was then wrapped with xeroform gauze and dressed appropriately with a telfa and gauze bandage to ensure continued blood supply and protect the attached pedicle. O-Z Flap Text: The defect edges were debeveled with a #15 scalpel blade.  Given the location of the defect, shape of the defect and the proximity to free margins an O-Z flap was deemed most appropriate.  Using a sterile surgical marker, an appropriate transposition flap was drawn incorporating the defect and placing the expected incisions within the relaxed skin tension lines where possible. The area thus outlined was incised deep to adipose tissue with a #15 scalpel blade.  The skin margins were undermined to an appropriate distance in all directions utilizing iris scissors. Purse String (Simple) Text: Given the location of the defect and the characteristics of the surrounding skin a pursestring closure was deemed most appropriate.  Undermining was performed circumfirentially around the surgical defect.  A purstring suture was then placed and tightened. Island Pedicle Flap With Canthal Suspension Text: The defect edges were debeveled with a #15 scalpel blade.  Given the location of the defect, shape of the defect and the proximity to free margins an island pedicle advancement flap was deemed most appropriate.  Using a sterile surgical marker, an appropriate advancement flap was drawn incorporating the defect, outlining the appropriate donor tissue and placing the expected incisions within the relaxed skin tension lines where possible. The area thus outlined was incised deep to adipose tissue with a #15 scalpel blade.  The skin margins were undermined to an appropriate distance in all directions around the primary defect and laterally outward around the island pedicle utilizing iris scissors.  There was minimal undermining beneath the pedicle flap. A suspension suture was placed in the canthal tendon to prevent tension and prevent ectropion. Melolabial Transposition Flap Text: The defect edges were debeveled with a #15 scalpel blade.  Given the location of the defect and the proximity to free margins a melolabial flap was deemed most appropriate.  Using a sterile surgical marker, an appropriate melolabial transposition flap was drawn incorporating the defect.    The area thus outlined was incised deep to adipose tissue with a #15 scalpel blade.  The skin margins were undermined to an appropriate distance in all directions utilizing iris scissors. Mastoid Interpolation Flap Text: A decision was made to reconstruct the defect utilizing an interpolation axial flap and a staged reconstruction.  A telfa template was made of the defect.  This telfa template was then used to outline the mastoid interpolation flap.  The donor area for the pedicle flap was then injected with anesthesia.  The flap was excised through the skin and subcutaneous tissue down to the layer of the underlying musculature.  The pedicle flap was carefully excised within this deep plane to maintain its blood supply.  The edges of the donor site were undermined.   The donor site was closed in a primary fashion.  The pedicle was then rotated into position and sutured.  Once the tube was sutured into place, adequate blood supply was confirmed with blanching and refill.  The pedicle was then wrapped with xeroform gauze and dressed appropriately with a telfa and gauze bandage to ensure continued blood supply and protect the attached pedicle. Keystone Flap Text: The defect edges were debeveled with a #15 scalpel blade.  Given the location of the defect, shape of the defect a keystone flap was deemed most appropriate.  Using a sterile surgical marker, an appropriate keystone flap was drawn incorporating the defect, outlining the appropriate donor tissue and placing the expected incisions within the relaxed skin tension lines where possible. The area thus outlined was incised deep to adipose tissue with a #15 scalpel blade.  The skin margins were undermined to an appropriate distance in all directions around the primary defect and laterally outward around the flap utilizing iris scissors. Localized Dermabrasion With Wire Brush Text: The patient was draped in routine manner.  Localized dermabrasion using 3 x 17 mm wire brush was performed in routine manner to papillary dermis. This spot dermabrasion is being performed to complete skin cancer reconstruction. It also will eliminate the other sun damaged precancerous cells that are known to be part of the regional effect of a lifetime's worth of sun exposure. This localized dermabrasion is therapeutic and should not be considered cosmetic in any regard. Mercedes Flap Text: The defect edges were debeveled with a #15 scalpel blade.  Given the location of the defect, shape of the defect and the proximity to free margins a Mercedes flap was deemed most appropriate.  Using a sterile surgical marker, an appropriate advancement flap was drawn incorporating the defect and placing the expected incisions within the relaxed skin tension lines where possible. The area thus outlined was incised deep to adipose tissue with a #15 scalpel blade.  The skin margins were undermined to an appropriate distance in all directions utilizing iris scissors.

## 2019-06-03 ENCOUNTER — TELEPHONE (OUTPATIENT)
Dept: PHYSICAL THERAPY | Facility: REHABILITATION | Age: 69
End: 2019-06-03

## 2019-06-03 NOTE — OP THERAPY DISCHARGE SUMMARY
Outpatient Physical Therapy  DISCHARGE SUMMARY NOTE      [facility] [address]  [address]  [place] [address]  Phone:  [phone]  Fax:  [phone]    Date of Visit: 06/03/2019    Patient: Ashly Meyer  YOB: 1950  MRN: [id number]     Referring Provider: Damaris Knutson M.D.   Referring Diagnosis 1. S/P arthroscopy of right shoulder Z98.890   2. Status post carpal tunnel release Z98.890          Physical Therapy Occurrence Codes    Date of onset of impairment:  2/1/19   Date physical therapy care plan established or reviewed:  2/20/19   Date physical therapy treatment started:  2/20/19          Functional Limitations and Severity Modifiers      ?    Your patient is being discharged from Physical Therapy with the following comments:   · Goals partially met  · Patient has failed to schedule or reschedule follow-up visits    Comments:  The patient was seen for 9 visits to physical therapy to treat impairments/limiations s/p R shoulder arthroscopy and R carpal tunnel release. The patient did progress well in PT, meeting several PT goals. She did not attend or reschedule follow ups after her 3/21/2019 visit.  Per clinic policy, she will be discharged.    Limitations Remaining:  ?    Recommendations:  Return with a new order for PT evaluation if needed.     Ericka Alvarez, PT    Date: 6/3/2019

## 2019-06-12 ENCOUNTER — OFFICE VISIT (OUTPATIENT)
Dept: MEDICAL GROUP | Facility: MEDICAL CENTER | Age: 69
End: 2019-06-12
Payer: MEDICARE

## 2019-06-12 VITALS
RESPIRATION RATE: 12 BRPM | SYSTOLIC BLOOD PRESSURE: 124 MMHG | WEIGHT: 281 LBS | DIASTOLIC BLOOD PRESSURE: 68 MMHG | BODY MASS INDEX: 45.16 KG/M2 | TEMPERATURE: 98.7 F | HEART RATE: 79 BPM | OXYGEN SATURATION: 93 % | HEIGHT: 66 IN

## 2019-06-12 DIAGNOSIS — E11.9 CONTROLLED TYPE 2 DIABETES MELLITUS WITHOUT COMPLICATION, WITHOUT LONG-TERM CURRENT USE OF INSULIN (HCC): ICD-10-CM

## 2019-06-12 DIAGNOSIS — G47.34 NOCTURNAL HYPOXIA: ICD-10-CM

## 2019-06-12 DIAGNOSIS — E66.01 MORBID OBESITY WITH BMI OF 45.0-49.9, ADULT (HCC): ICD-10-CM

## 2019-06-12 DIAGNOSIS — G47.33 OSA (OBSTRUCTIVE SLEEP APNEA): ICD-10-CM

## 2019-06-12 DIAGNOSIS — H53.8 BLURRED VISION, RIGHT EYE: ICD-10-CM

## 2019-06-12 DIAGNOSIS — K21.9 GASTROESOPHAGEAL REFLUX DISEASE WITHOUT ESOPHAGITIS: ICD-10-CM

## 2019-06-12 PROCEDURE — 99213 OFFICE O/P EST LOW 20 MIN: CPT | Performed by: FAMILY MEDICINE

## 2019-06-12 NOTE — PROGRESS NOTES
Chief Complaint   Patient presents with   • Gastrophageal Reflux   • Sleep Problem   • Obesity       Subjective:     HPI:   Ashly Meyer presents today with the followin. Controlled type 2 diabetes mellitus without complication, without long-term current use of insulin ([facility])  Patient's blood tests do confirm that she now has diabetes.  This has been an off and on result for several years.  Patient is very frustrated by this.  She is trying to push herself to exercise but finds that she gets tired very quickly and her joints will not tolerate much exercise.  Discussed again that weight is a major issue and seems to impact most of her problems.  She is well aware.  She has tried several different diets and the diet she is currently following is high in fiber and vegetables and generally healthy.    2. Gastroesophageal reflux disease without esophagitis  Patient continues pantoprazole twice daily as prescribed by gastroenterology.  She has chronic GI problems.  Biopsies in 2017 revealed mild inflammation but no Campos's.  H. pylori was negative.  Patient continues to have symptoms.  Unfortunately we have been unable to eliminate the nabumetone which is probably a significant factor.  She also takes ibuprofen at when needed as she has persistent joint pain.  With her recent weight gain she notices that her GERD symptoms are worse.    3. KERRI (obstructive sleep apnea)  Patient does have documented sleep apnea with significant hypoxia.  She was able to be titrated to a therapeutic pressure but was unable to tolerate the mask due to claustrophobia.  She is unwilling to try another mask as the whole idea of having something on her face she finds intolerable.  I do believe her sleep apnea contributes heavily to her problems but her obesity also contributes heavily to her sleep apnea.    4. Nocturnal hypoxia  Wears nighttime oxygen.  Has daytime somnolence.    5. Morbid obesity with BMI of 45.0-49.9, adult  ([facility])  Patient has morbid obesity, quite severe.  This has not improved at all despite attempts at exercise and changes in diet.  Patient is very frustrated.  I do feel it is important to address this.  I have made a bariatric surgery referral.  This is discussed with the patient.    6. Blurred vision, right eye  Patient notes right eye blurring.  She has not had an eye exam for over 2 years per the patient.  She needs a referral because of the right eye blurring but also because of her diabetes.  That referral is discussed and placed.      Patient Active Problem List    Diagnosis Date Noted   • Controlled type 2 diabetes mellitus without complication, without long-term current use of insulin ([facility]) 06/12/2019   • Elevated glucose level 04/30/2019   • Chronic gout of foot 04/30/2019   • Dry eye syndrome, bilateral    • Rotator cuff syndrome of right shoulder and allied disorders 10/10/2018   • Elevated sedimentation rate    • Former smoker 05/30/2018   • Recurrent major depressive disorder, in partial remission ([facility]) 03/29/2018   • Vitamin D deficiency 12/28/2017   • Morbid obesity with BMI of 45.0-49.9, adult ([facility])    • Obesity hypoventilation syndrome ([facility]) 05/31/2017   • Neural foraminal stenosis of cervical spine 05/18/2017   • Tinnitus of both ears 05/18/2017   • Dyslipidemia, goal LDL below 130 01/06/2017   • Nonalcoholic fatty liver disease 01/05/2017   • Disease of accessory sinus 10/03/2016   • Atrophic rhinitis 10/03/2016   • Deviated nasal septum 08/01/2016   • Menopausal symptoms 04/28/2016   • Anal fissure 04/14/2016   • Lumbar facet arthropathy 12/09/2015   • Chronic pain 11/18/2015   • Candidal intertrigo 06/01/2015   • Elevated sed rate 12/09/2014   • Mild intermittent asthma without complication    • H/O fibromyalgia 03/11/2014   • KERRI (obstructive sleep apnea) 03/11/2014   • Acute idiopathic gout of right foot 03/11/2014   • Menopause 03/11/2014   • Peripheral neuropathy    • Rectocele 11/25/2013    • Pelvic floor dysfunction    • Chronic constipation 10/02/2013   • Nocturnal hypoxia    • Cervical stenosis of spinal canal 11/01/2011   • Lumbar radiculopathy 11/01/2011   • Migraine without aura    • Carpal tunnel syndrome 09/05/2011   • Ulnar neuropathy 09/05/2011   • Seasonal allergies 07/08/2011   • Polymyalgia rheumatica (HCC) 05/06/2011   • GERD (gastroesophageal reflux disease) 12/10/2010   • Osteoarthritis of multiple joints 04/13/2010   • Eczema, dyshidrotic 08/03/2009   • Essential hypertension 05/20/2009   • Hypothyroidism due to acquired atrophy of thyroid 05/20/2009       Current medicines (including changes today)  Current Outpatient Prescriptions   Medication Sig Dispense Refill   • venlafaxine XR (EFFEXOR-XR) 150 MG extended-release capsule Take 1 Cap by mouth every day. 30 Cap 6   • allopurinol (ZYLOPRIM) 300 MG Tab Take 1 Tab by mouth every day. 90 Tab 3   • tramadol (ULTRAM) 50 MG Tab Take 1 Tab by mouth every four hours as needed for Moderate Pain for up to 90 days. 180 Tab 2   • levothyroxine (SYNTHROID) 200 MCG Tab Take 1 Tab by mouth Every morning on an empty stomach. 90 Tab 3   • nabumetone (RELAFEN) 750 MG Tab Take 1 Tab by mouth 2 times a day, with meals. 180 Tab 3   • furosemide (LASIX) 20 MG Tab Take 1 Tab by mouth every day. 90 Tab 3   • ketoconazole (NIZORAL) 2 % Cream APPLY TO AFFECTED AREA DAILY 30 g 1   • ibuprofen (MOTRIN) 200 MG Tab Take 400-600 mg by mouth every 6 hours as needed.     • Polyethyl Glycol-Propyl Glycol (SYSTANE OP) 1 Drop by Ophthalmic route 4 times a day.     • albuterol (PROAIR HFA) 108 (90 Base) MCG/ACT Aero Soln inhalation aerosol Inhale 2 Puffs by mouth every 6 hours as needed for Shortness of Breath. 1 Inhaler 6   • carvedilol (COREG) 6.25 MG Tab TAKE 1 TABLET BY MOUTH TWICE A DAY WITH MEALS 180 Tab 2   • gabapentin (NEURONTIN) 300 MG Cap Take 2 Caps by mouth 3 times a day. 540 Cap 3   • pantoprazole (PROTONIX) 40 MG Tablet Delayed Response TAKE ONE TABLET  "BY MOUTH TWICE DAILY  180 Tab 2   • potassium chloride SA (KDUR) 20 MEQ Tab CR Take 1 Tab by mouth 2 times a day. 180 Tab 3   • albuterol (VENTOLIN HFA) 108 (90 Base) MCG/ACT Aero Soln inhalation aerosol Inhale 2 Puffs by mouth every 6 hours as needed for Shortness of Breath.     • spironolactone (ALDACTONE) 50 MG Tab TAKE 1 TABLET BY MOUTH EVERY DAY 90 Tab 3   • metronidazole (METROGEL) 0.75 % gel APPLY TO AFFECTED AREA TWICE A DAY 1 Tube 0   • polyethyl glycol-propyl glycol (SYSTANE) 0.4-0.3 % Solution Place 1 Drop in both eyes as needed.     • temazepam (RESTORIL) 15 MG Cap Take 1 Cap by mouth at bedtime as needed for Sleep. 30 Cap 3   • Fluocinolone Acetonide 0.01 % Oil Place  in ear 2 Times a Day.       No current facility-administered medications for this visit.        Allergies   Allergen Reactions   • Butalbital-Acetaminophen Unspecified     Dizzy, can't walk, hard to focus   • Cefaclor Unspecified     Ceclor causes light headedness and dizziness   • Diphenhydramine Hives     Full body hives   • Iodine Unspecified     Labored breathing   • Lamictal Rash and Swelling     \"hot\", unable to function   • Morphine Itching     redness   • Penicillins Itching and Swelling     Skin itching and redness   • Savella [Milnacipran Hcl] Unspecified     Muscle aches, feet swelling   • Sulfa Drugs Hives and Anxiety     Hard breathing   • Tizanidine Hcl Unspecified     dizziness   • Amlactin Rash     Lotion causes itching, redness and burnng   • Savella [Kdc:Milnacipran+Ci Pigment Blue 63]      Swelling, bone and muscle pain, sweating, nausea, dizziness, sleeplessness, anxiety   • Ace Inhibitors Cough     Cough     • Tape Unspecified     Paper tape is ok       ROS: As per HPI       Objective:     /68   Pulse 79   Temp 37.1 °C (98.7 °F)   Resp 12   Ht 1.676 m (5' 6\")   Wt (!) 127.5 kg (281 lb)   SpO2 93%  Body mass index is 45.35 kg/m².    Physical Exam:  Constitutional: Well-developed and well-nourished. Not " diaphoretic. No distress. Lucid and fluent.  Skin: Skin is warm and dry. No rash noted.  Head: Atraumatic without lesions.  Eyes: Conjunctivae and extraocular motions are normal. Pupils are equal, round, and reactive to light. No scleral icterus.   Mouth/Throat: Tongue normal. Oropharynx is clear and moist. Posterior pharynx without erythema or exudates.  Neck: Supple, trachea midline. No thyromegaly present. No cervical or supraclavicular lymphadenopathy. No JVD or carotid bruits appreciated  Cardiovascular: Regular rate and rhythm.  Normal S1, S2 without murmur appreciated.  Chest: Effort normal. Clear to auscultation throughout. No adventitious sounds.   Abdomen: Soft, pressing on epigastrium and sternal region is moderately tender.  Also has moderate bloating.. Active bowel sounds in all four quadrants. No rebound, guarding, masses or hepatosplenomegaly.  Extremities: No cyanosis, clubbing, erythema, nor edema.   Neurological: Alert and oriented x 3.  Currently only slight tremor.  Gait is somewhat slow and broad-based.  No foot drop appreciated.  Psychiatric:  Behavior, mood, and affect are appropriate.       Assessment and Plan:     69 y.o. female with the following issues:    1. Controlled type 2 diabetes mellitus without complication, without long-term current use of insulin ([facility])  REFERRAL TO OPHTHALMOLOGY   2. Gastroesophageal reflux disease without esophagitis     3. KERRI (obstructive sleep apnea)     4. Nocturnal hypoxia     5. Morbid obesity with BMI of 45.0-49.9, adult ([facility])  REFERRAL TO BARIATRIC SURGERY   6. Blurred vision, right eye  REFERRAL TO OPHTHALMOLOGY         Followup: Return in about 3 months (around 9/12/2019), or if symptoms worsen or fail to improve.

## 2019-07-05 DIAGNOSIS — I10 ESSENTIAL HYPERTENSION: ICD-10-CM

## 2019-07-06 RX ORDER — SPIRONOLACTONE 50 MG/1
TABLET, FILM COATED ORAL
Qty: 90 TAB | Refills: 2 | Status: SHIPPED | OUTPATIENT
Start: 2019-07-06 | End: 2020-05-04 | Stop reason: SDUPTHER

## 2019-07-16 ENCOUNTER — APPOINTMENT (OUTPATIENT)
Dept: MEDICAL GROUP | Facility: MEDICAL CENTER | Age: 69
End: 2019-07-16
Payer: MEDICARE

## 2019-07-22 ENCOUNTER — OFFICE VISIT (OUTPATIENT)
Dept: MEDICAL GROUP | Facility: MEDICAL CENTER | Age: 69
End: 2019-07-22
Payer: MEDICARE

## 2019-07-22 ENCOUNTER — HOSPITAL ENCOUNTER (OUTPATIENT)
Facility: MEDICAL CENTER | Age: 69
End: 2019-07-22
Attending: FAMILY MEDICINE
Payer: MEDICARE

## 2019-07-22 VITALS
OXYGEN SATURATION: 92 % | HEART RATE: 72 BPM | WEIGHT: 280 LBS | DIASTOLIC BLOOD PRESSURE: 76 MMHG | RESPIRATION RATE: 12 BRPM | BODY MASS INDEX: 45 KG/M2 | SYSTOLIC BLOOD PRESSURE: 128 MMHG | HEIGHT: 66 IN | TEMPERATURE: 97.1 F

## 2019-07-22 DIAGNOSIS — E66.01 MORBID OBESITY WITH BMI OF 45.0-49.9, ADULT (HCC): ICD-10-CM

## 2019-07-22 DIAGNOSIS — E11.9 CONTROLLED TYPE 2 DIABETES MELLITUS WITHOUT COMPLICATION, WITHOUT LONG-TERM CURRENT USE OF INSULIN (HCC): ICD-10-CM

## 2019-07-22 DIAGNOSIS — G47.33 OSA (OBSTRUCTIVE SLEEP APNEA): ICD-10-CM

## 2019-07-22 DIAGNOSIS — G47.34 NOCTURNAL HYPOXIA: ICD-10-CM

## 2019-07-22 DIAGNOSIS — N34.3 DYSURIA-FREQUENCY SYNDROME: ICD-10-CM

## 2019-07-22 PROBLEM — R73.09 ELEVATED GLUCOSE LEVEL: Status: RESOLVED | Noted: 2019-04-30 | Resolved: 2019-07-22

## 2019-07-22 LAB
APPEARANCE UR: NORMAL
BILIRUB UR STRIP-MCNC: NEGATIVE MG/DL
COLOR UR AUTO: NORMAL
GLUCOSE UR STRIP.AUTO-MCNC: NEGATIVE MG/DL
KETONES UR STRIP.AUTO-MCNC: NEGATIVE MG/DL
LEUKOCYTE ESTERASE UR QL STRIP.AUTO: NEGATIVE
NITRITE UR QL STRIP.AUTO: NEGATIVE
PH UR STRIP.AUTO: 5.5 [PH] (ref 5–8)
PROT UR QL STRIP: NEGATIVE MG/DL
RBC UR QL AUTO: NEGATIVE
SP GR UR STRIP.AUTO: 1.02
UROBILINOGEN UR STRIP-MCNC: 0.2 MG/DL

## 2019-07-22 PROCEDURE — 99213 OFFICE O/P EST LOW 20 MIN: CPT | Performed by: FAMILY MEDICINE

## 2019-07-22 PROCEDURE — 81002 URINALYSIS NONAUTO W/O SCOPE: CPT | Performed by: FAMILY MEDICINE

## 2019-07-22 PROCEDURE — 99000 SPECIMEN HANDLING OFFICE-LAB: CPT | Performed by: FAMILY MEDICINE

## 2019-07-22 NOTE — PROGRESS NOTES
Chief Complaint   Patient presents with   • Obesity     morbid   • Hypoxemia       Subjective:     HPI:   Ashly Meyer presents today with the followin. Morbid obesity with BMI of 45.0-49.9, adult ([facility])/type 2 diabetes non-insulin-requiring  She has an appointment to see the bariatric specialist.  She has already spoken with the office and started part of the process.  She will be seeing me monthly. The diabetes is also clearly obesity related.    2. Nocturnal hypoxia  She continues to have trouble with shortness of breath at night and fatigue in the morning.  Has not been able to adapt to a mask.  She is wearing her nocturnal oxygen nightly.    3. KERRI (obstructive sleep apnea)  Cannot adapt to a mask.  This is driven by her obesity.  Encouraged to pursue bariatric surgery.  I believe this will be helpful.  Her sleep apnea is primarily obstructive.    Will also Obtain her urinary microalbumin today    Patient Active Problem List    Diagnosis Date Noted   • Controlled type 2 diabetes mellitus without complication, without long-term current use of insulin ([facility]) 2019   • Chronic gout of foot 2019   • Dry eye syndrome, bilateral    • Rotator cuff syndrome of right shoulder and allied disorders 10/10/2018   • Elevated sedimentation rate    • Former smoker 2018   • Recurrent major depressive disorder, in partial remission ([facility]) 2018   • Vitamin D deficiency 2017   • Morbid obesity with BMI of 45.0-49.9, adult ([facility])    • Obesity hypoventilation syndrome ([facility]) 2017   • Neural foraminal stenosis of cervical spine 2017   • Tinnitus of both ears 2017   • Dyslipidemia, goal LDL below 130 2017   • Nonalcoholic fatty liver disease 2017   • Disease of accessory sinus 10/03/2016   • Atrophic rhinitis 10/03/2016   • Deviated nasal septum 2016   • Menopausal symptoms 2016   • Anal fissure 2016   • Lumbar facet arthropathy 2015   •  Chronic pain 11/18/2015   • Candidal intertrigo 06/01/2015   • Elevated sed rate 12/09/2014   • Mild intermittent asthma without complication    • H/O fibromyalgia 03/11/2014   • KERRI (obstructive sleep apnea) 03/11/2014   • Acute idiopathic gout of right foot 03/11/2014   • Menopause 03/11/2014   • Peripheral neuropathy    • Rectocele 11/25/2013   • Pelvic floor dysfunction    • Chronic constipation 10/02/2013   • Nocturnal hypoxia    • Cervical stenosis of spinal canal 11/01/2011   • Lumbar radiculopathy 11/01/2011   • Migraine without aura    • Carpal tunnel syndrome 09/05/2011   • Ulnar neuropathy 09/05/2011   • Seasonal allergies 07/08/2011   • Polymyalgia rheumatica (HCC) 05/06/2011   • GERD (gastroesophageal reflux disease) 12/10/2010   • Osteoarthritis of multiple joints 04/13/2010   • Eczema, dyshidrotic 08/03/2009   • Essential hypertension 05/20/2009   • Hypothyroidism due to acquired atrophy of thyroid 05/20/2009       Current medicines (including changes today)  Current Outpatient Prescriptions   Medication Sig Dispense Refill   • spironolactone (ALDACTONE) 50 MG Tab TAKE 1 TABLET BY MOUTH EVERY DAY 90 Tab 2   • venlafaxine XR (EFFEXOR-XR) 150 MG extended-release capsule Take 1 Cap by mouth every day. 30 Cap 6   • allopurinol (ZYLOPRIM) 300 MG Tab Take 1 Tab by mouth every day. 90 Tab 3   • levothyroxine (SYNTHROID) 200 MCG Tab Take 1 Tab by mouth Every morning on an empty stomach. 90 Tab 3   • nabumetone (RELAFEN) 750 MG Tab Take 1 Tab by mouth 2 times a day, with meals. 180 Tab 3   • furosemide (LASIX) 20 MG Tab Take 1 Tab by mouth every day. 90 Tab 3   • ketoconazole (NIZORAL) 2 % Cream APPLY TO AFFECTED AREA DAILY 30 g 1   • Polyethyl Glycol-Propyl Glycol (SYSTANE OP) 1 Drop by Ophthalmic route 4 times a day.     • albuterol (PROAIR HFA) 108 (90 Base) MCG/ACT Aero Soln inhalation aerosol Inhale 2 Puffs by mouth every 6 hours as needed for Shortness of Breath. 1 Inhaler 6   • carvedilol (COREG) 6.25  "MG Tab TAKE 1 TABLET BY MOUTH TWICE A DAY WITH MEALS 180 Tab 2   • gabapentin (NEURONTIN) 300 MG Cap Take 2 Caps by mouth 3 times a day. 540 Cap 3   • pantoprazole (PROTONIX) 40 MG Tablet Delayed Response TAKE ONE TABLET BY MOUTH TWICE DAILY  180 Tab 2   • potassium chloride SA (KDUR) 20 MEQ Tab CR Take 1 Tab by mouth 2 times a day. 180 Tab 3   • albuterol (VENTOLIN HFA) 108 (90 Base) MCG/ACT Aero Soln inhalation aerosol Inhale 2 Puffs by mouth every 6 hours as needed for Shortness of Breath.     • metronidazole (METROGEL) 0.75 % gel APPLY TO AFFECTED AREA TWICE A DAY 1 Tube 0   • polyethyl glycol-propyl glycol (SYSTANE) 0.4-0.3 % Solution Place 1 Drop in both eyes as needed.     • temazepam (RESTORIL) 15 MG Cap Take 1 Cap by mouth at bedtime as needed for Sleep. 30 Cap 3   • Fluocinolone Acetonide 0.01 % Oil Place  in ear 2 Times a Day.       No current facility-administered medications for this visit.        Allergies   Allergen Reactions   • Butalbital-Acetaminophen Unspecified     Dizzy, can't walk, hard to focus   • Cefaclor Unspecified     Ceclor causes light headedness and dizziness   • Diphenhydramine Hives     Full body hives   • Iodine Unspecified     Labored breathing   • Lamictal Rash and Swelling     \"hot\", unable to function   • Morphine Itching     redness   • Penicillins Itching and Swelling     Skin itching and redness   • Savella [Milnacipran Hcl] Unspecified     Muscle aches, feet swelling   • Sulfa Drugs Hives and Anxiety     Hard breathing   • Tizanidine Hcl Unspecified     dizziness   • Amlactin Rash     Lotion causes itching, redness and burnng   • Savella [Kdc:Milnacipran+Ci Pigment Blue 63]      Swelling, bone and muscle pain, sweating, nausea, dizziness, sleeplessness, anxiety   • Ace Inhibitors Cough     Cough     • Tape Unspecified     Paper tape is ok       ROS: As per HPI       Objective:     /76   Pulse 72   Temp 36.2 °C (97.1 °F)   Resp 12   Ht 1.676 m (5' 6\")   Wt (!) 127 " kg (280 lb)   SpO2 92%  Body mass index is 45.19 kg/m².    Physical Exam:  Constitutional: Well-developed and well-nourished. Not diaphoretic. No distress. Lucid and fluent.  Skin: Skin is warm and dry. No rash noted.  Head: Atraumatic without lesions.  Eyes: Conjunctivae and extraocular motions are normal. Pupils are equal, round, and reactive to light. No scleral icterus.   Mouth/Throat: Tongue normal. Oropharynx is clear and moist. Posterior pharynx without erythema or exudates.  Neck: Supple, trachea midline. No thyromegaly present. No cervical or supraclavicular lymphadenopathy. No JVD or carotid bruits appreciated  Cardiovascular: Regular rate and rhythm.  Normal S1, S2 without murmur appreciated.  Chest: Effort normal. Clear to auscultation throughout. No adventitious sounds.   Abdomen: Soft, non tender, and without distention. Active bowel sounds in all four quadrants. No rebound, guarding, masses or hepatosplenomegaly.  Extremities: No cyanosis, clubbing, erythema, nor edema.   Neurological: Alert and oriented x 3. No termor noted.  Psychiatric:  Behavior, mood, and affect are appropriate.       Assessment and Plan:     69 y.o. female with the following issues:    1. Morbid obesity with BMI of 45.0-49.9, adult ([facility])     2. Nocturnal hypoxia     3. KERRI (obstructive sleep apnea)     4. Dysuria-frequency syndrome  URINE CULTURE(NEW)    POCT Urinalysis   5. Controlled type 2 diabetes mellitus without complication, without long-term current use of insulin ([facility])  MICROALBUMIN CREAT RATIO URINE (CLINIC COLLECT)         Followup: Return in about 1 month (around 8/22/2019), or if symptoms worsen or fail to improve.

## 2019-07-23 ENCOUNTER — DOCUMENTATION (OUTPATIENT)
Dept: MEDICAL GROUP | Facility: MEDICAL CENTER | Age: 69
End: 2019-07-23

## 2019-07-25 ENCOUNTER — APPOINTMENT (OUTPATIENT)
Dept: PULMONOLOGY | Facility: HOSPICE | Age: 69
End: 2019-07-25
Payer: MEDICARE

## 2019-07-25 ENCOUNTER — OFFICE VISIT (OUTPATIENT)
Dept: PULMONOLOGY | Facility: HOSPICE | Age: 69
End: 2019-07-25
Payer: MEDICARE

## 2019-07-25 VITALS
BODY MASS INDEX: 45 KG/M2 | RESPIRATION RATE: 16 BRPM | DIASTOLIC BLOOD PRESSURE: 90 MMHG | WEIGHT: 280 LBS | SYSTOLIC BLOOD PRESSURE: 140 MMHG | OXYGEN SATURATION: 90 % | HEIGHT: 66 IN | HEART RATE: 80 BPM

## 2019-07-25 DIAGNOSIS — J45.20 MILD INTERMITTENT ASTHMA WITHOUT COMPLICATION: ICD-10-CM

## 2019-07-25 DIAGNOSIS — G47.34 NOCTURNAL HYPOXIA: ICD-10-CM

## 2019-07-25 DIAGNOSIS — K21.9 GASTROESOPHAGEAL REFLUX DISEASE WITHOUT ESOPHAGITIS: ICD-10-CM

## 2019-07-25 PROCEDURE — 99213 OFFICE O/P EST LOW 20 MIN: CPT | Performed by: PHYSICIAN ASSISTANT

## 2019-07-25 RX ORDER — TRAMADOL HYDROCHLORIDE 50 MG/1
50 TABLET ORAL EVERY 4 HOURS PRN
COMMUNITY
End: 2019-11-29 | Stop reason: SDUPTHER

## 2019-07-25 ASSESSMENT — ENCOUNTER SYMPTOMS
SPUTUM PRODUCTION: 1
DIZZINESS: 1
SHORTNESS OF BREATH: 1
DIAPHORESIS: 0
SINUS PAIN: 0
FEVER: 0
WEIGHT LOSS: 0
HEADACHES: 1
HEARTBURN: 1
CHILLS: 0
CLAUDICATION: 0
PALPITATIONS: 0
EYES NEGATIVE: 1
INSOMNIA: 0
WHEEZING: 1
WEAKNESS: 1
COUGH: 1
ORTHOPNEA: 0
SORE THROAT: 1

## 2019-07-25 NOTE — PATIENT INSTRUCTIONS
1-increased daytime somnolence  2-did discuss entire face mask as possible option for cpap use  3-taking pantoprazole twice daily with break through  4-consider adding ranitidine, taken 30 min prior to largest meal   5-follow up 6 months with PFT

## 2019-07-25 NOTE — PROGRESS NOTES
CC: Some increased instability/imbalance    HPI:  Ashly Meyer is a 69 y.o. year old female here today for follow-up on mild intermittent asthma. Last seen in clinic 1/25/2019.  Patient with long medical history and she provides print out with list of multiple allergies as well as medications and hospitalization/surgeries. See media.  She is a former smoker with a reported quit date of 2007 and 43-pack-year history.  Continued abstinence advised.    Reviewed in clinic vitals including blood pressure 140/90, heart rate of 80, O2 sat of 90% on room air. Patient reports she wears 3 L to 4 L at home with exertion and at night. /Patient's body mass index is 45.19 kg/m². Exercise and nutrition counseling were performed at this visit.    Reviewed home medication regimen including Lasix, pantoprazole, potassium, albuterol.  Patient reports using her rescue inhaler approximately 3 or 4 times per week.  She denied orthopnea but sleeps on 4 pillows.  Patient did have a sleep study recommending CPAP however was unable to tolerate mask due to claustrophobia.  Tried nasal pillows but could not tolerate the direct nasal flow due to sinus issues.  We did discuss entire face mask as a possible solution, patient will consider.    Reviewed most recent imaging including chest x-ray obtained 7/26/2018 demonstrating slight hyperinflation, mild bibasilar atelectasis, no acute cardiopulmonary disease.    Had nuclear medicine cardiac stress test obtained 12/31/2018 demonstrating normal left ventricular wall motion, LVEF of 68%, no evidence of significant jeopardized viable myocardium or prior MI.    Last pulmonary function testing obtained 7/26/2018 demonstrate an FEV1 of 1.91 L or 79% predicted, FEV1/FVC ratio of 86% predicted, slight improvement postbronchodilator, total lung capacity 88% of predicted, DLCO 100% of predicted, airway resistance normal.  Per pulmonologist interpretation mild improvement in FEV1 after bronchodilator  suggest possibility of obstructive lung disease with a reversible component.  Reduction in FEV1 and FVC may be secondary to patient's BMI of 46.6.      Review of Systems   Constitutional: Positive for malaise/fatigue. Negative for chills, diaphoresis, fever and weight loss.   HENT: Positive for congestion (right > left), hearing loss (mild), sore throat and tinnitus (blowing type sound). Negative for nosebleeds and sinus pain.    Eyes: Negative.         Prescription eyeglasses   Respiratory: Positive for cough, sputum production (beige yellow thick), shortness of breath (with exertion) and wheezing (occasional ).    Cardiovascular: Positive for chest pain (mild occasional, like heartburn). Negative for palpitations, orthopnea, claudication and leg swelling.   Gastrointestinal: Positive for heartburn (improved on 2x a day but still having break through ).   Skin: Negative.    Neurological: Positive for dizziness, weakness and headaches.   Psychiatric/Behavioral: The patient does not have insomnia (increased somnolence day).        Past Medical History:   Diagnosis Date   • Anginal syndrome ([facility])    • Anxiety    • Aphasia     Written word recognition/useage   • Arthritis     Everywhere.    • Back pain    • Breath shortness    • Carotid artery stenosis, unilateral     moderate left carotid artery stenosis   • Constipation    • Controlled type 2 diabetes mellitus without complication ([facility])     states borderline   • Controlled type 2 diabetes mellitus without complication, without long-term current use of insulin ([facility]) 6/12/2019   • Daytime sleepiness    • Degenerative disc disease    • Depression 5/20/2009   • Diarrhea     and constipation   • Dizziness    • Dry eye syndrome, bilateral    • Elevated sedimentation rate     history of negative temporal artery biopsy around 2006   • Fatigue    • Fibromyalgia     confirmed by Dr. Ann   • Frequent headaches    • GERD (gastroesophageal reflux disease)     hiatal hernia   •  GOUT 5/20/2009   • H/O foot surgery    • Hearing difficulty    • Heart burn    • Hemorrhagic disorder ([facility]) 2016    nose bleeds   • Hiatus hernia syndrome    • History of anemia     none currently   • Hypertension    • Hypothyroidism 5/20/2009   • Incipient cataract of both eyes    • Indigestion    • Insomnia    • Migraine without aura, without mention of intractable migraine without mention of status migrainosus    • Mild intermittent asthma without complication     inhalers as needed   • Mixed dyslipidemia    • Morbid obesity with BMI of 45.0-49.9, adult ([facility])    • Morning headache    • Mumps    • Nasal drainage    • Nocturnal hypoxia     severe, to 69% O2 sat   • Obesity    • Obesity hypoventilation syndrome ([facility]) 5/31/2017   • Pelvic floor dysfunction    • Peripheral neuropathy    • Pleomorphic adenoma    • Polymyalgia rheumatica ([facility])     Dr. Ann   • Restless leg syndrome    • Ringing in ears    • Rotator cuff syndrome of right shoulder and allied disorders 10/10/2018   • S/P tonsillectomy    • Seasonal allergies    • Skin cancer 1990's    skin   • Sleep apnea syndrome     she is not using CPAP, claustrophobia, uses 2-3l at night   • Snoring    • Sore muscles    • Sweat, sweating, excessive    • Swelling of lower extremity    • Urinary incontinence     will not wear a pad   • Vision loss    • Weakness        Past Surgical History:   Procedure Laterality Date   • SHOULDER DECOMPRESSION ARTHROSCOPIC Right 2/1/2019    Procedure: SHOULDER DECOMPRESSION ARTHROSCOPIC - SUBACROMIAL, LABRAL DEBRIDEMENT;  Surgeon: Damaris Knutson M.D.;  Location: [facility];  Service: Orthopedics   • SHOULDER ARTHROSCOPY W/ BICIPITAL TENODESIS REPAIR Right 2/1/2019    Procedure: SHOULDER ARTHROSCOPY W/ BICIPITAL TENOTOMY;  Surgeon: Damaris Knutson M.D.;  Location: [facility];  Service: Orthopedics   • CARPAL TUNNEL RELEASE Right 2/1/2019    Procedure: CARPAL TUNNEL RELEASE;  Surgeon: Damaris  ELYSIA Knutson;  Location: SURGERY [facility];  Service: Orthopedics   • GASTROSCOPY  2/6/2017    Procedure: GASTROSCOPY;  Surgeon: Pau Foster M.D.;  Location: SURGERY SAME DAY [facility] ORS;  Service:    • COLONOSCOPY  2/6/2017    Procedure: COLONOSCOPY;  Surgeon: Pau Foster M.D.;  Location: SURGERY SAME DAY [facility] ORS;  Service:    • SEPTAL RECONSTRUCTION  10/3/2016    Procedure: SEPTAL RECONSTRUCTION with  grafts ;  Surgeon: PIETER Dickson M.D.;  Location: SURGERY SAME DAY [facility]S;  Service:    • SEPTOPLASTY N/A 8/1/2016    Procedure: SEPTOPLASTY;  Surgeon: PIETER Dickson M.D.;  Location: SURGERY SAME DAY [facility];  Service:    • TURBINOPLASTY Bilateral 8/1/2016    Procedure: TURBINOPLASTY;  Surgeon: PIETER Dickson M.D.;  Location: SURGERY SAME DAY [facility];  Service:    • NASAL FRACTURE REDUCTION OPEN N/A 8/1/2016    Procedure: SEPTAL FRACTURE REDUCTION OPEN;  Surgeon: PIETER Dickson M.D.;  Location: SURGERY SAME DAY [facility];  Service:    • FINE NEEDLE ASPIRATION  11/16/2009    Performed by DA CALLOWAY at ENDOSCOPY [facility]   • COLONOSCOPY  2006    ? unclear date   • BLADDER SUSPENSION  1983   • HYSTERECTOMY, VAGINAL  1983    ovaries are present, excessive bleeding   • TONSILLECTOMY  1953   • HYSTERECTOMY LAPAROSCOPY     • OTHER ABDOMINAL SURGERY      Cholysestectomy   • OTHER ORTHOPEDIC SURGERY  1962/1980    foot surgery    • TONSILLECTOMY         Family History   Problem Relation Age of Onset   • Heart Disease Mother         Arotic vaulve replacement   • GI Mother         bile duct problem   • Bladder cancer Mother    • GI Sister         celiac   • Arthritis Sister    • Other Sister         gout and lupus   • Arthritis Sister    • Kidney Disease Sister    • GI Sister    • Bladder cancer Maternal Aunt    • Arthritis Maternal Grandmother    • Hypertension Maternal Grandmother    • Heart Disease Maternal Grandmother    • Hypertension  "Maternal Grandfather    • Heart Disease Maternal Grandfather    • Arthritis Maternal Grandfather    • No Known Problems Paternal Grandmother    • No Known Problems Paternal Grandfather    • Cancer Brother         Larynx   • Cancer Daughter         non hopskins limphoma   • GI Daughter    • Bipolar disorder Daughter    • Seizures Daughter    • Bipolar disorder Daughter        Social History     Social History   • Marital status:      Spouse name: N/A   • Number of children: N/A   • Years of education: N/A     Occupational History   • Not on file.     Social History Main Topics   • Smoking status: Former Smoker     Packs/day: 1.00     Years: 43.00     Types: Cigarettes     Start date: 1962     Quit date: 3/1/2007   • Smokeless tobacco: Never Used      Comment: Started smoking at age 15, continued abstinance    • Alcohol use No      Comment: no longer drinks   • Drug use: No      Comment: CBD topical pain cream   • Sexual activity: No     Other Topics Concern   • Not on file     Social History Narrative   • No narrative on file       Allergies as of 07/25/2019 - Reviewed 07/25/2019   Allergen Reaction Noted   • Butalbital-acetaminophen Unspecified 07/27/2016   • Cefaclor Unspecified 08/03/2007   • Diphenhydramine Hives 08/03/2007   • Iodine Unspecified 05/20/2009   • Lamictal Rash and Swelling 12/27/2011   • Morphine Itching 03/11/2011   • Penicillins Itching and Swelling 08/03/2007   • Savella [milnacipran hcl] Unspecified 05/24/2013   • Sulfa drugs Hives and Anxiety 08/03/2007   • Tizanidine hcl Unspecified 03/11/2014   • Amlactin Rash 01/30/2019   • Savella [kdc:milnacipran+ci pigment blue 63]  01/30/2019   • Ace inhibitors Cough 05/20/2009   • Tape Unspecified 07/27/2016        @Vital signs for this encounter:  Vitals:    07/25/19 1526   Height: 1.676 m (5' 6\")   Weight: (!) 127 kg (280 lb)   Weight % change since last entry.: 0 %   BP: 140/90   Pulse: 80   BMI (Calculated): 45.19   Resp: 16       Current " medications as of today   Current Outpatient Prescriptions   Medication Sig Dispense Refill   • tramadol (ULTRAM) 50 MG Tab Take 50 mg by mouth every four hours as needed.     • spironolactone (ALDACTONE) 50 MG Tab TAKE 1 TABLET BY MOUTH EVERY DAY 90 Tab 2   • venlafaxine XR (EFFEXOR-XR) 150 MG extended-release capsule Take 1 Cap by mouth every day. 30 Cap 6   • allopurinol (ZYLOPRIM) 300 MG Tab Take 1 Tab by mouth every day. 90 Tab 3   • levothyroxine (SYNTHROID) 200 MCG Tab Take 1 Tab by mouth Every morning on an empty stomach. 90 Tab 3   • nabumetone (RELAFEN) 750 MG Tab Take 1 Tab by mouth 2 times a day, with meals. 180 Tab 3   • furosemide (LASIX) 20 MG Tab Take 1 Tab by mouth every day. 90 Tab 3   • carvedilol (COREG) 6.25 MG Tab TAKE 1 TABLET BY MOUTH TWICE A DAY WITH MEALS 180 Tab 2   • gabapentin (NEURONTIN) 300 MG Cap Take 2 Caps by mouth 3 times a day. 540 Cap 3   • pantoprazole (PROTONIX) 40 MG Tablet Delayed Response TAKE ONE TABLET BY MOUTH TWICE DAILY  180 Tab 2   • potassium chloride SA (KDUR) 20 MEQ Tab CR Take 1 Tab by mouth 2 times a day. 180 Tab 3   • albuterol (VENTOLIN HFA) 108 (90 Base) MCG/ACT Aero Soln inhalation aerosol Inhale 2 Puffs by mouth every 6 hours as needed for Shortness of Breath.     • ketoconazole (NIZORAL) 2 % Cream APPLY TO AFFECTED AREA DAILY 30 g 1   • Polyethyl Glycol-Propyl Glycol (SYSTANE OP) 1 Drop by Ophthalmic route 4 times a day.     • albuterol (PROAIR HFA) 108 (90 Base) MCG/ACT Aero Soln inhalation aerosol Inhale 2 Puffs by mouth every 6 hours as needed for Shortness of Breath. 1 Inhaler 6   • metronidazole (METROGEL) 0.75 % gel APPLY TO AFFECTED AREA TWICE A DAY 1 Tube 0   • polyethyl glycol-propyl glycol (SYSTANE) 0.4-0.3 % Solution Place 1 Drop in both eyes as needed.     • temazepam (RESTORIL) 15 MG Cap Take 1 Cap by mouth at bedtime as needed for Sleep. 30 Cap 3   • Fluocinolone Acetonide 0.01 % Oil Place  in ear 2 Times a Day.       No current  facility-administered medications for this visit.          Physical Exam:   Gen:           Alert and oriented, No apparent distress. Mood and affect appropriate, normal interaction with provider.  Eyes:          sclere white, conjunctive moist.  Hearing:     Grossly intact.  Dentition:    Good dentition.  Oropharynx:   Tongue normal, posterior pharynx without erythema or exudate.  Neck:        Supple, trachea midline, no masses.  Respiratory Effort: No intercostal retractions or use of accessory muscles.   Lung Auscultation:      Clear to auscultation bilaterally; no rales, rhonchi or wheezing.  CV:            Regular rate and rhythm. No edema. No murmurs, rubs or gallops.  Digits, Nails, Ext: No clubbing, cyanosis, petechiae, or nodes.   Skin:        No rashes, lesions or ulcers noted on back or exposed skin surfaces.               Other:   No evidence of instability or imbalance during her time in clinic      Assessment:  1. Mild intermittent asthma without complication  PULMONARY FUNCTION TESTS -Test requested: Complete Pulmonary Function Test   2. Nocturnal hypoxia   continue O2 use at night   3. Gastroesophageal reflux disease without esophagitis   having breakthrough symptoms on twice daily dosing, suggest adjust timing, follow-up with GI       Immunizations:    Flu: 10/2/2018  Pneumovax 23: 1/16/2014  Prevnar 13: 1/5/2017    Plan:    1-increased daytime somnolence  2-did discuss entire face mask as possible option for cpap use  3-taking pantoprazole twice daily with break through  4-consider adding ranitidine, taken 30 min prior to largest meal   5-follow up 6 months with PFT    This dictation was created using voice recognition software. The accuracy of the dictation is limited to the abilities of the software. I expect there may be some errors of grammar and possibly content.

## 2019-08-10 ENCOUNTER — APPOINTMENT (OUTPATIENT)
Dept: RADIOLOGY | Facility: MEDICAL CENTER | Age: 69
End: 2019-08-10
Attending: EMERGENCY MEDICINE
Payer: MEDICARE

## 2019-08-10 ENCOUNTER — HOSPITAL ENCOUNTER (EMERGENCY)
Facility: MEDICAL CENTER | Age: 69
End: 2019-08-10
Attending: EMERGENCY MEDICINE
Payer: MEDICARE

## 2019-08-10 VITALS
SYSTOLIC BLOOD PRESSURE: 170 MMHG | DIASTOLIC BLOOD PRESSURE: 84 MMHG | BODY MASS INDEX: 46.98 KG/M2 | HEART RATE: 75 BPM | HEIGHT: 65 IN | RESPIRATION RATE: 20 BRPM | OXYGEN SATURATION: 94 % | TEMPERATURE: 97.5 F | WEIGHT: 282 LBS

## 2019-08-10 DIAGNOSIS — S52.134A CLOSED NONDISPLACED FRACTURE OF NECK OF RIGHT RADIUS, INITIAL ENCOUNTER: ICD-10-CM

## 2019-08-10 DIAGNOSIS — S62.114A CLOSED NONDISPLACED FRACTURE OF TRIQUETRUM OF RIGHT WRIST, INITIAL ENCOUNTER: ICD-10-CM

## 2019-08-10 DIAGNOSIS — S09.90XA CLOSED HEAD INJURY, INITIAL ENCOUNTER: ICD-10-CM
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
## 2019-08-10 PROCEDURE — 99285 EMERGENCY DEPT VISIT HI MDM: CPT

## 2019-08-10 PROCEDURE — 72125 CT NECK SPINE W/O DYE: CPT

## 2019-08-10 PROCEDURE — 700102 HCHG RX REV CODE 250 W/ 637 OVERRIDE(OP): Performed by: EMERGENCY MEDICINE

## 2019-08-10 PROCEDURE — 73080 X-RAY EXAM OF ELBOW: CPT | Mod: RT

## 2019-08-10 PROCEDURE — 73110 X-RAY EXAM OF WRIST: CPT | Mod: RT

## 2019-08-10 PROCEDURE — 70450 CT HEAD/BRAIN W/O DYE: CPT

## 2019-08-10 PROCEDURE — 302874 HCHG BANDAGE ACE 2 OR 3""

## 2019-08-10 PROCEDURE — 29125 APPL SHORT ARM SPLINT STATIC: CPT

## 2019-08-10 PROCEDURE — 73030 X-RAY EXAM OF SHOULDER: CPT | Mod: RT

## 2019-08-10 PROCEDURE — A9270 NON-COVERED ITEM OR SERVICE: HCPCS | Performed by: EMERGENCY MEDICINE

## 2019-08-10 RX ORDER — TRAMADOL HYDROCHLORIDE 50 MG/1
50 TABLET ORAL ONCE
Status: DISCONTINUED | OUTPATIENT
Start: 2019-08-10 | End: 2019-08-10

## 2019-08-10 RX ORDER — TRAMADOL HYDROCHLORIDE 50 MG/1
100 TABLET ORAL ONCE
Status: COMPLETED | OUTPATIENT
Start: 2019-08-10 | End: 2019-08-10

## 2019-08-10 RX ADMIN — TRAMADOL HYDROCHLORIDE 100 MG: 50 TABLET, FILM COATED ORAL at 18:05

## 2019-08-10 ASSESSMENT — ENCOUNTER SYMPTOMS
HEADACHES: 1
FLANK PAIN: 0
VOMITING: 0
FOCAL WEAKNESS: 0
BLURRED VISION: 0
BACK PAIN: 0
SENSORY CHANGE: 0
ROS SKIN COMMENTS: POSITIVE FOR ABRASIONS
NECK PAIN: 1
NAUSEA: 0

## 2019-08-11 NOTE — ED PROVIDER NOTES
ED Provider Note    Primary care provider: Colton Ahn M.D.  Means of arrival: ambulance  History obtained from: patient  History limited by: none    CHIEF COMPLAINT  Chief Complaint   Patient presents with   • T-5000 FALL     trip and fall down 4 deck steps onto concrete   • Shoulder Pain     right   • Abrasion     bilat knees   • Wrist Pain     right       HPI  Ashly Meyer is a 69 y.o. female who presents to the Emergency Department for fall.  Patient reports that she tripped and fell down 4 steps.  She landed mostly on her right arm but also subsequently rolled over and hit the back of her head.  She reports that she is having moderate throbbing pain to her right shoulder, right elbow and right wrist.  She reports that she did not lose consciousness but is having mild posterior throbbing headache with associated mild generalized neck pain.  She denies pain to her left upper extremity or bilateral lower extremities.  Patient does state that she is sustained abrasions to her bilateral knees during the fall but she is not having any pain and has been able to ambulate without difficulty.  Denies any other injuries, numbness, tingling, or weakness.    REVIEW OF SYSTEMS  Review of Systems   Eyes: Negative for blurred vision.   Cardiovascular: Negative for chest pain.   Gastrointestinal: Negative for nausea and vomiting.   Genitourinary: Negative for flank pain.   Musculoskeletal: Positive for joint pain and neck pain. Negative for back pain.   Skin:        Positive for abrasions   Neurological: Positive for headaches. Negative for sensory change and focal weakness.   All other systems reviewed and are negative.        PAST MEDICAL HISTORY   has a past medical history of Anginal syndrome (HCC), Anxiety, Aphasia, Arthritis, Back pain, Breath shortness, Carotid artery stenosis, unilateral, Constipation, Controlled type 2 diabetes mellitus without complication (HCC), Controlled type 2 diabetes mellitus  without complication, without long-term current use of insulin ([facility]) (6/12/2019), Daytime sleepiness, Degenerative disc disease, Depression (5/20/2009), Diarrhea, Dizziness, Dry eye syndrome, bilateral, Elevated sedimentation rate, Fatigue, Fibromyalgia, Frequent headaches, GERD (gastroesophageal reflux disease), GOUT (5/20/2009), H/O foot surgery, Hearing difficulty, Heart burn, Hemorrhagic disorder ([facility]) (2016), Hiatus hernia syndrome, History of anemia, Hypertension, Hypothyroidism (5/20/2009), Incipient cataract of both eyes, Indigestion, Insomnia, Migraine without aura, without mention of intractable migraine without mention of status migrainosus, Mild intermittent asthma without complication, Mixed dyslipidemia, Morbid obesity with BMI of 45.0-49.9, adult ([facility]), Morning headache, Mumps, Nasal drainage, Nocturnal hypoxia, Obesity, Obesity hypoventilation syndrome ([facility]) (5/31/2017), Pelvic floor dysfunction, Peripheral neuropathy, Pleomorphic adenoma, Polymyalgia rheumatica ([facility]), Restless leg syndrome, Ringing in ears, Rotator cuff syndrome of right shoulder and allied disorders (10/10/2018), S/P tonsillectomy, Seasonal allergies, Skin cancer (1990's), Sleep apnea syndrome, Snoring, Sore muscles, Sweat, sweating, excessive, Swelling of lower extremity, Urinary incontinence, Vision loss, and Weakness.    SURGICAL HISTORY   has a past surgical history that includes [facility] stat fine needle aspiration (11/16/2009); tonsillectomy (1953); bladder suspension (1983); other orthopedic surgery (1962/1980); other abdominal surgery; hysterectomy, vaginal (1983); colonoscopy (2006); septoplasty (N/A, 8/1/2016); turbinoplasty (Bilateral, 8/1/2016); nasal fracture reduction open (N/A, 8/1/2016); septal reconstruction (10/3/2016); gastroscopy (2/6/2017); colonoscopy (2/6/2017); hysterectomy laparoscopy; tonsillectomy; shoulder decompression arthroscopic (Right, 2/1/2019); shoulder arthroscopy w/ bicipital tenodesis repair  (Right, 2019); and carpal tunnel release (Right, 2019).    SOCIAL HISTORY  Social History     Tobacco Use   • Smoking status: Former Smoker     Packs/day: 1.00     Years: 43.00     Pack years: 43.00     Types: Cigarettes     Start date:      Last attempt to quit: 3/1/2007     Years since quittin.4   • Smokeless tobacco: Never Used   • Tobacco comment: Started smoking at age 15, continued abstinance    Substance Use Topics   • Alcohol use: No     Comment: no longer drinks   • Drug use: No     Comment: CBD topical pain cream      Social History     Substance and Sexual Activity   Drug Use No    Comment: CBD topical pain cream       FAMILY HISTORY  Family History   Problem Relation Age of Onset   • Heart Disease Mother         Arotic vaulve replacement   • GI Disease Mother         bile duct problem   • Bladder cancer Mother    • GI Disease Sister         celiac   • Arthritis Sister    • Other Sister         gout and lupus   • Arthritis Sister    • Kidney Disease Sister    • GI Disease Sister    • Bladder cancer Maternal Aunt    • Arthritis Maternal Grandmother    • Hypertension Maternal Grandmother    • Heart Disease Maternal Grandmother    • Hypertension Maternal Grandfather    • Heart Disease Maternal Grandfather    • Arthritis Maternal Grandfather    • No Known Problems Paternal Grandmother    • No Known Problems Paternal Grandfather    • Cancer Brother         Larynx   • Cancer Daughter         non hopskins limphoma   • GI Disease Daughter    • Bipolar disorder Daughter    • Seizures Daughter    • Bipolar disorder Daughter        CURRENT MEDICATIONS  Home Medications    See nursing notes         ALLERGIES  Allergies   Allergen Reactions   • Butalbital-Acetaminophen Unspecified     Dizzy, can't walk, hard to focus   • Cefaclor Unspecified     Ceclor causes light headedness and dizziness   • Diphenhydramine Hives     Full body hives   • Iodine Unspecified     Labored breathing   • Lamictal Rash and  "Swelling     \"hot\", unable to function   • Morphine Itching     redness   • Penicillins Itching and Swelling     Skin itching and redness   • Savella [Milnacipran Hcl] Unspecified     Muscle aches, feet swelling   • Sulfa Drugs Hives and Anxiety     Hard breathing   • Tizanidine Hcl Unspecified     dizziness   • Amlactin Rash     Lotion causes itching, redness and burnng   • Savella [Kdc:Milnacipran+Ci Pigment Blue 63]      Swelling, bone and muscle pain, sweating, nausea, dizziness, sleeplessness, anxiety   • Ace Inhibitors Cough     Cough     • Tape Unspecified     Paper tape is ok       PHYSICAL EXAM  VITAL SIGNS: BP (!) 175/92   Pulse 71   Temp 36.4 °C (97.5 °F) (Temporal)   Resp 18   Ht 1.651 m (5' 5\")   Wt (!) 127.9 kg (282 lb)   SpO2 90%   BMI 46.93 kg/m²   Vitals reviewed by myself.  Physical Exam   Constitutional:   Well-developed, well-nourished, seems slightly uncomfortable   HENT:   Head: Normocephalic and atraumatic.   Mouth/Throat: Oropharynx is clear and moist.   Eyes: EOM are normal.   Neck: Normal range of motion.   No point tenderness on the cervical spine, diffuse tenderness over the entire cervical spine area, no step-offs or deformities noted of the cervical spine   Cardiovascular: Normal rate, regular rhythm and normal heart sounds. Exam reveals no gallop and no friction rub.   No murmur heard.  2+ bilateral radial pulses   Pulmonary/Chest: Effort normal and breath sounds normal. No respiratory distress. She has no wheezes. She has no rales.   No chest wall tenderness   Musculoskeletal:   Decreased range of motion in the right upper extremity secondary to pain.  Patient has tenderness to palpation along the right anterior shoulder, right elbow, and right wrist.  5 out of 5  strength in bilateral upper extremities. no obvious deformity noted.  No midline spinal tenderness.  Patient ambulates with a narrow base steady gait.  No deformities or tenderness in the left upper extremity and " bilateral lower extremities   Neurological:   Sensation intact in all extremities   Skin:   Abrasions noted to right elbow bilateral knees         DIAGNOSTIC STUDIES /  LABS  Labs Reviewed - No data to display    All labs reviewed by me.      RADIOLOGY  CT-HEAD W/O   Final Result      1.  No acute intracranial abnormality.      CT-CSPINE WITHOUT PLUS RECONS   Final Result      1.  No acute fracture or dislocation cervical spine.   2.  Degenerative changes as detailed.      DX-WRIST-COMPLETE 3+ RIGHT   Final Result      Acute-appearing triquetral fracture.      DX-SHOULDER 2+ RIGHT   Final Result      No acute osseous abnormality.      DX-ELBOW-COMPLETE 3+ RIGHT   Final Result      Findings suspicious for nondisplaced radial neck fracture. Correlate with point tenderness. Follow-up radiographs in 7-10 days may be performed.        The radiologist's interpretation of all radiological studies have been reviewed by me.        COURSE & MEDICAL DECISION MAKING  Nursing notes, VS, PMSFHx reviewed in chart.    Patient is a 69-year-old female who comes in after mechanical ground-level fall down a couple of stairs.  Differential diagnosis includes sprain, strain, fracture, dislocation, concussion, intracranial hemorrhage, spinal injury.  Given her age she cannot be clinically cleared by [facility] criteria and therefore I will order a CT of her head and C-spine given her headache and neck pain.  I will also obtain plain film x-rays of her right shoulder, right elbow, and right wrist.    Patient's initial vitals notable for hypertension, likely secondary to pain.  She is treated with tramadol after which she feels improved.  Patient is neurovascularly and neurologically intact on exam.  Patient's x-rays returned and are notable for no acute osseous abnormality to the right shoulder, however patient is noted to have effusion in the right elbow concerning for nondisplaced radial neck fracture along with isolated triquetral fracture.   I discussed the case with on-call orthopedic surgeon Dr. Steinberg who recommends placing patient in ulnar gutter splint for triquetral fracture and sling for possible radial neck fracture.  Patient is placed in splint and remains neurovascularly intact after splint and sling placement.  CT scans return and demonstrate no acute traumatic injuries.  Therefore patient is reassured and advised on symptomatic management of pain.  She is advised to follow-up with orthopedic surgeon within the next week and given strict return precautions.  Patient is then discharged home in stable condition.      FINAL IMPRESSION  1. Closed nondisplaced fracture of triquetrum of right wrist, initial encounter    2. Closed nondisplaced fracture of neck of right radius, initial encounter    3. Fall, initial encounter    4. Closed head injury, initial encounter

## 2019-08-11 NOTE — ED TRIAGE NOTES
Pt bib ems from home.  Chief Complaint   Patient presents with   • T-5000 FALL     trip and fall down 4 deck steps onto concrete   • Shoulder Pain     right   • Abrasion     bilat knees   • Wrist Pain     right     Pt slipped down steps, landed on ground. Denies LOC. [facility].   PTA Sling applied by EMS to right arm.

## 2019-08-20 ENCOUNTER — OFFICE VISIT (OUTPATIENT)
Dept: CARDIOLOGY | Facility: MEDICAL CENTER | Age: 69
End: 2019-08-20
Payer: MEDICARE

## 2019-08-20 VITALS
WEIGHT: 284 LBS | HEIGHT: 65 IN | OXYGEN SATURATION: 91 % | DIASTOLIC BLOOD PRESSURE: 80 MMHG | BODY MASS INDEX: 47.32 KG/M2 | SYSTOLIC BLOOD PRESSURE: 142 MMHG | HEART RATE: 76 BPM

## 2019-08-20 DIAGNOSIS — G47.33 OSA (OBSTRUCTIVE SLEEP APNEA): ICD-10-CM

## 2019-08-20 DIAGNOSIS — E66.2 OBESITY HYPOVENTILATION SYNDROME (HCC): ICD-10-CM

## 2019-08-20 DIAGNOSIS — Z91.89 OTHER SPECIFIED PERSONAL RISK FACTORS, NOT ELSEWHERE CLASSIFIED: ICD-10-CM

## 2019-08-20 DIAGNOSIS — I10 ESSENTIAL HYPERTENSION: ICD-10-CM

## 2019-08-20 DIAGNOSIS — K60.2 ANAL FISSURE: ICD-10-CM

## 2019-08-20 DIAGNOSIS — E11.9 CONTROLLED TYPE 2 DIABETES MELLITUS WITHOUT COMPLICATION, WITHOUT LONG-TERM CURRENT USE OF INSULIN (HCC): ICD-10-CM

## 2019-08-20 PROCEDURE — 99214 OFFICE O/P EST MOD 30 MIN: CPT | Performed by: INTERNAL MEDICINE

## 2019-08-20 ASSESSMENT — ENCOUNTER SYMPTOMS
HEMOPTYSIS: 0
GASTROINTESTINAL NEGATIVE: 1
CARDIOVASCULAR NEGATIVE: 1
ORTHOPNEA: 0
RESPIRATORY NEGATIVE: 1
DIZZINESS: 0
MUSCULOSKELETAL NEGATIVE: 1
WHEEZING: 0
EYES NEGATIVE: 1
PND: 0
SPUTUM PRODUCTION: 0
NEUROLOGICAL NEGATIVE: 1
CONSTITUTIONAL NEGATIVE: 1
SORE THROAT: 0
WEAKNESS: 0
COUGH: 0
BRUISES/BLEEDS EASILY: 0
LOSS OF CONSCIOUSNESS: 0
CLAUDICATION: 0
SHORTNESS OF BREATH: 0
CHILLS: 0
PALPITATIONS: 0
FEVER: 0
STRIDOR: 0

## 2019-08-20 NOTE — PROGRESS NOTES
Chief Complaint   Patient presents with   • Hypertension       Subjective:   Ashly Meyer is a 69 y.o. female who presents today as a follow-up for her preoperative stratification.  Since she was last seen she fell and broke her right wrist.  She did undergo surgery and had good preoperative clearance.  Her right shoulder still has a lot of pain.  Her most recent cholesterol shows an LDL of 107.  Of calculated her ASCVD risk today at 13.9%.  She is never had a coronary calcium score done.  She does have no lower extremity edema.  She is had trouble with CPAP compliance because she cannot sleep with it.    Past Medical History:   Diagnosis Date   • Anginal syndrome ([facility])    • Anxiety    • Aphasia     Written word recognition/useage   • Arthritis     Everywhere.    • Back pain    • Breath shortness    • Carotid artery stenosis, unilateral     moderate left carotid artery stenosis   • Constipation    • Controlled type 2 diabetes mellitus without complication ([facility])     states borderline   • Controlled type 2 diabetes mellitus without complication, without long-term current use of insulin ([facility]) 6/12/2019   • Daytime sleepiness    • Degenerative disc disease    • Depression 5/20/2009   • Diarrhea     and constipation   • Dizziness    • Dry eye syndrome, bilateral    • Elevated sedimentation rate     history of negative temporal artery biopsy around 2006   • Fatigue    • Fibromyalgia     confirmed by Dr. Ann   • Frequent headaches    • GERD (gastroesophageal reflux disease)     hiatal hernia   • GOUT 5/20/2009   • H/O foot surgery    • Hearing difficulty    • Heart burn    • Hemorrhagic disorder ([facility]) 2016    nose bleeds   • Hiatus hernia syndrome    • History of anemia     none currently   • Hypertension    • Hypothyroidism 5/20/2009   • Incipient cataract of both eyes    • Indigestion    • Insomnia    • Migraine without aura, without mention of intractable migraine without mention of status migrainosus    •  Mild intermittent asthma without complication     inhalers as needed   • Mixed dyslipidemia    • Morbid obesity with BMI of 45.0-49.9, adult ([facility])    • Morning headache    • Mumps    • Nasal drainage    • Nocturnal hypoxia     severe, to 69% O2 sat   • Obesity    • Obesity hypoventilation syndrome ([facility]) 5/31/2017   • Pelvic floor dysfunction    • Peripheral neuropathy    • Pleomorphic adenoma    • Polymyalgia rheumatica ([facility])     Dr. Ann   • Restless leg syndrome    • Ringing in ears    • Rotator cuff syndrome of right shoulder and allied disorders 10/10/2018   • S/P tonsillectomy    • Seasonal allergies    • Skin cancer 1990's    skin   • Sleep apnea syndrome     she is not using CPAP, claustrophobia, uses 2-3l at night   • Snoring    • Sore muscles    • Sweat, sweating, excessive    • Swelling of lower extremity    • Urinary incontinence     will not wear a pad   • Vision loss    • Weakness      Past Surgical History:   Procedure Laterality Date   • SHOULDER DECOMPRESSION ARTHROSCOPIC Right 2/1/2019    Procedure: SHOULDER DECOMPRESSION ARTHROSCOPIC - SUBACROMIAL, LABRAL DEBRIDEMENT;  Surgeon: Damaris Knutson M.D.;  Location: [facility];  Service: Orthopedics   • SHOULDER ARTHROSCOPY W/ BICIPITAL TENODESIS REPAIR Right 2/1/2019    Procedure: SHOULDER ARTHROSCOPY W/ BICIPITAL TENOTOMY;  Surgeon: Damaris Knutson M.D.;  Location: [facility];  Service: Orthopedics   • CARPAL TUNNEL RELEASE Right 2/1/2019    Procedure: CARPAL TUNNEL RELEASE;  Surgeon: Damaris Knutson M.D.;  Location: [facility];  Service: Orthopedics   • GASTROSCOPY  2/6/2017    Procedure: GASTROSCOPY;  Surgeon: Pau Foster M.D.;  Location: SURGERY SAME DAY [facility];  Service:    • COLONOSCOPY  2/6/2017    Procedure: COLONOSCOPY;  Surgeon: Pau Foster M.D.;  Location: SURGERY SAME DAY [facility];  Service:    • SEPTAL RECONSTRUCTION  10/3/2016    Procedure: SEPTAL RECONSTRUCTION with   grafts ;  Surgeon: PIETER Dickson M.D.;  Location: SURGERY SAME DAY [facility];  Service:    • SEPTOPLASTY N/A 8/1/2016    Procedure: SEPTOPLASTY;  Surgeon: PIETER Dickson M.D.;  Location: SURGERY SAME DAY [facility];  Service:    • TURBINOPLASTY Bilateral 8/1/2016    Procedure: TURBINOPLASTY;  Surgeon: PIETER Dickson M.D.;  Location: SURGERY SAME DAY [facility];  Service:    • NASAL FRACTURE REDUCTION OPEN N/A 8/1/2016    Procedure: SEPTAL FRACTURE REDUCTION OPEN;  Surgeon: PIETER Dickson M.D.;  Location: SURGERY SAME DAY [facility];  Service:    • FINE NEEDLE ASPIRATION  11/16/2009    Performed by DA CALLOWAY at ENDOSCOPY [facility]   • COLONOSCOPY  2006    ? unclear date   • BLADDER SUSPENSION  1983   • HYSTERECTOMY, VAGINAL  1983    ovaries are present, excessive bleeding   • TONSILLECTOMY  1953   • HYSTERECTOMY LAPAROSCOPY     • OTHER ABDOMINAL SURGERY      Cholysestectomy   • OTHER ORTHOPEDIC SURGERY  1962/1980    foot surgery    • TONSILLECTOMY       Family History   Problem Relation Age of Onset   • Heart Disease Mother         Arotic vaulve replacement   • GI Disease Mother         bile duct problem   • Bladder cancer Mother    • GI Disease Sister         celiac   • Arthritis Sister    • Other Sister         gout and lupus   • Arthritis Sister    • Kidney Disease Sister    • GI Disease Sister    • Bladder cancer Maternal Aunt    • Arthritis Maternal Grandmother    • Hypertension Maternal Grandmother    • Heart Disease Maternal Grandmother    • Hypertension Maternal Grandfather    • Heart Disease Maternal Grandfather    • Arthritis Maternal Grandfather    • No Known Problems Paternal Grandmother    • No Known Problems Paternal Grandfather    • Cancer Brother         Larynx   • Cancer Daughter         non hopskins limphoma   • GI Disease Daughter    • Bipolar disorder Daughter    • Seizures Daughter    • Bipolar disorder Daughter      Social History      Socioeconomic History   • Marital status:      Spouse name: Not on file   • Number of children: Not on file   • Years of education: Not on file   • Highest education level: Not on file   Occupational History   • Not on file   Social Needs   • Financial resource strain: Not on file   • Food insecurity:     Worry: Not on file     Inability: Not on file   • Transportation needs:     Medical: Not on file     Non-medical: Not on file   Tobacco Use   • Smoking status: Former Smoker     Packs/day: 1.00     Years: 43.00     Pack years: 43.00     Types: Cigarettes     Start date:      Last attempt to quit: 3/1/2007     Years since quittin.4   • Smokeless tobacco: Never Used   • Tobacco comment: Started smoking at age 15, continued abstinance    Substance and Sexual Activity   • Alcohol use: No     Comment: no longer drinks   • Drug use: No     Comment: CBD topical pain cream   • Sexual activity: Never     Partners: Male   Lifestyle   • Physical activity:     Days per week: Not on file     Minutes per session: Not on file   • Stress: Not on file   Relationships   • Social connections:     Talks on phone: Not on file     Gets together: Not on file     Attends [employer] service: Not on file     Active member of club or organization: Not on file     Attends meetings of clubs or organizations: Not on file     Relationship status: Not on file   • Intimate partner violence:     Fear of current or ex partner: Not on file     Emotionally abused: Not on file     Physically abused: Not on file     Forced sexual activity: Not on file   Other Topics Concern   • Not on file   Social History Narrative   • Not on file     Allergies   Allergen Reactions   • Butalbital-Acetaminophen Unspecified     Dizzy, can't walk, hard to focus   • Cefaclor Unspecified     Ceclor causes light headedness and dizziness   • Diphenhydramine Hives     Full body hives   • Iodine Unspecified     Labored breathing   • Lamictal Rash and Swelling  "    \"hot\", unable to function   • Morphine Itching     redness   • Penicillins Itching and Swelling     Skin itching and redness   • Savella [Milnacipran Hcl] Unspecified     Muscle aches, feet swelling   • Sulfa Drugs Hives and Anxiety     Hard breathing   • Tizanidine Hcl Unspecified     dizziness   • Amlactin Rash     Lotion causes itching, redness and burnng   • Savella [Kdc:Milnacipran+Ci Pigment Blue 63]      Swelling, bone and muscle pain, sweating, nausea, dizziness, sleeplessness, anxiety   • Ace Inhibitors Cough     Cough     • Tape Unspecified     Paper tape is ok     Outpatient Encounter Medications as of 8/20/2019   Medication Sig Dispense Refill   • tramadol (ULTRAM) 50 MG Tab Take 50 mg by mouth every four hours as needed.     • spironolactone (ALDACTONE) 50 MG Tab TAKE 1 TABLET BY MOUTH EVERY DAY 90 Tab 2   • venlafaxine XR (EFFEXOR-XR) 150 MG extended-release capsule Take 1 Cap by mouth every day. 30 Cap 6   • levothyroxine (SYNTHROID) 200 MCG Tab Take 1 Tab by mouth Every morning on an empty stomach. 90 Tab 3   • nabumetone (RELAFEN) 750 MG Tab Take 1 Tab by mouth 2 times a day, with meals. 180 Tab 3   • furosemide (LASIX) 20 MG Tab Take 1 Tab by mouth every day. 90 Tab 3   • ketoconazole (NIZORAL) 2 % Cream APPLY TO AFFECTED AREA DAILY 30 g 1   • Polyethyl Glycol-Propyl Glycol (SYSTANE OP) 1 Drop by Ophthalmic route 4 times a day.     • albuterol (PROAIR HFA) 108 (90 Base) MCG/ACT Aero Soln inhalation aerosol Inhale 2 Puffs by mouth every 6 hours as needed for Shortness of Breath. 1 Inhaler 6   • carvedilol (COREG) 6.25 MG Tab TAKE 1 TABLET BY MOUTH TWICE A DAY WITH MEALS 180 Tab 2   • gabapentin (NEURONTIN) 300 MG Cap Take 2 Caps by mouth 3 times a day. 540 Cap 3   • pantoprazole (PROTONIX) 40 MG Tablet Delayed Response TAKE ONE TABLET BY MOUTH TWICE DAILY  180 Tab 2   • potassium chloride SA (KDUR) 20 MEQ Tab CR Take 1 Tab by mouth 2 times a day. 180 Tab 3   • albuterol (VENTOLIN HFA) 108 (90 " "Base) MCG/ACT Aero Soln inhalation aerosol Inhale 2 Puffs by mouth every 6 hours as needed for Shortness of Breath.     • metronidazole (METROGEL) 0.75 % gel APPLY TO AFFECTED AREA TWICE A DAY 1 Tube 0   • polyethyl glycol-propyl glycol (SYSTANE) 0.4-0.3 % Solution Place 1 Drop in both eyes as needed.     • temazepam (RESTORIL) 15 MG Cap Take 1 Cap by mouth at bedtime as needed for Sleep. 30 Cap 3   • Fluocinolone Acetonide 0.01 % Oil Place  in ear 2 Times a Day.     • allopurinol (ZYLOPRIM) 300 MG Tab Take 1 Tab by mouth every day. (Patient not taking: Reported on 8/20/2019) 90 Tab 3     No facility-administered encounter medications on file as of 8/20/2019.      Review of Systems   Constitutional: Negative.  Negative for chills, fever and malaise/fatigue.   HENT: Negative.  Negative for sore throat.    Eyes: Negative.    Respiratory: Negative.  Negative for cough, hemoptysis, sputum production, shortness of breath, wheezing and stridor.    Cardiovascular: Negative.  Negative for chest pain, palpitations, orthopnea, claudication, leg swelling and PND.   Gastrointestinal: Negative.    Genitourinary: Negative.    Musculoskeletal: Negative.    Skin: Negative.    Neurological: Negative.  Negative for dizziness, loss of consciousness and weakness.   Endo/Heme/Allergies: Negative.  Does not bruise/bleed easily.   All other systems reviewed and are negative.       Objective:   /80 (BP Location: Left arm, Patient Position: Sitting, BP Cuff Size: Adult)   Pulse 76   Ht 1.651 m (5' 5\")   Wt (!) 128.8 kg (284 lb)   SpO2 91%   BMI 47.26 kg/m²     Physical Exam   Constitutional: She appears well-developed and well-nourished. No distress.   HENT:   Head: Normocephalic and atraumatic.   Right Ear: External ear normal.   Left Ear: External ear normal.   Nose: Nose normal.   Mouth/Throat: No oropharyngeal exudate.   Eyes: Pupils are equal, round, and reactive to light. Conjunctivae and EOM are normal. Right eye exhibits " no discharge. Left eye exhibits no discharge. No scleral icterus.   Neck: Neck supple. No JVD present.   Cardiovascular: Normal rate, regular rhythm and intact distal pulses. Exam reveals no gallop and no friction rub.   No murmur heard.  Pulmonary/Chest: Effort normal. No stridor. No respiratory distress. She has no wheezes. She has no rales. She exhibits no tenderness.   Abdominal: Soft. She exhibits no distension. There is no guarding.   Musculoskeletal: Normal range of motion. She exhibits no edema, tenderness or deformity.   Neurological: She is alert. She has normal reflexes. She displays normal reflexes. No cranial nerve deficit. She exhibits normal muscle tone. Coordination normal.   Skin: Skin is warm and dry. No rash noted. She is not diaphoretic. No erythema. No pallor.   Psychiatric: She has a normal mood and affect. Her behavior is normal. Judgment and thought content normal.   Nursing note and vitals reviewed.      Assessment:     1. Anal fissure     2. Controlled type 2 diabetes mellitus without complication, without long-term current use of insulin (HCC)     3. Essential hypertension     4. KERRI (obstructive sleep apnea)     5. Obesity hypoventilation syndrome (HCC)     6. Other specified personal risk factors, not elsewhere classified  CT-CARDIAC SCORING       Medical Decision Making:  Today's Assessment / Status / Plan:     69-year-old female with an elevated ASCVD risk score.  We will get a coronary calcium score.  She would like a cholesterol-lowering medication sent to her pharmacy if she has a result high enough to deem she needs to be on that.  Otherwise for her lower extremity edema she is stable.  Blood pressure is at goal.  She will try to be more compliant with her CPAP.  We will see her back in 1 year.    Thank for you allowing me to take part in your patient's care, please call should you have any questions or would like to discuss this patient.

## 2019-08-22 ENCOUNTER — OFFICE VISIT (OUTPATIENT)
Dept: MEDICAL GROUP | Facility: MEDICAL CENTER | Age: 69
End: 2019-08-22
Payer: MEDICARE

## 2019-08-22 VITALS
SYSTOLIC BLOOD PRESSURE: 134 MMHG | HEART RATE: 68 BPM | DIASTOLIC BLOOD PRESSURE: 76 MMHG | BODY MASS INDEX: 46.82 KG/M2 | RESPIRATION RATE: 12 BRPM | TEMPERATURE: 97.4 F | HEIGHT: 65 IN | OXYGEN SATURATION: 90 % | WEIGHT: 281 LBS

## 2019-08-22 DIAGNOSIS — K21.9 GASTROESOPHAGEAL REFLUX DISEASE WITHOUT ESOPHAGITIS: ICD-10-CM

## 2019-08-22 DIAGNOSIS — E78.5 DYSLIPIDEMIA, GOAL LDL BELOW 130: ICD-10-CM

## 2019-08-22 DIAGNOSIS — E03.4 HYPOTHYROIDISM DUE TO ACQUIRED ATROPHY OF THYROID: ICD-10-CM

## 2019-08-22 DIAGNOSIS — Z23 NEED FOR VACCINATION FOR PNEUMOCOCCUS: ICD-10-CM

## 2019-08-22 DIAGNOSIS — E11.9 CONTROLLED TYPE 2 DIABETES MELLITUS WITHOUT COMPLICATION, WITHOUT LONG-TERM CURRENT USE OF INSULIN (HCC): ICD-10-CM

## 2019-08-22 DIAGNOSIS — K76.0 NONALCOHOLIC FATTY LIVER DISEASE: ICD-10-CM

## 2019-08-22 DIAGNOSIS — I10 ESSENTIAL HYPERTENSION: ICD-10-CM

## 2019-08-22 DIAGNOSIS — E66.01 MORBID OBESITY WITH BMI OF 45.0-49.9, ADULT (HCC): ICD-10-CM

## 2019-08-22 PROCEDURE — 90732 PPSV23 VACC 2 YRS+ SUBQ/IM: CPT | Performed by: FAMILY MEDICINE

## 2019-08-22 PROCEDURE — 99214 OFFICE O/P EST MOD 30 MIN: CPT | Mod: 25 | Performed by: FAMILY MEDICINE

## 2019-08-22 PROCEDURE — G0009 ADMIN PNEUMOCOCCAL VACCINE: HCPCS | Performed by: FAMILY MEDICINE

## 2019-08-22 ASSESSMENT — PATIENT HEALTH QUESTIONNAIRE - PHQ9
SUM OF ALL RESPONSES TO PHQ9 QUESTIONS 1 AND 2: 4
1. LITTLE INTEREST OR PLEASURE IN DOING THINGS: NEARLY EVERY DAY
2. FEELING DOWN, DEPRESSED, IRRITABLE, OR HOPELESS: SEVERAL DAYS
7. TROUBLE CONCENTRATING ON THINGS, SUCH AS READING THE NEWSPAPER OR WATCHING TELEVISION: SEVERAL DAYS
SUM OF ALL RESPONSES TO PHQ QUESTIONS 1-9: 7
3. TROUBLE FALLING OR STAYING ASLEEP OR SLEEPING TOO MUCH: SEVERAL DAYS
8. MOVING OR SPEAKING SO SLOWLY THAT OTHER PEOPLE COULD HAVE NOTICED. OR THE OPPOSITE, BEING SO FIGETY OR RESTLESS THAT YOU HAVE BEEN MOVING AROUND A LOT MORE THAN USUAL: NOT AT ALL
9. THOUGHTS THAT YOU WOULD BE BETTER OFF DEAD, OR OF HURTING YOURSELF: NOT AT ALL
5. POOR APPETITE OR OVEREATING: NOT AT ALL
4. FEELING TIRED OR HAVING LITTLE ENERGY: SEVERAL DAYS
6. FEELING BAD ABOUT YOURSELF - OR THAT YOU ARE A FAILURE OR HAVE LET YOURSELF OR YOUR FAMILY DOWN: NOT AL ALL
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
## 2019-08-28 DIAGNOSIS — K21.9 GASTROESOPHAGEAL REFLUX DISEASE WITHOUT ESOPHAGITIS: ICD-10-CM

## 2019-08-28 RX ORDER — PANTOPRAZOLE SODIUM 40 MG/1
TABLET, DELAYED RELEASE ORAL
Qty: 180 TAB | Refills: 3 | Status: SHIPPED | OUTPATIENT
Start: 2019-08-28 | End: 2020-03-19 | Stop reason: SDUPTHER

## 2019-09-07 ENCOUNTER — HOSPITAL ENCOUNTER (OUTPATIENT)
Dept: LAB | Facility: MEDICAL CENTER | Age: 69
End: 2019-09-07
Attending: INTERNAL MEDICINE
Payer: MEDICARE

## 2019-09-07 ENCOUNTER — HOSPITAL ENCOUNTER (OUTPATIENT)
Dept: LAB | Facility: MEDICAL CENTER | Age: 69
End: 2019-09-07
Attending: FAMILY MEDICINE
Payer: MEDICARE

## 2019-09-07 DIAGNOSIS — K21.9 GASTROESOPHAGEAL REFLUX DISEASE WITHOUT ESOPHAGITIS: ICD-10-CM

## 2019-09-07 DIAGNOSIS — E11.9 CONTROLLED TYPE 2 DIABETES MELLITUS WITHOUT COMPLICATION, WITHOUT LONG-TERM CURRENT USE OF INSULIN (HCC): ICD-10-CM

## 2019-09-07 DIAGNOSIS — K76.0 NONALCOHOLIC FATTY LIVER DISEASE: ICD-10-CM

## 2019-09-07 DIAGNOSIS — I10 ESSENTIAL HYPERTENSION: ICD-10-CM

## 2019-09-07 DIAGNOSIS — E78.5 DYSLIPIDEMIA, GOAL LDL BELOW 130: ICD-10-CM

## 2019-09-07 DIAGNOSIS — E03.4 HYPOTHYROIDISM DUE TO ACQUIRED ATROPHY OF THYROID: ICD-10-CM

## 2019-09-07 DIAGNOSIS — N34.3 DYSURIA-FREQUENCY SYNDROME: ICD-10-CM

## 2019-09-07 LAB
ALBUMIN SERPL BCP-MCNC: 4.2 G/DL (ref 3.2–4.9)
ALBUMIN/GLOB SERPL: 1.1 G/DL
ALP SERPL-CCNC: 103 U/L (ref 30–99)
ALT SERPL-CCNC: 17 U/L (ref 2–50)
ANION GAP SERPL CALC-SCNC: 12 MMOL/L (ref 0–11.9)
AST SERPL-CCNC: 17 U/L (ref 12–45)
BASOPHILS # BLD AUTO: 0.4 % (ref 0–1.8)
BASOPHILS # BLD: 0.05 K/UL (ref 0–0.12)
BILIRUB SERPL-MCNC: 0.6 MG/DL (ref 0.1–1.5)
BUN SERPL-MCNC: 19 MG/DL (ref 8–22)
CALCIUM SERPL-MCNC: 9.5 MG/DL (ref 8.5–10.5)
CHLORIDE SERPL-SCNC: 103 MMOL/L (ref 96–112)
CHOLEST SERPL-MCNC: 163 MG/DL (ref 100–199)
CO2 SERPL-SCNC: 24 MMOL/L (ref 20–33)
CREAT SERPL-MCNC: 0.85 MG/DL (ref 0.5–1.4)
CREAT UR-MCNC: 149.9 MG/DL
CRP SERPL HS-MCNC: 10.62 MG/DL (ref 0–0.75)
EOSINOPHIL # BLD AUTO: 0.23 K/UL (ref 0–0.51)
EOSINOPHIL NFR BLD: 1.8 % (ref 0–6.9)
ERYTHROCYTE [DISTWIDTH] IN BLOOD BY AUTOMATED COUNT: 51.5 FL (ref 35.9–50)
ERYTHROCYTE [DISTWIDTH] IN BLOOD BY AUTOMATED COUNT: 52 FL (ref 35.9–50)
ERYTHROCYTE [SEDIMENTATION RATE] IN BLOOD BY WESTERGREN METHOD: 105 MM/HOUR (ref 0–30)
EST. AVERAGE GLUCOSE BLD GHB EST-MCNC: 137 MG/DL
FASTING STATUS PATIENT QL REPORTED: NORMAL
GLOBULIN SER CALC-MCNC: 3.7 G/DL (ref 1.9–3.5)
GLUCOSE SERPL-MCNC: 107 MG/DL (ref 65–99)
HBA1C MFR BLD: 6.4 % (ref 0–5.6)
HCT VFR BLD AUTO: 39.2 % (ref 37–47)
HCT VFR BLD AUTO: 39.8 % (ref 37–47)
HDLC SERPL-MCNC: 46 MG/DL
HGB BLD-MCNC: 12.2 G/DL (ref 12–16)
HGB BLD-MCNC: 12.2 G/DL (ref 12–16)
IMM GRANULOCYTES # BLD AUTO: 0.04 K/UL (ref 0–0.11)
IMM GRANULOCYTES NFR BLD AUTO: 0.3 % (ref 0–0.9)
LDLC SERPL CALC-MCNC: 93 MG/DL
LYMPHOCYTES # BLD AUTO: 2.62 K/UL (ref 1–4.8)
LYMPHOCYTES NFR BLD: 20.9 % (ref 22–41)
MCH RBC QN AUTO: 29.1 PG (ref 27–33)
MCH RBC QN AUTO: 29.5 PG (ref 27–33)
MCHC RBC AUTO-ENTMCNC: 30.7 G/DL (ref 33.6–35)
MCHC RBC AUTO-ENTMCNC: 31.1 G/DL (ref 33.6–35)
MCV RBC AUTO: 94.9 FL (ref 81.4–97.8)
MCV RBC AUTO: 95 FL (ref 81.4–97.8)
MICROALBUMIN UR-MCNC: 0.7 MG/DL
MICROALBUMIN/CREAT UR: 5 MG/G (ref 0–30)
MONOCYTES # BLD AUTO: 0.8 K/UL (ref 0–0.85)
MONOCYTES NFR BLD AUTO: 6.4 % (ref 0–13.4)
NEUTROPHILS # BLD AUTO: 8.8 K/UL (ref 2–7.15)
NEUTROPHILS NFR BLD: 70.2 % (ref 44–72)
NRBC # BLD AUTO: 0 K/UL
NRBC BLD-RTO: 0 /100 WBC
PLATELET # BLD AUTO: 246 K/UL (ref 164–446)
PLATELET # BLD AUTO: 255 K/UL (ref 164–446)
PMV BLD AUTO: 11.1 FL (ref 9–12.9)
PMV BLD AUTO: 11.1 FL (ref 9–12.9)
POTASSIUM SERPL-SCNC: 4.1 MMOL/L (ref 3.6–5.5)
PROT SERPL-MCNC: 7.9 G/DL (ref 6–8.2)
RBC # BLD AUTO: 4.13 M/UL (ref 4.2–5.4)
RBC # BLD AUTO: 4.19 M/UL (ref 4.2–5.4)
SODIUM SERPL-SCNC: 139 MMOL/L (ref 135–145)
TRIGL SERPL-MCNC: 118 MG/DL (ref 0–149)
TSH SERPL DL<=0.005 MIU/L-ACNC: 0.35 UIU/ML (ref 0.38–5.33)
WBC # BLD AUTO: 12.5 K/UL (ref 4.8–10.8)
WBC # BLD AUTO: 12.7 K/UL (ref 4.8–10.8)

## 2019-09-07 PROCEDURE — 82043 UR ALBUMIN QUANTITATIVE: CPT

## 2019-09-07 PROCEDURE — 82570 ASSAY OF URINE CREATININE: CPT

## 2019-09-07 PROCEDURE — 36415 COLL VENOUS BLD VENIPUNCTURE: CPT

## 2019-09-07 PROCEDURE — 85025 COMPLETE CBC W/AUTO DIFF WBC: CPT

## 2019-09-07 PROCEDURE — 86140 C-REACTIVE PROTEIN: CPT

## 2019-09-07 PROCEDURE — 85652 RBC SED RATE AUTOMATED: CPT

## 2019-09-07 PROCEDURE — 85027 COMPLETE CBC AUTOMATED: CPT | Mod: XU

## 2019-09-07 PROCEDURE — 80061 LIPID PANEL: CPT

## 2019-09-07 PROCEDURE — 84443 ASSAY THYROID STIM HORMONE: CPT

## 2019-09-07 PROCEDURE — 83036 HEMOGLOBIN GLYCOSYLATED A1C: CPT | Mod: GA

## 2019-09-07 PROCEDURE — 80053 COMPREHEN METABOLIC PANEL: CPT

## 2019-09-11 ENCOUNTER — OFFICE VISIT (OUTPATIENT)
Dept: MEDICAL GROUP | Facility: MEDICAL CENTER | Age: 69
End: 2019-09-11
Payer: MEDICARE

## 2019-09-11 VITALS
WEIGHT: 279 LBS | TEMPERATURE: 98.8 F | RESPIRATION RATE: 12 BRPM | BODY MASS INDEX: 46.48 KG/M2 | HEIGHT: 65 IN | SYSTOLIC BLOOD PRESSURE: 112 MMHG | DIASTOLIC BLOOD PRESSURE: 66 MMHG | OXYGEN SATURATION: 96 % | HEART RATE: 72 BPM

## 2019-09-11 DIAGNOSIS — Z23 NEED FOR IMMUNIZATION AGAINST INFLUENZA: ICD-10-CM

## 2019-09-11 DIAGNOSIS — E66.01 MORBID OBESITY WITH BMI OF 45.0-49.9, ADULT (HCC): ICD-10-CM

## 2019-09-11 PROCEDURE — G0008 ADMIN INFLUENZA VIRUS VAC: HCPCS | Performed by: FAMILY MEDICINE

## 2019-09-11 PROCEDURE — 90662 IIV NO PRSV INCREASED AG IM: CPT | Performed by: FAMILY MEDICINE

## 2019-09-11 PROCEDURE — 99213 OFFICE O/P EST LOW 20 MIN: CPT | Mod: 25 | Performed by: FAMILY MEDICINE

## 2019-09-11 RX ORDER — DOXYCYCLINE HYCLATE 100 MG
TABLET ORAL
COMMUNITY
Start: 2019-09-04 | End: 2020-01-23

## 2019-09-11 NOTE — PROGRESS NOTES
Chief Complaint   Patient presents with   • Obesity   • Foot Pain       Subjective:     HPI:   Ashyl Meyer presents today with the followin. Morbid obesity with BMI of 45.0-49.9, adult ([facility])  She is trying to lose weight.  It is a struggle.  Is hoping to proceed with bariatric surgery.  Recent sed rate and inflammatory markers are high.  She is working with orthopedics as her right foot is very swollen.  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  She has improved her diet and has lost two pounds.    2. Need for immunization against influenza  HD administered today        Patient Active Problem List    Diagnosis Date Noted   • Controlled type 2 diabetes mellitus without complication, without long-term current use of insulin ([facility]) 2019   • Chronic gout of foot 2019   • Dry eye syndrome, bilateral    • Rotator cuff syndrome of right shoulder and allied disorders 10/10/2018   • Former smoker 2018   • Recurrent major depressive disorder, in partial remission ([facility]) 2018   • Vitamin D deficiency 2017   • Morbid obesity with BMI of 45.0-49.9, adult ([facility])    • Obesity hypoventilation syndrome ([facility]) 2017   • Neural foraminal stenosis of cervical spine 2017   • Tinnitus of both ears 2017   • Dyslipidemia, goal LDL below 130 2017   • Nonalcoholic fatty liver disease 2017   • Disease of accessory sinus 10/03/2016   • Atrophic rhinitis 10/03/2016   • Deviated nasal septum 2016   • Menopausal symptoms 2016   • Lumbar facet arthropathy 2015   • Chronic pain 2015   • Candidal intertrigo 2015   • Elevated sed rate 2014   • Mild intermittent asthma without complication    • H/O fibromyalgia 2014   • KERRI (obstructive sleep apnea) 2014   • Acute idiopathic gout of right foot 2014   • Menopause 2014   • Peripheral neuropathy    • Rectocele 2013   • Pelvic floor dysfunction    • Chronic constipation 10/02/2013    • Nocturnal hypoxia    • Cervical stenosis of spinal canal 11/01/2011   • Lumbar radiculopathy 11/01/2011   • Migraine without aura    • Carpal tunnel syndrome 09/05/2011   • Ulnar neuropathy 09/05/2011   • Seasonal allergies 07/08/2011   • Polymyalgia rheumatica (HCC) 05/06/2011   • GERD (gastroesophageal reflux disease) 12/10/2010   • Osteoarthritis of multiple joints 04/13/2010   • Eczema, dyshidrotic 08/03/2009   • Essential hypertension 05/20/2009   • Hypothyroidism due to acquired atrophy of thyroid 05/20/2009       Current medicines (including changes today)  Current Outpatient Medications   Medication Sig Dispense Refill   • doxycycline (VIBRAMYCIN) 100 MG Tab      • pantoprazole (PROTONIX) 40 MG Tablet Delayed Response TAKE ONE TABLET BY MOUTH TWICE DAILY  180 Tab 3   • tramadol (ULTRAM) 50 MG Tab Take 50 mg by mouth every four hours as needed.     • spironolactone (ALDACTONE) 50 MG Tab TAKE 1 TABLET BY MOUTH EVERY DAY 90 Tab 2   • venlafaxine XR (EFFEXOR-XR) 150 MG extended-release capsule Take 1 Cap by mouth every day. 30 Cap 6   • allopurinol (ZYLOPRIM) 300 MG Tab Take 1 Tab by mouth every day. (Patient not taking: Reported on 8/20/2019) 90 Tab 3   • levothyroxine (SYNTHROID) 200 MCG Tab Take 1 Tab by mouth Every morning on an empty stomach. 90 Tab 3   • nabumetone (RELAFEN) 750 MG Tab Take 1 Tab by mouth 2 times a day, with meals. 180 Tab 3   • furosemide (LASIX) 20 MG Tab Take 1 Tab by mouth every day. 90 Tab 3   • ketoconazole (NIZORAL) 2 % Cream APPLY TO AFFECTED AREA DAILY 30 g 1   • Polyethyl Glycol-Propyl Glycol (SYSTANE OP) 1 Drop by Ophthalmic route 4 times a day.     • albuterol (PROAIR HFA) 108 (90 Base) MCG/ACT Aero Soln inhalation aerosol Inhale 2 Puffs by mouth every 6 hours as needed for Shortness of Breath. 1 Inhaler 6   • carvedilol (COREG) 6.25 MG Tab TAKE 1 TABLET BY MOUTH TWICE A DAY WITH MEALS 180 Tab 2   • gabapentin (NEURONTIN) 300 MG Cap Take 2 Caps by mouth 3 times a day. 540  "Cap 3   • potassium chloride SA (KDUR) 20 MEQ Tab CR Take 1 Tab by mouth 2 times a day. 180 Tab 3   • albuterol (VENTOLIN HFA) 108 (90 Base) MCG/ACT Aero Soln inhalation aerosol Inhale 2 Puffs by mouth every 6 hours as needed for Shortness of Breath.     • polyethyl glycol-propyl glycol (SYSTANE) 0.4-0.3 % Solution Place 1 Drop in both eyes as needed.     • temazepam (RESTORIL) 15 MG Cap Take 1 Cap by mouth at bedtime as needed for Sleep. 30 Cap 3   • Fluocinolone Acetonide 0.01 % Oil Place  in ear 2 Times a Day.       No current facility-administered medications for this visit.        Allergies   Allergen Reactions   • Lamictal Rash and Swelling     \"hot\", unable to function.  Severe rash, scarring   • Sulfa Drugs Hives, Shortness of Breath and Anxiety     Hard breathing, shortness of breath   • Butalbital-Acetaminophen Unspecified     Dizzy, can't walk, hard to focus   • Cefaclor Unspecified     Ceclor causes light headedness and dizziness   • Diphenhydramine Hives     Full body hives   • Iodine Unspecified     Labored breathing   • Morphine Itching     redness   • Penicillins Itching and Swelling     Skin itching and redness   • Savella [Milnacipran Hcl] Unspecified     Muscle aches, feet swelling   • Tizanidine Hcl Unspecified     dizziness   • Amlactin Rash     Lotion causes itching, redness and burnng   • Savella [Kdc:Milnacipran+Ci Pigment Blue 63]      Swelling, bone and muscle pain, sweating, nausea, dizziness, sleeplessness, anxiety   • Ace Inhibitors Cough     Cough     • Tape Unspecified     Paper tape is ok       ROS: As per HPI       Objective:     /66   Pulse 72   Temp 37.1 °C (98.8 °F)   Resp 12   Ht 1.651 m (5' 5\")   Wt (!) 126.6 kg (279 lb)   SpO2 96%  Body mass index is 46.43 kg/m².    Physical Exam:  Constitutional: Well-developed and well-nourished. Not diaphoretic. No distress. Lucid and fluent.  Skin: Skin is warm and dry. No rash noted.  Head: Atraumatic without lesions.  Eyes: " Conjunctivae and extraocular motions are normal. Pupils are equal, round, and reactive to light. No scleral icterus.   Mouth/Throat: Tongue normal. Oropharynx is clear and moist. Posterior pharynx without erythema or exudates.  Neck: Supple, trachea midline. No thyromegaly present. No cervical or supraclavicular lymphadenopathy. No JVD or carotid bruits appreciated  Cardiovascular: Regular rate and rhythm.  Normal S1, S2 without murmur appreciated.  Chest: Effort normal. Clear to auscultation throughout. No adventitious sounds.   Extremities: No cyanosis, clubbing, erythema, nor edema.  Wearing a right boot and using a walker.  Neurological: Alert and oriented x 3.   Psychiatric:  Behavior, mood, and affect are appropriate.       Assessment and Plan:     69 y.o. female with the following issues:    1. Morbid obesity with BMI of 45.0-49.9, adult (HCC)     2. Need for immunization against influenza  Influenza Vaccine, High Dose (65+ Only)         Followup: Return in about 6 weeks (around 10/24/2019), or if symptoms worsen or fail to improve.

## 2019-09-19 ENCOUNTER — HOSPITAL ENCOUNTER (OUTPATIENT)
Dept: LAB | Facility: MEDICAL CENTER | Age: 69
End: 2019-09-19
Attending: NURSE PRACTITIONER
Payer: MEDICARE

## 2019-09-19 LAB
BASOPHILS # BLD AUTO: 0.3 % (ref 0–1.8)
BASOPHILS # BLD: 0.03 K/UL (ref 0–0.12)
CRP SERPL HS-MCNC: 3.2 MG/DL (ref 0–0.75)
EOSINOPHIL # BLD AUTO: 0.21 K/UL (ref 0–0.51)
EOSINOPHIL NFR BLD: 2.1 % (ref 0–6.9)
ERYTHROCYTE [DISTWIDTH] IN BLOOD BY AUTOMATED COUNT: 50.6 FL (ref 35.9–50)
ERYTHROCYTE [SEDIMENTATION RATE] IN BLOOD BY WESTERGREN METHOD: 95 MM/HOUR (ref 0–30)
HCT VFR BLD AUTO: 43.1 % (ref 37–47)
HGB BLD-MCNC: 13.4 G/DL (ref 12–16)
IMM GRANULOCYTES # BLD AUTO: 0.04 K/UL (ref 0–0.11)
IMM GRANULOCYTES NFR BLD AUTO: 0.4 % (ref 0–0.9)
LYMPHOCYTES # BLD AUTO: 2.62 K/UL (ref 1–4.8)
LYMPHOCYTES NFR BLD: 25.6 % (ref 22–41)
MCH RBC QN AUTO: 29.4 PG (ref 27–33)
MCHC RBC AUTO-ENTMCNC: 31.1 G/DL (ref 33.6–35)
MCV RBC AUTO: 94.5 FL (ref 81.4–97.8)
MONOCYTES # BLD AUTO: 0.75 K/UL (ref 0–0.85)
MONOCYTES NFR BLD AUTO: 7.3 % (ref 0–13.4)
NEUTROPHILS # BLD AUTO: 6.58 K/UL (ref 2–7.15)
NEUTROPHILS NFR BLD: 64.3 % (ref 44–72)
NRBC # BLD AUTO: 0 K/UL
NRBC BLD-RTO: 0 /100 WBC
PLATELET # BLD AUTO: 280 K/UL (ref 164–446)
PMV BLD AUTO: 11 FL (ref 9–12.9)
RBC # BLD AUTO: 4.56 M/UL (ref 4.2–5.4)
WBC # BLD AUTO: 10.2 K/UL (ref 4.8–10.8)

## 2019-09-19 PROCEDURE — 85025 COMPLETE CBC W/AUTO DIFF WBC: CPT

## 2019-09-19 PROCEDURE — 36415 COLL VENOUS BLD VENIPUNCTURE: CPT

## 2019-09-19 PROCEDURE — 86140 C-REACTIVE PROTEIN: CPT

## 2019-09-19 PROCEDURE — 85652 RBC SED RATE AUTOMATED: CPT

## 2019-10-23 RX ORDER — POTASSIUM CHLORIDE 20 MEQ/1
TABLET, EXTENDED RELEASE ORAL
Qty: 180 TAB | Refills: 2 | Status: SHIPPED | OUTPATIENT
Start: 2019-10-23 | End: 2020-05-21

## 2019-10-29 ENCOUNTER — OFFICE VISIT (OUTPATIENT)
Dept: MEDICAL GROUP | Facility: MEDICAL CENTER | Age: 69
End: 2019-10-29
Payer: MEDICARE

## 2019-10-29 VITALS
DIASTOLIC BLOOD PRESSURE: 66 MMHG | SYSTOLIC BLOOD PRESSURE: 132 MMHG | RESPIRATION RATE: 14 BRPM | WEIGHT: 270 LBS | BODY MASS INDEX: 44.98 KG/M2 | HEIGHT: 65 IN | TEMPERATURE: 98.3 F | OXYGEN SATURATION: 95 % | HEART RATE: 65 BPM

## 2019-10-29 DIAGNOSIS — F33.41 RECURRENT MAJOR DEPRESSIVE DISORDER, IN PARTIAL REMISSION (HCC): ICD-10-CM

## 2019-10-29 DIAGNOSIS — E66.01 MORBID OBESITY WITH BMI OF 40.0-44.9, ADULT (HCC): ICD-10-CM

## 2019-10-29 DIAGNOSIS — G47.33 OSA (OBSTRUCTIVE SLEEP APNEA): ICD-10-CM

## 2019-10-29 PROCEDURE — 99213 OFFICE O/P EST LOW 20 MIN: CPT | Performed by: FAMILY MEDICINE

## 2019-10-29 RX ORDER — VENLAFAXINE HYDROCHLORIDE 225 MG/1
225 TABLET, EXTENDED RELEASE ORAL DAILY
Qty: 90 TAB | Refills: 3 | Status: SHIPPED | OUTPATIENT
Start: 2019-10-29 | End: 2020-11-02

## 2019-10-29 RX ORDER — VENLAFAXINE HYDROCHLORIDE 225 MG/1
225 TABLET, EXTENDED RELEASE ORAL DAILY
Qty: 30 TAB | Refills: 6 | Status: SHIPPED | OUTPATIENT
Start: 2019-10-29 | End: 2019-10-29

## 2019-10-29 NOTE — PROGRESS NOTES
Chief Complaint   Patient presents with   • Apnea   • Depression   • Obesity       Subjective:     HPI:   Ashly Meyer presents today with the followin. KERRI (obstructive sleep apnea)  She is using nighttime oxygen.  Discussed that her sleep apnea     2. Recurrent major depressive disorder, in partial remission ([facility])  Feels the venlafaxine helps but it is not strong enough.  Have increased to 225 mg.  Denies SI or thoughts of self harm.  She is very sad over her family situation.    3. Morbid obesity with BMI of 40.0-44.9, adult ([facility])  She is doing better.  She has lost 9#.  Remains morbidly obese.  She is trying to push herself to move but the fall and also had a significant arthritis flare in the left thumb and wrist.  She has seen orthopedics for this.  Still in the brace.  She struggles with her weight and her relationship with food.  Bariatric surgery would likely help her apnea and many of her other medical problems.      Patient Active Problem List    Diagnosis Date Noted   • Controlled type 2 diabetes mellitus without complication, without long-term current use of insulin ([facility]) 2019   • Chronic gout of foot 2019   • Dry eye syndrome, bilateral    • Rotator cuff syndrome of right shoulder and allied disorders 10/10/2018   • Former smoker 2018   • Recurrent major depressive disorder, in partial remission ([facility]) 2018   • Vitamin D deficiency 2017   • Morbid obesity with BMI of 45.0-49.9, adult ([facility])    • Obesity hypoventilation syndrome ([facility]) 2017   • Neural foraminal stenosis of cervical spine 2017   • Tinnitus of both ears 2017   • Dyslipidemia, goal LDL below 130 2017   • Nonalcoholic fatty liver disease 2017   • Disease of accessory sinus 10/03/2016   • Atrophic rhinitis 10/03/2016   • Deviated nasal septum 2016   • Menopausal symptoms 2016   • Lumbar facet arthropathy 2015   • Chronic pain 2015   • Candidal  intertrigo 06/01/2015   • Elevated sed rate 12/09/2014   • Mild intermittent asthma without complication    • H/O fibromyalgia 03/11/2014   • KERRI (obstructive sleep apnea) 03/11/2014   • Acute idiopathic gout of right foot 03/11/2014   • Menopause 03/11/2014   • Peripheral neuropathy    • Rectocele 11/25/2013   • Pelvic floor dysfunction    • Chronic constipation 10/02/2013   • Nocturnal hypoxia    • Cervical stenosis of spinal canal 11/01/2011   • Lumbar radiculopathy 11/01/2011   • Migraine without aura    • Carpal tunnel syndrome 09/05/2011   • Ulnar neuropathy 09/05/2011   • Seasonal allergies 07/08/2011   • Polymyalgia rheumatica (HCC) 05/06/2011   • GERD (gastroesophageal reflux disease) 12/10/2010   • Osteoarthritis of multiple joints 04/13/2010   • Eczema, dyshidrotic 08/03/2009   • Essential hypertension 05/20/2009   • Hypothyroidism due to acquired atrophy of thyroid 05/20/2009       Current medicines (including changes today)  Current Outpatient Medications   Medication Sig Dispense Refill   • venlafaxine ER (EFFEXOR) 225 MG TABLET SR 24 HR tablet Take 1 Tab by mouth every day. 90 Tab 3   • potassium chloride SA (KDUR) 20 MEQ Tab CR TAKE ONE TABLET BY MOUTH TWICE DAILY  180 Tab 2   • doxycycline (VIBRAMYCIN) 100 MG Tab      • pantoprazole (PROTONIX) 40 MG Tablet Delayed Response TAKE ONE TABLET BY MOUTH TWICE DAILY  180 Tab 3   • tramadol (ULTRAM) 50 MG Tab Take 50 mg by mouth every four hours as needed.     • spironolactone (ALDACTONE) 50 MG Tab TAKE 1 TABLET BY MOUTH EVERY DAY 90 Tab 2   • allopurinol (ZYLOPRIM) 300 MG Tab Take 1 Tab by mouth every day. (Patient not taking: Reported on 8/20/2019) 90 Tab 3   • levothyroxine (SYNTHROID) 200 MCG Tab Take 1 Tab by mouth Every morning on an empty stomach. 90 Tab 3   • nabumetone (RELAFEN) 750 MG Tab Take 1 Tab by mouth 2 times a day, with meals. 180 Tab 3   • furosemide (LASIX) 20 MG Tab Take 1 Tab by mouth every day. 90 Tab 3   • ketoconazole (NIZORAL) 2 %  "Cream APPLY TO AFFECTED AREA DAILY 30 g 1   • Polyethyl Glycol-Propyl Glycol (SYSTANE OP) 1 Drop by Ophthalmic route 4 times a day.     • albuterol (PROAIR HFA) 108 (90 Base) MCG/ACT Aero Soln inhalation aerosol Inhale 2 Puffs by mouth every 6 hours as needed for Shortness of Breath. 1 Inhaler 6   • carvedilol (COREG) 6.25 MG Tab TAKE 1 TABLET BY MOUTH TWICE A DAY WITH MEALS 180 Tab 2   • gabapentin (NEURONTIN) 300 MG Cap Take 2 Caps by mouth 3 times a day. 540 Cap 3   • albuterol (VENTOLIN HFA) 108 (90 Base) MCG/ACT Aero Soln inhalation aerosol Inhale 2 Puffs by mouth every 6 hours as needed for Shortness of Breath.     • polyethyl glycol-propyl glycol (SYSTANE) 0.4-0.3 % Solution Place 1 Drop in both eyes as needed.     • temazepam (RESTORIL) 15 MG Cap Take 1 Cap by mouth at bedtime as needed for Sleep. 30 Cap 3   • Fluocinolone Acetonide 0.01 % Oil Place  in ear 2 Times a Day.       No current facility-administered medications for this visit.        Allergies   Allergen Reactions   • Lamictal Rash and Swelling     \"hot\", unable to function.  Severe rash, scarring   • Sulfa Drugs Hives, Shortness of Breath and Anxiety     Hard breathing, shortness of breath   • Butalbital-Acetaminophen Unspecified     Dizzy, can't walk, hard to focus   • Cefaclor Unspecified     Ceclor causes light headedness and dizziness   • Diphenhydramine Hives     Full body hives   • Iodine Unspecified     Labored breathing   • Morphine Itching     redness   • Penicillins Itching and Swelling     Skin itching and redness   • Savella [Milnacipran Hcl] Unspecified     Muscle aches, feet swelling   • Tizanidine Hcl Unspecified     dizziness   • Amlactin Rash     Lotion causes itching, redness and burnng   • Savella [Kdc:Milnacipran+Ci Pigment Blue 63]      Swelling, bone and muscle pain, sweating, nausea, dizziness, sleeplessness, anxiety   • Ace Inhibitors Cough     Cough     • Tape Unspecified     Paper tape is ok       ROS: As per HPI       " "Objective:     /66   Pulse 65   Temp 36.8 °C (98.3 °F)   Resp 14   Ht 1.651 m (5' 5\")   Wt 122.5 kg (270 lb)   SpO2 95%  Body mass index is 44.93 kg/m².    Physical Exam:  Constitutional: Well-developed and well-nourished. Not diaphoretic. No distress. Lucid and fluent.  Skin: Skin is warm and dry. No rash noted.  Head: Atraumatic without lesions.  Eyes: Conjunctivae and extraocular motions are normal. Pupils are equal, round, and reactive to light. No scleral icterus.   Mouth/Throat: Tongue normal. Oropharynx is clear and moist. Posterior pharynx without erythema or exudates.  Neck: Supple, trachea midline. No thyromegaly present. No cervical or supraclavicular lymphadenopathy. No JVD or carotid bruits appreciated  Cardiovascular: Regular rate and rhythm.  Normal S1, S2 without murmur appreciated.  Chest: Effort normal. Clear to auscultation throughout. No adventitious sounds.   Extremities: No cyanosis, clubbing, erythema, nor edema.   Neurological: Alert and oriented x 3. No tremor appreciated.  Psychiatric:  Behavior, mood, and affect are appropriate.       Assessment and Plan:     69 y.o. female with the following issues:    1. KERRI (obstructive sleep apnea)     2. Recurrent major depressive disorder, in partial remission ([facility])  venlafaxine ER (EFFEXOR) 225 MG TABLET SR 24 HR tablet    DISCONTINUED: venlafaxine ER (EFFEXOR) 225 MG TABLET SR 24 HR tablet   3. Morbid obesity with BMI of 40.0-44.9, adult ([facility])           Followup: Return in about 16 days (around 11/14/2019), or if symptoms worsen or fail to improve.  "

## 2019-11-01 DIAGNOSIS — I10 ESSENTIAL HYPERTENSION: ICD-10-CM

## 2019-11-01 RX ORDER — CARVEDILOL 6.25 MG/1
TABLET ORAL
Qty: 180 TAB | Refills: 1 | Status: SHIPPED | OUTPATIENT
Start: 2019-11-01 | End: 2020-05-18

## 2019-11-14 ENCOUNTER — OFFICE VISIT (OUTPATIENT)
Dept: MEDICAL GROUP | Facility: MEDICAL CENTER | Age: 69
End: 2019-11-14
Payer: MEDICARE

## 2019-11-14 VITALS
TEMPERATURE: 98.6 F | WEIGHT: 270 LBS | HEART RATE: 72 BPM | DIASTOLIC BLOOD PRESSURE: 74 MMHG | BODY MASS INDEX: 43.39 KG/M2 | RESPIRATION RATE: 14 BRPM | SYSTOLIC BLOOD PRESSURE: 110 MMHG | HEIGHT: 66 IN | OXYGEN SATURATION: 90 %

## 2019-11-14 DIAGNOSIS — L03.031 CELLULITIS OF SECOND TOE OF RIGHT FOOT: ICD-10-CM

## 2019-11-14 DIAGNOSIS — E66.01 MORBID OBESITY WITH BMI OF 40.0-44.9, ADULT (HCC): ICD-10-CM

## 2019-11-14 DIAGNOSIS — J96.11 CHRONIC RESPIRATORY FAILURE WITH HYPOXIA (HCC): ICD-10-CM

## 2019-11-14 DIAGNOSIS — G89.29 CHRONIC MIDLINE LOW BACK PAIN WITHOUT SCIATICA: ICD-10-CM

## 2019-11-14 DIAGNOSIS — G47.34 NOCTURNAL HYPOXIA: ICD-10-CM

## 2019-11-14 DIAGNOSIS — M25.551 BILATERAL HIP PAIN: ICD-10-CM

## 2019-11-14 DIAGNOSIS — M25.552 BILATERAL HIP PAIN: ICD-10-CM

## 2019-11-14 DIAGNOSIS — M54.50 CHRONIC MIDLINE LOW BACK PAIN WITHOUT SCIATICA: ICD-10-CM

## 2019-11-14 PROCEDURE — 99213 OFFICE O/P EST LOW 20 MIN: CPT | Performed by: FAMILY MEDICINE

## 2019-11-14 RX ORDER — DOXYCYCLINE HYCLATE 100 MG
100 TABLET ORAL 2 TIMES DAILY
Qty: 14 TAB | Refills: 0 | Status: SHIPPED | OUTPATIENT
Start: 2019-11-14 | End: 2019-11-21

## 2019-11-14 NOTE — PROGRESS NOTES
Chief Complaint   Patient presents with   • Obesity   • Ringing in Ear   • Back Pain   • Hip Pain       Subjective:     HPI:   Ashly Meyer presents today with the followin. Morbid obesity with BMI of 40.0-44.9, adult ([facility])  Eating more eggs.  She is following a meal plan with higher protein and small meals.  She now has a manager to help her.  She is coaching her.  She started eating candy out of frustration but has stopped.  Encouraged to follow the plan.  She is struggling but is trying to move forward.    2. Nocturnal hypoxia/Chronic respiratory failure with hypoxia ([facility])  She continues the nighttime oxygen, cannot tolerate CPAP.      3. Chronic midline low back pain without sciatica  Patient is noting more back pain.  She does have degenerative changes in the sacroiliac joints in the lower spine.    4. Bilateral hip pain  Patient does have bilateral hip pain and stiffness.  She finds it is difficult to walk or move around.  She is very much hoping that with a loss of weight of 40 pounds or more she will be able to move more    5. Cellulitis of second toe of right foot  The right second toe has swollen, is peeling and violaceous again.    - doxycycline (VIBRAMYCIN) 100 MG Tab; Take 1 Tab by mouth 2 times a day for 7 days.  Dispense: 14 Tab; Refill: 0      Patient Active Problem List    Diagnosis Date Noted   • Controlled type 2 diabetes mellitus without complication, without long-term current use of insulin ([facility]) 2019   • Chronic gout of foot 2019   • Dry eye syndrome, bilateral    • Rotator cuff syndrome of right shoulder and allied disorders 10/10/2018   • Former smoker 2018   • Recurrent major depressive disorder, in partial remission ([facility]) 2018   • Vitamin D deficiency 2017   • Morbid obesity with BMI of 45.0-49.9, adult ([facility])    • Obesity hypoventilation syndrome ([facility]) 2017   • Neural foraminal stenosis of cervical spine 2017   • Tinnitus of both  ears 05/18/2017   • Dyslipidemia, goal LDL below 130 01/06/2017   • Nonalcoholic fatty liver disease 01/05/2017   • Disease of accessory sinus 10/03/2016   • Atrophic rhinitis 10/03/2016   • Deviated nasal septum 08/01/2016   • Menopausal symptoms 04/28/2016   • Lumbar facet arthropathy 12/09/2015   • Chronic pain 11/18/2015   • Candidal intertrigo 06/01/2015   • Elevated sed rate 12/09/2014   • Mild intermittent asthma without complication    • H/O fibromyalgia 03/11/2014   • KERRI (obstructive sleep apnea) 03/11/2014   • Acute idiopathic gout of right foot 03/11/2014   • Menopause 03/11/2014   • Peripheral neuropathy    • Rectocele 11/25/2013   • Pelvic floor dysfunction    • Chronic constipation 10/02/2013   • Nocturnal hypoxia    • Cervical stenosis of spinal canal 11/01/2011   • Lumbar radiculopathy 11/01/2011   • Migraine without aura    • Carpal tunnel syndrome 09/05/2011   • Ulnar neuropathy 09/05/2011   • Seasonal allergies 07/08/2011   • Polymyalgia rheumatica (HCC) 05/06/2011   • GERD (gastroesophageal reflux disease) 12/10/2010   • Osteoarthritis of multiple joints 04/13/2010   • Eczema, dyshidrotic 08/03/2009   • Essential hypertension 05/20/2009   • Hypothyroidism due to acquired atrophy of thyroid 05/20/2009       Current medicines (including changes today)  Current Outpatient Medications   Medication Sig Dispense Refill   • doxycycline (VIBRAMYCIN) 100 MG Tab Take 1 Tab by mouth 2 times a day for 7 days. 14 Tab 0   • Diclofenac Sodium 1 % Gel      • carvedilol (COREG) 6.25 MG Tab TAKE ONE TABLET BY MOUTH TWICE DAILY WITH FOOD  180 Tab 1   • venlafaxine ER (EFFEXOR) 225 MG TABLET SR 24 HR tablet Take 1 Tab by mouth every day. 90 Tab 3   • potassium chloride SA (KDUR) 20 MEQ Tab CR TAKE ONE TABLET BY MOUTH TWICE DAILY  180 Tab 2   • doxycycline (VIBRAMYCIN) 100 MG Tab      • pantoprazole (PROTONIX) 40 MG Tablet Delayed Response TAKE ONE TABLET BY MOUTH TWICE DAILY  180 Tab 3   • tramadol (ULTRAM) 50 MG  "Tab Take 50 mg by mouth every four hours as needed.     • spironolactone (ALDACTONE) 50 MG Tab TAKE 1 TABLET BY MOUTH EVERY DAY 90 Tab 2   • allopurinol (ZYLOPRIM) 300 MG Tab Take 1 Tab by mouth every day. (Patient not taking: Reported on 8/20/2019) 90 Tab 3   • levothyroxine (SYNTHROID) 200 MCG Tab Take 1 Tab by mouth Every morning on an empty stomach. 90 Tab 3   • nabumetone (RELAFEN) 750 MG Tab Take 1 Tab by mouth 2 times a day, with meals. 180 Tab 3   • furosemide (LASIX) 20 MG Tab Take 1 Tab by mouth every day. 90 Tab 3   • ketoconazole (NIZORAL) 2 % Cream APPLY TO AFFECTED AREA DAILY 30 g 1   • Polyethyl Glycol-Propyl Glycol (SYSTANE OP) 1 Drop by Ophthalmic route 4 times a day.     • albuterol (PROAIR HFA) 108 (90 Base) MCG/ACT Aero Soln inhalation aerosol Inhale 2 Puffs by mouth every 6 hours as needed for Shortness of Breath. 1 Inhaler 6   • gabapentin (NEURONTIN) 300 MG Cap Take 2 Caps by mouth 3 times a day. 540 Cap 3   • albuterol (VENTOLIN HFA) 108 (90 Base) MCG/ACT Aero Soln inhalation aerosol Inhale 2 Puffs by mouth every 6 hours as needed for Shortness of Breath.     • polyethyl glycol-propyl glycol (SYSTANE) 0.4-0.3 % Solution Place 1 Drop in both eyes as needed.     • temazepam (RESTORIL) 15 MG Cap Take 1 Cap by mouth at bedtime as needed for Sleep. 30 Cap 3   • Fluocinolone Acetonide 0.01 % Oil Place  in ear 2 Times a Day.       No current facility-administered medications for this visit.        Allergies   Allergen Reactions   • Lamictal Rash and Swelling     \"hot\", unable to function.  Severe rash, scarring   • Sulfa Drugs Hives, Shortness of Breath and Anxiety     Hard breathing, shortness of breath   • Butalbital-Acetaminophen Unspecified     Dizzy, can't walk, hard to focus   • Cefaclor Unspecified     Ceclor causes light headedness and dizziness   • Diphenhydramine Hives     Full body hives   • Iodine Unspecified     Labored breathing   • Morphine Itching     redness   • Penicillins Itching " "and Swelling     Skin itching and redness   • Savella [Milnacipran Hcl] Unspecified     Muscle aches, feet swelling   • Tizanidine Hcl Unspecified     dizziness   • Amlactin Rash     Lotion causes itching, redness and burnng   • Savella [Kdc:Milnacipran+Ci Pigment Blue 63]      Swelling, bone and muscle pain, sweating, nausea, dizziness, sleeplessness, anxiety   • Ace Inhibitors Cough     Cough     • Tape Unspecified     Paper tape is ok       ROS: As per HPI       Objective:     /74   Pulse 72   Temp 37 °C (98.6 °F)   Resp 14   Ht 1.664 m (5' 5.5\")   Wt 122.5 kg (270 lb)   SpO2 90%  Body mass index is 44.25 kg/m².    Physical Exam:  Constitutional: Well-developed and well-nourished. Not diaphoretic. No distress. Lucid and fluent.  Skin: Skin is warm and dry. No rash noted.  Head: Atraumatic without lesions.  Eyes: Conjunctivae and extraocular motions are normal. Pupils are equal, round, and reactive to light. No scleral icterus.   Mouth/Throat: Tongue normal. Oropharynx is clear and moist. Posterior pharynx without erythema or exudates.  Neck: Supple, trachea midline. No thyromegaly present. No cervical or supraclavicular lymphadenopathy. No JVD or carotid bruits appreciated  Cardiovascular: Regular rate and rhythm.  Normal S1, S2 without murmur appreciated.  Chest: Effort normal. Clear to auscultation throughout. No adventitious sounds.   Abdomen: Soft, non tender, and without distention. Active bowel sounds in all four quadrants. No rebound, guarding, masses or hepatosplenomegaly.  Extremities: No cyanosis, clubbing, erythema, nor edema.   Neurological: Alert and oriented x 3.   Psychiatric:  Behavior, mood, and affect are appropriate.    Past CT of thorax reviewed.  No emphysema or chronic bronchitis.  Normal CT.  No evidence of COPD,.       Assessment and Plan:     69 y.o. female with the following issues:    1. Morbid obesity with BMI of 40.0-44.9, adult (HCC)     2. Nocturnal hypoxia     3. " Chronic respiratory failure with hypoxia (HCC)     4. Chronic midline low back pain without sciatica     5. Bilateral hip pain     6. Cellulitis of second toe of right foot  doxycycline (VIBRAMYCIN) 100 MG Tab         Followup: Return in about 5 weeks (around 12/19/2019), or if symptoms worsen or fail to improve.

## 2019-12-08 DIAGNOSIS — M48.02 CERVICAL STENOSIS OF SPINAL CANAL: ICD-10-CM

## 2019-12-08 DIAGNOSIS — M54.16 LUMBAR RADICULOPATHY: ICD-10-CM

## 2019-12-09 RX ORDER — GABAPENTIN 300 MG/1
CAPSULE ORAL
Qty: 540 CAP | Refills: 2 | Status: SHIPPED | OUTPATIENT
Start: 2019-12-09 | End: 2020-08-19 | Stop reason: SDUPTHER

## 2019-12-19 ENCOUNTER — OFFICE VISIT (OUTPATIENT)
Dept: MEDICAL GROUP | Facility: MEDICAL CENTER | Age: 69
End: 2019-12-19
Payer: MEDICARE

## 2019-12-19 VITALS
SYSTOLIC BLOOD PRESSURE: 118 MMHG | WEIGHT: 270 LBS | HEIGHT: 66 IN | OXYGEN SATURATION: 91 % | BODY MASS INDEX: 43.39 KG/M2 | TEMPERATURE: 97.8 F | HEART RATE: 72 BPM | RESPIRATION RATE: 14 BRPM | DIASTOLIC BLOOD PRESSURE: 64 MMHG

## 2019-12-19 DIAGNOSIS — E66.01 MORBID OBESITY WITH BMI OF 45.0-49.9, ADULT (HCC): ICD-10-CM

## 2019-12-19 PROCEDURE — 99213 OFFICE O/P EST LOW 20 MIN: CPT | Performed by: FAMILY MEDICINE

## 2019-12-19 NOTE — PROGRESS NOTES
Chief Complaint   Patient presents with   • Obesity     morbid       Subjective:     HPI:   Ashly Meyer presents today with the followin. Morbid obesity with BMI of 45.0-49.9, adult ([facility])  Using a protein drink for one meal daily.  Trying to eat better.  She is having trouble sticking to the diet during the holidays.  She has lost 12 pounds since she saw surgery, needs to lose a little more.  Needs a psychology evaluation.  She thought she could not heat protein other than eggs or a shake.  Protein handout from [facility] printed out.  Discussed reducing starches and milk. Needs to add more cooked vegetables.        Patient Active Problem List    Diagnosis Date Noted   • Controlled type 2 diabetes mellitus without complication, without long-term current use of insulin ([facility]) 2019   • Chronic gout of foot 2019   • Dry eye syndrome, bilateral    • Rotator cuff syndrome of right shoulder and allied disorders 10/10/2018   • Former smoker 2018   • Recurrent major depressive disorder, in partial remission ([facility]) 2018   • Vitamin D deficiency 2017   • Morbid obesity with BMI of 45.0-49.9, adult ([facility])    • Obesity hypoventilation syndrome ([facility]) 2017   • Neural foraminal stenosis of cervical spine 2017   • Tinnitus of both ears 2017   • Dyslipidemia, goal LDL below 130 2017   • Nonalcoholic fatty liver disease 2017   • Disease of accessory sinus 10/03/2016   • Atrophic rhinitis 10/03/2016   • Deviated nasal septum 2016   • Menopausal symptoms 2016   • Lumbar facet arthropathy 2015   • Chronic pain 2015   • Candidal intertrigo 2015   • Elevated sed rate 2014   • Mild intermittent asthma without complication    • H/O fibromyalgia 2014   • KERRI (obstructive sleep apnea) 2014   • Acute idiopathic gout of right foot 2014   • Menopause 2014   • Peripheral neuropathy    • Rectocele 2013    • Pelvic floor dysfunction    • Chronic constipation 10/02/2013   • Nocturnal hypoxia    • Cervical stenosis of spinal canal 11/01/2011   • Lumbar radiculopathy 11/01/2011   • Migraine without aura    • Carpal tunnel syndrome 09/05/2011   • Ulnar neuropathy 09/05/2011   • Seasonal allergies 07/08/2011   • Polymyalgia rheumatica (HCC) 05/06/2011   • GERD (gastroesophageal reflux disease) 12/10/2010   • Osteoarthritis of multiple joints 04/13/2010   • Eczema, dyshidrotic 08/03/2009   • Essential hypertension 05/20/2009   • Hypothyroidism due to acquired atrophy of thyroid 05/20/2009       Current medicines (including changes today)  Current Outpatient Medications   Medication Sig Dispense Refill   • gabapentin (NEURONTIN) 300 MG Cap TAKE TWO CAPSULES BY MOUTH THREE TIMES DAILY  540 Cap 2   • tramadol (ULTRAM) 50 MG Tab TAKE ONE TABLET BY MOUTH EVERY FOUR HOURS AS NEEDED FOR MODERATE PAIN  180 Tab 2   • Diclofenac Sodium 1 % Gel      • carvedilol (COREG) 6.25 MG Tab TAKE ONE TABLET BY MOUTH TWICE DAILY WITH FOOD  180 Tab 1   • venlafaxine ER (EFFEXOR) 225 MG TABLET SR 24 HR tablet Take 1 Tab by mouth every day. 90 Tab 3   • potassium chloride SA (KDUR) 20 MEQ Tab CR TAKE ONE TABLET BY MOUTH TWICE DAILY  180 Tab 2   • doxycycline (VIBRAMYCIN) 100 MG Tab      • pantoprazole (PROTONIX) 40 MG Tablet Delayed Response TAKE ONE TABLET BY MOUTH TWICE DAILY  180 Tab 3   • spironolactone (ALDACTONE) 50 MG Tab TAKE 1 TABLET BY MOUTH EVERY DAY 90 Tab 2   • allopurinol (ZYLOPRIM) 300 MG Tab Take 1 Tab by mouth every day. (Patient not taking: Reported on 8/20/2019) 90 Tab 3   • levothyroxine (SYNTHROID) 200 MCG Tab Take 1 Tab by mouth Every morning on an empty stomach. 90 Tab 3   • nabumetone (RELAFEN) 750 MG Tab Take 1 Tab by mouth 2 times a day, with meals. 180 Tab 3   • furosemide (LASIX) 20 MG Tab Take 1 Tab by mouth every day. 90 Tab 3   • ketoconazole (NIZORAL) 2 % Cream APPLY TO AFFECTED AREA DAILY 30 g 1   • Polyethyl  "Glycol-Propyl Glycol (SYSTANE OP) 1 Drop by Ophthalmic route 4 times a day.     • albuterol (PROAIR HFA) 108 (90 Base) MCG/ACT Aero Soln inhalation aerosol Inhale 2 Puffs by mouth every 6 hours as needed for Shortness of Breath. 1 Inhaler 6   • albuterol (VENTOLIN HFA) 108 (90 Base) MCG/ACT Aero Soln inhalation aerosol Inhale 2 Puffs by mouth every 6 hours as needed for Shortness of Breath.     • polyethyl glycol-propyl glycol (SYSTANE) 0.4-0.3 % Solution Place 1 Drop in both eyes as needed.     • temazepam (RESTORIL) 15 MG Cap Take 1 Cap by mouth at bedtime as needed for Sleep. 30 Cap 3   • Fluocinolone Acetonide 0.01 % Oil Place  in ear 2 Times a Day.       No current facility-administered medications for this visit.        Allergies   Allergen Reactions   • Lamictal Rash and Swelling     \"hot\", unable to function.  Severe rash, scarring   • Sulfa Drugs Hives, Shortness of Breath and Anxiety     Hard breathing, shortness of breath   • Butalbital-Acetaminophen Unspecified     Dizzy, can't walk, hard to focus   • Cefaclor Unspecified     Ceclor causes light headedness and dizziness   • Diphenhydramine Hives     Full body hives   • Iodine Unspecified     Labored breathing   • Morphine Itching     redness   • Penicillins Itching and Swelling     Skin itching and redness   • Savella [Milnacipran Hcl] Unspecified     Muscle aches, feet swelling   • Tizanidine Hcl Unspecified     dizziness   • Amlactin Rash     Lotion causes itching, redness and burnng   • Savella [Kdc:Milnacipran+Ci Pigment Blue 63]      Swelling, bone and muscle pain, sweating, nausea, dizziness, sleeplessness, anxiety   • Ace Inhibitors Cough     Cough     • Tape Unspecified     Paper tape is ok       ROS: As per HPI       Objective:     /64   Pulse 72   Temp 36.6 °C (97.8 °F)   Resp 14   Ht 1.664 m (5' 5.5\")   Wt 122.5 kg (270 lb)   SpO2 91%  Body mass index is 44.25 kg/m².    Physical Exam:  Constitutional: Well-developed and " well-nourished. Not diaphoretic. No distress. Lucid and fluent.  Skin: Skin is warm and dry. No rash noted.  Head: Atraumatic without lesions.  Eyes: Conjunctivae and extraocular motions are normal. Pupils are equal, round, and reactive to light. No scleral icterus.   Mouth/Throat: Tongue normal. Oropharynx is clear and moist. Posterior pharynx without erythema or exudates.  Neck: Supple, trachea midline. No thyromegaly present. No cervical or supraclavicular lymphadenopathy. No JVD or carotid bruits appreciated  Cardiovascular: Regular rate and rhythm.  Normal S1, S2 without murmur appreciated.  Chest: Effort normal. Clear to auscultation throughout. No adventitious sounds.   Abdomen: Soft, non tender, and without distention. Active bowel sounds in all four quadrants. No rebound, guarding, masses or hepatosplenomegaly.  Extremities: No cyanosis, clubbing, erythema, nor edema.   Neurological: Alert and oriented x 3.   Psychiatric:  Behavior, mood, and affect are appropriate.       Assessment and Plan:     69 y.o. female with the following issues:    1. Morbid obesity with BMI of 45.0-49.9, adult ([facility])           Followup: Return if symptoms worsen or fail to improve.

## 2019-12-27 ENCOUNTER — DOCUMENTATION (OUTPATIENT)
Dept: MEDICAL GROUP | Facility: MEDICAL CENTER | Age: 69
End: 2019-12-27

## 2020-01-04 NOTE — OR NURSING
0837 Pt to PACU from OR via gurney, respirations spontaneous and non-labored via 4L NC vo OPA. Right shoulder and wrist surgical sites clean, dry and intact, pt not arousable on calling. Ice pack placed on right surgical sight, right fingers warm and cap refill < 2 seconds. Will keep pt for 1 hour in recovery per [facility] protocol.    0850 Pt arousable on calling with no c/o pain or nausea.   0905 No changes, pt calm resting.   0920 Pt reports moderate pain, plan to medicate with oral analgesia. See MAR.   0935 Report to Sheri MILLS.   0954 Received report from Sheri MILLS, assumed care of pt. Pt arousable on calling, denies nausea and report moderate pain persists pain with non-labored and spontaneous respirations via 4L NC, VSS.  1005 Pt calm sleeping.   1020 PT states pain has improved now, Pt meets criteria for transfer to stage II.  1026 Report to GENE Wade.    1032 Pt transported to stage II.        
0928 report from Ronal lindo. Pt resting comfortably arouses to verbal stim. Rt shoulder in sling, dressing c/d/i ice on, pt head of gurney 30 degrees. Denies nausea and states pain med helped.   0964 assisted pt to readjust on the gurney, states pain back and she would like pain medication, fentanyl given 25 mcg iv pt awake but dozes, sats mid 90's on 4 L.   9569 report back to ronal lindo   
1032 arrived to stage 2 per myself, assisted with dressing without incident, states pain controlled and denies nausea, assessment reviewed and agree, no change, rt arm in sling, adjusted for comfort.   1100 discharge instructions given to pt and daughter both verbalized understanding, meets criteria for dc stage 2 pain controlled no nausea.   
Dr Cobb notified of BMI > 40 and health hx..Hx and meds reviewed, pre op instructions given. Pt aware may take the listed meds morning of surgery; albuterol, coreg, gabapentin, synthroid, pantoprazole and tramadol if needed.. Anesthesia and Dr Knutson's fasting guidelines reviewed with pt.Pt to bring portable concentrator and inhaler DOS.  
I have reviewed and confirmed nurses' notes for patient's medications, allergies, medical history, and surgical history.

## 2020-01-05 DIAGNOSIS — B37.2 CANDIDAL INTERTRIGO: ICD-10-CM

## 2020-01-05 RX ORDER — KETOCONAZOLE 20 MG/G
CREAM TOPICAL
Qty: 30 G | Refills: 0 | Status: SHIPPED | OUTPATIENT
Start: 2020-01-05 | End: 2020-05-29

## 2020-01-23 ENCOUNTER — OFFICE VISIT (OUTPATIENT)
Dept: MEDICAL GROUP | Facility: MEDICAL CENTER | Age: 70
End: 2020-01-23
Payer: MEDICARE

## 2020-01-23 VITALS
OXYGEN SATURATION: 94 % | HEART RATE: 67 BPM | SYSTOLIC BLOOD PRESSURE: 122 MMHG | HEIGHT: 66 IN | BODY MASS INDEX: 42.27 KG/M2 | RESPIRATION RATE: 14 BRPM | DIASTOLIC BLOOD PRESSURE: 66 MMHG | TEMPERATURE: 98.1 F | WEIGHT: 263 LBS

## 2020-01-23 DIAGNOSIS — M35.3 POLYMYALGIA RHEUMATICA (HCC): ICD-10-CM

## 2020-01-23 DIAGNOSIS — E66.01 MORBID OBESITY WITH BMI OF 40.0-44.9, ADULT (HCC): ICD-10-CM

## 2020-01-23 DIAGNOSIS — F33.41 RECURRENT MAJOR DEPRESSIVE DISORDER, IN PARTIAL REMISSION (HCC): ICD-10-CM

## 2020-01-23 DIAGNOSIS — E11.9 CONTROLLED TYPE 2 DIABETES MELLITUS WITHOUT COMPLICATION, WITHOUT LONG-TERM CURRENT USE OF INSULIN (HCC): ICD-10-CM

## 2020-01-23 DIAGNOSIS — J96.11 CHRONIC RESPIRATORY FAILURE WITH HYPOXIA (HCC): ICD-10-CM

## 2020-01-23 PROBLEM — E66.2 OBESITY HYPOVENTILATION SYNDROME (HCC): Status: RESOLVED | Noted: 2017-05-31 | Resolved: 2020-01-23

## 2020-01-23 PROCEDURE — 99214 OFFICE O/P EST MOD 30 MIN: CPT | Performed by: FAMILY MEDICINE

## 2020-01-23 NOTE — PROGRESS NOTES
Chief Complaint   Patient presents with   • Obesity     morbid obesity   • Diabetes Mellitus       Subjective:     HPI:   Ashly Meyer presents today with the followin. Morbid obesity with BMI of 40.0-44.9, adult ([facility])  She is carefully following the diet.  She has reduced pasta, breads and salt.  Is eating high vegetable and high protein.  Has lost 7 pounds since last visit.  She is not yet at goal.  Her goal is 258#.  Has been unable to reach the dietitian, now has a good number for them.  Also has a number for the psychologist.    2. Chronic respiratory failure with hypoxia ([facility])  She is using the nighttime oxygen.  She also uses it during the day when she is very short of breath.  Could not tolerate the CPAP at all.    3. Controlled type 2 diabetes mellitus without complication, without long-term current use of insulin ([facility])  Her diabetes has been well controlled.  She is following her diet well.    4. Polymyalgia rheumatica ([facility])  Has not tolerated the treatment.  Six months of steroids did not resolve.  Currently her symptoms are reduced.  However, her last sedimentation rate in September was 95, still quite active.    5. Recurrent major depressive disorder, in partial remission ([facility])  This is a chronic stable problem.  She finds her symptoms to be stable.  Denies SI or thoughts of self harm.  She is stable on her medication regimen.        Patient Active Problem List    Diagnosis Date Noted   • Controlled type 2 diabetes mellitus without complication, without long-term current use of insulin ([facility]) 2019   • Chronic gout of foot 2019   • Dry eye syndrome, bilateral    • Rotator cuff syndrome of right shoulder and allied disorders 10/10/2018   • Former smoker 2018   • Recurrent major depressive disorder, in partial remission ([facility]) 2018   • Vitamin D deficiency 2017   • Neural foraminal stenosis of cervical spine 2017   • Tinnitus of both ears 2017   •  Dyslipidemia, goal LDL below 130 01/06/2017   • Nonalcoholic fatty liver disease 01/05/2017   • Disease of accessory sinus 10/03/2016   • Atrophic rhinitis 10/03/2016   • Deviated nasal septum 08/01/2016   • Menopausal symptoms 04/28/2016   • Lumbar facet arthropathy 12/09/2015   • Chronic pain 11/18/2015   • Candidal intertrigo 06/01/2015   • Elevated sed rate 12/09/2014   • Mild intermittent asthma without complication    • H/O fibromyalgia 03/11/2014   • KERRI (obstructive sleep apnea) 03/11/2014   • Acute idiopathic gout of right foot 03/11/2014   • Menopause 03/11/2014   • Peripheral neuropathy    • Rectocele 11/25/2013   • Pelvic floor dysfunction    • Chronic constipation 10/02/2013   • Nocturnal hypoxia    • Cervical stenosis of spinal canal 11/01/2011   • Lumbar radiculopathy 11/01/2011   • Migraine without aura    • Carpal tunnel syndrome 09/05/2011   • Ulnar neuropathy 09/05/2011   • Seasonal allergies 07/08/2011   • Polymyalgia rheumatica (HCC) 05/06/2011   • GERD (gastroesophageal reflux disease) 12/10/2010   • Osteoarthritis of multiple joints 04/13/2010   • Eczema, dyshidrotic 08/03/2009   • Essential hypertension 05/20/2009   • Hypothyroidism due to acquired atrophy of thyroid 05/20/2009       Current medicines (including changes today)  Current Outpatient Medications   Medication Sig Dispense Refill   • ketoconazole (NIZORAL) 2 % Cream APPLY TO AFFECTED AREA ONCE DAILY. 30 g 0   • gabapentin (NEURONTIN) 300 MG Cap TAKE TWO CAPSULES BY MOUTH THREE TIMES DAILY  540 Cap 2   • tramadol (ULTRAM) 50 MG Tab TAKE ONE TABLET BY MOUTH EVERY FOUR HOURS AS NEEDED FOR MODERATE PAIN  180 Tab 2   • Diclofenac Sodium 1 % Gel      • carvedilol (COREG) 6.25 MG Tab TAKE ONE TABLET BY MOUTH TWICE DAILY WITH FOOD  180 Tab 1   • venlafaxine ER (EFFEXOR) 225 MG TABLET SR 24 HR tablet Take 1 Tab by mouth every day. 90 Tab 3   • potassium chloride SA (KDUR) 20 MEQ Tab CR TAKE ONE TABLET BY MOUTH TWICE DAILY  180 Tab 2   •  "pantoprazole (PROTONIX) 40 MG Tablet Delayed Response TAKE ONE TABLET BY MOUTH TWICE DAILY  180 Tab 3   • spironolactone (ALDACTONE) 50 MG Tab TAKE 1 TABLET BY MOUTH EVERY DAY 90 Tab 2   • allopurinol (ZYLOPRIM) 300 MG Tab Take 1 Tab by mouth every day. (Patient not taking: Reported on 8/20/2019) 90 Tab 3   • levothyroxine (SYNTHROID) 200 MCG Tab Take 1 Tab by mouth Every morning on an empty stomach. 90 Tab 3   • nabumetone (RELAFEN) 750 MG Tab Take 1 Tab by mouth 2 times a day, with meals. 180 Tab 3   • furosemide (LASIX) 20 MG Tab Take 1 Tab by mouth every day. 90 Tab 3   • albuterol (PROAIR HFA) 108 (90 Base) MCG/ACT Aero Soln inhalation aerosol Inhale 2 Puffs by mouth every 6 hours as needed for Shortness of Breath. 1 Inhaler 6     No current facility-administered medications for this visit.        Allergies   Allergen Reactions   • Lamictal Rash and Swelling     \"hot\", unable to function.  Severe rash, scarring   • Sulfa Drugs Hives, Shortness of Breath and Anxiety     Hard breathing, shortness of breath   • Butalbital-Acetaminophen Unspecified     Dizzy, can't walk, hard to focus   • Cefaclor Unspecified     Ceclor causes light headedness and dizziness   • Diphenhydramine Hives     Full body hives   • Iodine Unspecified     Labored breathing   • Morphine Itching     redness   • Penicillins Itching and Swelling     Skin itching and redness   • Savella [Milnacipran Hcl] Unspecified     Muscle aches, feet swelling   • Tizanidine Hcl Unspecified     dizziness   • Amlactin Rash     Lotion causes itching, redness and burnng   • Savella [Kdc:Milnacipran+Ci Pigment Blue 63]      Swelling, bone and muscle pain, sweating, nausea, dizziness, sleeplessness, anxiety   • Ace Inhibitors Cough     Cough     • Tape Unspecified     Paper tape is ok       ROS: As per HPI       Objective:     /66 (BP Location: Left arm, Patient Position: Sitting, BP Cuff Size: Large adult)   Pulse 67   Temp 36.7 °C (98.1 °F) (Temporal)   " "Resp 14   Ht 1.664 m (5' 5.5\")   Wt 119.3 kg (263 lb)   SpO2 94%  Body mass index is 43.1 kg/m².    Physical Exam:  Constitutional: Well-developed and well-nourished. Not diaphoretic. No distress. Lucid and fluent.  Skin: Skin is warm and dry. No rash noted.  Head: Atraumatic without lesions.  Eyes: Conjunctivae and extraocular motions are normal. Pupils are equal, round, and reactive to light. No scleral icterus.   Mouth/Throat: Tongue normal. Oropharynx is clear and moist. Posterior pharynx without erythema or exudates.  Neck: Supple, trachea midline. No thyromegaly present. No cervical or supraclavicular lymphadenopathy. No JVD or carotid bruits appreciated  Cardiovascular: Regular rate and rhythm.  Normal S1, S2 without murmur appreciated.  Chest: Effort normal. Clear to auscultation throughout. No adventitious sounds.   Abdomen: Soft, non tender, and without distention. Active bowel sounds in all four quadrants. No rebound, guarding, masses or hepatosplenomegaly.  Extremities: No cyanosis, clubbing, erythema, nor edema.  Feet are dry, some callus and slight cracking.  Good DP pulses, warm.  No fissures or ulcers.  Sensation is mildly reduced bilaterally.  Neurological: Alert and oriented x 3.   Psychiatric:  Behavior, mood, and affect are appropriate.       Assessment and Plan:     69 y.o. female with the following issues:    1. Morbid obesity with BMI of 40.0-44.9, adult ([facility])     2. Chronic respiratory failure with hypoxia ([facility])     3. Controlled type 2 diabetes mellitus without complication, without long-term current use of insulin ([facility])  Diabetic Monofilament Lower Extremity Exam   4. Polymyalgia rheumatica ([facility])     5. Recurrent major depressive disorder, in partial remission ([facility])           Followup: Return in about 4 weeks (around 2/20/2020), or if symptoms worsen or fail to improve.  "

## 2020-01-30 ENCOUNTER — OFFICE VISIT (OUTPATIENT)
Dept: MEDICAL GROUP | Facility: MEDICAL CENTER | Age: 70
End: 2020-01-30
Payer: MEDICARE

## 2020-01-30 ENCOUNTER — APPOINTMENT (OUTPATIENT)
Dept: HEALTH INFORMATION MANAGEMENT | Facility: MEDICAL CENTER | Age: 70
End: 2020-01-30
Payer: MEDICARE

## 2020-01-30 VITALS
DIASTOLIC BLOOD PRESSURE: 76 MMHG | HEIGHT: 66 IN | HEART RATE: 76 BPM | SYSTOLIC BLOOD PRESSURE: 140 MMHG | TEMPERATURE: 97.8 F | OXYGEN SATURATION: 87 % | RESPIRATION RATE: 18 BRPM | WEIGHT: 262 LBS | BODY MASS INDEX: 42.11 KG/M2

## 2020-01-30 DIAGNOSIS — R05.9 COUGH: ICD-10-CM

## 2020-01-30 DIAGNOSIS — R09.02 HYPOXIA: ICD-10-CM

## 2020-01-30 DIAGNOSIS — J45.21 MILD INTERMITTENT ASTHMA WITH ACUTE EXACERBATION: ICD-10-CM

## 2020-01-30 PROCEDURE — 99213 OFFICE O/P EST LOW 20 MIN: CPT | Performed by: FAMILY MEDICINE

## 2020-01-30 RX ORDER — AZITHROMYCIN 250 MG/1
TABLET, FILM COATED ORAL
Qty: 6 TAB | Refills: 0 | Status: SHIPPED | OUTPATIENT
Start: 2020-01-30 | End: 2020-02-12

## 2020-01-30 RX ORDER — ALBUTEROL SULFATE 90 UG/1
2 AEROSOL, METERED RESPIRATORY (INHALATION) EVERY 6 HOURS PRN
Qty: 1 INHALER | Refills: 6 | Status: SHIPPED | OUTPATIENT
Start: 2020-01-30 | End: 2021-08-16

## 2020-01-30 RX ORDER — PROMETHAZINE HYDROCHLORIDE AND CODEINE PHOSPHATE 6.25; 1 MG/5ML; MG/5ML
5 SYRUP ORAL 4 TIMES DAILY PRN
Qty: 180 ML | Refills: 0 | Status: SHIPPED | OUTPATIENT
Start: 2020-01-30 | End: 2020-02-12 | Stop reason: SDUPTHER

## 2020-01-30 NOTE — PROGRESS NOTES
Chief Complaint   Patient presents with   • Cough     sore throat x Monday       Subjective:     HPI:   Ashly Meyer presents today with the followin. Mild intermittent asthma with acute exacerbation  Increased cough, rattling in lungs.  Temp has been up and down but that is common for her.  She has been sweating in bed at night. The phlegm is yellow, thick, worsening.   Saw her daughter Friday, symptoms began gradually on Monday but more severe by Tuesday.  Her inhalers are running out.  She is using them.  Has not needed steroids in the past.  Appetite is low.      2. Cough  Keeping her up at night.      3. Hypoxia  Oxygen level is low here today.  Has oxygen at home.  She uses this at night.  Instructed to wear it all day long until she is feeling better.          Patient Active Problem List    Diagnosis Date Noted   • Controlled type 2 diabetes mellitus without complication, without long-term current use of insulin ([facility]) 2019   • Chronic gout of foot 2019   • Dry eye syndrome, bilateral    • Rotator cuff syndrome of right shoulder and allied disorders 10/10/2018   • Former smoker 2018   • Recurrent major depressive disorder, in partial remission ([facility]) 2018   • Vitamin D deficiency 2017   • Neural foraminal stenosis of cervical spine 2017   • Tinnitus of both ears 2017   • Dyslipidemia, goal LDL below 130 2017   • Nonalcoholic fatty liver disease 2017   • Disease of accessory sinus 10/03/2016   • Atrophic rhinitis 10/03/2016   • Deviated nasal septum 2016   • Menopausal symptoms 2016   • Lumbar facet arthropathy 2015   • Chronic pain 2015   • Candidal intertrigo 2015   • Elevated sed rate 2014   • Mild intermittent asthma without complication    • H/O fibromyalgia 2014   • KERRI (obstructive sleep apnea) 2014   • Acute idiopathic gout of right foot 2014   • Menopause 2014   • Peripheral  neuropathy    • Rectocele 11/25/2013   • Pelvic floor dysfunction    • Chronic constipation 10/02/2013   • Nocturnal hypoxia    • Cervical stenosis of spinal canal 11/01/2011   • Lumbar radiculopathy 11/01/2011   • Migraine without aura    • Carpal tunnel syndrome 09/05/2011   • Ulnar neuropathy 09/05/2011   • Seasonal allergies 07/08/2011   • Polymyalgia rheumatica (HCC) 05/06/2011   • GERD (gastroesophageal reflux disease) 12/10/2010   • Osteoarthritis of multiple joints 04/13/2010   • Eczema, dyshidrotic 08/03/2009   • Essential hypertension 05/20/2009   • Hypothyroidism due to acquired atrophy of thyroid 05/20/2009       Current medicines (including changes today)  Current Outpatient Medications   Medication Sig Dispense Refill   • albuterol (PROAIR HFA) 108 (90 Base) MCG/ACT Aero Soln inhalation aerosol Inhale 2 Puffs by mouth every 6 hours as needed for Shortness of Breath. 1 Inhaler 6   • azithromycin (ZITHROMAX) 250 MG Tab As directed on pack 6 Tab 0   • promethazine-codeine (PHENERGAN-CODEINE) 6.25-10 MG/5ML Syrup Take 5 mL by mouth 4 times a day as needed for Cough for up to 7 days. 180 mL 0   • ketoconazole (NIZORAL) 2 % Cream APPLY TO AFFECTED AREA ONCE DAILY. 30 g 0   • gabapentin (NEURONTIN) 300 MG Cap TAKE TWO CAPSULES BY MOUTH THREE TIMES DAILY  540 Cap 2   • tramadol (ULTRAM) 50 MG Tab TAKE ONE TABLET BY MOUTH EVERY FOUR HOURS AS NEEDED FOR MODERATE PAIN  180 Tab 2   • Diclofenac Sodium 1 % Gel      • carvedilol (COREG) 6.25 MG Tab TAKE ONE TABLET BY MOUTH TWICE DAILY WITH FOOD  180 Tab 1   • venlafaxine ER (EFFEXOR) 225 MG TABLET SR 24 HR tablet Take 1 Tab by mouth every day. 90 Tab 3   • potassium chloride SA (KDUR) 20 MEQ Tab CR TAKE ONE TABLET BY MOUTH TWICE DAILY  180 Tab 2   • pantoprazole (PROTONIX) 40 MG Tablet Delayed Response TAKE ONE TABLET BY MOUTH TWICE DAILY  180 Tab 3   • spironolactone (ALDACTONE) 50 MG Tab TAKE 1 TABLET BY MOUTH EVERY DAY 90 Tab 2   • allopurinol (ZYLOPRIM) 300 MG  "Tab Take 1 Tab by mouth every day. (Patient not taking: Reported on 8/20/2019) 90 Tab 3   • levothyroxine (SYNTHROID) 200 MCG Tab Take 1 Tab by mouth Every morning on an empty stomach. 90 Tab 3   • nabumetone (RELAFEN) 750 MG Tab Take 1 Tab by mouth 2 times a day, with meals. 180 Tab 3   • furosemide (LASIX) 20 MG Tab Take 1 Tab by mouth every day. 90 Tab 3     No current facility-administered medications for this visit.        Allergies   Allergen Reactions   • Lamictal Rash and Swelling     \"hot\", unable to function.  Severe rash, scarring   • Sulfa Drugs Hives, Shortness of Breath and Anxiety     Hard breathing, shortness of breath   • Butalbital-Acetaminophen Unspecified     Dizzy, can't walk, hard to focus   • Cefaclor Unspecified     Ceclor causes light headedness and dizziness   • Diphenhydramine Hives     Full body hives   • Iodine Unspecified     Labored breathing   • Morphine Itching     redness   • Penicillins Itching and Swelling     Skin itching and redness   • Savella [Milnacipran Hcl] Unspecified     Muscle aches, feet swelling   • Tizanidine Hcl Unspecified     dizziness   • Amlactin Rash     Lotion causes itching, redness and burnng   • Savella [Kdc:Milnacipran+Ci Pigment Blue 63]      Swelling, bone and muscle pain, sweating, nausea, dizziness, sleeplessness, anxiety   • Ace Inhibitors Cough     Cough     • Tape Unspecified     Paper tape is ok       ROS: As per HPI       Objective:     /76 (BP Location: Right arm, Patient Position: Sitting, BP Cuff Size: Large adult)   Pulse 76   Temp 36.6 °C (97.8 °F) (Temporal)   Resp 18   Ht 1.664 m (5' 5.5\")   Wt 118.8 kg (262 lb)   SpO2 (!) 87%  Body mass index is 42.94 kg/m².    Physical Exam:  Constitutional: Well-developed and well-nourished. Not diaphoretic. No distress. Lucid and fluent.  Skin: Skin is warm and dry. No rash noted.  Head: Atraumatic without lesions.  Eyes: Conjunctivae and extraocular motions are normal. Pupils are equal, " round, and reactive to light. No scleral icterus.   Mouth/Throat: Tongue normal. Oropharynx is clear and moist. Posterior pharynx with moderate erythema, no exudates.  Neck: Supple, trachea midline. No thyromegaly present. No cervical or supraclavicular lymphadenopathy. No JVD or carotid bruits appreciated  Cardiovascular: Regular rate and rhythm.  Normal S1, S2 without murmur appreciated.  Chest: Effort somewhat increased, no true retractions.  Wet rhonchi and end expiratory wheezes scattered throughout, worse right base.  Extremities: No cyanosis, clubbing, erythema, nor edema.   Neurological: Alert and oriented x 3. Movements symmetric.  Psychiatric:  Behavior, mood, and affect are appropriate.       Assessment and Plan:     69 y.o. female with the following issues:    1. Mild intermittent asthma with acute exacerbation  albuterol (PROAIR HFA) 108 (90 Base) MCG/ACT Aero Soln inhalation aerosol    azithromycin (ZITHROMAX) 250 MG Tab   2. Cough  promethazine-codeine (PHENERGAN-CODEINE) 6.25-10 MG/5ML Syrup   3. Hypoxia  albuterol (PROAIR HFA) 108 (90 Base) MCG/ACT Aero Soln inhalation aerosol         Followup: Return in about 3 weeks (around 2/20/2020), or if symptoms worsen or fail to improve.

## 2020-02-06 ENCOUNTER — NON-PROVIDER VISIT (OUTPATIENT)
Dept: HEALTH INFORMATION MANAGEMENT | Facility: MEDICAL CENTER | Age: 70
End: 2020-02-06
Payer: MEDICARE

## 2020-02-06 VITALS — WEIGHT: 262.7 LBS | BODY MASS INDEX: 42.22 KG/M2 | HEIGHT: 66 IN

## 2020-02-06 DIAGNOSIS — E66.01 MORBID (SEVERE) OBESITY DUE TO EXCESS CALORIES (HCC): ICD-10-CM

## 2020-02-06 PROCEDURE — 97802 MEDICAL NUTRITION INDIV IN: CPT | Performed by: DIETITIAN, REGISTERED

## 2020-02-06 NOTE — PROGRESS NOTES
"2/6/2020    Colton Ahn M.D.  69 y.o.   Time in/out: 10:10-11:10am    Anthropometrics/Objective  Vitals:    02/06/20 1321   Weight: 119.2 kg (262 lb 11.2 oz)   Height: 1.664 m (5' 5.5\")       Body mass index is 43.05 kg/m².    Stated Goal Weight: 160 lbs  Estimated Caloric needs 1725 Kcal (MSJ)   See comprehensive patient history form for further information     Subjective:  · Pt referred today for nutrition evaluation in preparation for sleeve gastrectomy  · Lives alone/with her dog  · Current diet varies although she reports that she's worked on reducing her portions  · Struggles with knowing amounts of foods she should eat/how often she should eat  · Goes to store and walks around with cart (for stability) for 1-2 hours as her form of exercise  · Drinks Premier Protein      Diet Recall:  B- 2 oranges, 1 apple  S- none  L- 1 beef stick  S- 1 orange, 8 fl oz almond milk   D- 2 hot dogs, 2 tortillas (street taco size)   Beverages: water, black tea, almond milk     Nutrition Diagnosis (PES Statement)    Morbidly Obese related to excessive energy intake and inadequate energy energy expenditure as evidenced by BMI of 43.05 kg/m2.    Client history:  Condition(s) associated with a diagnosis or treatment (specify) HTN, hypothyroidism, eczema, osteoarthritis, GERD, migraine, chronic constipation, KERRI, peripheral neuropathy, NAFLD, vit D deficiency, gout, T2DM     Biochemical data, medical test and procedures  Lab Results   Component Value Date/Time    HBA1C 6.4 (H) 09/07/2019 12:19 PM   @  Lab Results   Component Value Date/Time    POCGLUCOSE 129 (H) 02/01/2019 06:45 AM     Lab Results   Component Value Date/Time    CHOLSTRLTOT 163 09/07/2019 12:19 PM    LDL 93 09/07/2019 12:19 PM    HDL 46 09/07/2019 12:19 PM    TRIGLYCERIDE 118 09/07/2019 12:19 PM         Nutrition Intervention  Nutrition Prescription  Protein choices/grams: 65-85g/day    Meal and Snack  Recommend a general/healthful diet  5-6 small frequent " "meals    Comprehensive Nutrition education Instruction or training leading to in-depth nutrition related knowledge about:  Meal timing and spacing, Physical activity/exercise, Portion control and Sweets and alcohol in moderation and diet guidelines for post gastric sleeve surgery. Reviewed the following topics:   · Basic physiology of metabolism  · Pros/cons of bariatric surgery - this is a tool and not a “quick fix”   · Dumping syndrome:  what it is and how to avoid it (if applicable)   · Importance of avoiding foods high in fat/sugar  · Outline of the progression of the diet/stages of diet after surgery  · The importance of eating meals/snacks at appropriate times  · Chewing food well  · Adequate fluid intake - sip fluids throughout the day and avoid with meals  · Lifestyle modifications - the importance of healthful food choices and exercise  · Basic concept of emotional vs. physical hunger      Monitoring & Evaluation Plan    Behavioral-Environmental:  Behavior: Begin tracking protein intake to ensure reaching goal of 65-85g each day (pt prefers tracking on computer)  Physical activity: Increase as tolerated; suggested including another \"store workout\" during the week    Food / Nutrient Intake:    Food Intake:  1. Eat 3 meals and 3 small snacks daily   2. Slow down when eating.  3. Chew your food well.  4. Use smaller plates/bowls/utensils to help eat smaller portions.  5. Do not eat in front of the TV nor read while eating; focus on your body and the food.  6. Decrease your intake of high fat/high sugar foods.  7. Eat more fruits and vegetables.  8. Use sugar substitutes like Stevia or Splenda.  9. Eat protein foods first   10. Start taking a Multivitamin with minerals daily      Fluid Intake:  1. Practice not drinking fluids 30 - 60 minutes before and after meals.  2. Do not drink from a straw.  3. Stop drinking sugar-sweetened beverages.  4. Start weaning off of caffeine and carbonated beverages.        5. " Practice sipping liquids; not gulping     Physical Signs / Symptoms:  HbA1c profiles within ADA guidelines   Weight loss 1-2 lbs per week to goal and with the assistance of bariatric surgery    Assessment Notes:  Today we discussed recommendations for diet progression post bariatric surgery in addition to the topics listed above. We reviewed potential post-surgery complications and discussed lifestyle changes that Ashly can begin implementing to minimize/avoid these complications. Suggested she begin including protein with each of her meals/snacks and including a mid-morning snack between breakfast and lunch. Provided her with a list of pre-surgery goals to work on to increase her success post-surgery. Suggested she discuss vitamin/mineral supplements further with her surgeon, including protein shakes - emphasized importance of medical-grade shakes vs storebought. Ashly verbalized understanding of topics discussed today and was encouraged to call me with any future questions or concerns before/after surgery. Ashly was taking notes during our discussion and seems to be motivated to make these lifestyle changes. I believe she will make a good candidate for bariatric surgery if she continues to work on improving the habits we discussed today.    Agnieszka Kahn) VIKI Garcia, LD  [facility]  [phone]  [email or web address]

## 2020-02-07 ENCOUNTER — TELEPHONE (OUTPATIENT)
Dept: MEDICAL GROUP | Facility: MEDICAL CENTER | Age: 70
End: 2020-02-07

## 2020-02-07 NOTE — TELEPHONE ENCOUNTER
Does she mean bladder irritation and yeast?  Really difficult for antibiotics to give somebody a bacterial infection.

## 2020-02-07 NOTE — TELEPHONE ENCOUNTER
Patient states the antibiotics she was taking gave her a bladder infection and is requesting a medication be sent to her pharmacy for that.

## 2020-02-07 NOTE — TELEPHONE ENCOUNTER
Spoke with patient she states its not a yeast infection, she states she thought it was related to the medication only because it started after she took the medication. She states she feels burning and discomfort.

## 2020-02-08 NOTE — TELEPHONE ENCOUNTER
If she feels it is not a yeast infection but there is burning in the area she may get some relief from Desitin or something similar.

## 2020-02-12 ENCOUNTER — HOSPITAL ENCOUNTER (OUTPATIENT)
Facility: MEDICAL CENTER | Age: 70
End: 2020-02-12
Attending: FAMILY MEDICINE
Payer: MEDICARE

## 2020-02-12 ENCOUNTER — OFFICE VISIT (OUTPATIENT)
Dept: MEDICAL GROUP | Facility: MEDICAL CENTER | Age: 70
End: 2020-02-12
Payer: MEDICARE

## 2020-02-12 VITALS
WEIGHT: 261 LBS | BODY MASS INDEX: 41.95 KG/M2 | HEART RATE: 74 BPM | TEMPERATURE: 96.6 F | HEIGHT: 66 IN | OXYGEN SATURATION: 94 % | SYSTOLIC BLOOD PRESSURE: 138 MMHG | RESPIRATION RATE: 18 BRPM | DIASTOLIC BLOOD PRESSURE: 70 MMHG

## 2020-02-12 DIAGNOSIS — M25.552 BILATERAL HIP PAIN: ICD-10-CM

## 2020-02-12 DIAGNOSIS — N34.3 DYSURIA-FREQUENCY SYNDROME: ICD-10-CM

## 2020-02-12 DIAGNOSIS — M25.551 BILATERAL HIP PAIN: ICD-10-CM

## 2020-02-12 DIAGNOSIS — N30.00 ACUTE CYSTITIS WITHOUT HEMATURIA: ICD-10-CM

## 2020-02-12 DIAGNOSIS — M1A.0790 CHRONIC GOUT OF FOOT, UNSPECIFIED CAUSE, UNSPECIFIED LATERALITY: ICD-10-CM

## 2020-02-12 DIAGNOSIS — E66.01 MORBID OBESITY WITH BMI OF 40.0-44.9, ADULT (HCC): ICD-10-CM

## 2020-02-12 DIAGNOSIS — R05.3 PERSISTENT COUGH: ICD-10-CM

## 2020-02-12 LAB
APPEARANCE UR: NORMAL
BILIRUB UR STRIP-MCNC: NEGATIVE MG/DL
COLOR UR AUTO: YELLOW
GLUCOSE UR STRIP.AUTO-MCNC: NEGATIVE MG/DL
KETONES UR STRIP.AUTO-MCNC: NEGATIVE MG/DL
LEUKOCYTE ESTERASE UR QL STRIP.AUTO: NORMAL
NITRITE UR QL STRIP.AUTO: POSITIVE
PH UR STRIP.AUTO: 5.5 [PH] (ref 5–8)
PROT UR QL STRIP: 30 MG/DL
RBC UR QL AUTO: NORMAL
SP GR UR STRIP.AUTO: 1.01
UROBILINOGEN UR STRIP-MCNC: 0.2 MG/DL

## 2020-02-12 PROCEDURE — 99213 OFFICE O/P EST LOW 20 MIN: CPT | Performed by: FAMILY MEDICINE

## 2020-02-12 PROCEDURE — 87186 SC STD MICRODIL/AGAR DIL: CPT

## 2020-02-12 PROCEDURE — 81002 URINALYSIS NONAUTO W/O SCOPE: CPT | Performed by: FAMILY MEDICINE

## 2020-02-12 PROCEDURE — 87086 URINE CULTURE/COLONY COUNT: CPT

## 2020-02-12 PROCEDURE — 87077 CULTURE AEROBIC IDENTIFY: CPT

## 2020-02-12 RX ORDER — PROMETHAZINE HYDROCHLORIDE AND CODEINE PHOSPHATE 6.25; 1 MG/5ML; MG/5ML
5 SYRUP ORAL EVERY 4 HOURS PRN
Qty: 180 ML | Refills: 0 | Status: SHIPPED | OUTPATIENT
Start: 2020-02-12 | End: 2020-02-19

## 2020-02-12 RX ORDER — TRAMADOL HYDROCHLORIDE 50 MG/1
50 TABLET ORAL EVERY 4 HOURS PRN
Qty: 180 TAB | Refills: 2 | Status: SHIPPED | OUTPATIENT
Start: 2020-02-12 | End: 2020-11-23 | Stop reason: SDUPTHER

## 2020-02-12 RX ORDER — CIPROFLOXACIN 500 MG/1
500 TABLET, FILM COATED ORAL 2 TIMES DAILY
Qty: 14 TAB | Refills: 0 | Status: SHIPPED | OUTPATIENT
Start: 2020-02-12 | End: 2020-02-19

## 2020-02-12 NOTE — PROGRESS NOTES
Chief Complaint   Patient presents with   • Urinary Frequency     x 1 week   • Cough   • Obesity       Subjective:     HPI:   Ashly Meyer presents today with the followin. Dysuria-frequency syndrome  Over the last week to 10 days patient has had increased dysuria and frequency.  She feels there is significant burning.  She felt this was a problem due to the antibiotic.  I do not really think so.  Point-of-care urine test done today appears to show infection.    2. Acute cystitis without hematuria  Patient's urine test done today shows positive nitrites, blood and leukocytes.  Patient is highly symptomatic and would like to start treatment now.  I have chosen cipro as patient is sulfa, penicillin and ceclor allergic.  Last culture showed reduced sensitivity to nitrofurantoin.    3. Persistent cough  Patient feels the antibiotics helped her bronchitis and she is doing better overall but she is still having very persistent cough.  She states she cannot sleep at night now that she has run out of the cough medication.  She asked for renewal.   review shows no inconsistencies.  This is renewed today.    4. Morbid obesity with BMI of 40.0-44.9, adult (HCC)  Patient has managed to lose another pound.  She is working hard on her weight loss but it is still a struggle.    5. Bilateral hip pain  Patient continues to have bilateral hip pain from degenerative changes.  This is worsened by her weight.  She is aware that this is a factor.  She is pursuing bariatric surgery when she can.  This is a source of chronic pain.  Patient has been using the tramadol as needed and feels it is helpful.  The prescription is renewed.    6. Chronic gout of foot, unspecified cause, unspecified laterality  Patient does have chronic gout and this is a source of chronic pain.  [facility] board of pharmacy interface is reviewed.  No inconsistencies are found.  The patient's maximum MME is 30.  This is within CDC guideline for chronic  pain prescribing by primary care.  I do believe the pain medication is medically necessary.  Patient continues her nabumetone nonsteroidal anti-inflammatory drug but it is insufficient.        Patient Active Problem List    Diagnosis Date Noted   • Controlled type 2 diabetes mellitus without complication, without long-term current use of insulin ([facility]) 06/12/2019   • Chronic gout of foot 04/30/2019   • Dry eye syndrome, bilateral    • Rotator cuff syndrome of right shoulder and allied disorders 10/10/2018   • Former smoker 05/30/2018   • Recurrent major depressive disorder, in partial remission ([facility]) 03/29/2018   • Vitamin D deficiency 12/28/2017   • Morbid obesity with BMI of 40.0-44.9, adult ([facility])    • Neural foraminal stenosis of cervical spine 05/18/2017   • Tinnitus of both ears 05/18/2017   • Dyslipidemia, goal LDL below 130 01/06/2017   • Nonalcoholic fatty liver disease 01/05/2017   • Disease of accessory sinus 10/03/2016   • Atrophic rhinitis 10/03/2016   • Deviated nasal septum 08/01/2016   • Menopausal symptoms 04/28/2016   • Lumbar facet arthropathy 12/09/2015   • Chronic pain 11/18/2015   • Candidal intertrigo 06/01/2015   • Elevated sed rate 12/09/2014   • Mild intermittent asthma without complication    • H/O fibromyalgia 03/11/2014   • KERRI (obstructive sleep apnea) 03/11/2014   • Acute idiopathic gout of right foot 03/11/2014   • Menopause 03/11/2014   • Peripheral neuropathy    • Rectocele 11/25/2013   • Pelvic floor dysfunction    • Chronic constipation 10/02/2013   • Nocturnal hypoxia    • Cervical stenosis of spinal canal 11/01/2011   • Lumbar radiculopathy 11/01/2011   • Migraine without aura    • Carpal tunnel syndrome 09/05/2011   • Ulnar neuropathy 09/05/2011   • Seasonal allergies 07/08/2011   • Polymyalgia rheumatica (HCC) 05/06/2011   • GERD (gastroesophageal reflux disease) 12/10/2010   • Osteoarthritis of multiple joints 04/13/2010   • Eczema, dyshidrotic 08/03/2009   • Essential  hypertension 05/20/2009   • Hypothyroidism due to acquired atrophy of thyroid 05/20/2009       Current medicines (including changes today)  Current Outpatient Medications   Medication Sig Dispense Refill   • promethazine-codeine (PHENERGAN-CODEINE) 6.25-10 MG/5ML Syrup Take 5 mL by mouth every four hours as needed for Cough for up to 7 days. 180 mL 0   • tramadol (ULTRAM) 50 MG Tab Take 1 Tab by mouth every four hours as needed for Moderate Pain for up to 90 days. 180 Tab 2   • ciprofloxacin (CIPRO) 500 MG Tab Take 1 Tab by mouth 2 times a day for 7 days. 14 Tab 0   • albuterol (PROAIR HFA) 108 (90 Base) MCG/ACT Aero Soln inhalation aerosol Inhale 2 Puffs by mouth every 6 hours as needed for Shortness of Breath. 1 Inhaler 6   • ketoconazole (NIZORAL) 2 % Cream APPLY TO AFFECTED AREA ONCE DAILY. 30 g 0   • gabapentin (NEURONTIN) 300 MG Cap TAKE TWO CAPSULES BY MOUTH THREE TIMES DAILY  540 Cap 2   • Diclofenac Sodium 1 % Gel      • carvedilol (COREG) 6.25 MG Tab TAKE ONE TABLET BY MOUTH TWICE DAILY WITH FOOD  180 Tab 1   • venlafaxine ER (EFFEXOR) 225 MG TABLET SR 24 HR tablet Take 1 Tab by mouth every day. 90 Tab 3   • potassium chloride SA (KDUR) 20 MEQ Tab CR TAKE ONE TABLET BY MOUTH TWICE DAILY  180 Tab 2   • pantoprazole (PROTONIX) 40 MG Tablet Delayed Response TAKE ONE TABLET BY MOUTH TWICE DAILY  180 Tab 3   • spironolactone (ALDACTONE) 50 MG Tab TAKE 1 TABLET BY MOUTH EVERY DAY 90 Tab 2   • allopurinol (ZYLOPRIM) 300 MG Tab Take 1 Tab by mouth every day. (Patient not taking: Reported on 8/20/2019) 90 Tab 3   • levothyroxine (SYNTHROID) 200 MCG Tab Take 1 Tab by mouth Every morning on an empty stomach. 90 Tab 3   • nabumetone (RELAFEN) 750 MG Tab Take 1 Tab by mouth 2 times a day, with meals. 180 Tab 3   • furosemide (LASIX) 20 MG Tab Take 1 Tab by mouth every day. 90 Tab 3     No current facility-administered medications for this visit.        Allergies   Allergen Reactions   • Lamictal Rash and Swelling      "\"hot\", unable to function.  Severe rash, scarring   • Sulfa Drugs Hives, Shortness of Breath and Anxiety     Hard breathing, shortness of breath   • Butalbital-Acetaminophen Unspecified     Dizzy, can't walk, hard to focus   • Cefaclor Unspecified     Ceclor causes light headedness and dizziness   • Diphenhydramine Hives     Full body hives   • Iodine Unspecified     Labored breathing   • Morphine Itching     redness   • Penicillins Itching and Swelling     Skin itching and redness   • Savella [Milnacipran Hcl] Unspecified     Muscle aches, feet swelling   • Tizanidine Hcl Unspecified     dizziness   • Amlactin Rash     Lotion causes itching, redness and burnng   • Savella [Kdc:Milnacipran+Ci Pigment Blue 63]      Swelling, bone and muscle pain, sweating, nausea, dizziness, sleeplessness, anxiety   • Ace Inhibitors Cough     Cough     • Tape Unspecified     Paper tape is ok       ROS: As per HPI       Objective:     /70 (BP Location: Right arm, Patient Position: Sitting, BP Cuff Size: Adult)   Pulse 74   Temp 35.9 °C (96.6 °F) (Temporal)   Resp 18   Ht 1.664 m (5' 5.5\")   Wt 118.4 kg (261 lb)   SpO2 94%  Body mass index is 42.77 kg/m².    Physical Exam:  Constitutional: Well-developed and well-nourished. Not diaphoretic. No distress. Lucid and fluent.  Skin: Skin is warm and dry. No rash noted.  Head: Atraumatic without lesions.  Eyes: Conjunctivae and extraocular motions are normal. Pupils are equal, round, and reactive to light. No scleral icterus.   Mouth/Throat: Tongue normal. Oropharynx is clear and moist. Posterior pharynx without erythema or exudates.  Neck: Supple, trachea midline. No thyromegaly present. No cervical or supraclavicular lymphadenopathy. No JVD or carotid bruits appreciated  Cardiovascular: Regular rate and rhythm.  Normal S1, S2 without murmur appreciated.  Chest: Effort normal. Clear to auscultation throughout. No adventitious sounds.  There are some upper airway wet sounds but " they clear with cough.  Extremities: No cyanosis, clubbing, erythema, nor edema.  Some limitation to range of motion due to pain, particularly in the knees and hips.  Neurological: Alert and oriented x 3.  Patient's gait is rolling and broad-based.  Psychiatric:  Behavior, mood, and affect are appropriate.            Assessment and Plan:     69 y.o. female with the following issues:    1. Dysuria-frequency syndrome  POCT Urinalysis    URINE CULTURE(NEW)    ciprofloxacin (CIPRO) 500 MG Tab   2. Acute cystitis without hematuria  URINE CULTURE(NEW)    ciprofloxacin (CIPRO) 500 MG Tab   3. Persistent cough  promethazine-codeine (PHENERGAN-CODEINE) 6.25-10 MG/5ML Syrup   4. Morbid obesity with BMI of 40.0-44.9, adult (HCC)     5. Bilateral hip pain  tramadol (ULTRAM) 50 MG Tab   6. Chronic gout of foot, unspecified cause, unspecified laterality  tramadol (ULTRAM) 50 MG Tab         Followup: Return in about 5 weeks (around 3/19/2020), or if symptoms worsen or fail to improve.

## 2020-03-19 ENCOUNTER — OFFICE VISIT (OUTPATIENT)
Dept: MEDICAL GROUP | Facility: MEDICAL CENTER | Age: 70
End: 2020-03-19
Payer: MEDICARE

## 2020-03-19 VITALS
RESPIRATION RATE: 14 BRPM | DIASTOLIC BLOOD PRESSURE: 70 MMHG | TEMPERATURE: 98.8 F | OXYGEN SATURATION: 91 % | HEART RATE: 75 BPM | SYSTOLIC BLOOD PRESSURE: 126 MMHG | WEIGHT: 260 LBS | BODY MASS INDEX: 41.78 KG/M2 | HEIGHT: 66 IN

## 2020-03-19 DIAGNOSIS — E66.01 MORBID OBESITY WITH BMI OF 40.0-44.9, ADULT (HCC): ICD-10-CM

## 2020-03-19 DIAGNOSIS — M15.9 PRIMARY OSTEOARTHRITIS INVOLVING MULTIPLE JOINTS: ICD-10-CM

## 2020-03-19 DIAGNOSIS — R60.9 PERIPHERAL EDEMA: ICD-10-CM

## 2020-03-19 DIAGNOSIS — E11.9 CONTROLLED TYPE 2 DIABETES MELLITUS WITHOUT COMPLICATION, WITHOUT LONG-TERM CURRENT USE OF INSULIN (HCC): ICD-10-CM

## 2020-03-19 DIAGNOSIS — K21.9 GASTROESOPHAGEAL REFLUX DISEASE WITHOUT ESOPHAGITIS: ICD-10-CM

## 2020-03-19 DIAGNOSIS — E03.4 HYPOTHYROIDISM DUE TO ACQUIRED ATROPHY OF THYROID: ICD-10-CM

## 2020-03-19 DIAGNOSIS — M1A.0790 CHRONIC GOUT OF FOOT, UNSPECIFIED CAUSE, UNSPECIFIED LATERALITY: ICD-10-CM

## 2020-03-19 LAB
HBA1C MFR BLD: 6.1 % (ref 0–5.6)
INT CON NEG: NEGATIVE
INT CON POS: POSITIVE

## 2020-03-19 PROCEDURE — 99214 OFFICE O/P EST MOD 30 MIN: CPT | Performed by: FAMILY MEDICINE

## 2020-03-19 PROCEDURE — 83036 HEMOGLOBIN GLYCOSYLATED A1C: CPT | Performed by: FAMILY MEDICINE

## 2020-03-19 RX ORDER — PANTOPRAZOLE SODIUM 40 MG/1
40 TABLET, DELAYED RELEASE ORAL 2 TIMES DAILY
Qty: 180 TAB | Refills: 3 | Status: SHIPPED | OUTPATIENT
Start: 2020-03-19 | End: 2020-10-13 | Stop reason: SDUPTHER

## 2020-03-19 RX ORDER — FUROSEMIDE 20 MG/1
20 TABLET ORAL
Qty: 90 TAB | Refills: 3 | Status: SHIPPED
Start: 2020-03-19 | End: 2020-11-02

## 2020-03-19 RX ORDER — ALLOPURINOL 300 MG/1
300 TABLET ORAL DAILY
Qty: 90 TAB | Refills: 3 | Status: SHIPPED
Start: 2020-03-19 | End: 2020-11-02

## 2020-03-19 RX ORDER — LEVOTHYROXINE SODIUM 0.2 MG/1
200 TABLET ORAL
Qty: 90 TAB | Refills: 3 | Status: SHIPPED | OUTPATIENT
Start: 2020-03-19 | End: 2021-06-03 | Stop reason: SDUPTHER

## 2020-03-19 RX ORDER — NABUMETONE 750 MG/1
750 TABLET, FILM COATED ORAL 2 TIMES DAILY WITH MEALS
Qty: 180 TAB | Refills: 3 | Status: SHIPPED
Start: 2020-03-19 | End: 2020-11-02

## 2020-03-19 ASSESSMENT — FIBROSIS 4 INDEX: FIB4 SCORE: 1.02

## 2020-03-19 NOTE — PROGRESS NOTES
Chief Complaint   Patient presents with   • Obesity   • Diabetes Mellitus   • Gout   • Hypothyroidism       Subjective:     HPI:   Ashly Meyer presents today with the followin. Morbid obesity with BMI of 40.0-44.9, adult ([facility])  She has lost one more pound.  She has been very stressed with her pain, family issues and the pandemic.  She tends to be a stress eater.  She is trying to keep this in control.  She met with the dietician for nutritional counseling.  She is trying to practice this.  Wanting to pursue the bariatric surgery.    2. Controlled type 2 diabetes mellitus without complication, without long-term current use of insulin ([facility])  She has been controlled overall.  She is generally stable.  Trying to change her eating habits consistently.  Hba1c is 6.1%.  She is in good control.    3. Chronic gout of foot, unspecified cause, unspecified laterality  Allopurinol continues to be helpful.    4. Hypothyroidism due to acquired atrophy of thyroid  Patient reports stable energy level on the medication. Patient denies insomnia, tremor or change in appetite.  Patient is taking the medication on an empty stomach in the morning and waiting at least 30 minutes before eating.  Last TSH in September was at target.    5. Primary osteoarthritis involving multiple joints  Chronic neck and shoulder pain.  Left knee pain has been severe lately. She thinks she twisted wrongly.  Thre was a pop in the anterior knee and the back of the knee.   Wants to get the bariatric surgery done in a hope that some of her joint pain will improve.     6. Peripheral edema  She has continued peripheral edema.  She is on furosemide, this is renewed today.        Patient Active Problem List    Diagnosis Date Noted   • Controlled type 2 diabetes mellitus without complication, without long-term current use of insulin ([facility]) 2019   • Chronic gout of foot 2019   • Dry eye syndrome, bilateral    • Rotator cuff syndrome of  right shoulder and allied disorders 10/10/2018   • Former smoker 05/30/2018   • Recurrent major depressive disorder, in partial remission (HCC) 03/29/2018   • Vitamin D deficiency 12/28/2017   • Morbid obesity with BMI of 40.0-44.9, adult ([facility])    • Neural foraminal stenosis of cervical spine 05/18/2017   • Tinnitus of both ears 05/18/2017   • Dyslipidemia, goal LDL below 130 01/06/2017   • Nonalcoholic fatty liver disease 01/05/2017   • Disease of accessory sinus 10/03/2016   • Atrophic rhinitis 10/03/2016   • Deviated nasal septum 08/01/2016   • Menopausal symptoms 04/28/2016   • Lumbar facet arthropathy 12/09/2015   • Chronic pain 11/18/2015   • Candidal intertrigo 06/01/2015   • Elevated sed rate 12/09/2014   • Mild intermittent asthma without complication    • H/O fibromyalgia 03/11/2014   • KERRI (obstructive sleep apnea) 03/11/2014   • Acute idiopathic gout of right foot 03/11/2014   • Menopause 03/11/2014   • Peripheral neuropathy    • Rectocele 11/25/2013   • Pelvic floor dysfunction    • Chronic constipation 10/02/2013   • Nocturnal hypoxia    • Cervical stenosis of spinal canal 11/01/2011   • Lumbar radiculopathy 11/01/2011   • Migraine without aura    • Carpal tunnel syndrome 09/05/2011   • Ulnar neuropathy 09/05/2011   • Seasonal allergies 07/08/2011   • Polymyalgia rheumatica (HCC) 05/06/2011   • GERD (gastroesophageal reflux disease) 12/10/2010   • Osteoarthritis of multiple joints 04/13/2010   • Eczema, dyshidrotic 08/03/2009   • Essential hypertension 05/20/2009   • Hypothyroidism due to acquired atrophy of thyroid 05/20/2009       Current medicines (including changes today)  Current Outpatient Medications   Medication Sig Dispense Refill   • allopurinol (ZYLOPRIM) 300 MG Tab Take 1 Tab by mouth every day. 90 Tab 3   • levothyroxine (SYNTHROID) 200 MCG Tab Take 1 Tab by mouth Every morning on an empty stomach. 90 Tab 3   • nabumetone (RELAFEN) 750 MG Tab Take 1 Tab by mouth 2 times a day, with meals.  "180 Tab 3   • furosemide (LASIX) 20 MG Tab Take 1 Tab by mouth every day. 90 Tab 3   • pantoprazole (PROTONIX) 40 MG Tablet Delayed Response Take 1 Tab by mouth 2 times a day. 180 Tab 3   • tramadol (ULTRAM) 50 MG Tab Take 1 Tab by mouth every four hours as needed for Moderate Pain for up to 90 days. 180 Tab 2   • albuterol (PROAIR HFA) 108 (90 Base) MCG/ACT Aero Soln inhalation aerosol Inhale 2 Puffs by mouth every 6 hours as needed for Shortness of Breath. 1 Inhaler 6   • ketoconazole (NIZORAL) 2 % Cream APPLY TO AFFECTED AREA ONCE DAILY. 30 g 0   • gabapentin (NEURONTIN) 300 MG Cap TAKE TWO CAPSULES BY MOUTH THREE TIMES DAILY  540 Cap 2   • Diclofenac Sodium 1 % Gel      • carvedilol (COREG) 6.25 MG Tab TAKE ONE TABLET BY MOUTH TWICE DAILY WITH FOOD  180 Tab 1   • venlafaxine ER (EFFEXOR) 225 MG TABLET SR 24 HR tablet Take 1 Tab by mouth every day. 90 Tab 3   • potassium chloride SA (KDUR) 20 MEQ Tab CR TAKE ONE TABLET BY MOUTH TWICE DAILY  180 Tab 2   • spironolactone (ALDACTONE) 50 MG Tab TAKE 1 TABLET BY MOUTH EVERY DAY 90 Tab 2     No current facility-administered medications for this visit.        Allergies   Allergen Reactions   • Lamictal Rash and Swelling     \"hot\", unable to function.  Severe rash, scarring   • Sulfa Drugs Hives, Shortness of Breath and Anxiety     Hard breathing, shortness of breath   • Butalbital-Acetaminophen Unspecified     Dizzy, can't walk, hard to focus   • Cefaclor Unspecified     Ceclor causes light headedness and dizziness   • Diphenhydramine Hives     Full body hives   • Iodine Unspecified     Labored breathing   • Morphine Itching     redness   • Penicillins Itching and Swelling     Skin itching and redness   • Savella [Milnacipran Hcl] Unspecified     Muscle aches, feet swelling   • Tizanidine Hcl Unspecified     dizziness   • Amlactin Rash     Lotion causes itching, redness and burnng   • Savella [Kdc:Milnacipran+Ci Pigment Blue 63]      Swelling, bone and muscle pain, " "sweating, nausea, dizziness, sleeplessness, anxiety   • Ace Inhibitors Cough     Cough     • Tape Unspecified     Paper tape is ok       ROS: As per HPI       Objective:     /70   Pulse 75   Temp 37.1 °C (98.8 °F)   Resp 14   Ht 1.664 m (5' 5.5\")   Wt 117.9 kg (260 lb)   SpO2 91%  Body mass index is 42.61 kg/m².    Physical Exam:  Constitutional: Well-developed and well-nourished. Not diaphoretic. No distress. Lucid and fluent.  Skin: Skin is warm and dry. No rash noted.  Head: Atraumatic without lesions.  Eyes: Conjunctivae and extraocular motions are normal. Pupils are equal, round, and reactive to light. No scleral icterus.   Neck: Supple, trachea midline. No thyromegaly present. No cervical or supraclavicular lymphadenopathy. No JVD or carotid bruits appreciated  Cardiovascular: Regular rate and rhythm.  Normal S1, S2 without murmur appreciated.  Chest: Effort normal. Clear to auscultation throughout. No adventitious sounds.   Abdomen: Soft, non tender, and without distention. Active bowel sounds in all four quadrants. No rebound, guarding, masses or hepatosplenomegaly.  Extremities: No cyanosis, clubbing, erythema, nor edema.  Left knee edema superior to patella, tender.  No heat.  Moderate tender swelling in the posterior knee.  No erythema or bruising.  Neurological: Alert and oriented x 3. Gait is antalgic.  Psychiatric:  Behavior, mood, and affect are appropriate.    POC A1c is 6.1%     Assessment and Plan:     69 y.o. female with the following issues:    1. Morbid obesity with BMI of 40.0-44.9, adult ([facility])     2. Controlled type 2 diabetes mellitus without complication, without long-term current use of insulin ([facility])  POCT  A1C   3. Chronic gout of foot, unspecified cause, unspecified laterality  allopurinol (ZYLOPRIM) 300 MG Tab   4. Hypothyroidism due to acquired atrophy of thyroid  levothyroxine (SYNTHROID) 200 MCG Tab   5. Primary osteoarthritis involving multiple joints  nabumetone " (RELAFEN) 750 MG Tab   6. Peripheral edema  furosemide (LASIX) 20 MG Tab   7. Gastroesophageal reflux disease without esophagitis  pantoprazole (PROTONIX) 40 MG Tablet Delayed Response         Followup: Return in about 3 months (around 6/19/2020), or if symptoms worsen or fail to improve.

## 2020-03-20 DIAGNOSIS — E03.4 HYPOTHYROIDISM DUE TO ACQUIRED ATROPHY OF THYROID: ICD-10-CM

## 2020-03-20 NOTE — PROGRESS NOTES
Patient's insurance is requiring another thyroid test before they consider the bariatric surgery.  Order is placed.  Last TSH in September was suppressed.

## 2020-03-30 ENCOUNTER — TELEPHONE (OUTPATIENT)
Dept: HEALTH INFORMATION MANAGEMENT | Facility: OTHER | Age: 70
End: 2020-03-30

## 2020-03-30 NOTE — TELEPHONE ENCOUNTER
1. Caller Name: Ashly Meyer                        Call Back Number: [phone]  [facility] PCP or Specialty Provider: Yes        2.  Does patient have any active symptoms of respiratory illness (fever OR cough OR shortness of breath OR sore throat)? Yes, the patient reports the following respiratory symptoms: cough, but this is nothing new, on home O2 starting before 2010, not having new symptoms    3.  Does patient have any comoribidities? None but has multiple medical problems to include neuropathy, gout, arthritis fibromyalgia, etc.,     4.  Has the patient traveled in the last 14 days OR had any known contact with someone who is suspected or confirmed to have COVID-19?  No.    5. Disposition: Cleared by RN Triage; OK to keep/schedule appointment    Note routed to [facility] Provider: WALKER only.

## 2020-04-10 ENCOUNTER — OFFICE VISIT (OUTPATIENT)
Dept: PULMONOLOGY | Facility: HOSPICE | Age: 70
End: 2020-04-10
Payer: MEDICARE

## 2020-04-10 VITALS
BODY MASS INDEX: 43.32 KG/M2 | OXYGEN SATURATION: 93 % | RESPIRATION RATE: 16 BRPM | WEIGHT: 260 LBS | DIASTOLIC BLOOD PRESSURE: 78 MMHG | HEART RATE: 77 BPM | HEIGHT: 65 IN | SYSTOLIC BLOOD PRESSURE: 124 MMHG

## 2020-04-10 DIAGNOSIS — G47.34 NOCTURNAL HYPOXIA: ICD-10-CM

## 2020-04-10 DIAGNOSIS — F41.8 ANXIETY ABOUT HEALTH: ICD-10-CM

## 2020-04-10 DIAGNOSIS — J45.30 MILD PERSISTENT ASTHMA WITHOUT COMPLICATION: ICD-10-CM

## 2020-04-10 DIAGNOSIS — J30.2 SEASONAL ALLERGIES: ICD-10-CM

## 2020-04-10 DIAGNOSIS — J31.0 ATROPHIC RHINITIS: ICD-10-CM

## 2020-04-10 DIAGNOSIS — G47.33 OSA (OBSTRUCTIVE SLEEP APNEA): ICD-10-CM

## 2020-04-10 DIAGNOSIS — L30.1 ECZEMA, DYSHIDROTIC: ICD-10-CM

## 2020-04-10 PROCEDURE — 99214 OFFICE O/P EST MOD 30 MIN: CPT | Performed by: PHYSICIAN ASSISTANT

## 2020-04-10 ASSESSMENT — ENCOUNTER SYMPTOMS
SORE THROAT: 0
SINUS PAIN: 0
INSOMNIA: 1
HEARTBURN: 1
WEIGHT LOSS: 0
EYES NEGATIVE: 1
DIZZINESS: 1
ORTHOPNEA: 0
WHEEZING: 0
PALPITATIONS: 0
SHORTNESS OF BREATH: 1
SPUTUM PRODUCTION: 1
CHILLS: 0
TREMORS: 0
COUGH: 0
FEVER: 0
HEADACHES: 0

## 2020-04-10 ASSESSMENT — FIBROSIS 4 INDEX: FIB4 SCORE: 1.02

## 2020-04-10 NOTE — PATIENT INSTRUCTIONS
1-get albuterol from [place]  2-possible early thrush, reviewed oral hygiene, encouraged brushing teeth and tongue  3-update PFT at next visit, consider maintenance inhaler if indication  4-follow up in 6 months

## 2020-04-10 NOTE — PROGRESS NOTES
CC: Balance issues    HPI:  Ashly Meyer is a 69 y.o. year old female here today for follow-up on mild intermittent asthma. Last seen in clinic 1/25/2019.  At that time she provided a printout with a long list of multiple allergies, medications, hospitalizations/surgeries.  She is a former smoker with reported 43-pack-year history and quit date in 2007.  Continued abstinence advised.    Pertinent past medical history KERRI  (day time fatigue, morning headache, snoring some improvement on O2), restless leg syndrome, hiatus hernia syndrome, excessive sweating, anxiety, nocturnal hypoxia, fibromyalgia, peripheral neuropathy, atrophic rhinitis, GERD, polymyalgia rheumatica.    Reviewed in clinic vitals including blood pressure 124/78, heart rate of 77, O2 sat of 93% on room air. Patient's body mass index is 43.27 kg/m². Exercise and nutrition counseling were performed at this visit. Discussed impact of central adiposity on pulmonary function.  Patient has been working on weight loss.      Patient does wear O2 at night at 3.5 L for her nocturnal hypoxia, she was unable to tolerate CPAP due to her anxiety.  Patient requires O2 at 3 L with significant exertion as well.    Reviewed home medication regimen including furosemide, pantoprazole, albuterol which she requires approximately once every 2 months.  She is not on a maintenance inhaler and could potentially benefit from that, will reassess pulmonary function testing and discuss further at next appointment sooner if experiencing increase in symptoms.  Patient reported at last visit using her albuterol inhaler up to 10 times a month so baseline symptoms have improved.    Reviewed most recent imaging including chest x-ray obtained 7/26/18 demonstrating slight hypoinflation, mild bibasilar atelectasis.  Last pulmonary function testing obtained 7/26/2018, demonstrated FEV1 of 1.91 L or 79% predicted, FVC of 2.22 L or 69% predicted, FEV1/FVC ratio of 86, residual volume  99% predicted, total lung capacity 88% predicted, DLCO 101% predicted, normal airway resistance.  Per pulmonologist interpretation mild improvement in FEV1 after inhaled bronchodilator indicating possibility of obstructive lung disease with reversible component, reduced FEV1 and FVC may be secondary to elevated BMI.  Had nuclear medicine cardiac stress test obtained 12/31/2018 demonstrating normal left ventricular wall motion, LVEF of 68%, no evidence of significant jeopardized viable myocardium or prior MI.    Review of Systems   Constitutional: Positive for malaise/fatigue. Negative for chills, fever and weight loss.   HENT: Positive for congestion (left, baseline), nosebleeds (mild right daily) and tinnitus. Negative for hearing loss, sinus pain and sore throat.    Eyes: Negative.         Prescription eyeglasses   Respiratory: Positive for sputum production (yellow thick ) and shortness of breath (occasional with exertion). Negative for cough and wheezing.    Cardiovascular: Positive for leg swelling. Negative for chest pain, palpitations and orthopnea.   Gastrointestinal: Positive for heartburn (maintenance BID medication, mostly controlled ).        Air trapping esophagus causing belching     Skin: Negative.    Neurological: Positive for dizziness (off balance, some falls ). Negative for tremors and headaches.   Psychiatric/Behavioral: The patient has insomnia (staying asleep, sleepy during day).        Past Medical History:   Diagnosis Date   • Anginal syndrome (HCC)    • Anxiety    • Arthritis     Everywhere.    • Back pain    • Carotid artery stenosis, unilateral     moderate left carotid artery stenosis   • Constipation    • Controlled type 2 diabetes mellitus without complication (HCC)     states borderline   • Controlled type 2 diabetes mellitus without complication, without long-term current use of insulin (HCC) 6/12/2019   • Daytime sleepiness    • Degenerative disc disease    • Depression 5/20/2009   •  Diarrhea     and constipation   • Dizziness    • Dry eye syndrome, bilateral    • Elevated sedimentation rate     history of negative temporal artery biopsy around 2006   • Fatigue    • Fibromyalgia     confirmed by Dr. Ann   • Frequent headaches    • GERD (gastroesophageal reflux disease)     hiatal hernia   • GOUT 5/20/2009   • H/O foot surgery    • Hearing difficulty    • Heart burn    • Hemorrhagic disorder ([facility]) 2016    nose bleeds   • Hiatus hernia syndrome    • History of anemia     none currently   • Hypertension    • Hypothyroidism 5/20/2009   • Incipient cataract of both eyes    • Insomnia    • Migraine without aura, without mention of intractable migraine without mention of status migrainosus    • Mild intermittent asthma without complication     inhalers as needed   • Mixed dyslipidemia    • Morbid obesity with BMI of 45.0-49.9, adult ([facility])    • Morning headache    • Mumps    • Nasal drainage    • Nocturnal hypoxia     severe, to 69% O2 sat   • Obesity    • Obesity hypoventilation syndrome ([facility]) 5/31/2017   • Pelvic floor dysfunction    • Peripheral neuropathy    • Pleomorphic adenoma    • Polymyalgia rheumatica ([facility])     Dr. Ann   • Restless leg syndrome    • Ringing in ears    • Rotator cuff syndrome of right shoulder and allied disorders 10/10/2018   • S/P tonsillectomy    • Seasonal allergies    • Skin cancer 1990's    skin   • Sleep apnea syndrome     she is not using CPAP, claustrophobia, uses 2-3l at night   • Snoring    • Sore muscles    • Sweat, sweating, excessive    • Swelling of lower extremity    • Urinary incontinence     will not wear a pad   • Vision loss    • Weakness        Past Surgical History:   Procedure Laterality Date   • SHOULDER DECOMPRESSION ARTHROSCOPIC Right 2/1/2019    Procedure: SHOULDER DECOMPRESSION ARTHROSCOPIC - SUBACROMIAL, LABRAL DEBRIDEMENT;  Surgeon: Damaris Knutson M.D.;  Location: SURGERY [facility];  Service: Orthopedics   • SHOULDER ARTHROSCOPY W/  BICIPITAL TENODESIS REPAIR Right 2/1/2019    Procedure: SHOULDER ARTHROSCOPY W/ BICIPITAL TENOTOMY;  Surgeon: Damaris Knutson M.D.;  Location: SURGERY [facility];  Service: Orthopedics   • CARPAL TUNNEL RELEASE Right 2/1/2019    Procedure: CARPAL TUNNEL RELEASE;  Surgeon: Damaris Knutson M.D.;  Location: SURGERY [facility];  Service: Orthopedics   • GASTROSCOPY  2/6/2017    Procedure: GASTROSCOPY;  Surgeon: Pau Foster M.D.;  Location: SURGERY SAME DAY [facility];  Service:    • COLONOSCOPY  2/6/2017    Procedure: COLONOSCOPY;  Surgeon: Pau Foster M.D.;  Location: SURGERY SAME DAY [facility];  Service:    • SEPTAL RECONSTRUCTION  10/3/2016    Procedure: SEPTAL RECONSTRUCTION with  grafts ;  Surgeon: PIETER Dickson M.D.;  Location: SURGERY SAME DAY [facility];  Service:    • SEPTOPLASTY N/A 8/1/2016    Procedure: SEPTOPLASTY;  Surgeon: PIETER Dickson M.D.;  Location: SURGERY SAME DAY [facility];  Service:    • TURBINOPLASTY Bilateral 8/1/2016    Procedure: TURBINOPLASTY;  Surgeon: PIETER Dickson M.D.;  Location: SURGERY SAME DAY [facility];  Service:    • NASAL FRACTURE REDUCTION OPEN N/A 8/1/2016    Procedure: SEPTAL FRACTURE REDUCTION OPEN;  Surgeon: PIETER Dickson M.D.;  Location: SURGERY SAME DAY [facility];  Service:    • FINE NEEDLE ASPIRATION  11/16/2009    Performed by DA CALLOWAY at ENDOSCOPY [facility]   • COLONOSCOPY  2006    ? unclear date   • BLADDER SUSPENSION  1983   • HYSTERECTOMY, VAGINAL  1983    ovaries are present, excessive bleeding   • TONSILLECTOMY  1953   • HYSTERECTOMY LAPAROSCOPY     • OTHER ABDOMINAL SURGERY      Cholysestectomy   • OTHER ORTHOPEDIC SURGERY  1962/1980    foot surgery    • TONSILLECTOMY         Family History   Problem Relation Age of Onset   • Heart Disease Mother         Arotic vaulve replacement   • GI Disease Mother         bile duct problem   • Bladder cancer Mother    • GI Disease Sister          celiac   • Arthritis Sister    • Other Sister         gout and lupus   • Arthritis Sister    • Kidney Disease Sister    • GI Disease Sister    • Bladder cancer Maternal Aunt    • Arthritis Maternal Grandmother    • Hypertension Maternal Grandmother    • Heart Disease Maternal Grandmother    • Hypertension Maternal Grandfather    • Heart Disease Maternal Grandfather    • Arthritis Maternal Grandfather    • No Known Problems Paternal Grandmother    • No Known Problems Paternal Grandfather    • Cancer Brother         Larynx   • Cancer Daughter         non hopskins limphoma   • GI Disease Daughter    • Bipolar disorder Daughter    • Seizures Daughter    • Bipolar disorder Daughter        Social History     Socioeconomic History   • Marital status:      Spouse name: Not on file   • Number of children: Not on file   • Years of education: Not on file   • Highest education level: Not on file   Occupational History   • Not on file   Social Needs   • Financial resource strain: Not on file   • Food insecurity     Worry: Not on file     Inability: Not on file   • Transportation needs     Medical: Not on file     Non-medical: Not on file   Tobacco Use   • Smoking status: Former Smoker     Packs/day: 1.00     Years: 43.00     Pack years: 43.00     Types: Cigarettes     Start date:      Last attempt to quit: 3/1/2007     Years since quittin.1   • Smokeless tobacco: Never Used   • Tobacco comment: Started smoking at age 15, continued abstinance    Substance and Sexual Activity   • Alcohol use: No     Comment: no longer drinks   • Drug use: No     Comment: CBD topical pain cream   • Sexual activity: Not Currently     Partners: Male   Lifestyle   • Physical activity     Days per week: Not on file     Minutes per session: Not on file   • Stress: Not on file   Relationships   • Social connections     Talks on phone: Not on file     Gets together: Not on file     Attends Sabianist service: Not on file     Active  "member of club or organization: Not on file     Attends meetings of clubs or organizations: Not on file     Relationship status: Not on file   • Intimate partner violence     Fear of current or ex partner: Not on file     Emotionally abused: Not on file     Physically abused: Not on file     Forced sexual activity: Not on file   Other Topics Concern   • Not on file   Social History Narrative   • Not on file       Allergies as of 04/10/2020 - Reviewed 04/10/2020   Allergen Reaction Noted   • Lamictal Rash and Swelling 12/27/2011   • Sulfa drugs Hives, Shortness of Breath, and Anxiety 08/03/2007   • Butalbital-acetaminophen Unspecified 07/27/2016   • Cefaclor Unspecified 08/03/2007   • Diphenhydramine Hives 08/03/2007   • Iodine Unspecified 05/20/2009   • Morphine Itching 03/11/2011   • Penicillins Itching and Swelling 08/03/2007   • Savella [milnacipran hcl] Unspecified 05/24/2013   • Tizanidine hcl Unspecified 03/11/2014   • Amlactin Rash 01/30/2019   • Savella [kdc:milnacipran+ci pigment blue 63]  01/30/2019   • Ace inhibitors Cough 05/20/2009   • Tape Unspecified 07/27/2016        @Vital signs for this encounter:  Vitals:    04/10/20 0825   Height: 1.651 m (5' 5\")   Weight: 117.9 kg (260 lb)   Weight % change since last entry.: 0 %   BP: 124/78   Pulse: 77   BMI (Calculated): 43.27   Resp: 16       Current medications as of today   Current Outpatient Medications   Medication Sig Dispense Refill   • allopurinol (ZYLOPRIM) 300 MG Tab Take 1 Tab by mouth every day. 90 Tab 3   • levothyroxine (SYNTHROID) 200 MCG Tab Take 1 Tab by mouth Every morning on an empty stomach. 90 Tab 3   • nabumetone (RELAFEN) 750 MG Tab Take 1 Tab by mouth 2 times a day, with meals. 180 Tab 3   • furosemide (LASIX) 20 MG Tab Take 1 Tab by mouth every day. 90 Tab 3   • pantoprazole (PROTONIX) 40 MG Tablet Delayed Response Take 1 Tab by mouth 2 times a day. 180 Tab 3   • tramadol (ULTRAM) 50 MG Tab Take 1 Tab by mouth every four hours as " needed for Moderate Pain for up to 90 days. 180 Tab 2   • albuterol (PROAIR HFA) 108 (90 Base) MCG/ACT Aero Soln inhalation aerosol Inhale 2 Puffs by mouth every 6 hours as needed for Shortness of Breath. 1 Inhaler 6   • ketoconazole (NIZORAL) 2 % Cream APPLY TO AFFECTED AREA ONCE DAILY. 30 g 0   • gabapentin (NEURONTIN) 300 MG Cap TAKE TWO CAPSULES BY MOUTH THREE TIMES DAILY  540 Cap 2   • Diclofenac Sodium 1 % Gel      • carvedilol (COREG) 6.25 MG Tab TAKE ONE TABLET BY MOUTH TWICE DAILY WITH FOOD  180 Tab 1   • venlafaxine ER (EFFEXOR) 225 MG TABLET SR 24 HR tablet Take 1 Tab by mouth every day. 90 Tab 3   • potassium chloride SA (KDUR) 20 MEQ Tab CR TAKE ONE TABLET BY MOUTH TWICE DAILY  180 Tab 2   • spironolactone (ALDACTONE) 50 MG Tab TAKE 1 TABLET BY MOUTH EVERY DAY 90 Tab 2     No current facility-administered medications for this visit.          Physical Exam:   Gen:           Alert and oriented, No apparent distress. Mood and affect     appropriate, normal interaction with provider.  Eyes:          sclere white, conjunctive moist.  Hearing:     Grossly intact.  Dentition:    Fair dentition.  Oropharynx:   Tongue normal, posterior pharynx without erythema or exudate.  Neck:        Supple, trachea midline, no masses.  Respiratory Effort: No intercostal retractions or use of accessory muscles.   Lung Auscultation:      Decreased; no rales, rhonchi or wheezing.  CV:            Regular rate and rhythm.  Trace lower extremity edema. No murmurs, rubs or gallops.  Digits, Nails, Ext: No clubbing, cyanosis, petechiae, or nodes.   Skin:        Dry skin, eczema noted thumbs and palmar surface, otherwise no rashes, lesions or ulcers noted on back or exposed skin surfaces.                     Assessment:  1. Mild persistent asthma without complication  PULMONARY FUNCTION TESTS -Test requested: Complete Pulmonary Function Test   2. Eczema, dyshidrotic   patient reports improved, chronic   3. Nocturnal hypoxia   uses 3.5  L at night and as needed day time   4. Seasonal allergies   reports using over-the-counter medicine when needed, allergic to diphenhydramine   5. Atrophic rhinitis     not using nasal spray                                                                               6. KERRI (obstructive sleep apnea)   did not tolerate CPAP, daytime fatigue, wears oxygen at night   7. Anxiety about health   support given   8. BMI 40.0-44.9, adult ([facility])  OBESITY COUNSELING (No Charge): Patient identified as having weight management issue.  Appropriate orders and counseling given.   9. GERD      on twice daily meds, occasional breakthrough    Immunizations:    Flu: 9/11/2019  Pneumovax 23: 8/22/2019  Prevnar 13: 1/5/2017    Plan:     Patient has a complicated medical history including mild persistent asthma, seasonal allergies, KERRI, anxiety, nocturnal hypoxia, fibromyalgia,  atrophic rhinitis, GERD.  She is also followed at [place].  Her allergies include diphenhydramine.  Improved/decreased rescue inhaler use.    1-gets albuterol from [facility]  2-possible early thrush, reports dry mouth,  3-reviewed oral hygiene, encouraged brushing teeth and tongue  4-update PFT at next visit, consider maintenance inhaler if indication  5-follow up in 6 months     This dictation was created using voice recognition software. The accuracy of the dictation is limited to the abilities of the software. I expect there may be some errors of grammar and possibly content.

## 2020-05-04 DIAGNOSIS — I10 ESSENTIAL HYPERTENSION: ICD-10-CM

## 2020-05-04 RX ORDER — SPIRONOLACTONE 50 MG/1
50 TABLET, FILM COATED ORAL
Qty: 90 TAB | Refills: 2 | Status: SHIPPED
Start: 2020-05-04 | End: 2020-11-02

## 2020-05-17 DIAGNOSIS — I10 ESSENTIAL HYPERTENSION: ICD-10-CM

## 2020-05-18 RX ORDER — CARVEDILOL 6.25 MG/1
TABLET ORAL
Qty: 180 TAB | Refills: 0 | Status: SHIPPED | OUTPATIENT
Start: 2020-05-18 | End: 2020-05-21

## 2020-05-21 DIAGNOSIS — I10 ESSENTIAL HYPERTENSION: ICD-10-CM

## 2020-05-21 RX ORDER — CARVEDILOL 6.25 MG/1
TABLET ORAL
Qty: 180 TAB | Refills: 0 | Status: SHIPPED
Start: 2020-05-21 | End: 2020-11-02

## 2020-05-21 RX ORDER — POTASSIUM CHLORIDE 20 MEQ/1
TABLET, EXTENDED RELEASE ORAL
Qty: 180 TAB | Refills: 1 | Status: SHIPPED
Start: 2020-05-21 | End: 2020-11-02

## 2020-05-29 DIAGNOSIS — B37.2 CANDIDAL INTERTRIGO: ICD-10-CM

## 2020-05-29 RX ORDER — KETOCONAZOLE 20 MG/G
CREAM TOPICAL
Qty: 30 G | Refills: 0 | Status: SHIPPED
Start: 2020-05-29 | End: 2020-11-02

## 2020-06-19 ENCOUNTER — HOSPITAL ENCOUNTER (OUTPATIENT)
Dept: LAB | Facility: MEDICAL CENTER | Age: 70
End: 2020-06-19
Attending: FAMILY MEDICINE
Payer: MEDICARE

## 2020-06-19 ENCOUNTER — OFFICE VISIT (OUTPATIENT)
Dept: MEDICAL GROUP | Facility: MEDICAL CENTER | Age: 70
End: 2020-06-19
Payer: MEDICARE

## 2020-06-19 VITALS
HEART RATE: 82 BPM | WEIGHT: 255 LBS | SYSTOLIC BLOOD PRESSURE: 120 MMHG | HEIGHT: 65 IN | DIASTOLIC BLOOD PRESSURE: 80 MMHG | RESPIRATION RATE: 12 BRPM | TEMPERATURE: 98.6 F | OXYGEN SATURATION: 92 % | BODY MASS INDEX: 42.49 KG/M2

## 2020-06-19 DIAGNOSIS — E03.4 HYPOTHYROIDISM DUE TO ACQUIRED ATROPHY OF THYROID: ICD-10-CM

## 2020-06-19 DIAGNOSIS — M17.12 PRIMARY OSTEOARTHRITIS OF LEFT KNEE: ICD-10-CM

## 2020-06-19 DIAGNOSIS — E66.01 MORBID OBESITY WITH BMI OF 40.0-44.9, ADULT (HCC): ICD-10-CM

## 2020-06-19 DIAGNOSIS — I10 ESSENTIAL HYPERTENSION: ICD-10-CM

## 2020-06-19 DIAGNOSIS — E11.9 CONTROLLED TYPE 2 DIABETES MELLITUS WITHOUT COMPLICATION, WITHOUT LONG-TERM CURRENT USE OF INSULIN (HCC): ICD-10-CM

## 2020-06-19 DIAGNOSIS — E78.5 DYSLIPIDEMIA, GOAL LDL BELOW 130: ICD-10-CM

## 2020-06-19 PROCEDURE — 36415 COLL VENOUS BLD VENIPUNCTURE: CPT

## 2020-06-19 PROCEDURE — 84439 ASSAY OF FREE THYROXINE: CPT

## 2020-06-19 PROCEDURE — 99213 OFFICE O/P EST LOW 20 MIN: CPT | Performed by: FAMILY MEDICINE

## 2020-06-19 PROCEDURE — 84443 ASSAY THYROID STIM HORMONE: CPT

## 2020-06-19 ASSESSMENT — FIBROSIS 4 INDEX: FIB4 SCORE: 1.030776406404415137

## 2020-06-19 NOTE — PROGRESS NOTES
Chief Complaint   Patient presents with   • Obesity   • Hypertension   • Knee Pain       Subjective:     HPI:   Ashly Meyer presents today with the followin. Morbid obesity with BMI of 40.0-44.9, adult ([facility])  She has managed to lose 5 more pounds.  She is working at this very hard.  Has lost 10% of her initial weight which was 284#  She is at 255# today.  She has made good progress.    2. Essential hypertension  HTN - Chronic condition stable. Currently taking all meds as directed.   She is not taking baby aspirin daily.   She is monitoring BP at home. This is occasional, has been 120s/70s.    Denies symptoms low BP: light-headed, tunnel-vision, unusual fatigue.   Denies symptoms high BP:pounding headache, visual changes, palpitations, flushed face.   Denies medicine side effects: unusual fatigue, slow heartbeat, foot/leg swelling, cough.    3. Controlled type 2 diabetes mellitus without complication, without long-term current use of insulin ([facility])  Her last Hba1c was 6.1% in March, .      4. Dyslipidemia, goal LDL below 130  Patient denies chest pain, chest pressure, palpitations or exertional shortness of breath. Patient is not on lipid lowering medication.  Her lipids have been very good the last few years. Patient is a former smoker. Quit over 40 years ago.  Patient takes no aspirin daily. Patient has no history of myocardial infarction, stroke or PVD.     5. Primary osteoarthritis of left knee  Left knee pain is worse, limits her walking.  She is anxious to lose weight to get this pressure off the knee and the rest of her joints.    Will get her thyroid test completed today.        Patient Active Problem List    Diagnosis Date Noted   • Controlled type 2 diabetes mellitus without complication, without long-term current use of insulin ([facility]) 2019   • Chronic gout of foot 2019   • Dry eye syndrome, bilateral    • Rotator cuff syndrome of right shoulder and allied disorders  10/10/2018   • Former smoker 05/30/2018   • Recurrent major depressive disorder, in partial remission ([facility]) 03/29/2018   • Vitamin D deficiency 12/28/2017   • Morbid obesity with BMI of 40.0-44.9, adult ([facility])    • Neural foraminal stenosis of cervical spine 05/18/2017   • Tinnitus of both ears 05/18/2017   • Dyslipidemia, goal LDL below 130 01/06/2017   • Nonalcoholic fatty liver disease 01/05/2017   • Disease of accessory sinus 10/03/2016   • Atrophic rhinitis 10/03/2016   • Deviated nasal septum 08/01/2016   • Menopausal symptoms 04/28/2016   • Lumbar facet arthropathy 12/09/2015   • Chronic pain 11/18/2015   • Candidal intertrigo 06/01/2015   • Elevated sed rate 12/09/2014   • Mild intermittent asthma without complication    • H/O fibromyalgia 03/11/2014   • KERRI (obstructive sleep apnea) 03/11/2014   • Acute idiopathic gout of right foot 03/11/2014   • Menopause 03/11/2014   • Peripheral neuropathy    • Rectocele 11/25/2013   • Pelvic floor dysfunction    • Chronic constipation 10/02/2013   • Nocturnal hypoxia    • Cervical stenosis of spinal canal 11/01/2011   • Lumbar radiculopathy 11/01/2011   • Migraine without aura    • Carpal tunnel syndrome 09/05/2011   • Ulnar neuropathy 09/05/2011   • Seasonal allergies 07/08/2011   • Polymyalgia rheumatica (HCC) 05/06/2011   • GERD (gastroesophageal reflux disease) 12/10/2010   • Osteoarthritis of multiple joints 04/13/2010   • Eczema, dyshidrotic 08/03/2009   • Essential hypertension 05/20/2009   • Hypothyroidism due to acquired atrophy of thyroid 05/20/2009       Current medicines (including changes today)  Current Outpatient Medications   Medication Sig Dispense Refill   • ketoconazole (NIZORAL) 2 % Cream APPLY TO AFFECTED AREA(S) ONCE DAILY. 30 g 0   • carvedilol (COREG) 6.25 MG Tab TAKE ONE TABLET BY MOUTH TWICE DAILY WITH FOOD  180 Tab 0   • potassium chloride SA (KDUR) 20 MEQ Tab CR TAKE ONE TABLET BY MOUTH TWO TIMES A DAY. 180 Tab 1   • spironolactone  "(ALDACTONE) 50 MG Tab Take 1 Tab by mouth every day. 90 Tab 2   • allopurinol (ZYLOPRIM) 300 MG Tab Take 1 Tab by mouth every day. 90 Tab 3   • levothyroxine (SYNTHROID) 200 MCG Tab Take 1 Tab by mouth Every morning on an empty stomach. 90 Tab 3   • nabumetone (RELAFEN) 750 MG Tab Take 1 Tab by mouth 2 times a day, with meals. 180 Tab 3   • furosemide (LASIX) 20 MG Tab Take 1 Tab by mouth every day. 90 Tab 3   • pantoprazole (PROTONIX) 40 MG Tablet Delayed Response Take 1 Tab by mouth 2 times a day. 180 Tab 3   • albuterol (PROAIR HFA) 108 (90 Base) MCG/ACT Aero Soln inhalation aerosol Inhale 2 Puffs by mouth every 6 hours as needed for Shortness of Breath. 1 Inhaler 6   • gabapentin (NEURONTIN) 300 MG Cap TAKE TWO CAPSULES BY MOUTH THREE TIMES DAILY  540 Cap 2   • Diclofenac Sodium 1 % Gel      • venlafaxine ER (EFFEXOR) 225 MG TABLET SR 24 HR tablet Take 1 Tab by mouth every day. 90 Tab 3     No current facility-administered medications for this visit.        Allergies   Allergen Reactions   • Lamictal Rash and Swelling     \"hot\", unable to function.  Severe rash, scarring   • Sulfa Drugs Hives, Shortness of Breath and Anxiety     Hard breathing, shortness of breath   • Butalbital-Acetaminophen Unspecified     Dizzy, can't walk, hard to focus   • Cefaclor Unspecified     Ceclor causes light headedness and dizziness   • Diphenhydramine Hives     Full body hives   • Iodine Unspecified     Labored breathing   • Morphine Itching     redness   • Penicillins Itching and Swelling     Skin itching and redness   • Savella [Milnacipran Hcl] Unspecified     Muscle aches, feet swelling   • Tizanidine Hcl Unspecified     dizziness   • Amlactin Rash     Lotion causes itching, redness and burnng   • Savella [Kdc:Milnacipran+Ci Pigment Blue 63]      Swelling, bone and muscle pain, sweating, nausea, dizziness, sleeplessness, anxiety   • Ace Inhibitors Cough     Cough     • Tape Unspecified     Paper tape is ok       ROS: As per " "HPI       Objective:     /80   Pulse 82   Temp 37 °C (98.6 °F)   Resp 12   Ht 1.651 m (5' 5\")   Wt 115.7 kg (255 lb)   SpO2 92%  Body mass index is 42.43 kg/m².    Physical Exam:  Constitutional: Well-developed and well-nourished. Not diaphoretic. No distress. Lucid and fluent.  Patient, physician and staff all wearing masks.  Skin: Skin is warm and dry. No rash noted. Left scalp  to touch.  Wart right upper shoulder, frozen today.   Head: Atraumatic without lesions.  Eyes: Conjunctivae and extraocular motions are normal. Pupils are equal, round, and reactive to light. No scleral icterus.   Neck: Supple, trachea midline. No thyromegaly present. No cervical or supraclavicular lymphadenopathy. No JVD or carotid bruits appreciated  Cardiovascular: Regular rate and rhythm.  Normal S1, S2 without murmur appreciated.  Chest: Effort normal. Clear to auscultation throughout. No adventitious sounds.   Extremities: No cyanosis, clubbing, erythema, nor edema.   Neurological: Alert and oriented x 3.  Psychiatric:  Behavior, mood, and affect are appropriate.       Assessment and Plan:     70 y.o. female with the following issues:    1. Morbid obesity with BMI of 40.0-44.9, adult ([facility])     2. Essential hypertension     3. Controlled type 2 diabetes mellitus without complication, without long-term current use of insulin ([facility])     4. Dyslipidemia, goal LDL below 130     5. Primary osteoarthritis of left knee           Followup: Return in about 2 months (around 8/19/2020), or if symptoms worsen or fail to improve.  "

## 2020-06-20 LAB
T4 FREE SERPL-MCNC: 2.12 NG/DL (ref 0.93–1.7)
TSH SERPL DL<=0.005 MIU/L-ACNC: 0.01 UIU/ML (ref 0.38–5.33)

## 2020-07-17 ENCOUNTER — OFFICE VISIT (OUTPATIENT)
Dept: BEHAVIORAL HEALTH | Facility: CLINIC | Age: 70
End: 2020-07-17
Payer: MEDICARE

## 2020-07-17 DIAGNOSIS — E66.01 MORBID OBESITY WITH BMI OF 40.0-44.9, ADULT (HCC): ICD-10-CM

## 2020-07-17 DIAGNOSIS — G89.4 CHRONIC PAIN SYNDROME: ICD-10-CM

## 2020-07-17 DIAGNOSIS — Z71.89 PRE-BARIATRIC SURGERY PSYCHOLOGICAL EVALUATION: ICD-10-CM

## 2020-07-17 DIAGNOSIS — F33.41 RECURRENT MAJOR DEPRESSIVE DISORDER, IN PARTIAL REMISSION (HCC): ICD-10-CM

## 2020-07-17 PROCEDURE — 96131 PSYCL TST EVAL PHYS/QHP EA: CPT | Performed by: PSYCHOLOGIST

## 2020-07-17 PROCEDURE — 96136 PSYCL/NRPSYC TST PHY/QHP 1ST: CPT | Performed by: PSYCHOLOGIST

## 2020-07-17 PROCEDURE — 96137 PSYCL/NRPSYC TST PHY/QHP EA: CPT | Performed by: PSYCHOLOGIST

## 2020-07-17 PROCEDURE — 96130 PSYCL TST EVAL PHYS/QHP 1ST: CPT | Performed by: PSYCHOLOGIST

## 2020-07-31 PROBLEM — Z71.89 PRE-BARIATRIC SURGERY PSYCHOLOGICAL EVALUATION: Status: ACTIVE | Noted: 2020-07-31
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
## 2020-08-19 ENCOUNTER — DOCUMENTATION (OUTPATIENT)
Dept: MEDICAL GROUP | Facility: MEDICAL CENTER | Age: 70
End: 2020-08-19

## 2020-08-19 ENCOUNTER — OFFICE VISIT (OUTPATIENT)
Dept: MEDICAL GROUP | Facility: MEDICAL CENTER | Age: 70
End: 2020-08-19
Payer: MEDICARE

## 2020-08-19 VITALS
WEIGHT: 259 LBS | DIASTOLIC BLOOD PRESSURE: 70 MMHG | HEART RATE: 72 BPM | OXYGEN SATURATION: 94 % | SYSTOLIC BLOOD PRESSURE: 126 MMHG | TEMPERATURE: 97.5 F | BODY MASS INDEX: 40.65 KG/M2 | HEIGHT: 67 IN | RESPIRATION RATE: 14 BRPM

## 2020-08-19 DIAGNOSIS — M48.02 CERVICAL STENOSIS OF SPINAL CANAL: ICD-10-CM

## 2020-08-19 DIAGNOSIS — E66.01 MORBID OBESITY WITH BMI OF 40.0-44.9, ADULT (HCC): ICD-10-CM

## 2020-08-19 DIAGNOSIS — Z12.31 ENCOUNTER FOR SCREENING MAMMOGRAM FOR BREAST CANCER: ICD-10-CM

## 2020-08-19 DIAGNOSIS — M54.16 LUMBAR RADICULOPATHY: ICD-10-CM

## 2020-08-19 PROCEDURE — 99213 OFFICE O/P EST LOW 20 MIN: CPT | Performed by: FAMILY MEDICINE

## 2020-08-19 RX ORDER — GABAPENTIN 300 MG/1
600 CAPSULE ORAL 3 TIMES DAILY
Qty: 540 CAP | Refills: 2 | Status: SHIPPED
Start: 2020-08-19 | End: 2021-06-03

## 2020-08-19 ASSESSMENT — FIBROSIS 4 INDEX: FIB4 SCORE: 1.030776406404415137

## 2020-08-19 NOTE — PROGRESS NOTES
Chief Complaint   Patient presents with   • Obesity   • Back Pain   • Neck Pain       Subjective:     HPI:   Ashly Meyer presents today with the followin. Morbid obesity with BMI of 40.0-44.9, adult ([facility])  She continues to try hard.  She has actually gained 4 pounds from last time.  She had already lost her weight to goal and is anxious to proceed with surgery as the pain makes it very difficult for her to walk around.  She feels a lot of her joint pain is weight related.  She did have her psychological evaluation.      2. Lumbar radiculopathy  The gabapentin continues to be helpful.  Denies any somnolence.  This is renewed.  - gabapentin (NEURONTIN) 300 MG Cap; Take 2 Caps by mouth 3 times a day.  Dispense: 540 Cap; Refill: 2    3. Cervical stenosis of spinal canal  She has cervical stenosis and has had radicular pain at times.  This is a little worse with the weather changes.  - gabapentin (NEURONTIN) 300 MG Cap; Take 2 Caps by mouth 3 times a day.  Dispense: 540 Cap; Refill: 2    4. Encounter for screening mammogram for breast cancer  Order discussed and placed.  - MA-SCREENING MAMMO BILAT W/TOMOSYNTHESIS W/CAD; Future    Having cataract surgery next week on the right eye.  Recent root canal.      Patient Active Problem List    Diagnosis Date Noted   • Pre-bariatric surgery psychological evaluation 2020   • Controlled type 2 diabetes mellitus without complication, without long-term current use of insulin ([facility]) 2019   • Chronic gout of foot 2019   • Dry eye syndrome, bilateral    • Rotator cuff syndrome of right shoulder and allied disorders 10/10/2018   • Former smoker 2018   • Recurrent major depressive disorder, in partial remission ([facility]) 2018   • Vitamin D deficiency 2017   • Morbid obesity with BMI of 40.0-44.9, adult ([facility])    • Neural foraminal stenosis of cervical spine 2017   • Tinnitus of both ears 2017   • Dyslipidemia, goal LDL below 130  01/06/2017   • Nonalcoholic fatty liver disease 01/05/2017   • Disease of accessory sinus 10/03/2016   • Atrophic rhinitis 10/03/2016   • Deviated nasal septum 08/01/2016   • Menopausal symptoms 04/28/2016   • Lumbar facet arthropathy 12/09/2015   • Chronic pain 11/18/2015   • Candidal intertrigo 06/01/2015   • Elevated sed rate 12/09/2014   • Mild intermittent asthma without complication    • H/O fibromyalgia 03/11/2014   • KERRI (obstructive sleep apnea) 03/11/2014   • Acute idiopathic gout of right foot 03/11/2014   • Menopause 03/11/2014   • Peripheral neuropathy    • Rectocele 11/25/2013   • Pelvic floor dysfunction    • Chronic constipation 10/02/2013   • Nocturnal hypoxia    • Cervical stenosis of spinal canal 11/01/2011   • Lumbar radiculopathy 11/01/2011   • Migraine without aura    • Carpal tunnel syndrome 09/05/2011   • Ulnar neuropathy 09/05/2011   • Seasonal allergies 07/08/2011   • Polymyalgia rheumatica (HCC) 05/06/2011   • GERD (gastroesophageal reflux disease) 12/10/2010   • Osteoarthritis of multiple joints 04/13/2010   • Eczema, dyshidrotic 08/03/2009   • Essential hypertension 05/20/2009   • Hypothyroidism due to acquired atrophy of thyroid 05/20/2009       Current medicines (including changes today)  Current Outpatient Medications   Medication Sig Dispense Refill   • gabapentin (NEURONTIN) 300 MG Cap Take 2 Caps by mouth 3 times a day. 540 Cap 2   • ketoconazole (NIZORAL) 2 % Cream APPLY TO AFFECTED AREA(S) ONCE DAILY. 30 g 0   • carvedilol (COREG) 6.25 MG Tab TAKE ONE TABLET BY MOUTH TWICE DAILY WITH FOOD  180 Tab 0   • potassium chloride SA (KDUR) 20 MEQ Tab CR TAKE ONE TABLET BY MOUTH TWO TIMES A DAY. 180 Tab 1   • spironolactone (ALDACTONE) 50 MG Tab Take 1 Tab by mouth every day. 90 Tab 2   • allopurinol (ZYLOPRIM) 300 MG Tab Take 1 Tab by mouth every day. 90 Tab 3   • levothyroxine (SYNTHROID) 200 MCG Tab Take 1 Tab by mouth Every morning on an empty stomach. 90 Tab 3   • nabumetone  "(RELAFEN) 750 MG Tab Take 1 Tab by mouth 2 times a day, with meals. 180 Tab 3   • furosemide (LASIX) 20 MG Tab Take 1 Tab by mouth every day. 90 Tab 3   • pantoprazole (PROTONIX) 40 MG Tablet Delayed Response Take 1 Tab by mouth 2 times a day. 180 Tab 3   • albuterol (PROAIR HFA) 108 (90 Base) MCG/ACT Aero Soln inhalation aerosol Inhale 2 Puffs by mouth every 6 hours as needed for Shortness of Breath. 1 Inhaler 6   • Diclofenac Sodium 1 % Gel      • venlafaxine ER (EFFEXOR) 225 MG TABLET SR 24 HR tablet Take 1 Tab by mouth every day. 90 Tab 3     No current facility-administered medications for this visit.        Allergies   Allergen Reactions   • Lamictal Rash and Swelling     \"hot\", unable to function.  Severe rash, scarring   • Sulfa Drugs Hives, Shortness of Breath and Anxiety     Hard breathing, shortness of breath   • Butalbital-Acetaminophen Unspecified     Dizzy, can't walk, hard to focus   • Cefaclor Unspecified     Ceclor causes light headedness and dizziness   • Diphenhydramine Hives     Full body hives   • Iodine Unspecified     Labored breathing   • Morphine Itching     redness   • Penicillins Itching and Swelling     Skin itching and redness   • Savella [Milnacipran Hcl] Unspecified     Muscle aches, feet swelling   • Tizanidine Hcl Unspecified     dizziness   • Amlactin Rash     Lotion causes itching, redness and burnng   • Savella [Kdc:Milnacipran+Ci Pigment Blue 63]      Swelling, bone and muscle pain, sweating, nausea, dizziness, sleeplessness, anxiety   • Ace Inhibitors Cough     Cough     • Tape Unspecified     Paper tape is ok       ROS: As per HPI       Objective:     /70   Pulse 72   Temp 36.4 °C (97.5 °F)   Resp 14   Ht 1.689 m (5' 6.5\")   Wt 117.5 kg (259 lb)   SpO2 94%  Body mass index is 41.18 kg/m².    Physical Exam:  Constitutional: Well-developed and well-nourished. Not diaphoretic. No distress. Lucid and fluent.  Patient, physician and staff all wearing masks.  Skin: Skin " is warm and dry. No rash noted.  Head: Atraumatic without lesions.  Eyes: Conjunctivae and extraocular motions are normal. Pupils are equal, round, and reactive to light. No scleral icterus.   Neck: Supple, trachea midline. No thyromegaly present. No cervical or supraclavicular lymphadenopathy. No JVD or carotid bruits appreciated  Cardiovascular: Regular rate and rhythm.  Normal S1, S2 without murmur appreciated.  Chest: Effort normal. Clear to auscultation throughout. No adventitious sounds.   Back: moderate spinous process tenderness throughout the thoracic spine, worse lumbar and cervical regions.  Extremities: No cyanosis, clubbing, erythema, nor edema.   Neurological: Alert and oriented x 3. Gait is broad based.  Psychiatric:  Behavior, mood, and affect are appropriate.       Assessment and Plan:     70 y.o. female with the following issues:    1. Morbid obesity with BMI of 40.0-44.9, adult (HCC)     2. Lumbar radiculopathy  gabapentin (NEURONTIN) 300 MG Cap   3. Cervical stenosis of spinal canal  gabapentin (NEURONTIN) 300 MG Cap   4. Encounter for screening mammogram for breast cancer  MA-SCREENING MAMMO BILAT W/TOMOSYNTHESIS W/CAD         Followup: Return in about 6 weeks (around 9/30/2020), or if symptoms worsen or fail to improve.

## 2020-09-09 ENCOUNTER — HOSPITAL ENCOUNTER (OUTPATIENT)
Dept: LAB | Facility: MEDICAL CENTER | Age: 70
End: 2020-09-09
Attending: FAMILY MEDICINE
Payer: MEDICARE

## 2020-09-09 ENCOUNTER — TELEMEDICINE (OUTPATIENT)
Dept: MEDICAL GROUP | Facility: MEDICAL CENTER | Age: 70
End: 2020-09-09
Payer: MEDICARE

## 2020-09-09 VITALS — BODY MASS INDEX: 41.82 KG/M2 | RESPIRATION RATE: 14 BRPM | WEIGHT: 251 LBS | HEIGHT: 65 IN

## 2020-09-09 DIAGNOSIS — B34.9 HEADACHE DUE TO VIRAL INFECTION: ICD-10-CM

## 2020-09-09 DIAGNOSIS — R50.9 FEVER AND CHILLS: ICD-10-CM

## 2020-09-09 DIAGNOSIS — R51.9 HEADACHE DUE TO VIRAL INFECTION: ICD-10-CM

## 2020-09-09 DIAGNOSIS — R11.0 NAUSEA: ICD-10-CM

## 2020-09-09 PROCEDURE — C9803 HOPD COVID-19 SPEC COLLECT: HCPCS

## 2020-09-09 PROCEDURE — 99213 OFFICE O/P EST LOW 20 MIN: CPT | Mod: CS,95,CR | Performed by: FAMILY MEDICINE

## 2020-09-09 PROCEDURE — U0003 INFECTIOUS AGENT DETECTION BY NUCLEIC ACID (DNA OR RNA); SEVERE ACUTE RESPIRATORY SYNDROME CORONAVIRUS 2 (SARS-COV-2) (CORONAVIRUS DISEASE [COVID-19]), AMPLIFIED PROBE TECHNIQUE, MAKING USE OF HIGH THROUGHPUT TECHNOLOGIES AS DESCRIBED BY CMS-2020-01-R: HCPCS

## 2020-09-09 ASSESSMENT — FIBROSIS 4 INDEX: FIB4 SCORE: 1.030776406404415137

## 2020-09-09 NOTE — PROGRESS NOTES
Virtual Visit: Established Patient   This visit was conducted via Zoom  using secure and encrypted videoconferencing technology. The patient was in a private location in the state of [place].    The patient's identity was confirmed and verbal consent was obtained for this virtual visit.      CC:  Fever for several days, feels ill                                                                                                                             HPI:   Ashly presents today with the following.    1. Fever and chills  She had eye surgery on Tuesday.  Began feeling poorly late Thursday night.  She began with fever and chills on Friday am.  Has headache.  Unable to measure her temperature as she cannot read her thermometer.  Using the eye drops.  Discussed calling the paramedics if she gets SOB.    2. Nausea  Has been unable to eat for 2 days.  Is taking sips of liquid, water.  Soft stool, sometimes throwing up bile.  Denies diarrhea.  Getting significant muscle aches.    3. Headache due to viral infection  She is having a strong headache, stronger than her normal.  It has been present the last week.   This is left sided.  Denies visual change but hard to tell as she has had eye surgery.      Patient Active Problem List    Diagnosis Date Noted   • Pre-bariatric surgery psychological evaluation 07/31/2020   • Controlled type 2 diabetes mellitus without complication, without long-term current use of insulin (Formerly [facility]) 06/12/2019   • Chronic gout of foot 04/30/2019   • Dry eye syndrome, bilateral    • Rotator cuff syndrome of right shoulder and allied disorders 10/10/2018   • Former smoker 05/30/2018   • Recurrent major depressive disorder, in partial remission (Formerly [facility]) 03/29/2018   • Vitamin D deficiency 12/28/2017   • Morbid obesity with BMI of 40.0-44.9, adult (Formerly [facility])    • Neural foraminal stenosis of cervical spine 05/18/2017   • Tinnitus of both ears 05/18/2017   • Dyslipidemia, goal LDL below 130 01/06/2017   •  Nonalcoholic fatty liver disease 01/05/2017   • Disease of accessory sinus 10/03/2016   • Atrophic rhinitis 10/03/2016   • Deviated nasal septum 08/01/2016   • Menopausal symptoms 04/28/2016   • Lumbar facet arthropathy 12/09/2015   • Chronic pain 11/18/2015   • Candidal intertrigo 06/01/2015   • Elevated sed rate 12/09/2014   • Mild intermittent asthma without complication    • H/O fibromyalgia 03/11/2014   • KERRI (obstructive sleep apnea) 03/11/2014   • Acute idiopathic gout of right foot 03/11/2014   • Menopause 03/11/2014   • Peripheral neuropathy    • Rectocele 11/25/2013   • Pelvic floor dysfunction    • Chronic constipation 10/02/2013   • Nocturnal hypoxia    • Cervical stenosis of spinal canal 11/01/2011   • Lumbar radiculopathy 11/01/2011   • Migraine without aura    • Carpal tunnel syndrome 09/05/2011   • Ulnar neuropathy 09/05/2011   • Seasonal allergies 07/08/2011   • Polymyalgia rheumatica (HCC) 05/06/2011   • GERD (gastroesophageal reflux disease) 12/10/2010   • Osteoarthritis of multiple joints 04/13/2010   • Eczema, dyshidrotic 08/03/2009   • Essential hypertension 05/20/2009   • Hypothyroidism due to acquired atrophy of thyroid 05/20/2009       Current Outpatient Medications   Medication Sig Dispense Refill   • gabapentin (NEURONTIN) 300 MG Cap Take 2 Caps by mouth 3 times a day. 540 Cap 2   • ketoconazole (NIZORAL) 2 % Cream APPLY TO AFFECTED AREA(S) ONCE DAILY. 30 g 0   • carvedilol (COREG) 6.25 MG Tab TAKE ONE TABLET BY MOUTH TWICE DAILY WITH FOOD  180 Tab 0   • potassium chloride SA (KDUR) 20 MEQ Tab CR TAKE ONE TABLET BY MOUTH TWO TIMES A DAY. 180 Tab 1   • spironolactone (ALDACTONE) 50 MG Tab Take 1 Tab by mouth every day. 90 Tab 2   • allopurinol (ZYLOPRIM) 300 MG Tab Take 1 Tab by mouth every day. 90 Tab 3   • levothyroxine (SYNTHROID) 200 MCG Tab Take 1 Tab by mouth Every morning on an empty stomach. 90 Tab 3   • nabumetone (RELAFEN) 750 MG Tab Take 1 Tab by mouth 2 times a day, with meals.  "180 Tab 3   • furosemide (LASIX) 20 MG Tab Take 1 Tab by mouth every day. 90 Tab 3   • pantoprazole (PROTONIX) 40 MG Tablet Delayed Response Take 1 Tab by mouth 2 times a day. 180 Tab 3   • albuterol (PROAIR HFA) 108 (90 Base) MCG/ACT Aero Soln inhalation aerosol Inhale 2 Puffs by mouth every 6 hours as needed for Shortness of Breath. 1 Inhaler 6   • Diclofenac Sodium 1 % Gel      • venlafaxine ER (EFFEXOR) 225 MG TABLET SR 24 HR tablet Take 1 Tab by mouth every day. 90 Tab 3     No current facility-administered medications for this visit.          Allergies as of 09/09/2020 - Reviewed 09/09/2020   Allergen Reaction Noted   • Lamictal Rash and Swelling 12/27/2011   • Sulfa drugs Hives, Shortness of Breath, and Anxiety 08/03/2007   • Butalbital-acetaminophen Unspecified 07/27/2016   • Cefaclor Unspecified 08/03/2007   • Diphenhydramine Hives 08/03/2007   • Iodine Unspecified 05/20/2009   • Morphine Itching 03/11/2011   • Penicillins Itching and Swelling 08/03/2007   • Savella [milnacipran hcl] Unspecified 05/24/2013   • Tizanidine hcl Unspecified 03/11/2014   • Amlactin Rash 01/30/2019   • Savella [kdc:milnacipran+ci pigment blue 63]  01/30/2019   • Ace inhibitors Cough 05/20/2009   • Tape Unspecified 07/27/2016        ROS:  Denies, chest pain, Shortness of breath, Edema.     Ht 1.651 m (5' 5\")   Wt 113.9 kg (251 lb)   BMI 41.77 kg/m²  respiratory rate visually estimated at 14      Physical Exam:  Constitutional: Alert, well-groomed.  She is sitting in the dark.  Appears pale and ill.  Skin: No rashes in visible areas.  Eye: Round. Conjunctiva clear, No icterus.   ENMT: Lips pink without lesions, good dentition, moist mucous membranes. Phonation normal.  Neck: No masses, no thyromegaly. Moves freely without pain.  Respiratory: Unlabored respiratory effort, no cough or audible wheeze  Psych: Alert and oriented x3, normal affect and mood.          Assessment and Plan.   70 y.o. female with the following issues.    1. " Fever and chills  Has had since Friday.  Testing needed.  - COVID/SARS COV-2 PCR; Future    2. Nausea  Nausea and loose stool, some vomiting and body aches.  Testing discussed  - COVID/SARS COV-2 PCR; Future    3. Headache due to viral infection  Is prone to headache but this seems unusual to patient.  She will call the lab to get a testing appointment right away.  - COVID/SARS COV-2 PCR; Future    Follow up 6 weeks and prn.  Precautions, especially for shortness of breath discussed.

## 2020-09-10 LAB — COVID ORDER STATUS COVID19: NORMAL

## 2020-09-17 ENCOUNTER — TELEPHONE (OUTPATIENT)
Dept: MEDICAL GROUP | Facility: MEDICAL CENTER | Age: 70
End: 2020-09-17

## 2020-09-17 LAB
SARS-COV-2 RNA RESP QL NAA+PROBE: NOTDETECTED
SPECIMEN SOURCE: NORMAL

## 2020-09-18 NOTE — TELEPHONE ENCOUNTER
Unfortunately the test still says received, there is no result.  Called the lab.  They are puzzled about this too and they are going to investigate this and call us back.

## 2020-09-25 ENCOUNTER — HOSPITAL ENCOUNTER (OUTPATIENT)
Dept: RADIOLOGY | Facility: MEDICAL CENTER | Age: 70
End: 2020-09-25
Attending: NURSE PRACTITIONER
Payer: MEDICARE

## 2020-09-25 ENCOUNTER — NURSE TRIAGE (OUTPATIENT)
Dept: HEALTH INFORMATION MANAGEMENT | Facility: OTHER | Age: 70
End: 2020-09-25

## 2020-09-25 ENCOUNTER — HOSPITAL ENCOUNTER (OUTPATIENT)
Dept: LAB | Facility: MEDICAL CENTER | Age: 70
End: 2020-09-25
Attending: NURSE PRACTITIONER
Payer: MEDICARE

## 2020-09-25 ENCOUNTER — OFFICE VISIT (OUTPATIENT)
Dept: URGENT CARE | Facility: PHYSICIAN GROUP | Age: 70
End: 2020-09-25
Payer: MEDICARE

## 2020-09-25 VITALS
HEIGHT: 66 IN | TEMPERATURE: 96.8 F | RESPIRATION RATE: 18 BRPM | DIASTOLIC BLOOD PRESSURE: 98 MMHG | OXYGEN SATURATION: 98 % | HEART RATE: 68 BPM | WEIGHT: 251 LBS | BODY MASS INDEX: 40.34 KG/M2 | SYSTOLIC BLOOD PRESSURE: 158 MMHG

## 2020-09-25 DIAGNOSIS — R10.31 RLQ ABDOMINAL PAIN: ICD-10-CM

## 2020-09-25 DIAGNOSIS — K57.92 DIVERTICULITIS: ICD-10-CM

## 2020-09-25 LAB
ALBUMIN SERPL BCP-MCNC: 4 G/DL (ref 3.2–4.9)
ALBUMIN/GLOB SERPL: 1.3 G/DL
ALP SERPL-CCNC: 115 U/L (ref 30–99)
ALT SERPL-CCNC: 21 U/L (ref 2–50)
ANION GAP SERPL CALC-SCNC: 14 MMOL/L (ref 7–16)
APPEARANCE UR: NORMAL
AST SERPL-CCNC: 15 U/L (ref 12–45)
BILIRUB SERPL-MCNC: 0.4 MG/DL (ref 0.1–1.5)
BILIRUB UR STRIP-MCNC: NORMAL MG/DL
BUN SERPL-MCNC: 10 MG/DL (ref 8–22)
CALCIUM SERPL-MCNC: 9.3 MG/DL (ref 8.5–10.5)
CHLORIDE SERPL-SCNC: 100 MMOL/L (ref 96–112)
CO2 SERPL-SCNC: 27 MMOL/L (ref 20–33)
COLOR UR AUTO: YELLOW
CREAT SERPL-MCNC: 0.71 MG/DL (ref 0.5–1.4)
FASTING STATUS PATIENT QL REPORTED: NORMAL
GLOBULIN SER CALC-MCNC: 3.2 G/DL (ref 1.9–3.5)
GLUCOSE SERPL-MCNC: 106 MG/DL (ref 65–99)
GLUCOSE UR STRIP.AUTO-MCNC: NORMAL MG/DL
KETONES UR STRIP.AUTO-MCNC: NORMAL MG/DL
LEUKOCYTE ESTERASE UR QL STRIP.AUTO: NORMAL
NITRITE UR QL STRIP.AUTO: NORMAL
PH UR STRIP.AUTO: 6 [PH] (ref 5–8)
POTASSIUM SERPL-SCNC: 3.7 MMOL/L (ref 3.6–5.5)
PROT SERPL-MCNC: 7.2 G/DL (ref 6–8.2)
PROT UR QL STRIP: NORMAL MG/DL
RBC UR QL AUTO: NORMAL
SODIUM SERPL-SCNC: 141 MMOL/L (ref 135–145)
SP GR UR STRIP.AUTO: 1.02
UROBILINOGEN UR STRIP-MCNC: 0.2 MG/DL

## 2020-09-25 PROCEDURE — 74176 CT ABD & PELVIS W/O CONTRAST: CPT

## 2020-09-25 PROCEDURE — 99214 OFFICE O/P EST MOD 30 MIN: CPT | Performed by: NURSE PRACTITIONER

## 2020-09-25 PROCEDURE — 81002 URINALYSIS NONAUTO W/O SCOPE: CPT | Performed by: NURSE PRACTITIONER

## 2020-09-25 PROCEDURE — 36415 COLL VENOUS BLD VENIPUNCTURE: CPT

## 2020-09-25 PROCEDURE — 80053 COMPREHEN METABOLIC PANEL: CPT

## 2020-09-25 RX ORDER — METRONIDAZOLE 500 MG/1
500 TABLET ORAL 2 TIMES DAILY
Qty: 10 TAB | Refills: 0 | Status: SHIPPED | OUTPATIENT
Start: 2020-09-25 | End: 2020-09-30

## 2020-09-25 RX ORDER — CIPROFLOXACIN 500 MG/1
500 TABLET, FILM COATED ORAL 2 TIMES DAILY
Qty: 10 TAB | Refills: 0 | Status: SHIPPED | OUTPATIENT
Start: 2020-09-25 | End: 2020-10-13 | Stop reason: SDUPTHER

## 2020-09-25 ASSESSMENT — ENCOUNTER SYMPTOMS
DIARRHEA: 0
CHILLS: 0
TINGLING: 0
SHORTNESS OF BREATH: 0
ORTHOPNEA: 0
VOMITING: 0
HEARTBURN: 0
FEVER: 0
WHEEZING: 0
ABDOMINAL PAIN: 1
SORE THROAT: 0
COUGH: 0
MEMORY LOSS: 0
NAUSEA: 1
MYALGIAS: 0
CONSTIPATION: 0
BACK PAIN: 0
HEADACHES: 0
PALPITATIONS: 0
DIZZINESS: 0

## 2020-09-25 ASSESSMENT — FIBROSIS 4 INDEX: FIB4 SCORE: 1.030776406404415137

## 2020-09-25 ASSESSMENT — PAIN SCALES - GENERAL: PAINLEVEL: 7=MODERATE-SEVERE PAIN

## 2020-09-25 NOTE — TELEPHONE ENCOUNTER
Regarding: PAIN IN RIGHT SIDE OF ABDOMINAL AREA  ----- Message from Alin La sent at 9/25/2020  9:56 AM PDT -----  PAIN IN RIGHT SIDE OF ABDOMINAL AREA

## 2020-09-25 NOTE — PROGRESS NOTES
"Subjective:      Ashly Meyer is a 70 y.o. female who presents with RLQ Pain (x couple days but worsened last night, fatigue, nausea, loss of taste and smell (COVID on the 9th was negative) )            RLQ Pain  This is a new problem. Episode onset: In the past 4 days. The onset quality is gradual. The problem occurs constantly. The problem has been gradually worsening. The pain is located in the RLQ. The pain is at a severity of 8/10. The pain is moderate. The quality of the pain is aching. The abdominal pain does not radiate. Associated symptoms include nausea ( Chronic). Pertinent negatives include no constipation, diarrhea, fever, headaches, myalgias or vomiting. The pain is aggravated by movement, palpation and certain positions. The pain is relieved by nothing. She has tried nothing for the symptoms. Her past medical history is significant for abdominal surgery and GERD.       Review of Systems   Constitutional: Positive for malaise/fatigue (Chronic). Negative for chills and fever.   HENT: Negative for ear pain and sore throat.    Respiratory: Negative for cough, shortness of breath and wheezing.    Cardiovascular: Negative for chest pain, palpitations, orthopnea and leg swelling.   Gastrointestinal: Positive for abdominal pain and nausea ( Chronic). Negative for constipation, diarrhea, heartburn and vomiting.   Musculoskeletal: Negative for back pain, joint pain and myalgias.   Skin: Negative for rash.   Neurological: Negative for dizziness, tingling and headaches.   Psychiatric/Behavioral: Negative for memory loss and suicidal ideas.   All other systems reviewed and are negative.         Objective:     /98 (BP Location: Right arm, Patient Position: Sitting, BP Cuff Size: Adult long)   Pulse 68   Temp 36 °C (96.8 °F) (Tympanic)   Resp 18   Ht 1.676 m (5' 6\")   Wt 113.9 kg (251 lb)   SpO2 98%   BMI 40.51 kg/m²      Physical Exam  Vitals signs reviewed.   Constitutional:       Appearance: " Normal appearance.   HENT:      Head: Normocephalic.      Right Ear: External ear normal.      Left Ear: External ear normal.      Nose: Nose normal. No congestion.      Mouth/Throat:      Mouth: Mucous membranes are moist.   Eyes:      Extraocular Movements: Extraocular movements intact.      Conjunctiva/sclera: Conjunctivae normal.   Neck:      Musculoskeletal: Normal range of motion.   Cardiovascular:      Rate and Rhythm: Normal rate and regular rhythm.      Pulses: Normal pulses.      Heart sounds: Normal heart sounds. No murmur.   Pulmonary:      Effort: Pulmonary effort is normal.      Breath sounds: Normal breath sounds. No wheezing.   Abdominal:      General: Abdomen is protuberant. Bowel sounds are normal. There is no distension or abdominal bruit.      Palpations: Abdomen is soft. There is no pulsatile mass.      Tenderness: There is abdominal tenderness in the right lower quadrant. There is guarding and rebound. There is no right CVA tenderness or left CVA tenderness. Positive signs include McBurney's sign.       Musculoskeletal: Normal range of motion.   Lymphadenopathy:      Cervical: No cervical adenopathy.   Skin:     General: Skin is warm and dry.      Capillary Refill: Capillary refill takes less than 2 seconds.   Neurological:      Mental Status: She is alert and oriented to person, place, and time.   Psychiatric:         Mood and Affect: Mood normal.         Behavior: Behavior normal.       Corby Be M.D.  [phone] 9/25/2020 Ur[facility]          Narrative & Impression         9/25/2020 6:06 PM   HISTORY/REASON FOR EXAM: RLQ abdominal pain, appendicitis suspected (Age > 14y); PT ALLERGIC TO IODINE.   TECHNIQUE/EXAM DESCRIPTION:   CT scan of the abdomen and pelvis without contrast.   Noncontrast helical scanning was obtained from the diaphragmatic domes through the pubic symphysis.   Low dose optimization technique was utilized for this CT exam including automated exposure control and  adjustment of the mA and/or kV according to patient size.   COMPARISON: None.   FINDINGS:   LUNGS: There are adjacent 7 mm and 5 Mill or nodule within the right middle lobe. There are multiple areas of linear density consistent with atelectasis or scar.   There is coronary artery atherosclerotic plaque.   OSSEOUS structures: Facet arthropathy of the lumbar spine. Bilateral sacroiliac joint osteoarthritis. Otherwise within normal limits.   Abdomen:   LIVER: There is hepatic steatosis and hepatomegaly..   GALLBLADDER and BILIARY: Prior cholecystectomy. No biliary dilation identified.   SPLEEN: The spleen is normal in noncontrast appearance.   PANCREAS: The pancreas is normal in noncontrast appearance.   ADRENALS: Both adrenal glands are normal in appearance.   KIDNEYS: The kidneys are normal in noncontrast appearance.   There are no renal or ureteral calculi.   There is no hydronephrosis.   BOWEL: There is colonic diverticulosis. There is wall thickening with adjacent inflammatory change in the mid sigmoid colon identified on coronal images 35-40 and axial images 77-82.   AORTA: shows atherosclerosis without aneurysm.   APPENDIX: Not identified and probably absent   No inflammatory process is identified within the abdomen or pelvis.   Pelvis:   There has been prior hysterectomy. There is no free fluid or mass.   IMPRESSION:   1. Acute diverticulitis of the mid sigmoid colon, mild in degree   2. Negative for abscess or free air   3. Colonic diverticulosis   4. Prior hysterectomy, cholecystectomy, and probably appendectomy   5. Hepatic steatosis and hepatomegaly   6. Diffuse aortic and branch vessel atherosclerotic plaque               Assessment/Plan:        1.   1. RLQ abdominal pain  Comp Metabolic Panel    POCT Urinalysis    CT-ABDOMEN-PELVIS W/O    CANCELED: CT-ABDOMEN-PELVIS WITH   2. Diverticulitis  ciprofloxacin (CIPRO) 500 MG Tab    metroNIDAZOLE (FLAGYL) 500 MG Tab     Plan of care, medications and treatments  reviewed with patient and or guardian.  Patient and or guardian voices understanding and agrees with the instructions provided. After visit summary reviewed with patient. Patient and or guardian understand the parameters for reevaluation and ER precautions discussed.     Follow up with primary care provider in the next 1-5 days for recheck as needed.  Discussed that urgent care setting has limited resources, therefore any worsening of symptoms should be evaluated in the ER. Patient and or guardian verbalized understanding.     Please note that this dictation was created using voice recognition software. I have made every reasonable attempt to correct obvious errors, but I expect that there are errors of grammar and possibly content that I did not discover before finalizing the note.

## 2020-09-25 NOTE — TELEPHONE ENCOUNTER
Symptoms began end of July, diarrhea, vomiting bile, loss of appetite.  Patient is forcing herself to eat.  Pain in RLQ getting worse over the last 2 days.  Patient is reporting nausea, constipation, fever, chills off and on.  Body aches and fatigue with back pain around LEFT shoulder blade, taste and smell is gone, congestion..    1. Caller Name: Ashly               Call Back Number: [phone]  [facility] PCP or Specialty Provider: Yes Robbin Ahn        2.  In the last two weeks, has the patient had any new or worsening symptoms (not explained by alternative diagnosis)? Yes, the patient reports the following COVID-19 consistent symptoms: fever of at least 100.4°F (38°C) or greater, chills, congestion or runny nose, muscle pain or body aches, fatigue, headache, nausea, new loss of taste or smell and constipation with RLQ pain.    3.  Does patient have any comoribidities? Immunosuppressed sleep apnea    4.  Has the patient traveled in the last 14 days OR had any known contact with someone who is suspected or confirmed to have COVID-19?  No.    5. Disposition: Advised to go to     Note routed to [facility] Provider: WALKER only.     Reason for Disposition  • Patient sounds very sick or weak to the triager    Additional Information  • Negative: Passed out (i.e., fainted, collapsed and was not responding)  • Negative: Shock suspected (e.g., cold/pale/clammy skin, too weak to stand, low BP, rapid pulse)  • Negative: Sounds like a life-threatening emergency to the triager  • Negative: Chest pain  • Negative: Pain is mainly in upper abdomen (if needed ask: 'is it mainly above the belly button?')  • Negative: Abdominal pain and pregnant > 20 weeks  • Negative: Abdominal pain and pregnant < 20 weeks  • Negative: SEVERE abdominal pain (e.g., excruciating)  • Negative: Vomiting red blood or black (coffee ground) material  • Negative: Bloody, black, or tarry bowel movements  • Negative: Constant abdominal pain lasting > 2  "hours  • Negative: Vomiting bile (green color)    Answer Assessment - Initial Assessment Questions  1. LOCATION: \"Where does it hurt?\"       RLQ  2. RADIATION: \"Does the pain shoot anywhere else?\" (e.g., chest, back)    no  3. ONSET: \"When did the pain begin?\" (e.g., minutes, hours or days ago)      Since July but is gradually getting worse over the last 2 days  4. SUDDEN: \"Gradual or sudden onset?\"      gradual  5. PATTERN \"Does the pain come and go, or is it constant?\"     - If constant: \"Is it getting better, staying the same, or worsening?\"       (Note: Constant means the pain never goes away completely; most serious pain is constant and it progresses)      - If intermittent: \"How long does it last?\" \"Do you have pain now?\"      (Note: Intermittent means the pain goes away completely between bouts)      Alternating diarrhea and constipation  6. SEVERITY: \"How bad is the pain?\"  (e.g., Scale 1-10; mild, moderate, or severe)    - MILD (1-3): doesn't interfere with normal activities, abdomen soft and not tender to touch     - MODERATE (4-7): interferes with normal activities or awakens from sleep, tender to touch     - SEVERE (8-10): excruciating pain, doubled over, unable to do any normal activities       8-9/10  7. RECURRENT SYMPTOM: \"Have you ever had this type of abdominal pain before?\" If so, ask: \"When was the last time?\" and \"What happened that time?\"    yes it comes and goes  8. CAUSE: \"What do you think is causing the abdominal pain?\"      Appendicitis or hernia  9. RELIEVING/AGGRAVATING FACTORS: \"What makes it better or worse?\" (e.g., movement, antacids, bowel movement)      Moving aggravates the pain  10. OTHER SYMPTOMS: \"Has there been any vomiting, diarrhea, constipation, or urine problems?\"        Nausea, constipation, diarrhea  11. PREGNANCY: \"Is there any chance you are pregnant?\" \"When was your last menstrual period?\"        no    Protocols used: ABDOMINAL PAIN - FEMALE-A-OH      "

## 2020-10-12 ENCOUNTER — NON-PROVIDER VISIT (OUTPATIENT)
Dept: PULMONOLOGY | Facility: HOSPICE | Age: 70
End: 2020-10-12
Attending: PHYSICIAN ASSISTANT
Payer: MEDICARE

## 2020-10-12 ENCOUNTER — OFFICE VISIT (OUTPATIENT)
Dept: PULMONOLOGY | Facility: HOSPICE | Age: 70
End: 2020-10-12
Payer: MEDICARE

## 2020-10-12 VITALS
HEIGHT: 65 IN | BODY MASS INDEX: 42.15 KG/M2 | HEART RATE: 71 BPM | RESPIRATION RATE: 16 BRPM | OXYGEN SATURATION: 93 % | SYSTOLIC BLOOD PRESSURE: 144 MMHG | DIASTOLIC BLOOD PRESSURE: 90 MMHG | WEIGHT: 253 LBS

## 2020-10-12 VITALS — BODY MASS INDEX: 40.84 KG/M2 | WEIGHT: 253 LBS

## 2020-10-12 DIAGNOSIS — J45.20 MILD INTERMITTENT ASTHMA WITHOUT COMPLICATION: ICD-10-CM

## 2020-10-12 DIAGNOSIS — J30.2 SEASONAL ALLERGIES: ICD-10-CM

## 2020-10-12 DIAGNOSIS — G47.34 NOCTURNAL HYPOXIA: ICD-10-CM

## 2020-10-12 DIAGNOSIS — J45.30 MILD PERSISTENT ASTHMA WITHOUT COMPLICATION: ICD-10-CM

## 2020-10-12 PROCEDURE — 94726 PLETHYSMOGRAPHY LUNG VOLUMES: CPT | Performed by: INTERNAL MEDICINE

## 2020-10-12 PROCEDURE — 94060 EVALUATION OF WHEEZING: CPT | Performed by: INTERNAL MEDICINE

## 2020-10-12 PROCEDURE — 94729 DIFFUSING CAPACITY: CPT | Performed by: INTERNAL MEDICINE

## 2020-10-12 PROCEDURE — 99214 OFFICE O/P EST MOD 30 MIN: CPT | Performed by: PHYSICIAN ASSISTANT

## 2020-10-12 RX ORDER — TRAMADOL HYDROCHLORIDE 100 MG/1
CAPSULE ORAL
COMMUNITY
End: 2020-11-23

## 2020-10-12 ASSESSMENT — PULMONARY FUNCTION TESTS
FVC_PERCENT_PREDICTED: 85
FEV1/FVC: 86
FVC_LLN: 2.46
FEV1/FVC_PERCENT_LLN: 65
FEV1/FVC_PERCENT_PREDICTED: 111
FEV1_PERCENT_PREDICTED: 94
FEV1/FVC_PERCENT_CHANGE: 50
FEV1: 2.19
FEV1/FVC_PERCENT_CHANGE: -1
FEV1/FVC_PREDICTED: 78
FEV1_PERCENT_CHANGE: 1
FEV1/FVC_PERCENT_PREDICTED: 77
FVC: 2.51
FEV1: 2.16
FEV1/FVC_PERCENT_PREDICTED: 110
FEV1/FVC: 85
FEV1_PERCENT_PREDICTED: 95
FEV1_LLN: 1.91
FEV1/FVC_PERCENT_PREDICTED: 109
FVC_PERCENT_PREDICTED: 86
FEV1/FVC_PERCENT_PREDICTED: 110
FEV1/FVC: 85.21
FEV1/FVC: 86
FEV1_PREDICTED: 2.28
FEV1_PERCENT_CHANGE: 2
FVC_PREDICTED: 2.95
FVC: 2.57

## 2020-10-12 ASSESSMENT — ENCOUNTER SYMPTOMS
WEIGHT LOSS: 0
HEARTBURN: 1
HEADACHES: 1
SHORTNESS OF BREATH: 1
FEVER: 0
ORTHOPNEA: 0
COUGH: 1
TREMORS: 0
SPUTUM PRODUCTION: 1
WHEEZING: 0
SINUS PAIN: 0
CHILLS: 0
INSOMNIA: 1
PALPITATIONS: 0
SORE THROAT: 0

## 2020-10-12 ASSESSMENT — FIBROSIS 4 INDEX
FIB4 SCORE: 0.82
FIB4 SCORE: 0.82

## 2020-10-12 NOTE — PROCEDURES
Technician: NATALIA Raman    Technician Comment:  Good patient effort & cooperation.  The results of this test meet the ATS/ERS standards for acceptability & reproducibility.  Test was performed on the [employer] Body Plethysmograph-Elite DX system.  Predicted values were GLI-2012 for spirometry, GLI-2017 for DLCO, ITS for Lung Volumes.  The DLCO was uncorrected for Hgb.  A bronchodilator of Ventolin HFA -2puffs via spacer administered.  DLCO performed during dilation period.    Interpretation:  1. Baseline spirometry shows normal air flows.  2. There is no significant bronchodilator response.  3. Lung volumes are low normal with TLC of 4.36 L or 83% predicted.  4. DLCO is within normal limits at 105% predicted.   Normal pulmonary function test with normal flow volume loop. This does not rule out reactive airway disease.

## 2020-10-12 NOTE — PROGRESS NOTES
CC    HPI:  Ashly Meyer is a 70 y.o. year old female here today for follow-up on mild asthma.  Patient last seen in clinic on 4/10/2020.  Patient is a former smoker with reported quit date in 2007 and 43-pack-year history.  Continued abstinence advised.    Pertinent past medical history KERRI intolerant to CPAP, restless leg syndrome, hiatus hernia syndrome, excessive sweating, anxiety, nocturnal hypoxia, fibromyalgia, peripheral neuropathy, atrophic rhinitis, GERD, polymyalgia rheumatica.  Type 2 diabetes.    Reviewed in clinic vitals including blood pressure 144/90, heart rate is 71, O2 sat of 93% on room air. Patient's body mass index is 42.1 kg/m². Exercise and nutrition counseling were performed at this visit.    Reviewed home medication regimen including albuterol, pantoprazole.  Patient reports using albuterol 1 time in the last 6 months.  Patient wears 3 L O2 with exertion and at night.    No recent chest imaging, chest x-ray obtained 7/26/2018 demonstrated slight hypoinflation and mild bibasilar atelectasis.    Patient did have a nuclear medicine cardiac stress test in 2018 demonstrating no evidence of significant jeopardized viable myocardium or prior MI, normal left ventricular wall motion, LVEF estimated 68%, negative stress ECG for ischemia.    Reviewed most recent pulmonary function test obtained 10/12/2020 demonstrating FEV1 2.16 L or 94% predicted, FVC of 2.51 L or 85% predicted, FEV1/FVC ratio of 86, no significant postbronchodilator change, residual volume 83% predicted, TLC 83% predicted, DLCO 105% predicted.  Baseline spirometry shows normal airflows, lung volumes low normal, DLCO within normal limits, per pulmonologist interpretation normal PFT with normal flow volume loop however this does not rule out reactive airway disease.    Review of Systems   Constitutional: Positive for malaise/fatigue. Negative for chills, fever and weight loss.   HENT: Positive for nosebleeds (streaking) and  tinnitus. Negative for congestion, hearing loss, sinus pain and sore throat.    Eyes:        Cataracts out, needs glasses   Respiratory: Positive for cough (post nasal drip ), sputum production (clear to green ) and shortness of breath (intermittent uses oxygen then ). Negative for wheezing.    Cardiovascular: Positive for chest pain (occasional ) and leg swelling (some ). Negative for palpitations and orthopnea.   Gastrointestinal: Positive for heartburn (some break thru ).   Neurological: Positive for headaches. Negative for tremors. Dizziness: off centered    Psychiatric/Behavioral: The patient has insomnia (staying asleep ).        Past Medical History:   Diagnosis Date   • Anginal syndrome ([facility])    • Anxiety    • Arthritis     Everywhere.    • Back pain    • Carotid artery stenosis, unilateral     moderate left carotid artery stenosis   • Chronic pain    • Constipation    • Controlled type 2 diabetes mellitus without complication ([facility])     states borderline   • Controlled type 2 diabetes mellitus without complication, without long-term current use of insulin ([facility]) 6/12/2019   • Daytime sleepiness    • Degenerative disc disease    • Depression 5/20/2009   • Diarrhea     and constipation   • Dizziness    • Dry eye syndrome, bilateral    • Elevated sedimentation rate     history of negative temporal artery biopsy around 2006   • Fatigue    • Fibromyalgia     confirmed by Dr. Ann   • Frequent headaches    • GERD (gastroesophageal reflux disease)     hiatal hernia   • GOUT 5/20/2009   • H/O foot surgery    • Hearing difficulty    • Heart burn    • Hemorrhagic disorder ([facility]) 2016    nose bleeds   • Hiatus hernia syndrome    • History of anemia     none currently   • Hypertension    • Hypothyroidism 5/20/2009   • Incipient cataract of both eyes    • Insomnia    • Migraine without aura, without mention of intractable migraine without mention of status migrainosus    • Mild intermittent asthma without complication      inhalers as needed   • Mixed dyslipidemia    • Morbid obesity with BMI of 45.0-49.9, adult ([facility])    • Morning headache    • Mumps    • Nasal drainage    • Nocturnal hypoxia     severe, to 69% O2 sat   • Obesity    • Obesity hypoventilation syndrome ([facility]) 5/31/2017   • Pelvic floor dysfunction    • Peripheral neuropathy    • Pleomorphic adenoma    • Polymyalgia rheumatica ([facility])     Dr. Ann   • Restless leg syndrome    • Ringing in ears    • Rotator cuff syndrome of right shoulder and allied disorders 10/10/2018   • S/P tonsillectomy    • Seasonal allergies    • Skin cancer 1990's    skin   • Sleep apnea syndrome     she is not using CPAP, claustrophobia, uses 2-3l at night   • Snoring    • Sore muscles    • Sweat, sweating, excessive    • Swelling of lower extremity    • Urinary incontinence     will not wear a pad   • Vision loss    • Weakness        Past Surgical History:   Procedure Laterality Date   • SHOULDER DECOMPRESSION ARTHROSCOPIC Right 2/1/2019    Procedure: SHOULDER DECOMPRESSION ARTHROSCOPIC - SUBACROMIAL, LABRAL DEBRIDEMENT;  Surgeon: Damaris Knutson M.D.;  Location: [facility];  Service: Orthopedics   • SHOULDER ARTHROSCOPY W/ BICIPITAL TENODESIS REPAIR Right 2/1/2019    Procedure: SHOULDER ARTHROSCOPY W/ BICIPITAL TENOTOMY;  Surgeon: Damaris Knutson M.D.;  Location: [facility];  Service: Orthopedics   • CARPAL TUNNEL RELEASE Right 2/1/2019    Procedure: CARPAL TUNNEL RELEASE;  Surgeon: Damaris Knutson M.D.;  Location: [facility];  Service: Orthopedics   • GASTROSCOPY  2/6/2017    Procedure: GASTROSCOPY;  Surgeon: Pau Foster M.D.;  Location: SURGERY SAME DAY [facility];  Service:    • COLONOSCOPY  2/6/2017    Procedure: COLONOSCOPY;  Surgeon: Pau Foster M.D.;  Location: SURGERY SAME DAY [facility];  Service:    • SEPTAL RECONSTRUCTION  10/3/2016    Procedure: SEPTAL RECONSTRUCTION with  grafts ;  Surgeon: PIETER Dickson  M.D.;  Location: SURGERY SAME DAY [facility];  Service:    • SEPTOPLASTY N/A 8/1/2016    Procedure: SEPTOPLASTY;  Surgeon: PIETER Dickson M.D.;  Location: SURGERY SAME DAY [facility];  Service:    • TURBINOPLASTY Bilateral 8/1/2016    Procedure: TURBINOPLASTY;  Surgeon: PIETER Dickson M.D.;  Location: SURGERY SAME DAY [facility];  Service:    • NASAL FRACTURE REDUCTION OPEN N/A 8/1/2016    Procedure: SEPTAL FRACTURE REDUCTION OPEN;  Surgeon: PIETER Dickson M.D.;  Location: SURGERY SAME DAY [facility];  Service:    • FINE NEEDLE ASPIRATION  11/16/2009    Performed by DA CALLOWAY at ENDOSCOPY [facility]   • COLONOSCOPY  2006    ? unclear date   • BLADDER SUSPENSION  1983   • HYSTERECTOMY, VAGINAL  1983    ovaries are present, excessive bleeding   • TONSILLECTOMY  1953   • HYSTERECTOMY LAPAROSCOPY     • OTHER ABDOMINAL SURGERY      Cholysestectomy   • OTHER ORTHOPEDIC SURGERY  1962/1980    foot surgery    • TONSILLECTOMY         Family History   Problem Relation Age of Onset   • Heart Disease Mother         Arotic vaulve replacement   • GI Disease Mother         bile duct problem   • Bladder cancer Mother    • GI Disease Sister         celiac   • Arthritis Sister    • Other Sister         gout and lupus   • Arthritis Sister    • Kidney Disease Sister    • GI Disease Sister    • Bladder cancer Maternal Aunt    • Arthritis Maternal Grandmother    • Hypertension Maternal Grandmother    • Heart Disease Maternal Grandmother    • Hypertension Maternal Grandfather    • Heart Disease Maternal Grandfather    • Arthritis Maternal Grandfather    • No Known Problems Paternal Grandmother    • No Known Problems Paternal Grandfather    • Cancer Brother         Larynx   • Cancer Daughter         non hopskins limphoma   • GI Disease Daughter    • Bipolar disorder Daughter    • Seizures Daughter    • Bipolar disorder Daughter        Social History     Socioeconomic History   • Marital status:       Spouse name: Not on file   • Number of children: Not on file   • Years of education: Not on file   • Highest education level: Not on file   Occupational History   • Not on file   Social Needs   • Financial resource strain: Not on file   • Food insecurity     Worry: Not on file     Inability: Not on file   • Transportation needs     Medical: Not on file     Non-medical: Not on file   Tobacco Use   • Smoking status: Former Smoker     Packs/day: 1.00     Years: 43.00     Pack years: 43.00     Types: Cigarettes     Start date:      Quit date: 3/1/2007     Years since quittin.6   • Smokeless tobacco: Never Used   • Tobacco comment: Started smoking at age 15, continued abstinance    Substance and Sexual Activity   • Alcohol use: No     Comment: no longer drinks   • Drug use: No     Comment: CBD topical pain cream   • Sexual activity: Not Currently     Partners: Male   Lifestyle   • Physical activity     Days per week: Not on file     Minutes per session: Not on file   • Stress: Not on file   Relationships   • Social connections     Talks on phone: Not on file     Gets together: Not on file     Attends Hinduism service: Not on file     Active member of club or organization: Not on file     Attends meetings of clubs or organizations: Not on file     Relationship status: Not on file   • Intimate partner violence     Fear of current or ex partner: Not on file     Emotionally abused: Not on file     Physically abused: Not on file     Forced sexual activity: Not on file   Other Topics Concern   • Not on file   Social History Narrative   • Not on file       Allergies as of 10/12/2020 - Reviewed 10/12/2020   Allergen Reaction Noted   • Lamictal Rash and Swelling 2011   • Sulfa drugs Hives, Shortness of Breath, and Anxiety 2007   • Butalbital-acetaminophen Unspecified 2016   • Cefaclor Unspecified 2007   • Diphenhydramine Hives 2007   • Iodine Unspecified 2009   • Morphine Itching  "03/11/2011   • Penicillins Itching and Swelling 08/03/2007   • Savella [milnacipran hcl] Unspecified 05/24/2013   • Tizanidine hcl Unspecified 03/11/2014   • Amlactin Rash 01/30/2019   • Savella [kdc:milnacipran+ci pigment blue 63]  01/30/2019   • Ace inhibitors Cough 05/20/2009   • Tape Unspecified 07/27/2016        @Vital signs for this encounter:  Vitals:    10/12/20 1304   Height: 1.651 m (5' 5\")   Weight: 114.8 kg (253 lb)   Weight % change since last entry.: 0 %   BP: 144/90   Pulse: 71   BMI (Calculated): 42.1   Resp: 16       Current medications as of today   Current Outpatient Medications   Medication Sig Dispense Refill   • TraMADol HCl 100 MG CAPSULE SR 24 HR Take  by mouth.     • albuterol (PROAIR HFA) 108 (90 Base) MCG/ACT Aero Soln inhalation aerosol Inhale 2 Puffs by mouth every 6 hours as needed for Shortness of Breath. 1 Inhaler 6   • gabapentin (NEURONTIN) 300 MG Cap Take 2 Caps by mouth 3 times a day. (Patient not taking: Reported on 10/12/2020) 540 Cap 2   • ketoconazole (NIZORAL) 2 % Cream APPLY TO AFFECTED AREA(S) ONCE DAILY. (Patient not taking: Reported on 10/12/2020) 30 g 0   • carvedilol (COREG) 6.25 MG Tab TAKE ONE TABLET BY MOUTH TWICE DAILY WITH FOOD  (Patient not taking: Reported on 10/12/2020) 180 Tab 0   • potassium chloride SA (KDUR) 20 MEQ Tab CR TAKE ONE TABLET BY MOUTH TWO TIMES A DAY. (Patient not taking: Reported on 10/12/2020) 180 Tab 1   • spironolactone (ALDACTONE) 50 MG Tab Take 1 Tab by mouth every day. (Patient not taking: Reported on 10/12/2020) 90 Tab 2   • allopurinol (ZYLOPRIM) 300 MG Tab Take 1 Tab by mouth every day. (Patient not taking: Reported on 10/12/2020) 90 Tab 3   • levothyroxine (SYNTHROID) 200 MCG Tab Take 1 Tab by mouth Every morning on an empty stomach. (Patient not taking: Reported on 10/12/2020) 90 Tab 3   • nabumetone (RELAFEN) 750 MG Tab Take 1 Tab by mouth 2 times a day, with meals. (Patient not taking: Reported on 10/12/2020) 180 Tab 3   • " furosemide (LASIX) 20 MG Tab Take 1 Tab by mouth every day. (Patient not taking: Reported on 10/12/2020) 90 Tab 3   • pantoprazole (PROTONIX) 40 MG Tablet Delayed Response Take 1 Tab by mouth 2 times a day. (Patient not taking: Reported on 10/12/2020) 180 Tab 3   • Diclofenac Sodium 1 % Gel      • venlafaxine ER (EFFEXOR) 225 MG TABLET SR 24 HR tablet Take 1 Tab by mouth every day. (Patient not taking: Reported on 10/12/2020) 90 Tab 3     No current facility-administered medications for this visit.          Physical Exam:   Gen:           Alert and oriented, No apparent distress. Mood and affect appropriate, normal interaction with provider.  Eyes:          sclere white, conjunctive moist.  Hearing:     Grossly intact.  Dentition:    Fair dentition.  Oropharynx:   Tongue normal, posterior pharynx without erythema or exudate.  Neck:        Supple, trachea midline, no masses.  Respiratory Effort: No intercostal retractions or use of accessory muscles.   Lung Auscultation:      Diminished; no rales, rhonchi or wheezing.  CV:            Regular rate and rhythm.  Mild lower extremity edema. No murmurs, rubs or gallops.  Digits, Nails, Ext: No clubbing, cyanosis, petechiae, or nodes.   Skin:        No rashes, lesions or ulcers noted on exposed skin    surfaces.                     Assessment:  1. Nocturnal hypoxia     2. Seasonal allergies     3. BMI 40.0-44.9, adult (HCC)     4. Mild intermittent asthma without complication         Immunizations:    Flu: 10/13/2020  Pneumovax 23: 8/22/2019  Prevnar 13: 1/5/2017    Plan:  70 y.o. year old female here today for follow-up on mild asthma.  Patient last seen in clinic on 4/10/2020.  Patient is a former smoker with reported quit date in 2007 and 43-pack-year history.  Continued abstinence advised.    Pertinent past medical history KERRI intolerant to CPAP, restless leg syndrome, hiatus hernia syndrome, excessive sweating, anxiety, nocturnal hypoxia, fibromyalgia, peripheral  neuropathy, atrophic rhinitis, GERD, polymyalgia rheumatica.  Type 2 diabetes.    Reviewed in clinic vitals including blood pressure 144/90, heart rate is 71, O2 sat of 93% on room air. Patient's body mass index is 42.1 kg/m². Exercise and nutrition counseling were performed at this visit.  Blood pressure was not elevated at last visit.  Monitor.    Elevated BMI: Discussion included impact of central adiposity on pulmonary function.    Reviewed home medication regimen including albuterol, pantoprazole.  Patient reports using albuterol 1 time in the last 6 months.  Patient wears 3 L O2 with exertion and at night.    Nocturnal hypoxia: Wears oxygen at 3 L with stated benefit at night and with exertion  Seasonal allergies: Uses over-the-counter antihistamines when needed  Mild intermittent asthma: Currently not requiring maintenance inhaler and only rare use of rescue inhaler.  Monitor    No recent chest imaging, chest x-ray obtained 7/26/2018 demonstrated slight hypoinflation and mild bibasilar atelectasis.    Patient did have a nuclear medicine cardiac stress test in 2018 demonstrating no evidence of significant jeopardized viable myocardium or prior MI, normal left ventricular wall motion, LVEF estimated 68%, negative stress ECG for ischemia.    Reviewed most recent pulmonary function test obtained 10/12/2020 demonstrating FEV1 2.16 L or 94% predicted, FVC of 2.51 L or 85% predicted, FEV1/FVC ratio of 86, no significant postbronchodilator change, residual volume 83% predicted, TLC 83% predicted, DLCO 105% predicted.  Baseline spirometry shows normal airflows, lung volumes low normal, DLCO within normal limits, per pulmonologist interpretation normal PFT with normal flow volume loop however this does not rule out reactive airway disease.    Reviewed current COVID-19 precautions including wearing mask, social distancing, frequent handwashing, recommended flu shot.    Patient to follow-up in 1 year, sooner if  needed.    This dictation was created using voice recognition software. The accuracy of the dictation is limited to the abilities of the software. I expect there may be some errors of grammar and possibly content.

## 2020-10-13 ENCOUNTER — OFFICE VISIT (OUTPATIENT)
Dept: MEDICAL GROUP | Facility: MEDICAL CENTER | Age: 70
End: 2020-10-13
Payer: MEDICARE

## 2020-10-13 ENCOUNTER — HOSPITAL ENCOUNTER (OUTPATIENT)
Dept: LAB | Facility: MEDICAL CENTER | Age: 70
End: 2020-10-13
Attending: FAMILY MEDICINE
Payer: MEDICARE

## 2020-10-13 VITALS
TEMPERATURE: 97.8 F | DIASTOLIC BLOOD PRESSURE: 80 MMHG | BODY MASS INDEX: 43.32 KG/M2 | SYSTOLIC BLOOD PRESSURE: 138 MMHG | WEIGHT: 260 LBS | OXYGEN SATURATION: 94 % | RESPIRATION RATE: 16 BRPM | HEIGHT: 65 IN | HEART RATE: 74 BPM

## 2020-10-13 DIAGNOSIS — E11.9 CONTROLLED TYPE 2 DIABETES MELLITUS WITHOUT COMPLICATION, WITHOUT LONG-TERM CURRENT USE OF INSULIN (HCC): ICD-10-CM

## 2020-10-13 DIAGNOSIS — K21.9 GASTROESOPHAGEAL REFLUX DISEASE WITHOUT ESOPHAGITIS: ICD-10-CM

## 2020-10-13 DIAGNOSIS — E55.9 VITAMIN D DEFICIENCY: ICD-10-CM

## 2020-10-13 DIAGNOSIS — E03.4 HYPOTHYROIDISM DUE TO ACQUIRED ATROPHY OF THYROID: ICD-10-CM

## 2020-10-13 DIAGNOSIS — E78.5 DYSLIPIDEMIA, GOAL LDL BELOW 130: ICD-10-CM

## 2020-10-13 DIAGNOSIS — K57.32 SIGMOID DIVERTICULITIS: ICD-10-CM

## 2020-10-13 DIAGNOSIS — I10 ESSENTIAL HYPERTENSION: ICD-10-CM

## 2020-10-13 DIAGNOSIS — Z23 NEED FOR IMMUNIZATION AGAINST INFLUENZA: ICD-10-CM

## 2020-10-13 DIAGNOSIS — Z78.0 POSTMENOPAUSAL STATUS: ICD-10-CM

## 2020-10-13 LAB
25(OH)D3 SERPL-MCNC: 47 NG/ML (ref 30–100)
ALBUMIN SERPL BCP-MCNC: 4.1 G/DL (ref 3.2–4.9)
ALBUMIN/GLOB SERPL: 1.1 G/DL
ALP SERPL-CCNC: 123 U/L (ref 30–99)
ALT SERPL-CCNC: 16 U/L (ref 2–50)
ANION GAP SERPL CALC-SCNC: 16 MMOL/L (ref 7–16)
AST SERPL-CCNC: 18 U/L (ref 12–45)
BASOPHILS # BLD AUTO: 0.4 % (ref 0–1.8)
BASOPHILS # BLD: 0.04 K/UL (ref 0–0.12)
BILIRUB SERPL-MCNC: 0.8 MG/DL (ref 0.1–1.5)
BUN SERPL-MCNC: 14 MG/DL (ref 8–22)
CALCIUM SERPL-MCNC: 9.5 MG/DL (ref 8.5–10.5)
CHLORIDE SERPL-SCNC: 99 MMOL/L (ref 96–112)
CHOLEST SERPL-MCNC: 266 MG/DL (ref 100–199)
CO2 SERPL-SCNC: 23 MMOL/L (ref 20–33)
CREAT SERPL-MCNC: 0.83 MG/DL (ref 0.5–1.4)
CREAT UR-MCNC: 107.88 MG/DL
EOSINOPHIL # BLD AUTO: 0.15 K/UL (ref 0–0.51)
EOSINOPHIL NFR BLD: 1.3 % (ref 0–6.9)
ERYTHROCYTE [DISTWIDTH] IN BLOOD BY AUTOMATED COUNT: 52.6 FL (ref 35.9–50)
EST. AVERAGE GLUCOSE BLD GHB EST-MCNC: 134 MG/DL
GLOBULIN SER CALC-MCNC: 3.7 G/DL (ref 1.9–3.5)
GLUCOSE SERPL-MCNC: 100 MG/DL (ref 65–99)
HBA1C MFR BLD: 6.3 % (ref 0–5.6)
HCT VFR BLD AUTO: 46.6 % (ref 37–47)
HDLC SERPL-MCNC: 79 MG/DL
HGB BLD-MCNC: 15.1 G/DL (ref 12–16)
IMM GRANULOCYTES # BLD AUTO: 0.04 K/UL (ref 0–0.11)
IMM GRANULOCYTES NFR BLD AUTO: 0.4 % (ref 0–0.9)
LDLC SERPL CALC-MCNC: 157 MG/DL
LYMPHOCYTES # BLD AUTO: 2.82 K/UL (ref 1–4.8)
LYMPHOCYTES NFR BLD: 25.3 % (ref 22–41)
MCH RBC QN AUTO: 30.4 PG (ref 27–33)
MCHC RBC AUTO-ENTMCNC: 32.4 G/DL (ref 33.6–35)
MCV RBC AUTO: 94 FL (ref 81.4–97.8)
MICROALBUMIN UR-MCNC: 2.1 MG/DL
MICROALBUMIN/CREAT UR: 19 MG/G (ref 0–30)
MONOCYTES # BLD AUTO: 0.57 K/UL (ref 0–0.85)
MONOCYTES NFR BLD AUTO: 5.1 % (ref 0–13.4)
NEUTROPHILS # BLD AUTO: 7.51 K/UL (ref 2–7.15)
NEUTROPHILS NFR BLD: 67.5 % (ref 44–72)
NRBC # BLD AUTO: 0 K/UL
NRBC BLD-RTO: 0 /100 WBC
PLATELET # BLD AUTO: 205 K/UL (ref 164–446)
PMV BLD AUTO: 11.4 FL (ref 9–12.9)
POTASSIUM SERPL-SCNC: 3.9 MMOL/L (ref 3.6–5.5)
PROT SERPL-MCNC: 7.8 G/DL (ref 6–8.2)
RBC # BLD AUTO: 4.96 M/UL (ref 4.2–5.4)
SODIUM SERPL-SCNC: 138 MMOL/L (ref 135–145)
T4 FREE SERPL-MCNC: 0.16 NG/DL (ref 0.93–1.7)
TRIGL SERPL-MCNC: 150 MG/DL (ref 0–149)
TSH SERPL DL<=0.005 MIU/L-ACNC: 52.6 UIU/ML (ref 0.38–5.33)
WBC # BLD AUTO: 11.1 K/UL (ref 4.8–10.8)

## 2020-10-13 PROCEDURE — 82570 ASSAY OF URINE CREATININE: CPT

## 2020-10-13 PROCEDURE — 36415 COLL VENOUS BLD VENIPUNCTURE: CPT

## 2020-10-13 PROCEDURE — 80061 LIPID PANEL: CPT

## 2020-10-13 PROCEDURE — 82043 UR ALBUMIN QUANTITATIVE: CPT

## 2020-10-13 PROCEDURE — 85025 COMPLETE CBC W/AUTO DIFF WBC: CPT

## 2020-10-13 PROCEDURE — 90662 IIV NO PRSV INCREASED AG IM: CPT | Performed by: FAMILY MEDICINE

## 2020-10-13 PROCEDURE — 80053 COMPREHEN METABOLIC PANEL: CPT

## 2020-10-13 PROCEDURE — G0008 ADMIN INFLUENZA VIRUS VAC: HCPCS | Performed by: FAMILY MEDICINE

## 2020-10-13 PROCEDURE — 84439 ASSAY OF FREE THYROXINE: CPT

## 2020-10-13 PROCEDURE — 99214 OFFICE O/P EST MOD 30 MIN: CPT | Mod: 25 | Performed by: FAMILY MEDICINE

## 2020-10-13 PROCEDURE — 84443 ASSAY THYROID STIM HORMONE: CPT

## 2020-10-13 PROCEDURE — 82306 VITAMIN D 25 HYDROXY: CPT

## 2020-10-13 PROCEDURE — 83036 HEMOGLOBIN GLYCOSYLATED A1C: CPT

## 2020-10-13 RX ORDER — PANTOPRAZOLE SODIUM 40 MG/1
40 TABLET, DELAYED RELEASE ORAL 2 TIMES DAILY
Qty: 180 TAB | Refills: 3 | Status: SHIPPED | OUTPATIENT
Start: 2020-10-13 | End: 2020-10-13 | Stop reason: SDUPTHER

## 2020-10-13 RX ORDER — CIPROFLOXACIN 500 MG/1
500 TABLET, FILM COATED ORAL 2 TIMES DAILY
Qty: 10 TAB | Refills: 0 | Status: SHIPPED | OUTPATIENT
Start: 2020-10-13 | End: 2020-10-18

## 2020-10-13 ASSESSMENT — FIBROSIS 4 INDEX: FIB4 SCORE: 0.82

## 2020-10-13 NOTE — PROGRESS NOTES
Chief Complaint   Patient presents with   • Diverticulitis   • Nausea   • Emesis   • Diabetes   • Hyperlipidemia       Subjective:     HPI:   Ashly Meyer presents today with the followin. Sigmoid diverticulitis  CT shows significant sigmoid inflammation.    Constantly nauseated, left and right lower quadrant pain.  Wet burps.  Frequent vomiting.  Dark stool, small amounts of stool. Tolerated the antibiotics but they were very nauseating.  Course of Cipro prescribed again.  Cannot take Augmentin.  - ciprofloxacin (CIPRO) 500 MG Tab; Take 1 Tab by mouth 2 times a day for 5 days.  Dispense: 10 Tab; Refill: 0    2. Gastroesophageal reflux disease without esophagitis  She is taking this 2-3 times per day, not helping very much.    - pantoprazole (PROTONIX) 40 MG Tablet Delayed Response; Take 1 Tab by mouth 2 times a day.  Dispense: 180 Tab; Refill: 3  - CBC WITH DIFFERENTIAL; Future    3. Essential hypertension  Pressure is doing quite well off her medication.  It was a little high yesterday and at urgent care.  She has not been taking her medication for around 3 weeks.   - Comp Metabolic Panel; Future    4. Dyslipidemia, goal LDL below 130  Needs a lipid recheck, lab order discussed and placed.  - Comp Metabolic Panel; Future  - Lipid Profile; Future    5. Controlled type 2 diabetes mellitus without complication, without long-term current use of insulin (HCC)  Needs follow up lab tests, eating erratically, mostly bland and starchy due to the abdominal problem.  - HEMOGLOBIN A1C; Future  - Comp Metabolic Panel; Future  - Lipid Profile; Future  - MICROALBUMIN CREAT RATIO URINE; Future    6. Hypothyroidism due to acquired atrophy of thyroid  Needs follow up.  Not taking regularly for the last two weeks.   - TSH WITH REFLEX TO FT4; Future    7. Need for immunization against influenza  HD administered today  - Influenza Vaccine, High Dose (65+ Only)    8. Vitamin D deficiency  Follow up lab order discussed  and placed.  - VITAMIN D,25 HYDROXY; Future    9. Postmenopausal status  DEXA order placed and discussed.   Will schedule with her mammogram.    - DS-BONE DENSITY STUDY (DEXA); Future    Had stopped most of her medications other than the pantoprazole for 3 weeeks      Patient Active Problem List    Diagnosis Date Noted   • Pre-bariatric surgery psychological evaluation 07/31/2020   • Controlled type 2 diabetes mellitus without complication, without long-term current use of insulin ([facility]) 06/12/2019   • Chronic gout of foot 04/30/2019   • Dry eye syndrome, bilateral    • Rotator cuff syndrome of right shoulder and allied disorders 10/10/2018   • Former smoker 05/30/2018   • Recurrent major depressive disorder, in partial remission ([facility]) 03/29/2018   • Vitamin D deficiency 12/28/2017   • Morbid obesity with BMI of 40.0-44.9, adult ([facility])    • Neural foraminal stenosis of cervical spine 05/18/2017   • Tinnitus of both ears 05/18/2017   • Dyslipidemia, goal LDL below 130 01/06/2017   • Nonalcoholic fatty liver disease 01/05/2017   • Disease of accessory sinus 10/03/2016   • Atrophic rhinitis 10/03/2016   • Deviated nasal septum 08/01/2016   • Menopausal symptoms 04/28/2016   • Lumbar facet arthropathy 12/09/2015   • Chronic pain 11/18/2015   • Candidal intertrigo 06/01/2015   • Elevated sed rate 12/09/2014   • Mild intermittent asthma without complication    • H/O fibromyalgia 03/11/2014   • KERRI (obstructive sleep apnea) 03/11/2014   • Acute idiopathic gout of right foot 03/11/2014   • Menopause 03/11/2014   • Peripheral neuropathy    • Rectocele 11/25/2013   • Pelvic floor dysfunction    • Chronic constipation 10/02/2013   • Nocturnal hypoxia    • Cervical stenosis of spinal canal 11/01/2011   • Lumbar radiculopathy 11/01/2011   • Migraine without aura    • Carpal tunnel syndrome 09/05/2011   • Ulnar neuropathy 09/05/2011   • Seasonal allergies 07/08/2011   • Polymyalgia rheumatica (HCC) 05/06/2011   • GERD  (gastroesophageal reflux disease) 12/10/2010   • Osteoarthritis of multiple joints 04/13/2010   • Eczema, dyshidrotic 08/03/2009   • Essential hypertension 05/20/2009   • Hypothyroidism due to acquired atrophy of thyroid 05/20/2009       Current medicines (including changes today)  Current Outpatient Medications   Medication Sig Dispense Refill   • pantoprazole (PROTONIX) 40 MG Tablet Delayed Response Take 1 Tab by mouth 2 times a day. 180 Tab 3   • ciprofloxacin (CIPRO) 500 MG Tab Take 1 Tab by mouth 2 times a day for 5 days. 10 Tab 0   • TraMADol HCl 100 MG CAPSULE SR 24 HR Take  by mouth.     • gabapentin (NEURONTIN) 300 MG Cap Take 2 Caps by mouth 3 times a day. (Patient not taking: Reported on 10/12/2020) 540 Cap 2   • ketoconazole (NIZORAL) 2 % Cream APPLY TO AFFECTED AREA(S) ONCE DAILY. (Patient not taking: Reported on 10/12/2020) 30 g 0   • carvedilol (COREG) 6.25 MG Tab TAKE ONE TABLET BY MOUTH TWICE DAILY WITH FOOD  (Patient not taking: Reported on 10/12/2020) 180 Tab 0   • potassium chloride SA (KDUR) 20 MEQ Tab CR TAKE ONE TABLET BY MOUTH TWO TIMES A DAY. (Patient not taking: Reported on 10/12/2020) 180 Tab 1   • spironolactone (ALDACTONE) 50 MG Tab Take 1 Tab by mouth every day. (Patient not taking: Reported on 10/12/2020) 90 Tab 2   • allopurinol (ZYLOPRIM) 300 MG Tab Take 1 Tab by mouth every day. (Patient not taking: Reported on 10/12/2020) 90 Tab 3   • levothyroxine (SYNTHROID) 200 MCG Tab Take 1 Tab by mouth Every morning on an empty stomach. (Patient not taking: Reported on 10/12/2020) 90 Tab 3   • nabumetone (RELAFEN) 750 MG Tab Take 1 Tab by mouth 2 times a day, with meals. (Patient not taking: Reported on 10/12/2020) 180 Tab 3   • furosemide (LASIX) 20 MG Tab Take 1 Tab by mouth every day. (Patient not taking: Reported on 10/12/2020) 90 Tab 3   • albuterol (PROAIR HFA) 108 (90 Base) MCG/ACT Aero Soln inhalation aerosol Inhale 2 Puffs by mouth every 6 hours as needed for Shortness of Breath. 1  "Inhaler 6   • Diclofenac Sodium 1 % Gel      • venlafaxine ER (EFFEXOR) 225 MG TABLET SR 24 HR tablet Take 1 Tab by mouth every day. (Patient not taking: Reported on 10/12/2020) 90 Tab 3     No current facility-administered medications for this visit.        Allergies   Allergen Reactions   • Lamictal Rash and Swelling     \"hot\", unable to function.  Severe rash, scarring   • Sulfa Drugs Hives, Shortness of Breath and Anxiety     Hard breathing, shortness of breath   • Butalbital-Acetaminophen Unspecified     Dizzy, can't walk, hard to focus   • Cefaclor Unspecified     Ceclor causes light headedness and dizziness   • Diphenhydramine Hives     Full body hives   • Iodine Unspecified     Labored breathing   • Morphine Itching     redness   • Penicillins Itching and Swelling     Skin itching and redness   • Savella [Milnacipran Hcl] Unspecified     Muscle aches, feet swelling   • Tizanidine Hcl Unspecified     dizziness   • Amlactin Rash     Lotion causes itching, redness and burnng   • Savella [Kdc:Milnacipran+Ci Pigment Blue 63]      Swelling, bone and muscle pain, sweating, nausea, dizziness, sleeplessness, anxiety   • Ace Inhibitors Cough     Cough     • Tape Unspecified     Paper tape is ok       ROS: As per HPI       Objective:     /80   Pulse 74   Temp 36.6 °C (97.8 °F)   Resp 16   Ht 1.651 m (5' 5\")   Wt 117.9 kg (260 lb)   SpO2 94%  Body mass index is 43.27 kg/m².    Physical Exam:  Constitutional: Well-developed and well-nourished. Not diaphoretic. No distress. Lucid and fluent.  Deep wet burps.  Patient, physician and staff all wearing masks.  Skin: Skin is warm and dry. No rash noted.  Head: Atraumatic without lesions.  Eyes: Conjunctivae and extraocular motions are normal. Pupils are equal, round, and reactive to light. No scleral icterus.   Neck: Supple, trachea midline. No thyromegaly present. No cervical or supraclavicular lymphadenopathy. No JVD or carotid bruits " appreciated  Cardiovascular: Regular rate and rhythm.  Normal S1, S2 without murmur appreciated.  Chest: Effort normal. Clear to auscultation throughout. No adventitious sounds.   Abdomen: Soft, left lower quadrant tender, also deep right sided pelvic pain.  Mild bloating. Active bowel sounds in all four quadrants. No rebound, masses or hepatosplenomegaly.  Mild guarding.  Extremities: No cyanosis, clubbing, erythema, nor edema.   Neurological: Alert and oriented x 3. No tremor appreciated.  Psychiatric:  Behavior, mood, and affect are appropriate.    CT scam shows inflammation and diverticulitis.         Assessment and Plan:     70 y.o. female with the following issues:    1. Sigmoid diverticulitis  ciprofloxacin (CIPRO) 500 MG Tab   2. Gastroesophageal reflux disease without esophagitis  pantoprazole (PROTONIX) 40 MG Tablet Delayed Response    CBC WITH DIFFERENTIAL   3. Essential hypertension  Comp Metabolic Panel   4. Dyslipidemia, goal LDL below 130  Comp Metabolic Panel    Lipid Profile   5. Controlled type 2 diabetes mellitus without complication, without long-term current use of insulin (HCC)  HEMOGLOBIN A1C    Comp Metabolic Panel    Lipid Profile    MICROALBUMIN CREAT RATIO URINE   6. Hypothyroidism due to acquired atrophy of thyroid  TSH WITH REFLEX TO FT4   7. Need for immunization against influenza  Influenza Vaccine, High Dose (65+ Only)   8. Vitamin D deficiency  VITAMIN D,25 HYDROXY   9. Postmenopausal status  DS-BONE DENSITY STUDY (DEXA)         Followup: Return in about 3 weeks (around 11/3/2020), or if symptoms worsen or fail to improve.

## 2020-10-15 RX ORDER — PANTOPRAZOLE SODIUM 40 MG/1
40 TABLET, DELAYED RELEASE ORAL 2 TIMES DAILY
Qty: 180 TAB | Refills: 3 | Status: SHIPPED | OUTPATIENT
Start: 2020-10-15 | End: 2021-12-03 | Stop reason: SDUPTHER

## 2020-11-02 ENCOUNTER — OFFICE VISIT (OUTPATIENT)
Dept: MEDICAL GROUP | Facility: MEDICAL CENTER | Age: 70
End: 2020-11-02
Payer: MEDICARE

## 2020-11-02 VITALS
TEMPERATURE: 97.7 F | BODY MASS INDEX: 42.32 KG/M2 | OXYGEN SATURATION: 97 % | SYSTOLIC BLOOD PRESSURE: 124 MMHG | HEART RATE: 77 BPM | WEIGHT: 254 LBS | DIASTOLIC BLOOD PRESSURE: 68 MMHG | RESPIRATION RATE: 16 BRPM | HEIGHT: 65 IN

## 2020-11-02 DIAGNOSIS — R10.31 GROIN PAIN, CHRONIC, RIGHT: ICD-10-CM

## 2020-11-02 DIAGNOSIS — M54.6 ACUTE RIGHT-SIDED THORACIC BACK PAIN: ICD-10-CM

## 2020-11-02 DIAGNOSIS — G60.8 SENSORY POLYNEUROPATHY: ICD-10-CM

## 2020-11-02 DIAGNOSIS — R60.9 PERIPHERAL EDEMA: ICD-10-CM

## 2020-11-02 DIAGNOSIS — G89.29 GROIN PAIN, CHRONIC, RIGHT: ICD-10-CM

## 2020-11-02 DIAGNOSIS — E03.4 HYPOTHYROIDISM DUE TO ACQUIRED ATROPHY OF THYROID: ICD-10-CM

## 2020-11-02 DIAGNOSIS — G89.4 CHRONIC PAIN SYNDROME: ICD-10-CM

## 2020-11-02 PROCEDURE — 99214 OFFICE O/P EST MOD 30 MIN: CPT | Performed by: FAMILY MEDICINE

## 2020-11-02 ASSESSMENT — FIBROSIS 4 INDEX: FIB4 SCORE: 1.54

## 2020-11-02 NOTE — PROGRESS NOTES
Chief Complaint   Patient presents with   • Muscle Pain   • Hypothyroidism   • Neurological Problem       Subjective:     HPI:   Ashly Meyer presents today with the followin. Groin pain, chronic, right  The right groin pain continues.  Also some shooting pain.  Her neuropathic pain and sciatica seem worse.  She had stopped the gabapentin.  She will restart.    2. Acute right-sided thoracic back pain  Woke with right rib area muscle pain this am.  She stopped all her NSAIDS and reduced her tramadol.  She has chronic inflammation with an adverse response to steroids.  She is taking her PPI regularly.    3. Peripheral edema  She stopped spironolactone and furosemide.  The swelling is no worse, actually better.  She also stopped the carvedilol and her blood pressure and pulse are fine.    4. Chronic pain syndrome  She is getting increased muscle pain and increased sciatica.  She stopped her gabapentin.  She is taking the tramadol only rarely.  Advised resuming the gabapentin.    5. Sensory polyneuropathy  She responded to lamictal prescribed by neurology but had a very severe allergic reaction.  No other treatment has been identified.  She has failed lyrica and savella.  If going back on the gabapentin does not help we will consider the topiramate.    6. Hypothyroidism due to acquired atrophy of thyroid  She had stopped her thyroid medication nearly completely in July.  In October her TSH was quite high and her circulating thyroid hormone was very low.  She has resumed the thyroid medication starting .  She is not feeling better yet.      Patient Active Problem List    Diagnosis Date Noted   • Pre-bariatric surgery psychological evaluation 2020   • Controlled type 2 diabetes mellitus without complication, without long-term current use of insulin ([facility]) 2019   • Chronic gout of foot 2019   • Dry eye syndrome, bilateral    • Rotator cuff syndrome of right shoulder and allied  disorders 10/10/2018   • Former smoker 05/30/2018   • Recurrent major depressive disorder, in partial remission ([facility]) 03/29/2018   • Vitamin D deficiency 12/28/2017   • Morbid obesity with BMI of 40.0-44.9, adult ([facility])    • Neural foraminal stenosis of cervical spine 05/18/2017   • Tinnitus of both ears 05/18/2017   • Dyslipidemia, goal LDL below 130 01/06/2017   • Nonalcoholic fatty liver disease 01/05/2017   • Disease of accessory sinus 10/03/2016   • Atrophic rhinitis 10/03/2016   • Deviated nasal septum 08/01/2016   • Menopausal symptoms 04/28/2016   • Lumbar facet arthropathy 12/09/2015   • Chronic pain 11/18/2015   • Candidal intertrigo 06/01/2015   • Elevated sed rate 12/09/2014   • Mild intermittent asthma without complication    • H/O fibromyalgia 03/11/2014   • KERRI (obstructive sleep apnea) 03/11/2014   • Acute idiopathic gout of right foot 03/11/2014   • Menopause 03/11/2014   • Peripheral neuropathy    • Rectocele 11/25/2013   • Pelvic floor dysfunction    • Chronic constipation 10/02/2013   • Nocturnal hypoxia    • Cervical stenosis of spinal canal 11/01/2011   • Lumbar radiculopathy 11/01/2011   • Migraine without aura    • Carpal tunnel syndrome 09/05/2011   • Ulnar neuropathy 09/05/2011   • Seasonal allergies 07/08/2011   • Polymyalgia rheumatica (HCC) 05/06/2011   • GERD (gastroesophageal reflux disease) 12/10/2010   • Osteoarthritis of multiple joints 04/13/2010   • Eczema, dyshidrotic 08/03/2009   • Essential hypertension 05/20/2009   • Hypothyroidism due to acquired atrophy of thyroid 05/20/2009       Current medicines (including changes today)  Current Outpatient Medications   Medication Sig Dispense Refill   • pantoprazole (PROTONIX) 40 MG Tablet Delayed Response Take 1 Tab by mouth 2 times a day. 180 Tab 3   • gabapentin (NEURONTIN) 300 MG Cap Take 2 Caps by mouth 3 times a day. 540 Cap 2   • levothyroxine (SYNTHROID) 200 MCG Tab Take 1 Tab by mouth Every morning on an empty stomach. 90 Tab 3  "  • TraMADol HCl 100 MG CAPSULE SR 24 HR Take  by mouth.     • albuterol (PROAIR HFA) 108 (90 Base) MCG/ACT Aero Soln inhalation aerosol Inhale 2 Puffs by mouth every 6 hours as needed for Shortness of Breath. 1 Inhaler 6   • Diclofenac Sodium 1 % Gel        No current facility-administered medications for this visit.        Allergies   Allergen Reactions   • Lamictal Rash and Swelling     \"hot\", unable to function.  Severe rash, scarring   • Sulfa Drugs Hives, Shortness of Breath and Anxiety     Hard breathing, shortness of breath   • Butalbital-Acetaminophen Unspecified     Dizzy, can't walk, hard to focus   • Cefaclor Unspecified     Ceclor causes light headedness and dizziness   • Diphenhydramine Hives     Full body hives   • Iodine Unspecified     Labored breathing   • Morphine Itching     redness   • Penicillins Itching and Swelling     Skin itching and redness   • Savella [Milnacipran Hcl] Unspecified     Muscle aches, feet swelling   • Tizanidine Hcl Unspecified     dizziness   • Amlactin Rash     Lotion causes itching, redness and burnng   • Savella [Kdc:Milnacipran+Ci Pigment Blue 63]      Swelling, bone and muscle pain, sweating, nausea, dizziness, sleeplessness, anxiety   • Ace Inhibitors Cough     Cough     • Tape Unspecified     Paper tape is ok       ROS: As per HPI       Objective:     /68   Pulse 77   Temp 36.5 °C (97.7 °F)   Resp 16   Ht 1.651 m (5' 5\")   Wt 115.2 kg (254 lb)   SpO2 97%  Body mass index is 42.27 kg/m².    Physical Exam:  Constitutional: Well-developed and well-nourished. Not diaphoretic. No distress. Lucid and fluent.  Patient, physician and staff all wearing masks.  Skin: Skin is warm and dry. No rash noted.  Head: Atraumatic without lesions.  Eyes: Conjunctivae and extraocular motions are normal. Pupils are equal, round, and reactive to light. No scleral icterus.   Neck: Supple, trachea midline. No thyromegaly present. No cervical or supraclavicular lymphadenopathy. " No JVD or carotid bruits appreciated  Cardiovascular: Regular rate and rhythm.  Normal S1, S2 without murmur appreciated.  Chest: Effort normal. Clear to auscultation throughout. No adventitious sounds.   Abdomen: Soft, non tender, and without distention. Active bowel sounds in all four quadrants. No rebound, guarding, masses or hepatosplenomegaly.  Extremities: No cyanosis, clubbing, erythema, nor edema. Everything tender to touch.  Neurological: Alert and oriented x 3. Movements are slow but symmetric.  Psychiatric:  Behavior, mood, and affect are appropriate.       Assessment and Plan:     70 y.o. female with the following issues:    1. Groin pain, chronic, right     2. Acute right-sided thoracic back pain     3. Peripheral edema     4. Chronic pain syndrome     5. Sensory polyneuropathy     6. Hypothyroidism due to acquired atrophy of thyroid           Followup: Return in about 6 weeks (around 12/14/2020), or if symptoms worsen or fail to improve.

## 2020-11-23 ENCOUNTER — OFFICE VISIT (OUTPATIENT)
Dept: MEDICAL GROUP | Facility: MEDICAL CENTER | Age: 70
End: 2020-11-23
Payer: MEDICARE

## 2020-11-23 VITALS
DIASTOLIC BLOOD PRESSURE: 76 MMHG | RESPIRATION RATE: 16 BRPM | OXYGEN SATURATION: 92 % | WEIGHT: 257 LBS | SYSTOLIC BLOOD PRESSURE: 144 MMHG | HEART RATE: 77 BPM | BODY MASS INDEX: 41.3 KG/M2 | TEMPERATURE: 97.8 F | HEIGHT: 66 IN

## 2020-11-23 DIAGNOSIS — M25.552 BILATERAL HIP PAIN: ICD-10-CM

## 2020-11-23 DIAGNOSIS — R70.0 ELEVATED SED RATE: ICD-10-CM

## 2020-11-23 DIAGNOSIS — E03.4 HYPOTHYROIDISM DUE TO ACQUIRED ATROPHY OF THYROID: ICD-10-CM

## 2020-11-23 DIAGNOSIS — M25.551 BILATERAL HIP PAIN: ICD-10-CM

## 2020-11-23 DIAGNOSIS — M1A.0790 CHRONIC GOUT OF FOOT, UNSPECIFIED CAUSE, UNSPECIFIED LATERALITY: ICD-10-CM

## 2020-11-23 DIAGNOSIS — M79.10 MUSCLE PAIN: ICD-10-CM

## 2020-11-23 PROCEDURE — 99214 OFFICE O/P EST MOD 30 MIN: CPT | Performed by: FAMILY MEDICINE

## 2020-11-23 RX ORDER — METHOCARBAMOL 750 MG/1
750 TABLET, FILM COATED ORAL 4 TIMES DAILY
Qty: 120 TAB | Refills: 2 | Status: SHIPPED
Start: 2020-11-23 | End: 2021-06-03

## 2020-11-23 RX ORDER — TRAMADOL HYDROCHLORIDE 50 MG/1
50 TABLET ORAL EVERY 4 HOURS PRN
Qty: 180 TAB | Refills: 2 | Status: SHIPPED
Start: 2020-11-23 | End: 2020-12-23

## 2020-11-23 ASSESSMENT — FIBROSIS 4 INDEX: FIB4 SCORE: 1.54

## 2020-11-23 NOTE — PROGRESS NOTES
Chief Complaint   Patient presents with   • Back Pain   • Leg Pain       Subjective:     HPI:   Ashly Meyer presents today with the followin. Muscle pain  Patient seen today very frustrated with her severe muscle pain.  She is not on any prescription anti-inflammatory.  Patient had some response to prednisone in the past and then rapidly had poor response.  She does not want to try that again.  She is not on a muscle relaxant.  We will add methocarbamol.  Unfortunately the only treatment that worked in the past was the Lamictal to which she had a very severe rash and moderate anaphylactic response.  She asks if we can try that again and I told her unfortunately that is impossible.  Creatine kinase level ordered again.    2. Elevated sed rate  Patient continues to have a very elevated sed rate.  She has had thorough work-up with neurology and rheumatology with a small fiber neuropathy diagnosis.  She has had arterial biopsies which were negative.    3. Hypothyroidism due to acquired atrophy of thyroid  Patient needs recheck of her TSH.  She states she is taking it regularly.  She had stopped it for several months.  Order discussed and placed.    4. Bilateral hip pain  She continues to have pain of numerous targets.  However, she has quite prominent hip pain bilaterally.  She has been told that is inoperable until she gets her weight under 200 pounds.  She was pursuing bariatric surgery but that has been delayed for various reasons.  Patient states the tramadol does continue to help somewhat.  This is renewed.    5. Chronic gout of foot, unspecified cause, unspecified laterality  Patient has refused to start her allopurinol again.  She is having gout pain.  I have asked her to start this.  She uses the tramadol as needed.  She is very frustrated with her medical situation.  I do not blame her as this is indeed very confusing and frustrating.  PDMP review shows no inconsistencies.      Patient Active  Problem List    Diagnosis Date Noted   • Pre-bariatric surgery psychological evaluation 07/31/2020   • Controlled type 2 diabetes mellitus without complication, without long-term current use of insulin ([facility]) 06/12/2019   • Chronic gout of foot 04/30/2019   • Dry eye syndrome, bilateral    • Rotator cuff syndrome of right shoulder and allied disorders 10/10/2018   • Former smoker 05/30/2018   • Recurrent major depressive disorder, in partial remission ([facility]) 03/29/2018   • Vitamin D deficiency 12/28/2017   • Morbid obesity with BMI of 40.0-44.9, adult ([facility])    • Neural foraminal stenosis of cervical spine 05/18/2017   • Tinnitus of both ears 05/18/2017   • Dyslipidemia, goal LDL below 130 01/06/2017   • Nonalcoholic fatty liver disease 01/05/2017   • Disease of accessory sinus 10/03/2016   • Atrophic rhinitis 10/03/2016   • Deviated nasal septum 08/01/2016   • Menopausal symptoms 04/28/2016   • Lumbar facet arthropathy 12/09/2015   • Chronic pain 11/18/2015   • Candidal intertrigo 06/01/2015   • Elevated sed rate 12/09/2014   • Mild intermittent asthma without complication    • H/O fibromyalgia 03/11/2014   • KERRI (obstructive sleep apnea) 03/11/2014   • Acute idiopathic gout of right foot 03/11/2014   • Menopause 03/11/2014   • Peripheral neuropathy    • Rectocele 11/25/2013   • Pelvic floor dysfunction    • Chronic constipation 10/02/2013   • Nocturnal hypoxia    • Cervical stenosis of spinal canal 11/01/2011   • Lumbar radiculopathy 11/01/2011   • Migraine without aura    • Carpal tunnel syndrome 09/05/2011   • Ulnar neuropathy 09/05/2011   • Seasonal allergies 07/08/2011   • Polymyalgia rheumatica ([facility]) 05/06/2011   • GERD (gastroesophageal reflux disease) 12/10/2010   • Osteoarthritis of multiple joints 04/13/2010   • Eczema, dyshidrotic 08/03/2009   • Essential hypertension 05/20/2009   • Hypothyroidism due to acquired atrophy of thyroid 05/20/2009       Current medicines (including changes today)  Current  "Outpatient Medications   Medication Sig Dispense Refill   • methocarbamol (ROBAXIN) 750 MG Tab Take 1 Tab by mouth 4 times a day. 120 Tab 2   • traMADol (ULTRAM) 50 MG Tab Take 1 Tab by mouth every four hours as needed for Moderate Pain for up to 90 days. 180 Tab 2   • pantoprazole (PROTONIX) 40 MG Tablet Delayed Response Take 1 Tab by mouth 2 times a day. 180 Tab 3   • gabapentin (NEURONTIN) 300 MG Cap Take 2 Caps by mouth 3 times a day. 540 Cap 2   • levothyroxine (SYNTHROID) 200 MCG Tab Take 1 Tab by mouth Every morning on an empty stomach. 90 Tab 3   • albuterol (PROAIR HFA) 108 (90 Base) MCG/ACT Aero Soln inhalation aerosol Inhale 2 Puffs by mouth every 6 hours as needed for Shortness of Breath. 1 Inhaler 6   • Diclofenac Sodium 1 % Gel        No current facility-administered medications for this visit.        Allergies   Allergen Reactions   • Lamictal Rash and Swelling     \"hot\", unable to function.  Severe rash, scarring   • Sulfa Drugs Hives, Shortness of Breath and Anxiety     Hard breathing, shortness of breath   • Butalbital-Acetaminophen Unspecified     Dizzy, can't walk, hard to focus   • Cefaclor Unspecified     Ceclor causes light headedness and dizziness   • Diphenhydramine Hives     Full body hives   • Iodine Unspecified     Labored breathing   • Morphine Itching     redness   • Penicillins Itching and Swelling     Skin itching and redness   • Savella [Milnacipran Hcl] Unspecified     Muscle aches, feet swelling   • Tizanidine Hcl Unspecified     dizziness   • Amlactin Rash     Lotion causes itching, redness and burnng   • Savella [Kdc:Milnacipran+Ci Pigment Blue 63]      Swelling, bone and muscle pain, sweating, nausea, dizziness, sleeplessness, anxiety   • Ace Inhibitors Cough     Cough     • Tape Unspecified     Paper tape is ok       ROS: As per HPI       Objective:     /76 (BP Location: Right arm, Patient Position: Sitting, BP Cuff Size: Adult)   Pulse 77   Temp 36.6 °C (97.8 °F) " "(Temporal)   Resp 16   Ht 1.676 m (5' 6\")   Wt 116.6 kg (257 lb)   SpO2 92%  Body mass index is 41.48 kg/m².    Physical Exam:  Constitutional: Well-developed and well-nourished. Not diaphoretic. No distress. Lucid and fluent. Patient, physician and staff all wearing masks.  Skin: Skin is warm and dry. No rash noted.  Head: Atraumatic without lesions.  Eyes: Conjunctivae and extraocular motions are normal. Pupils are equal, round, and reactive to light. No scleral icterus.   Neck: Supple, trachea midline. No thyromegaly present. No cervical or supraclavicular lymphadenopathy. No JVD appreciated  Cardiovascular: Regular rate and rhythm.  Normal S1, S2 without murmur appreciated.  Chest: Effort normal. Clear to auscultation throughout. No adventitious sounds.   Back: marked spasm of lumbar and thoracic spine, spinous process tenderness as well.  Extremities: No cyanosis, clubbing, erythema, nor edema.   Neurological: Alert and oriented x 3.   Psychiatric:  Behavior, mood, and affect are appropriate.       Assessment and Plan:     70 y.o. female with the following issues:    1. Muscle pain  methocarbamol (ROBAXIN) 750 MG Tab    CREATINE KINASE   2. Elevated sed rate  Sed Rate   3. Hypothyroidism due to acquired atrophy of thyroid  TSH   4. Bilateral hip pain  traMADol (ULTRAM) 50 MG Tab    Consent for Opiate Prescription   5. Chronic gout of foot, unspecified cause, unspecified laterality  traMADol (ULTRAM) 50 MG Tab    Consent for Opiate Prescription     Consequences of Chronic Opiate therapy:  (5 A's)  Analgesia:  improved  Activity:  improved  Adverse Events: None reported or observed  Aberrant Behaviors: None reported or observed  Affect/Mood: good grooming, full facial expressions, normal speech pattern and content, normal thought patterns, normal perception, good insight, normal reasoning, somewhat tearful and frustrated which certainly is normal in this situation.  Last CMP:   October 13 this year, basically " normal  Appropriate Imaging done:   Yes, many times and many blood tests as well          Followup: Return in about 24 days (around 12/17/2020), or if symptoms worsen or fail to improve.

## 2020-12-23 ENCOUNTER — OFFICE VISIT (OUTPATIENT)
Dept: MEDICAL GROUP | Facility: MEDICAL CENTER | Age: 70
End: 2020-12-23
Payer: MEDICARE

## 2020-12-23 VITALS
SYSTOLIC BLOOD PRESSURE: 154 MMHG | HEIGHT: 66 IN | HEART RATE: 67 BPM | WEIGHT: 257.06 LBS | DIASTOLIC BLOOD PRESSURE: 74 MMHG | OXYGEN SATURATION: 95 % | BODY MASS INDEX: 41.31 KG/M2 | TEMPERATURE: 96.9 F

## 2020-12-23 DIAGNOSIS — G61.9 INFLAMMATORY NEUROPATHY (HCC): ICD-10-CM

## 2020-12-23 DIAGNOSIS — M15.9 PRIMARY OSTEOARTHRITIS INVOLVING MULTIPLE JOINTS: ICD-10-CM

## 2020-12-23 DIAGNOSIS — L71.9 ACNE ROSACEA: ICD-10-CM

## 2020-12-23 PROCEDURE — 99213 OFFICE O/P EST LOW 20 MIN: CPT | Performed by: FAMILY MEDICINE

## 2020-12-23 RX ORDER — NABUMETONE 750 MG/1
750 TABLET, FILM COATED ORAL 2 TIMES DAILY WITH MEALS
Qty: 180 TAB | Refills: 3 | Status: SHIPPED | OUTPATIENT
Start: 2020-12-23 | End: 2021-09-03 | Stop reason: SDUPTHER

## 2020-12-23 RX ORDER — DULOXETIN HYDROCHLORIDE 60 MG/1
60 CAPSULE, DELAYED RELEASE ORAL DAILY
Qty: 30 CAP | Refills: 4 | Status: SHIPPED | OUTPATIENT
Start: 2020-12-23 | End: 2021-06-03 | Stop reason: SDUPTHER

## 2020-12-23 ASSESSMENT — FIBROSIS 4 INDEX: FIB4 SCORE: 1.54

## 2020-12-24 NOTE — PROGRESS NOTES
Chief Complaint   Patient presents with   • Pain     All over body, 6 Wk Fv   • Muscle Pain       Subjective:     HPI:   Ashly Meyer presents today with the followin. Primary osteoarthritis involving multiple joints  She is having worsening arthritis especially right hip and left knee.  Hands are quite stiff and painful..  Hurts to bend over or twist torso.  Has been off nabumetone, increased pain, resuming    2. Inflammatory neuropathy (HCC)  She still has persistent neuropathic pain.  Tramadol did not help, stopping.  Will try duloxetine.  If does not work we may triy baclofen, cabemazepine or topiramate.        Patient Active Problem List    Diagnosis Date Noted   • Pre-bariatric surgery psychological evaluation 2020   • Controlled type 2 diabetes mellitus without complication, without long-term current use of insulin ([facility]) 2019   • Chronic gout of foot 2019   • Dry eye syndrome, bilateral    • Rotator cuff syndrome of right shoulder and allied disorders 10/10/2018   • Former smoker 2018   • Recurrent major depressive disorder, in partial remission ([facility]) 2018   • Vitamin D deficiency 2017   • Morbid obesity with BMI of 40.0-44.9, adult ([facility])    • Neural foraminal stenosis of cervical spine 2017   • Tinnitus of both ears 2017   • Dyslipidemia, goal LDL below 130 2017   • Nonalcoholic fatty liver disease 2017   • Disease of accessory sinus 10/03/2016   • Atrophic rhinitis 10/03/2016   • Deviated nasal septum 2016   • Menopausal symptoms 2016   • Lumbar facet arthropathy 2015   • Chronic pain 2015   • Candidal intertrigo 2015   • Elevated sed rate 2014   • Mild intermittent asthma without complication    • H/O fibromyalgia 2014   • KERRI (obstructive sleep apnea) 2014   • Acute idiopathic gout of right foot 2014   • Menopause 2014   • Peripheral neuropathy    • Rectocele 2013  "  • Pelvic floor dysfunction    • Chronic constipation 10/02/2013   • Nocturnal hypoxia    • Cervical stenosis of spinal canal 11/01/2011   • Lumbar radiculopathy 11/01/2011   • Migraine without aura    • Carpal tunnel syndrome 09/05/2011   • Ulnar neuropathy 09/05/2011   • Seasonal allergies 07/08/2011   • Polymyalgia rheumatica (HCC) 05/06/2011   • GERD (gastroesophageal reflux disease) 12/10/2010   • Osteoarthritis of multiple joints 04/13/2010   • Eczema, dyshidrotic 08/03/2009   • Essential hypertension 05/20/2009   • Hypothyroidism due to acquired atrophy of thyroid 05/20/2009       Current medicines (including changes today)  Current Outpatient Medications   Medication Sig Dispense Refill   • nabumetone (RELAFEN) 750 MG Tab Take 1 Tab by mouth 2 times a day, with meals. 180 Tab 3   • DULoxetine (CYMBALTA) 60 MG Cap DR Particles delayed-release capsule Take 1 Cap by mouth every day. 30 Cap 4   • methocarbamol (ROBAXIN) 750 MG Tab Take 1 Tab by mouth 4 times a day. 120 Tab 2   • pantoprazole (PROTONIX) 40 MG Tablet Delayed Response Take 1 Tab by mouth 2 times a day. 180 Tab 3   • gabapentin (NEURONTIN) 300 MG Cap Take 2 Caps by mouth 3 times a day. 540 Cap 2   • levothyroxine (SYNTHROID) 200 MCG Tab Take 1 Tab by mouth Every morning on an empty stomach. 90 Tab 3   • albuterol (PROAIR HFA) 108 (90 Base) MCG/ACT Aero Soln inhalation aerosol Inhale 2 Puffs by mouth every 6 hours as needed for Shortness of Breath. 1 Inhaler 6     No current facility-administered medications for this visit.        Allergies   Allergen Reactions   • Lamictal Rash and Swelling     \"hot\", unable to function.  Severe rash, scarring   • Sulfa Drugs Hives, Shortness of Breath and Anxiety     Hard breathing, shortness of breath   • Butalbital-Acetaminophen Unspecified     Dizzy, can't walk, hard to focus   • Cefaclor Unspecified     Ceclor causes light headedness and dizziness   • Diphenhydramine Hives     Full body hives   • Iodine " "Unspecified     Labored breathing   • Morphine Itching     redness   • Penicillins Itching and Swelling     Skin itching and redness   • Savella [Milnacipran Hcl] Unspecified     Muscle aches, feet swelling   • Tizanidine Hcl Unspecified     dizziness   • Amlactin Rash     Lotion causes itching, redness and burnng   • Savella [Kdc:Milnacipran+Ci Pigment Blue 63]      Swelling, bone and muscle pain, sweating, nausea, dizziness, sleeplessness, anxiety   • Ace Inhibitors Cough     Cough     • Tape Unspecified     Paper tape is ok       ROS: As per HPI       Objective:     /74 (BP Location: Right arm, Patient Position: Sitting, BP Cuff Size: Large adult)   Pulse 67   Temp 36.1 °C (96.9 °F) (Temporal)   Ht 1.676 m (5' 6\")   Wt 116.6 kg (257 lb 0.9 oz)   SpO2 95%  Body mass index is 41.49 kg/m².    Physical Exam:  Constitutional: Well-developed and well-nourished. Not diaphoretic. No distress. Lucid and fluent.  Patient, physician and staff all wearing masks.  Skin: Skin is warm and dry. Facial rash, bright erythema with increased thickness, consistent with rosacea.  Took mask off briefly to do this.  Head: Atraumatic without lesions.  Eyes: Conjunctivae and extraocular motions are normal. Pupils are equal, round, and reactive to light. No scleral icterus.   Neck: Supple, trachea midline. No thyromegaly present. No cervical or supraclavicular lymphadenopathy. No JVD or carotid bruits appreciated  Cardiovascular: Regular rate and rhythm.  Normal S1, S2 without murmur appreciated.  Chest: Effort normal. Clear to auscultation throughout. No adventitious sounds.   Extremities: No cyanosis, clubbing, erythema, nor edema. Right hip painful with increased ROM.  Neurological: Alert and oriented x 3. No tremor noted.  Psychiatric:  Behavior, mood, and affect are appropriate.       Assessment and Plan:     70 y.o. female with the following issues:    1. Primary osteoarthritis involving multiple joints  nabumetone " (RELAFEN) 750 MG Tab   2. Inflammatory neuropathy (HCC)  DULoxetine (CYMBALTA) 60 MG Cap DR Particles delayed-release capsule   3. Acne rosacea  REFERRAL TO DERMATOLOGY         Followup: Return in about 3 months (around 3/23/2021), or if symptoms worsen or fail to improve.

## 2021-01-15 DIAGNOSIS — Z23 NEED FOR VACCINATION: ICD-10-CM

## 2021-02-03 ENCOUNTER — OFFICE VISIT (OUTPATIENT)
Dept: MEDICAL GROUP | Facility: MEDICAL CENTER | Age: 71
End: 2021-02-03
Payer: MEDICARE

## 2021-02-03 VITALS
SYSTOLIC BLOOD PRESSURE: 142 MMHG | WEIGHT: 257.5 LBS | DIASTOLIC BLOOD PRESSURE: 76 MMHG | BODY MASS INDEX: 41.38 KG/M2 | OXYGEN SATURATION: 94 % | TEMPERATURE: 97.4 F | HEIGHT: 66 IN | HEART RATE: 75 BPM

## 2021-02-03 DIAGNOSIS — G61.9 INFLAMMATORY NEUROPATHY (HCC): ICD-10-CM

## 2021-02-03 DIAGNOSIS — B37.2 CANDIDAL INTERTRIGO: ICD-10-CM

## 2021-02-03 DIAGNOSIS — E11.9 CONTROLLED TYPE 2 DIABETES MELLITUS WITHOUT COMPLICATION, WITHOUT LONG-TERM CURRENT USE OF INSULIN (HCC): ICD-10-CM

## 2021-02-03 DIAGNOSIS — J96.11 CHRONIC RESPIRATORY FAILURE WITH HYPOXIA (HCC): ICD-10-CM

## 2021-02-03 DIAGNOSIS — R60.9 PERIPHERAL EDEMA: ICD-10-CM

## 2021-02-03 DIAGNOSIS — F33.41 RECURRENT MAJOR DEPRESSIVE DISORDER, IN PARTIAL REMISSION (HCC): ICD-10-CM

## 2021-02-03 PROCEDURE — 99214 OFFICE O/P EST MOD 30 MIN: CPT | Performed by: FAMILY MEDICINE

## 2021-02-03 RX ORDER — ARIPIPRAZOLE 5 MG/1
5 TABLET ORAL DAILY
Qty: 90 TAB | Refills: 3 | Status: SHIPPED | OUTPATIENT
Start: 2021-02-03 | End: 2021-12-03

## 2021-02-03 RX ORDER — FUROSEMIDE 20 MG/1
20 TABLET ORAL
Qty: 90 TAB | Refills: 3 | Status: SHIPPED | OUTPATIENT
Start: 2021-02-03 | End: 2021-12-03 | Stop reason: SDUPTHER

## 2021-02-03 RX ORDER — NYSTATIN 100000 [USP'U]/G
1 POWDER TOPICAL 3 TIMES DAILY
Qty: 60 G | Refills: 6 | Status: SHIPPED
Start: 2021-02-03 | End: 2021-12-03

## 2021-02-03 ASSESSMENT — FIBROSIS 4 INDEX: FIB4 SCORE: 1.54

## 2021-02-03 NOTE — PROGRESS NOTES
Chief Complaint   Patient presents with   • Rash     Infection Groin Area    • Neurological Problem   • Depression   • Diabetes Mellitus       Subjective:     HPI:   Ashly Meyer presents today with the followin. Candidal intertrigo  The rash under the panniculus and in the groin bilaterally and is consistent with candidal intertrigo.  The ketoconazole cream has not been working for her very well she feels.  We will change to nystatin powder.    2. Inflammatory neuropathy ([facility])  Patient does have history of inflammatory neuropathy.  She had responded to Lamictal but unfortunately could not tolerate it.    3. Recurrent major depressive disorder, in partial remission ([facility])  Patient states the antidepressant is currently not working well.  Over the years we have tried numerous antidepressants with only limited results.  Some of her depression is actually situational.  Discussed that changing to another antidepressant would probably not give her any further benefit.  She agrees.  Adding abilify at low-dose to see if this improves her situation.    4. Chronic respiratory failure with hypoxia ([facility])  Patient continues nocturnal oxygen supplementation.  Her O2 sat here today is mid 90s, quite good..  States if she forgets to use it she does not feel well through the day.    5. BMI 40.0-44.9, adult ([facility])  Patient unfortunately has gained back some weight.  She is going to resume the previous diet which had resulted in nearly a 30 pound weight loss.    6. Controlled type 2 diabetes mellitus without complication, without long-term current use of insulin ([facility])  Patient does need follow-up lab testing, orders are discussed and placed.  She already has some orders placed, glycohemoglobin is ordered in addition.    7. Peripheral edema  Patient does need a renewal on her furosemide.  This is placed.  She states if she misses it she begins to have very significant edema.        Patient Active Problem List     Diagnosis Date Noted   • Pre-bariatric surgery psychological evaluation 07/31/2020   • Controlled type 2 diabetes mellitus without complication, without long-term current use of insulin ([facility]) 06/12/2019   • Chronic gout of foot 04/30/2019   • Dry eye syndrome, bilateral    • Rotator cuff syndrome of right shoulder and allied disorders 10/10/2018   • Former smoker 05/30/2018   • Recurrent major depressive disorder, in partial remission ([facility]) 03/29/2018   • Vitamin D deficiency 12/28/2017   • Morbid obesity with BMI of 40.0-44.9, adult ([facility])    • Neural foraminal stenosis of cervical spine 05/18/2017   • Tinnitus of both ears 05/18/2017   • Dyslipidemia, goal LDL below 130 01/06/2017   • Nonalcoholic fatty liver disease 01/05/2017   • Disease of accessory sinus 10/03/2016   • Atrophic rhinitis 10/03/2016   • Deviated nasal septum 08/01/2016   • Menopausal symptoms 04/28/2016   • Lumbar facet arthropathy 12/09/2015   • Chronic pain 11/18/2015   • Candidal intertrigo 06/01/2015   • Elevated sed rate 12/09/2014   • Mild intermittent asthma without complication    • H/O fibromyalgia 03/11/2014   • KERRI (obstructive sleep apnea) 03/11/2014   • Acute idiopathic gout of right foot 03/11/2014   • Menopause 03/11/2014   • Peripheral neuropathy    • Rectocele 11/25/2013   • Pelvic floor dysfunction    • Chronic constipation 10/02/2013   • Nocturnal hypoxia    • Cervical stenosis of spinal canal 11/01/2011   • Lumbar radiculopathy 11/01/2011   • Migraine without aura    • Carpal tunnel syndrome 09/05/2011   • Ulnar neuropathy 09/05/2011   • Seasonal allergies 07/08/2011   • GERD (gastroesophageal reflux disease) 12/10/2010   • Osteoarthritis of multiple joints 04/13/2010   • Eczema, dyshidrotic 08/03/2009   • Essential hypertension 05/20/2009   • Hypothyroidism due to acquired atrophy of thyroid 05/20/2009       Current medicines (including changes today)  Current Outpatient Medications   Medication Sig Dispense Refill   •  "furosemide (LASIX) 20 MG Tab Take 1 Tab by mouth every day. 90 Tab 3   • nystatin (MYCOSTATIN) powder Apply 1 g topically 3 times a day. 60 g 6   • ARIPiprazole (ABILIFY) 5 MG tablet Take 1 Tab by mouth every day. 90 Tab 3   • nabumetone (RELAFEN) 750 MG Tab Take 1 Tab by mouth 2 times a day, with meals. 180 Tab 3   • DULoxetine (CYMBALTA) 60 MG Cap DR Particles delayed-release capsule Take 1 Cap by mouth every day. 30 Cap 4   • methocarbamol (ROBAXIN) 750 MG Tab Take 1 Tab by mouth 4 times a day. 120 Tab 2   • pantoprazole (PROTONIX) 40 MG Tablet Delayed Response Take 1 Tab by mouth 2 times a day. 180 Tab 3   • gabapentin (NEURONTIN) 300 MG Cap Take 2 Caps by mouth 3 times a day. 540 Cap 2   • levothyroxine (SYNTHROID) 200 MCG Tab Take 1 Tab by mouth Every morning on an empty stomach. 90 Tab 3   • albuterol (PROAIR HFA) 108 (90 Base) MCG/ACT Aero Soln inhalation aerosol Inhale 2 Puffs by mouth every 6 hours as needed for Shortness of Breath. 1 Inhaler 6     No current facility-administered medications for this visit.        Allergies   Allergen Reactions   • Lamictal Rash and Swelling     \"hot\", unable to function.  Severe rash, scarring   • Sulfa Drugs Hives, Shortness of Breath and Anxiety     Hard breathing, shortness of breath   • Butalbital-Acetaminophen Unspecified     Dizzy, can't walk, hard to focus   • Cefaclor Unspecified     Ceclor causes light headedness and dizziness   • Diphenhydramine Hives     Full body hives   • Iodine Unspecified     Labored breathing   • Morphine Itching     redness   • Penicillins Itching and Swelling     Skin itching and redness   • Savella [Milnacipran Hcl] Unspecified     Muscle aches, feet swelling   • Tizanidine Hcl Unspecified     dizziness   • Amlactin Rash     Lotion causes itching, redness and burnng   • Savella [Kdc:Milnacipran+Ci Pigment Blue 63]      Swelling, bone and muscle pain, sweating, nausea, dizziness, sleeplessness, anxiety   • Ace Inhibitors Cough     " "Cough     • Tape Unspecified     Paper tape is ok       ROS: As per HPI       Objective:     /76 (BP Location: Right arm, Patient Position: Sitting, BP Cuff Size: Large adult)   Pulse 75   Temp 36.3 °C (97.4 °F) (Temporal)   Ht 1.676 m (5' 6\")   Wt 117 kg (257 lb 8 oz)   SpO2 94%  Body mass index is 41.56 kg/m².    Physical Exam:  Constitutional: Well-developed and well-nourished. Not diaphoretic. No distress. Lucid and fluent.  Patient, student, physician and staff all wearing masks.  Skin: Skin is warm and dry.  She has her usual inflammatory facial rash..  Head: Atraumatic without lesions.  Eyes: Conjunctivae and extraocular motions are normal. Pupils are equal, round, and reactive to light. No scleral icterus.   Ears:  External ears unremarkable.   Neck: Supple, trachea midline. No thyromegaly present. No cervical or supraclavicular lymphadenopathy. No JVD or carotid bruits appreciated  Cardiovascular: Regular rate and rhythm.  Normal S1, S2 without murmur appreciated.  Chest: Effort normal. Clear to auscultation throughout. No adventitious sounds.   Abdomen: Soft, non tender, and without distention. Active bowel sounds in all four quadrants. No rebound, guarding, masses or hepatosplenomegaly.  Extremities: No cyanosis, clubbing, erythema, nor edema.   Neurological: Alert and oriented x 3.  Some mild coarse tremor bilaterally, stable.  Psychiatric:  Behavior, mood, and affect are appropriate.       Assessment and Plan:     70 y.o. female with the following issues:    1. Candidal intertrigo  nystatin (MYCOSTATIN) powder   2. Inflammatory neuropathy ([facility])     3. Recurrent major depressive disorder, in partial remission ([facility])  ARIPiprazole (ABILIFY) 5 MG tablet   4. Chronic respiratory failure with hypoxia ([facility])     5. BMI 40.0-44.9, adult ([facility])     6. Controlled type 2 diabetes mellitus without complication, without long-term current use of insulin ([facility])  HEMOGLOBIN A1C   7. Peripheral edema  furosemide " (LASIX) 20 MG Tab         Followup: Return in about 7 weeks (around 3/23/2021), or if symptoms worsen or fail to improve.

## 2021-02-04 ENCOUNTER — HOSPITAL ENCOUNTER (OUTPATIENT)
Dept: LAB | Facility: MEDICAL CENTER | Age: 71
End: 2021-02-04
Attending: FAMILY MEDICINE
Payer: MEDICARE

## 2021-02-04 DIAGNOSIS — E03.4 HYPOTHYROIDISM DUE TO ACQUIRED ATROPHY OF THYROID: ICD-10-CM

## 2021-02-04 DIAGNOSIS — R70.0 ELEVATED SED RATE: ICD-10-CM

## 2021-02-04 DIAGNOSIS — M79.10 MUSCLE PAIN: ICD-10-CM

## 2021-02-04 LAB — ERYTHROCYTE [SEDIMENTATION RATE] IN BLOOD BY WESTERGREN METHOD: 27 MM/HOUR (ref 0–30)

## 2021-02-04 PROCEDURE — 84443 ASSAY THYROID STIM HORMONE: CPT

## 2021-02-04 PROCEDURE — 85652 RBC SED RATE AUTOMATED: CPT

## 2021-02-04 PROCEDURE — 36415 COLL VENOUS BLD VENIPUNCTURE: CPT

## 2021-02-04 PROCEDURE — 82550 ASSAY OF CK (CPK): CPT

## 2021-02-05 LAB
CK SERPL-CCNC: 63 U/L (ref 0–154)
TSH SERPL DL<=0.005 MIU/L-ACNC: 0.49 UIU/ML (ref 0.38–5.33)

## 2021-02-10 ENCOUNTER — RX ONLY (OUTPATIENT)
Age: 71
Setting detail: RX ONLY
End: 2021-02-10

## 2021-02-10 ENCOUNTER — APPOINTMENT (RX ONLY)
Dept: URBAN - METROPOLITAN AREA CLINIC 22 | Facility: CLINIC | Age: 71
Setting detail: DERMATOLOGY
End: 2021-02-10

## 2021-02-10 DIAGNOSIS — L71.8 OTHER ROSACEA: ICD-10-CM

## 2021-02-10 PROCEDURE — 99204 OFFICE O/P NEW MOD 45 MIN: CPT

## 2021-02-10 PROCEDURE — ? COUNSELING

## 2021-02-10 PROCEDURE — ? PHOTO-DOCUMENTATION

## 2021-02-10 PROCEDURE — ? ADDITIONAL NOTES

## 2021-02-10 PROCEDURE — ? PRESCRIPTION

## 2021-02-10 RX ORDER — ERYTHROMYCIN 5 MG/G
1 OINTMENT OPHTHALMIC BID
Qty: 1 | Refills: 1 | Status: ERX

## 2021-02-10 RX ORDER — METRONIDAZOLE 7.5 MG/G
1 CREAM TOPICAL QD
Qty: 1 | Refills: 5 | Status: ERX

## 2021-02-10 ASSESSMENT — LOCATION DETAILED DESCRIPTION DERM
LOCATION DETAILED: LEFT LATERAL INFERIOR PRETARSAL REGION
LOCATION DETAILED: RIGHT MEDIAL SUPERIOR TARSAL REGION
LOCATION DETAILED: LEFT INFERIOR CENTRAL MALAR CHEEK

## 2021-02-10 ASSESSMENT — LOCATION ZONE DERM
LOCATION ZONE: FACE
LOCATION ZONE: EYELID

## 2021-02-10 ASSESSMENT — LOCATION SIMPLE DESCRIPTION DERM
LOCATION SIMPLE: LEFT LATERAL INFERIOR PRETARSAL REGION
LOCATION SIMPLE: LEFT CHEEK
LOCATION SIMPLE: RIGHT MEDIAL SUPERIOR TARSAL REGION

## 2021-02-10 ASSESSMENT — SEVERITY ASSESSMENT OVERALL AMONG ALL PATIENTS
IN YOUR EXPERIENCE, AMONG ALL PATIENTS YOU HAVE SEEN WITH THIS CONDITION, HOW SEVERE IS THIS PATIENT'S CONDITION?: MILD TO MODERATE

## 2021-02-10 NOTE — PROCEDURE: ADDITIONAL NOTES
Render Risk Assessment In Note?: no
Detail Level: Detailed
Additional Notes: Patient has an ophthalmologist that she sees regularly for other issues. Discussed to always be in contact with that office if she is developing any significant eye symptoms or visual changes. Mild today.  Discussed having erythromycin on hand if she feels that there are significant drainage or irritation and then to also follow with eye doctor point.

## 2021-03-11 ENCOUNTER — IMMUNIZATION (OUTPATIENT)
Dept: FAMILY PLANNING/WOMEN'S HEALTH CLINIC | Facility: IMMUNIZATION CENTER | Age: 71
End: 2021-03-11
Attending: INTERNAL MEDICINE
Payer: MEDICARE

## 2021-03-11 ENCOUNTER — OFFICE VISIT (OUTPATIENT)
Dept: MEDICAL GROUP | Facility: MEDICAL CENTER | Age: 71
End: 2021-03-11
Payer: MEDICARE

## 2021-03-11 VITALS
HEART RATE: 74 BPM | SYSTOLIC BLOOD PRESSURE: 128 MMHG | RESPIRATION RATE: 16 BRPM | WEIGHT: 261 LBS | TEMPERATURE: 98.6 F | HEIGHT: 66 IN | DIASTOLIC BLOOD PRESSURE: 68 MMHG | BODY MASS INDEX: 41.95 KG/M2 | OXYGEN SATURATION: 96 %

## 2021-03-11 DIAGNOSIS — G61.9 INFLAMMATORY NEUROPATHY (HCC): ICD-10-CM

## 2021-03-11 DIAGNOSIS — G89.29 CHRONIC BILATERAL THORACIC BACK PAIN: ICD-10-CM

## 2021-03-11 DIAGNOSIS — M25.552 BILATERAL HIP PAIN: ICD-10-CM

## 2021-03-11 DIAGNOSIS — Z23 ENCOUNTER FOR VACCINATION: Primary | ICD-10-CM

## 2021-03-11 DIAGNOSIS — M15.9 PRIMARY OSTEOARTHRITIS INVOLVING MULTIPLE JOINTS: ICD-10-CM

## 2021-03-11 DIAGNOSIS — K57.90 DIVERTICULOSIS: ICD-10-CM

## 2021-03-11 DIAGNOSIS — G89.4 CHRONIC PAIN DISORDER: ICD-10-CM

## 2021-03-11 DIAGNOSIS — K21.9 GASTROESOPHAGEAL REFLUX DISEASE WITHOUT ESOPHAGITIS: ICD-10-CM

## 2021-03-11 DIAGNOSIS — M1A.0790 CHRONIC GOUT OF FOOT, UNSPECIFIED CAUSE, UNSPECIFIED LATERALITY: ICD-10-CM

## 2021-03-11 DIAGNOSIS — Z23 NEED FOR VACCINATION: ICD-10-CM

## 2021-03-11 DIAGNOSIS — M54.6 CHRONIC BILATERAL THORACIC BACK PAIN: ICD-10-CM

## 2021-03-11 DIAGNOSIS — M25.551 BILATERAL HIP PAIN: ICD-10-CM

## 2021-03-11 DIAGNOSIS — M48.02 CERVICAL STENOSIS OF SPINAL CANAL: ICD-10-CM

## 2021-03-11 DIAGNOSIS — M79.10 MUSCLE PAIN: ICD-10-CM

## 2021-03-11 DIAGNOSIS — M54.16 LUMBAR RADICULOPATHY: ICD-10-CM

## 2021-03-11 PROCEDURE — 91300 PFIZER SARS-COV-2 VACCINE: CPT | Performed by: INTERNAL MEDICINE

## 2021-03-11 PROCEDURE — 99213 OFFICE O/P EST LOW 20 MIN: CPT | Performed by: FAMILY MEDICINE

## 2021-03-11 PROCEDURE — 0001A PFIZER SARS-COV-2 VACCINE: CPT | Performed by: INTERNAL MEDICINE

## 2021-03-11 RX ORDER — TRAMADOL HYDROCHLORIDE 50 MG/1
50 TABLET ORAL EVERY 4 HOURS PRN
Qty: 180 TABLET | Refills: 2 | Status: SHIPPED | OUTPATIENT
Start: 2021-03-11 | End: 2021-09-03 | Stop reason: SDUPTHER

## 2021-03-11 ASSESSMENT — PATIENT HEALTH QUESTIONNAIRE - PHQ9
1. LITTLE INTEREST OR PLEASURE IN DOING THINGS: NEARLY EVERY DAY
3. TROUBLE FALLING OR STAYING ASLEEP OR SLEEPING TOO MUCH: NEARLY EVERY DAY
2. FEELING DOWN, DEPRESSED, IRRITABLE, OR HOPELESS: NEARLY EVERY DAY
9. THOUGHTS THAT YOU WOULD BE BETTER OFF DEAD, OR OF HURTING YOURSELF: NOT AT ALL
SUM OF ALL RESPONSES TO PHQ QUESTIONS 1-9: 18
4. FEELING TIRED OR HAVING LITTLE ENERGY: NEARLY EVERY DAY
5. POOR APPETITE OR OVEREATING: NEARLY EVERY DAY
7. TROUBLE CONCENTRATING ON THINGS, SUCH AS READING THE NEWSPAPER OR WATCHING TELEVISION: NEARLY EVERY DAY
6. FEELING BAD ABOUT YOURSELF - OR THAT YOU ARE A FAILURE OR HAVE LET YOURSELF OR YOUR FAMILY DOWN: NOT AL ALL
8. MOVING OR SPEAKING SO SLOWLY THAT OTHER PEOPLE COULD HAVE NOTICED. OR THE OPPOSITE, BEING SO FIGETY OR RESTLESS THAT YOU HAVE BEEN MOVING AROUND A LOT MORE THAN USUAL: NOT AT ALL
SUM OF ALL RESPONSES TO PHQ9 QUESTIONS 1 AND 2: 6

## 2021-03-11 ASSESSMENT — FIBROSIS 4 INDEX: FIB4 SCORE: 1.54

## 2021-03-12 NOTE — PROGRESS NOTES
Chief Complaint   Patient presents with   • Back Pain   • Foot Pain   • Muscle Pain       Subjective:     HPI:   Ashly Meyer presents today with the followin. Chronic pain disorder  Patient is having a difficult time dealing with her chronic pain.  This is multifactorial and seems to be pretty much her whole body at this point.  She states today even her eyelids and her eyes hurt.  Patient has been seen by neurology and other specialists in the past.  She has been diagnosed not only with migraine but with various types of neuropathy, particularly small fiber neuropathy.  Patient has untreated sleep apnea as she is intolerant of CPAP.  Wears 3 L oxygen at night for her nocturnal hypoxia.  She had a past diagnosis of polymyalgia rheumatica but did not tolerate the high-dose prednisone well.  Patient underwent extremely thorough evaluation with Dr. Zuñiga in  and .  She was placed on Lamictal which actually helped the neuropathy symptoms considerably.  Unfortunately, she developed a very severe rash to the Lamictal.  This slowly resolved after discontinuation of Lamictal but of course her pain returned.  She requests evaluation with pain management as she feels she cannot stand this level of pain anymore.  I think that is very reasonable and a referral is placed.    2. Chronic bilateral thoracic back pain  She is very frustrated with her chronic bilateral thoracic pain which is not only midline but is paravertebral bilaterally.    3. Inflammatory neuropathy (HCC)  There is a thought that there may be an inflammatory neuropathy though that is less certain.  Test results have been quite inconsistent.    4. Primary osteoarthritis involving multiple joints  Patient does have osteoarthritis of numerous joints and this is also a source of pain.    5. Muscle pain  Patient has persistent muscle pain.  She is frustrated that her current regimen is just not adequate.    6. Cervical stenosis of spinal  canal  Patient does have degenerative changes in the spine and some cervical stenosis with arthritis.  This may be a trigger for a lot of her headaches and neck pain but it does not appear to be sufficient.    7. Lumbar radiculopathy  Patient has lumbar radicular pain.  She is currently on duloxetine and gabapentin for this without much relief.    8. Bilateral hip pain  Patient also has bilateral hip pain.  On recent CT there was bilateral sacroiliac joint osteoarthritis and facet arthropathy in the lower spine.  She most likely needs reimaging of her hips.  Hip x-rays in the past have shown mild degenerative changes but that has not been done for a while.    9. Chronic gout of foot, unspecified cause, unspecified laterality  Patient does have chronic gout which did seem to respond at one point to allopurinol.  Patient feels it did not help enough and discontinued the medication.  Currently we are treating this with Toradol which the patient states helps a little bit but not sufficiently.  This is renewed for now.    10. Gastroesophageal reflux disease without esophagitis  Patient has history of reflux and is due for another endoscopy.  Referral is discussed and placed.  - REFERRAL TO GASTROENTEROLOGY    11. Diverticulosis  Patient has very significant diverticulosis and had an episode of diverticulitis last fall.  She is due for reevaluation with GI.  Referral is discussed and placed.  - REFERRAL TO GASTROENTEROLOGY      Patient Active Problem List    Diagnosis Date Noted   • Pre-bariatric surgery psychological evaluation 07/31/2020   • Controlled type 2 diabetes mellitus without complication, without long-term current use of insulin ([facility]) 06/12/2019   • Chronic gout of foot 04/30/2019   • Dry eye syndrome, bilateral    • Rotator cuff syndrome of right shoulder and allied disorders 10/10/2018   • Former smoker 05/30/2018   • Recurrent major depressive disorder, in partial remission ([facility]) 03/29/2018   • Vitamin D  deficiency 12/28/2017   • Morbid obesity with BMI of 40.0-44.9, adult (HCC)    • Neural foraminal stenosis of cervical spine 05/18/2017   • Tinnitus of both ears 05/18/2017   • Dyslipidemia, goal LDL below 130 01/06/2017   • Nonalcoholic fatty liver disease 01/05/2017   • Disease of accessory sinus 10/03/2016   • Atrophic rhinitis 10/03/2016   • Deviated nasal septum 08/01/2016   • Menopausal symptoms 04/28/2016   • Lumbar facet arthropathy 12/09/2015   • Chronic pain 11/18/2015   • Candidal intertrigo 06/01/2015   • Elevated sed rate 12/09/2014   • Mild intermittent asthma without complication    • H/O fibromyalgia 03/11/2014   • KERRI (obstructive sleep apnea) 03/11/2014   • Acute idiopathic gout of right foot 03/11/2014   • Menopause 03/11/2014   • Peripheral neuropathy    • Rectocele 11/25/2013   • Pelvic floor dysfunction    • Chronic constipation 10/02/2013   • Nocturnal hypoxia    • Cervical stenosis of spinal canal 11/01/2011   • Lumbar radiculopathy 11/01/2011   • Migraine without aura    • Carpal tunnel syndrome 09/05/2011   • Ulnar neuropathy 09/05/2011   • Seasonal allergies 07/08/2011   • GERD (gastroesophageal reflux disease) 12/10/2010   • Osteoarthritis of multiple joints 04/13/2010   • Eczema, dyshidrotic 08/03/2009   • Essential hypertension 05/20/2009   • Hypothyroidism due to acquired atrophy of thyroid 05/20/2009       Current medicines (including changes today)  Current Outpatient Medications   Medication Sig Dispense Refill   • traMADol (ULTRAM) 50 MG Tab Take 1 tablet by mouth every four hours as needed for Moderate Pain for up to 90 days. 180 tablet 2   • furosemide (LASIX) 20 MG Tab Take 1 Tab by mouth every day. 90 Tab 3   • nystatin (MYCOSTATIN) powder Apply 1 g topically 3 times a day. 60 g 6   • ARIPiprazole (ABILIFY) 5 MG tablet Take 1 Tab by mouth every day. 90 Tab 3   • nabumetone (RELAFEN) 750 MG Tab Take 1 Tab by mouth 2 times a day, with meals. 180 Tab 3   • DULoxetine (CYMBALTA) 60  "MG Cap DR Particles delayed-release capsule Take 1 Cap by mouth every day. 30 Cap 4   • methocarbamol (ROBAXIN) 750 MG Tab Take 1 Tab by mouth 4 times a day. 120 Tab 2   • pantoprazole (PROTONIX) 40 MG Tablet Delayed Response Take 1 Tab by mouth 2 times a day. 180 Tab 3   • gabapentin (NEURONTIN) 300 MG Cap Take 2 Caps by mouth 3 times a day. 540 Cap 2   • levothyroxine (SYNTHROID) 200 MCG Tab Take 1 Tab by mouth Every morning on an empty stomach. 90 Tab 3   • albuterol (PROAIR HFA) 108 (90 Base) MCG/ACT Aero Soln inhalation aerosol Inhale 2 Puffs by mouth every 6 hours as needed for Shortness of Breath. 1 Inhaler 6     No current facility-administered medications for this visit.       Allergies   Allergen Reactions   • Lamictal Rash and Swelling     \"hot\", unable to function.  Severe rash, scarring   • Sulfa Drugs Hives, Shortness of Breath and Anxiety     Hard breathing, shortness of breath   • Butalbital-Acetaminophen Unspecified     Dizzy, can't walk, hard to focus   • Cefaclor Unspecified     Ceclor causes light headedness and dizziness   • Diphenhydramine Hives     Full body hives   • Iodine Unspecified     Labored breathing   • Morphine Itching     redness   • Penicillins Itching and Swelling     Skin itching and redness   • Savella [Milnacipran Hcl] Unspecified     Muscle aches, feet swelling   • Tizanidine Hcl Unspecified     dizziness   • Amlactin Rash     Lotion causes itching, redness and burnng   • Savella [Kdc:Milnacipran+Ci Pigment Blue 63]      Swelling, bone and muscle pain, sweating, nausea, dizziness, sleeplessness, anxiety   • Ace Inhibitors Cough     Cough     • Tape Unspecified     Paper tape is ok       ROS: As per HPI       Objective:     /68   Pulse 74   Temp 37 °C (98.6 °F)   Resp 16   Ht 1.676 m (5' 6\")   Wt 118 kg (261 lb)   SpO2 96%  Body mass index is 42.13 kg/m².    Physical Exam:  Constitutional: Well-developed and well-nourished. Not diaphoretic. No distress. Lucid and " fluent.  Patient, physician and staff all wearing masks.  Skin: Skin is warm and dry. No rash noted.  Head: Atraumatic without lesions.  Eyes: Conjunctivae and extraocular motions are normal. Pupils are equal, round, and reactive to light. No scleral icterus.   Ears:  External ears unremarkable.   Neck: limited in extension and flexion due to pain.   No thyromegaly present. No cervical or supraclavicular lymphadenopathy. No JVD or carotid bruits appreciated  Cardiovascular: Regular rate and rhythm.  Normal S1, S2 without murmur appreciated.  Chest: Effort normal. Clear to auscultation throughout. No adventitious sounds.   Abdomen: Soft, non tender, and without distention. Active bowel sounds in all four quadrants. No rebound, guarding, masses or hepatosplenomegaly.  Diffuse tenderness touching all the musculature.  Extremities: No cyanosis, clubbing, erythema, nor edema.   Neurological: Alert and oriented x 3. No termor noted.  Gait is broad based but stable.  Psychiatric:  Behavior, mood, and affect are appropriate.       Assessment and Plan:     70 y.o. female with the following issues:    1. Chronic pain disorder  REFERRAL TO OUTPATIENT INTERVENTIONAL PAIN CLINIC   2. Chronic bilateral thoracic back pain  REFERRAL TO OUTPATIENT INTERVENTIONAL PAIN CLINIC   3. Inflammatory neuropathy (HCC)  REFERRAL TO OUTPATIENT INTERVENTIONAL PAIN CLINIC    traMADol (ULTRAM) 50 MG Tab   4. Primary osteoarthritis involving multiple joints  REFERRAL TO OUTPATIENT INTERVENTIONAL PAIN CLINIC   5. Muscle pain  REFERRAL TO OUTPATIENT INTERVENTIONAL PAIN CLINIC   6. Cervical stenosis of spinal canal  REFERRAL TO OUTPATIENT INTERVENTIONAL PAIN CLINIC   7. Lumbar radiculopathy  REFERRAL TO OUTPATIENT INTERVENTIONAL PAIN CLINIC   8. Bilateral hip pain  traMADol (ULTRAM) 50 MG Tab   9. Chronic gout of foot, unspecified cause, unspecified laterality  traMADol (ULTRAM) 50 MG Tab   10. Gastroesophageal reflux disease without esophagitis   REFERRAL TO GASTROENTEROLOGY   11. Diverticulosis  REFERRAL TO GASTROENTEROLOGY         Followup: Return in about 3 months (around 6/11/2021), or if symptoms worsen or fail to improve.

## 2021-04-02 ENCOUNTER — IMMUNIZATION (OUTPATIENT)
Dept: FAMILY PLANNING/WOMEN'S HEALTH CLINIC | Facility: IMMUNIZATION CENTER | Age: 71
End: 2021-04-02
Attending: INTERNAL MEDICINE
Payer: MEDICARE

## 2021-04-02 DIAGNOSIS — Z23 ENCOUNTER FOR VACCINATION: Primary | ICD-10-CM

## 2021-04-03 PROCEDURE — 0002A PFIZER SARS-COV-2 VACCINE: CPT

## 2021-04-03 PROCEDURE — 91300 PFIZER SARS-COV-2 VACCINE: CPT

## 2021-04-12 ENCOUNTER — HOSPITAL ENCOUNTER (OUTPATIENT)
Dept: RADIOLOGY | Facility: MEDICAL CENTER | Age: 71
End: 2021-04-12
Attending: FAMILY MEDICINE
Payer: MEDICARE

## 2021-04-12 DIAGNOSIS — Z12.31 ENCOUNTER FOR SCREENING MAMMOGRAM FOR BREAST CANCER: ICD-10-CM

## 2021-04-12 DIAGNOSIS — Z78.0 POSTMENOPAUSAL STATUS: ICD-10-CM

## 2021-04-12 PROCEDURE — 77080 DXA BONE DENSITY AXIAL: CPT

## 2021-04-12 PROCEDURE — 77063 BREAST TOMOSYNTHESIS BI: CPT

## 2021-04-14 ENCOUNTER — OFFICE VISIT (OUTPATIENT)
Dept: PHYSICAL MEDICINE AND REHAB | Facility: MEDICAL CENTER | Age: 71
End: 2021-04-14
Payer: MEDICARE

## 2021-04-14 VITALS
HEART RATE: 92 BPM | WEIGHT: 258.6 LBS | BODY MASS INDEX: 40.59 KG/M2 | OXYGEN SATURATION: 93 % | SYSTOLIC BLOOD PRESSURE: 140 MMHG | TEMPERATURE: 97.3 F | DIASTOLIC BLOOD PRESSURE: 80 MMHG | HEIGHT: 67 IN

## 2021-04-14 DIAGNOSIS — M54.2 CHRONIC NECK PAIN: ICD-10-CM

## 2021-04-14 DIAGNOSIS — M1A.0790 CHRONIC GOUT OF FOOT, UNSPECIFIED CAUSE, UNSPECIFIED LATERALITY: ICD-10-CM

## 2021-04-14 DIAGNOSIS — G89.29 CHRONIC BILATERAL LOW BACK PAIN, UNSPECIFIED WHETHER SCIATICA PRESENT: ICD-10-CM

## 2021-04-14 DIAGNOSIS — M54.50 CHRONIC BILATERAL LOW BACK PAIN, UNSPECIFIED WHETHER SCIATICA PRESENT: ICD-10-CM

## 2021-04-14 DIAGNOSIS — G89.29 CHRONIC NECK PAIN: ICD-10-CM

## 2021-04-14 DIAGNOSIS — Z87.39 H/O FIBROMYALGIA: ICD-10-CM

## 2021-04-14 DIAGNOSIS — E11.9 CONTROLLED TYPE 2 DIABETES MELLITUS WITHOUT COMPLICATION, WITHOUT LONG-TERM CURRENT USE OF INSULIN (HCC): ICD-10-CM

## 2021-04-14 DIAGNOSIS — M50.30 DDD (DEGENERATIVE DISC DISEASE), CERVICAL: ICD-10-CM

## 2021-04-14 DIAGNOSIS — M51.36 LUMBAR DEGENERATIVE DISC DISEASE: ICD-10-CM

## 2021-04-14 DIAGNOSIS — G89.4 CHRONIC PAIN SYNDROME: ICD-10-CM

## 2021-04-14 PROCEDURE — 99205 OFFICE O/P NEW HI 60 MIN: CPT | Performed by: PHYSICAL MEDICINE & REHABILITATION

## 2021-04-14 ASSESSMENT — PATIENT HEALTH QUESTIONNAIRE - PHQ9
CLINICAL INTERPRETATION OF PHQ2 SCORE: 6
SUM OF ALL RESPONSES TO PHQ QUESTIONS 1-9: 21
5. POOR APPETITE OR OVEREATING: 3 - NEARLY EVERY DAY

## 2021-04-14 ASSESSMENT — FIBROSIS 4 INDEX: FIB4 SCORE: 1.54

## 2021-04-14 ASSESSMENT — PAIN SCALES - GENERAL: PAINLEVEL: 8=MODERATE-SEVERE PAIN

## 2021-04-14 NOTE — PROGRESS NOTES
New patient note    Physiatry (physical medicine and  Rehabilitation), interventional spine and sports medicine    Date of service: See epic    Chief complaint:   Chief Complaint   Patient presents with   • New Patient     Back pain        Referring provider: Colton Ahn M.D.     HISTORY    HPI: Ashly Meyer 70 y.o.  who presents today with Diagnoses of Chronic pain syndrome, Controlled type 2 diabetes mellitus without complication, without long-term current use of insulin (HCC), Chronic gout of foot, unspecified cause, unspecified laterality, H/O fibromyalgia, Chronic bilateral low back pain, unspecified whether sciatica present, Chronic neck pain, DDD (degenerative disc disease), cervical, and Lumbar degenerative disc disease were pertinent to this visit.    HPI    The patient has diffuse nonfocal pain throughout her entire body.  This is chronic with diffuse tenderness to palpation throughout her body.  She also has degenerative changes in her cervical spine and lumbar spine as well as arthritis in her hips with associated pains.  She has a difficult time telling these apart.  Chronic diffuse 9 out of 10 in intensity pain and constant.    Psychological testing for pain as depression and pain commonly coexist and need to both be treated.  She denies SI/HI    Opioid Risk Score: 5      Interpretation of Opioid Risk Score   Score 0-3 = Low risk of abuse. Do UDS at least once per year.  Score 4-7 = Moderate risk of abuse. Do UDS 1-4 times per year.  Score 8+ = High risk of abuse. Refer to specialist.    PHQ  Depression Screen (PHQ-2/PHQ-9) 8/22/2019 3/11/2021 4/14/2021   PHQ-2 Total Score 4 6 -   PHQ-2 Total Score - - -   PHQ-2 Total Score - - 6   PHQ-9 Total Score - - -   PHQ-9 Total Score 7 18 -   PHQ-9 Total Score - - -   PHQ-9 Total Score - - 21       Interpretation of PHQ-9 Total Score   Score Severity   1-4 No Depression   5-9 Mild Depression   10-14 Moderate Depression   15-19 Moderately Severe  Depression   20-27 Severe Depression              Medical records review:  I reviewed the note from the referring provider Colton Ahn M.D. including the note dated 3/11/2021 with complaints of chronic pain disorder with bilateral thoracic pain and inflammatory neuropathy.  Concern for cervical stenosis and lumbar radiculopathy.  Tramadol was prescribed.  History of polymyalgia rheumatica and previously on high-dose prednisone.  Complaints of whole body pain.  I also reviewed multiple previous notes from Dr. Ahn      ROS:   Review of systems was globally positive, however red flag symptoms are negative.  Red Flags ROS:   Fever, Chills, Sweats: Denies  Involuntary Weight Loss: Denies  Bladder Incontinence: Denies  Bowel Incontinence: denies  Saddle Anesthesia: Denies    All other systems reviewed and negative.       PMHx:   Past Medical History:   Diagnosis Date   • Anginal syndrome ([facility])    • Anxiety    • Arthritis     Everywhere.    • Back pain    • Carotid artery stenosis, unilateral     moderate left carotid artery stenosis   • Chronic pain    • Constipation    • Controlled type 2 diabetes mellitus without complication ([facility])     states borderline   • Controlled type 2 diabetes mellitus without complication, without long-term current use of insulin ([facility]) 6/12/2019   • Daytime sleepiness    • Degenerative disc disease    • Depression 5/20/2009   • Diarrhea     and constipation   • Dizziness    • Dry eye syndrome, bilateral    • Elevated sedimentation rate     history of negative temporal artery biopsy around 2006   • Fatigue    • Fibromyalgia     confirmed by Dr. Ann   • Frequent headaches    • GERD (gastroesophageal reflux disease)     hiatal hernia   • GOUT 5/20/2009   • H/O foot surgery    • Hearing difficulty    • Heart burn    • Hemorrhagic disorder ([facility]) 2016    nose bleeds   • Hiatus hernia syndrome    • History of anemia     none currently   • Hypertension    • Hypothyroidism 5/20/2009   •  Incipient cataract of both eyes    • Insomnia    • Migraine without aura, without mention of intractable migraine without mention of status migrainosus    • Mild intermittent asthma without complication     inhalers as needed   • Mixed dyslipidemia    • Morbid obesity with BMI of 45.0-49.9, adult ([facility])    • Morning headache    • Mumps    • Nasal drainage    • Nocturnal hypoxia     severe, to 69% O2 sat   • Obesity    • Obesity hypoventilation syndrome ([facility]) 5/31/2017   • Pelvic floor dysfunction    • Peripheral neuropathy    • Pleomorphic adenoma    • Polymyalgia rheumatica ([facility])     Dr. Ann   • Restless leg syndrome    • Ringing in ears    • Rotator cuff syndrome of right shoulder and allied disorders 10/10/2018   • S/P tonsillectomy    • Seasonal allergies    • Skin cancer 1990's    skin   • Sleep apnea syndrome     she is not using CPAP, claustrophobia, uses 2-3l at night   • Snoring    • Sore muscles    • Sweat, sweating, excessive    • Swelling of lower extremity    • Urinary incontinence     will not wear a pad   • Vision loss    • Weakness          Current Outpatient Medications on File Prior to Visit   Medication Sig Dispense Refill   • Cholecalciferol (VITAMIN D-3) 125 MCG (5000 UT) Tab Take  by mouth.     • vitamin E 180 mg (400 units) Cap Take 180 mg by mouth every day.     • traMADol (ULTRAM) 50 MG Tab Take 1 tablet by mouth every four hours as needed for Moderate Pain for up to 90 days. 180 tablet 2   • furosemide (LASIX) 20 MG Tab Take 1 Tab by mouth every day. 90 Tab 3   • nystatin (MYCOSTATIN) powder Apply 1 g topically 3 times a day. 60 g 6   • ARIPiprazole (ABILIFY) 5 MG tablet Take 1 Tab by mouth every day. 90 Tab 3   • nabumetone (RELAFEN) 750 MG Tab Take 1 Tab by mouth 2 times a day, with meals. 180 Tab 3   • DULoxetine (CYMBALTA) 60 MG Cap DR Particles delayed-release capsule Take 1 Cap by mouth every day. 30 Cap 4   • methocarbamol (ROBAXIN) 750 MG Tab Take 1 Tab by mouth 4 times a day. 120  Tab 2   • pantoprazole (PROTONIX) 40 MG Tablet Delayed Response Take 1 Tab by mouth 2 times a day. 180 Tab 3   • gabapentin (NEURONTIN) 300 MG Cap Take 2 Caps by mouth 3 times a day. 540 Cap 2   • levothyroxine (SYNTHROID) 200 MCG Tab Take 1 Tab by mouth Every morning on an empty stomach. 90 Tab 3   • albuterol (PROAIR HFA) 108 (90 Base) MCG/ACT Aero Soln inhalation aerosol Inhale 2 Puffs by mouth every 6 hours as needed for Shortness of Breath. 1 Inhaler 6     No current facility-administered medications on file prior to visit.        PSHx:   Past Surgical History:   Procedure Laterality Date   • SHOULDER DECOMPRESSION ARTHROSCOPIC Right 2/1/2019    Procedure: SHOULDER DECOMPRESSION ARTHROSCOPIC - SUBACROMIAL, LABRAL DEBRIDEMENT;  Surgeon: Damaris Knutson M.D.;  Location: [facility];  Service: Orthopedics   • SHOULDER ARTHROSCOPY W/ BICIPITAL TENODESIS REPAIR Right 2/1/2019    Procedure: SHOULDER ARTHROSCOPY W/ BICIPITAL TENOTOMY;  Surgeon: Damaris Knutson M.D.;  Location: [facility];  Service: Orthopedics   • CARPAL TUNNEL RELEASE Right 2/1/2019    Procedure: CARPAL TUNNEL RELEASE;  Surgeon: Damaris Knutson M.D.;  Location: [facility];  Service: Orthopedics   • GASTROSCOPY  2/6/2017    Procedure: GASTROSCOPY;  Surgeon: Pau Foster M.D.;  Location: SURGERY SAME DAY [facility];  Service:    • COLONOSCOPY  2/6/2017    Procedure: COLONOSCOPY;  Surgeon: Pau Foster M.D.;  Location: SURGERY SAME DAY [facility];  Service:    • SEPTAL RECONSTRUCTION  10/3/2016    Procedure: SEPTAL RECONSTRUCTION with  grafts ;  Surgeon: PIETER Dickson M.D.;  Location: SURGERY SAME DAY [facility];  Service:    • SEPTOPLASTY N/A 8/1/2016    Procedure: SEPTOPLASTY;  Surgeon: PIETER Dickson M.D.;  Location: SURGERY SAME DAY [facility];  Service:    • TURBINOPLASTY Bilateral 8/1/2016    Procedure: TURBINOPLASTY;  Surgeon: PIETER Dickson M.D.;  Location:  SURGERY SAME DAY [facility];  Service:    • NASAL FRACTURE REDUCTION OPEN N/A 8/1/2016    Procedure: SEPTAL FRACTURE REDUCTION OPEN;  Surgeon: PIETER Dickson M.D.;  Location: SURGERY SAME DAY [facility];  Service:    • FINE NEEDLE ASPIRATION  11/16/2009    Performed by DA CALLOWAY at ENDOSCOPY [facility]   • COLONOSCOPY  2006    ? unclear date   • BLADDER SUSPENSION  1983   • HYSTERECTOMY, VAGINAL  1983    ovaries are present, excessive bleeding   • TONSILLECTOMY  1953   • HYSTERECTOMY LAPAROSCOPY     • OTHER ABDOMINAL SURGERY      Cholysestectomy   • OTHER ORTHOPEDIC SURGERY  1962/1980    foot surgery    • TONSILLECTOMY         Family history   Family History   Problem Relation Age of Onset   • Heart Disease Mother         Arotic vaulve replacement   • GI Disease Mother         bile duct problem   • Bladder cancer Mother    • GI Disease Sister         celiac   • Arthritis Sister    • Other Sister         gout and lupus   • Arthritis Sister    • Kidney Disease Sister    • GI Disease Sister    • Bladder cancer Maternal Aunt    • Arthritis Maternal Grandmother    • Hypertension Maternal Grandmother    • Heart Disease Maternal Grandmother    • Hypertension Maternal Grandfather    • Heart Disease Maternal Grandfather    • Arthritis Maternal Grandfather    • No Known Problems Paternal Grandmother    • No Known Problems Paternal Grandfather    • Cancer Brother         Larynx   • Cancer Daughter         non hopskins limphoma   • GI Disease Daughter    • Bipolar disorder Daughter    • Seizures Daughter    • Bipolar disorder Daughter          Medications: reviewed on epic.   Outpatient Medications Marked as Taking for the 4/14/21 encounter (Office Visit) with Luis Carlos Garza M.D.   Medication Sig Dispense Refill   • Cholecalciferol (VITAMIN D-3) 125 MCG (5000 UT) Tab Take  by mouth.     • vitamin E 180 mg (400 units) Cap Take 180 mg by mouth every day.     • traMADol (ULTRAM) 50 MG Tab Take 1 tablet  "by mouth every four hours as needed for Moderate Pain for up to 90 days. 180 tablet 2   • furosemide (LASIX) 20 MG Tab Take 1 Tab by mouth every day. 90 Tab 3   • nystatin (MYCOSTATIN) powder Apply 1 g topically 3 times a day. 60 g 6   • ARIPiprazole (ABILIFY) 5 MG tablet Take 1 Tab by mouth every day. 90 Tab 3   • nabumetone (RELAFEN) 750 MG Tab Take 1 Tab by mouth 2 times a day, with meals. 180 Tab 3   • DULoxetine (CYMBALTA) 60 MG Cap DR Particles delayed-release capsule Take 1 Cap by mouth every day. 30 Cap 4   • methocarbamol (ROBAXIN) 750 MG Tab Take 1 Tab by mouth 4 times a day. 120 Tab 2   • pantoprazole (PROTONIX) 40 MG Tablet Delayed Response Take 1 Tab by mouth 2 times a day. 180 Tab 3   • gabapentin (NEURONTIN) 300 MG Cap Take 2 Caps by mouth 3 times a day. 540 Cap 2   • levothyroxine (SYNTHROID) 200 MCG Tab Take 1 Tab by mouth Every morning on an empty stomach. 90 Tab 3   • albuterol (PROAIR HFA) 108 (90 Base) MCG/ACT Aero Soln inhalation aerosol Inhale 2 Puffs by mouth every 6 hours as needed for Shortness of Breath. 1 Inhaler 6        Allergies:   Allergies   Allergen Reactions   • Lamictal Rash and Swelling     \"hot\", unable to function.  Severe rash, scarring   • Sulfa Drugs Hives, Shortness of Breath and Anxiety     Hard breathing, shortness of breath   • Butalbital-Acetaminophen Unspecified     Dizzy, can't walk, hard to focus   • Cefaclor Unspecified     Ceclor causes light headedness and dizziness   • Diphenhydramine Hives     Full body hives   • Iodine Unspecified     Labored breathing   • Morphine Itching     redness   • Penicillins Itching and Swelling     Skin itching and redness   • Savella [Milnacipran Hcl] Unspecified     Muscle aches, feet swelling   • Tizanidine Hcl Unspecified     dizziness   • Amlactin Rash     Lotion causes itching, redness and burnng   • Savella [Kdc:Milnacipran+Ci Pigment Blue 63]      Swelling, bone and muscle pain, sweating, nausea, dizziness, sleeplessness, " anxiety   • Ace Inhibitors Cough     Cough     • Tape Unspecified     Paper tape is ok       Social Hx:   Social History     Socioeconomic History   • Marital status:      Spouse name: Not on file   • Number of children: Not on file   • Years of education: Not on file   • Highest education level: Not on file   Occupational History   • Not on file   Tobacco Use   • Smoking status: Former Smoker     Packs/day: 1.00     Years: 43.00     Pack years: 43.00     Types: Cigarettes     Start date:      Quit date: 3/1/2007     Years since quittin.1   • Smokeless tobacco: Never Used   • Tobacco comment: Started smoking at age 15, continued abstinance    Substance and Sexual Activity   • Alcohol use: No     Comment: no longer drinks   • Drug use: No     Comment: CBD topical pain cream   • Sexual activity: Not Currently     Partners: Male   Other Topics Concern   •  Service No   • Blood Transfusions No   • Caffeine Concern Not Asked   • Occupational Exposure No   • Hobby Hazards No   • Sleep Concern No   • Stress Concern No   • Weight Concern No   • Special Diet No   • Back Care No   • Exercise No   • Bike Helmet No   • Seat Belt Yes   • Self-Exams No   Social History Narrative   • Not on file     Social Determinants of Health     Financial Resource Strain:    • Difficulty of Paying Living Expenses:    Food Insecurity:    • Worried About Running Out of Food in the Last Year:    • Ran Out of Food in the Last Year:    Transportation Needs:    • Lack of Transportation (Medical):    • Lack of Transportation (Non-Medical):    Physical Activity:    • Days of Exercise per Week:    • Minutes of Exercise per Session:    Stress:    • Feeling of Stress :    Social Connections:    • Frequency of Communication with Friends and Family:    • Frequency of Social Gatherings with Friends and Family:    • Attends Orthodoxy Services:    • Active Member of Clubs or Organizations:    • Attends Club or Organization Meetings:   "  • Marital Status:    Intimate Partner Violence:    • Fear of Current or Ex-Partner:    • Emotionally Abused:    • Physically Abused:    • Sexually Abused:          EXAMINATION     Physical Exam:   Vitals: /80 (BP Location: Left arm, Patient Position: Sitting, BP Cuff Size: Adult)   Pulse 92   Temp 36.3 °C (97.3 °F) (Temporal)   Ht 1.689 m (5' 6.5\")   Wt 117 kg (258 lb 9.6 oz)   SpO2 93%     Constitutional:   Body Habitus: Body mass index is 41.11 kg/m².  Cooperation: Fully cooperates with exam  Appearance: Well-groomed, well-nourished, not disheveled     Eyes: No scleral icterus to suggest severe liver disease, no proptosis to suggest severe hyperthyroid    ENT -no obvious auditory deficits, no obvious tongue lesions, tongue midline, no facial droop     Skin -no rashes or lesions noted     Respiratory-  breathing comfortable on room air, no audible wheezing    Cardiovascular- capillary refills less than 2 seconds. No lower extremity edema is noted.     Psychiatric- alert and oriented ×3. Normal affect.     Musculoskeletal and Neuro -     There is diffuse nonfocal tenderness palpation throughout the bilateral arms, cervical thoracic and lumbar spine, hips.  This limits the utility of the palpatory exam.  No significant areas of extra tenderness to palpation to suggest fracture.      Key points for the international standards for neurological classification of spinal cord injury (ISNCSCI) to light touch.     Dermatome R L   C4 2 2   C5 2 2   C6 2 2   C7 2 2   C8 2 2   T1 2 2   T2 2 2                                     Motor Exam Upper Extremities   ? Myotome R L   Shoulder flexion C5 5 5   Elbow flexion C5 5 5   Wrist extension C6 5 5   Elbow extension C7 5 5   Finger flexion C8 5 5   Finger abduction T1 5 5     Reflexes  ?  R L   Biceps  1+ 1+   Brachioradialis  1+ 1+     Vega’s sign negative bilaterally        Thoracic/Lumbar Spine/Sacral Spine/Hips   Inspection: No evidence of atrophy in bilateral " lower extremities throughout       Lumbar spine Special tests  Neuro tension  Straight leg test negative bilaterally    Slump test negative bilaterally        Key points for the international standards for neurological classification of spinal cord injury (ISNCSCI) to light touch.     Dermatome R L                                      L2 2 2   L3 2 2   L4 1 1   L5 1 1   S1 1 1   S2 2 2       Motor Exam Lower Extremities    ? Myotome R L   Hip flexion L2 5 5   Knee extension L3 5 5   Ankle dorsiflexion L4 5 5   Toe extension L5 5 5   Ankle plantarflexion S1 5 5         Reflexes  ?  R L                Patella  1+ 1+   Achilles   1+ 1+       Babinski sign negative bilaterally   Clonus of the ankle negative bilaterally       MEDICAL DECISION MAKING    Medical records review: see under HPI section.     DATA    Labs:   Lab Results   Component Value Date/Time    SODIUM 138 10/13/2020 12:46 PM    POTASSIUM 3.9 10/13/2020 12:46 PM    CHLORIDE 99 10/13/2020 12:46 PM    CO2 23 10/13/2020 12:46 PM    ANION 16.0 10/13/2020 12:46 PM    GLUCOSE 100 (H) 10/13/2020 12:46 PM    BUN 14 10/13/2020 12:46 PM    CREATININE 0.83 10/13/2020 12:46 PM    CREATININE 0.84 04/18/2011 03:41 PM    CALCIUM 9.5 10/13/2020 12:46 PM    ASTSGOT 18 10/13/2020 12:46 PM    ALTSGPT 16 10/13/2020 12:46 PM    TBILIRUBIN 0.8 10/13/2020 12:46 PM    ALBUMIN 4.1 10/13/2020 12:46 PM    ALBUMIN 4.51 07/22/2015 02:28 PM    TOTPROTEIN 7.8 10/13/2020 12:46 PM    TOTPROTEIN 7.90 07/22/2015 02:28 PM    GLOBULIN 3.7 (H) 10/13/2020 12:46 PM    AGRATIO 1.1 10/13/2020 12:46 PM    AGRATIO 1.1 04/18/2011 03:41 PM   ]    Lab Results   Component Value Date/Time    PROTHROMBTM 12.5 05/09/2014 09:22 AM    INR 0.94 05/09/2014 09:22 AM        Lab Results   Component Value Date/Time    WBC 11.1 (H) 10/13/2020 12:46 PM    WBC 10.8 (H) 10/06/2009 08:45 AM    RBC 4.96 10/13/2020 12:46 PM    RBC 4.58 10/06/2009 08:45 AM    HEMOGLOBIN 15.1 10/13/2020 12:46 PM    HEMATOCRIT 46.6  10/13/2020 12:46 PM    MCV 94.0 10/13/2020 12:46 PM    MCV 91 10/06/2009 08:45 AM    MCH 30.4 10/13/2020 12:46 PM    MCH 30.5 10/06/2009 08:45 AM    MCHC 32.4 (L) 10/13/2020 12:46 PM    MPV 11.4 10/13/2020 12:46 PM    NEUTSPOLYS 67.50 10/13/2020 12:46 PM    LYMPHOCYTES 25.30 10/13/2020 12:46 PM    MONOCYTES 5.10 10/13/2020 12:46 PM    EOSINOPHILS 1.30 10/13/2020 12:46 PM    BASOPHILS 0.40 10/13/2020 12:46 PM    HYPOCHROMIA 1+ 02/04/2014 11:38 AM    ANISOCYTOSIS 1+ 08/10/2011 08:20 PM        Lab Results   Component Value Date/Time    HBA1C 6.3 (H) 10/13/2020 12:46 PM        Imaging:   I personally reviewed following images, these are my reads      IMAGING radiology reads. I reviewed the following radiology reads         Results for orders placed during the hospital encounter of 06/04/14   MR-BRAIN-WITH & W/O    Impression 1.  No evidence of acute territorial infarct, intracranial hemorrhage or mass lesion.  2.  Mild diffuse cerebral and cerebellar substance loss.        Results for orders placed during the hospital encounter of 09/01/17   MR-BRAIN-W/O    Impression 1. Age-related cerebral atrophy.    2. Mild periventricular white matter changes consistent with chronic microvascular ischemic gliosis.                          Results for orders placed during the hospital encounter of 03/16/17   MR-CERVICAL SPINE-W/O    Impression 1.  Mild cervical spondylotic changes at C5-6 and C6-7 levels.    2.  Moderate to severe bilateral neural foraminal stenosis at C5-6 and C6-7 levels.           Results for orders placed during the hospital encounter of 03/16/17   MR-LUMBAR SPINE-W/O    Impression 1.  Severe discal and moderate endplate degenerative changes at the L5-S1 level. Minimal endplate degenerative changes noted at multiple levels in the lumbar spine.    2.  Mild lumbar spondylotic changes at the L4-5 level with minimal lumbar spondylotic changes at the L3-4 level.    3.  Minimal cyst cephalad subligamentous disc  extrusion at the L2-3 level causing minimal effacement of the ventral surface of thecal sac.    4.  Borderline central canal stenosis at the L2-3 and L4-5 level secondary to facet arthropathy.            Results for orders placed during the hospital encounter of 03/16/17   MR-THORACIC SPINE-W/O    Impression 1. Minimal thoracic spondylotic changes at the T5-6, T6-7, and a T8-9 levels.    2. Mild discal degenerative changes in the mid and lower thoracic spine.        Results for orders placed during the hospital encounter of 03/16/17   MR-LUMBAR SPINE-W/O    Impression 1.  Severe discal and moderate endplate degenerative changes at the L5-S1 level. Minimal endplate degenerative changes noted at multiple levels in the lumbar spine.    2.  Mild lumbar spondylotic changes at the L4-5 level with minimal lumbar spondylotic changes at the L3-4 level.    3.  Minimal cyst cephalad subligamentous disc extrusion at the L2-3 level causing minimal effacement of the ventral surface of thecal sac.    4.  Borderline central canal stenosis at the L2-3 and L4-5 level secondary to facet arthropathy.            Results for orders placed during the hospital encounter of 06/04/14   MR-MRA NECK-W/O    Impression Unremarkable MR angiogram of the carotid arteries and vertebral basilar system.                Results for orders placed during the hospital encounter of 08/16/10   MR-ORBITS, FACE, NECK-WITH & W/O AND SEQUENCES                                               Results for orders placed during the hospital encounter of 01/19/19   DX-ANKLE 3+ VIEWS LEFT    Impression No evidence of fracture or dislocation.    Soft tissue swelling.              Results for orders placed in visit on 07/26/18   DX-CHEST-2 VIEWS    Impression Low lung volumes with bibasilar atelectasis. No focal consolidation or pleural effusions.          Results for orders placed during the hospital encounter of 08/10/19   DX-ELBOW-COMPLETE 3+ RIGHT    Impression  Findings suspicious for nondisplaced radial neck fracture. Correlate with point tenderness. Follow-up radiographs in 7-10 days may be performed.            Results for orders placed in visit on 10/29/13   DX-FOOT-COMPLETE 3+    Impression Proximal phalanx right fourth toe fracture.    [facility]  [address] [facility] [address]      Results for orders placed during the hospital encounter of 12/06/18   DX-FOREARM RIGHT    Impression Unremarkable RIGHT forearm.        Results for orders placed during the hospital encounter of 01/18/09   DX-HAND 3+    Impression IMPRESSION:     1. NEGATIVE FOR FRACTURE OR MALALIGNMENT.    2. OSTEOARTHRITIS.    BDK/carmen    Read By ELISEO WORTHY MD on Jan 18 2009  1:48PM  : CARMEN Transcription Date: Jan 18 2009  6:54PM  THIS DOCUMENT HAS BEEN ELECTRONICALLY SIGNED BY: ELISEO WORTHY MD on Jan 19 2009  8:06PM             Results for orders placed during the hospital encounter of 11/17/15   DX-HIP-COMPLETE - UNILATERAL 2+ RIGHT    Impression 1. No evidence of acute fracture or dislocation.    2. Mild degenerative changes.      Results for orders placed during the hospital encounter of 09/02/11   DX-HIPS-COMPLETE - BILATERAL 3+    Impression Minimally sclerotic hip joints.  No acute fracture identified.            Results for orders placed in visit on 09/23/13   DX-KNEES-AP BILATERAL STANDING    Impression Negative standing knee radiograph.    [facility] [address] [facility] [address]      Results for orders placed in visit on 05/21/12   DX-KNEE COMPLETE 4+    Impression Unremarkable right knee series.      Results for orders placed during the hospital encounter of 12/06/15   DX-LUMBAR SPINE-2 OR 3 VIEWS    Impression 1.  There has been progression of L4-5 and L5-S1 disc disease and facet disease, right greater than left.        Results for orders placed in visit on 04/23/14   DX-PELVIS-1 OR 2 VIEWS    Impression Mild degenerative changes.                       Results for orders placed during the hospital encounter of 08/10/19   DX-SHOULDER 2+ RIGHT    Impression No acute osseous abnormality.              Results for orders placed during the hospital encounter of 08/10/19   DX-WRIST-COMPLETE 3+ RIGHT    Impression Acute-appearing triquetral fracture.           Diagnosis   Visit Diagnoses     ICD-10-CM   1. Chronic pain syndrome  G89.4   2. Controlled type 2 diabetes mellitus without complication, without long-term current use of insulin (HCC)  E11.9   3. Chronic gout of foot, unspecified cause, unspecified laterality  M1A.0790   4. H/O fibromyalgia  Z87.39   5. Chronic bilateral low back pain, unspecified whether sciatica present  M54.5    G89.29   6. Chronic neck pain  M54.2    G89.29   7. DDD (degenerative disc disease), cervical  M50.30   8. Lumbar degenerative disc disease  M51.36           ASSESSMENT AND PLAN:  Ashly Meyer 70 y.o. female      Ashly was seen today for new patient.    Diagnoses and all orders for this visit:    Chronic pain syndrome  -     REFERRAL TO OUTPATIENT INTERVENTIONAL PAIN CLINIC    Controlled type 2 diabetes mellitus without complication, without long-term current use of insulin (HCC)  -     REFERRAL TO OUTPATIENT INTERVENTIONAL PAIN CLINIC    Chronic gout of foot, unspecified cause, unspecified laterality  -     REFERRAL TO OUTPATIENT INTERVENTIONAL PAIN CLINIC    H/O fibromyalgia  -     REFERRAL TO OUTPATIENT INTERVENTIONAL PAIN CLINIC    Chronic bilateral low back pain, unspecified whether sciatica present  -     REFERRAL TO OUTPATIENT INTERVENTIONAL PAIN CLINIC    Chronic neck pain  -     REFERRAL TO OUTPATIENT INTERVENTIONAL PAIN CLINIC    DDD (degenerative disc disease), cervical  -     REFERRAL TO OUTPATIENT INTERVENTIONAL PAIN CLINIC    Lumbar degenerative disc disease  -     REFERRAL TO OUTPATIENT INTERVENTIONAL PAIN CLINIC        Patient has diffuse nonfocal pain and has had exacerbations with physical therapy in the  past as well as with exercise.  I do not believe that injections would be helpful given the diffuse nature of the patient's pain.  She has failed multiple different medications in the past including NSAIDs, muscle relaxers, opioids, tramadol, steroids.    Referrals:   - I referred the patient to Dr. Maverick Parada for consideration of pain pump and for taking over care of this patient.   -I recommend the patient's PCP Colton Ahn M.D.  to consider referral to psychology and psychiatry      Follow-up: As needed with me      Total time spent on the day of the encounter: 60 minutes.  This includes counseling, care coordination, medical records review.        Please note that this dictation was created using voice recognition software. I have made every reasonable attempt to correct obvious errors but there may be errors of grammar and content that I may have overlooked prior to finalization of this note.      Luis Carlos Garza MD  Physical Medicine and Rehabilitation  Interventional Spine and Sports Physiatry  [facility]           Colton Quintero M.D.

## 2021-04-14 NOTE — Clinical Note
Dear Colton Ahn M.D. ,     Thank you for the referral of Ashly Meyer.  Given the lack of focal pain and her multifactorial pain I do not think that she would benefit from any specific injections.  I referred the patient to Dr. Maverick Parada for consideration of a pain pump.    Please see my note for more details       Should you have any questions or concerns please do not hesitate to contact me.    Luis Carlos Garza M.D.

## 2021-04-22 ENCOUNTER — NURSE TRIAGE (OUTPATIENT)
Dept: MEDICAL GROUP | Facility: PHYSICIAN GROUP | Age: 71
End: 2021-04-22

## 2021-04-22 NOTE — TELEPHONE ENCOUNTER
"Phone Number Called: [phone]    Call outcome: Spoke to patient regarding message below.    Message: Called to follow up with the patient regarding message left about symptoms of side effects from the COVID vaccine.     Has been sick since the middle of last week. She has been vomiting bile and having diarrhea. When she eats she has vomiting.     Patient states her blood pressure was 179/107 HR 78 at 1950. At 0000 /89 HR 71. This is after vomiting. Current BP is 112/68. HR is 50.     Patient concerned that this is a side effect of her COVID vaccine. Patient got the second dose of her COVID vaccine 04/02/2021.     Patient is afebrile. Patient states she has some dizziness that comes and goes.    Advised patient to go to ED based on possible bloody stool. She states she eats and she has \"really dark biley\" stool.     ----- Message from Dwaine Mullins, Med Ass't sent at 4/22/2021 12:26 PM PDT -----    Mayank Valenzuela,       Please give Ashly a call. She had questions about her symptoms regarding her Covid vaccine.    Please and thank you,    Dwaine"

## 2021-04-22 NOTE — TELEPHONE ENCOUNTER
"  Reason for Disposition  • Bloody, black, or tarry bowel movements    Additional Information  • Negative: Chest pain  • Negative: Pain is mainly in upper abdomen (if needed ask: 'is it mainly above the belly button?')  • Negative: Abdominal pain and pregnant > 20 weeks  • Negative: Abdominal pain and pregnant < 20 weeks  • Negative: Passed out (i.e., fainted, collapsed and was not responding)  • Negative: Shock suspected (e.g., cold/pale/clammy skin, too weak to stand, low BP, rapid pulse)  • Negative: Sounds like a life-threatening emergency to the triager  • Negative: Vomiting red blood or black (coffee ground) material    Answer Assessment - Initial Assessment Questions  1. LOCATION: \"Where does it hurt?\"       Groin on her right hand side and goes up her abdomen. If she pushes hard it relieves the pain, but she has rebound tenderness.   2. RADIATION: \"Does the pain shoot anywhere else?\" (e.g., chest, back)      Radiates into her back  3. ONSET: \"When did the pain begin?\" (e.g., minutes, hours or days ago)       04/14-04/15  4. SUDDEN: \"Gradual or sudden onset?\"      Sudden onset  5. PATTERN \"Does the pain come and go, or is it constant?\"     - If constant: \"Is it getting better, staying the same, or worsening?\"       (Note: Constant means the pain never goes away completely; most serious pain is constant and it progresses)      - If intermittent: \"How long does it last?\" \"Do you have pain now?\"      (Note: Intermittent means the pain goes away completely between bouts)      Constant.  6. SEVERITY: \"How bad is the pain?\"  (e.g., Scale 1-10; mild, moderate, or severe)    - MILD (1-3): doesn't interfere with normal activities, abdomen soft and not tender to touch     - MODERATE (4-7): interferes with normal activities or awakens from sleep, tender to touch     - SEVERE (8-10): excruciating pain, doubled over, unable to do any normal activities       7  7. RECURRENT SYMPTOM: \"Have you ever had this type of " "abdominal pain before?\" If so, ask: \"When was the last time?\" and \"What happened that time?\"       Off and On for years  8. CAUSE: \"What do you think is causing the abdominal pain?\"      Does not know  9. RELIEVING/AGGRAVATING FACTORS: \"What makes it better or worse?\" (e.g., movement, antacids, bowel movement)      Pushing on the side makes it better  10. OTHER SYMPTOMS: \"Has there been any vomiting, diarrhea, constipation, or urine problems?\"        Vomiting, Diarrhea, painful urination.   11. PREGNANCY: \"Is there any chance you are pregnant?\" \"When was your last menstrual period?\"        NO    Protocols used: ABDOMINAL PAIN - FEMALE-A-OH    "

## 2021-04-23 ENCOUNTER — APPOINTMENT (OUTPATIENT)
Dept: RADIOLOGY | Facility: MEDICAL CENTER | Age: 71
End: 2021-04-23
Attending: EMERGENCY MEDICINE
Payer: MEDICARE

## 2021-04-23 ENCOUNTER — HOSPITAL ENCOUNTER (EMERGENCY)
Facility: MEDICAL CENTER | Age: 71
End: 2021-04-23
Attending: EMERGENCY MEDICINE
Payer: MEDICARE

## 2021-04-23 VITALS
WEIGHT: 246.91 LBS | SYSTOLIC BLOOD PRESSURE: 156 MMHG | BODY MASS INDEX: 39.68 KG/M2 | HEART RATE: 71 BPM | RESPIRATION RATE: 18 BRPM | OXYGEN SATURATION: 97 % | HEIGHT: 66 IN | DIASTOLIC BLOOD PRESSURE: 78 MMHG | TEMPERATURE: 97.5 F

## 2021-04-23 DIAGNOSIS — R10.84 GENERALIZED ABDOMINAL PAIN: ICD-10-CM

## 2021-04-23 DIAGNOSIS — R91.1 PULMONARY NODULE, RIGHT: ICD-10-CM

## 2021-04-23 DIAGNOSIS — E27.8 LEFT ADRENAL MASS (HCC): ICD-10-CM

## 2021-04-23 LAB
ALBUMIN SERPL BCP-MCNC: 4.6 G/DL (ref 3.2–4.9)
ALBUMIN/GLOB SERPL: 1.2 G/DL
ALP SERPL-CCNC: 147 U/L (ref 30–99)
ALT SERPL-CCNC: 25 U/L (ref 2–50)
ANION GAP SERPL CALC-SCNC: 14 MMOL/L (ref 7–16)
APPEARANCE UR: CLEAR
AST SERPL-CCNC: 23 U/L (ref 12–45)
BASOPHILS # BLD AUTO: 0.5 % (ref 0–1.8)
BASOPHILS # BLD: 0.05 K/UL (ref 0–0.12)
BILIRUB SERPL-MCNC: 1 MG/DL (ref 0.1–1.5)
BILIRUB UR QL STRIP.AUTO: NEGATIVE
BUN SERPL-MCNC: 21 MG/DL (ref 8–22)
CALCIUM SERPL-MCNC: 10.4 MG/DL (ref 8.4–10.2)
CHLORIDE SERPL-SCNC: 99 MMOL/L (ref 96–112)
CO2 SERPL-SCNC: 26 MMOL/L (ref 20–33)
COLOR UR: YELLOW
CREAT SERPL-MCNC: 0.85 MG/DL (ref 0.5–1.4)
EOSINOPHIL # BLD AUTO: 0.09 K/UL (ref 0–0.51)
EOSINOPHIL NFR BLD: 0.9 % (ref 0–6.9)
ERYTHROCYTE [DISTWIDTH] IN BLOOD BY AUTOMATED COUNT: 45.7 FL (ref 35.9–50)
GLOBULIN SER CALC-MCNC: 4 G/DL (ref 1.9–3.5)
GLUCOSE SERPL-MCNC: 124 MG/DL (ref 65–99)
GLUCOSE UR STRIP.AUTO-MCNC: NEGATIVE MG/DL
HCT VFR BLD AUTO: 46.3 % (ref 37–47)
HGB BLD-MCNC: 15.2 G/DL (ref 12–16)
IMM GRANULOCYTES # BLD AUTO: 0.03 K/UL (ref 0–0.11)
IMM GRANULOCYTES NFR BLD AUTO: 0.3 % (ref 0–0.9)
KETONES UR STRIP.AUTO-MCNC: NEGATIVE MG/DL
LEUKOCYTE ESTERASE UR QL STRIP.AUTO: NEGATIVE
LIPASE SERPL-CCNC: 22 U/L (ref 7–58)
LYMPHOCYTES # BLD AUTO: 2.24 K/UL (ref 1–4.8)
LYMPHOCYTES NFR BLD: 21.4 % (ref 22–41)
MCH RBC QN AUTO: 30.2 PG (ref 27–33)
MCHC RBC AUTO-ENTMCNC: 32.8 G/DL (ref 33.6–35)
MCV RBC AUTO: 91.9 FL (ref 81.4–97.8)
MICRO URNS: NORMAL
MONOCYTES # BLD AUTO: 0.72 K/UL (ref 0–0.85)
MONOCYTES NFR BLD AUTO: 6.9 % (ref 0–13.4)
NEUTROPHILS # BLD AUTO: 7.34 K/UL (ref 2–7.15)
NEUTROPHILS NFR BLD: 70 % (ref 44–72)
NITRITE UR QL STRIP.AUTO: NEGATIVE
NRBC # BLD AUTO: 0 K/UL
NRBC BLD-RTO: 0 /100 WBC
PH UR STRIP.AUTO: 5.5 [PH] (ref 5–8)
PLATELET # BLD AUTO: 203 K/UL (ref 164–446)
PMV BLD AUTO: 10.5 FL (ref 9–12.9)
POTASSIUM SERPL-SCNC: 3.7 MMOL/L (ref 3.6–5.5)
PROT SERPL-MCNC: 8.6 G/DL (ref 6–8.2)
PROT UR QL STRIP: NEGATIVE MG/DL
RBC # BLD AUTO: 5.04 M/UL (ref 4.2–5.4)
RBC UR QL AUTO: NEGATIVE
SODIUM SERPL-SCNC: 139 MMOL/L (ref 135–145)
SP GR UR STRIP.AUTO: 1.02
WBC # BLD AUTO: 10.5 K/UL (ref 4.8–10.8)

## 2021-04-23 PROCEDURE — 99284 EMERGENCY DEPT VISIT MOD MDM: CPT

## 2021-04-23 PROCEDURE — 85025 COMPLETE CBC W/AUTO DIFF WBC: CPT

## 2021-04-23 PROCEDURE — 74177 CT ABD & PELVIS W/CONTRAST: CPT | Mod: MG

## 2021-04-23 PROCEDURE — 700117 HCHG RX CONTRAST REV CODE 255: Performed by: EMERGENCY MEDICINE

## 2021-04-23 PROCEDURE — 83690 ASSAY OF LIPASE: CPT

## 2021-04-23 PROCEDURE — 81003 URINALYSIS AUTO W/O SCOPE: CPT

## 2021-04-23 PROCEDURE — 80053 COMPREHEN METABOLIC PANEL: CPT

## 2021-04-23 RX ORDER — ERYTHROMYCIN 5 MG/G
1 OINTMENT OPHTHALMIC 2 TIMES DAILY PRN
Status: SHIPPED | COMMUNITY
Start: 2021-02-10 | End: 2021-04-23

## 2021-04-23 RX ADMIN — IOHEXOL 100 ML: 350 INJECTION, SOLUTION INTRAVENOUS at 12:42

## 2021-04-23 ASSESSMENT — PAIN DESCRIPTION - PAIN TYPE: TYPE: ACUTE PAIN

## 2021-04-23 ASSESSMENT — FIBROSIS 4 INDEX: FIB4 SCORE: 1.54

## 2021-04-23 NOTE — ED PROVIDER NOTES
"ED Provider Note    CHIEF COMPLAINT  Chief Complaint   Patient presents with   • Abdominal Pain   • Nausea/Vomiting/Diarrhea       HPI  Ashly Meyer is a 70 y.o. female who presents with right-sided abdominal pain for about a week now.  She was instructed by her primary care physician to come in for further evaluation.  She notes 1 or 2 episodes of vomiting and diarrhea daily.  Notes that it is \"bile\".  No bloody emesis or stool.  Has some dysuria for about 2 weeks now.  No hematuria.    The patient reports a prior history of hysterectomy, bladder repair, cholecystectomy.  She is uncertain whether or not she had an appendectomy in the past.  She does continue to have bilateral ovaries according to the patient.    The patient notes a prior history of diverticulitis.  Last CT exam was performed on 9/25/2020 and showed diverticulitis.  She was treated with antibiotics.  Has a history of recurrent diverticulitis.    REVIEW OF SYSTEMS  See HPI for further details. All other systems are negative.     PAST MEDICAL HISTORY   has a past medical history of Anginal syndrome ([facility]), Anxiety, Arthritis, Back pain, Carotid artery stenosis, unilateral, Chronic pain, Constipation, Controlled type 2 diabetes mellitus without complication ([facility]), Controlled type 2 diabetes mellitus without complication, without long-term current use of insulin ([facility]) (6/12/2019), Daytime sleepiness, Degenerative disc disease, Depression (5/20/2009), Diarrhea, Dizziness, Dry eye syndrome, bilateral, Elevated sedimentation rate, Fatigue, Fibromyalgia, Frequent headaches, GERD (gastroesophageal reflux disease), GOUT (5/20/2009), H/O foot surgery, Hearing difficulty, Heart burn, Hemorrhagic disorder ([facility]) (2016), Hiatus hernia syndrome, History of anemia, Hypertension, Hypothyroidism (5/20/2009), Incipient cataract of both eyes, Insomnia, Migraine without aura, without mention of intractable migraine without mention of status migrainosus, Mild " intermittent asthma without complication, Mixed dyslipidemia, Morbid obesity with BMI of 45.0-49.9, adult (HCC), Morning headache, Mumps, Nasal drainage, Nocturnal hypoxia, Obesity, Obesity hypoventilation syndrome (HCC) (2017), Pelvic floor dysfunction, Peripheral neuropathy, Pleomorphic adenoma, Polymyalgia rheumatica ([facility]), Restless leg syndrome, Ringing in ears, Rotator cuff syndrome of right shoulder and allied disorders (10/10/2018), S/P tonsillectomy, Seasonal allergies, Skin cancer (), Sleep apnea syndrome, Snoring, Sore muscles, Sweat, sweating, excessive, Swelling of lower extremity, Urinary incontinence, Vision loss, and Weakness.    SOCIAL HISTORY  Social History     Tobacco Use   • Smoking status: Former Smoker     Packs/day: 1.00     Years: 43.00     Pack years: 43.00     Types: Cigarettes     Start date:      Quit date: 3/1/2007     Years since quittin.1   • Smokeless tobacco: Never Used   • Tobacco comment: Started smoking at age 15, continued abstinance    Substance and Sexual Activity   • Alcohol use: Not Currently   • Drug use: No     Comment: CBD topical pain cream   • Sexual activity: Not Currently     Partners: Male       SURGICAL HISTORY   has a past surgical history that includes [facility] stat fine needle aspiration (2009); tonsillectomy (); bladder suspension (); other orthopedic surgery (/); other abdominal surgery; hysterectomy, vaginal (); colonoscopy (); septoplasty (N/A, 2016); turbinoplasty (Bilateral, 2016); nasal fracture reduction open (N/A, 2016); septal reconstruction (10/3/2016); gastroscopy (2017); colonoscopy (2017); hysterectomy laparoscopy; tonsillectomy; shoulder decompression arthroscopic (Right, 2019); shoulder arthroscopy w/ bicipital tenodesis repair (Right, 2019); and carpal tunnel release (Right, 2019).    CURRENT MEDICATIONS  Home Medications    **Home medications have not yet been reviewed  "for this encounter**         ALLERGIES  Allergies   Allergen Reactions   • Lamictal Rash and Swelling     \"hot\", unable to function.  Severe rash, scarring   • Sulfa Drugs Hives, Shortness of Breath and Anxiety     Hard breathing, shortness of breath   • Butalbital-Acetaminophen Unspecified     Dizzy, can't walk, hard to focus   • Cefaclor Unspecified     Ceclor causes light headedness and dizziness   • Diphenhydramine Hives     Full body hives   • Iodine Unspecified     Labored breathing   • Morphine Itching     redness   • Penicillins Itching and Swelling     Skin itching and redness   • Savella [Milnacipran Hcl] Unspecified     Muscle aches, feet swelling   • Tizanidine Hcl Unspecified     dizziness   • Amlactin Rash     Lotion causes itching, redness and burnng   • Savella [Kdc:Milnacipran+Ci Pigment Blue 63]      Swelling, bone and muscle pain, sweating, nausea, dizziness, sleeplessness, anxiety   • Ace Inhibitors Cough     Cough     • Tape Unspecified     Paper tape is ok       PHYSICAL EXAM  VITAL SIGNS: /87   Pulse 69   Temp 36 °C (96.8 °F) (Temporal)   Resp 20   Ht 1.676 m (5' 6\")   Wt 112 kg (246 lb 14.6 oz)   SpO2 93%   BMI 39.85 kg/m²   Pulse ox interpretation: I interpret this pulse ox as normal.  Constitutional: Alert in no apparent distress.  HENT: No signs of trauma, Bilateral external ears normal, Nose normal.   Eyes: Pupils are equal and reactive, Conjunctiva normal, Non-icteric.   Neck: Normal range of motion, No tenderness, Supple, No stridor.   Cardiovascular: Regular rate and rhythm.   Thorax & Lungs: Normal breath sounds, No respiratory distress, No wheezing, No chest tenderness.   Abdomen: Bowel sounds normal, Soft, mild diffuse abdominal greatest on the right side tenderness, No masses, No pulsatile masses. No peritoneal signs.  Skin: Warm, Dry, No erythema, No rash.   Back: No bony tenderness, No CVA tenderness.   Extremities: Intact distal pulses, No edema, No tenderness, No " cyanosis  Musculoskeletal: Good range of motion in all major joints. No tenderness to palpation or major deformities noted.   Neurologic: Alert, No focal deficits noted.       DIAGNOSTIC STUDIES / PROCEDURES      LABS  Labs Reviewed   CBC WITH DIFFERENTIAL - Abnormal; Notable for the following components:       Result Value    MCHC 32.8 (*)     Lymphocytes 21.40 (*)     Neutrophils (Absolute) 7.34 (*)     All other components within normal limits   COMP METABOLIC PANEL - Abnormal; Notable for the following components:    Glucose 124 (*)     Calcium 10.4 (*)     Alkaline Phosphatase 147 (*)     Total Protein 8.6 (*)     Globulin 4.0 (*)     All other components within normal limits   LIPASE   URINALYSIS   ESTIMATED GFR         RADIOLOGY  CT-ABDOMEN-PELVIS WITH   Final Result      1.  Severe distal, mild proximal colonic diverticulosis without evidence of diverticulitis.      2.  No CT evidence of acute appendicitis      3.  Moderate hepatic steatosis      4.  Stable left adrenal gland thickening most likely indicates a small underlying mass, not fully resolved given the small size      5.  Stable lung bases with evidence of scarring, multifocal groundglass likely from prior infection and/or NSIP favored over adenocarcinoma in situ and stable right middle lobe noncalcified nodules measuring up to just under 7 mm long axis      Follow-up recommendations as below   Ground Glass: CT at 6-12 months to confirm persistence, then CT every 2 years until 5 years      Part Solid: CT at 3-6 months to confirm persistence. If unchanged and solid component remains less than 6 mm, annual CT should be performed for 5 years.      Comments: In practice, part-solid nodules cannot be defined as such until equal to or greater than 6 mm, and nodules less than 6 mm do not usually require follow-up. Persistent part-solid nodules with solid components equal to or greater than 6 mm should    be considered highly suspicious.      Note: These  recommendations do not apply to lung cancer screening, patients with immunosuppression, or patients with known primary cancer.      Fleischner Society 2017 Guidelines for Management of Incidentally Detected Pulmonary Nodules in Adults            COURSE & MEDICAL DECISION MAKING    Medications   iohexol (OMNIPAQUE) 350 mg/mL (100 mL Intravenous Given 4/23/21 1242)       Pertinent Labs & Imaging studies reviewed. (See chart for details)  70 y.o. female presenting with mild diffuse abdominal pain symptoms.  Greatest on the right side.  Has been ongoing for the past week or so.  No active vomiting, diarrhea.  Some dysuria without hematuria.    On exam, vital signs are unremarkable.  Patient does have right-sided abdominal tenderness.    Laboratory studies were performed.  No evidence of urinary tract infection.  No leukocytosis.  No significant metabolic acidosis or significant metabolic abnormalities.  Patient has a prior history of cholecystectomy.    Given the patient's age and prior medical history, CT abdomen pelvis was ordered for further evaluation of her abdominal pain.  No acute abnormalities to explain the right-sided abdominal pain at this time.  She was informed of multiple incidental findings including pulmonary nodule and adrenal mass.  She was instructed to follow-up with a primary care physician regarding these findings and will require further future surveillance.    No evidence of bowel obstruction, kidney stone, appendicitis.    Ultimately the patient appears stable for discharge at this time.  No clear etiology was identified for the patient's right-sided abdominal pain at this time.  She was encouraged to watch carefully over the next few days and should she have any worsening, she should return immediately for further evaluation.    The patient was instructed to follow-up with primary care physician for further management.  To return immediately for any worsening symptoms or development of any  "other concerning signs or symptoms. The patient verbalizes understanding in their own words.    /87   Pulse 69   Temp 36 °C (96.8 °F) (Temporal)   Resp 20   Ht 1.676 m (5' 6\")   Wt 112 kg (246 lb 14.6 oz)   SpO2 93%   BMI 39.85 kg/m²     The patient was referred to primary care where they will receive further BP management.      Colton Ahn M.D.  [address] [place] [address]  [facility] [address] [phone]    Schedule an appointment as soon as possible for a visit       [facility], Emergency Dept  [address]  [facility] [address] [phone]    As needed, If symptoms worsen      FINAL IMPRESSION  1. Generalized abdominal pain    2. Left adrenal mass (HCC)    3. Pulmonary nodule, right            Electronically signed by: Adam Valenzuela M.D., 4/23/2021 10:07 AM    "

## 2021-04-23 NOTE — ED NOTES
This RN introduced self to patient. Assessment performed. Patient states that her RLQ pain has subsided, but that she now also has pain in her L flank area radiating to L mid/lower abdomen.

## 2021-04-23 NOTE — ED NOTES
Discharge instructions reviewed with patient. AVS signed by patient. PIV removed. No new prescriptions. Patient understands need for follow-up appointment with healthcare team and to return to ED for worsening symptoms. All questions answered at this time. Patient ambulated to exit with belongings. Patient in stable condition with no signs of distress. Patient agreeable to discharge instructions.

## 2021-04-23 NOTE — ED NOTES
Med rec completed per Pt at bedside with medication/allergy list provided by Pt. Copies made with Pt permission and placed in chart; originals returned to Pt.  Allergies reviewed with Pt.  Pt states that she has not taken most of her medications, aside from levothyroxine, since the beginning of last week due to her symptoms.  Pt reports that for her levothyroxine 200 mcg she takes 1/2 of a tablet (100 mcg) on Mondays, Wednesdays, and Fridays and 1 tablet (200 mcg) Tuesdays, Thursdays, Saturdays, and Sundays.  Pt states that she is OUT of her tramadol.  No oral antibiotics in last 14 days.

## 2021-04-23 NOTE — ED TRIAGE NOTES
"Pt is referred to our facility by her PCP for further evaluation and treatment of RLQ abdominal pain with associated episodic N/V/D recurring for the past 10 days.   Chief Complaint   Patient presents with   • Abdominal Pain   • Nausea/Vomiting/Diarrhea     /87   Pulse 69   Temp 36 °C (96.8 °F) (Temporal)   Resp 20   Ht 1.676 m (5' 6\")   Wt 112 kg (246 lb 14.6 oz)   SpO2 93%   BMI 39.85 kg/m²      "

## 2021-05-12 ENCOUNTER — APPOINTMENT (RX ONLY)
Dept: URBAN - METROPOLITAN AREA CLINIC 22 | Facility: CLINIC | Age: 71
Setting detail: DERMATOLOGY
End: 2021-05-12

## 2021-05-12 DIAGNOSIS — L82.0 INFLAMED SEBORRHEIC KERATOSIS: ICD-10-CM

## 2021-05-12 DIAGNOSIS — L81.4 OTHER MELANIN HYPERPIGMENTATION: ICD-10-CM

## 2021-05-12 DIAGNOSIS — L71.8 OTHER ROSACEA: ICD-10-CM | Status: STABLE

## 2021-05-12 DIAGNOSIS — L57.0 ACTINIC KERATOSIS: ICD-10-CM

## 2021-05-12 DIAGNOSIS — L91.8 OTHER HYPERTROPHIC DISORDERS OF THE SKIN: ICD-10-CM

## 2021-05-12 DIAGNOSIS — L82.1 OTHER SEBORRHEIC KERATOSIS: ICD-10-CM

## 2021-05-12 DIAGNOSIS — D22 MELANOCYTIC NEVI: ICD-10-CM

## 2021-05-12 DIAGNOSIS — Z85.828 PERSONAL HISTORY OF OTHER MALIGNANT NEOPLASM OF SKIN: ICD-10-CM

## 2021-05-12 DIAGNOSIS — Z71.89 OTHER SPECIFIED COUNSELING: ICD-10-CM

## 2021-05-12 DIAGNOSIS — D18.0 HEMANGIOMA: ICD-10-CM

## 2021-05-12 PROBLEM — D18.01 HEMANGIOMA OF SKIN AND SUBCUTANEOUS TISSUE: Status: ACTIVE | Noted: 2021-05-12

## 2021-05-12 PROBLEM — D22.5 MELANOCYTIC NEVI OF TRUNK: Status: ACTIVE | Noted: 2021-05-12

## 2021-05-12 PROCEDURE — 99213 OFFICE O/P EST LOW 20 MIN: CPT | Mod: 25

## 2021-05-12 PROCEDURE — ? SKIN TAG REMOVAL

## 2021-05-12 PROCEDURE — ? PRESCRIPTION

## 2021-05-12 PROCEDURE — ? COUNSELING

## 2021-05-12 PROCEDURE — ? LIQUID NITROGEN

## 2021-05-12 PROCEDURE — 11200 RMVL SKIN TAGS UP TO&INC 15: CPT

## 2021-05-12 PROCEDURE — 17000 DESTRUCT PREMALG LESION: CPT | Mod: 59

## 2021-05-12 RX ORDER — METRONIDAZOLE 7.5 MG/G
1 CREAM TOPICAL QD
Qty: 1 | Refills: 5 | Status: CANCELLED

## 2021-05-12 ASSESSMENT — LOCATION SIMPLE DESCRIPTION DERM
LOCATION SIMPLE: RIGHT ANTERIOR NECK
LOCATION SIMPLE: LEFT FOREARM
LOCATION SIMPLE: RIGHT UPPER BACK
LOCATION SIMPLE: LEFT CHEEK
LOCATION SIMPLE: ABDOMEN
LOCATION SIMPLE: NOSE
LOCATION SIMPLE: LEFT ANTERIOR NECK
LOCATION SIMPLE: LEFT UPPER BACK

## 2021-05-12 ASSESSMENT — LOCATION DETAILED DESCRIPTION DERM
LOCATION DETAILED: NASAL TIP
LOCATION DETAILED: NASAL SUPRATIP
LOCATION DETAILED: LEFT LATERAL ABDOMEN
LOCATION DETAILED: LEFT INFERIOR CENTRAL MALAR CHEEK
LOCATION DETAILED: RIGHT INFERIOR LATERAL NECK
LOCATION DETAILED: LEFT PROXIMAL DORSAL FOREARM
LOCATION DETAILED: LEFT INFERIOR UPPER BACK
LOCATION DETAILED: RIGHT MID-UPPER BACK
LOCATION DETAILED: LEFT CLAVICULAR NECK

## 2021-05-12 ASSESSMENT — LOCATION ZONE DERM
LOCATION ZONE: NOSE
LOCATION ZONE: TRUNK
LOCATION ZONE: FACE
LOCATION ZONE: ARM
LOCATION ZONE: NECK

## 2021-05-12 NOTE — PROCEDURE: SKIN TAG REMOVAL
Anesthesia Volume In Cc: 3
Consent: Written consent obtained and the risks of skin tag removal was reviewed with the patient including but not limited to bleeding, pigmentary change, infection, pain, and remote possibility of scarring.
Medical Necessity Clause: This procedure was medically necessary because the lesions that were treated were:
Include Z78.9 (Other Specified Conditions Influencing Health Status) As An Associated Diagnosis?: No
Add Associated Diagnoses If Applicable When Selecting Medical Necessity: Yes
Anesthesia Type: 1% lidocaine with epinephrine
Detail Level: Detailed
Medical Necessity Information: It is in your best interest to select a reason for this procedure from the list below. All of these items fulfill various CMS LCD requirements except the new and changing color options.

## 2021-05-12 NOTE — PROCEDURE: LIQUID NITROGEN
Duration Of Freeze Thaw-Cycle (Seconds): 3
Consent: The patient's consent was obtained including but not limited to risks of crusting, scabbing, blistering, scarring, darker or lighter pigmentary change, recurrence, incomplete removal and infection.
Post-Care Instructions: I reviewed with the patient in detail post-care instructions. Patient is to wear sunprotection, and avoid picking at any of the treated lesions. Pt may apply Vaseline to crusted or scabbing areas.
Number Of Freeze-Thaw Cycles: 2 freeze-thaw cycles
Render Post-Care Instructions In Note?: no
Detail Level: Detailed
Duration Of Freeze Thaw-Cycle (Seconds): 0
Medical Necessity Information: It is in your best interest to select a reason for this procedure from the list below. All of these items fulfill various CMS LCD requirements except the new and changing color options.
Medical Necessity Clause: This procedure was medically necessary because the lesions that were treated were:

## 2021-06-03 ENCOUNTER — OFFICE VISIT (OUTPATIENT)
Dept: MEDICAL GROUP | Facility: MEDICAL CENTER | Age: 71
End: 2021-06-03
Payer: MEDICARE

## 2021-06-03 VITALS
HEIGHT: 65 IN | TEMPERATURE: 98.2 F | HEART RATE: 63 BPM | OXYGEN SATURATION: 97 % | WEIGHT: 254 LBS | DIASTOLIC BLOOD PRESSURE: 64 MMHG | SYSTOLIC BLOOD PRESSURE: 114 MMHG | RESPIRATION RATE: 16 BRPM | BODY MASS INDEX: 42.32 KG/M2

## 2021-06-03 DIAGNOSIS — E11.9 CONTROLLED TYPE 2 DIABETES MELLITUS WITHOUT COMPLICATION, WITHOUT LONG-TERM CURRENT USE OF INSULIN (HCC): ICD-10-CM

## 2021-06-03 DIAGNOSIS — M15.9 PRIMARY OSTEOARTHRITIS INVOLVING MULTIPLE JOINTS: ICD-10-CM

## 2021-06-03 DIAGNOSIS — I10 ESSENTIAL HYPERTENSION: ICD-10-CM

## 2021-06-03 DIAGNOSIS — M1A.0790 CHRONIC GOUT OF FOOT, UNSPECIFIED CAUSE, UNSPECIFIED LATERALITY: ICD-10-CM

## 2021-06-03 DIAGNOSIS — J45.20 MILD INTERMITTENT ASTHMA WITHOUT COMPLICATION: ICD-10-CM

## 2021-06-03 DIAGNOSIS — M48.02 CERVICAL STENOSIS OF SPINAL CANAL: ICD-10-CM

## 2021-06-03 DIAGNOSIS — Z87.891 FORMER SMOKER: ICD-10-CM

## 2021-06-03 DIAGNOSIS — Z00.00 MEDICARE ANNUAL WELLNESS VISIT, SUBSEQUENT: ICD-10-CM

## 2021-06-03 DIAGNOSIS — G47.34 NOCTURNAL HYPOXIA: ICD-10-CM

## 2021-06-03 DIAGNOSIS — G43.009 MIGRAINE WITHOUT AURA AND WITHOUT STATUS MIGRAINOSUS, NOT INTRACTABLE: ICD-10-CM

## 2021-06-03 DIAGNOSIS — L30.1 ECZEMA, DYSHIDROTIC: ICD-10-CM

## 2021-06-03 DIAGNOSIS — F32.2 CURRENT SEVERE EPISODE OF MAJOR DEPRESSIVE DISORDER WITHOUT PSYCHOTIC FEATURES WITHOUT PRIOR EPISODE (HCC): ICD-10-CM

## 2021-06-03 DIAGNOSIS — E03.4 HYPOTHYROIDISM DUE TO ACQUIRED ATROPHY OF THYROID: ICD-10-CM

## 2021-06-03 DIAGNOSIS — H04.123 DRY EYE SYNDROME, BILATERAL: ICD-10-CM

## 2021-06-03 DIAGNOSIS — E55.9 VITAMIN D DEFICIENCY: ICD-10-CM

## 2021-06-03 DIAGNOSIS — N81.6 RECTOCELE: ICD-10-CM

## 2021-06-03 DIAGNOSIS — E66.01 MORBID OBESITY WITH BMI OF 40.0-44.9, ADULT (HCC): ICD-10-CM

## 2021-06-03 DIAGNOSIS — K59.09 CHRONIC CONSTIPATION: ICD-10-CM

## 2021-06-03 DIAGNOSIS — G47.33 OSA (OBSTRUCTIVE SLEEP APNEA): ICD-10-CM

## 2021-06-03 DIAGNOSIS — J96.11 CHRONIC RESPIRATORY FAILURE WITH HYPOXIA (HCC): ICD-10-CM

## 2021-06-03 DIAGNOSIS — M54.16 LUMBAR RADICULOPATHY: ICD-10-CM

## 2021-06-03 DIAGNOSIS — K76.0 NONALCOHOLIC FATTY LIVER DISEASE: ICD-10-CM

## 2021-06-03 DIAGNOSIS — G56.22 ULNAR NEUROPATHY OF LEFT UPPER EXTREMITY: ICD-10-CM

## 2021-06-03 DIAGNOSIS — K21.00 GASTROESOPHAGEAL REFLUX DISEASE WITH ESOPHAGITIS WITHOUT HEMORRHAGE: ICD-10-CM

## 2021-06-03 DIAGNOSIS — G62.9 PERIPHERAL POLYNEUROPATHY: ICD-10-CM

## 2021-06-03 DIAGNOSIS — G61.9 INFLAMMATORY NEUROPATHY (HCC): ICD-10-CM

## 2021-06-03 DIAGNOSIS — M48.02 NEURAL FORAMINAL STENOSIS OF CERVICAL SPINE: ICD-10-CM

## 2021-06-03 DIAGNOSIS — M62.89 PELVIC FLOOR DYSFUNCTION: ICD-10-CM

## 2021-06-03 DIAGNOSIS — G89.4 CHRONIC PAIN SYNDROME: ICD-10-CM

## 2021-06-03 DIAGNOSIS — E78.5 DYSLIPIDEMIA, GOAL LDL BELOW 130: ICD-10-CM

## 2021-06-03 DIAGNOSIS — M47.816 LUMBAR FACET ARTHROPATHY: ICD-10-CM

## 2021-06-03 PROBLEM — F33.41 RECURRENT MAJOR DEPRESSIVE DISORDER, IN PARTIAL REMISSION (HCC): Status: RESOLVED | Noted: 2018-03-29 | Resolved: 2021-06-03

## 2021-06-03 LAB
HBA1C MFR BLD: 5.7 % (ref 0–5.6)
INT CON NEG: NEGATIVE
INT CON POS: POSITIVE

## 2021-06-03 PROCEDURE — 83036 HEMOGLOBIN GLYCOSYLATED A1C: CPT | Performed by: FAMILY MEDICINE

## 2021-06-03 PROCEDURE — G0439 PPPS, SUBSEQ VISIT: HCPCS | Performed by: FAMILY MEDICINE

## 2021-06-03 RX ORDER — LEVOTHYROXINE SODIUM 0.2 MG/1
200 TABLET ORAL
Qty: 90 TABLET | Refills: 3 | Status: SHIPPED | OUTPATIENT
Start: 2021-06-03 | End: 2021-09-03

## 2021-06-03 RX ORDER — DULOXETIN HYDROCHLORIDE 60 MG/1
60 CAPSULE, DELAYED RELEASE ORAL EVERY EVENING
Qty: 90 CAPSULE | Refills: 3 | Status: SHIPPED | OUTPATIENT
Start: 2021-06-03 | End: 2022-06-02 | Stop reason: SDUPTHER

## 2021-06-03 ASSESSMENT — ENCOUNTER SYMPTOMS: GENERAL WELL-BEING: POOR

## 2021-06-03 ASSESSMENT — FIBROSIS 4 INDEX: FIB4 SCORE: 1.61

## 2021-06-03 ASSESSMENT — ACTIVITIES OF DAILY LIVING (ADL): BATHING_REQUIRES_ASSISTANCE: 0

## 2021-06-03 NOTE — PROGRESS NOTES
Chief Complaint   Patient presents with   • Annual Wellness Visit       HPI:  Ashly is a 71 y.o. here for Medicare Annual Wellness Visit    She is generally stable.    Patient Active Problem List    Diagnosis Date Noted   • Current severe episode of major depressive disorder without psychotic features without prior episode ([facility]) 06/03/2021   • Chronic respiratory failure with hypoxia ([facility]) 06/03/2021   • Pre-bariatric surgery psychological evaluation 07/31/2020   • Controlled type 2 diabetes mellitus without complication, without long-term current use of insulin ([facility]) 06/12/2019   • Chronic gout of foot 04/30/2019   • Dry eye syndrome, bilateral    • Rotator cuff syndrome of right shoulder and allied disorders 10/10/2018   • Former smoker 05/30/2018   • Vitamin D deficiency 12/28/2017   • Morbid obesity with BMI of 40.0-44.9, adult ([facility])    • Neural foraminal stenosis of cervical spine 05/18/2017   • Tinnitus of both ears 05/18/2017   • Dyslipidemia, goal LDL below 130 01/06/2017   • Nonalcoholic fatty liver disease 01/05/2017   • Disease of accessory sinus 10/03/2016   • Atrophic rhinitis 10/03/2016   • Deviated nasal septum 08/01/2016   • Menopausal symptoms 04/28/2016   • Lumbar facet arthropathy 12/09/2015   • Chronic pain 11/18/2015   • Candidal intertrigo 06/01/2015   • Elevated sed rate 12/09/2014   • Mild intermittent asthma without complication    • H/O fibromyalgia 03/11/2014   • KERRI (obstructive sleep apnea) 03/11/2014   • Menopause 03/11/2014   • Peripheral neuropathy    • Rectocele 11/25/2013   • Pelvic floor dysfunction    • Chronic constipation 10/02/2013   • Nocturnal hypoxia    • Cervical stenosis of spinal canal 11/01/2011   • Lumbar radiculopathy 11/01/2011   • Migraine without aura    • Carpal tunnel syndrome 09/05/2011   • Ulnar neuropathy 09/05/2011   • Seasonal allergies 07/08/2011   • GERD (gastroesophageal reflux disease) 12/10/2010   • Osteoarthritis of multiple joints 04/13/2010   •  Eczema, dyshidrotic 08/03/2009   • Essential hypertension 05/20/2009   • Hypothyroidism due to acquired atrophy of thyroid 05/20/2009       Current Outpatient Medications   Medication Sig Dispense Refill   • levothyroxine (SYNTHROID) 200 MCG Tab Take 1 tablet by mouth every morning on an empty stomach. 90 tablet 3   • DULoxetine (CYMBALTA) 60 MG Cap DR Particles delayed-release capsule Take 1 capsule by mouth every evening. 90 capsule 3   • Cholecalciferol (VITAMIN D-3) 125 MCG (5000 UT) Tab Take 5,000 Units by mouth every evening.     • vitamin E 180 mg (400 units) Cap Take 180 mg by mouth every morning.     • traMADol (ULTRAM) 50 MG Tab Take 1 tablet by mouth every four hours as needed for Moderate Pain for up to 90 days. 180 tablet 2   • furosemide (LASIX) 20 MG Tab Take 1 Tab by mouth every day. (Patient taking differently: Take 20 mg by mouth every morning.) 90 Tab 3   • nystatin (MYCOSTATIN) powder Apply 1 g topically 3 times a day. 60 g 6   • ARIPiprazole (ABILIFY) 5 MG tablet Take 1 Tab by mouth every day. (Patient taking differently: Take 5 mg by mouth every morning.) 90 Tab 3   • nabumetone (RELAFEN) 750 MG Tab Take 1 Tab by mouth 2 times a day, with meals. 180 Tab 3   • pantoprazole (PROTONIX) 40 MG Tablet Delayed Response Take 1 Tab by mouth 2 times a day. 180 Tab 3   • albuterol (PROAIR HFA) 108 (90 Base) MCG/ACT Aero Soln inhalation aerosol Inhale 2 Puffs by mouth every 6 hours as needed for Shortness of Breath. 1 Inhaler 6     No current facility-administered medications for this visit.        Patient is taking medications as noted in medication list.  Current supplements as per medication list.     Allergies: Ace inhibitors, Lamictal, Sulfa drugs, Butalbital-acetaminophen, Cefaclor, Morphine, Penicillins, Savella [kdc:milnacipran+ci pigment blue 63], Savella [milnacipran hcl], Tizanidine hcl, Amlactin, and Tape    Current social contact/activities:no limited, does not see her family anymore.  Her  daughters no longer talk to her.    Is patient current with immunizations? Yes. wants to wait on shingrix.    She  reports that she quit smoking about 14 years ago. Her smoking use included cigarettes. She started smoking about 59 years ago. She has a 43.00 pack-year smoking history. She has never used smokeless tobacco. She reports previous alcohol use. She reports that she does not use drugs.  Counseling given: Yes  Comment: Started smoking at age 15, continued abstinance       DPA/Advanced directive: Patient does not have an Advanced Directive.  A packet and workshop information was given on Advanced Directives.    ROS:    Gait: Uses a grocery store cart to hold on at the store.  She does not use a cane.  Ostomy: No   Other tubes: No   Amputations: No   Chronic oxygen use Yes   Last eye exam 1/2021   Wears hearing aids: No   : Reports urinary leakage during the last 6 months that has interfered a lot with their daily activities or sleep.      Screening:  Is up to date, monofilament examination performed today    Depression Screening  Little interest or pleasure in doing things?  3 - nearly every day  Feeling down, depressed, or hopeless? 3 - nearly every day  Trouble falling or staying asleep, or sleeping too much?  3 - nearly every day  Feeling tired or having little energy?  3 - nearly every day  Poor appetite or overeating?  3 - nearly every day  Feeling bad about yourself - or that you are a failure or have let yourself or your family down? 3 - nearly every day  Trouble concentrating on things, such as reading the newspaper or watching television? 3 - nearly every day  Moving or speaking so slowly that other people could have noticed.  Or the opposite - being so fidgety or restless that you have been moving around a lot more than usual?  0 - not at all  Thoughts that you would be better off dead, or of hurting yourself?  0 - not at all  Patient Health Questionnaire Score: 21    If depressive symptoms  identified deferred to follow up visit unless specifically addressed in assessment and plan.    Interpretation of PHQ-9 Total Score   Score Severity   1-4 No Depression   5-9 Mild Depression   10-14 Moderate Depression   15-19 Moderately Severe Depression   20-27 Severe Depression      Screening for Cognitive Impairment  Three Minute Recall (captain, janeth, teresa)  1/3   off her thyroid medication one month  Draw clock face with all 12 numbers and set the hands to show 5 past 8.    5/5    If cognitive concerns identified, deferred for follow up unless specifically addressed in assessment and plan.    Fall Risk Assessment  Has the patient had two or more falls in the last year or any fall with injury in the last year?  No  If fall risk identified, deferred for follow up unless specifically addressed in assessment and plan.    Safety Assessment  Throw rugs on floor.  Yes  Handrails on all stairs.  Yes  Good lighting in all hallways.  Yes  Difficulty hearing.  Yes  Patient counseled about all safety risks that were identified.    Functional Assessment ADLs  Are there any barriers preventing you from cooking for yourself or meeting nutritional needs?  No.    Are there any barriers preventing you from driving safely or obtaining transportation?  No.    Are there any barriers preventing you from using a telephone or calling for help?  No.    Are there any barriers preventing you from shopping?  No.    Are there any barriers preventing you from taking care of your own finances?  No.    Are there any barriers preventing you from managing your medications?    No.    Are there any barriers preventing you from showering, bathing or dressing yourself?  No.    Are you currently engaging in any exercise or physical activity?  No.     What is your perception of your health?  Poor.    Health Maintenance Summary                DIABETES MONOFILAMENT / LE EXAM Postponed 9/7/2021 Originally 1/23/2021. Provider advises postponing for  medical reasons     Done 1/23/2020 AMB DIABETIC MONOFILAMENT LOWER EXTREMITY EXAM     Patient has more history with this topic...    IMM ZOSTER VACCINES Postponed 3/9/2035 Originally 6/29/2017. Insurance/Financial     Done 5/4/2017 Imm Admin: Zoster Vaccine Live (ZVL) (Zostavax) - HISTORICAL DATA    FASTING LIPID PROFILE Next Due 10/13/2021      Done 10/13/2020 LIPID PROFILE     Patient has more history with this topic...    URINE ACR / MICROALBUMIN Next Due 10/13/2021      Done 10/13/2020 MICROALBUMIN CREAT RATIO URINE     Patient has more history with this topic...    A1C SCREENING Next Due 12/3/2021      Done 6/3/2021 POCT A1C     Patient has more history with this topic...    COLONOSCOPY Next Due 2/10/2022      Done 2/10/2017 REFERRAL TO GI FOR COLONOSCOPY     Patient has more history with this topic...    RETINAL SCREENING Next Due 3/3/2022      Done 3/3/2021 REFERRAL FOR RETINAL SCREENING EXAM     Patient has more history with this topic...    SERUM CREATININE Next Due 4/23/2022      Done 4/23/2021 COMP METABOLIC PANEL     Patient has more history with this topic...    Annual Wellness Visit Next Due 6/4/2022      Done 6/3/2021 Visit Dx: Medicare annual wellness visit, subsequent     Patient has more history with this topic...    MAMMOGRAM Next Due 4/12/2023      Done 4/12/2021 MA-SCREENING MAMMO BILAT W/TOMOSYNTHESIS W/CAD     Patient has more history with this topic...    BONE DENSITY Next Due 4/12/2026      Done 4/12/2021 DS-BONE DENSITY STUDY (DEXA)     Patient has more history with this topic...    IMM DTaP/Tdap/Td Vaccine Next Due 5/18/2027      Done 5/18/2017 Imm Admin: Tdap Vaccine          Patient Care Team:  Colton Ahn M.D. as PCP - General  Jeffy Botello M.D. as Consulting Physician (Neurology)  [facility] as Respiratory Therapist  Ericka Alvarez, PT, DPT (Inactive) as Physical Therapist (Physical Therapy)    Social History     Tobacco Use   • Smoking status: Former Smoker     Packs/day:  1.00     Years: 43.00     Pack years: 43.00     Types: Cigarettes     Start date:      Quit date: 3/1/2007     Years since quittin.2   • Smokeless tobacco: Never Used   • Tobacco comment: Started smoking at age 15, continued abstinance    Vaping Use   • Vaping Use: Never used   Substance Use Topics   • Alcohol use: Not Currently   • Drug use: No     Comment: CBD topical pain cream     Family History   Problem Relation Age of Onset   • Heart Disease Mother         Arotic vaulve replacement   • GI Disease Mother         bile duct problem   • Bladder cancer Mother    • GI Disease Sister         celiac   • Arthritis Sister    • Other Sister         gout and lupus   • Arthritis Sister    • Kidney Disease Sister    • GI Disease Sister    • Bladder cancer Maternal Aunt    • Arthritis Maternal Grandmother    • Hypertension Maternal Grandmother    • Heart Disease Maternal Grandmother    • Hypertension Maternal Grandfather    • Heart Disease Maternal Grandfather    • Arthritis Maternal Grandfather    • No Known Problems Paternal Grandmother    • No Known Problems Paternal Grandfather    • Cancer Brother         Larynx   • Cancer Daughter         non hopskins limphoma   • GI Disease Daughter    • Bipolar disorder Daughter    • Seizures Daughter    • Bipolar disorder Daughter      She  has a past medical history of Anginal syndrome (HCC), Anxiety, Arthritis, Back pain, Carotid artery stenosis, unilateral, Chronic pain, Constipation, Controlled type 2 diabetes mellitus without complication ([facility]), Controlled type 2 diabetes mellitus without complication, without long-term current use of insulin ([facility]) (2019), Current severe episode of major depressive disorder without psychotic features without prior episode (HCC) (6/3/2021), Daytime sleepiness, Degenerative disc disease, Depression (2009), Diarrhea, Dizziness, Dry eye syndrome, bilateral, Elevated sedimentation rate, Fatigue, Fibromyalgia, Frequent headaches,  GERD (gastroesophageal reflux disease), GOUT (5/20/2009), H/O foot surgery, Hearing difficulty, Heart burn, Hemorrhagic disorder ([facility]) (2016), Hiatus hernia syndrome, History of anemia, Hypertension, Hypothyroidism (5/20/2009), Incipient cataract of both eyes, Insomnia, Migraine without aura, without mention of intractable migraine without mention of status migrainosus, Mild intermittent asthma without complication, Mixed dyslipidemia, Morbid obesity with BMI of 45.0-49.9, adult ([facility]), Morning headache, Mumps, Nasal drainage, Nocturnal hypoxia, Obesity, Obesity hypoventilation syndrome ([facility]) (5/31/2017), Pelvic floor dysfunction, Peripheral neuropathy, Pleomorphic adenoma, Polymyalgia rheumatica ([facility]), Restless leg syndrome, Ringing in ears, Rotator cuff syndrome of right shoulder and allied disorders (10/10/2018), S/P tonsillectomy, Seasonal allergies, Skin cancer (1990's), Sleep apnea syndrome, Snoring, Sore muscles, Sweat, sweating, excessive, Swelling of lower extremity, Urinary incontinence, Vision loss, and Weakness.   Past Surgical History:   Procedure Laterality Date   • SHOULDER DECOMPRESSION ARTHROSCOPIC Right 2/1/2019    Procedure: SHOULDER DECOMPRESSION ARTHROSCOPIC - SUBACROMIAL, LABRAL DEBRIDEMENT;  Surgeon: Damaris Knutson M.D.;  Location: SURGERY [facility];  Service: Orthopedics   • SHOULDER ARTHROSCOPY W/ BICIPITAL TENODESIS REPAIR Right 2/1/2019    Procedure: SHOULDER ARTHROSCOPY W/ BICIPITAL TENOTOMY;  Surgeon: Damaris Knutson M.D.;  Location: SURGERY [facility];  Service: Orthopedics   • CARPAL TUNNEL RELEASE Right 2/1/2019    Procedure: CARPAL TUNNEL RELEASE;  Surgeon: Damaris Knutson M.D.;  Location: SURGERY [facility];  Service: Orthopedics   • GASTROSCOPY  2/6/2017    Procedure: GASTROSCOPY;  Surgeon: Pau Foster M.D.;  Location: SURGERY SAME DAY [facility];  Service:    • COLONOSCOPY  2/6/2017    Procedure: COLONOSCOPY;  Surgeon: Pau Foster M.D.;   "Location: SURGERY SAME DAY [facility];  Service:    • SEPTAL RECONSTRUCTION  10/3/2016    Procedure: SEPTAL RECONSTRUCTION with  grafts ;  Surgeon: PIETER Dickson M.D.;  Location: SURGERY SAME DAY [facility];  Service:    • SEPTOPLASTY N/A 8/1/2016    Procedure: SEPTOPLASTY;  Surgeon: PIETER Dickson M.D.;  Location: SURGERY SAME DAY [facility];  Service:    • TURBINOPLASTY Bilateral 8/1/2016    Procedure: TURBINOPLASTY;  Surgeon: PIETER Dickson M.D.;  Location: SURGERY SAME DAY [facility];  Service:    • NASAL FRACTURE REDUCTION OPEN N/A 8/1/2016    Procedure: SEPTAL FRACTURE REDUCTION OPEN;  Surgeon: PIETER Dickson M.D.;  Location: SURGERY SAME DAY [facility];  Service:    • FINE NEEDLE ASPIRATION  11/16/2009    Performed by DA CALLOWAY at ENDOSCOPY [facility]   • COLONOSCOPY  2006    ? unclear date   • BLADDER SUSPENSION  1983   • HYSTERECTOMY, VAGINAL  1983    ovaries are present, excessive bleeding   • TONSILLECTOMY  1953   • HYSTERECTOMY LAPAROSCOPY     • OTHER ABDOMINAL SURGERY      Cholysestectomy   • OTHER ORTHOPEDIC SURGERY  1962/1980    foot surgery    • TONSILLECTOMY           Exam:   /64   Pulse 63   Temp 36.8 °C (98.2 °F)   Resp 16   Ht 1.651 m (5' 5\")   Wt 115 kg (254 lb)   SpO2 97%  Body mass index is 42.27 kg/m².    Hearing good.    Dentition not examined as patient, physician and staff all wearing masks.  Alert, oriented in no acute distress.  Eye contact is good, speech goal directed, affect calm  HEENT:   EOMI, PERRLA.   Oropharynx is well hydrated with normal soft palate motion. No exudate or ulcerations noted. Ear canals are patent, normal cerumen, TMs are pearly grey and quiet in appearance.  Neck:       ROM is full.. No JVD or carotid bruits appreciated. No cervical adenopathy appreciated. No thyromegaly or neck masses appreciated.  No retractions appreciated.  Lungs:     Clear to auscultation A&P with good air movement.   Heart:  "     Regular rate and rhythm normal S1 and S2 without murmur appreciated.  Strong and symmetric radial and DP pulses.  Abd:        Soft, bowel sounds positive, nontender. No bloating noted. No hepatosplenomegaly or mass appreciated. No pulsatile mass appreciated.  Ext:         Extremities show symmetric and full range of motion with normal strength. No cyanosis or clubbing appreciated. No peripheral edema appreciated.  Neuro:    Gait is slow and broad based. Patient is lucid, fluent and appropriate. No significant tremor appreciated.  Skin:       Exhibit no rashes, pigmented lesions or ulcerations.    Depression Screening    Little interest or pleasure in doing things?  3 - nearly every day   Feeling down, depressed , or hopeless? 3 - nearly every day   Trouble falling or staying asleep, or sleeping too much?  3 - nearly every day   Feeling tired or having little energy?  3 - nearly every day   Poor appetite or overeating?  3 - nearly every day   Feeling bad about yourself - or that you are a failure or have let yourself or your family down? 3 - nearly every day   Trouble concentrating on things, such as reading the newspaper or watching television? 3 - nearly every day   Moving or speaking so slowly that other people could have noticed.  Or the opposite - being so fidgety or restless that you have been moving around a lot more than usual?  0 - not at all   Thoughts that you would be better off dead, or of hurting yourself?  0 - not at all   Patient Health Questionnaire Score: 21       If depressive symptoms identified deferred to follow up visit unless specifically addressed in assesment and plan.    Interpretation of PHQ-9 Total Score   Score Severity   1-4 No Depression   5-9 Mild Depression   10-14 Moderate Depression   15-19 Moderately Severe Depression   20-27 Severe Depression      Assessment and Plan. The following treatment and monitoring plan is recommended:      1. Medicare annual wellness visit,  subsequent  Patient with multiple medical problems, stable overall    2. Hypothyroidism due to acquired atrophy of thyroid  Patient reports good energy level on the medication. Patient denies insomnia, tremor or change in appetite.  Patient is taking the medication on an empty stomach in the morning and waiting at least 30 minutes before eating.  However, patient thought again that it would be a good idea to stop all her medications.  She has now been off her thyroid medication over a month.  Patient states she does not like taking the medication because she does not feel well.  However, she acknowledges that when she is off the medication she actually feels worse.  Last TSH in February was at target.  Overall stable problem.  - levothyroxine (SYNTHROID) 200 MCG Tab; Take 1 tablet by mouth every morning on an empty stomach.  Dispense: 90 tablet; Refill: 3    3. Controlled type 2 diabetes mellitus without complication, without long-term current use of insulin (HCC)  Patient does have diabetes which tends to be in very good control.  Point-of-care A1c today is at 5.7%, very good.  Patient is not on any diabetes specific medication.  Stable problem.  - POCT  A1C    4. Essential hypertension  HTN - Chronic condition stable.  It is actually unclear whether she is taking the furosemide or not.  It sounds as if she is.  She is on no other blood pressure lowering medication that I am aware of at this time.  She is not taking baby aspirin daily.   She is not monitoring BP at home.   Denies symptoms low BP: light-headed, tunnel-vision, unusual fatigue.   Denies symptoms high BP:pounding headache, visual changes, palpitations, flushed face.   Denies medicine side effects: unusual fatigue, slow heartbeat, foot/leg swelling, cough.    5. Dyslipidemia, goal LDL below 130  Patient denies chest pain, chest pressure, palpitations or exertional shortness of breath. Patient is not on lipid-lowering medication.  Patient is not  interested in being on this kind of medication at this time. Patient is a former smoker. Patient takes no aspirin daily. Patient has no history of myocardial infarction, stroke or PVD.  The problem is clinically stable.    6. Gastroesophageal reflux disease with esophagitis without hemorrhage  The patient feels the current medication regimen of pantoprazole is controlling the gastroesophageal reflux symptoms well.  That medication she is taking.  Denies dysphagia, reflux symptoms, acidity, abdominal pain or visible blood or mucus in the stool. Denies vomiting or hematemesis. Denies burping or abdominal bloating.  Patient is on chronic nonsteroidal anti-inflammatory drugs which have been helpful.  Avoids heavy meals or eating within 2 hours of bedtime.  Recently saw GI.  Stable overall.    7. Nonalcoholic fatty liver disease  Patient does have fatty liver disease.  She continues to eat a diet that is high in starches and processed food.  Unfortunately stable problem.    8. Vitamin D deficiency  Patient does have history of vitamin D deficiency.  She is not currently taking her vitamin D.  She is encouraged to resume this.  No history of pathologic fracture.  Overall stable problem.    9. Current severe episode of major depressive disorder without psychotic features without prior episode (HCC)  Patient is certainly significantly depressed.  She recognizes this herself.  She stopped the Abilify and apparently has not been taking the duloxetine for at least 2 months.  She complains of increased depression and increased pain.  She denies suicidal ideation or plan of self-harm.  She is socially quite isolated.  She is not exercising.  This is in part due to her very significant chronic pain.  Unfortunately a stable and very difficult to treat problem in this patient.  I have urged her strongly to resume her medications.  - Patient has been identified as having a positive depression screening. Appropriate orders and  counseling have been given.    10. KERRI (obstructive sleep apnea)/Chronic respiratory failure with hypoxia (HCC)/Nocturnal hypoxia  This disorder I believe to be central to many of her medical problems.  She has obstructive sleep apnea.  She refuses to try CPAP again.  She says she has tried several masks and cannot tolerate that.  She uses nocturnal oxygen.  She also uses it during the day when she feels short of breath or very tired.  I feel this disorder is inadequately treated but I do not have another good option for the patient.  Unfortunately stable problem.    11. Mild intermittent asthma without complication  Stable. Currently using inhalers as prescribed.  She denies side effects.  Denies recent or current exacerbation.   Denies current shortness of breath, chest pain, or cough.  She is on oxygen therapy.    12. Morbid obesity with BMI of 40.0-44.9, adult ([facility])  Struggles with morbid obesity.  Was considering the bariatric program but the persistent roadblocks have been    13. Peripheral polyneuropathy/Inflammatory neuropathy ([facility])  Patient has a complex peripheral polyneuropathy.  There is an inflammatory component.  This has been worked up very thoroughly by neurology.  She was prescribed Lamictal which worked very well but unfortunately she had a very severe reaction to it.  Unfortunately chronic painful stable problem.  She is on the duloxetine in part for this pain.  She has not been taking that lately.    14. Pelvic floor dysfunction/Chronic constipation/Rectocele  Patient has significant pelvic floor dysfunction and rectocele.  She has chronic constipation worsened by the rectocele but probably heat the constipation work and worsened the rectocele as well.  She is following with GI for this.  No surgery is contemplated at this time.  Stable problem.    15. Chronic pain syndrome  She has chronic pain from numerous issues as discussed above and below.  Patient is seeing a pain specialist.  She is  frustrated with this at this time.  Stable chronic problem.    16. Chronic gout of foot, unspecified cause, unspecified laterality  Patient does have chronic gout.  Currently it is not flaring.  She was on allopurinol in the past but did not perceive benefit so she stopped that medication.  Currently clinically stable.    17. Cervical stenosis of spinal canal/Neural foraminal stenosis of cervical spine  Patient has multiple level arthritic degeneration of the cervical spine with cervical stenosis.  She has seen neurosurgery in the past and per the patient was told that no surgery was needed.  She does tend to have bilateral arm pain and neuralgia.  No weakness currently.  Stable problem.    18. Rotator cuff syndrome of right shoulder and allied disorders  Patient does have rotator cuff syndrome.  She underwent right shoulder arthroscopic decompression and tendon repair in February 2019.  She feels she still has pain but I note that she has improved range of motion.  Overall stable problem.    19. Lumbar radiculopathy/Lumbar facet arthropathy  Patient has multiple level lumbar facet arthropathy and episodic lumbar radiculopathy.  She is on the duloxetine for this issue.  She has not been taking that recently.  She reports increased leg and back pain.  She is trying to work with pain management concerning this.  Stable problem.    20. Primary osteoarthritis involving multiple joints  Patient does have osteoarthritis of multiple levels including her spine.  Patient continues the etodolac twice daily.  That has actually been moderately helpful for the patient for a long time.  She did stop taking it this month and has increased pain but will start again.  Unfortunately longstanding stable problem.    21. Dry eye syndrome, bilateral  Patient continues to follow with ophthalmology.  She has eyedrops that I believe she discontinued.  She is using over-the-counter drops as needed.  Patient states her vision is quite poor.   She just saw ophthalmology.  Unfortunately stable problem.    22. Eczema, dyshidrotic  Patient does have eczema.  She does use triamcinolone occasionally for this but typically uses over-the-counter products.  Currently it is relatively quiet.  Stable.    23. Former smoker  She remains quit, stable.    24. Migraine without aura and without status migrainosus, not intractable  Patient does tend to have frequent and recurring headaches.  I believe this is in part due to her inadequately treated obstructive sleep apnea.  Unfortunately stable and frequent problem.    25. Ulnar neuropathy of left upper extremity  Patient has been diagnosed with ulnar neuropathy in the past.  Patient denies any such symptoms at this time though she does have some neuralgia of the arms and hands but not specifically ulnar neuropathy.  Unclear if this problem is even active.  Certainly seems clinically stable.         Services suggested: No services needed at this time  Health Care Screening recommendations as per orders if indicated.  Referrals offered: PT/OT/Nutrition counseling/Behavioral Health/Smoking cessation as per orders if indicated.    Discussion today about general wellness and lifestyle habits:  discussed  · Prevent falls and reduce trip hazards; Cautioned about securing or removing rugs.  · Have a working fire alarm and carbon monoxide detector;  has  · Engage in regular physical activity and social activities     Follow-up: Return in about 3 months (around 9/3/2021), or if symptoms worsen or fail to improve.

## 2021-06-08 ENCOUNTER — HOSPITAL ENCOUNTER (OUTPATIENT)
Dept: LAB | Facility: MEDICAL CENTER | Age: 71
End: 2021-06-08
Attending: NURSE PRACTITIONER
Payer: MEDICARE

## 2021-06-08 PROCEDURE — 84080 ASSAY ALKALINE PHOSPHATASES: CPT

## 2021-06-08 PROCEDURE — 36415 COLL VENOUS BLD VENIPUNCTURE: CPT

## 2021-06-08 PROCEDURE — 84075 ASSAY ALKALINE PHOSPHATASE: CPT

## 2021-06-12 LAB
ALP BONE SERPL-CCNC: 65 U/L (ref 0–55)
ALP ISOS SERPL HS-CCNC: 0 U/L
ALP LIVER SERPL-CCNC: 65 U/L (ref 0–94)
ALP SERPL-CCNC: 129 U/L (ref 40–120)

## 2021-07-06 ENCOUNTER — OFFICE VISIT (OUTPATIENT)
Dept: MEDICAL GROUP | Facility: MEDICAL CENTER | Age: 71
End: 2021-07-06
Payer: MEDICARE

## 2021-07-06 VITALS
RESPIRATION RATE: 14 BRPM | HEIGHT: 63 IN | BODY MASS INDEX: 44.83 KG/M2 | WEIGHT: 253 LBS | HEART RATE: 84 BPM | OXYGEN SATURATION: 93 % | DIASTOLIC BLOOD PRESSURE: 66 MMHG | SYSTOLIC BLOOD PRESSURE: 124 MMHG | TEMPERATURE: 97.5 F

## 2021-07-06 DIAGNOSIS — J20.9 ACUTE BRONCHITIS, UNSPECIFIED ORGANISM: ICD-10-CM

## 2021-07-06 DIAGNOSIS — R05.8 PRODUCTIVE COUGH: ICD-10-CM

## 2021-07-06 PROCEDURE — 99213 OFFICE O/P EST LOW 20 MIN: CPT | Performed by: FAMILY MEDICINE

## 2021-07-06 RX ORDER — BENZONATATE 100 MG/1
100 CAPSULE ORAL 3 TIMES DAILY PRN
Qty: 60 CAPSULE | Refills: 0 | Status: SHIPPED | OUTPATIENT
Start: 2021-07-06 | End: 2021-09-03 | Stop reason: SDUPTHER

## 2021-07-06 RX ORDER — AZITHROMYCIN 250 MG/1
TABLET, FILM COATED ORAL
Qty: 6 TABLET | Refills: 1 | Status: SHIPPED | OUTPATIENT
Start: 2021-07-06 | End: 2021-09-03

## 2021-07-06 ASSESSMENT — FIBROSIS 4 INDEX: FIB4 SCORE: 1.61

## 2021-07-06 NOTE — PROGRESS NOTES
Chief Complaint   Patient presents with   • Cough       Subjective:     HPI:   Ashly Meyer presents today with the followin. Acute bronchitis, unspecified organism  3 weeks of cough.  Began with clear phlegm, now the last 10 days has been thick and yellow.  Appetite a little lower..    2. Productive cough  She has cough productive of yellow phlegm.  Denies visible blood.          Patient Active Problem List    Diagnosis Date Noted   • Current severe episode of major depressive disorder without psychotic features without prior episode ([facility]) 2021   • Chronic respiratory failure with hypoxia ([facility]) 2021   • Pre-bariatric surgery psychological evaluation 2020   • Controlled type 2 diabetes mellitus without complication, without long-term current use of insulin ([facility]) 2019   • Chronic gout of foot 2019   • Dry eye syndrome, bilateral    • Rotator cuff syndrome of right shoulder and allied disorders 10/10/2018   • Former smoker 2018   • Vitamin D deficiency 2017   • Morbid obesity with BMI of 40.0-44.9, adult ([facility])    • Neural foraminal stenosis of cervical spine 2017   • Tinnitus of both ears 2017   • Dyslipidemia, goal LDL below 130 2017   • Nonalcoholic fatty liver disease 2017   • Disease of accessory sinus 10/03/2016   • Atrophic rhinitis 10/03/2016   • Deviated nasal septum 2016   • Menopausal symptoms 2016   • Lumbar facet arthropathy 2015   • Chronic pain 2015   • Candidal intertrigo 2015   • Elevated sed rate 2014   • Mild intermittent asthma without complication    • H/O fibromyalgia 2014   • KERRI (obstructive sleep apnea) 2014   • Menopause 2014   • Peripheral neuropathy    • Rectocele 2013   • Pelvic floor dysfunction    • Chronic constipation 10/02/2013   • Nocturnal hypoxia    • Cervical stenosis of spinal canal 2011   • Lumbar radiculopathy 2011   • Migraine  "without aura    • Carpal tunnel syndrome 09/05/2011   • Ulnar neuropathy 09/05/2011   • Seasonal allergies 07/08/2011   • GERD (gastroesophageal reflux disease) 12/10/2010   • Osteoarthritis of multiple joints 04/13/2010   • Eczema, dyshidrotic 08/03/2009   • Essential hypertension 05/20/2009   • Hypothyroidism due to acquired atrophy of thyroid 05/20/2009       Current medicines (including changes today)  Current Outpatient Medications   Medication Sig Dispense Refill   • azithromycin (ZITHROMAX) 250 MG Tab As directed on pack 6 tablet 1   • benzonatate (TESSALON) 100 MG Cap Take 1 capsule by mouth 3 times a day as needed for Cough. 60 capsule 0   • levothyroxine (SYNTHROID) 200 MCG Tab Take 1 tablet by mouth every morning on an empty stomach. 90 tablet 3   • DULoxetine (CYMBALTA) 60 MG Cap DR Particles delayed-release capsule Take 1 capsule by mouth every evening. 90 capsule 3   • Cholecalciferol (VITAMIN D-3) 125 MCG (5000 UT) Tab Take 5,000 Units by mouth every evening.     • vitamin E 180 mg (400 units) Cap Take 180 mg by mouth every morning.     • furosemide (LASIX) 20 MG Tab Take 1 Tab by mouth every day. (Patient taking differently: Take 20 mg by mouth every morning.) 90 Tab 3   • nystatin (MYCOSTATIN) powder Apply 1 g topically 3 times a day. 60 g 6   • ARIPiprazole (ABILIFY) 5 MG tablet Take 1 Tab by mouth every day. (Patient taking differently: Take 5 mg by mouth every morning.) 90 Tab 3   • nabumetone (RELAFEN) 750 MG Tab Take 1 Tab by mouth 2 times a day, with meals. 180 Tab 3   • pantoprazole (PROTONIX) 40 MG Tablet Delayed Response Take 1 Tab by mouth 2 times a day. 180 Tab 3   • albuterol (PROAIR HFA) 108 (90 Base) MCG/ACT Aero Soln inhalation aerosol Inhale 2 Puffs by mouth every 6 hours as needed for Shortness of Breath. 1 Inhaler 6     No current facility-administered medications for this visit.       Allergies   Allergen Reactions   • Ace Inhibitors Cough   • Lamictal Rash and Swelling     \"hot\", " "unable to function.  Severe rash, scarring   • Sulfa Drugs Hives, Shortness of Breath and Anxiety     Hard breathing, shortness of breath   • Butalbital-Acetaminophen Unspecified     Dizzy, can't walk, hard to focus   • Cefaclor Unspecified     Ceclor causes light headedness and dizziness   • Morphine Itching     \"Redness\"   • Penicillins Itching and Swelling     Skin itching and redness   • Savella [Kdc:Milnacipran+Ci Pigment Blue 63] Nausea, Swelling and Anxiety     Swelling, bone and muscle pain, sweating, nausea, dizziness, sleeplessness, anxiety   • Savella [Milnacipran Hcl] Unspecified     Muscle aches, feet swelling   • Tizanidine Hcl Nausea     \"Dizziness\"   • Amlactin Rash and Itching     Lotion causes itching, redness and burnng   • Tape Rash and Itching     Skin tears, paper tape is OK per Pt       ROS: As per HPI       Objective:     /66   Pulse 84   Temp 36.4 °C (97.5 °F)   Resp 14   Ht 1.6 m (5' 3\")   Wt 115 kg (253 lb)   SpO2 93%  Body mass index is 44.82 kg/m².    Physical Exam:  Constitutional: Well-developed and well-nourished. Not diaphoretic. No distress. Lucid and fluent.  Patient, physician and staff all wearing msks.  Skin: Skin is warm and dry. No rash noted.  Head: Atraumatic without lesions.  Eyes: Conjunctivae and extraocular motions are normal. Pupils are equal, round, and reactive to light. No scleral icterus.   Ears:  External ears unremarkable.   Neck: Supple, trachea midline. No thyromegaly present. No cervical or supraclavicular lymphadenopathy. No JVD or carotid bruits appreciated  Cardiovascular: Regular rate and rhythm.  Normal S1, S2 without murmur appreciated.  Chest: Effort normal. Scattered rhonchi.   Extremities: No cyanosis, clubbing, erythema, nor edema.   Neurological: Alert and oriented x 3. More resting and intention tremor.  Psychiatric:  Behavior, mood, and affect are appropriate.       Assessment and Plan:     71 y.o. female with the following " issues:    1. Acute bronchitis, unspecified organism  azithromycin (ZITHROMAX) 250 MG Tab    benzonatate (TESSALON) 100 MG Cap   2. Productive cough  azithromycin (ZITHROMAX) 250 MG Tab    benzonatate (TESSALON) 100 MG Cap         Followup: Return in about 8 weeks (around 9/3/2021), or if symptoms worsen or fail to improve.

## 2021-07-28 NOTE — OR NURSING
7/28 Called pt for preadmit apt and with screening, pt said she had recent bronchitis 7/6 and did received antibiotics.  She states she has been symptom free for about a week, since 7/21.  Pt's procedure moved to 8/26.  Called office, spoke to GENE Seaman for Dr. Lopez, relaying pt's recent bronchitis.  Pt will have a covid test 8/20 prior to procedure.  Urszula said she would tell Dr. Lopez, and if there were any issues with proceeding with the procedure, she would call us back.

## 2021-07-29 ENCOUNTER — APPOINTMENT (OUTPATIENT)
Dept: ADMISSIONS | Facility: MEDICAL CENTER | Age: 71
End: 2021-07-29
Payer: MEDICARE

## 2021-08-12 ENCOUNTER — PRE-ADMISSION TESTING (OUTPATIENT)
Dept: ADMISSIONS | Facility: MEDICAL CENTER | Age: 71
End: 2021-08-12
Attending: INTERNAL MEDICINE
Payer: MEDICARE

## 2021-08-12 VITALS — HEIGHT: 65 IN | BODY MASS INDEX: 42.1 KG/M2

## 2021-08-12 RX ORDER — TRAMADOL HYDROCHLORIDE 50 MG/1
100 TABLET ORAL EVERY 4 HOURS PRN
COMMUNITY
End: 2021-09-03

## 2021-08-12 RX ORDER — GABAPENTIN 800 MG/1
800 TABLET ORAL 2 TIMES DAILY
COMMUNITY
Start: 2021-07-21 | End: 2021-09-24 | Stop reason: SDUPTHER

## 2021-08-12 NOTE — PREPROCEDURE INSTRUCTIONS
Dr Cobb's response-  Nothing further. Thank you.  Rio Cobb M.D.  [facility]

## 2021-08-12 NOTE — PREPROCEDURE INSTRUCTIONS
Hx and meds reviewed, pre op instructions given, handouts reviewed and emailed, questions answered.  Pt instructed to continue regularly prescribed medications through day before surgery.Per anesthesia protocol pt instructed to take these medications with a sip of water the day of surgery- tramadol if needed, albuterol inh if needed, gabapentin, tessalon if needed, zithromax if still on abx, synthroid, abilify and pantoprazole.. Anesthesia fasting guidelines reviewed with pt. Covid testing scheduled because of cough and bronchitis symptoms. Pt aware to wear mask per CDC guidelines. Pt states Dr Lopez is aware of recent bronchitis.Dr Cobb notified of hx.

## 2021-08-14 DIAGNOSIS — R09.02 HYPOXIA: ICD-10-CM

## 2021-08-14 DIAGNOSIS — J45.21 MILD INTERMITTENT ASTHMA WITH ACUTE EXACERBATION: ICD-10-CM

## 2021-08-16 RX ORDER — ALBUTEROL SULFATE 90 UG/1
AEROSOL, METERED RESPIRATORY (INHALATION)
Qty: 8.5 G | Refills: 6 | Status: SHIPPED | OUTPATIENT
Start: 2021-08-16 | End: 2022-03-03 | Stop reason: SDUPTHER

## 2021-08-20 ENCOUNTER — PRE-ADMISSION TESTING (OUTPATIENT)
Dept: ADMISSIONS | Facility: MEDICAL CENTER | Age: 71
End: 2021-08-20
Attending: INTERNAL MEDICINE
Payer: MEDICARE

## 2021-08-20 DIAGNOSIS — Z01.810 PRE-OPERATIVE CARDIOVASCULAR EXAMINATION: ICD-10-CM

## 2021-08-20 DIAGNOSIS — Z01.812 PRE-OPERATIVE LABORATORY EXAMINATION: ICD-10-CM

## 2021-08-20 LAB
ALBUMIN SERPL BCP-MCNC: 3.9 G/DL (ref 3.2–4.9)
ALBUMIN/GLOB SERPL: 1 G/DL
ALP SERPL-CCNC: 130 U/L (ref 30–99)
ALT SERPL-CCNC: 21 U/L (ref 2–50)
ANION GAP SERPL CALC-SCNC: 12 MMOL/L (ref 7–16)
AST SERPL-CCNC: 19 U/L (ref 12–45)
BILIRUB SERPL-MCNC: 0.5 MG/DL (ref 0.1–1.5)
BUN SERPL-MCNC: 19 MG/DL (ref 8–22)
CALCIUM SERPL-MCNC: 9.9 MG/DL (ref 8.4–10.2)
CHLORIDE SERPL-SCNC: 101 MMOL/L (ref 96–112)
CO2 SERPL-SCNC: 27 MMOL/L (ref 20–33)
CREAT SERPL-MCNC: 0.75 MG/DL (ref 0.5–1.4)
EKG IMPRESSION: NORMAL
ERYTHROCYTE [DISTWIDTH] IN BLOOD BY AUTOMATED COUNT: 45.8 FL (ref 35.9–50)
GLOBULIN SER CALC-MCNC: 4 G/DL (ref 1.9–3.5)
GLUCOSE SERPL-MCNC: 107 MG/DL (ref 65–99)
HCT VFR BLD AUTO: 42.5 % (ref 37–47)
HGB BLD-MCNC: 13.6 G/DL (ref 12–16)
MCH RBC QN AUTO: 29.2 PG (ref 27–33)
MCHC RBC AUTO-ENTMCNC: 32 G/DL (ref 33.6–35)
MCV RBC AUTO: 91.4 FL (ref 81.4–97.8)
PLATELET # BLD AUTO: 214 K/UL (ref 164–446)
PMV BLD AUTO: 10.8 FL (ref 9–12.9)
POTASSIUM SERPL-SCNC: 4.1 MMOL/L (ref 3.6–5.5)
PROT SERPL-MCNC: 7.9 G/DL (ref 6–8.2)
RBC # BLD AUTO: 4.65 M/UL (ref 4.2–5.4)
SODIUM SERPL-SCNC: 140 MMOL/L (ref 135–145)
WBC # BLD AUTO: 8.9 K/UL (ref 4.8–10.8)

## 2021-08-20 PROCEDURE — 80053 COMPREHEN METABOLIC PANEL: CPT

## 2021-08-20 PROCEDURE — 36415 COLL VENOUS BLD VENIPUNCTURE: CPT

## 2021-08-20 PROCEDURE — U0003 INFECTIOUS AGENT DETECTION BY NUCLEIC ACID (DNA OR RNA); SEVERE ACUTE RESPIRATORY SYNDROME CORONAVIRUS 2 (SARS-COV-2) (CORONAVIRUS DISEASE [COVID-19]), AMPLIFIED PROBE TECHNIQUE, MAKING USE OF HIGH THROUGHPUT TECHNOLOGIES AS DESCRIBED BY CMS-2020-01-R: HCPCS

## 2021-08-20 PROCEDURE — 93010 ELECTROCARDIOGRAM REPORT: CPT | Performed by: INTERNAL MEDICINE

## 2021-08-20 PROCEDURE — U0005 INFEC AGEN DETEC AMPLI PROBE: HCPCS

## 2021-08-20 PROCEDURE — 85027 COMPLETE CBC AUTOMATED: CPT

## 2021-08-20 PROCEDURE — 93005 ELECTROCARDIOGRAM TRACING: CPT

## 2021-08-20 PROCEDURE — C9803 HOPD COVID-19 SPEC COLLECT: HCPCS

## 2021-08-22 LAB
SARS-COV-2 RNA RESP QL NAA+PROBE: NOTDETECTED
SPECIMEN SOURCE: NORMAL

## 2021-08-26 ENCOUNTER — ANESTHESIA EVENT (OUTPATIENT)
Dept: SURGERY | Facility: MEDICAL CENTER | Age: 71
End: 2021-08-26
Payer: MEDICARE

## 2021-08-26 ENCOUNTER — HOSPITAL ENCOUNTER (OUTPATIENT)
Facility: MEDICAL CENTER | Age: 71
End: 2021-08-26
Attending: INTERNAL MEDICINE | Admitting: INTERNAL MEDICINE
Payer: MEDICARE

## 2021-08-26 ENCOUNTER — ANESTHESIA (OUTPATIENT)
Dept: SURGERY | Facility: MEDICAL CENTER | Age: 71
End: 2021-08-26
Payer: MEDICARE

## 2021-08-26 VITALS
WEIGHT: 252.21 LBS | OXYGEN SATURATION: 94 % | DIASTOLIC BLOOD PRESSURE: 79 MMHG | HEIGHT: 65 IN | TEMPERATURE: 97 F | RESPIRATION RATE: 15 BRPM | HEART RATE: 69 BPM | BODY MASS INDEX: 42.02 KG/M2 | SYSTOLIC BLOOD PRESSURE: 147 MMHG

## 2021-08-26 LAB — GLUCOSE BLD-MCNC: 105 MG/DL (ref 65–99)

## 2021-08-26 PROCEDURE — 160046 HCHG PACU - 1ST 60 MINS PHASE II: Performed by: INTERNAL MEDICINE

## 2021-08-26 PROCEDURE — 160208 HCHG ENDO MINUTES - EA ADDL 1 MIN LEVEL 4: Performed by: INTERNAL MEDICINE

## 2021-08-26 PROCEDURE — 160203 HCHG ENDO MINUTES - 1ST 30 MINS LEVEL 4: Performed by: INTERNAL MEDICINE

## 2021-08-26 PROCEDURE — 160002 HCHG RECOVERY MINUTES (STAT): Performed by: INTERNAL MEDICINE

## 2021-08-26 PROCEDURE — 82962 GLUCOSE BLOOD TEST: CPT

## 2021-08-26 PROCEDURE — 160009 HCHG ANES TIME/MIN: Performed by: INTERNAL MEDICINE

## 2021-08-26 PROCEDURE — 700111 HCHG RX REV CODE 636 W/ 250 OVERRIDE (IP): Performed by: ANESTHESIOLOGY

## 2021-08-26 PROCEDURE — 160025 RECOVERY II MINUTES (STATS): Performed by: INTERNAL MEDICINE

## 2021-08-26 PROCEDURE — 700105 HCHG RX REV CODE 258: Performed by: INTERNAL MEDICINE

## 2021-08-26 PROCEDURE — 160048 HCHG OR STATISTICAL LEVEL 1-5: Performed by: INTERNAL MEDICINE

## 2021-08-26 PROCEDURE — 160035 HCHG PACU - 1ST 60 MINS PHASE I: Performed by: INTERNAL MEDICINE

## 2021-08-26 PROCEDURE — 160036 HCHG PACU - EA ADDL 30 MINS PHASE I: Performed by: INTERNAL MEDICINE

## 2021-08-26 RX ORDER — SODIUM CHLORIDE, SODIUM LACTATE, POTASSIUM CHLORIDE, CALCIUM CHLORIDE 600; 310; 30; 20 MG/100ML; MG/100ML; MG/100ML; MG/100ML
INJECTION, SOLUTION INTRAVENOUS CONTINUOUS
Status: DISCONTINUED | OUTPATIENT
Start: 2021-08-26 | End: 2021-08-26 | Stop reason: HOSPADM

## 2021-08-26 RX ORDER — METOPROLOL TARTRATE 1 MG/ML
1 INJECTION, SOLUTION INTRAVENOUS
Status: DISCONTINUED | OUTPATIENT
Start: 2021-08-26 | End: 2021-08-26 | Stop reason: HOSPADM

## 2021-08-26 RX ORDER — MIDAZOLAM HYDROCHLORIDE 1 MG/ML
INJECTION INTRAMUSCULAR; INTRAVENOUS PRN
Status: DISCONTINUED | OUTPATIENT
Start: 2021-08-26 | End: 2021-08-26 | Stop reason: SURG

## 2021-08-26 RX ORDER — ONDANSETRON 2 MG/ML
4 INJECTION INTRAMUSCULAR; INTRAVENOUS
Status: DISCONTINUED | OUTPATIENT
Start: 2021-08-26 | End: 2021-08-26 | Stop reason: HOSPADM

## 2021-08-26 RX ORDER — LABETALOL HYDROCHLORIDE 5 MG/ML
5 INJECTION, SOLUTION INTRAVENOUS
Status: DISCONTINUED | OUTPATIENT
Start: 2021-08-26 | End: 2021-08-26 | Stop reason: HOSPADM

## 2021-08-26 RX ORDER — HYDRALAZINE HYDROCHLORIDE 20 MG/ML
5 INJECTION INTRAMUSCULAR; INTRAVENOUS
Status: DISCONTINUED | OUTPATIENT
Start: 2021-08-26 | End: 2021-08-26 | Stop reason: HOSPADM

## 2021-08-26 RX ORDER — HALOPERIDOL 5 MG/ML
1 INJECTION INTRAMUSCULAR
Status: DISCONTINUED | OUTPATIENT
Start: 2021-08-26 | End: 2021-08-26 | Stop reason: HOSPADM

## 2021-08-26 RX ADMIN — MIDAZOLAM HYDROCHLORIDE 2 MG: 1 INJECTION, SOLUTION INTRAMUSCULAR; INTRAVENOUS at 09:07

## 2021-08-26 RX ADMIN — SODIUM CHLORIDE, POTASSIUM CHLORIDE, SODIUM LACTATE AND CALCIUM CHLORIDE: 600; 310; 30; 20 INJECTION, SOLUTION INTRAVENOUS at 08:13

## 2021-08-26 RX ADMIN — PROPOFOL 50 MG: 10 INJECTION, EMULSION INTRAVENOUS at 09:15

## 2021-08-26 RX ADMIN — FENTANYL CITRATE 50 MCG: 50 INJECTION, SOLUTION INTRAMUSCULAR; INTRAVENOUS at 09:15

## 2021-08-26 ASSESSMENT — PAIN SCALES - GENERAL: PAIN_LEVEL: 0

## 2021-08-26 ASSESSMENT — PAIN DESCRIPTION - PAIN TYPE: TYPE: ACUTE PAIN;CHRONIC PAIN

## 2021-08-26 ASSESSMENT — FIBROSIS 4 INDEX: FIB4 SCORE: 1.38

## 2021-08-26 NOTE — ANESTHESIA POSTPROCEDURE EVALUATION
Patient: Ashly Meyer    Procedure Summary     Date: 08/26/21 Room / Location:  ENDOSCOPIC ULTRASOUND ROOM / SURGERY [facility]    Anesthesia Start: 0911 Anesthesia Stop: 0954    Procedures:       GASTROSCOPY (N/A Abdomen)      COLONOSCOPY - WITH BIOPSIES. Diagnosis: (RLQ ABDOMINAL PAIN, GERD, AND FATTY LIVER; pending pathology)    Surgeons: Marty Lopez M.D. Responsible Provider: Michele Garcia M.D.    Anesthesia Type: MAC ASA Status: 3          Final Anesthesia Type: MAC  Last vitals  BP   Blood Pressure : 149/75    Temp   36.4 °C (97.5 °F)    Pulse   74   Resp   14    SpO2   100 %      Anesthesia Post Evaluation    Patient location during evaluation: PACU  Patient participation: complete - patient participated  Level of consciousness: awake and alert  Pain score: 0    Airway patency: patent  Anesthetic complications: no  Cardiovascular status: hemodynamically stable  Respiratory status: acceptable  Hydration status: euvolemic    PONV: none          No complications documented.     Nurse Pain Score: 0 (NPRS)

## 2021-08-26 NOTE — OP REPORT
DATE OF SERVICE:  08/26/2021     INDICATION FOR PROCEDURE:  GERD, RLQ pain     PROCEDURE PERFORMED: EGD and colonoscopy     CONSENT:  Informed consent was obtained directly from the patient after benefits, risks and possible alternatives were discussed.     MEDICATIONS:  The patient was given Propofol for this procedure. Please see the anesthesia record for details     PROCEDURE DESCRIPTION:  The patient was placed in the left lateral decubitus position and provided with supplemental oxygen via nasal cannula.  Vital signs were monitored continuously throughout the procedure. After appropriate sedation, the endoscope was advanced through the mouth to the pharynx. The scope was then directly advanced through the esophagus, stomach and duodenum. Retroflexion was performed in the stomach.      Following the EGD, the patient was turned and scope changed. The colonoscopy was performed. RAJINDER was normal. The colonoscope was then introduced into the rectum through the anus. The colonoscope was advanced through the colon under direct visualization to the cecum. The scope was then withdrawn and mucosa examined. Retroflexion was performed in the rectum. The patients toleration of this procedure was excellent. The prep was excellent as well.      FINDINGS:    1)  normal esophagus  2) multiple benign-appearing fundic gland polyps were identified in the stomach. These were not removed today  3) normal duodenum  4) severe left sided diverticulosis with colonic narrowing.   5) otherwise normal colon and normal TI        COMPLICATIONS:  No complications or blood loss during or in the immediate postoperative period.     RECOMMENDATION:    1) increase fiber in diet or take fiber supplement  2) no repeat colonoscopy. F/u would be recommended in 10 years but due to age at f/u and her severely narrowed diverticular-laden sigmoid colon, risks would likely outweigh the benefits  3) f/u with NP  4) discharge home today

## 2021-08-26 NOTE — OR NURSING
0953: To PACU post colonoscopy. Pt is extubated, breathing is spontaneous and unlabored.  1000: Report given to ADRIANNA Verduzco RN.  1015: Pt awake. Denies pain or nausea.  1050: Dr. Lopez at bedside.  1055: Meets criteria for stage ll.

## 2021-08-26 NOTE — OR NURSING
0654: Brought patient back to pre-op and assumed care.  0808: Patient allergies and NPO status verified, home medication reconciliation completed and belongings secured. Patient verbalizes understanding of pain scale, expected course of stay and plan of care. Surgical site verified with patient. IV access established.

## 2021-08-26 NOTE — OR NURSING
1100: Report received from Uri MILLS. Pt to Stage 2 from PACU via [facility]. Assisted to get dressed by CNA. VSS.     1140: DC instructions reviewed with pt and pt's daughter.     1146: PIV DC'd by RN. Pt discharged via wheelchair by CNA. All belongings with pt.

## 2021-08-26 NOTE — ANESTHESIA PREPROCEDURE EVALUATION
Relevant Problems   ANESTHESIA   (positive) KERRI (obstructive sleep apnea)      PULMONARY   (positive) Mild intermittent asthma without complication      NEURO   (positive) H/O fibromyalgia      CARDIAC   (positive) Essential hypertension   (positive) Migraine without aura      GI   (positive) GERD (gastroesophageal reflux disease)         (positive) Nonalcoholic fatty liver disease      ENDO   (positive) Controlled type 2 diabetes mellitus without complication, without long-term current use of insulin (HCC)   (positive) Hypothyroidism due to acquired atrophy of thyroid      Other   (positive) Chronic gout of foot       Physical Exam    Airway   Mallampati: II  TM distance: >3 FB  Neck ROM: full       Cardiovascular - normal exam  Rhythm: regular  Rate: normal  (-) murmur     Dental - normal exam             Pulmonary - normal exam  Breath sounds clear to auscultation     Abdominal   (+) obese     Neurological - normal exam               Anesthesia Plan    ASA 3   ASA physical status 3 criteria: morbid obesity - BMI greater than or equal to 40    Plan - MAC               Induction: intravenous      Pertinent diagnostic labs and testing reviewed    Informed Consent:    Anesthetic plan and risks discussed with patient.

## 2021-08-26 NOTE — DISCHARGE INSTRUCTIONS
ENDOSCOPY HOME CARE INSTRUCTIONS      COLONOSCOPY OR FLEXIBLE SIGMOIDOSCOPY  1. If you received a barium enema, take a mild laxative such as dulcolax, Asha's M.O., or Milk of Magnesia to clean out the barium.  2. Drink plenty of fluids. Eat a diet high in fiber (whole-grain breads, fresh fruit and vegetables).  3. You may notice a few drops of blood with your first bowel movement. If you develop any large amount of bleeding, black stools, a fever, or abdominal pain, call your doctor right away.  4.Your doctor will call with any results  5. Don't drive or drink alcohol for 24 hours. The medication you received will make you too drowsy.  6. No heavy lifting, no Aspirin products or Aspirin for 5 days       FINDINGS:    1)  normal esophagus  2) multiple benign-appearing fundic gland polyps were identified in the stomach. These were not removed today  3) normal duodenum  4) severe left sided diverticulosis with colonic narrowing.   5) otherwise normal colon and normal TI        RECOMMENDATION:    1) increase fiber in diet or take fiber supplement  2) no repeat colonoscopy. F/u would be recommended in 10 years but due to age at f/u and her severely narrowed diverticular-laden sigmoid colon, risks would likely outweigh the benefits  3) f/u with NP  4) discharge home today        Dr. Lopez [phone]    You should call 911 if you develop problems with breathing or chest pain.  If any questions arise, call your doctor. If your doctor is not available, please feel free to call [phone]. You can also call the HEALTH HOTLINE open 24 hours/day, 7 days/week and speak to a nurse at [phone], or toll free [phone].    Depression / Suicide Risk    As you are discharged from this [facility], it is important to learn how to keep safe from harming yourself.    Recognize the warning signs:  · Abrupt changes in personality, positive or negative- including increase in energy   · Giving away  possessions  · Change in eating patterns- significant weight changes-  positive or negative  · Change in sleeping patterns- unable to sleep or sleeping all the time   · Unwillingness or inability to communicate  · Depression  · Unusual sadness, discouragement and loneliness  · Talk of wanting to die  · Neglect of personal appearance   · Rebelliousness- reckless behavior  · Withdrawal from people/activities they love  · Confusion- inability to concentrate     If you or a loved one observes any of these behaviors or has concerns about self-harm, here's what you can do:  · Talk about it- your feelings and reasons for harming yourself  · Remove any means that you might use to hurt yourself (examples: pills, rope, extension cords, firearm)  · Get professional help from the community (Mental Health, Substance Abuse, psychological counseling)  · Do not be alone:Call your Safe Contact- someone whom you trust who will be there for you.  · Call your local CRISIS HOTLINE 200-2853 or 039-388-0811  · Call your local Children's Mobile Crisis Response Team [place] (436) 784-7565 or www.Adbrain  · Call the toll free National Suicide Prevention Hotlines   · National Suicide Prevention Lifeline 932-924-XPFG (3421)  · National Hope Line Network 800-SUICIDE (949-9080)    I acknowledge receipt and understanding of these Home Care Instructions.

## 2021-08-26 NOTE — ANESTHESIA TIME REPORT
Anesthesia Start and Stop Event Times     Date Time Event    8/26/2021 0845 Ready for Procedure     0911 Anesthesia Start     0954 Anesthesia Stop        Responsible Staff  08/26/21    Name Role Begin End    Michele Garcia M.D. Anesth 0911 0954        Preop Diagnosis (Free Text):  Pre-op Diagnosis     RLQ ABDOMINAL PAIN, GERD, AND FATTY LIVER        Preop Diagnosis (Codes):    Post op Diagnosis  GERD (gastroesophageal reflux disease)  right lower quadrant abdominal pain    Premium Reason  Non-Premium    Comments:

## 2021-09-03 ENCOUNTER — OFFICE VISIT (OUTPATIENT)
Dept: MEDICAL GROUP | Facility: MEDICAL CENTER | Age: 71
End: 2021-09-03
Payer: MEDICARE

## 2021-09-03 VITALS
DIASTOLIC BLOOD PRESSURE: 78 MMHG | WEIGHT: 255.51 LBS | HEART RATE: 72 BPM | OXYGEN SATURATION: 92 % | HEIGHT: 65 IN | SYSTOLIC BLOOD PRESSURE: 126 MMHG | TEMPERATURE: 98 F | BODY MASS INDEX: 42.57 KG/M2

## 2021-09-03 DIAGNOSIS — G61.9 INFLAMMATORY NEUROPATHY (HCC): ICD-10-CM

## 2021-09-03 DIAGNOSIS — M1A.0790 CHRONIC GOUT OF FOOT, UNSPECIFIED CAUSE, UNSPECIFIED LATERALITY: ICD-10-CM

## 2021-09-03 DIAGNOSIS — M25.552 BILATERAL HIP PAIN: ICD-10-CM

## 2021-09-03 DIAGNOSIS — G25.0 BENIGN ESSENTIAL TREMOR: ICD-10-CM

## 2021-09-03 DIAGNOSIS — K62.3 INCOMPLETE RECTAL PROLAPSE: ICD-10-CM

## 2021-09-03 DIAGNOSIS — E66.01 MORBID OBESITY WITH BMI OF 40.0-44.9, ADULT (HCC): ICD-10-CM

## 2021-09-03 DIAGNOSIS — M25.551 BILATERAL HIP PAIN: ICD-10-CM

## 2021-09-03 DIAGNOSIS — E03.4 HYPOTHYROIDISM DUE TO ACQUIRED ATROPHY OF THYROID: ICD-10-CM

## 2021-09-03 DIAGNOSIS — M15.9 PRIMARY OSTEOARTHRITIS INVOLVING MULTIPLE JOINTS: ICD-10-CM

## 2021-09-03 DIAGNOSIS — R05.8 PRODUCTIVE COUGH: ICD-10-CM

## 2021-09-03 PROBLEM — F32.2 CURRENT SEVERE EPISODE OF MAJOR DEPRESSIVE DISORDER WITHOUT PSYCHOTIC FEATURES WITHOUT PRIOR EPISODE (HCC): Status: RESOLVED | Noted: 2021-06-03 | Resolved: 2021-09-03

## 2021-09-03 PROBLEM — J20.9 ACUTE BRONCHITIS: Status: ACTIVE | Noted: 2021-09-03

## 2021-09-03 PROBLEM — J20.9 ACUTE BRONCHITIS: Status: RESOLVED | Noted: 2021-09-03 | Resolved: 2021-09-03

## 2021-09-03 PROCEDURE — 99214 OFFICE O/P EST MOD 30 MIN: CPT | Performed by: FAMILY MEDICINE

## 2021-09-03 RX ORDER — NABUMETONE 750 MG/1
750 TABLET, FILM COATED ORAL 2 TIMES DAILY WITH MEALS
Qty: 180 TABLET | Refills: 3 | Status: SHIPPED | OUTPATIENT
Start: 2021-09-03 | End: 2022-09-15 | Stop reason: SDUPTHER

## 2021-09-03 RX ORDER — LEVOTHYROXINE SODIUM 175 UG/1
175 TABLET ORAL
Qty: 90 TABLET | Refills: 3 | Status: SHIPPED | OUTPATIENT
Start: 2021-09-03 | End: 2022-08-12

## 2021-09-03 RX ORDER — TRAMADOL HYDROCHLORIDE 50 MG/1
50 TABLET ORAL EVERY 4 HOURS PRN
Qty: 180 TABLET | Refills: 2 | Status: SHIPPED | OUTPATIENT
Start: 2021-09-03 | End: 2022-04-10

## 2021-09-03 RX ORDER — BENZONATATE 100 MG/1
100 CAPSULE ORAL 3 TIMES DAILY PRN
Qty: 60 CAPSULE | Refills: 6 | Status: SHIPPED | OUTPATIENT
Start: 2021-09-03 | End: 2022-09-15

## 2021-09-03 ASSESSMENT — FIBROSIS 4 INDEX: FIB4 SCORE: 1.38

## 2021-09-03 NOTE — PROGRESS NOTES
Chief Complaint   Patient presents with   • Hypothyroidism   • Arthritis   • Tremors   • Obesity   • Neurological Problem       Subjective:     HPI:   Ashly Meyer presents today with the followin. Hypothyroidism due to acquired atrophy of thyroid  Recent TSH in February was at goal but on the low side.  We have been adjusting the medication so she is now taking between 5-1/2 and 5 tablets/week.  She is keeping track of this but clearly she is having increased tremor.  I have reduced her overall dose to 175 mcg.    2. Incomplete rectal prolapse  Patient recently saw gynecology and was told she has incomplete rectal prolapse.  Surgery was discussed but she does not want to proceed with that at this time.    3. Primary osteoarthritis involving multiple joints  Patient does have significant osteoarthritis of many joints.  She has been on nabumetone twice daily for this for many years.  This is the only thing that really has consistently helped.  By accident she is no longer taking the medication.  She would like to resume as she feels a distinct worsening of her arthritis symptoms.  Tramadol is also helpful to her for the chronic pain.  This is renewed.  PDMP review shows no inconsistencies.    4. Benign essential tremor  Discussed the patient does have benign tremor.  This is beginning to affect her head motion.  I do believe this may be worsened by her current thyroid dose.  Hopefully this will improve on the lower dose.    5. Morbid obesity with BMI of 40.0-44.9, adult (HCC)  Patient's weight has gone back up a few pounds.  She found the procedure for bariatric surgery to be far too cumbersome.  She also felt the office was not well organized as far as this.  She is not pursuing bariatric surgery at this time.    6. Inflammatory neuropathy (HCC)  Patient does have inflammatory neuropathy with an excellent work-up from neurology in the past.  Unfortunately in the Lamictal which was very helpful was  not tolerated and patient had an extremely severe rash.  The tramadol does continue to help some to temper the pain.    7. Bilateral hip pain  Patient has bilateral hip pain for which in the past she has been using the nabumetone and the tramadol.  She was starting to think that the tramadol was no longer working until she checked her medications and found that she was not taking the nabumetone.  This is being renewed as is the tramadol.  She states the tramadol overall is helpful getting her pain down from a 7 or 8 to a 5.  This allows her to complete her ADLs.  She does have some difficulty and has to pace herself.    8. Chronic gout of foot, unspecified cause, unspecified laterality  Patient does have chronic gout.  She is not currently taking the allopurinol as I believe she felt it was not helpful in the past.  Patient does not want to take any more medications than what she is currently taking with the addition of the nabumetone.    9. Productive cough  Patient states the benzonatate perles are actually quite helpful.  This is renewed today.  Cough is productive but the phlegm is clear, white or a light tan.        Patient Active Problem List    Diagnosis Date Noted   • Incomplete rectal prolapse 09/03/2021   • Benign essential tremor 09/03/2021   • Chronic respiratory failure with hypoxia ([facility]) 06/03/2021   • Pre-bariatric surgery psychological evaluation 07/31/2020   • Controlled type 2 diabetes mellitus without complication, without long-term current use of insulin ([facility]) 06/12/2019   • Chronic gout of foot 04/30/2019   • Dry eye syndrome, bilateral    • Rotator cuff syndrome of right shoulder and allied disorders 10/10/2018   • Former smoker 05/30/2018   • Vitamin D deficiency 12/28/2017   • Morbid obesity with BMI of 40.0-44.9, adult ([facility])    • Neural foraminal stenosis of cervical spine 05/18/2017   • Tinnitus of both ears 05/18/2017   • Dyslipidemia, goal LDL below 130 01/06/2017   • Nonalcoholic fatty  liver disease 01/05/2017   • Disease of accessory sinus 10/03/2016   • Atrophic rhinitis 10/03/2016   • Deviated nasal septum 08/01/2016   • Menopausal symptoms 04/28/2016   • Lumbar facet arthropathy 12/09/2015   • Chronic pain 11/18/2015   • Candidal intertrigo 06/01/2015   • Elevated sed rate 12/09/2014   • Mild intermittent asthma without complication    • H/O fibromyalgia 03/11/2014   • KERRI (obstructive sleep apnea) 03/11/2014   • Menopause 03/11/2014   • Peripheral neuropathy    • Rectocele 11/25/2013   • Pelvic floor dysfunction    • Chronic constipation 10/02/2013   • Nocturnal hypoxia    • Cervical stenosis of spinal canal 11/01/2011   • Lumbar radiculopathy 11/01/2011   • Migraine without aura    • Carpal tunnel syndrome 09/05/2011   • Ulnar neuropathy 09/05/2011   • Seasonal allergies 07/08/2011   • GERD (gastroesophageal reflux disease) 12/10/2010   • Osteoarthritis of multiple joints 04/13/2010   • Eczema, dyshidrotic 08/03/2009   • Essential hypertension 05/20/2009   • Hypothyroidism due to acquired atrophy of thyroid 05/20/2009       Current medicines (including changes today)  Current Outpatient Medications   Medication Sig Dispense Refill   • levothyroxine (SYNTHROID) 175 MCG Tab Take 1 Tablet by mouth every morning on an empty stomach. 90 Tablet 3   • nabumetone (RELAFEN) 750 MG Tab Take 1 Tablet by mouth 2 times a day with meals. 180 Tablet 3   • traMADol (ULTRAM) 50 MG Tab Take 1 Tablet by mouth every four hours as needed for Moderate Pain for up to 90 days. 180 tablets is a 30 day quantity 180 Tablet 2   • benzonatate (TESSALON) 100 MG Cap Take 1 Capsule by mouth 3 times a day as needed for Cough. 60 Capsule 6   • albuterol 108 (90 Base) MCG/ACT Aero Soln inhalation aerosol INHALE TWO PUFFS BY MOUTH EVERY SIX HOURS AS NEEDED FOR SHORTNESS OF BREATH 8.5 g 6   • gabapentin (NEURONTIN) 800 MG tablet Take 800 mg by mouth 2 times a day. One in AM, two in PM     • DULoxetine (CYMBALTA) 60 MG Cap   "Particles delayed-release capsule Take 1 capsule by mouth every evening. 90 capsule 3   • Cholecalciferol (VITAMIN D-3) 125 MCG (5000 UT) Tab Take 5,000 Units by mouth every evening.     • vitamin E 180 mg (400 units) Cap Take 180 mg by mouth every morning.     • furosemide (LASIX) 20 MG Tab Take 1 Tab by mouth every day. (Patient taking differently: Take 20 mg by mouth every morning.) 90 Tab 3   • nystatin (MYCOSTATIN) powder Apply 1 g topically 3 times a day. (Patient taking differently: Apply 1 Application topically 3 times a day as needed.) 60 g 6   • ARIPiprazole (ABILIFY) 5 MG tablet Take 1 Tab by mouth every day. (Patient taking differently: Take 5 mg by mouth every morning.) 90 Tab 3   • pantoprazole (PROTONIX) 40 MG Tablet Delayed Response Take 1 Tab by mouth 2 times a day. 180 Tab 3     No current facility-administered medications for this visit.       Allergies   Allergen Reactions   • Ace Inhibitors Cough   • Benadryl Allergy Hives     Hot skin itching   • Iodine Shortness of Breath     Labored breathing- Occasionally can take it   • Lamictal Rash and Swelling     \"hot\", unable to function.  Severe rash, scarring   • Savella [Kdc:Milnacipran+Ci Pigment Blue 63] Nausea, Swelling and Anxiety     Swelling, bone and muscle pain, sweating, nausea, dizziness, sleeplessness, anxiety   • Savella [Milnacipran Hcl] Unspecified     Muscle aches, feet swelling   • Sulfa Drugs Hives, Shortness of Breath and Anxiety     Hard breathing, shortness of breath   • Butalbital-Acetaminophen Unspecified     Dizzy, can't walk, hard to focus   • Cefaclor Nausea and Unspecified     Ceclor causes light headedness and dizziness   • Morphine Itching     \"Redness\"   • Penicillins Itching and Swelling     Skin itching and redness   • Tizanidine Hcl Nausea     \"Dizziness\" hard to focus   • Amlactin Rash and Itching     Lotion causes itching, redness and burnng   • Tape Rash and Itching     Skin tears, paper tape is OK per Pt " "      ROS: As per HPI       Objective:     /78 (BP Location: Left arm, Patient Position: Sitting, BP Cuff Size: Large adult)   Pulse 72   Temp 36.7 °C (98 °F) (Temporal)   Ht 1.651 m (5' 5\")   Wt 116 kg (255 lb 8.2 oz)   SpO2 92%  Body mass index is 42.52 kg/m².    Physical Exam:  Constitutional: Well-developed and well-nourished. Not diaphoretic. No distress. Lucid and fluent.  Patient, physician and staff all wearing masks.  Skin: Skin is warm and dry. No rash noted.  Head: Atraumatic without lesions.  Eyes: Conjunctivae and extraocular motions are normal. Pupils are equal, round, and reactive to light. No scleral icterus.   Ears:  External ears unremarkable.   Neck: Supple, trachea midline. No thyromegaly present. No cervical or supraclavicular lymphadenopathy. No JVD or carotid bruits appreciated  Cardiovascular: Regular rate and rhythm.  Normal S1, S2 without murmur appreciated.  Chest: Effort normal. Clear to auscultation throughout. No adventitious sounds.   Abdomen: Soft, non tender, and without distention. Active bowel sounds in all four quadrants. No rebound, guarding, masses or hepatosplenomegaly.  Extremities: No cyanosis, clubbing, erythema, nor edema.   Neurological: Alert and oriented x 3.  Psychiatric:  Behavior, mood, and affect are appropriate.       Assessment and Plan:     71 y.o. female with the following issues:    1. Hypothyroidism due to acquired atrophy of thyroid  levothyroxine (SYNTHROID) 175 MCG Tab   2. Incomplete rectal prolapse     3. Primary osteoarthritis involving multiple joints  nabumetone (RELAFEN) 750 MG Tab   4. Benign essential tremor     5. Morbid obesity with BMI of 40.0-44.9, adult (HCC)     6. Inflammatory neuropathy (HCC)  traMADol (ULTRAM) 50 MG Tab    Consent for Opiate Prescription   7. Bilateral hip pain  traMADol (ULTRAM) 50 MG Tab    Consent for Opiate Prescription   8. Chronic gout of foot, unspecified cause, unspecified laterality  traMADol (ULTRAM) 50 " MG Tab    Consent for Opiate Prescription   9. Productive cough  benzonatate (TESSALON) 100 MG Cap     Consequences of Chronic Opiate therapy:  (5 A's)  Analgesia:  improved  Activity:  improved  Adverse Events: None reported or observed  Aberrant Behaviors: None reported or observed  Affect/Mood: good grooming, full facial expressions, normal speech pattern and content, normal thought patterns, normal perception, good insight, normal reasoning  Last CMP:   About 10 days ago, normal liver enzymes and normal kidney function.  Good blood salts.  Appropriate Imaging done:   Yes          Followup: Return in about 3 months (around 12/3/2021), or if symptoms worsen or fail to improve.

## 2021-09-07 NOTE — H&P
Gastroenterology Pre-op H&P       HPI: Patient with history of GERD and RLQ pain who presents for EGD and colonoscopy     PMHX:  Past Medical History:   Diagnosis Date   • Anginal syndrome ([facility])    • Anxiety    • Arthritis     Everywhere.    • Back pain    • Bronchitis 08/2021    finished 1st course of ABX to start a 2nd course soon.    • Carotid artery stenosis, unilateral     moderate left carotid artery stenosis   • Chronic pain    • Constipation    • Controlled type 2 diabetes mellitus without complication ([facility])     states borderline   • Controlled type 2 diabetes mellitus without complication, without long-term current use of insulin ([facility]) 6/12/2019   • Cough 08/2021    r/t bronchitis   • Current severe episode of major depressive disorder without psychotic features without prior episode ([facility]) 6/3/2021   • Daytime sleepiness    • Degenerative disc disease    • Depression 5/20/2009   • Diarrhea     and constipation   • Dizziness    • Dry eye syndrome, bilateral    • Elevated sedimentation rate     history of negative temporal artery biopsy around 2006   • Fatigue    • Fatty liver    • Fibromyalgia     confirmed by Dr. Ann   • Frequent headaches    • GERD (gastroesophageal reflux disease)     hiatal hernia   • GOUT 5/20/2009   • H/O foot surgery    • Hearing difficulty    • Heart burn    • Hemorrhagic disorder ([facility]) 2016    nose bleeds   • Hiatus hernia syndrome    • History of anemia     none currently   • Hypertension    • Hypothyroidism 5/20/2009   • Incipient cataract of both eyes    • Incomplete rectal prolapse 9/3/2021   • Insomnia    • Migraine without aura, without mention of intractable migraine without mention of status migrainosus    • Mild intermittent asthma without complication     inhalers as needed   • Mixed dyslipidemia    • Morbid obesity with BMI of 45.0-49.9, adult ([facility])    • Morning headache    • Mumps    • Nasal drainage    • Nocturnal hypoxia      severe, to 69% O2 sat   • Obesity    • Obesity hypoventilation syndrome (HCC) 5/31/2017   • Pelvic floor dysfunction    • Peripheral neuropathy    • Pleomorphic adenoma    • Polymyalgia rheumatica ([facility])     Dr. Ann   • Restless leg syndrome    • Ringing in ears    • Rotator cuff syndrome of right shoulder and allied disorders 10/10/2018   • S/P tonsillectomy    • Seasonal allergies    • Skin cancer 1990's    skin   • Sleep apnea syndrome     she is not using CPAP, claustrophobia, uses 2-3l at night via concentrator occ 2l during daytime ([facility])   • Snoring    • Sore muscles    • Sweat, sweating, excessive    • Swelling of lower extremity    • Urinary incontinence     will not wear a pad   • Vision loss    • Weakness           PSurgHx:   Past Surgical History:   Procedure Laterality Date   • PB UPPER GI ENDOSCOPY,DIAGNOSIS N/A 8/26/2021    Procedure: GASTROSCOPY;  Surgeon: Marty Lopez M.D.;  Location: [facility];  Service: Gastroenterology   • PB COLONOSCOPY,DIAGNOSTIC  8/26/2021    Procedure: COLONOSCOPY - WITH BIOPSIES.;  Surgeon: Marty Lopez M.D.;  Location: [facility];  Service: Gastroenterology   • SHOULDER DECOMPRESSION ARTHROSCOPIC Right 2/1/2019    Procedure: SHOULDER DECOMPRESSION ARTHROSCOPIC - SUBACROMIAL, LABRAL DEBRIDEMENT;  Surgeon: Damaris Knutson M.D.;  Location: [facility];  Service: Orthopedics   • SHOULDER ARTHROSCOPY W/ BICIPITAL TENODESIS REPAIR Right 2/1/2019    Procedure: SHOULDER ARTHROSCOPY W/ BICIPITAL TENOTOMY;  Surgeon: Damaris Knutson M.D.;  Location: [facility];  Service: Orthopedics   • CARPAL TUNNEL RELEASE Right 2/1/2019    Procedure: CARPAL TUNNEL RELEASE;  Surgeon: Damaris Knutson M.D.;  Location: [facility];  Service: Orthopedics   • GASTROSCOPY  2/6/2017    Procedure: GASTROSCOPY;  Surgeon: Pau Foster M.D.;  Location: [facility] SAME DAY [facility];  Service:    • COLONOSCOPY   2/6/2017    Procedure: COLONOSCOPY;  Surgeon: Pau Foster M.D.;  Location: SURGERY SAME DAY [facility]S;  Service:    • SEPTAL RECONSTRUCTION  10/3/2016    Procedure: SEPTAL RECONSTRUCTION with  grafts ;  Surgeon: PIETER Dickson M.D.;  Location: SURGERY SAME DAY [facility];  Service:    • SEPTOPLASTY N/A 8/1/2016    Procedure: SEPTOPLASTY;  Surgeon: PIETER Dickson M.D.;  Location: SURGERY SAME DAY [facility];  Service:    • TURBINOPLASTY Bilateral 8/1/2016    Procedure: TURBINOPLASTY;  Surgeon: PIETER Dickson M.D.;  Location: SURGERY SAME DAY [facility];  Service:    • NASAL FRACTURE REDUCTION OPEN N/A 8/1/2016    Procedure: SEPTAL FRACTURE REDUCTION OPEN;  Surgeon: PIETER Dickson M.D.;  Location: SURGERY SAME DAY [facility];  Service:    • FINE NEEDLE ASPIRATION  11/16/2009    Performed by DA CALLOWAY at ENDOSCOPY [facility]S   • COLONOSCOPY  2006    ? unclear date   • OTHER SURGICAL PROCEDURE  1998    vaginal wall repair   • BLADDER SUSPENSION  1983   • HYSTERECTOMY, VAGINAL  1983    ovaries are present, excessive bleeding   • TUBAL LIGATION  1980   • MASS EXCISION GENERAL Right 1956    had carbuncle removal R thigh   • TONSILLECTOMY  1953   • ANAL SPHINCTEROTOMY      anal muscle repair   • CATARACT EXTRACTION WITH IOL Bilateral    • CHOLECYSTECTOMY     • HYSTERECTOMY LAPAROSCOPY     • OTHER ORTHOPEDIC SURGERY  1962/1980/1983/1985    foot surgery    • SCAR REVISION Right     thigh scar    • TONSILLECTOMY          ALLERGIES:Ace inhibitors, Benadryl allergy, Iodine, Lamictal, Savella [kdc:milnacipran+ci pigment blue 63], Savella [milnacipran hcl], Sulfa drugs, Butalbital-acetaminophen, Cefaclor, Morphine, Penicillins, Tizanidine hcl, Amlactin, and Tape     SocHx:   Social History     Socioeconomic History   • Marital status:      Spouse name: Not on file   • Number of children: Not on file   • Years of education: Not on file   • Highest education level:  Not on file   Occupational History   • Not on file   Tobacco Use   • Smoking status: Former Smoker     Packs/day: 1.00     Years: 43.00     Pack years: 43.00     Types: Cigarettes     Start date:      Quit date: 3/1/2007     Years since quittin.5   • Smokeless tobacco: Never Used   • Tobacco comment: Started smoking at age 15, continued abstinance    Vaping Use   • Vaping Use: Never used   Substance and Sexual Activity   • Alcohol use: Not Currently   • Drug use: Not Currently     Comment: CBD topical pain cream   • Sexual activity: Not Currently     Partners: Male   Other Topics Concern   •  Service No   • Blood Transfusions No   • Caffeine Concern Not Asked   • Occupational Exposure No   • Hobby Hazards No   • Sleep Concern No   • Stress Concern No   • Weight Concern No   • Special Diet No   • Back Care No   • Exercise No   • Bike Helmet No   • Seat Belt Yes   • Self-Exams No   Social History Narrative   • Not on file     Social Determinants of Health     Financial Resource Strain:    • Difficulty of Paying Living Expenses:    Food Insecurity:    • Worried About Running Out of Food in the Last Year:    • Ran Out of Food in the Last Year:    Transportation Needs:    • Lack of Transportation (Medical):    • Lack of Transportation (Non-Medical):    Physical Activity:    • Days of Exercise per Week:    • Minutes of Exercise per Session:    Stress:    • Feeling of Stress :    Social Connections:    • Frequency of Communication with Friends and Family:    • Frequency of Social Gatherings with Friends and Family:    • Attends [employer] Services:    • Active Member of Clubs or Organizations:    • Attends Club or Organization Meetings:    • Marital Status:    Intimate Partner Violence:    • Fear of Current or Ex-Partner:    • Emotionally Abused:    • Physically Abused:    • Sexually Abused:         FAMHx:   Family History   Problem Relation Age of Onset   • Heart Disease Mother         S[place]aim[place] vafidel  replacement   • GI Disease Mother         bile duct problem   • Bladder cancer Mother    • GI Disease Sister         celiac   • Arthritis Sister    • Other Sister         gout and lupus   • Arthritis Sister    • Kidney Disease Sister    • GI Disease Sister    • Bladder cancer Maternal Aunt    • Arthritis Maternal Grandmother    • Hypertension Maternal Grandmother    • Heart Disease Maternal Grandmother    • Hypertension Maternal Grandfather    • Heart Disease Maternal Grandfather    • Arthritis Maternal Grandfather    • No Known Problems Paternal Grandmother    • No Known Problems Paternal Grandfather    • Cancer Brother         Larynx   • Cancer Daughter         non hopskins limphoma   • GI Disease Daughter    • Bipolar disorder Daughter    • Seizures Daughter    • Bipolar disorder Daughter         ROS:  No new complaints    PE:  Vitals:    08/26/21 1008 08/26/21 1023 08/26/21 1053 08/26/21 1109   BP: 149/75 153/83 144/66 147/79   Pulse: 74 71 67 69   Resp: 14 16 20 15   Temp:    36.1 °C (97 °F)   TempSrc:    Temporal   SpO2: 100% 91% 96% 94%   Weight:       Height:         Gen: AAOx3, NAD, lying in bed  HEENT: PERRL, EOMI, nares patent, Mucous membranes moist  Neck: supple, no cervical or supraclavicular adenopathy  CVS: regular rhythm, normal rate, no MRG  Pulm: CTAB, no crackles  Abd: soft, Nd, NT, no guarding or rebound  Ext: no edema, normal sensation  NEURO: grossly normal, no weakness  Skin: warm, no rash  Psych: normal Affect, no anxiety     LABS:  Lab Results   Component Value Date/Time    SODIUM 140 08/20/2021 12:57 PM    POTASSIUM 4.1 08/20/2021 12:57 PM    CHLORIDE 101 08/20/2021 12:57 PM    CO2 27 08/20/2021 12:57 PM    GLUCOSE 107 (H) 08/20/2021 12:57 PM    BUN 19 08/20/2021 12:57 PM    CREATININE 0.75 08/20/2021 12:57 PM    CREATININE 0.84 04/18/2011 03:41 PM    BUNCREATRAT 25 04/18/2011 03:41 PM    GLOMRATE >59 01/14/2011 10:30 AM      Lab Results   Component Value Date/Time    WBC 8.9 08/20/2021  12:57 PM    WBC 10.8 (H) 10/06/2009 08:45 AM    RBC 4.65 08/20/2021 12:57 PM    RBC 4.58 10/06/2009 08:45 AM    HEMOGLOBIN 13.6 08/20/2021 12:57 PM    HEMATOCRIT 42.5 08/20/2021 12:57 PM    MCV 91.4 08/20/2021 12:57 PM    MCV 91 10/06/2009 08:45 AM    MCH 29.2 08/20/2021 12:57 PM    MCH 30.5 10/06/2009 08:45 AM    MCHC 32.0 (L) 08/20/2021 12:57 PM    MPV 10.8 08/20/2021 12:57 PM    NEUTSPOLYS 70.00 04/23/2021 10:35 AM    LYMPHOCYTES 21.40 (L) 04/23/2021 10:35 AM    MONOCYTES 6.90 04/23/2021 10:35 AM    EOSINOPHILS 0.90 04/23/2021 10:35 AM    BASOPHILS 0.50 04/23/2021 10:35 AM    HYPOCHROMIA 1+ 02/04/2014 11:38 AM    ANISOCYTOSIS 1+ 08/10/2011 08:20 PM        Lab Results   Component Value Date/Time    PROTHROMBTM 12.5 05/09/2014 09:22 AM    INR 0.94 05/09/2014 09:22 AM               Problem List Items Addressed This Visit     None           ASSESSMENT/PLAN: 70 yo female with history of GERD and RLQ pain and constipation - plan for EGD and colonoscopy today

## 2021-09-24 ENCOUNTER — OFFICE VISIT (OUTPATIENT)
Dept: MEDICAL GROUP | Facility: MEDICAL CENTER | Age: 71
End: 2021-09-24
Payer: MEDICARE

## 2021-09-24 VITALS
HEIGHT: 65 IN | HEART RATE: 70 BPM | TEMPERATURE: 97.1 F | OXYGEN SATURATION: 93 % | SYSTOLIC BLOOD PRESSURE: 126 MMHG | DIASTOLIC BLOOD PRESSURE: 66 MMHG | BODY MASS INDEX: 43.58 KG/M2 | WEIGHT: 261.6 LBS

## 2021-09-24 DIAGNOSIS — R41.3 MEMORY LOSS, SHORT TERM: ICD-10-CM

## 2021-09-24 DIAGNOSIS — G47.33 OSA (OBSTRUCTIVE SLEEP APNEA): ICD-10-CM

## 2021-09-24 DIAGNOSIS — M48.02 NEURAL FORAMINAL STENOSIS OF CERVICAL SPINE: ICD-10-CM

## 2021-09-24 DIAGNOSIS — M54.16 LUMBAR RADICULOPATHY: ICD-10-CM

## 2021-09-24 DIAGNOSIS — M62.838 NECK MUSCLE SPASM: ICD-10-CM

## 2021-09-24 DIAGNOSIS — G25.0 BENIGN ESSENTIAL TREMOR: ICD-10-CM

## 2021-09-24 PROCEDURE — 99214 OFFICE O/P EST MOD 30 MIN: CPT | Performed by: FAMILY MEDICINE

## 2021-09-24 RX ORDER — PROPRANOLOL HYDROCHLORIDE 10 MG/1
10 TABLET ORAL 3 TIMES DAILY
Qty: 90 TABLET | Refills: 11 | Status: SHIPPED
Start: 2021-09-24 | End: 2021-12-03

## 2021-09-24 RX ORDER — GABAPENTIN 800 MG/1
800 TABLET ORAL 2 TIMES DAILY
Qty: 180 TABLET | Refills: 2 | Status: SHIPPED | OUTPATIENT
Start: 2021-09-24 | End: 2021-09-27

## 2021-09-24 RX ORDER — METHOCARBAMOL 500 MG/1
1000 TABLET, FILM COATED ORAL 4 TIMES DAILY PRN
Qty: 90 TABLET | Refills: 0 | Status: SHIPPED
Start: 2021-09-24 | End: 2021-12-03

## 2021-09-24 ASSESSMENT — FIBROSIS 4 INDEX: FIB4 SCORE: 1.38

## 2021-09-24 NOTE — PROGRESS NOTES
Chief Complaint   Patient presents with   • Shaking     is getting worse and head bobbing [id number]   • Memory Loss     x3 weeks   • Neck Pain     pain of 10, shoots down her back half way       Subjective:     HPI:   Ashly Meyer presents today with the followin. Benign essential tremor  Her daughter is here with her today as she is very concerned about mom's increasing tremor.  In particular she has had much more head and neck tremor which she feels is causing increased neck pain.  Discussed benign tremor which she has had over many years but is definitely getting symptomatic.  Added propranolol 10 mg tid.    2. Memory loss, short term  Patient's daughter states her short-term memory loss has become dramatically worse.  I believe it has been worsening pretty steadily over the last year and a half and we have discussed it in the past.  Her daughter is asking for several kinds of work-up some of which Ashly has already undergone.  However they would like another evaluation with neurology which I think is entirely appropriate.  Referral is placed.    3. Lumbar radiculopathy/Neural foraminal stenosis of cervical spine  Patient is using gabapentin both for the lumbar radiculopathy and the neural foraminal symptoms down the arms.  This is moderately helpful.  She did reduce the dose as she felt the higher dose was actually making her feel worse.  This may be contributing to her shaking.  It is renewed.    4. KERRI (obstructive sleep apnea)  Past polysomnography showed rather mild obstructive sleep apnea.  There did seem to be some central hypoxia and hypovolume of oxygen.  Initially was going to make a sleep study referral.  However, modalities of treatment discussed.  She is already wearing nighttime oxygen.  She is claustrophobic.  Cannot tolerate the masks.  Apparently she tried several different masks.  Does not feel she would be able to tolerate this kind of treatment.    5. Neck muscle spasm  Patient  has very significant neck muscle spasm again.  This is quite bothersome.  She has not tried methocarbamol.  I have suggested that she give this a try and see if it is helpful.  - methocarbamol (ROBAXIN) 500 MG Tab; Take 2 Tablets by mouth 4 times a day as needed.  Dispense: 90 Tablet; Refill: 0      Patient Active Problem List    Diagnosis Date Noted   • Incomplete rectal prolapse 09/03/2021   • Benign essential tremor 09/03/2021   • Chronic respiratory failure with hypoxia ([facility]) 06/03/2021   • Pre-bariatric surgery psychological evaluation 07/31/2020   • Controlled type 2 diabetes mellitus without complication, without long-term current use of insulin ([facility]) 06/12/2019   • Chronic gout of foot 04/30/2019   • Dry eye syndrome, bilateral    • Rotator cuff syndrome of right shoulder and allied disorders 10/10/2018   • Former smoker 05/30/2018   • Vitamin D deficiency 12/28/2017   • Morbid obesity with BMI of 40.0-44.9, adult ([facility])    • Neural foraminal stenosis of cervical spine 05/18/2017   • Tinnitus of both ears 05/18/2017   • Dyslipidemia, goal LDL below 130 01/06/2017   • Nonalcoholic fatty liver disease 01/05/2017   • Disease of accessory sinus 10/03/2016   • Atrophic rhinitis 10/03/2016   • Deviated nasal septum 08/01/2016   • Menopausal symptoms 04/28/2016   • Lumbar facet arthropathy 12/09/2015   • Chronic pain 11/18/2015   • Candidal intertrigo 06/01/2015   • Elevated sed rate 12/09/2014   • Mild intermittent asthma without complication    • H/O fibromyalgia 03/11/2014   • KERRI (obstructive sleep apnea) 03/11/2014   • Menopause 03/11/2014   • Peripheral neuropathy    • Rectocele 11/25/2013   • Pelvic floor dysfunction    • Chronic constipation 10/02/2013   • Nocturnal hypoxia    • Cervical stenosis of spinal canal 11/01/2011   • Lumbar radiculopathy 11/01/2011   • Migraine without aura    • Carpal tunnel syndrome 09/05/2011   • Ulnar neuropathy 09/05/2011   • Seasonal allergies 07/08/2011   • GERD  (gastroesophageal reflux disease) 12/10/2010   • Osteoarthritis of multiple joints 04/13/2010   • Eczema, dyshidrotic 08/03/2009   • Essential hypertension 05/20/2009   • Hypothyroidism due to acquired atrophy of thyroid 05/20/2009       Current medicines (including changes today)  Current Outpatient Medications   Medication Sig Dispense Refill   • propranolol (INDERAL) 10 MG Tab Take 1 Tablet by mouth 3 times a day. 90 Tablet 11   • gabapentin (NEURONTIN) 800 MG tablet Take 1 Tablet by mouth 2 times a day. One in AM, two in  Tablet 2   • methocarbamol (ROBAXIN) 500 MG Tab Take 2 Tablets by mouth 4 times a day as needed. 90 Tablet 0   • levothyroxine (SYNTHROID) 175 MCG Tab Take 1 Tablet by mouth every morning on an empty stomach. 90 Tablet 3   • nabumetone (RELAFEN) 750 MG Tab Take 1 Tablet by mouth 2 times a day with meals. 180 Tablet 3   • traMADol (ULTRAM) 50 MG Tab Take 1 Tablet by mouth every four hours as needed for Moderate Pain for up to 90 days. 180 tablets is a 30 day quantity 180 Tablet 2   • benzonatate (TESSALON) 100 MG Cap Take 1 Capsule by mouth 3 times a day as needed for Cough. 60 Capsule 6   • albuterol 108 (90 Base) MCG/ACT Aero Soln inhalation aerosol INHALE TWO PUFFS BY MOUTH EVERY SIX HOURS AS NEEDED FOR SHORTNESS OF BREATH 8.5 g 6   • DULoxetine (CYMBALTA) 60 MG Cap DR Particles delayed-release capsule Take 1 capsule by mouth every evening. 90 capsule 3   • Cholecalciferol (VITAMIN D-3) 125 MCG (5000 UT) Tab Take 5,000 Units by mouth every evening.     • vitamin E 180 mg (400 units) Cap Take 180 mg by mouth every morning.     • furosemide (LASIX) 20 MG Tab Take 1 Tab by mouth every day. (Patient taking differently: Take 20 mg by mouth every morning.) 90 Tab 3   • nystatin (MYCOSTATIN) powder Apply 1 g topically 3 times a day. (Patient taking differently: Apply 1 Application topically 3 times a day as needed.) 60 g 6   • ARIPiprazole (ABILIFY) 5 MG tablet Take 1 Tab by mouth every day.  "(Patient taking differently: Take 5 mg by mouth every morning.) 90 Tab 3   • pantoprazole (PROTONIX) 40 MG Tablet Delayed Response Take 1 Tab by mouth 2 times a day. 180 Tab 3     No current facility-administered medications for this visit.       Allergies   Allergen Reactions   • Ace Inhibitors Cough   • Benadryl Allergy Hives     Hot skin itching   • Iodine Shortness of Breath     Labored breathing- Occasionally can take it   • Lamictal Rash and Swelling     \"hot\", unable to function.  Severe rash, scarring   • Savella [Kdc:Milnacipran+Ci Pigment Blue 63] Nausea, Swelling and Anxiety     Swelling, bone and muscle pain, sweating, nausea, dizziness, sleeplessness, anxiety   • Savella [Milnacipran Hcl] Unspecified     Muscle aches, feet swelling   • Sulfa Drugs Hives, Shortness of Breath and Anxiety     Hard breathing, shortness of breath   • Butalbital-Acetaminophen Unspecified     Dizzy, can't walk, hard to focus   • Cefaclor Nausea and Unspecified     Ceclor causes light headedness and dizziness   • Morphine Itching     \"Redness\"   • Penicillins Itching and Swelling     Skin itching and redness   • Tizanidine Hcl Nausea     \"Dizziness\" hard to focus   • Amlactin Rash and Itching     Lotion causes itching, redness and burnng   • Tape Rash and Itching     Skin tears, paper tape is OK per Pt       ROS: As per HPI       Objective:     /66 (BP Location: Right arm, Patient Position: Sitting, BP Cuff Size: Large adult)   Pulse 70   Temp 36.2 °C (97.1 °F) (Temporal)   Ht 1.651 m (5' 5\")   Wt 119 kg (261 lb 9.6 oz)   SpO2 93%  Body mass index is 43.53 kg/m².    Physical Exam:  Constitutional: Well-developed and well-nourished. Not diaphoretic. No distress. Lucid and fluent. Patient, daughter, physician and staff all wearing masks.  Skin: Skin is warm and dry. No rash noted.  Head: Atraumatic without lesions.  Eyes: Conjunctivae and extraocular motions are normal. Pupils are equal, round, and reactive to light. " No scleral icterus.    Ears:  External ears unremarkable.   Neck: posterior neck spasm, severe.. No thyromegaly present. No cervical or supraclavicular lymphadenopathy. No JVD or carotid bruits appreciated  Cardiovascular: Regular rate and rhythm.  Normal S1, S2 without murmur appreciated.  Chest: Effort normal. Clear to auscultation throughout. No adventitious sounds.   Extremities: No cyanosis, clubbing, erythema, nor edema.   Neurological: Alert and oriented x 3.  She has significant neck tremor and bilateral hand tremor.  These are intentional tremors.  No resting tremor appreciated other than very slight in the neck.  Psychiatric:  Behavior, mood, and affect are appropriate.       Assessment and Plan:     71 y.o. female with the following issues:    1. Benign essential tremor  REFERRAL TO NEUROLOGY    propranolol (INDERAL) 10 MG Tab   2. Memory loss, short term  REFERRAL TO NEUROLOGY   3. Lumbar radiculopathy  gabapentin (NEURONTIN) 800 MG tablet   4. Neural foraminal stenosis of cervical spine  gabapentin (NEURONTIN) 800 MG tablet   5. KERRI (obstructive sleep apnea)     6. Neck muscle spasm  methocarbamol (ROBAXIN) 500 MG Tab         Followup: Return in about 3 months (around 12/24/2021), or if symptoms worsen or fail to improve.

## 2021-09-27 ENCOUNTER — TELEPHONE (OUTPATIENT)
Dept: MEDICAL GROUP | Facility: MEDICAL CENTER | Age: 71
End: 2021-09-27

## 2021-09-27 DIAGNOSIS — M48.02 NEURAL FORAMINAL STENOSIS OF CERVICAL SPINE: ICD-10-CM

## 2021-09-27 DIAGNOSIS — M54.16 LUMBAR RADICULOPATHY: ICD-10-CM

## 2021-09-27 RX ORDER — GABAPENTIN 800 MG/1
800 TABLET ORAL 2 TIMES DAILY
Qty: 180 TABLET | Refills: 2 | Status: SHIPPED | OUTPATIENT
Start: 2021-09-27 | End: 2022-06-02 | Stop reason: SDUPTHER

## 2021-09-27 NOTE — TELEPHONE ENCOUNTER
I apologize, that was unclear.  I have resent the prescription as 1 twice daily.  I sent it but also please let the pharmacy know.

## 2021-09-27 NOTE — TELEPHONE ENCOUNTER
"Pharmacy called and stated they have two different instructions for how to take the gabapentin. They just need to know is it \"Take 1 tablet by mouth 2 times a day,\" or \"One in AM, Two in PM,\" please advise.  "

## 2021-11-02 ENCOUNTER — HOSPITAL ENCOUNTER (OUTPATIENT)
Dept: RADIOLOGY | Facility: MEDICAL CENTER | Age: 71
End: 2021-11-02
Attending: NURSE PRACTITIONER
Payer: MEDICARE

## 2021-11-02 DIAGNOSIS — M79.7 SCAPULOHUMERAL FIBROSITIS: ICD-10-CM

## 2021-11-02 DIAGNOSIS — M54.2 CERVICALGIA: ICD-10-CM

## 2021-11-02 PROCEDURE — 72040 X-RAY EXAM NECK SPINE 2-3 VW: CPT

## 2021-12-03 ENCOUNTER — OFFICE VISIT (OUTPATIENT)
Dept: MEDICAL GROUP | Facility: MEDICAL CENTER | Age: 71
End: 2021-12-03
Payer: MEDICARE

## 2021-12-03 VITALS
SYSTOLIC BLOOD PRESSURE: 130 MMHG | OXYGEN SATURATION: 95 % | TEMPERATURE: 97.6 F | DIASTOLIC BLOOD PRESSURE: 72 MMHG | BODY MASS INDEX: 43.32 KG/M2 | HEART RATE: 58 BPM | HEIGHT: 65 IN | WEIGHT: 260 LBS

## 2021-12-03 DIAGNOSIS — B37.2 CANDIDAL INTERTRIGO: ICD-10-CM

## 2021-12-03 DIAGNOSIS — R60.9 PERIPHERAL EDEMA: ICD-10-CM

## 2021-12-03 DIAGNOSIS — K59.09 CHRONIC CONSTIPATION: ICD-10-CM

## 2021-12-03 DIAGNOSIS — M25.551 PELVIC JOINT PAIN, RIGHT: ICD-10-CM

## 2021-12-03 DIAGNOSIS — M25.511 RIGHT ANTERIOR SHOULDER PAIN: ICD-10-CM

## 2021-12-03 DIAGNOSIS — E78.5 DYSLIPIDEMIA, GOAL LDL BELOW 130: ICD-10-CM

## 2021-12-03 DIAGNOSIS — E03.4 HYPOTHYROIDISM DUE TO ACQUIRED ATROPHY OF THYROID: ICD-10-CM

## 2021-12-03 DIAGNOSIS — F33.41 RECURRENT MAJOR DEPRESSIVE DISORDER, IN PARTIAL REMISSION (HCC): ICD-10-CM

## 2021-12-03 DIAGNOSIS — K21.9 GASTROESOPHAGEAL REFLUX DISEASE WITHOUT ESOPHAGITIS: ICD-10-CM

## 2021-12-03 DIAGNOSIS — E11.9 CONTROLLED TYPE 2 DIABETES MELLITUS WITHOUT COMPLICATION, WITHOUT LONG-TERM CURRENT USE OF INSULIN (HCC): ICD-10-CM

## 2021-12-03 DIAGNOSIS — E55.9 VITAMIN D DEFICIENCY: ICD-10-CM

## 2021-12-03 PROCEDURE — 99214 OFFICE O/P EST MOD 30 MIN: CPT | Performed by: FAMILY MEDICINE

## 2021-12-03 RX ORDER — ALPRAZOLAM 0.5 MG/1
TABLET ORAL
COMMUNITY
Start: 2021-11-11 | End: 2021-12-03

## 2021-12-03 RX ORDER — KETOCONAZOLE 20 MG/G
CREAM TOPICAL
Qty: 30 G | Refills: 2 | Status: SHIPPED | OUTPATIENT
Start: 2021-12-03 | End: 2022-04-04 | Stop reason: SDUPTHER

## 2021-12-03 RX ORDER — FUROSEMIDE 20 MG/1
20 TABLET ORAL EVERY MORNING
Qty: 90 TABLET | Refills: 3 | Status: SHIPPED | OUTPATIENT
Start: 2021-12-03 | End: 2023-03-13

## 2021-12-03 RX ORDER — QUETIAPINE FUMARATE 25 MG/1
25 TABLET, FILM COATED ORAL
Qty: 90 TABLET | Refills: 3 | Status: SHIPPED | OUTPATIENT
Start: 2021-12-03 | End: 2022-12-12

## 2021-12-03 RX ORDER — PANTOPRAZOLE SODIUM 40 MG/1
40 TABLET, DELAYED RELEASE ORAL 2 TIMES DAILY
Qty: 180 TABLET | Refills: 3 | Status: SHIPPED | OUTPATIENT
Start: 2021-12-03 | End: 2022-04-04 | Stop reason: SDUPTHER

## 2021-12-03 ASSESSMENT — FIBROSIS 4 INDEX: FIB4 SCORE: 1.38

## 2021-12-03 NOTE — PROGRESS NOTES
Chief Complaint   Patient presents with   • Hip Pain     month on left   • Neck Pain     not able to support head   • Shoulder Pain     right for about 6 weeks   • Medication Management       Subjective:     HPI:   Ashly Meyer presents today with the followin. Controlled type 2 diabetes mellitus without complication, without long-term current use of insulin (HCC)  She is well controlled.  Her A1c is 6.1%.  She was at one point considering bariatric surgery.  She has had about a 20 pound weight loss over the last 6 or 7 years.  She has not been able to go lower but as I say is in good control currently.    2. Dyslipidemia, goal LDL below 130  Patient denies chest pain, chest pressure, palpitations or exertional shortness of breath. Patient is not on lipid-lowering medication.  She has had mild to moderate lipid elevation in the past with good HDL.. Patient is a former smoker, quit over 15 years ago. Patient takes no aspirin daily. Patient has no history of myocardial infarction, stroke or PVD.  The problem is clinically stable.    3. Hypothyroidism due to acquired atrophy of thyroid  Patient reports good energy level on the medication. Patient denies insomnia, tremor or change in appetite.  Patient is taking the medication on an empty stomach in the morning and waiting at least 30 minutes before eating.  Last TSH in February this year was at target.    4. Right anterior shoulder pain  Patient has chronic right anterior shoulder pain.  This is worsening.  She had a right shoulder decompression in 2019.  Referral to orthopedics is discussed and placed.    5. Chronic constipation  Patient does have chronic constipation.  Discussed continuing her MiraLAX and other support which has been helpful.    6. Vitamin D deficiency  Patient has vitamin D deficiency.  Emphasized that she does need to take her vitamin D supplementation regularly.    7. Pelvic joint pain, right  There is pain in the right side of the  pelvis this appears to be on the right iliac crest.  X-ray order discussed and placed.  She was saying this was hip pain but it does not appear to be near the hip joint or the trochanteric bursa.    8. Candidal intertrigo  Patient finds that the nystatin powder is not helpful to her.  Discussed changing to ketoconazole cream.    9. Gastroesophageal reflux disease without esophagitis  The patient feels the current medication regimen of pantoprazole twice daily is controlling the gastroesophageal reflux symptoms well. Denies dysphagia, reflux symptoms, acidity, abdominal pain or visible blood or mucus in the stool. Denies vomiting or hematemesis. Denies burping or abdominal bloating. Patient avoids nonsteroidal anti-inflammatory drugs. Avoids heavy meals or eating within 2 hours of bedtime.  At first patient said she was having increased symptoms and that she was only taking the pantoprazole once a day.  She was not really sure how she was taking it.  When questioned again she said taking it twice a day works well.  She and her daughter will go through her medications and use the after visit summary to make sure she is taking what is prescribed.    10. Recurrent major depressive disorder, in partial remission (HCC)  Patient stated she was taking the duloxetine daily and it was not helping.  I felt like she was somewhat better.  I looked at her bottles which she had brought in and she had filled the duloxetine on August 4 and still had a nearly full bottle.  This is not consistent with taking the medication regularly.  Discussed the importance of taking this medication daily and filling her pill minder  correctly.    11. Peripheral edema  Patient's furosemide and potassium are renewed.  Patient finds she has significant edema if she neglects this.    Reviewed and discussed her medications.  The propranolol was discontinued as is the nystatin and methocarbamol.  Patient feels the duloxetine does not do very much but to  me there is significantly less depression since she has been on that.  Her daughter seems to agree.    Patient Active Problem List    Diagnosis Date Noted   • Incomplete rectal prolapse 09/03/2021   • Benign essential tremor 09/03/2021   • Chronic respiratory failure with hypoxia ([facility]) 06/03/2021   • Pre-bariatric surgery psychological evaluation 07/31/2020   • Controlled type 2 diabetes mellitus without complication, without long-term current use of insulin ([facility]) 06/12/2019   • Chronic gout of foot 04/30/2019   • Dry eye syndrome, bilateral    • Rotator cuff syndrome of right shoulder and allied disorders 10/10/2018   • Former smoker 05/30/2018   • Vitamin D deficiency 12/28/2017   • Morbid obesity with BMI of 40.0-44.9, adult ([facility])    • Neural foraminal stenosis of cervical spine 05/18/2017   • Tinnitus of both ears 05/18/2017   • Dyslipidemia, goal LDL below 130 01/06/2017   • Nonalcoholic fatty liver disease 01/05/2017   • Disease of accessory sinus 10/03/2016   • Atrophic rhinitis 10/03/2016   • Deviated nasal septum 08/01/2016   • Menopausal symptoms 04/28/2016   • Lumbar facet arthropathy 12/09/2015   • Chronic pain 11/18/2015   • Candidal intertrigo 06/01/2015   • Elevated sed rate 12/09/2014   • Mild intermittent asthma without complication    • H/O fibromyalgia 03/11/2014   • KERRI (obstructive sleep apnea) 03/11/2014   • Menopause 03/11/2014   • Peripheral neuropathy    • Rectocele 11/25/2013   • Pelvic floor dysfunction    • Chronic constipation 10/02/2013   • Nocturnal hypoxia    • Cervical stenosis of spinal canal 11/01/2011   • Lumbar radiculopathy 11/01/2011   • Migraine without aura    • Carpal tunnel syndrome 09/05/2011   • Ulnar neuropathy 09/05/2011   • Seasonal allergies 07/08/2011   • GERD (gastroesophageal reflux disease) 12/10/2010   • Osteoarthritis of multiple joints 04/13/2010   • Eczema, dyshidrotic 08/03/2009   • Essential hypertension 05/20/2009   • Hypothyroidism due to acquired atrophy  "of thyroid 05/20/2009       Current medicines (including changes today)  Current Outpatient Medications   Medication Sig Dispense Refill   • POTASSIUM CHLORIDE PO Take 20 mEq by mouth 2 times a day.     • ketoconazole (NIZORAL) 2 % Cream Apply to affected area daily 30 g 2   • pantoprazole (PROTONIX) 40 MG Tablet Delayed Response Take 1 Tablet by mouth 2 times a day. 180 Tablet 3   • QUEtiapine (SEROQUEL) 25 MG Tab Take 1 Tablet by mouth at bedtime. 90 Tablet 3   • furosemide (LASIX) 20 MG Tab Take 1 Tablet by mouth every morning. 90 Tablet 3   • gabapentin (NEURONTIN) 800 MG tablet Take 1 Tablet by mouth 2 times a day. 180 Tablet 2   • levothyroxine (SYNTHROID) 175 MCG Tab Take 1 Tablet by mouth every morning on an empty stomach. 90 Tablet 3   • nabumetone (RELAFEN) 750 MG Tab Take 1 Tablet by mouth 2 times a day with meals. 180 Tablet 3   • benzonatate (TESSALON) 100 MG Cap Take 1 Capsule by mouth 3 times a day as needed for Cough. 60 Capsule 6   • albuterol 108 (90 Base) MCG/ACT Aero Soln inhalation aerosol INHALE TWO PUFFS BY MOUTH EVERY SIX HOURS AS NEEDED FOR SHORTNESS OF BREATH 8.5 g 6   • DULoxetine (CYMBALTA) 60 MG Cap DR Particles delayed-release capsule Take 1 capsule by mouth every evening. 90 capsule 3   • Cholecalciferol (VITAMIN D-3) 125 MCG (5000 UT) Tab Take 5,000 Units by mouth every evening.     • vitamin E 180 mg (400 units) Cap Take 180 mg by mouth every morning.       No current facility-administered medications for this visit.       Allergies   Allergen Reactions   • Ace Inhibitors Cough   • Benadryl Allergy Hives     Hot skin itching   • Iodine Shortness of Breath     Labored breathing- Occasionally can take it   • Lamictal Rash and Swelling     \"hot\", unable to function.  Severe rash, scarring   • Savella [Kdc:Milnacipran+Ci Pigment Blue 63] Nausea, Swelling and Anxiety     Swelling, bone and muscle pain, sweating, nausea, dizziness, sleeplessness, anxiety   • Savella [Milnacipran Hcl] " "Unspecified     Muscle aches, feet swelling   • Sulfa Drugs Hives, Shortness of Breath and Anxiety     Hard breathing, shortness of breath   • Butalbital-Acetaminophen Unspecified     Dizzy, can't walk, hard to focus   • Cefaclor Nausea and Unspecified     Ceclor causes light headedness and dizziness   • Morphine Itching     \"Redness\"   • Penicillins Itching and Swelling     Skin itching and redness   • Tizanidine Hcl Nausea     \"Dizziness\" hard to focus   • Amlactin Rash and Itching     Lotion causes itching, redness and burnng   • Tape Rash and Itching     Skin tears, paper tape is OK per Pt       ROS: As per HPI       Objective:     /72 (BP Location: Right arm, Patient Position: Sitting, BP Cuff Size: Large adult)   Pulse (!) 58   Temp 36.4 °C (97.6 °F) (Temporal)   Ht 1.651 m (5' 5\")   Wt 118 kg (260 lb)   SpO2 95%  Body mass index is 43.27 kg/m².    Physical Exam:  Constitutional: Well-developed and well-nourished. Not diaphoretic. No distress. Lucid and fluent.  Patient, daughter, physician and staff all wearing masks.  Skin: Skin is warm and dry. No rash noted.  Head: Atraumatic without lesions.  Eyes: Conjunctivae and extraocular motions are normal. Pupils are equal, round, and reactive to light. No scleral icterus.   Ears:  External ears unremarkable.   Neck: Supple, trachea midline. No thyromegaly present. No cervical or supraclavicular lymphadenopathy. No JVD or carotid bruits appreciated  Cardiovascular: Regular rate and rhythm.  Normal S1, S2 without murmur appreciated.  Chest: Effort normal. Clear to auscultation throughout. No adventitious sounds.   Abdomen: Soft, non tender, and without distention. Active bowel sounds in all four quadrants. No rebound, guarding, masses or hepatosplenomegaly.  Extremities: No cyanosis, clubbing, erythema, nor edema.   Neurological: Alert and oriented x 3. Tremor still present, especially on intention.  Psychiatric:  Behavior, mood, and affect are " appropriate.       Assessment and Plan:     71 y.o. female with the following issues:    1. Controlled type 2 diabetes mellitus without complication, without long-term current use of insulin (HCC)  HEMOGLOBIN A1C    Comp Metabolic Panel    MICROALBUMIN CREAT RATIO URINE   2. Dyslipidemia, goal LDL below 130  Comp Metabolic Panel    Lipid Profile   3. Hypothyroidism due to acquired atrophy of thyroid  TSH   4. Right anterior shoulder pain  Referral to Orthopedics   5. Chronic constipation  CBC WITHOUT DIFFERENTIAL   6. Vitamin D deficiency  VITAMIN D,25 HYDROXY   7. Pelvic joint pain, right  DX-PELVIS-1 OR 2 VIEWS   8. Candidal intertrigo  ketoconazole (NIZORAL) 2 % Cream   9. Gastroesophageal reflux disease without esophagitis  pantoprazole (PROTONIX) 40 MG Tablet Delayed Response   10. Recurrent major depressive disorder, in partial remission (HCC)  QUEtiapine (SEROQUEL) 25 MG Tab   11. Peripheral edema  furosemide (LASIX) 20 MG Tab         Followup: Return in about 3 months (around 3/3/2022), or if symptoms worsen or fail to improve.

## 2021-12-04 LAB
25(OH)D3+25(OH)D2 SERPL-MCNC: 42.1 NG/ML (ref 30–100)
ALBUMIN SERPL-MCNC: 4.7 G/DL (ref 3.7–4.7)
ALBUMIN/CREAT UR: 48 MG/G CREAT (ref 0–29)
ALBUMIN/GLOB SERPL: 1.5 {RATIO} (ref 1.2–2.2)
ALP SERPL-CCNC: 161 IU/L (ref 44–121)
ALT SERPL-CCNC: 26 IU/L (ref 0–32)
AST SERPL-CCNC: 24 IU/L (ref 0–40)
BILIRUB SERPL-MCNC: 0.7 MG/DL (ref 0–1.2)
BUN SERPL-MCNC: 25 MG/DL (ref 8–27)
BUN/CREAT SERPL: 27 (ref 12–28)
CALCIUM SERPL-MCNC: 9.9 MG/DL (ref 8.7–10.3)
CHLORIDE SERPL-SCNC: 103 MMOL/L (ref 96–106)
CHOLEST SERPL-MCNC: 230 MG/DL (ref 100–199)
CO2 SERPL-SCNC: 22 MMOL/L (ref 20–29)
CREAT SERPL-MCNC: 0.91 MG/DL (ref 0.57–1)
CREAT UR-MCNC: 108 MG/DL
ERYTHROCYTE [DISTWIDTH] IN BLOOD BY AUTOMATED COUNT: 14.9 % (ref 11.7–15.4)
GLOBULIN SER CALC-MCNC: 3.1 G/DL (ref 1.5–4.5)
GLUCOSE SERPL-MCNC: 90 MG/DL (ref 65–99)
HBA1C MFR BLD: 6.1 % (ref 4.8–5.6)
HCT VFR BLD AUTO: 44.5 % (ref 34–46.6)
HDLC SERPL-MCNC: 64 MG/DL
HGB BLD-MCNC: 14.5 G/DL (ref 11.1–15.9)
LABORATORY COMMENT REPORT: ABNORMAL
LDLC SERPL CALC-MCNC: 146 MG/DL (ref 0–99)
MCH RBC QN AUTO: 29 PG (ref 26.6–33)
MCHC RBC AUTO-ENTMCNC: 32.6 G/DL (ref 31.5–35.7)
MCV RBC AUTO: 89 FL (ref 79–97)
MICROALBUMIN UR-MCNC: 52.2 UG/ML
NRBC BLD AUTO-RTO: NORMAL %
PLATELET # BLD AUTO: 185 X10E3/UL (ref 150–450)
POTASSIUM SERPL-SCNC: 4.2 MMOL/L (ref 3.5–5.2)
PROT SERPL-MCNC: 7.8 G/DL (ref 6–8.5)
RBC # BLD AUTO: 5 X10E6/UL (ref 3.77–5.28)
SODIUM SERPL-SCNC: 142 MMOL/L (ref 134–144)
TRIGL SERPL-MCNC: 113 MG/DL (ref 0–149)
TSH SERPL DL<=0.005 MIU/L-ACNC: 0.34 UIU/ML (ref 0.45–4.5)
VLDLC SERPL CALC-MCNC: 20 MG/DL (ref 5–40)
WBC # BLD AUTO: 10.3 X10E3/UL (ref 3.4–10.8)

## 2021-12-23 RX ORDER — POTASSIUM CHLORIDE 20 MEQ/1
TABLET, EXTENDED RELEASE ORAL
Qty: 180 TABLET | Refills: 0 | Status: SHIPPED | OUTPATIENT
Start: 2021-12-23 | End: 2022-03-03 | Stop reason: SDUPTHER

## 2022-01-12 ENCOUNTER — OFFICE VISIT (OUTPATIENT)
Dept: NEUROLOGY | Facility: MEDICAL CENTER | Age: 72
End: 2022-01-12
Attending: PSYCHIATRY & NEUROLOGY
Payer: MEDICARE

## 2022-01-12 VITALS
OXYGEN SATURATION: 91 % | WEIGHT: 264.77 LBS | SYSTOLIC BLOOD PRESSURE: 136 MMHG | HEIGHT: 65 IN | DIASTOLIC BLOOD PRESSURE: 78 MMHG | HEART RATE: 74 BPM | BODY MASS INDEX: 44.11 KG/M2

## 2022-01-12 DIAGNOSIS — I10 ESSENTIAL HYPERTENSION: ICD-10-CM

## 2022-01-12 DIAGNOSIS — G25.0 BENIGN ESSENTIAL TREMOR: ICD-10-CM

## 2022-01-12 DIAGNOSIS — R26.9 NEUROLOGIC GAIT DISORDER: ICD-10-CM

## 2022-01-12 DIAGNOSIS — G98.8 OTHER DISORDERS OF NERVOUS SYSTEM: ICD-10-CM

## 2022-01-12 DIAGNOSIS — E03.4 HYPOTHYROIDISM DUE TO ACQUIRED ATROPHY OF THYROID: ICD-10-CM

## 2022-01-12 DIAGNOSIS — G47.33 OBSTRUCTIVE SLEEP APNEA: ICD-10-CM

## 2022-01-12 DIAGNOSIS — G31.84 MILD COGNITIVE IMPAIRMENT WITH MEMORY LOSS: Primary | ICD-10-CM

## 2022-01-12 PROCEDURE — 99211 OFF/OP EST MAY X REQ PHY/QHP: CPT | Performed by: PSYCHIATRY & NEUROLOGY

## 2022-01-12 PROCEDURE — 99205 OFFICE O/P NEW HI 60 MIN: CPT | Performed by: PSYCHIATRY & NEUROLOGY

## 2022-01-12 ASSESSMENT — PATIENT HEALTH QUESTIONNAIRE - PHQ9: CLINICAL INTERPRETATION OF PHQ2 SCORE: 0

## 2022-01-12 ASSESSMENT — FIBROSIS 4 INDEX: FIB4 SCORE: 1.81

## 2022-01-12 NOTE — PROGRESS NOTES
Reason for Consult:  Memory disturbance for over 4 years    History of present illness:    Ashly Meyer 71 y.o. right handed woman woman here with her daughter. She has Benign Essential Tremor, Chronic Respiratory Failure with Hypoxemia. I reviewed her extensive Problem List.     She has 1 year of [employer] and worked at [employer] Aide and  and retired in 2011.    She is aware over the last 12 months and is aware of being forgetful for almost everything. This includes the date(s) and will often go into a room and forget while she went in the room but usually can recall what she needed. She does feel that her memory has gradually worsened.    Her daughter has noted that Ashly has always had memory issues even in her 30(s). Over the last 5-6 years ago, Ashly started to have more difficulty with day to day memories and was repeating and asking questions over and over. She even had gradual problems knowing where she is going (such as driving to the mall) and she has to really think about where she is going.    Repeating of information typically occurs every other conversation and it could be 2 separate times within a conversation- usually 1-2 times per day in the last 12 months.    There has been a gradual change in Ashly's ability in her common sense thinking in the last 2 years or so.     No parkinsonian features have developed with a periodic head ron (side to side) about 2 years. No changes in voice quality. Mild hand bilateral tremor(s) with postural or action (noticeable when holding any object- even a cup,utensils,magazine/book).    Average sleep- 7-8 hours without any clear subjective arousals (snoring,gasping,vivid dreams); denies needing or feeling like taking a nap most days of the week.    No paranoia,delusions,visual-auditory hallucinations in the recent months.    No evolving changes in personality,behaviors (mood) per daughter in the recent months.    No  complaints of evolving headache(s)- postural type,valsalva provoked, or migraine type nor new pains of the limbs,sensory disturbances of the limbs, dysarthria,dysphagia in the recent 3 months or so.    No history of significant alcohol use in adult life. No history of concussion(s) or discrete concussive events with residual symptoms lasting weeks/months or longer.    No involuntary movements of the body or lower legs of any type in the recent months.    No dysarthria,dysphonia,dysphagia,diplopia in the recent months.    She has been going to P.T. ( 2 times a week) for more than 6 months- daughter feels this seems to help. She does not use a cane at home and does not feel she needs that.    CPAP- unable to use this due to claustrophobia ; tried several masks but could not tolerate. Uses home O2 at night.        Uncle- Mother's Brother (in his late 70).  Grand father's- Mother's side- late onset  Sister and Brother- bilateral tremors of the hands.    Patient Active Problem List    Diagnosis Date Noted   • Incomplete rectal prolapse 09/03/2021   • Benign essential tremor 09/03/2021   • Chronic respiratory failure with hypoxia ([facility]) 06/03/2021   • Pre-bariatric surgery psychological evaluation 07/31/2020   • Controlled type 2 diabetes mellitus without complication, without long-term current use of insulin ([facility]) 06/12/2019   • Chronic gout of foot 04/30/2019   • Dry eye syndrome, bilateral    • Rotator cuff syndrome of right shoulder and allied disorders 10/10/2018   • Former smoker 05/30/2018   • Vitamin D deficiency 12/28/2017   • Morbid obesity with BMI of 40.0-44.9, adult ([facility])    • Neural foraminal stenosis of cervical spine 05/18/2017   • Tinnitus of both ears 05/18/2017   • Dyslipidemia, goal LDL below 130 01/06/2017   • Nonalcoholic fatty liver disease 01/05/2017   • Disease of accessory sinus 10/03/2016   • Atrophic rhinitis 10/03/2016   • Deviated nasal septum 08/01/2016   • Menopausal symptoms 04/28/2016   •  Lumbar facet arthropathy 12/09/2015   • Chronic pain 11/18/2015   • Candidal intertrigo 06/01/2015   • Elevated sed rate 12/09/2014   • Mild intermittent asthma without complication    • H/O fibromyalgia 03/11/2014   • KERRI (obstructive sleep apnea) 03/11/2014   • Menopause 03/11/2014   • Peripheral neuropathy    • Rectocele 11/25/2013   • Pelvic floor dysfunction    • Chronic constipation 10/02/2013   • Nocturnal hypoxia    • Cervical stenosis of spinal canal 11/01/2011   • Lumbar radiculopathy 11/01/2011   • Migraine without aura    • Carpal tunnel syndrome 09/05/2011   • Ulnar neuropathy 09/05/2011   • Seasonal allergies 07/08/2011   • GERD (gastroesophageal reflux disease) 12/10/2010   • Osteoarthritis of multiple joints 04/13/2010   • Eczema, dyshidrotic 08/03/2009   • Essential hypertension 05/20/2009   • Hypothyroidism due to acquired atrophy of thyroid 05/20/2009       Past medical history:   Past Medical History:   Diagnosis Date   • Anginal syndrome (HCC)    • Anxiety    • Arthritis     Everywhere.    • Back pain    • Bronchitis 08/2021    finished 1st course of ABX to start a 2nd course soon.    • Carotid artery stenosis, unilateral     moderate left carotid artery stenosis   • Chronic pain    • Constipation    • Controlled type 2 diabetes mellitus without complication ([facility])     states borderline   • Controlled type 2 diabetes mellitus without complication, without long-term current use of insulin ([facility]) 6/12/2019   • Cough 08/2021    r/t bronchitis   • Current severe episode of major depressive disorder without psychotic features without prior episode ([facility]) 6/3/2021   • Daytime sleepiness    • Degenerative disc disease    • Depression 5/20/2009   • Diarrhea     and constipation   • Dizziness    • Dry eye syndrome, bilateral    • Elevated sedimentation rate     history of negative temporal artery biopsy around 2006   • Fatigue    • Fatty liver    • Fibromyalgia     confirmed by Dr. Ann   • Frequent  headaches    • GERD (gastroesophageal reflux disease)     hiatal hernia   • GOUT 5/20/2009   • H/O foot surgery    • Hearing difficulty    • Heart burn    • Hemorrhagic disorder ([facility]) 2016    nose bleeds   • Hiatus hernia syndrome    • History of anemia     none currently   • Hypertension    • Hypothyroidism 5/20/2009   • Incipient cataract of both eyes    • Incomplete rectal prolapse 9/3/2021   • Insomnia    • Migraine without aura, without mention of intractable migraine without mention of status migrainosus    • Mild intermittent asthma without complication     inhalers as needed   • Mixed dyslipidemia    • Morbid obesity with BMI of 45.0-49.9, adult ([facility])    • Morning headache    • Mumps    • Nasal drainage    • Nocturnal hypoxia     severe, to 69% O2 sat   • Obesity    • Obesity hypoventilation syndrome ([facility]) 5/31/2017   • Pelvic floor dysfunction    • Peripheral neuropathy    • Pleomorphic adenoma    • Polymyalgia rheumatica ([facility])     Dr. Ann   • Restless leg syndrome    • Ringing in ears    • Rotator cuff syndrome of right shoulder and allied disorders 10/10/2018   • S/P tonsillectomy    • Seasonal allergies    • Skin cancer 1990's    skin   • Sleep apnea syndrome     she is not using CPAP, claustrophobia, uses 2-3l at night via concentrator occ 2l during daytime ([facility])   • Snoring    • Sore muscles    • Sweat, sweating, excessive    • Swelling of lower extremity    • Urinary incontinence     will not wear a pad   • Vision loss    • Weakness        Past surgical history:   Past Surgical History:   Procedure Laterality Date   • PB UPPER GI ENDOSCOPY,DIAGNOSIS N/A 8/26/2021    Procedure: GASTROSCOPY;  Surgeon: Marty Lopez M.D.;  Location: SURGERY [facility];  Service: Gastroenterology   • PB COLONOSCOPY,DIAGNOSTIC  8/26/2021    Procedure: COLONOSCOPY - WITH BIOPSIES.;  Surgeon: Marty Lopez M.D.;  Location: SURGERY [facility];  Service: Gastroenterology   • SHOULDER DECOMPRESSION  ARTHROSCOPIC Right 2/1/2019    Procedure: SHOULDER DECOMPRESSION ARTHROSCOPIC - SUBACROMIAL, LABRAL DEBRIDEMENT;  Surgeon: Damaris Knutson M.D.;  Location: SURGERY [facility];  Service: Orthopedics   • SHOULDER ARTHROSCOPY W/ BICIPITAL TENODESIS REPAIR Right 2/1/2019    Procedure: SHOULDER ARTHROSCOPY W/ BICIPITAL TENOTOMY;  Surgeon: Damaris Knutson M.D.;  Location: SURGERY [facility];  Service: Orthopedics   • CARPAL TUNNEL RELEASE Right 2/1/2019    Procedure: CARPAL TUNNEL RELEASE;  Surgeon: Damaris Knutson M.D.;  Location: SURGERY [facility];  Service: Orthopedics   • GASTROSCOPY  2/6/2017    Procedure: GASTROSCOPY;  Surgeon: Pau Foster M.D.;  Location: SURGERY SAME DAY [facility];  Service:    • COLONOSCOPY  2/6/2017    Procedure: COLONOSCOPY;  Surgeon: Pau Foster M.D.;  Location: SURGERY SAME DAY [facility];  Service:    • SEPTAL RECONSTRUCTION  10/3/2016    Procedure: SEPTAL RECONSTRUCTION with  grafts ;  Surgeon: PIETER Dickson M.D.;  Location: SURGERY SAME DAY [facility];  Service:    • SEPTOPLASTY N/A 8/1/2016    Procedure: SEPTOPLASTY;  Surgeon: PIETER Dickson M.D.;  Location: SURGERY SAME DAY [facility];  Service:    • TURBINOPLASTY Bilateral 8/1/2016    Procedure: TURBINOPLASTY;  Surgeon: PIETER Dickson M.D.;  Location: SURGERY SAME DAY [facility];  Service:    • NASAL FRACTURE REDUCTION OPEN N/A 8/1/2016    Procedure: SEPTAL FRACTURE REDUCTION OPEN;  Surgeon: PIETER Dickson M.D.;  Location: SURGERY SAME DAY [facility];  Service:    • FINE NEEDLE ASPIRATION  11/16/2009    Performed by DA CALLOWAY at ENDOSCOPY [facility]   • COLONOSCOPY  2006    ? unclear date   • OTHER SURGICAL PROCEDURE  1998    vaginal wall repair   • BLADDER SUSPENSION  1983   • HYSTERECTOMY, VAGINAL  1983    ovaries are present, excessive bleeding   • TUBAL LIGATION  1980   • MASS EXCISION GENERAL Right 1956    had carbuncle removal R thigh   •  TONSILLECTOMY     • ANAL SPHINCTEROTOMY      anal muscle repair   • CATARACT EXTRACTION WITH IOL Bilateral    • CHOLECYSTECTOMY     • HYSTERECTOMY LAPAROSCOPY     • OTHER ORTHOPEDIC SURGERY  ///    foot surgery    • SCAR REVISION Right     thigh scar    • TONSILLECTOMY           Social history:   Social History     Socioeconomic History   • Marital status:      Spouse name: Not on file   • Number of children: Not on file   • Years of education: Not on file   • Highest education level: Not on file   Occupational History   • Not on file   Tobacco Use   • Smoking status: Former Smoker     Packs/day: 1.00     Years: 43.00     Pack years: 43.00     Types: Cigarettes     Start date:      Quit date: 3/1/2007     Years since quittin.8   • Smokeless tobacco: Never Used   • Tobacco comment: Started smoking at age 15, continued abstinance    Vaping Use   • Vaping Use: Never used   Substance and Sexual Activity   • Alcohol use: Not Currently   • Drug use: Not Currently     Comment: CBD topical pain cream   • Sexual activity: Not Currently     Partners: Male   Other Topics Concern   •  Service No   • Blood Transfusions No   • Caffeine Concern Not Asked   • Occupational Exposure No   • Hobby Hazards No   • Sleep Concern No   • Stress Concern No   • Weight Concern No   • Special Diet No   • Back Care No   • Exercise No   • Bike Helmet No   • Seat Belt Yes   • Self-Exams No   Social History Narrative   • Not on file     Social Determinants of Health     Financial Resource Strain:    • Difficulty of Paying Living Expenses: Not on file   Food Insecurity:    • Worried About Running Out of Food in the Last Year: Not on file   • Ran Out of Food in the Last Year: Not on file   Transportation Needs:    • Lack of Transportation (Medical): Not on file   • Lack of Transportation (Non-Medical): Not on file   Physical Activity:    • Days of Exercise per Week: Not on file   • Minutes of Exercise per  Session: Not on file   Stress:    • Feeling of Stress : Not on file   Social Connections:    • Frequency of Communication with Friends and Family: Not on file   • Frequency of Social Gatherings with Friends and Family: Not on file   • Attends Jainism Services: Not on file   • Active Member of Clubs or Organizations: Not on file   • Attends Club or Organization Meetings: Not on file   • Marital Status: Not on file   Intimate Partner Violence:    • Fear of Current or Ex-Partner: Not on file   • Emotionally Abused: Not on file   • Physically Abused: Not on file   • Sexually Abused: Not on file   Housing Stability:    • Unable to Pay for Housing in the Last Year: Not on file   • Number of Places Lived in the Last Year: Not on file   • Unstable Housing in the Last Year: Not on file       Family history:   Family History   Problem Relation Age of Onset   • Heart Disease Mother         Arotic vaulve replacement   • GI Disease Mother         bile duct problem   • Bladder cancer Mother    • GI Disease Sister         celiac   • Arthritis Sister    • Other Sister         gout and lupus   • Arthritis Sister    • Kidney Disease Sister    • GI Disease Sister    • Bladder cancer Maternal Aunt    • Arthritis Maternal Grandmother    • Hypertension Maternal Grandmother    • Heart Disease Maternal Grandmother    • Hypertension Maternal Grandfather    • Heart Disease Maternal Grandfather    • Arthritis Maternal Grandfather    • No Known Problems Paternal Grandmother    • No Known Problems Paternal Grandfather    • Cancer Brother         Larynx   • Cancer Daughter         non hopskins limphoma   • GI Disease Daughter    • Bipolar disorder Daughter    • Seizures Daughter    • Bipolar disorder Daughter          Current medications:   Current Outpatient Medications   Medication   • potassium chloride SA (KDUR) 20 MEQ Tab CR   • POTASSIUM CHLORIDE PO   • ketoconazole (NIZORAL) 2 % Cream   • pantoprazole (PROTONIX) 40 MG Tablet Delayed  "Response   • QUEtiapine (SEROQUEL) 25 MG Tab   • furosemide (LASIX) 20 MG Tab   • gabapentin (NEURONTIN) 800 MG tablet   • levothyroxine (SYNTHROID) 175 MCG Tab   • nabumetone (RELAFEN) 750 MG Tab   • benzonatate (TESSALON) 100 MG Cap   • albuterol 108 (90 Base) MCG/ACT Aero Soln inhalation aerosol   • DULoxetine (CYMBALTA) 60 MG Cap DR Particles delayed-release capsule   • Cholecalciferol (VITAMIN D-3) 125 MCG (5000 UT) Tab   • vitamin E 180 mg (400 units) Cap     No current facility-administered medications for this visit.       Medication Allergy:  Allergies   Allergen Reactions   • Ace Inhibitors Cough   • Benadryl Allergy Hives     Hot skin itching   • Iodine Shortness of Breath     Labored breathing- Occasionally can take it   • Lamictal Rash and Swelling     \"hot\", unable to function.  Severe rash, scarring   • Savella [Kdc:Milnacipran+Ci Pigment Blue 63] Nausea, Swelling and Anxiety     Swelling, bone and muscle pain, sweating, nausea, dizziness, sleeplessness, anxiety   • Savella [Milnacipran Hcl] Unspecified     Muscle aches, feet swelling   • Sulfa Drugs Hives, Shortness of Breath and Anxiety     Hard breathing, shortness of breath   • Butalbital-Acetaminophen Unspecified     Dizzy, can't walk, hard to focus   • Cefaclor Nausea and Unspecified     Ceclor causes light headedness and dizziness   • Morphine Itching     \"Redness\"   • Penicillins Itching and Swelling     Skin itching and redness   • Tizanidine Hcl Nausea     \"Dizziness\" hard to focus   • Amlactin Rash and Itching     Lotion causes itching, redness and burnng   • Tape Rash and Itching     Skin tears, paper tape is OK per Pt           Physical examination:   Vitals:    01/12/22 1305   BP: 136/78   BP Location: Right arm   Patient Position: Sitting   BP Cuff Size: Adult   Pulse: 74   SpO2: 91%   Weight: 120 kg (264 lb 12.4 oz)   Height: 1.651 m (5' 5\")       Normal Cephalic atraumatic.  General: Full Range of Movement around the Neck in all " "directions without restrictions or discrete pain evoked triggers.  No lower extremity edema.        MOCA: Version 7.1 today-      President- Deshaun Correa; before Sunny (\"Obama\")/ Anthony (?)> she did not realize Chong Pace was President before Sunny.    Neurological  Exam:    Mental status: Awake, alert and fully oriented to person, place, time and situation. Normal attention, concentration and fund of knowledge for education level.  Did not appear/act combative,irritable,anxious,paranoid/delusional or aggressive to or with me.  Speech and language: Speech is fluent without errors, clear, intact to repetition and intact to naming.     Follows 3 step motor commands in sequence without significant delay and correctly.    Cranial nerve exam:  II: Pupils are equally round and reactive to light. Visual fields are intact by confrontation.  III, IV, VI: EOMI, no diplopia, no ptosis.  V: Sensation to light touch is normal over V1-3 distributions bilaterally.  .  VII: Facial movements are symmetrical. There is no facial droop. .  VIII: Hearing intact to soft speech and finger rub bilaterally  IX: Palate elevates symmetrically, uvula is midline. Dysarthria is not present.  XI: Shoulder shrug are symmetrical and strong.   XII: Tongue protrudes midline.        Motor exam:  Muscle tone is normal in all 4 limbs. and No abnormal movements appreciated.    Muscle strength:    Mild postural and action of both hands ; no rest tremors.    No other involuntary movements of the limbs.    Neck Flexors/Extensors: 5/5       Right  Left  Deltoid   5/5  5/5      Biceps   5/5  5/5  Triceps  5/5  5/5   Wrist extensors 5/5  5/5  Wrist flexors  5/5  5/5     5/5  5/5  Interossei  5/5  5/5  Thenar (APB)  5/5  5/5   Hip flexors  5/5  5/5  Quadriceps  5/5  5/5    Hamstrings  5/5  5/5  Dorsiflexors  5/5  5/5  Plantarflexors  5/5  5/5  Toe extension  5/5  5/5  Toe Flexors                5/5                   5/5    Sensory exam:  Intact to Vibration " and Proprioception in bilaterally lower extremity.    Proprioception: normal at great toes and ankles.      Reflexes:       Right  Left  Biceps   2/4  2/4  Triceps  2/4  2/4  Brachioradialis 2/4  2/4  Knee jerk  2/4  2/4  Ankle jerk  1/4  1/4     Frontal release signs are absent.    bilaterally toes are downgoing to plantar stimulation..    Coordination (finger-to-nose, heel/knee/shin, rapid alternating movements) was normal.     There was no truncal ataxia, no tremors, and no dysmetria.     Station and gait - stands up from exam chair without retropulsion,veering,leaning,swaying (to either side).     Mild wide based stance; no en block turning.  Arm swing symmetrical.    No Rombergism.      Labs and Tests:    [facility] 12/4/2021: Alk Phos- 161  (AST/ALT)- 24/26    Reviewed from the last 3 to 6 months.  Viitamin D and TSH (0.335)- 12/3/2021     1 mo ago   (12/3/21) 1 yr ago   (10/13/20) 2 yr ago   (9/7/19) 2 yr ago   (5/1/19) 3 yr ago   (10/2/18) 4 yr ago   (1/4/18) 4 yr ago   (5/4/17)    Cholesterol,Tot 100 - 199 mg/dL 230 High   266 High   163  180  181  162  172    Triglycerides 0 - 149 mg/dL 113  150 High   118  134  165 High   114  112    HDL >39 mg/dL 64  79 R  46 R  46 R  44 R  48 R  53 R    VLDL Cholesterol Calc 5 - 40 mg/dL 20          LDL Chol Calc ([facility]) 0 - 99 mg/dL 146 High                  NEUROIMAGING:     There is multilevel facet degeneration, severe left C4-C5 and right C3-C4. There is mild grade 1 degenerative anterolisthesis C3 on C4 and slight C4 on C5. There is multilevel disc degeneration, severe C5-C7 with disc narrowing, disc osteophyte and some   mild uncovertebral osteophytes. Multilevel foraminal stenosis most pronounced at C3-C4 and left C4-C5. There is spinal stenosis at C5-C6 which is probably mild.     There is mild masslike prominence of the left thyroid lobe. Further evaluation with nonemergent ultrasound is recommended.     IMPRESSION:     1.  No acute fracture or dislocation cervical  spine.  2.  Degenerative changes as detailed    FINDINGS:  Mild generalized volume loss.  Patchy hypodensities in cerebral white matter most likely represent mild chronic microvascular ischemic type changes.     No acute intracranial hemorrhage, major vascular territory infarct, mass effect, midline shift or hydrocephalus.     Paranasal sinuses and mastoids are clear.  No depressed calvarial fracture.     IMPRESSION:     1.  No acute intracranial abnormality.             Exam Ended: 08/10/19  7:50 PM               Impression/Plans/Recommendations:    1. Mild Cognitive Impairment- likely or suspected neurodegenerative in nature. Has mild Essential tremor of the hands and head.    She is at risk and in a border line category for dementia> MOCA of 22/30 and FAQ of 5 with GDS of 3-4 range.    Not parkinsonian.    No rapid decline in behaviors,personality or cognition in the last 6 months.    Has a chronic gait disorder with a slight based stance -> this brings up the issue of vascular (subcortical changes) and potential NPH if she has significant ventricular enlargement on imaging.    I am also ordering a Cervical MRI to determine if there is any evolving compression in this area due to her known cervical stenosis by CT imaging    No evidence for tova or myelopathy,myopathy or peripheral neuropathy on my exam.    2. O.S.A. - known; referral to Dr. Valenzuela (S) for oral appliance.    3. HTN- on history of vascular events (cerebral). Continue to monitor at home which we reviewed.    4. Obesity- severe (chronic)> goal BMI under 30      Today, I reviewed the clinical criteria and reviewed several  scenarios of the differences being using the medical terms of normal brain aging (age associated memory impairment),  Mild Cognitive Impairment (MCI) and Dementia.    MCI is a syndrome but statistically and for the majority of patients  occurs due  to a more rapid aging of the brain tissue or potentially from injury to certain parts  "of one's brain ( often from such contributing factors as  the cumulative effects of alcohol, from one or more ischemic or hemmorhagic stroke(s), from neurodegeneration or long standing with/without suboptimally controlled Hypertension). It is for some of these potential factors as to why I recommend a brain imaging test (Head CT or Brain MRI) be done for the 1st time or in certain circumstances repeated for comparison purposes  as such imaging can suggest one or more factors as to the reason(s) for the person's cognitive/memory changes. In fact, a normal or \"age related\" finding on a brain imaging test in and of itself is useful clinical and objective information to have in the evaluation of a person who has either an acute, evolving or even chronic (months to years) long cognitive/memory complaint.    Additional factors or contributors to Brain Health issues can be summarized in several books/references which I discussed as well today.     Goals going forwards include:    A. Paying attention to one's risk factors and reducing their prevalence or potential impact on one's changing memory/thinking> an excellent example would be to stop smoking, reduce or eliminate alcohol use (depending on the amount and frequency of usage), maintain good blood pressure control by buying a digital arm blood pressure cuff such as an OMRON Series 3 or 5 and checking one's blood pressure atleast 3 days per week (in the am and early afternoon) that the numbers are under 140/90 and working as needed with the primary care doctor  to optimize blood pressure control).      B. Encouraging proper sleep hygiene which for most adults is 7 to 8 hours of uninterrupted sleep per night.      C. Encouraging some form of exercise preferable aerobic forms to be done (4 to 5 days per week- 30 minutes minimum per day)> 150 minutes per week as a goal. Example activities could include fast walking (up a slight incline), jogging, cycling (road or " stationary), swimming,tennis. Essentially, even basic walking on a flat surface or a treadmill would be better than doing nothing.    D. Maintaining or forming new social contacts with family and friends  and a positive attitude despite the concerns and/or ongoing issues with thinking and/or memory.    E. Eating well which means a diet low in salt  (under 2 grams per day), sugar and saturated fat.    F. Maintaining one's BMI (Body Mass Index) under 30.    G. Brain MRI to be done to look at subcortical areas and ventricular size.    H. Additional blood work to be done (Vitamin B levels, TSH).    I. OT (Driving Evaluation) to be done for general safety issues.    J. Formal Neuropsych testing to be done with Dr Damico (Ashly agreed to this).    K. We reviewe the consideration of using anti tremor medications for her essential tremor of the hands but she does not want to take on the side effects which we reviewed today (meds such as a beta blocker,mysoline,topamax,gabapentin).      I provided a handout to the patient about Mild Cognitive Impairment as it relates to the above topics.      The patient understands and agrees that due to the complexity of his/her diagnosis, results of any testing and further recommendations will typically be discussed/made during a face to face encounter in my office and for simpler  matters either by a phone call or email correspondence. The patient and/or family further understands it is their responsibility to keep proper follow up as needed and when suggested by me.      I have performed  a history and physical exam and a directed /focused  ROS today.    Total time spent today or this patient's care was 60 minutes and included reviewing diagnostic workup to date (labs and imaging that include interpreting such tests relevant to this patient's neurological condition),  reviewing/obtaining separately obtained history (from patient and/or accompanying family member)  for today's  neurological problem(s) , ordering either/or medications and additional tests, giving advise and suggestions on the present neurological problem and  documenting the clinical information in the EMR.    Follow up in 6 months or so    Jeffy June MD  [place] [facility]- [facility].   [facility]

## 2022-01-22 ENCOUNTER — HOSPITAL ENCOUNTER (OUTPATIENT)
Dept: RADIOLOGY | Facility: MEDICAL CENTER | Age: 72
End: 2022-01-22
Attending: PSYCHIATRY & NEUROLOGY
Payer: MEDICARE

## 2022-01-22 DIAGNOSIS — G31.84 MILD COGNITIVE IMPAIRMENT WITH MEMORY LOSS: ICD-10-CM

## 2022-01-22 DIAGNOSIS — G98.8 OTHER DISORDERS OF NERVOUS SYSTEM: ICD-10-CM

## 2022-01-22 DIAGNOSIS — R26.9 NEUROLOGIC GAIT DISORDER: ICD-10-CM

## 2022-01-22 PROCEDURE — 70551 MRI BRAIN STEM W/O DYE: CPT | Mod: MG

## 2022-01-22 PROCEDURE — 72141 MRI NECK SPINE W/O DYE: CPT | Mod: MG

## 2022-01-27 ENCOUNTER — OCCUPATIONAL THERAPY (OUTPATIENT)
Dept: OCCUPATIONAL THERAPY | Facility: REHABILITATION | Age: 72
End: 2022-01-27
Attending: PSYCHIATRY & NEUROLOGY
Payer: MEDICARE

## 2022-01-27 DIAGNOSIS — G31.84 MILD COGNITIVE IMPAIRMENT WITH MEMORY LOSS: ICD-10-CM

## 2022-01-27 PROCEDURE — 97750 PHYSICAL PERFORMANCE TEST: CPT

## 2022-01-27 ASSESSMENT — BALANCE ASSESSMENTS
BALANCE - SITTING DYNAMIC: NORMAL
BALANCE - STANDING STATIC: GOOD
BALANCE - SITTING STATIC: NORMAL
BALANCE - STANDING DYNAMIC: FAIR +

## 2022-01-27 NOTE — OP THERAPY EVALUATION
Outpatient Occupational Therapy  DRIVING EVALUATION    [facility] Occupational Therapy [address]  [address]  [facility] [address]  Phone:  [phone]  Fax:  [phone]    Date of Evaluation: 01/27/2022  Name of Evaluator: Paris Cerrato, OTD, OTR/L    Background  Patient Name: Ashly Meyer  YOB: 1950 Age: 71 y.o. Sex: female      Referring Provider: Jeffy June M.D.  [address] [place] [address] [address]  [place] [address]   Referring Diagnosis Mild cognitive impairment with memory loss [G31.84]     Occupational Therapy Occurrence Codes         No accidents to date but has difficulty directions for over 1 year    Concerns: Patient reports no concerns about her driving. Patient reports she recently bought a new cars. Patient reports she tries to avoid driving at night. Patient reports her tremor in bilateral hands does not affect her driving.     Driving Evaluation     Vehicle/ Details:     Make: Atilio    Model: Tony    Year: 2017    Features: automatic    Comments: Patient has current [place] drivers license.     Physical Assessment:   Auditory:     Hearing aids: no hearing aid    Hearing problems: no hearing problem    Range of Motion:     Upper extremity (left): within functional limits    Upper extremity (right): within functional limits    Lower extremity (left): within functional limits    Lower extremity (right): within functional limits    Cervical neck (left): within functional limits    Cervical neck (right): within functional limits    Thoracic rotation (left): within functional limits    Thoracic rotation (right): within functional limits    Strength:     Upper extremity (left): within functional limits    Upper extremity (right): within functional limits    Lower extremity (left): within functional limits    Lower extremity (right): within functional limits     (left): within functional limits     (right): within functional limits    Coordination:     Upper  extremity (left): within functional limits        Finger to finger: within functional limits    Upper extremity (right): within functional limits        Finger to finger: within functional limits    Lower extremity (left): within functional limits        Heel to shin: within functional limits    Lower extremity (right): within functional limits        Heel to shin: within functional limits    Sensation:   Upper extremity (left):    Light touch: intact    Proprioception: intact   Upper extremity (right):    Light touch: intact    Proprioception: intact   Lower extremity (left):    Light touch: intact    Proprioception: impaired (neuropathy in BLE)   Lower extremity (right):    Light touch: intact    Proprioception: impaired (Neuropathy in BLE)     Balance:     Sitting (static): Normal    Sitting (dynamic): Normal    Standing (static): Good    Standing (dynamic): Fair +    Sit to stand: independent    Cognition:     [facility] Mental Examination ([facility]): 27/30    Trail Making Test B: 96 sec    Sign Identification: 10/10    Vision:     Last eye exam: 1/27/2021    Previous eye problems: right eye cataract and left eye cataract    Previous eye treatments: corrective lenses and surgery (cataract sx in bilateral eyes)    Corrective lens used during: daytime and nighttime    Near acuity (Snellen Chart) Binocular: 20    Far acuity (Snellen Chart) Binocular: 30    Contrast sensitivity (day): adequate    Contrast sensitivity (night): diminished    Depth perception: adequate    Color vision: intact    MVPT-3 Visual Closure Subset:        Correct answers: (ex) (A), 22 (B), 23 (A), 24 (B), 25 (C), 26 (B), 27 (D), 29 (D), 30 (C), 31 (D), 32 (A), 33 (C) and 34 (D)        Score: 13/13        Accuracy: 100% correct        Pass/Fail: pass    Additional Physical Assessment Notes:      Full ocular ROM.  Smooth pursuits, saccades, and convergence WNL.  Peripheral vision: 85 degrees each eye for a total of 170 degrees of  arc.      Driving Simulator Tasks:   Motor Skills:     Using the accelerator: safe    Using the brake: safe    Using the steering wheel: safe    Reaction time to applying the brake: pass        Comments: .8 seconds     Following distance 3-4 sec rule: pass        Comments: Patient was able to maintain a safe distance between herself and the vehicle in front of her.     Configurable Crash Avoidance Scenarios:     Scenario 1:     Country road- avoiding a head on collision    Visibility: good visibility, no rain, day time    Pass/Fail: pass (Patient was able to maintain a safe distance behind the tractor driving slowly on the road.)      Scenario 2:     Country road- avoiding a collision with a pedestrians    Visibility: good visibility, no rain, day time    Pass/Fail: pass (Patient was able to avoid a collision with a pedestrian crossing the road unexpectedly. )      Scenario 3:     City road- avoiding a rear end collision    Visibility: good visibility, no rain, day time    Pass/Fail: pass (Patient was able to safely change lanes and stop at red lights in a busy city environment.)      Scenario 4:     City road- late threat recognition    Visibility: good visibility, no rain, day time    Pass/Fail: pass (Patient was able to safely avoid a collision with a bus that stopped in the road unexpectedly. )    Dividing and Focusing Attention:     Scenario 1:     Country road- good visibility, no rain, day time    Pass/Fail: pass    Comments: Patient was able to avoid a collision with a deer crossing the road unexpectedly.       Scenario 2:     City road- good visibility, no rain, day time    Pass/Fail: pass    Comments: Patient was able to avoid a collision with a pedestrian crossing the road unexpectedly.       Free Driving Scenario 1:    Country road    Comments: Patient had the opportunity to practice driving on simulator and manipulating gas and brake pedals.           Evaluation:     Pass/Fail: pass    Evaluation  "Comments: The objective findings indicate that Ms. Meyer has the physical, visual, visual-perceptual, and cognitive skills necessary to safely operate a vehicle.  She exhibited adequate reaction times and good safety distances with all simulator tasks.  In both rural and city settings where there were mild to moderate distractions, she did not have any accidents in multiple crash avoidance scenarios with pedestrians, animals, vehicles, or stationary objects.   She obeyed all regulatory signs, speed limits, maintained mid-faina position at all times, followed all verbal directions, safely passed other vehicles, and was able to divide and focus her attention throughout the driving simulation without difficulty.  Overall, Ms. Meyer demonstrated good safety awareness, judgement, and anticipatory skills.  Based on her performance today, I recommend she continue driving under the following conditions: during the day, good weather conditions, at low traffic times, on familiar routes, avoidance of the \"spaghetti bowl\", and minimize distractions. Ms. Meyer was in full agreement with these recommendations.    Operating a motor vehicle is a privilege in the [facility].  When a medical condition interferes with a person's ability to drive safely, it is the responsibility of the physician to approve driving or revoke the patient's license.  This test was not an on-the-road driving evaluation.  It was intended to identify the physical, visual, perceptual and cognitive deficits that may impair an individual's ability to safely operate a motor vehicle.    Please contact me with any questions regarding this case at [facility] at [phone].  Thank you for this referral and the safety concerns for your patients and others.    Sincerely,    Paris OSHEA, OTR/L      Referring provider co-signature:  I have reviewed this evaluation and my co-signature certifies my agreement with the " contents.    Physician Signature: ________________________________ Date: ______________

## 2022-02-01 ENCOUNTER — OFFICE VISIT (OUTPATIENT)
Dept: NEUROLOGY | Facility: MEDICAL CENTER | Age: 72
End: 2022-02-01
Attending: CLINICAL NEUROPSYCHOLOGIST
Payer: MEDICARE

## 2022-02-01 DIAGNOSIS — G31.84 MILD COGNITIVE IMPAIRMENT: ICD-10-CM

## 2022-02-01 PROCEDURE — 96137 PSYCL/NRPSYC TST PHY/QHP EA: CPT | Performed by: CLINICAL NEUROPSYCHOLOGIST

## 2022-02-01 PROCEDURE — 96116 NUBHVL XM PHYS/QHP 1ST HR: CPT | Performed by: CLINICAL NEUROPSYCHOLOGIST

## 2022-02-01 PROCEDURE — 96136 PSYCL/NRPSYC TST PHY/QHP 1ST: CPT | Performed by: CLINICAL NEUROPSYCHOLOGIST

## 2022-02-01 ASSESSMENT — ANXIETY QUESTIONNAIRES
5. BEING SO RESTLESS THAT IT IS HARD TO SIT STILL: NOT AT ALL
2. NOT BEING ABLE TO STOP OR CONTROL WORRYING: MORE THAN HALF THE DAYS
6. BECOMING EASILY ANNOYED OR IRRITABLE: SEVERAL DAYS
4. TROUBLE RELAXING: SEVERAL DAYS
1. FEELING NERVOUS, ANXIOUS, OR ON EDGE: NOT AT ALL
7. FEELING AFRAID AS IF SOMETHING AWFUL MIGHT HAPPEN: NEARLY EVERY DAY
3. WORRYING TOO MUCH ABOUT DIFFERENT THINGS: MORE THAN HALF THE DAYS
GAD7 TOTAL SCORE: 9

## 2022-02-01 ASSESSMENT — PATIENT HEALTH QUESTIONNAIRE - PHQ9
5. POOR APPETITE OR OVEREATING: 3 - NEARLY EVERY DAY
CLINICAL INTERPRETATION OF PHQ2 SCORE: 5
SUM OF ALL RESPONSES TO PHQ QUESTIONS 1-9: 23

## 2022-02-01 NOTE — PATIENT INSTRUCTIONS
Thank you for your effort during today's evaluation. We will have a feedback session to discuss your results on 02/25/2022 at 2 PM.

## 2022-02-01 NOTE — PROGRESS NOTES
A clinical interview was conducted with the patient and her daughter. Following this, the patient underwent a comprehensive neuropsychological evaluation.

## 2022-02-25 ENCOUNTER — OFFICE VISIT (OUTPATIENT)
Dept: NEUROLOGY | Facility: MEDICAL CENTER | Age: 72
End: 2022-02-25
Attending: CLINICAL NEUROPSYCHOLOGIST
Payer: MEDICARE

## 2022-02-25 ENCOUNTER — HOSPITAL ENCOUNTER (OUTPATIENT)
Dept: RADIOLOGY | Facility: MEDICAL CENTER | Age: 72
End: 2022-02-25
Attending: FAMILY MEDICINE
Payer: MEDICARE

## 2022-02-25 DIAGNOSIS — M25.551 PELVIC JOINT PAIN, RIGHT: ICD-10-CM

## 2022-02-25 DIAGNOSIS — G31.84 MILD COGNITIVE IMPAIRMENT: ICD-10-CM

## 2022-02-25 PROCEDURE — 72170 X-RAY EXAM OF PELVIS: CPT

## 2022-02-25 PROCEDURE — 96133 NRPSYC TST EVAL PHYS/QHP EA: CPT | Performed by: CLINICAL NEUROPSYCHOLOGIST

## 2022-02-25 PROCEDURE — 96132 NRPSYC TST EVAL PHYS/QHP 1ST: CPT | Performed by: CLINICAL NEUROPSYCHOLOGIST

## 2022-02-25 NOTE — PROGRESS NOTES
"CONFIDENTIAL NEUROPSYCHOLOGICAL EVALUATION REPORT    Patient name: Ashly Meyer  Referral source: Jeffy June M.D.   MRN: [id number]  Evaluation date: 02/01/2022   YOB: 1950  Neuropsychologist: Lane Damico, Ph.D.         This evaluation was conducted for clinical treatment planning and may not be valid for other purposes. Potential risks and benefits, limits of confidentiality, and test procedures were discussed. Following this discussion, Ms. Meyer consented to complete the evaluation. Information for this report was obtained from the medical record, neuropsychological testing, and a clinical interview with Ms. Meyer and her daughter conducted on 02/01/2022. Feedback, including review of results and recommendations, was conducted on 02/25/2022.    Background and Referral Information: Ms. Meyer is a 71-year-old, , right-handed, White, female, retired financial aide and , with 12 years of education, who has experienced cognitive decline in the context of a complex medical history. Dr. June referred Ms. Meyer for a neuropsychological evaluation to characterize her cognitive concerns, aid in differential diagnosis, and treatment planning.    Previous Studies: Neurological exam (01/12/2022) was remarkable for mild bilateral postural and action hand tremor and mild wide based stance. Cognitive screener (01/12/2022) was below expectation ([place] Cognitive Assessment; MOCA = 22/30). MRI of the brain (01/22/2022) documented: \"The ventricular system is mild to moderately prominent. There is mild-to-moderate prominence of the cortical sulci and gyri. There are scattered punctate and patchy areas of increased T2 signal intensity in the periventricular white matter.\" Impression was of age-related cerebral atrophy and mild periventricular white matter changes consistent with chronic microvascular ischemic gliosis. Lab results revealed elevated hemoglobin A1C on " 12/02/2021.    Neuropsychological Findings and Impressions    Presenting Concerns Summary: Ms. Meyer reported she has been experiencing gradual cognitive decline for the past 10 years, with more prominent worsening within the last year. She noted daily fluctuations in her cognitive functioning and level of alertness. She reported difficulties with short-term memory, attention, processing speed, expressive language, and navigation. She also noted frequent visual hallucinations, elevated symptoms of depression, and multiple ongoing motor and physical problems including tremors and frequent falls. Please see below for details. She lives alone and remains independent in all activities of daily living (ADLs).    Results Summary/Interpretation: Ms. Leachs verbal and visuospatial intellectual abilities were in the average range, slightly above premorbid estimated ability. Deficits were detected in aspects of executive function (response inhibition and response inhibition/switching) and visual memory. Encoding and delayed recall of visual information were deficient, but improved to average with recognition cues. All other aspects of memory were intact including encoding, delayed recall, and recognition of verbal information. Performance on all the remaining measures across cognitive domains was within normal limits. Abstract verbal reasoning and mental arithmetic were relative cognitive strengths. She reported severe symptoms of depression and mild symptoms of anxiety of self-report questionnaires, consistent with her report during the clinical interview.    Impression: Ms. Meyer's neuropsychological profile revealed impairments in aspects of executive function and visual memory, with otherwise intact cognitive functioning. Her pattern of deficits is suggestive of mild frontal subcortical networks dysfunction. Based on her history, cognitive profile, and reported functional status, she meets criteria for Mild  Neurocognitive Disorder (mild cognitive impairment). Etiology is not entirely clear and is likely multifactorial. Given her frontal subcortical pattern of deficits and history of multiple vascular risk factors, her presentation is most consistent with vascular cognitive impairment. Her reported well-formed visual hallucinations, fluctuations in cognitive functioning and level of alertness, gait difficulties, and worsening tremors coupled with her pattern of cognitive deficits are concerning for the early stage of underlying diffuse Lewy body disease. However, these symptoms are currently confounded by her history of chronic pain, essential tremor, and multiple orthopedic problems, which may interfere with movement, coupled with sleep apnea and medication effects (e.g., Seroquel, gabapentin, tramadol) that may be altering her level of alertness during the day. Elevated symptoms of depression and longstanding untreated KERRI are likely exacerbating factors to her cognitive difficulties. Additional possible contributors include chronic pain, elevated A1C, and headaches. Although less likely given recent brain MRI results, normal pressure hydrocephalus may also be considered in the differential diagnosis based on her cognitive profile and report of balance problems, frequent falls, and increased incontinence in the past year. Her current pattern of performance (intact language and verbal memory) is not consistent with what is typically seen in the early stages of Alzheimer's disease. Ms. Meyer’s cognition should be closely monitored over time and this evaluation may serve as a baseline longitudinal tracking.    Recommendations:    1. Based on the present results, Ms. Meyer is recommended for a repeat neuropsychological evaluation in 12 months or sooner, if there is a considerable change in cognitive or emotional status. At that time, diagnostic impressions and treatment recommendations will be revised and updated as  necessary. Additional testing will permit comparisons over time to better identify stability, potential improvement, or possible decline.  2. Ms. Meyer may benefit from a psychiatry consultation for depression and anxiety and to further assess her current psychotropic medication regimen.   3. She may also benefit from the initiation of individual counseling sessions for depression, anxiety, and additional support coping with her multiple health problems. Evidence-based treatments such as cognitive behavioral therapy, acceptance and commitment therapy, and mindfulness-based interventions may be particularly beneficial. Appropriate referrals will be discussed during the feedback session. The following are options for services in the community:  a. [facility] (https://www.[place]/health-services/behavioral-health; 825.571.4149 or [phone])  b. [facility] (https://www.[facility]; 535.796.9624)  c. [facility] (https://Meilimei; 422.787.2519)  d. [facility] (https://www.Regenesance/index.html; 615.973.2367)  e. [facility] (https://Fixetude; 849.979.9399)   f. A directory of providers can also be found on the Psychology Today website (www.psychologytoday.com)  4. Given the observed cognitive deficits, increased oversight with complex instrumental activities of daily living is recommended (e.g., financial and medication management). Examples include oversight form a family member, setting alarms for reminders, using a pillbox, and a calendar consistently.   5. Given her cognitive deficits, it is recommended that Ms. Meyer restricts her driving and be closely monitored by her family or other trusted individual for future changes. Examples include restricting driving to well-known routes during the day in fair weather with good visibility, limiting external distractions (e.g., radio, cell phone), and avoiding  major highways or other congested areas. If this becomes an area of concern, a repeat formal driving evaluation conducted by an occupational therapist with training and expertise in this area should be considered.  6. Given her report of chronic pain that may exacerbate cognitive problems, she is encouraged to continue pain management treatment.   7. In light of her history of untreated sleep apnea, which is likely exacerbating her cognitive difficulties, she is encouraged to follow through with the previous referral to consult with a sleep specialist.   8. Continued use of environmental compensatory strategies is recommended to reduce cognitive demands. This may include setting scheduled routines, having an established location for essential items (e.g., wallet, glasses, and keys), breaking down complex tasks into feasible subunits, completing tasks when there are no time demands, completing one task at a time, minimizing external distractions, maximizing time when she feels the most alert, paraphrasing and repeating to be learned information, and using external aids for reminders (e.g., planner, calendar, setting alarms, labels, to-do lists) consistently.   9. The book Living with Mild Cognitive Impairment: A Guide to Maximizing Brain Health and Reducing Risk by Claudia Cheng may be a helpful resource for Ms. Meyer and her family.  10. Given Ms. Meyer's current difficulties with mobility, in-home changes to minimize fall risk are strongly recommended. These may include increase lighting throughout the house and ensure that lighting is readily available when getting up in the middle of the night. Keep floors clutter-free and remove small throw rugs or use double-sided tape to keep the rugs from slipping. Install grab bars in locations where they will be conveniently usable. Use of a hand-held shower and shower chair to reduce risk while bathing. Continued use of a walker or other ambulation aids,  particularly in outdoor or unfamiliar environments, are also recommended.  11. In light of her medical history, cerebrovascular disease represents a significant risk factor for future cognitive decline, and Ms. Meyer is encouraged to reduce vascular risk factors per her providers' treatment recommendations (e.g., healthy diet and medication adherence).  12. Ms. Meyer is encouraged to regularly engage in social and physical recreation, as well as cognitively stimulating activities (e.g., puzzles, games, reading, exercise, social gatherings) to promote brain health and emotional well-being.  13. If not already done so, Ms. Meyer and her family members are encouraged to discuss legal and financial affairs, such as establishing a will and power of , to assist Ms. Meyer with future financial and healthcare decisions. Information regarding these issues can be found at www.caringinfo.org or www.agingwithdignity.org/5wishes.html.   14. Ms. Meyer and her family may benefit from resources available through the American Heart Association (https://www.heart.org/ or 1-730.118.6777), which offers psychoeducational information and services for individuals with vascular risk factors and their families.    Presenting Concerns:     Cognitive Functioning: Ms. Meyer reported she has been experiencing gradual cognitive decline for the past 10 years, with more prominent worsening within the last year. She noted daily fluctuations in her cognitive functioning without a known precipitating factor. She also reported fluctuations in her level of alertness during the day, which may be related to medication effects. Her daughter agreed with Ms. Meyer’s report, noting Ms. Meyer has been napping during the day more frequently. Examples of Ms. Meyer's current cognitive concerns included reduced processing speed, distractibility (e.g., her “mind wanders”), word finding problems, occasional difficulty pronouncing words, misplacing  items, repeating information, and forgetting recent conversations. Reminders are typically beneficial. She also reported difficulties with mental calculations, reading, and occasionally getting lost while driving. She denied significant difficulties with planning, organizing, and decision-making, though she no longer engages in these activities frequently. Her daughter corroborated Ms. Meyer's report.    Functional Abilities: Ms. Meyer reported she is impendent in all self-care and instrumental ADLs including medication management, finances, shopping, scheduling appointments, cooking, and household responsibilities. She noted occasional forgetfulness related to bill payments and taking her medications but this has not caused significant problems. Her daughter agreed. Ms. Meyer reported she underwent a driving evaluation with occupational therapy, which she passed. Other than the occasional difficulties with navigation noted above, she denied significant problems driving including recent traffic tickets or accidents    Emotional Functioning: Ms. Meyer described her current mood as “up and down.” She reported longstanding depression. Current symptoms include loss of interest, social withdrawal, occasional sadness, and feeling down. She also noted increased appetite, particularly for sweets. She reported elevated stress and anxiety associated with her multiple health problems, in addition to frustration related to a decrease in activity level due to her physical decline. She reported visual hallucinations of increasing frequency for the past two years. She explained she often sees a dark figure pass by in her peripheral vision. On other occasions, she has seen a mouse or bug pass by. She indicated these hallucinations are occurring approximately 4 times per week and are typically not distressing. She denied difficulties with sleep onset or maintenance at night. As noted above, she has been somnolent during the day  resulting in frequent naps. She denied vivid dreams or acting out dreams, though she sleeps alone and noted she fell out of bed last week. She also denied suicidal ideation, manic symptoms, and delusions. Her daughter agreed with Ms. Meyer report and denied observing personality changes.      Sensory/Physical/Motor Changes: Ms. Meyer reported bilateral tinnitus. Otherwise, she denied recent changes in her hearing, vision, taste, or smell. She has headaches most days. She noted generalized chronic joint and muscle pain (intensity: 8/10) associated with arthritis, back problems, and neuropathy, which she manages with tramadol and gabapentin. Ms. Meyer reported onset of intermittent bilateral hand tremors and shuffling gait approximately 5 to 6 years ago. She noted her tremors have worsened in the past year as she developed an intermittent head tremor as well. Her fine motor skills have declined resulting in poor penmanship and difficulty using utensils.  Additionally, she reported drooling at night and weakness in her hands. She also noted a decline in her balance and increased incontinence in the past year. She stated she has been falling approximately once every 2 months. As noted above, her last fall occurred last week. She uses a walker as needed for ambulation. She does not have home modifications in place for fall prevention. She has previously engaged in physical therapy, including aqua therapy.     Medical History: Remarkable for benign essential tremor, restless leg syndrome, obstructive sleep apnea (KERRI), chronic respiratory failure with hypoxia, diabetes type 2, generalized arthritis, chronic pain, vitamin D deficiency (supplementing), atrophic rhinitis, nonalcoholic fatty liver disease, dyslipidemia, mild asthma, menopause, fibromyalgia, hypothyroidism, gastroesophageal reflux disease, carpal tunnel syndrome, cervical stenosis, migraine, chronic constipation, urinary incontinence, skin cancer (1990;  treated without complications), polymyalgia rheumatica, incomplete rectal prolapse, anemia (resolved), anginal syndrome, bronchitis, moderate left carotid artery stenosis, and degenerative disc disease. She explained she was diagnosed with KERRI approximately 15 years ago but was not able to use CPAP due to claustrophobia. She currently uses oxygen throughout the night and sometimes during the day. She reported a fall that resulted in a hit to the head 4 years ago, without significant persistent cognitive sequelae. There is no reported history of known seizures or strokes. Surgical history includes tonsillectomy, foot surgery, hysterectomy, cholecystectomy, cataract extraction, anal sphincterotomy, tubal ligation, carbuncle removal in the right thigh, bladder suspension, colonoscopy, nasal fracture surgery, septal reconstruction, carpal tunnel release, gastroscopy, and right shoulder arthroscopy. Recent hospitalizations were denied.    Mental Health History: Ms. Meyer reported she was initially diagnosed with depression approximately 20 to 30 years ago, which has been primarily been treated with psychotropic medications prescribed by her primary care physician. She noted a remote history of engagement in counselling sessions for approximately 4 to 5 months, which were beneficial. She also reported a longstanding history of claustrophobia. She denied a history of treatment with a psychiatrist, suicidal ideation, or psychiatric hospitalizations     Family Medical History: Remarkable for dementia (maternal grandfather and uncle), heart disease, gastrointestinal disease, cancer, hypertension, arthritis, seizures, and bipolar disorder.     Medications and Supplements: Potassium chloride, ketoconazole, pantoprazole, quetiapine, levothyroxine, furosemide, gabapentin, levothyroxine, nabumetone, benzonatate, albuterol, duloxetine, vitamin D3, and vitamin E.     Psychosocial History: Ms. Meyer was born and raised in [facility]  [place], [place].  She denied a history of known birth complications or developmental delays. She obtained a high school diploma. She reported she had a slight stutter when she was in elementary school, noting she received speech therapy in a separate classroom. She also had reading and spelling difficulties in elementary school but she was never formally diagnosed with a learning disorder. 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 She denied significant cognitive problems at work before shelter. She has been  and  twice. She has 2 daughters. She currently lives alone in a one-story house. She reported good social support from one of her daughters. Hobbies and activities include mainly watching TV. She noted she enjoys gardening but has not been able to do it due to her physical problems.    Substance Use: Ms. Meyer denied current use of alcohol and illicit or recreational drugs, including nicotine products. She reported approximately a 43-pack-year history of cigarette smoking and has been abstinent for the past 15 years. She noted she used to consume 3 to 4 alcoholic beverages on weekends but she stopped drinking 20 years ago. There is no reported history of substance use disorder or treatment.     Measures Administered: [place] Naming Test (BNT), Brief Visuospatial Memory Test - Revised (BVMT-R), Amanda-Be Executive Functioning System (DKEFS) Color-Word Interference (CWI) & Verbal Fluency (VF), Digit Vigilance Test (DVT), Executive Clock Drawing Test (CLOX), Generalized Anxiety Disorder 7 Item Scale (JAYDEN-7), [place] Verbal Learning Test - Revised (HVLT-R), Mini-Mental State Examination - 2nd Ed. Orientation (MMSE-2), Patient Health Questionnaire (PHQ-9), Repeatable Battery for the Assessment of Neuropsychological Status Line Orientation (RBANS LO), Test of Practical Judgement (TOPJ), Trail  Making Test (TMT A & B), Wechsler Adult Intelligence Scale - 4th Ed. (WAIS-IV) Block Design (BD), Digit Span (DS), Similarities (SI), Matrix Reasoning (MR), Information (IN), & Coding (CD), Wechsler Memory Scale - 4th Ed. Logical Memory (WMS-IV LM), and Wide Range Achievement Test, 4th Ed. Word Reading (WRAT-4 WR). Informant Questionnaires: Activities of Daily Living Questionnaire (ADLQ) and Neuropsychiatric Inventory Questionnaire (NPI-Q).    Behavioral Observations: Ms. Meyer arrived at the clinic on time and was accompanied by her daughter who participated in the clinical interview. Everybody wore facemasks given the COVID-19 pandemic. She was well groomed and casually dressed. She was alert and fully oriented to situation, person, place, and time (MMSE-2 Orientation = 10/10). She was pleasant and always maintained appropriate interpersonal boundaries. Rapport was easily established. Vision (corrected with glasses) and hearing were adequate for the evaluation. Gait was slow. A mild bilateral action hand tremor was observed that it did not significantly interfere with task performance. She provided information during the interview without difficulty. Speech rate, volume, articulation, and prosody were normal. Speech content was logical and appropriate to context. Expressive and receptive language appeared intact. Mood was reportedly “up and down.” Affect was full in range and appropriate to the situation. Insight into cognitive and emotional functioning was intact. There was no indication of hallucinations, delusions, or thought disorder. She was attentive throughout the evaluation and had no difficulties with retention of task demands. She perceived her performance as poor at times and made negative remarks, but responded well to encouragement from the examiner. Her rate of task execution was slow. Overall, she was engaged and cooperative throughout testing.    Results & Key Findings: Please note that scores  reported are for professional use only. For diagnostic purposes, a performance score that falls below the 9th percentile of the reference group for that measure may be considered a cognitive deficit depending on the overall pattern of performance. The following clinical descriptors identify performance within the range of percentile scores indicated in the parentheses: Exceptionally High Score (>98th), Above Average Score (91st-97th), High Average Score (75th-90th), Average Score (25th-74th), Low Average Score (9th-24th), Below Average Score (3rd-8th), and Exceptionally Low Score (<2nd). Please see the attached test results summary table in the appendix for a list of measures administered as well as raw and normative scores, which are listed in the designated columns. All such scores are based on age-corrected norms and certain scores may be adjusted for education and/or other demographic factors as appropriate.     Data Validity: Ms. Martínez performance on embedded validity measures was within the valid range, suggesting she appropriately engaged in testing. The following test results are considered an accurate representation of her current level of cognitive functioning.    Premorbid/Estimated Intelligence: A predicted estimate of premorbid intellectual ability based on age and her performance on a single word-reading test fell within the low average range (WRAT-4 WR). Current overall verbal (prorated WAIS-IV VCI) and visuospatial intellectual abilities (WAIS-IV ANEESH) were estimated in the average range compared to similarly aged peers, slightly above her estimated premorbid ability.     Cognitive Functioning:     Ms. Martínez performance on some measures in the following areas was below expectations:    • Memory: Total recall of an array of simple geometric figures across three repeated learning trials was below average. Delayed recall remained below average. Delayed recognition discrimination of the figures was  within normal limits (BVMT-R). Total recall of a wordlist across three repeated learning trials was average. Delayed recall remained average. Delayed recognition discrimination of the wordlist was low average (HVLT-R). Immediate and delayed recall of short stories were both average. Delayed recognition of story details was also average (WMS LM).   • Executive Functioning: Response inhibition (DKEFS CWI) and response inhibition/switching (DKEFS CWI) were both exceptionally low due to multiple errors. Visuomotor set-shifting (TMT B), phonemic verbal fluency (DKEFS VF), and semantic verbal set shifting (DKEFS VF) were low average. Abstract verbal reasoning was average (WAIS-IV SI). Freehand clock drawing/conceptualization (CLOX 1) and abstract/deductive visual reasoning (WAIS-IV MR) were high average. Practical judgment and problem solving in response to hypothetical scenarios was exceptionally high (TOPJ).    Ms. Leachs performance in the following areas was within to above expectations:     • Attention/Working Memory: Basic auditory attention span and working memory (backward/sequenced number repetition; WAIS-IV DS), and sustained visual scanning/vigilance (DVT Errors).   • Processing Speed: Rapid color naming (DKEFS CWI), simple word reading speed (DKEFS CWI), visuomotor number sequencing speed (TMT A), sustained visual scanning speed (DVT Time), and speeded code transcription (WAIS-IV CD).   • Language: Visual confrontation naming (BNT), word reading (WRAT-4 WR), semantic verbal fluency (DKEFS VF), and general fund of knowledge (WAIS-IV IN).   • Visual Perception/Construction: Judgment of line orientation (RBANS LO), simple figure copy (BVMT-R Copy), copy of a clock (CLOX 2), and construction of block designs (WAIS-IV BD).     Emotional Status: Ms. Meyer's responses on self-report inventories of emotional functioning were indicative of severe levels of depression (PHQ-9) and mild levels of anxiety (JAYDEN-7) over  the past two weeks.     Informant Questionnaires: Her daughter completed questionnaires about Ms. Meyer's ability to function independently, implying a none to mild level of impairment in activities of daily living (ADLQ). On a questionnaire regarding neuropsychiatric symptoms, her daughter reported observing agitation, depression, irritability, nighttime behaviors, and appetite changes (NPI-Q).      Thank you for allowing me to participate in Ms. Meyer's care. If I can be of further assistance, please do not hesitate to contact me.      Lane Damico, Ph.D.         Clinical Neuropsychologist        Department of Neurology         [facility]             Appendix:            One hour and 9 minutes were spent interviewing Ms. Meyer and her daughter (01/02/2022; neurobehavioral status exam: 68668 = 1). Test administration and scoring were completed in 4 hours and 24 minutes (01/02/2022; 77294 = 1, 84100 = 8). Four hours and 57 minutes were spent in clinical decision-making, chart review, records reviews, interpretation of test results and clinical data, integration of patient data, interactive feedback to Ms. Meyer, and report preparation (01/02/2022 - 02/25/2022; test evaluation services: 35656 = 1, 99486 = 4).    This report was created using voice recognition software. I have made every reasonable attempt to avoid dictation errors, but this document may contain an error not identified before finalizing. If the error changes the accuracy of the document, I would appreciate it being brought to my attention. Thank you.

## 2022-02-25 NOTE — PROGRESS NOTES
NEUROPSYCHOLOGICAL FEEDBACK    Name: Ashly Meyer    MRN:  [id number]   YOB: 1950   Date of Feedback: 02/25/2022  Referred by: Jeffy June M.D.  Evaluated by: Lane Damico, Ph.D.        The patient and her daughter were seen for an interactive feedback session regarding her neuropsychological evaluation on 02/01/2022. They reported she has been stable in terms of cognitive, functional, emotional, and physical functioning since her neuropsychological evaluation. I discussed the results of the evaluation and the recommendations in detail with the patient as well as her daughter and they expressed understanding. The discussion included psychoeducation about mild neurocognitive disorder, vascular dementia, and Lewy body disease. Psychoeducation was also provided regarding how depression, anxiety, elevated stress, medication effects, and chronic pain can affect cognition. She agreed to referrals to behavioral health for counseling and a psychiatry consultation. The patient and her daughter were afforded time to ask questions and all their questions were addressed.    Forty-two minutes were spent conducting an interactive feedback session with the patient (52834 = 1 unit; billed as part of the neuropsychological evaluation).

## 2022-03-03 ENCOUNTER — OFFICE VISIT (OUTPATIENT)
Dept: MEDICAL GROUP | Facility: MEDICAL CENTER | Age: 72
End: 2022-03-03
Payer: MEDICARE

## 2022-03-03 VITALS
WEIGHT: 269 LBS | SYSTOLIC BLOOD PRESSURE: 142 MMHG | HEIGHT: 65 IN | HEART RATE: 72 BPM | OXYGEN SATURATION: 94 % | BODY MASS INDEX: 44.82 KG/M2 | TEMPERATURE: 96.1 F | DIASTOLIC BLOOD PRESSURE: 78 MMHG | RESPIRATION RATE: 16 BRPM

## 2022-03-03 DIAGNOSIS — M70.62 TROCHANTERIC BURSITIS OF BOTH HIPS: ICD-10-CM

## 2022-03-03 DIAGNOSIS — J45.30 MILD PERSISTENT ASTHMA, UNCOMPLICATED: ICD-10-CM

## 2022-03-03 DIAGNOSIS — E66.01 MORBID OBESITY WITH BMI OF 40.0-44.9, ADULT (HCC): ICD-10-CM

## 2022-03-03 DIAGNOSIS — G61.9 INFLAMMATORY NEUROPATHY (HCC): ICD-10-CM

## 2022-03-03 DIAGNOSIS — E87.6 HYPOKALEMIA: ICD-10-CM

## 2022-03-03 DIAGNOSIS — E11.9 CONTROLLED TYPE 2 DIABETES MELLITUS WITHOUT COMPLICATION, WITHOUT LONG-TERM CURRENT USE OF INSULIN (HCC): ICD-10-CM

## 2022-03-03 DIAGNOSIS — M70.61 TROCHANTERIC BURSITIS OF BOTH HIPS: ICD-10-CM

## 2022-03-03 PROCEDURE — 99213 OFFICE O/P EST LOW 20 MIN: CPT | Performed by: FAMILY MEDICINE

## 2022-03-03 RX ORDER — POTASSIUM CHLORIDE 20 MEQ/1
20 TABLET, EXTENDED RELEASE ORAL 2 TIMES DAILY
Qty: 180 TABLET | Refills: 2 | Status: SHIPPED | OUTPATIENT
Start: 2022-03-03 | End: 2022-12-14 | Stop reason: SDUPTHER

## 2022-03-03 RX ORDER — ALBUTEROL SULFATE 90 UG/1
2 AEROSOL, METERED RESPIRATORY (INHALATION) EVERY 6 HOURS PRN
Qty: 8.5 G | Refills: 11 | Status: SHIPPED | OUTPATIENT
Start: 2022-03-03 | End: 2023-05-12 | Stop reason: SDUPTHER

## 2022-03-03 RX ORDER — AZITHROMYCIN 500 MG/1
TABLET, FILM COATED ORAL
COMMUNITY
Start: 2022-01-27 | End: 2022-06-02

## 2022-03-03 ASSESSMENT — FIBROSIS 4 INDEX: FIB4 SCORE: 1.81

## 2022-03-03 NOTE — PROGRESS NOTES
Chief Complaint   Patient presents with   • Hip Pain   • Neurological Problem   • Hypokalemia       Subjective:     HPI:   Ashly Meyer presents today with the followin. Trochanteric bursitis of both hips  Patient was complaining of hip pain and x-rays were obtained.  The hip joints actually showed minimal change though there is some spurring.  The SI joints show some change bilaterally and there is significant arthritis of the lower spine.  However today patient points to her trochanteric bursa.  This is quite tender to palpation both sides, a little bit more on the right.  Discussed injection.  Patient states she has had that in the past and it was successful.  Referral to orthopedist is placed.    2. Inflammatory neuropathy (HCC)  Patient has longstanding inflammatory neuropathy which is definitely a source of pain and debility.  She has been tried on numerous medications and has had an extremely thorough work-up by neurology.  The only medication that helped her was Lamictal but she developed a very severe rash and is unable to take that.    3. Morbid obesity with BMI of 40.0-44.9, adult (HCC)  Patient still struggles with her obesity.  She is trying hard to follow a good regimen but that is difficult for her.    4. Controlled type 2 diabetes mellitus without complication, without long-term current use of insulin (HCC)  Patient has well-controlled diabetes.  She has lab orders already and will see me again for the diabetes in .  This is not specifically addressed today.    5. Hypokalemia  Patient has history of hypokalemia.  She is taking her potassium supplementation.  This is renewed.    6. Mild persistent asthma, uncomplicated  Patient does have asthma and does need to use her inhaler intermittently, usually 2-3 times a week.  Occasionally she needs to use this a little more often.  She needs a renewal of her inhaler, that is sent to the pharmacy.      Her neuropsychiatric examination  found depression and anxiety.  Cognitive impairment was mild. She has been referred to behavioral health.      Patient Active Problem List    Diagnosis Date Noted   • Trochanteric bursitis of both hips 03/03/2022   • Mild persistent asthma, uncomplicated 03/03/2022   • Hypokalemia 03/03/2022   • Incomplete rectal prolapse 09/03/2021   • Benign essential tremor 09/03/2021   • Chronic respiratory failure with hypoxia ([facility]) 06/03/2021   • Pre-bariatric surgery psychological evaluation 07/31/2020   • Controlled type 2 diabetes mellitus without complication, without long-term current use of insulin ([facility]) 06/12/2019   • Chronic gout of foot 04/30/2019   • Dry eye syndrome, bilateral    • Rotator cuff syndrome of right shoulder and allied disorders 10/10/2018   • Former smoker 05/30/2018   • Vitamin D deficiency 12/28/2017   • Morbid obesity with BMI of 40.0-44.9, adult ([facility])    • Neural foraminal stenosis of cervical spine 05/18/2017   • Tinnitus of both ears 05/18/2017   • Dyslipidemia, goal LDL below 130 01/06/2017   • Nonalcoholic fatty liver disease 01/05/2017   • Disease of accessory sinus 10/03/2016   • Atrophic rhinitis 10/03/2016   • Deviated nasal septum 08/01/2016   • Menopausal symptoms 04/28/2016   • Lumbar facet arthropathy 12/09/2015   • Chronic pain 11/18/2015   • Candidal intertrigo 06/01/2015   • Elevated sed rate 12/09/2014   • Mild intermittent asthma without complication    • H/O fibromyalgia 03/11/2014   • KERRI (obstructive sleep apnea) 03/11/2014   • Menopause 03/11/2014   • Peripheral neuropathy    • Rectocele 11/25/2013   • Pelvic floor dysfunction    • Chronic constipation 10/02/2013   • Nocturnal hypoxia    • Cervical stenosis of spinal canal 11/01/2011   • Lumbar radiculopathy 11/01/2011   • Migraine without aura    • Carpal tunnel syndrome 09/05/2011   • Ulnar neuropathy 09/05/2011   • Seasonal allergies 07/08/2011   • GERD (gastroesophageal reflux disease) 12/10/2010   • Osteoarthritis of  multiple joints 04/13/2010   • Eczema, dyshidrotic 08/03/2009   • Essential hypertension 05/20/2009   • Hypothyroidism due to acquired atrophy of thyroid 05/20/2009       Current medicines (including changes today)  Current Outpatient Medications   Medication Sig Dispense Refill   • azithromycin (ZITHROMAX) 500 MG tablet      • potassium chloride SA (KDUR) 20 MEQ Tab CR Take 1 Tablet by mouth 2 times a day. 180 Tablet 2   • albuterol 108 (90 Base) MCG/ACT Aero Soln inhalation aerosol Inhale 2 Puffs every 6 hours as needed for Shortness of Breath. 8.5 g 11   • Misc. Devices Misc Referral to Nona Valenzuela DDS for mouth device for Obstructive Sleep Apnea. 1 Each 1   • ketoconazole (NIZORAL) 2 % Cream Apply to affected area daily 30 g 2   • pantoprazole (PROTONIX) 40 MG Tablet Delayed Response Take 1 Tablet by mouth 2 times a day. 180 Tablet 3   • QUEtiapine (SEROQUEL) 25 MG Tab Take 1 Tablet by mouth at bedtime. 90 Tablet 3   • furosemide (LASIX) 20 MG Tab Take 1 Tablet by mouth every morning. 90 Tablet 3   • gabapentin (NEURONTIN) 800 MG tablet Take 1 Tablet by mouth 2 times a day. 180 Tablet 2   • levothyroxine (SYNTHROID) 175 MCG Tab Take 1 Tablet by mouth every morning on an empty stomach. 90 Tablet 3   • nabumetone (RELAFEN) 750 MG Tab Take 1 Tablet by mouth 2 times a day with meals. 180 Tablet 3   • benzonatate (TESSALON) 100 MG Cap Take 1 Capsule by mouth 3 times a day as needed for Cough. 60 Capsule 6   • DULoxetine (CYMBALTA) 60 MG Cap DR Particles delayed-release capsule Take 1 capsule by mouth every evening. 90 capsule 3   • Cholecalciferol (VITAMIN D-3) 125 MCG (5000 UT) Tab Take 5,000 Units by mouth every evening.     • vitamin E 180 mg (400 units) Cap Take 180 mg by mouth every morning.       No current facility-administered medications for this visit.       Allergies   Allergen Reactions   • Ace Inhibitors Cough   • Benadryl Allergy Hives     Hot skin itching   • Iodine Shortness of Breath     Labored  "breathing- Occasionally can take it   • Lamictal Rash and Swelling     \"hot\", unable to function.  Severe rash, scarring   • Savella [Kdc:Milnacipran+Ci Pigment Blue 63] Nausea, Swelling and Anxiety     Swelling, bone and muscle pain, sweating, nausea, dizziness, sleeplessness, anxiety   • Savella [Milnacipran Hcl] Unspecified     Muscle aches, feet swelling   • Sulfa Drugs Hives, Shortness of Breath and Anxiety     Hard breathing, shortness of breath   • Butalbital-Acetaminophen Unspecified     Dizzy, can't walk, hard to focus   • Cefaclor Nausea and Unspecified     Ceclor causes light headedness and dizziness   • Morphine Itching     \"Redness\"   • Penicillins Itching and Swelling     Skin itching and redness   • Tizanidine Hcl Nausea     \"Dizziness\" hard to focus   • Amlactin Rash and Itching     Lotion causes itching, redness and burnng   • Tape Rash and Itching     Skin tears, paper tape is OK per Pt       ROS: As per HPI       Objective:     /78 (BP Location: Right arm, Patient Position: Sitting, BP Cuff Size: Large adult)   Pulse 72   Temp (!) 35.6 °C (96.1 °F) (Temporal)   Ht 1.651 m (5' 5\")   Wt 122 kg (269 lb)   SpO2 94%  Body mass index is 44.76 kg/m².    Physical Exam:  Constitutional: Well-developed and well-nourished. Not diaphoretic. No distress. Lucid and fluent. Patient, physician and staff all wearing masks..  Skin: Skin is warm and dry. No rash noted.  Head: Atraumatic without lesions.  Eyes: Conjunctivae and extraocular motions are normal. Pupils are equal, round, and reactive to light. No scleral icterus.   Ears:  External ears unremarkable.  Neck: Supple, trachea midline. No thyromegaly present. No cervical or supraclavicular lymphadenopathy. No JVD or carotid bruits appreciated  Cardiovascular: Regular rate and rhythm.  Normal S1, S2 without murmur appreciated.  Chest: Effort normal. Clear to auscultation throughout. No adventitious sounds.   Extremities: No cyanosis, clubbing, " erythema, nor edema. Hammertoes 2 and 4th digits right foot, callus present.  Bunion of great toe present.   Neurological: Alert and oriented x 3. Moderate tremor, both hands and somewhat of the neck and head.  Psychiatric:  Behavior, mood, and affect are appropriate.       Assessment and Plan:     71 y.o. female with the following issues:    1. Trochanteric bursitis of both hips  Referral to Orthopedics   2. Inflammatory neuropathy ([facility])     3. Morbid obesity with BMI of 40.0-44.9, adult ([facility])     4. Controlled type 2 diabetes mellitus without complication, without long-term current use of insulin ([facility])     5. Hypokalemia  potassium chloride SA (KDUR) 20 MEQ Tab CR   6. Mild persistent asthma, uncomplicated  albuterol 108 (90 Base) MCG/ACT Aero Soln inhalation aerosol         Followup: Return in about 3 months (around 6/3/2022), or if symptoms worsen or fail to improve.

## 2022-04-04 DIAGNOSIS — G61.9 INFLAMMATORY NEUROPATHY (HCC): ICD-10-CM

## 2022-04-04 DIAGNOSIS — K21.9 GASTROESOPHAGEAL REFLUX DISEASE WITHOUT ESOPHAGITIS: ICD-10-CM

## 2022-04-04 DIAGNOSIS — M25.551 BILATERAL HIP PAIN: ICD-10-CM

## 2022-04-04 DIAGNOSIS — B37.2 CANDIDAL INTERTRIGO: ICD-10-CM

## 2022-04-04 DIAGNOSIS — M25.552 BILATERAL HIP PAIN: ICD-10-CM

## 2022-04-04 DIAGNOSIS — M1A.0790 CHRONIC GOUT OF FOOT, UNSPECIFIED CAUSE, UNSPECIFIED LATERALITY: ICD-10-CM

## 2022-04-04 RX ORDER — KETOCONAZOLE 20 MG/G
CREAM TOPICAL
Qty: 30 G | Refills: 0 | Status: SHIPPED
Start: 2022-04-04 | End: 2022-06-02

## 2022-04-04 RX ORDER — PANTOPRAZOLE SODIUM 40 MG/1
40 TABLET, DELAYED RELEASE ORAL 2 TIMES DAILY
Qty: 180 TABLET | Refills: 0 | Status: SHIPPED | OUTPATIENT
Start: 2022-04-04 | End: 2022-07-22 | Stop reason: SDUPTHER

## 2022-04-10 RX ORDER — TRAMADOL HYDROCHLORIDE 50 MG/1
TABLET ORAL
Qty: 180 TABLET | Refills: 0 | Status: SHIPPED | OUTPATIENT
Start: 2022-04-10 | End: 2022-07-20

## 2022-05-25 ENCOUNTER — APPOINTMENT (RX ONLY)
Dept: URBAN - METROPOLITAN AREA CLINIC 22 | Facility: CLINIC | Age: 72
Setting detail: DERMATOLOGY
End: 2022-05-25

## 2022-05-25 DIAGNOSIS — L82.0 INFLAMED SEBORRHEIC KERATOSIS: ICD-10-CM

## 2022-05-25 DIAGNOSIS — L30.4 ERYTHEMA INTERTRIGO: ICD-10-CM

## 2022-05-25 DIAGNOSIS — L71.8 OTHER ROSACEA: ICD-10-CM

## 2022-05-25 PROCEDURE — ? LIQUID NITROGEN

## 2022-05-25 PROCEDURE — ? PRESCRIPTION

## 2022-05-25 PROCEDURE — 17110 DESTRUCTION B9 LES UP TO 14: CPT

## 2022-05-25 PROCEDURE — ? COUNSELING

## 2022-05-25 PROCEDURE — 99214 OFFICE O/P EST MOD 30 MIN: CPT | Mod: 25

## 2022-05-25 PROCEDURE — ? ADDITIONAL NOTES

## 2022-05-25 RX ORDER — IVERMECTIN 10 MG/G
1 CREAM TOPICAL BID
Qty: 45 | Refills: 5 | Status: ERX

## 2022-05-25 RX ORDER — MINOCYCLINE HYDROCHLORIDE 50 MG/1
TABLET ORAL QD
Qty: 42 | Refills: 1 | Status: ERX

## 2022-05-25 RX ORDER — DOXYCYCLINE HYCLATE 100 MG/1
1 TABLET, COATED ORAL QD
Qty: 1 | Refills: 0 | Status: CANCELLED

## 2022-05-25 RX ORDER — FLUCONAZOLE 200 MG/1
TABLET ORAL
Qty: 2 | Refills: 4 | Status: ERX

## 2022-05-25 ASSESSMENT — LOCATION ZONE DERM
LOCATION ZONE: TRUNK
LOCATION ZONE: EAR

## 2022-05-25 ASSESSMENT — LOCATION SIMPLE DESCRIPTION DERM
LOCATION SIMPLE: RIGHT EAR
LOCATION SIMPLE: RIGHT BREAST

## 2022-05-25 ASSESSMENT — SEVERITY ASSESSMENT OVERALL AMONG ALL PATIENTS
IN YOUR EXPERIENCE, AMONG ALL PATIENTS YOU HAVE SEEN WITH THIS CONDITION, HOW SEVERE IS THIS PATIENT'S CONDITION?: MODERATE

## 2022-05-25 ASSESSMENT — LOCATION DETAILED DESCRIPTION DERM
LOCATION DETAILED: RIGHT SUPERIOR HELIX
LOCATION DETAILED: RIGHT INFRAMAMMARY CREASE (INNER QUADRANT)

## 2022-05-25 NOTE — PROCEDURE: LIQUID NITROGEN
Render Note In Bullet Format When Appropriate: No
Spray Paint Text: The liquid nitrogen was applied to the skin utilizing a spray paint frosting technique.
Medical Necessity Clause: This procedure was medically necessary because the lesions that were treated were:
Consent: The patient's mom’s consent was obtained including but not limited to risks of crusting, scabbing, blistering, scarring, darker or lighter pigmentary change, recurrence, incomplete removal and infection.
Medical Necessity Information: It is in your best interest to select a reason for this procedure from the list below. All of these items fulfill various CMS LCD requirements except the new and changing color options.
Show Topical Anesthesia Variable?: Yes
Post-Care Instructions: I reviewed with the patient in detail post-care instructions. Patient is to wear sunprotection, and avoid picking at any of the treated lesions. Pt may apply Vaseline to crusted or scabbing areas.
Duration Of Freeze Thaw-Cycle (Seconds): 2
Number Of Freeze-Thaw Cycles: 3 freeze-thaw cycles
Detail Level: Detailed

## 2022-05-25 NOTE — PROCEDURE: ADDITIONAL NOTES
Additional Notes: We discussed treatment with an oral medication and trial of new topical daily. \\nDiscontinue witch hazel as this may be irritating.  \\nDiscussed rosacea skin care in general.
Detail Level: Simple
Render Risk Assessment In Note?: no
Additional Notes: Currently very flared.    Discussed use of ketoconazole at night and use of power during the day prn.  \\nDiscussed due to borderline DM/medications/body habitus this may be a waxing and waning condition. \\nWill do fluconazole oral dose for flare.

## 2022-05-25 NOTE — HPI: RASH (ROSACEA)
Is This A New Presentation, Or A Follow-Up?: Rosacea
Additional History: This was previously under the care of her PCP Niki Anderson [facility]

## 2022-06-02 ENCOUNTER — OFFICE VISIT (OUTPATIENT)
Dept: MEDICAL GROUP | Facility: MEDICAL CENTER | Age: 72
End: 2022-06-02
Payer: MEDICARE

## 2022-06-02 VITALS
BODY MASS INDEX: 43.72 KG/M2 | HEIGHT: 66 IN | SYSTOLIC BLOOD PRESSURE: 146 MMHG | WEIGHT: 272.05 LBS | HEART RATE: 74 BPM | TEMPERATURE: 98 F | DIASTOLIC BLOOD PRESSURE: 76 MMHG | OXYGEN SATURATION: 94 %

## 2022-06-02 DIAGNOSIS — M70.61 TROCHANTERIC BURSITIS OF BOTH HIPS: ICD-10-CM

## 2022-06-02 DIAGNOSIS — R23.2 FLUSHING: ICD-10-CM

## 2022-06-02 DIAGNOSIS — M54.16 LUMBAR RADICULOPATHY: ICD-10-CM

## 2022-06-02 DIAGNOSIS — J96.11 CHRONIC RESPIRATORY FAILURE WITH HYPOXIA (HCC): ICD-10-CM

## 2022-06-02 DIAGNOSIS — I10 ESSENTIAL HYPERTENSION: ICD-10-CM

## 2022-06-02 DIAGNOSIS — M62.89 PELVIC FLOOR DYSFUNCTION: ICD-10-CM

## 2022-06-02 DIAGNOSIS — M70.62 TROCHANTERIC BURSITIS OF BOTH HIPS: ICD-10-CM

## 2022-06-02 DIAGNOSIS — G61.9 INFLAMMATORY NEUROPATHY (HCC): ICD-10-CM

## 2022-06-02 DIAGNOSIS — M48.02 NEURAL FORAMINAL STENOSIS OF CERVICAL SPINE: ICD-10-CM

## 2022-06-02 DIAGNOSIS — N39.46 MIXED STRESS AND URGE URINARY INCONTINENCE: ICD-10-CM

## 2022-06-02 DIAGNOSIS — E11.9 CONTROLLED TYPE 2 DIABETES MELLITUS WITHOUT COMPLICATION, WITHOUT LONG-TERM CURRENT USE OF INSULIN (HCC): ICD-10-CM

## 2022-06-02 DIAGNOSIS — G25.2 INTENTION TREMOR: ICD-10-CM

## 2022-06-02 PROBLEM — Z71.89 PRE-BARIATRIC SURGERY PSYCHOLOGICAL EVALUATION: Status: RESOLVED | Noted: 2020-07-31 | Resolved: 2022-06-02

## 2022-06-02 PROCEDURE — 99214 OFFICE O/P EST MOD 30 MIN: CPT | Performed by: FAMILY MEDICINE

## 2022-06-02 RX ORDER — GABAPENTIN 800 MG/1
800 TABLET ORAL 2 TIMES DAILY
Qty: 180 TABLET | Refills: 2 | Status: SHIPPED | OUTPATIENT
Start: 2022-06-02 | End: 2022-08-12

## 2022-06-02 RX ORDER — FLUCONAZOLE 200 MG/1
TABLET ORAL
COMMUNITY
Start: 2022-05-25 | End: 2022-09-15

## 2022-06-02 RX ORDER — MINOCYCLINE HYDROCHLORIDE 50 MG/1
CAPSULE ORAL
COMMUNITY
Start: 2022-05-25 | End: 2022-12-20

## 2022-06-02 RX ORDER — IVERMECTIN 10 MG/G
CREAM TOPICAL
COMMUNITY
Start: 2022-05-25 | End: 2022-12-20

## 2022-06-02 RX ORDER — DULOXETIN HYDROCHLORIDE 60 MG/1
60 CAPSULE, DELAYED RELEASE ORAL EVERY EVENING
Qty: 90 CAPSULE | Refills: 3 | Status: SHIPPED | OUTPATIENT
Start: 2022-06-02 | End: 2023-05-12 | Stop reason: SDUPTHER

## 2022-06-02 RX ORDER — AMLODIPINE BESYLATE 2.5 MG/1
2.5 TABLET ORAL DAILY
Qty: 90 TABLET | Refills: 2 | Status: SHIPPED | OUTPATIENT
Start: 2022-06-02 | End: 2022-12-14 | Stop reason: SDUPTHER

## 2022-06-02 ASSESSMENT — FIBROSIS 4 INDEX: FIB4 SCORE: 1.83

## 2022-06-02 NOTE — PROGRESS NOTES
Diabetes Focused Exam:    Chief Complaint   Patient presents with   • Diabetes Follow-up     3 Mth Fv      Subjective:   HPI  Ashly Meyer is a 72 y.o. female who presents for follow up of chronic conditions of diabetes mellitus, hypertension and hyperlipidemia. She indicates that she is feeling well and denies any symptoms referable to the above diagnoses. Specifically denies chest pain, palpitations, dyspnea, orthopnea, PND or peripheral edema. Also denies polyuria, polydipsia, urinary complaints, abdominal complaints, myalgias, numbness, weakness or other related symptoms.     Continues nighttime oxygen for hypoxia.  Uses during the day when short of breath.    Tremor and neuropathy persist.  Slight resting termor, mostly intention tremor.    She is having mixed stress and urge urinary incontinence and requests a urology referral.  Referral is placed.  No dysuria, just constant urination and loss of control.    She stopped the statins due to muscle pain.    The patient is not taking ASA every day.  She is taking all other medications as prescribed . Patient denies any side effects of medication.  DM: A1c goal <7  Glucose monitoring frequency: declines  Hypoglycemic episodes none noted  Diabetic complications: none  ACR Albumin/Creatinine Ratio goal <30  HTN: Blood pressure goal <140/<80. Currently Rx ACE/ARB: Not Indicated  Allergy to ace inhibitors  Hyperlipidemia:Cholesterol goal LDL <100, total/HDL <5. Currently Rx Statin: No  Last eye exam she is due  Denies visual blurring, double vision, eye pain and floaters  Last monofilament foot exam: today  Denies foot pain, calluses, ulcers.  Patient has chronic numbness.    See medications and orders placed in encounter report.  Past medical history, family history, social history reviewed and updated as documented in medical record.  Current medications including changes today:  Current Outpatient Medications   Medication Sig Dispense Refill   • minocycline  "(MINOCIN) 50 MG Cap      • SOOLANTRA 1 % Cream      • fluconazole (DIFLUCAN) 200 MG Tab      • amLODIPine (NORVASC) 2.5 MG Tab Take 1 Tablet by mouth every day. 90 Tablet 2   • DULoxetine (CYMBALTA) 60 MG Cap DR Particles delayed-release capsule Take 1 Capsule by mouth every evening. 90 Capsule 3   • gabapentin (NEURONTIN) 800 MG tablet Take 1 Tablet by mouth 2 times a day. 180 Tablet 2   • pantoprazole (PROTONIX) 40 MG Tablet Delayed Response Take 1 Tablet by mouth 2 times a day. 180 Tablet 0   • potassium chloride SA (KDUR) 20 MEQ Tab CR Take 1 Tablet by mouth 2 times a day. 180 Tablet 2   • albuterol 108 (90 Base) MCG/ACT Aero Soln inhalation aerosol Inhale 2 Puffs every 6 hours as needed for Shortness of Breath. 8.5 g 11   • Misc. Devices Misc Referral to Nona Valenzuela DDS for mouth device for Obstructive Sleep Apnea. 1 Each 1   • QUEtiapine (SEROQUEL) 25 MG Tab Take 1 Tablet by mouth at bedtime. 90 Tablet 3   • furosemide (LASIX) 20 MG Tab Take 1 Tablet by mouth every morning. 90 Tablet 3   • levothyroxine (SYNTHROID) 175 MCG Tab Take 1 Tablet by mouth every morning on an empty stomach. 90 Tablet 3   • nabumetone (RELAFEN) 750 MG Tab Take 1 Tablet by mouth 2 times a day with meals. 180 Tablet 3   • benzonatate (TESSALON) 100 MG Cap Take 1 Capsule by mouth 3 times a day as needed for Cough. 60 Capsule 6   • Cholecalciferol (VITAMIN D-3) 125 MCG (5000 UT) Tab Take 5,000 Units by mouth every evening.     • vitamin E 180 mg (400 units) Cap Take 180 mg by mouth every morning.       No current facility-administered medications for this visit.     Allergies:   Allergies   Allergen Reactions   • Ace Inhibitors Cough   • Benadryl Allergy Hives     Hot skin itching   • Iodine Shortness of Breath     Labored breathing- Occasionally can take it   • Lamictal Rash and Swelling     \"hot\", unable to function.  Severe rash, scarring   • Savella [Kdc:Milnacipran+Ci Pigment Blue 63] Nausea, Swelling and Anxiety     Swelling, bone " "and muscle pain, sweating, nausea, dizziness, sleeplessness, anxiety   • Savella [Milnacipran Hcl] Unspecified     Muscle aches, feet swelling   • Sulfa Drugs Hives, Shortness of Breath and Anxiety     Hard breathing, shortness of breath   • Butalbital-Acetaminophen Unspecified     Dizzy, can't walk, hard to focus   • Cefaclor Nausea and Unspecified     Ceclor causes light headedness and dizziness   • Morphine Itching     \"Redness\"   • Penicillins Itching and Swelling     Skin itching and redness   • Tizanidine Hcl Nausea     \"Dizziness\" hard to focus   • Amlactin Rash and Itching     Lotion causes itching, redness and burnng   • Tape Rash and Itching     Skin tears, paper tape is OK per Pt      Social History     Tobacco Use   • Smoking status: Former Smoker     Packs/day: 1.00     Years: 43.00     Pack years: 43.00     Types: Cigarettes     Start date: 1962     Quit date: 3/1/2007     Years since quitting: 15.2   • Smokeless tobacco: Never Used   • Tobacco comment: Started smoking at age 15, continued abstinance    Vaping Use   • Vaping Use: Never used   Substance Use Topics   • Alcohol use: Not Currently   • Drug use: Not Currently     Comment: CBD topical pain cream     Exercise: She gets very little exercise or activity  Health Maintenance/Immunizations: Discussed, Recommend second booster for COVID    ROS  Pertinent  ROS findings as above. Denies fever or chills or change in stool pattern.     Objective:     OBJECTIVE:  BP (!) 146/76 (BP Location: Right arm, Patient Position: Sitting, BP Cuff Size: Large adult)   Pulse 74   Temp 36.7 °C (98 °F) (Temporal)   Ht 1.676 m (5' 6\")   Wt 123 kg (272 lb 0.8 oz)   SpO2 94%   BMI 43.91 kg/m²  Body mass index is 43.91 kg/m². BMI: severely obese  General: No apparent distress, conversant, cooperative and pleasant with the examination.  Psych: Alert and oriented x4, judgment and insight normal  Neck: No JVD or bruits, no adenopathy, supple  Thyroid: normal to " inspection and palpation  Lungs: negative findings: normal respiratory rate and rhythm, lungs clear to auscultation  Heart: negative findings: regular rate and rhythm, S1 normal, S2 normal, no murmurs, clicks, or gallops  Abdomen: Soft, nontender, no hepatosplenomegaly or masses, normal bowel sounds  Skin: No rashes, no cyanosis, no lesions or ulcers  Extremities: No cyanosis clubbing or edema.   Feet are examined, sensation decreased bilaterally.  DP pulses present.  Dryness and fissuring present, shallow only     POC labs tin past:   Lab Results   Component Value Date/Time    POCGLUCOSE 105 (H) 08/26/2021 08:01 AM          Last labs    Lab Results   Component Value Date/Time    CHOLSTRLTOT 230 (H) 12/03/2021 10:01 AM    CHOLSTRLTOT 266 (H) 10/13/2020 12:46 PM     (H) 10/13/2020 12:46 PM    HDL 64 12/03/2021 10:01 AM    HDL 79 10/13/2020 12:46 PM    TRIGLYCERIDE 113 12/03/2021 10:01 AM    TRIGLYCERIDE 150 (H) 10/13/2020 12:46 PM       Lab Results   Component Value Date/Time    SODIUM 142 12/03/2021 10:01 AM    SODIUM 140 08/20/2021 12:57 PM    POTASSIUM 4.2 12/03/2021 10:01 AM    POTASSIUM 4.1 08/20/2021 12:57 PM    GLUCOSE 90 12/03/2021 10:01 AM    GLUCOSE 107 (H) 08/20/2021 12:57 PM    BUNCREATRAT 27 12/03/2021 10:01 AM    BUNCREATRAT 25 04/18/2011 03:41 PM    GLOMRATE >59 01/14/2011 10:30 AM     Lab Results   Component Value Date/Time    HBA1C 6.1 (H) 12/03/2021 10:01 AM    HBA1C 5.7 (A) 06/03/2021 08:29 AM    HBA1C 6.3 (H) 10/13/2020 12:46 PM    HBA1C 6.1 (A) 03/19/2020 02:46 PM     Lab Results   Component Value Date/Time    MALBCRT 19 10/13/2020 12:44 PM    MICROALBUR 2.1 10/13/2020 12:44 PM    MICRALB 52.2 12/03/2021 10:01 AM          Assessment/Plan:   Medications, refills, and referrals per orders.   1. Controlled type 2 diabetes mellitus without complication, without long-term current use of insulin (HCC)  Diabetic Monofilament LE Exam    Referral to Ophthalmology   2. Flushing  amLODIPine (NORVASC)  2.5 MG Tab   3. Essential hypertension  amLODIPine (NORVASC) 2.5 MG Tab   4. Chronic respiratory failure with hypoxia (HCC)     5. Intention tremor     6. Inflammatory neuropathy (HCC)  DULoxetine (CYMBALTA) 60 MG Cap DR Particles delayed-release capsule   7. Lumbar radiculopathy  gabapentin (NEURONTIN) 800 MG tablet    Referral to Orthopedics   8. Neural foraminal stenosis of cervical spine  gabapentin (NEURONTIN) 800 MG tablet   9. Trochanteric bursitis of both hips  Referral to Orthopedics   10. Pelvic floor dysfunction  Referral to Urology   11. Mixed stress and urge urinary incontinence  Referral to Urology     DM2 A1c is at goal   Patient to monitor sugars: declines   Discussed diet, exercise, disease management and weight loss goals.   Education and advise provided today:All medications, side effects and compliance (discussed carefully)  Annual eye examinations at Ophthalmology  Diabetic diet discussed in detail, written exchange diet given  Glycohemoglobin and other lab monitoring  Labs immediately prior to next visit  Long term diabetic complications  Low cholesterol diet, weight control and daily exercise    Followup:   Do labs and RTC 4 months, sooner should new symptoms or problems arise.

## 2022-06-27 ENCOUNTER — APPOINTMENT (RX ONLY)
Dept: URBAN - METROPOLITAN AREA CLINIC 22 | Facility: CLINIC | Age: 72
Setting detail: DERMATOLOGY
End: 2022-06-27

## 2022-06-27 DIAGNOSIS — Z85.828 PERSONAL HISTORY OF OTHER MALIGNANT NEOPLASM OF SKIN: ICD-10-CM

## 2022-06-27 DIAGNOSIS — L81.4 OTHER MELANIN HYPERPIGMENTATION: ICD-10-CM

## 2022-06-27 DIAGNOSIS — L71.8 OTHER ROSACEA: ICD-10-CM | Status: IMPROVED

## 2022-06-27 DIAGNOSIS — D18.0 HEMANGIOMA: ICD-10-CM

## 2022-06-27 DIAGNOSIS — L91.8 OTHER HYPERTROPHIC DISORDERS OF THE SKIN: ICD-10-CM

## 2022-06-27 DIAGNOSIS — L30.4 ERYTHEMA INTERTRIGO: ICD-10-CM | Status: IMPROVED

## 2022-06-27 DIAGNOSIS — Z71.89 OTHER SPECIFIED COUNSELING: ICD-10-CM

## 2022-06-27 DIAGNOSIS — L82.0 INFLAMED SEBORRHEIC KERATOSIS: ICD-10-CM

## 2022-06-27 DIAGNOSIS — L82.1 OTHER SEBORRHEIC KERATOSIS: ICD-10-CM

## 2022-06-27 DIAGNOSIS — D22 MELANOCYTIC NEVI: ICD-10-CM

## 2022-06-27 PROBLEM — D18.01 HEMANGIOMA OF SKIN AND SUBCUTANEOUS TISSUE: Status: ACTIVE | Noted: 2022-06-27

## 2022-06-27 PROBLEM — D22.5 MELANOCYTIC NEVI OF TRUNK: Status: ACTIVE | Noted: 2022-06-27

## 2022-06-27 PROCEDURE — ? COUNSELING

## 2022-06-27 PROCEDURE — ? SUNSCREEN RECOMMENDATIONS

## 2022-06-27 PROCEDURE — ? BENIGN DESTRUCTION

## 2022-06-27 PROCEDURE — ? ADDITIONAL NOTES

## 2022-06-27 PROCEDURE — 17110 DESTRUCTION B9 LES UP TO 14: CPT

## 2022-06-27 PROCEDURE — ? LIQUID NITROGEN

## 2022-06-27 PROCEDURE — ? PRESCRIPTION

## 2022-06-27 PROCEDURE — 99214 OFFICE O/P EST MOD 30 MIN: CPT | Mod: 25

## 2022-06-27 RX ORDER — NYSTATIN 100000 [USP'U]/G
1 POWDER TOPICAL PRN
Qty: 60 | Refills: 3 | Status: ERX

## 2022-06-27 ASSESSMENT — LOCATION SIMPLE DESCRIPTION DERM
LOCATION SIMPLE: RIGHT BREAST
LOCATION SIMPLE: NOSE
LOCATION SIMPLE: RIGHT PRETIBIAL REGION
LOCATION SIMPLE: RIGHT CHEEK
LOCATION SIMPLE: LEFT UPPER BACK
LOCATION SIMPLE: LEFT SHOULDER
LOCATION SIMPLE: LEFT FOREARM
LOCATION SIMPLE: RIGHT UPPER BACK
LOCATION SIMPLE: LEFT ANTERIOR NECK
LOCATION SIMPLE: RIGHT CLAVICULAR SKIN
LOCATION SIMPLE: CHEST
LOCATION SIMPLE: ABDOMEN
LOCATION SIMPLE: LEFT KNEE

## 2022-06-27 ASSESSMENT — LOCATION DETAILED DESCRIPTION DERM
LOCATION DETAILED: LEFT ANTERIOR SHOULDER
LOCATION DETAILED: RIGHT LATERAL PROXIMAL PRETIBIAL REGION
LOCATION DETAILED: RIGHT INFRAMAMMARY CREASE (INNER QUADRANT)
LOCATION DETAILED: LOWER STERNUM
LOCATION DETAILED: RIGHT CLAVICULAR SKIN
LOCATION DETAILED: LEFT PROXIMAL DORSAL FOREARM
LOCATION DETAILED: LEFT INFERIOR UPPER BACK
LOCATION DETAILED: LEFT LATERAL KNEE
LOCATION DETAILED: RIGHT LATERAL SUPERIOR CHEST
LOCATION DETAILED: NASAL TIP
LOCATION DETAILED: LEFT LATERAL ABDOMEN
LOCATION DETAILED: RIGHT INFERIOR CENTRAL MALAR CHEEK
LOCATION DETAILED: RIGHT MID-UPPER BACK
LOCATION DETAILED: LEFT CLAVICULAR NECK

## 2022-06-27 ASSESSMENT — LOCATION ZONE DERM
LOCATION ZONE: NOSE
LOCATION ZONE: LEG
LOCATION ZONE: TRUNK
LOCATION ZONE: ARM
LOCATION ZONE: NECK
LOCATION ZONE: FACE

## 2022-06-27 NOTE — PROCEDURE: BENIGN DESTRUCTION
Add 52 Modifier (Optional): no
Medical Necessity Clause: This procedure was medically necessary because the lesions that were treated were:
Anesthesia Volume In Cc: 0.2
Detail Level: Detailed
Consent: The patient's consent was obtained including but not limited to risks of crusting, scabbing, blistering, scarring, darker or lighter pigmentary change, recurrence, incomplete removal and infection.
Medical Necessity Information: It is in your best interest to select a reason for this procedure from the list below. All of these items fulfill various CMS LCD requirements except the new and changing color options.
Post-Care Instructions: I reviewed with the patient in detail post-care instructions. Patient is to wear sunprotection, and avoid picking at any of the treated lesions. Pt may apply Vaseline to crusted or scabbing areas.

## 2022-06-27 NOTE — PROCEDURE: MIPS QUALITY
Quality 130: Documentation Of Current Medications In The Medical Record: Current Medications Documented
Quality 110: Preventive Care And Screening: Influenza Immunization: Influenza Immunization Administered during Influenza season
Detail Level: Detailed
Quality 226: Preventive Care And Screening: Tobacco Use: Screening And Cessation Intervention: Patient screened for tobacco use and is an ex/non-smoker
Quality 111:Pneumonia Vaccination Status For Older Adults: Pneumococcal vaccine administered on or after patient’s 60th birthday and before the end of the measurement period

## 2022-06-27 NOTE — PROCEDURE: ADDITIONAL NOTES
Additional Notes: Doing great with the ivermectin cream.  Will continue with use.
Detail Level: Simple
Render Risk Assessment In Note?: no

## 2022-06-27 NOTE — PROCEDURE: LIQUID NITROGEN
Spray Paint Text: The liquid nitrogen was applied to the skin utilizing a spray paint frosting technique.
Show Topical Anesthesia Variable?: Yes
Add 52 Modifier (Optional): no
Consent: The patient's mom’s consent was obtained including but not limited to risks of crusting, scabbing, blistering, scarring, darker or lighter pigmentary change, recurrence, incomplete removal and infection.
Medical Necessity Information: It is in your best interest to select a reason for this procedure from the list below. All of these items fulfill various CMS LCD requirements except the new and changing color options.
Detail Level: Detailed
Medical Necessity Clause: This procedure was medically necessary because the lesions that were treated were:
Post-Care Instructions: I reviewed with the patient in detail post-care instructions. Patient is to wear sunprotection, and avoid picking at any of the treated lesions. Pt may apply Vaseline to crusted or scabbing areas.

## 2022-06-30 ENCOUNTER — HOSPITAL ENCOUNTER (OUTPATIENT)
Facility: MEDICAL CENTER | Age: 72
End: 2022-06-30
Attending: STUDENT IN AN ORGANIZED HEALTH CARE EDUCATION/TRAINING PROGRAM
Payer: MEDICARE

## 2022-06-30 PROCEDURE — 87086 URINE CULTURE/COLONY COUNT: CPT

## 2022-07-03 LAB
BACTERIA UR CULT: NORMAL
SIGNIFICANT IND 70042: NORMAL
SITE SITE: NORMAL
SOURCE SOURCE: NORMAL

## 2022-07-19 DIAGNOSIS — M1A.0790 CHRONIC GOUT OF FOOT, UNSPECIFIED CAUSE, UNSPECIFIED LATERALITY: ICD-10-CM

## 2022-07-19 DIAGNOSIS — M25.551 BILATERAL HIP PAIN: ICD-10-CM

## 2022-07-19 DIAGNOSIS — M25.552 BILATERAL HIP PAIN: ICD-10-CM

## 2022-07-19 DIAGNOSIS — G61.9 INFLAMMATORY NEUROPATHY (HCC): ICD-10-CM

## 2022-07-20 RX ORDER — TRAMADOL HYDROCHLORIDE 50 MG/1
TABLET ORAL
Qty: 180 TABLET | Refills: 0 | Status: SHIPPED | OUTPATIENT
Start: 2022-07-20 | End: 2022-09-15 | Stop reason: SDUPTHER

## 2022-07-21 NOTE — TELEPHONE ENCOUNTER
PDMP review shows no inconsistencies.  Patient last seen in the office 1 month ago.  Chronic diagnoses.

## 2022-07-22 DIAGNOSIS — K21.9 GASTROESOPHAGEAL REFLUX DISEASE WITHOUT ESOPHAGITIS: ICD-10-CM

## 2022-07-22 RX ORDER — PANTOPRAZOLE SODIUM 40 MG/1
40 TABLET, DELAYED RELEASE ORAL 2 TIMES DAILY
Qty: 180 TABLET | Refills: 0 | Status: SHIPPED | OUTPATIENT
Start: 2022-07-22 | End: 2022-10-30

## 2022-08-12 ENCOUNTER — OFFICE VISIT (OUTPATIENT)
Dept: NEUROLOGY | Facility: MEDICAL CENTER | Age: 72
End: 2022-08-12
Attending: PSYCHIATRY & NEUROLOGY
Payer: MEDICARE

## 2022-08-12 VITALS
HEART RATE: 86 BPM | HEIGHT: 66 IN | SYSTOLIC BLOOD PRESSURE: 122 MMHG | DIASTOLIC BLOOD PRESSURE: 72 MMHG | TEMPERATURE: 97.7 F | RESPIRATION RATE: 16 BRPM | OXYGEN SATURATION: 93 % | WEIGHT: 274.03 LBS | BODY MASS INDEX: 44.04 KG/M2

## 2022-08-12 DIAGNOSIS — G47.33 OBSTRUCTIVE SLEEP APNEA: ICD-10-CM

## 2022-08-12 DIAGNOSIS — R79.1 ABNORMAL COAGULATION PROFILE: ICD-10-CM

## 2022-08-12 DIAGNOSIS — G31.89 OTHER SPECIFIED DEGENERATIVE DISEASES OF NERVOUS SYSTEM (HCC): ICD-10-CM

## 2022-08-12 DIAGNOSIS — G31.84 MILD COGNITIVE IMPAIRMENT: ICD-10-CM

## 2022-08-12 DIAGNOSIS — G91.9 ACQUIRED HYDROCEPHALUS (HCC): Primary | ICD-10-CM

## 2022-08-12 DIAGNOSIS — Z01.812 PRE-PROCEDURE LAB EXAM: ICD-10-CM

## 2022-08-12 PROCEDURE — 99212 OFFICE O/P EST SF 10 MIN: CPT | Performed by: PSYCHIATRY & NEUROLOGY

## 2022-08-12 PROCEDURE — 99214 OFFICE O/P EST MOD 30 MIN: CPT | Performed by: PSYCHIATRY & NEUROLOGY

## 2022-08-12 RX ORDER — LEVOTHYROXINE SODIUM 175 UG/1
175 CAPSULE ORAL DAILY
COMMUNITY
End: 2022-09-19

## 2022-08-12 RX ORDER — GABAPENTIN 800 MG/1
800 TABLET ORAL
COMMUNITY
End: 2023-03-13 | Stop reason: SDUPTHER

## 2022-08-12 RX ORDER — VIBEGRON 75 MG/1
75 TABLET, FILM COATED ORAL DAILY
COMMUNITY
Start: 2022-07-29 | End: 2022-08-12

## 2022-08-12 ASSESSMENT — PATIENT HEALTH QUESTIONNAIRE - PHQ9: CLINICAL INTERPRETATION OF PHQ2 SCORE: 0

## 2022-08-12 ASSESSMENT — FIBROSIS 4 INDEX: FIB4 SCORE: 1.83

## 2022-08-12 NOTE — PROGRESS NOTES
Neuro Follow Up:     Seen last by me in 1/2022:    Reason: Mild Cognitive Impairment.    Since last visit with me had formal Neuropsychological evaluation with Dr. Lane Damico PhD  with below results:    Impression: Ms. Meyer's neuropsychological profile revealed impairments in aspects of executive function and visual memory, with otherwise intact cognitive functioning. Her pattern of deficits is suggestive of mild frontal subcortical networks dysfunction. Based on her history, cognitive profile, and reported functional status, she meets criteria for Mild Neurocognitive Disorder (mild cognitive impairment). Etiology is not entirely clear and is likely multifactorial. Given her frontal subcortical pattern of deficits and history of multiple vascular risk factors, her presentation is most consistent with vascular cognitive impairment.     Her reported well-formed visual hallucinations, fluctuations in cognitive functioning and level of alertness, gait difficulties, and worsening tremors coupled with her pattern of cognitive deficits are concerning for the early stage of underlying diffuse Lewy body disease. However, these symptoms are currently confounded by her history of chronic pain, essential tremor, and multiple orthopedic problems, which may interfere with movement, coupled with sleep apnea and medication effects (e.g., Seroquel, gabapentin, tramadol) that may be altering her level of alertness during the day. Elevated symptoms of depression and longstanding untreated KERRI are likely exacerbating factors to her cognitive difficulties. Additional possible contributors include chronic pain, elevated A1C, and headaches. Although less likely given recent brain MRI results, normal pressure hydrocephalus may also be considered in the differential diagnosis based on her cognitive profile and report of balance problems, frequent falls, and increased incontinence in the past year. Her current pattern of performance  (intact language and verbal memory) is not consistent with what is typically seen in the early stages of Alzheimer's disease. Ms. Meyer’s cognition should be closely monitored over time and this evaluation may serve as a baseline longitudinal tracking.    Daughter feels that Ashly feels she is BETTER than when she last saw me. She used to get mixed up with her directions. She has not forgotten anything on a stove which was an issue before. Clearly,daughter feels that Ashly is no worse than last visit.      Patient Active Problem List    Diagnosis Date Noted    Intention tremor 06/02/2022    Flushing 06/02/2022    Trochanteric bursitis of both hips 03/03/2022    Mild persistent asthma, uncomplicated 03/03/2022    Hypokalemia 03/03/2022    Incomplete rectal prolapse 09/03/2021    Benign essential tremor 09/03/2021    Chronic respiratory failure with hypoxia (HCC) 06/03/2021    Controlled type 2 diabetes mellitus without complication, without long-term current use of insulin ([facility]) 06/12/2019    Chronic gout of foot 04/30/2019    Dry eye syndrome, bilateral     Rotator cuff syndrome of right shoulder and allied disorders 10/10/2018    Former smoker 05/30/2018    Vitamin D deficiency 12/28/2017    Morbid obesity with BMI of 40.0-44.9, adult ([facility])     Neural foraminal stenosis of cervical spine 05/18/2017    Tinnitus of both ears 05/18/2017    Dyslipidemia, goal LDL below 130 01/06/2017    Nonalcoholic fatty liver disease 01/05/2017    Disease of accessory sinus 10/03/2016    Atrophic rhinitis 10/03/2016    Deviated nasal septum 08/01/2016    Menopausal symptoms 04/28/2016    Lumbar facet arthropathy 12/09/2015    Chronic pain 11/18/2015    Candidal intertrigo 06/01/2015    Elevated sed rate 12/09/2014    Mild intermittent asthma without complication     H/O fibromyalgia 03/11/2014    KERRI (obstructive sleep apnea) 03/11/2014    Menopause 03/11/2014    Peripheral neuropathy     Rectocele 11/25/2013    Pelvic floor  dysfunction     Chronic constipation 10/02/2013    Nocturnal hypoxia     Cervical stenosis of spinal canal 11/01/2011    Lumbar radiculopathy 11/01/2011    Migraine without aura     Carpal tunnel syndrome 09/05/2011    Ulnar neuropathy 09/05/2011    Seasonal allergies 07/08/2011    GERD (gastroesophageal reflux disease) 12/10/2010    Osteoarthritis of multiple joints 04/13/2010    Eczema, dyshidrotic 08/03/2009    Essential hypertension 05/20/2009    Hypothyroidism due to acquired atrophy of thyroid 05/20/2009       Past medical history:   Past Medical History:   Diagnosis Date    Anginal syndrome ([facility])     Anxiety     Arthritis     Everywhere.     Back pain     Bronchitis 08/2021    finished 1st course of ABX to start a 2nd course soon.     Carotid artery stenosis, unilateral     moderate left carotid artery stenosis    Chronic pain     Constipation     Controlled type 2 diabetes mellitus without complication ([facility])     states borderline    Controlled type 2 diabetes mellitus without complication, without long-term current use of insulin ([facility]) 6/12/2019    Cough 08/2021    r/t bronchitis    Current severe episode of major depressive disorder without psychotic features without prior episode ([facility]) 6/3/2021    Daytime sleepiness     Degenerative disc disease     Depression 5/20/2009    Diarrhea     and constipation    Dizziness     Dry eye syndrome, bilateral     Elevated sedimentation rate     history of negative temporal artery biopsy around 2006    Fatigue     Fatty liver     Fibromyalgia     confirmed by Dr. Ann    Frequent headaches     GERD (gastroesophageal reflux disease)     hiatal hernia    GOUT 5/20/2009    H/O foot surgery     Hearing difficulty     Heart burn     Hemorrhagic disorder ([facility]) 2016    nose bleeds    Hiatus hernia syndrome     History of anemia     none currently    Hypertension     Hypothyroidism 5/20/2009    Incipient cataract of both eyes     Incomplete rectal prolapse 9/3/2021     Insomnia     Intention tremor 6/2/2022    Migraine without aura, without mention of intractable migraine without mention of status migrainosus     Mild intermittent asthma without complication     inhalers as needed    Mixed dyslipidemia     Morbid obesity with BMI of 45.0-49.9, adult ([facility])     Morning headache     Mumps     Nasal drainage     Nocturnal hypoxia     severe, to 69% O2 sat    Obesity     Obesity hypoventilation syndrome ([facility]) 5/31/2017    Pelvic floor dysfunction     Peripheral neuropathy     Pleomorphic adenoma     Polymyalgia rheumatica ([facility])     Dr. Ann    Restless leg syndrome     Ringing in ears     Rotator cuff syndrome of right shoulder and allied disorders 10/10/2018    S/P tonsillectomy     Seasonal allergies     Skin cancer 1990's    skin    Sleep apnea syndrome     she is not using CPAP, claustrophobia, uses 2-3l at night via concentrator occ 2l during daytime ([facility])    Snoring     Sore muscles     Sweat, sweating, excessive     Swelling of lower extremity     Trochanteric bursitis of both hips 3/3/2022    Urinary incontinence     will not wear a pad    Vision loss     Weakness        Past surgical history:   Past Surgical History:   Procedure Laterality Date    AL UPPER GI ENDOSCOPY,DIAGNOSIS N/A 8/26/2021    Procedure: GASTROSCOPY;  Surgeon: Marty Lopez M.D.;  Location: [facility];  Service: Gastroenterology    AL COLONOSCOPY,DIAGNOSTIC  8/26/2021    Procedure: COLONOSCOPY - WITH BIOPSIES.;  Surgeon: Marty Lopez M.D.;  Location: [facility];  Service: Gastroenterology    SHOULDER DECOMPRESSION ARTHROSCOPIC Right 2/1/2019    Procedure: SHOULDER DECOMPRESSION ARTHROSCOPIC - SUBACROMIAL, LABRAL DEBRIDEMENT;  Surgeon: Damaris Knutson M.D.;  Location: [facility];  Service: Orthopedics    SHOULDER ARTHROSCOPY W/ BICIPITAL TENODESIS REPAIR Right 2/1/2019    Procedure: SHOULDER ARTHROSCOPY W/ BICIPITAL TENOTOMY;  Surgeon: Damaris  ELYSIA Knutson;  Location: SURGERY [facility];  Service: Orthopedics    CARPAL TUNNEL RELEASE Right 2/1/2019    Procedure: CARPAL TUNNEL RELEASE;  Surgeon: Damaris Knutson M.D.;  Location: SURGERY [facility];  Service: Orthopedics    GASTROSCOPY  2/6/2017    Procedure: GASTROSCOPY;  Surgeon: Pau Foster M.D.;  Location: SURGERY SAME DAY [facility];  Service:     COLONOSCOPY  2/6/2017    Procedure: COLONOSCOPY;  Surgeon: Pau Foster M.D.;  Location: SURGERY SAME DAY [facility];  Service:     SEPTAL RECONSTRUCTION  10/3/2016    Procedure: SEPTAL RECONSTRUCTION with  grafts ;  Surgeon: PIETER Dickson M.D.;  Location: SURGERY SAME DAY [facility];  Service:     SEPTOPLASTY N/A 8/1/2016    Procedure: SEPTOPLASTY;  Surgeon: PIETER Dickson M.D.;  Location: SURGERY [facility] DAY [facility];  Service:     TURBINOPLASTY Bilateral 8/1/2016    Procedure: TURBINOPLASTY;  Surgeon: PIETER Dickson M.D.;  Location: SURGERY SAME DAY [facility];  Service:     NASAL FRACTURE REDUCTION OPEN N/A 8/1/2016    Procedure: SEPTAL FRACTURE REDUCTION OPEN;  Surgeon: PIETER Dickson M.D.;  Location: SURGERY SAME DAY [facility];  Service:     FINE NEEDLE ASPIRATION  11/16/2009    Performed by DA CALLOWAY at ENDOSCOPY [facility]    COLONOSCOPY  2006    ? unclear date    OTHER SURGICAL PROCEDURE  1998    vaginal wall repair    BLADDER SUSPENSION  1983    HYSTERECTOMY, VAGINAL  1983    ovaries are present, excessive bleeding    TUBAL LIGATION  1980    MASS EXCISION GENERAL Right 1956    had carbuncle removal R thigh    TONSILLECTOMY  1953    ANAL SPHINCTEROTOMY      anal muscle repair    CATARACT EXTRACTION WITH IOL Bilateral     CHOLECYSTECTOMY      HYSTERECTOMY LAPAROSCOPY      OTHER ORTHOPEDIC SURGERY  1962/1980/1983/1985    foot surgery     SCAR REVISION Right     thigh scar     TONSILLECTOMY           Social history:   Social History     Socioeconomic History    Marital  status:      Spouse name: Not on file    Number of children: Not on file    Years of education: Not on file    Highest education level: Not on file   Occupational History    Not on file   Tobacco Use    Smoking status: Former     Packs/day: 1.00     Years: 43.00     Pack years: 43.00     Types: Cigarettes     Start date: 1962     Quit date: 3/1/2007     Years since quitting: 15.4    Smokeless tobacco: Never    Tobacco comments:     Started smoking at age 15, continued abstinance    Vaping Use    Vaping Use: Never used   Substance and Sexual Activity    Alcohol use: Not Currently    Drug use: Not Currently     Comment: CBD topical pain cream    Sexual activity: Not Currently     Partners: Male   Other Topics Concern     Service No    Blood Transfusions No    Caffeine Concern Not Asked    Occupational Exposure No    Hobby Hazards No    Sleep Concern No    Stress Concern No    Weight Concern No    Special Diet No    Back Care No    Exercise No    Bike Helmet No    Seat Belt Yes    Self-Exams No   Social History Narrative    Not on file     Social Determinants of Health     Financial Resource Strain: Not on file   Food Insecurity: Not on file   Transportation Needs: Not on file   Physical Activity: Not on file   Stress: Not on file   Social Connections: Not on file   Intimate Partner Violence: Not on file   Housing Stability: Not on file       Family history:   Family History   Problem Relation Age of Onset    Heart Disease Mother         Arotic vaulve replacement    GI Disease Mother         bile duct problem    Bladder cancer Mother     GI Disease Sister         celiac    Arthritis Sister     Other Sister         gout and lupus    Arthritis Sister     Kidney Disease Sister     GI Disease Sister     Bladder cancer Maternal Aunt     Arthritis Maternal Grandmother     Hypertension Maternal Grandmother     Heart Disease Maternal Grandmother     Hypertension Maternal Grandfather     Heart Disease Maternal  "Grandfather     Arthritis Maternal Grandfather     No Known Problems Paternal Grandmother     No Known Problems Paternal Grandfather     Cancer Brother         Larynx    Cancer Daughter         non hopskins limphoma    GI Disease Daughter     Bipolar disorder Daughter     Seizures Daughter     Bipolar disorder Daughter          Current medications:   Current Outpatient Medications   Medication    pantoprazole (PROTONIX) 40 MG Tablet Delayed Response    traMADol (ULTRAM) 50 MG Tab    minocycline (MINOCIN) 50 MG Cap    SOOLANTRA 1 % Cream    fluconazole (DIFLUCAN) 200 MG Tab    amLODIPine (NORVASC) 2.5 MG Tab    DULoxetine (CYMBALTA) 60 MG Cap DR Particles delayed-release capsule    gabapentin (NEURONTIN) 800 MG tablet    potassium chloride SA (KDUR) 20 MEQ Tab CR    albuterol 108 (90 Base) MCG/ACT Aero Soln inhalation aerosol    Misc. Devices Misc    QUEtiapine (SEROQUEL) 25 MG Tab    furosemide (LASIX) 20 MG Tab    levothyroxine (SYNTHROID) 175 MCG Tab    nabumetone (RELAFEN) 750 MG Tab    benzonatate (TESSALON) 100 MG Cap    Cholecalciferol (VITAMIN D-3) 125 MCG (5000 UT) Tab    vitamin E 180 mg (400 units) Cap     No current facility-administered medications for this visit.       Medication Allergy:  Allergies   Allergen Reactions    Ace Inhibitors Cough    Benadryl Allergy Hives     Hot skin itching    Iodine Shortness of Breath     Labored breathing- Occasionally can take it    Lamictal Rash and Swelling     \"hot\", unable to function.  Severe rash, scarring    Savella [Kdc:Milnacipran+Ci Pigment Blue 63] Nausea, Swelling and Anxiety     Swelling, bone and muscle pain, sweating, nausea, dizziness, sleeplessness, anxiety    Savella [Milnacipran Hcl] Unspecified     Muscle aches, feet swelling    Sulfa Drugs Hives, Shortness of Breath and Anxiety     Hard breathing, shortness of breath    Butalbital-Acetaminophen Unspecified     Dizzy, can't walk, hard to focus    Cefaclor Nausea and Unspecified     Ceclor causes " "light headedness and dizziness    Morphine Itching     \"Redness\"    Penicillins Itching and Swelling     Skin itching and redness    Tizanidine Hcl Nausea     \"Dizziness\" hard to focus    Amlactin Rash and Itching     Lotion causes itching, redness and burnng    Tape Rash and Itching     Skin tears, paper tape is OK per Pt           Physical examination:   There were no vitals filed for this visit.    Normal cephalic atraumatic.  There is full range of movement around the neck in all directions without restrictions or discrete pain evoked triggers.  No lower extremity edema.      Neurological  Exam:      [place] Cognitive Assessment (MOCA) Version 7.1    Years of Education: High School Graduate    TOTAL SCORE: 23/30  (to be scanned into the MEDIA section in the E.M.R.)          Mental status: Awake, alert and fully oriented to person, place, time, and situation. Normal attention and concentration.  Did not appear/act combative,irritable,anxious,paranoid/delusional or aggressive to or with me.    Speech and language: Speech is fluent without errors, clear, intact to repetition, and intact to naming.     Follows 3 step motor commands in sequence without significant delay and correctly.    Cranial nerve exam:  II: Pupils are equally round and reactive to light. Visual fields are intact by confrontation.  III, IV, VI: EOMI, no diplopia, no ptosis.  V: Sensation to light touch is normal over V1-3 distributions bilaterally.  .  VII: Facial movements are symmetrical. There is no facial droop. .  VIII: Hearing intact to soft speech and finger rub bilaterally  IX: Palate elevates symmetrically, uvula is midline. Dysarthria is not present.  XI: Shoulder shrug are symmetrical and strong.   XII: Tongue protrudes midline.      Motor exam:  Muscle tone is normal in all 4 limbs. and No abnormal movements appreciated.    Muscle strength:    Neck Flexors/Extensors: " 5/5       Right  Left  Deltoid   5/5  5/5      Biceps   5/5  5/5  Triceps  5/5  5/5   Wrist extensors 5/5  5/5  Wrist flexors  5/5  5/5     5/5  5/5  Interossei  5/5  5/5  Thenar (APB)  5/5  5/5   Hip flexors  5/5  5/5  Quadriceps  5/5  5/5    Hamstrings  5/5  5/5  Dorsiflexors  5/5  5/5  Plantarflexors  5/5  5/5  Toe extension  5/5  5/5      Reflexes:       Right  Left  Biceps   2/4  2/4  Triceps             /4  2/4  Brachioradialis 2          2/4             2/4  Knee jerk  /  2/4  Ankle jerk  /  2/4     Frontal release signs are absent    bilaterally toes are downgoing to plantar stimulation..    Coordination (finger-to-nose, heel/knee/shin, rapid alternating movements) was normal.     There was no ataxia, no tremors, and no dysmetria.     Station and gait > Easily stands up from exam chair without retropulsion,veering,leaning,swaying (to either side).     Mild wide based stance- semi shuffle (she feels like she can't lift them up)      Labs and Tests:    Reviewed from the last 6 months or so.     NEUROIMAGIN/2022:      Brain MRI:    The ventricular system is mild to moderately prominent       IMPRESSION:        1. Age-related cerebral atrophy.     2. Mild periventricular white matter changes consistent with chronic microvascular ischemic gliosis.           Exam Ended: 22 11:44 AM Last Resulted: 22 12:04 PM             Cervical Spine MRI without significant stenosis (central)= 2022.      Impression/Plans/Recommendations:    Mild Cognitive Impairment    Passed OT guided Driving Simulator Evaluation at [facility] on 2022.    Mild Cognitive Impairment- likely or suspected neurodegenerative in nature. Has mild Essential tremor of the hands and head.     She is at risk and in a border line category for dementia> MOCA of 22/30 and FAQ of 5 with GDS of 3-4 range.     Not parkinsonian at this time.     No rapid decline in behaviors,personality or cognition in the last 6 months.     Has a  chronic gait disorder with a slight based stance -> this brings up the issue of vascular (subcortical changes) and potential NPH if she has significant ventricular enlargement on imaging.     I am also ordering a Cervical MRI to determine if there is any evolving compression in this area due to her known cervical stenosis by CT imaging     No evidence for tova or myelopathy,myopathy or peripheral neuropathy on my exam.     2. O.S.A. - known; referral to Dr. Valenzuela (S) for oral appliance.     3. HTN- on history of vascular events (cerebral).   Continuation to monitor at home which we reviewed.     4. Obesity- severe (chronic)> goal BMI under 30    5. Gait Disorder- was better on PT- daughter felt she had better balance and could get up and down better from chair. PT was for 6 months.  Now she tends to shuffle.    We reviewed the pros and cons of doing a large volume LP to see if her walking improves or not given she has a gait apraxia (mild). I went over the potential risk(s) of doing this which is mainly a post lumbar headache(s).    Repeat Brain MRI to compare with study from 1/2022> attention to white matter and ventricle system and then coagulation blood work within 3 days of Lumbar Puncture (for large volume spinal tap)       I have performed  a history and physical exam and a directed /focused  ROS today.    Total time spent today or this patient's care was   and included reviewing  the diagnostic workup to date (such as labs and imaging as well as interpreting such tests relevant to this patient's neurological condition),  reviewing/obtaining separately obtained history (from patient and/or accompanying ffamily member)  for today's neurological problem(s) ,counseling and educating the patient and family member on issues related to cognition/memory and cognitive health factors and documenting  the clinical information in the EMR.    Follow up:         Jeffy June MD  [facility]  [place].   [facility]

## 2022-08-12 NOTE — Clinical Note
Ashly needs a 90 minute (Friday) appointment in the afternoon for a lumbar puncture procedure with me. This is NOT urgent however.  [employer]

## 2022-08-28 DIAGNOSIS — E03.4 HYPOTHYROIDISM DUE TO ACQUIRED ATROPHY OF THYROID: ICD-10-CM

## 2022-08-29 ENCOUNTER — HOSPITAL ENCOUNTER (OUTPATIENT)
Dept: LAB | Facility: MEDICAL CENTER | Age: 72
End: 2022-08-29
Attending: FAMILY MEDICINE
Payer: MEDICARE

## 2022-08-29 DIAGNOSIS — E11.9 CONTROLLED TYPE 2 DIABETES MELLITUS WITHOUT COMPLICATION, WITHOUT LONG-TERM CURRENT USE OF INSULIN (HCC): ICD-10-CM

## 2022-08-29 DIAGNOSIS — E55.9 VITAMIN D DEFICIENCY: ICD-10-CM

## 2022-08-29 DIAGNOSIS — E78.5 DYSLIPIDEMIA, GOAL LDL BELOW 130: ICD-10-CM

## 2022-08-29 DIAGNOSIS — E03.4 HYPOTHYROIDISM DUE TO ACQUIRED ATROPHY OF THYROID: ICD-10-CM

## 2022-08-29 LAB
25(OH)D3 SERPL-MCNC: 61 NG/ML (ref 30–100)
ALBUMIN SERPL BCP-MCNC: 4.6 G/DL (ref 3.2–4.9)
ALBUMIN/GLOB SERPL: 1.4 G/DL
ALP SERPL-CCNC: 143 U/L (ref 30–99)
ALT SERPL-CCNC: 21 U/L (ref 2–50)
ANION GAP SERPL CALC-SCNC: 15 MMOL/L (ref 7–16)
AST SERPL-CCNC: 24 U/L (ref 12–45)
BILIRUB SERPL-MCNC: 0.5 MG/DL (ref 0.1–1.5)
BUN SERPL-MCNC: 17 MG/DL (ref 8–22)
CALCIUM SERPL-MCNC: 9.6 MG/DL (ref 8.5–10.5)
CHLORIDE SERPL-SCNC: 102 MMOL/L (ref 96–112)
CHOLEST SERPL-MCNC: 185 MG/DL (ref 100–199)
CO2 SERPL-SCNC: 23 MMOL/L (ref 20–33)
CREAT SERPL-MCNC: 0.8 MG/DL (ref 0.5–1.4)
EST. AVERAGE GLUCOSE BLD GHB EST-MCNC: 128 MG/DL
GFR SERPLBLD CREATININE-BSD FMLA CKD-EPI: 78 ML/MIN/1.73 M 2
GLOBULIN SER CALC-MCNC: 3.3 G/DL (ref 1.9–3.5)
GLUCOSE SERPL-MCNC: 112 MG/DL (ref 65–99)
HBA1C MFR BLD: 6.1 % (ref 4–5.6)
HDLC SERPL-MCNC: 54 MG/DL
LDLC SERPL CALC-MCNC: 109 MG/DL
POTASSIUM SERPL-SCNC: 4.1 MMOL/L (ref 3.6–5.5)
PROT SERPL-MCNC: 7.9 G/DL (ref 6–8.2)
SODIUM SERPL-SCNC: 140 MMOL/L (ref 135–145)
TRIGL SERPL-MCNC: 111 MG/DL (ref 0–149)
TSH SERPL DL<=0.005 MIU/L-ACNC: 0.04 UIU/ML (ref 0.38–5.33)

## 2022-08-29 PROCEDURE — 80053 COMPREHEN METABOLIC PANEL: CPT

## 2022-08-29 PROCEDURE — 36415 COLL VENOUS BLD VENIPUNCTURE: CPT

## 2022-08-29 PROCEDURE — 80061 LIPID PANEL: CPT

## 2022-08-29 PROCEDURE — 84443 ASSAY THYROID STIM HORMONE: CPT

## 2022-08-29 PROCEDURE — 83036 HEMOGLOBIN GLYCOSYLATED A1C: CPT

## 2022-08-29 PROCEDURE — 82306 VITAMIN D 25 HYDROXY: CPT

## 2022-08-29 RX ORDER — LEVOTHYROXINE SODIUM 175 UG/1
TABLET ORAL
Qty: 90 TABLET | Refills: 0 | Status: SHIPPED
Start: 2022-08-29 | End: 2022-10-11

## 2022-08-30 ENCOUNTER — HOSPITAL ENCOUNTER (OUTPATIENT)
Facility: MEDICAL CENTER | Age: 72
End: 2022-08-30
Attending: FAMILY MEDICINE
Payer: MEDICARE

## 2022-08-30 LAB
CREAT UR-MCNC: 50.77 MG/DL
MICROALBUMIN UR-MCNC: <1.2 MG/DL
MICROALBUMIN/CREAT UR: NORMAL MG/G (ref 0–30)

## 2022-08-30 PROCEDURE — 82570 ASSAY OF URINE CREATININE: CPT

## 2022-08-30 PROCEDURE — 82043 UR ALBUMIN QUANTITATIVE: CPT

## 2022-09-08 ENCOUNTER — HOSPITAL ENCOUNTER (OUTPATIENT)
Facility: MEDICAL CENTER | Age: 72
End: 2022-09-08
Attending: UROLOGY
Payer: MEDICARE

## 2022-09-08 PROCEDURE — 87086 URINE CULTURE/COLONY COUNT: CPT

## 2022-09-11 LAB
BACTERIA UR CULT: NORMAL
SIGNIFICANT IND 70042: NORMAL
SITE SITE: NORMAL
SOURCE SOURCE: NORMAL

## 2022-09-15 ENCOUNTER — OFFICE VISIT (OUTPATIENT)
Dept: MEDICAL GROUP | Facility: MEDICAL CENTER | Age: 72
End: 2022-09-15
Payer: MEDICARE

## 2022-09-15 ENCOUNTER — HOSPITAL ENCOUNTER (OUTPATIENT)
Facility: MEDICAL CENTER | Age: 72
End: 2022-09-15
Attending: UROLOGY
Payer: MEDICARE

## 2022-09-15 VITALS
OXYGEN SATURATION: 93 % | DIASTOLIC BLOOD PRESSURE: 74 MMHG | RESPIRATION RATE: 16 BRPM | BODY MASS INDEX: 42.66 KG/M2 | HEIGHT: 66 IN | SYSTOLIC BLOOD PRESSURE: 134 MMHG | HEART RATE: 71 BPM | TEMPERATURE: 97.4 F | WEIGHT: 265.43 LBS

## 2022-09-15 DIAGNOSIS — N81.6 RECTOCELE: ICD-10-CM

## 2022-09-15 DIAGNOSIS — M15.9 PRIMARY OSTEOARTHRITIS INVOLVING MULTIPLE JOINTS: ICD-10-CM

## 2022-09-15 DIAGNOSIS — J96.11 CHRONIC RESPIRATORY FAILURE WITH HYPOXIA (HCC): ICD-10-CM

## 2022-09-15 DIAGNOSIS — E78.5 DYSLIPIDEMIA, GOAL LDL BELOW 130: ICD-10-CM

## 2022-09-15 DIAGNOSIS — M48.02 NEURAL FORAMINAL STENOSIS OF CERVICAL SPINE: ICD-10-CM

## 2022-09-15 DIAGNOSIS — K76.0 NONALCOHOLIC FATTY LIVER DISEASE: ICD-10-CM

## 2022-09-15 DIAGNOSIS — M25.551 BILATERAL HIP PAIN: ICD-10-CM

## 2022-09-15 DIAGNOSIS — G62.9 PERIPHERAL POLYNEUROPATHY: ICD-10-CM

## 2022-09-15 DIAGNOSIS — R70.0 ELEVATED SED RATE: ICD-10-CM

## 2022-09-15 DIAGNOSIS — Z91.81 AT RISK FOR FALLING: ICD-10-CM

## 2022-09-15 DIAGNOSIS — K62.3 INCOMPLETE RECTAL PROLAPSE: ICD-10-CM

## 2022-09-15 DIAGNOSIS — Z00.00 MEDICARE ANNUAL WELLNESS VISIT, SUBSEQUENT: ICD-10-CM

## 2022-09-15 DIAGNOSIS — M18.12 OSTEOARTHRITIS OF LEFT THUMB: ICD-10-CM

## 2022-09-15 DIAGNOSIS — N95.1 MENOPAUSAL SYMPTOMS: ICD-10-CM

## 2022-09-15 DIAGNOSIS — G25.0 BENIGN ESSENTIAL TREMOR: ICD-10-CM

## 2022-09-15 DIAGNOSIS — B37.2 CANDIDAL INTERTRIGO: ICD-10-CM

## 2022-09-15 DIAGNOSIS — I10 ESSENTIAL HYPERTENSION: ICD-10-CM

## 2022-09-15 DIAGNOSIS — E03.4 HYPOTHYROIDISM DUE TO ACQUIRED ATROPHY OF THYROID: ICD-10-CM

## 2022-09-15 DIAGNOSIS — M62.89 PELVIC FLOOR DYSFUNCTION: ICD-10-CM

## 2022-09-15 DIAGNOSIS — J45.30 MILD PERSISTENT ASTHMA, UNCOMPLICATED: ICD-10-CM

## 2022-09-15 DIAGNOSIS — E66.01 MORBID OBESITY WITH BMI OF 40.0-44.9, ADULT (HCC): ICD-10-CM

## 2022-09-15 DIAGNOSIS — M70.61 TROCHANTERIC BURSITIS OF BOTH HIPS: ICD-10-CM

## 2022-09-15 DIAGNOSIS — M54.16 LUMBAR RADICULOPATHY: ICD-10-CM

## 2022-09-15 DIAGNOSIS — G47.34 NOCTURNAL HYPOXIA: ICD-10-CM

## 2022-09-15 DIAGNOSIS — G61.9 INFLAMMATORY NEUROPATHY (HCC): ICD-10-CM

## 2022-09-15 DIAGNOSIS — Z87.39 H/O FIBROMYALGIA: ICD-10-CM

## 2022-09-15 DIAGNOSIS — L30.1 ECZEMA, DYSHIDROTIC: ICD-10-CM

## 2022-09-15 DIAGNOSIS — K21.00 GASTROESOPHAGEAL REFLUX DISEASE WITH ESOPHAGITIS WITHOUT HEMORRHAGE: ICD-10-CM

## 2022-09-15 DIAGNOSIS — K59.09 CHRONIC CONSTIPATION: ICD-10-CM

## 2022-09-15 DIAGNOSIS — H93.13 TINNITUS OF BOTH EARS: ICD-10-CM

## 2022-09-15 DIAGNOSIS — E11.9 CONTROLLED TYPE 2 DIABETES MELLITUS WITHOUT COMPLICATION, WITHOUT LONG-TERM CURRENT USE OF INSULIN (HCC): ICD-10-CM

## 2022-09-15 DIAGNOSIS — M70.62 TROCHANTERIC BURSITIS OF BOTH HIPS: ICD-10-CM

## 2022-09-15 DIAGNOSIS — J30.2 SEASONAL ALLERGIES: ICD-10-CM

## 2022-09-15 DIAGNOSIS — H04.123 DRY EYE SYNDROME, BILATERAL: ICD-10-CM

## 2022-09-15 DIAGNOSIS — E55.9 VITAMIN D DEFICIENCY: ICD-10-CM

## 2022-09-15 DIAGNOSIS — M1A.0790 CHRONIC GOUT OF FOOT, UNSPECIFIED CAUSE, UNSPECIFIED LATERALITY: ICD-10-CM

## 2022-09-15 DIAGNOSIS — M47.816 LUMBAR FACET ARTHROPATHY: ICD-10-CM

## 2022-09-15 DIAGNOSIS — G43.009 MIGRAINE WITHOUT AURA AND WITHOUT STATUS MIGRAINOSUS, NOT INTRACTABLE: ICD-10-CM

## 2022-09-15 DIAGNOSIS — Z23 NEED FOR VACCINATION: ICD-10-CM

## 2022-09-15 DIAGNOSIS — M25.552 BILATERAL HIP PAIN: ICD-10-CM

## 2022-09-15 DIAGNOSIS — G47.33 OSA (OBSTRUCTIVE SLEEP APNEA): ICD-10-CM

## 2022-09-15 PROBLEM — E87.6 HYPOKALEMIA: Status: RESOLVED | Noted: 2022-03-03 | Resolved: 2022-09-15

## 2022-09-15 PROBLEM — R23.2 FLUSHING: Status: RESOLVED | Noted: 2022-06-02 | Resolved: 2022-09-15

## 2022-09-15 PROBLEM — Z87.891 FORMER SMOKER: Status: RESOLVED | Noted: 2018-05-30 | Resolved: 2022-09-15

## 2022-09-15 PROBLEM — G25.2 INTENTION TREMOR: Status: RESOLVED | Noted: 2022-06-02 | Resolved: 2022-09-15

## 2022-09-15 PROCEDURE — 87086 URINE CULTURE/COLONY COUNT: CPT

## 2022-09-15 PROCEDURE — G0008 ADMIN INFLUENZA VIRUS VAC: HCPCS | Performed by: FAMILY MEDICINE

## 2022-09-15 PROCEDURE — G0439 PPPS, SUBSEQ VISIT: HCPCS | Mod: 25 | Performed by: FAMILY MEDICINE

## 2022-09-15 PROCEDURE — 90662 IIV NO PRSV INCREASED AG IM: CPT | Performed by: FAMILY MEDICINE

## 2022-09-15 RX ORDER — NABUMETONE 750 MG/1
750 TABLET, FILM COATED ORAL 2 TIMES DAILY WITH MEALS
Qty: 180 TABLET | Refills: 3 | Status: SHIPPED | OUTPATIENT
Start: 2022-09-15 | End: 2023-03-13 | Stop reason: SDUPTHER

## 2022-09-15 RX ORDER — TRAMADOL HYDROCHLORIDE 50 MG/1
50 TABLET ORAL EVERY 4 HOURS PRN
Qty: 180 TABLET | Refills: 0 | Status: SHIPPED | OUTPATIENT
Start: 2022-09-15 | End: 2022-10-11 | Stop reason: SDUPTHER

## 2022-09-15 ASSESSMENT — ACTIVITIES OF DAILY LIVING (ADL): BATHING_REQUIRES_ASSISTANCE: 1

## 2022-09-15 ASSESSMENT — ENCOUNTER SYMPTOMS: GENERAL WELL-BEING: POOR

## 2022-09-15 ASSESSMENT — FIBROSIS 4 INDEX: FIB4 SCORE: 2.04

## 2022-09-15 ASSESSMENT — PATIENT HEALTH QUESTIONNAIRE - PHQ9: CLINICAL INTERPRETATION OF PHQ2 SCORE: 0

## 2022-09-15 NOTE — PROGRESS NOTES
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 Tab; Take 1 Tablet by mouth 2 times a day with meals.  Dispense: 180 Tablet; Refill: 3    14. Bilateral hip pain/Trochanteric bursitis of both hips  Patient does have bilateral hip pain both deep in the groin and laterally.  She has trochanteric bursitis bilaterally.  She has had injections but unfortunately has not had any lasting relief.  She does do some stretching but it is limited.  She also has arthritis deep in both hips.  The tramadol continues to be helpful in reducing her pain.  She states overall it brings the pain from around an 8 to a 5 or a 6 which allows her to complete her ADLs.  She denies somnolence or confusion from the regimen.  She denies significant dry mouth or constipation.  Opiate consent form and controlled substance treatment agreement was reviewed and signed today.  Stable problem.  - traMADol (ULTRAM) 50 MG Tab; Take 1 Tablet by mouth every four hours as needed for Moderate Pain for up to 30 days.  Dispense: 180 Tablet; Refill: 0  - Consent for Opiate Prescription  - Controlled Substance Treatment Agreement    15. Lumbar radiculopathy/Lumbar facet arthropathy  Patient has long-standing multilevel mixed degenerative change including lumbar  disc disease, arthritis, ligamentous thickening.  Patient is using gabapentin to treat the radicular component.  The current regimen of tramadol is helping the pain bringing it from a level 7 or 8 to a level 6 and sometimes better.  The regimen is improving activity. The regimen allows the patient to complete her ADLs.  Patient denies somnolence, confusion, balance problems or severe constipation from the regimen. Patient notes side effect of mild dry mouth.  Patient denies new or worsening urine or stool incontinence. Patient denies leg weakness and balance problems.     16. Chronic constipation/Rectocele/Incomplete rectal prolapse/Pelvic floor dysfunction  Patient does have chronic constipation and pelvic floor dysfunction.  She has rectocele and incomplete rectal prolapse.  She does continue her exercises.  She is contemplating surgery.  Unfortunately a long treatment with difficult stable problem.    17. Peripheral polyneuropathy/Inflammatory neuropathy (HCC)/H/O fibromyalgia/Elevated sed rate  Patient has longstanding peripheral polyneuropathy very thoroughly worked up by neurology.  She also has an inflammatory neuropathy.  Unfortunately she was unable to tolerate the treatment for these problems.  She does have a history of fibromyalgia but that may actually be more a manifestation of her neurologic pain.  She does have an elevated sed rate and known inflammation for many causes.  The tramadol is helpful in controlling her pain.  She feels it is less sedating than other regimens.  She uses the medication as needed.  PDMP review shows no inconsistencies.  Stable problem.  - traMADol (ULTRAM) 50 MG Tab; Take 1 Tablet by mouth every four hours as needed for Moderate Pain for up to 30 days.  Dispense: 180 Tablet; Refill: 0  - Consent for Opiate Prescription  - Controlled Substance Treatment Agreement    18. Candidal intertrigo  Patient has candidal intertrigo that comes and goes.  She states that the ketoconazole  cream is helpful.  She has plenty currently and uses it as needed.  She notes that if she eats a better diet she has less intertrigo.  Stable problem.    19. Menopausal symptoms  Patient does have hot flashes and other menopausal symptoms.  She does not take estrogen.  Symptoms are relatively stable.  Stable problem overall.    20. Tinnitus of both ears  Patient does have mild ringing in the ears.  This is sometimes more bothersome.  She still is well-hydrated.  Unfortunately stable problem.    21. Vitamin D deficiency  Patient is taking vitamin D supplementation.  No history of pathologic fracture.  Stable problem.    22. Rotator cuff syndrome of right shoulder and allied disorders  Patient has history of right shoulder rotator cuff syndrome and pain.  She has had arthroscopic shoulder decompression which helped temporarily but the pain is returning.  Unfortunately stable problem.    23. Chronic gout of foot, unspecified cause, unspecified laterality  Patient has history of high uric acid and persistent gout.  She is no longer on allopurinol due to the patient's wish to reduce her regimen.  The tramadol does help her pain.  She will consider resuming the allopurinol.  Unfortunately stable persistent problem.  - traMADol (ULTRAM) 50 MG Tab; Take 1 Tablet by mouth every four hours as needed for Moderate Pain for up to 30 days.  Dispense: 180 Tablet; Refill: 0    24. Dry eye syndrome, bilateral  Patient has dry eye syndrome and continues to use eyedrops as needed throughout the day.  She is due for her retinal screening and will be seeing her ophthalmologist soon.  Denies other changes in vision.  Stable problem.    25. Benign essential tremor  Patient does have benign essential tremor with coarse movements of the hands on purposeful movement.  This is sometimes quite troubling for the patient.  Patient has seen neurology but does not tolerate medications use for treatment.  Unfortunately chronic stable problem with  slow progression as expected.    26. At risk for falling  Patient has poor balance and is at risk for falls.  Discussed using a walker or cane on a regular basis.  Stable problem.  - Patient identified as fall risk.  Appropriate orders and counseling given.    27. Morbid obesity with BMI of 40.0-44.9, adult (HCC)  Patient continues to fight with her obesity.  She did start the process a year or so ago of bariatric surgery but found it to be very cumbersome.  Unfortunately this is a chronic stable problem for her.    28. Need for vaccination  High-dose flu vaccine administered today  - Influenza Vaccine, High Dose (65+ Only)        Services suggested: No services needed at this time  Health Care Screening: Age-appropriate preventive services recommended by USPTF and ACIP covered by Medicare were discussed today. Services ordered if indicated and agreed upon by the patient.  Referrals offered: Community-based lifestyle interventions to reduce health risks and promote self-management and wellness, fall prevention, nutrition, physical activity, tobacco-use cessation, weight loss, and mental health services as per orders if indicated.    Discussion today about general wellness and lifestyle habits:  discussed  Prevent falls and reduce trip hazards; Cautioned about securing or removing rugs.  Have a working fire alarm and carbon monoxide detector; has  Engage in regular physical activity and social activities     Follow-up: Return in about 3 months (around 12/15/2022), or if symptoms worsen or fail to improve.

## 2022-09-18 LAB
BACTERIA UR CULT: NORMAL
SIGNIFICANT IND 70042: NORMAL
SITE SITE: NORMAL
SOURCE SOURCE: NORMAL

## 2022-10-07 ENCOUNTER — TELEPHONE (OUTPATIENT)
Dept: NEUROLOGY | Facility: MEDICAL CENTER | Age: 72
End: 2022-10-07
Payer: MEDICARE

## 2022-10-07 NOTE — TELEPHONE ENCOUNTER
10/07/22  Spoke with Alicia at [facility] and she stated that because the LP is being done in office that no prior authorization is needed. HL

## 2022-10-11 ENCOUNTER — OFFICE VISIT (OUTPATIENT)
Dept: MEDICAL GROUP | Facility: MEDICAL CENTER | Age: 72
End: 2022-10-11
Payer: MEDICARE

## 2022-10-11 VITALS
SYSTOLIC BLOOD PRESSURE: 146 MMHG | TEMPERATURE: 97.4 F | DIASTOLIC BLOOD PRESSURE: 76 MMHG | HEIGHT: 66 IN | RESPIRATION RATE: 16 BRPM | BODY MASS INDEX: 43.17 KG/M2 | WEIGHT: 268.6 LBS | OXYGEN SATURATION: 93 % | HEART RATE: 77 BPM

## 2022-10-11 DIAGNOSIS — E03.4 HYPOTHYROIDISM DUE TO ACQUIRED ATROPHY OF THYROID: ICD-10-CM

## 2022-10-11 DIAGNOSIS — R52 BODY ACHES: ICD-10-CM

## 2022-10-11 DIAGNOSIS — M25.551 BILATERAL HIP PAIN: ICD-10-CM

## 2022-10-11 DIAGNOSIS — M25.552 BILATERAL HIP PAIN: ICD-10-CM

## 2022-10-11 DIAGNOSIS — R05.1 ACUTE COUGH: ICD-10-CM

## 2022-10-11 DIAGNOSIS — U07.1 POSITIVE SELF-ADMINISTERED ANTIGEN TEST FOR COVID-19: ICD-10-CM

## 2022-10-11 DIAGNOSIS — I10 ESSENTIAL HYPERTENSION: ICD-10-CM

## 2022-10-11 DIAGNOSIS — M1A.0790 CHRONIC GOUT OF FOOT, UNSPECIFIED CAUSE, UNSPECIFIED LATERALITY: ICD-10-CM

## 2022-10-11 DIAGNOSIS — R09.81 SINUS CONGESTION: ICD-10-CM

## 2022-10-11 DIAGNOSIS — G61.9 INFLAMMATORY NEUROPATHY (HCC): ICD-10-CM

## 2022-10-11 DIAGNOSIS — Z86.16 HISTORY OF 2019 NOVEL CORONAVIRUS DISEASE (COVID-19): ICD-10-CM

## 2022-10-11 LAB
EXTERNAL QUALITY CONTROL: NORMAL
INT CON NEG: NEGATIVE
INT CON POS: POSITIVE
SARS-COV+SARS-COV-2 AG RESP QL IA.RAPID: NEGATIVE

## 2022-10-11 PROCEDURE — 87426 SARSCOV CORONAVIRUS AG IA: CPT | Performed by: FAMILY MEDICINE

## 2022-10-11 PROCEDURE — 99214 OFFICE O/P EST MOD 30 MIN: CPT | Mod: CS | Performed by: FAMILY MEDICINE

## 2022-10-11 RX ORDER — TRAMADOL HYDROCHLORIDE 50 MG/1
50 TABLET ORAL EVERY 6 HOURS PRN
Qty: 120 TABLET | Refills: 2 | Status: SHIPPED
Start: 2022-10-11 | End: 2022-12-20

## 2022-10-11 RX ORDER — LEVOTHYROXINE SODIUM 0.15 MG/1
150 TABLET ORAL
Qty: 90 TABLET | Refills: 3 | Status: SHIPPED
Start: 2022-10-11 | End: 2023-01-23

## 2022-10-11 ASSESSMENT — FIBROSIS 4 INDEX: FIB4 SCORE: 2.04

## 2022-10-11 NOTE — PROGRESS NOTES
Chief Complaint   Patient presents with    Coronavirus Screening     Positive COVID-19 test x1.5w ago. Pt states she has a cough, some congestion, body aches, and fever.        Subjective:     HPI:   Ashly Meyer presents today with the followin. Acute cough/Sinus congestion/Body aches  Patient continues to have cough that he has had cough and congestion as well as body aches for about 10 days.    2. Positive self-administered antigen test for COVID-19/History of 2019 novel coronavirus disease (COVID-19)  She did have a positive COVID test 10 days ago.  Point-of-care COVID test administered today is negative.    3. Essential hypertension  HTN - Chronic condition stable. Currently taking all meds as directed.   She is not taking baby aspirin daily.   She is not monitoring BP at home.  Blood pressure today is slightly high, patient attributes this to persistent cough.  Denies symptoms low BP: light-headed, tunnel-vision, unusual fatigue.   Denies symptoms high BP:pounding headache, visual changes, palpitations, flushed face.   Denies medicine side effects: unusual fatigue, slow heartbeat, foot/leg swelling, cough.    4. Chronic gout of foot, unspecified cause, unspecified laterality  Patient requested a renewal on her tramadol.  She uses this for her chronic foot pain part of which is gout associated but also arthritis and inflammatory neuropathy.  Patient does take the nabumetone.  However, she decided to stop her allopurinol as she felt it was not helpful.    5. Inflammatory neuropathy (HCC)  Patient has longstanding small fiber neuropathy, diagnosed after extensive testing by neurology.  She is on duloxetine as well as tramadol for this issue.  She continues her gabapentin as well.  The tramadol is helpful she states bringing her pain down from a 7 or 8 to a 5 or 6, sometimes a little bit better.  Denies somnolence or confusion from the regimen.  Notes mild dry mouth but no significant  constipation.    6. Bilateral hip pain  Patient has bilateral hip pain which is a combination of osteoarthritic and degenerative changes in the hip joint but also trochanteric bursitis.  Patient continues to nabumetone as well as the tramadol and finds the combination to be helpful.  PDMP review shows no inconsistencies and the medication is renewed.    7. Hypothyroidism due to acquired atrophy of thyroid  Patient reports continued fatigue and malaise on the medication. Patient does have insomnia and significant  tremor.  Patient is taking the medication on an empty stomach in the morning and waiting at least 30 minutes before eating.  Last TSH in August was significantly below target.  Adjusted medication to 150 mcg per day.  We will recheck a TSH in 3 to 4 months.        Patient Active Problem List    Diagnosis Date Noted    History of 2019 novel coronavirus disease (COVID-19) 10/11/2022    Positive self-administered antigen test for COVID-19 10/11/2022    Trochanteric bursitis of both hips 03/03/2022    Mild persistent asthma, uncomplicated 03/03/2022    Incomplete rectal prolapse 09/03/2021    Benign essential tremor 09/03/2021    Chronic respiratory failure with hypoxia ([facility]) 06/03/2021    Controlled type 2 diabetes mellitus without complication, without long-term current use of insulin ([facility]) 06/12/2019    Chronic gout of foot 04/30/2019    Dry eye syndrome, bilateral     Rotator cuff syndrome of right shoulder and allied disorders 10/10/2018    Vitamin D deficiency 12/28/2017    Morbid obesity with BMI of 40.0-44.9, adult ([facility])     Neural foraminal stenosis of cervical spine 05/18/2017    Tinnitus of both ears 05/18/2017    Dyslipidemia, goal LDL below 130 01/06/2017    Nonalcoholic fatty liver disease 01/05/2017    Menopausal symptoms 04/28/2016    Lumbar facet arthropathy 12/09/2015    Candidal intertrigo 06/01/2015    Elevated sed rate 12/09/2014    Osteoarthritis of left thumb 07/23/2014    H/O  fibromyalgia 03/11/2014    KERRI (obstructive sleep apnea) 03/11/2014    Peripheral neuropathy     Rectocele 11/25/2013    Pelvic floor dysfunction     Chronic constipation 10/02/2013    Nocturnal hypoxia     Lumbar radiculopathy 11/01/2011    Migraine without aura     Seasonal allergies 07/08/2011    GERD (gastroesophageal reflux disease) 12/10/2010    Osteoarthritis of multiple joints 04/13/2010    Eczema, dyshidrotic 08/03/2009    Essential hypertension 05/20/2009    Hypothyroidism due to acquired atrophy of thyroid 05/20/2009       Current medicines (including changes today)  Current Outpatient Medications   Medication Sig Dispense Refill    traMADol (ULTRAM) 50 MG Tab Take 1 Tablet by mouth every 6 hours as needed for Moderate Pain for up to 90 days. 120 tablets is a 30 day quantity 120 Tablet 2    levothyroxine (SYNTHROID) 150 MCG Tab Take 1 Tablet by mouth every morning on an empty stomach. 90 Tablet 3    nabumetone (RELAFEN) 750 MG Tab Take 1 Tablet by mouth 2 times a day with meals. 180 Tablet 3    Misc. Devices Misc Referral to Nona Valenzuela DDS in [place] for mouth device for Obstructive Sleep Apnea 1 Each 1    gabapentin (NEURONTIN) 800 MG tablet Take 800 mg by mouth 2 times a day.      pantoprazole (PROTONIX) 40 MG Tablet Delayed Response Take 1 Tablet by mouth 2 times a day. 180 Tablet 0    minocycline (MINOCIN) 50 MG Cap       SOOLANTRA 1 % Cream       amLODIPine (NORVASC) 2.5 MG Tab Take 1 Tablet by mouth every day. 90 Tablet 2    DULoxetine (CYMBALTA) 60 MG Cap DR Particles delayed-release capsule Take 1 Capsule by mouth every evening. 90 Capsule 3    potassium chloride SA (KDUR) 20 MEQ Tab CR Take 1 Tablet by mouth 2 times a day. 180 Tablet 2    albuterol 108 (90 Base) MCG/ACT Aero Soln inhalation aerosol Inhale 2 Puffs every 6 hours as needed for Shortness of Breath. 8.5 g 11    Misc. Devices Misc Referral to Nona Valenzuela DDS for mouth device for Obstructive Sleep Apnea. 1 Each 1    QUEtiapine (SEROQUEL)  "25 MG Tab Take 1 Tablet by mouth at bedtime. 90 Tablet 3    furosemide (LASIX) 20 MG Tab Take 1 Tablet by mouth every morning. 90 Tablet 3    Cholecalciferol (VITAMIN D-3) 125 MCG (5000 UT) Tab Take 5,000 Units by mouth every evening.      vitamin E 180 mg (400 units) Cap Take 180 mg by mouth every morning.       No current facility-administered medications for this visit.       Allergies   Allergen Reactions    Ace Inhibitors Cough    Benadryl Allergy Hives     Hot skin itching    Iodine Shortness of Breath and Unspecified     Labored breathing- Occasionally can take it    Lamictal Rash and Swelling     \"hot\", unable to function.  Severe rash, scarring    Savella [Kdc:Milnacipran+Ci Pigment Blue 63] Nausea, Swelling and Anxiety     Swelling, bone and muscle pain, sweating, nausea, dizziness, sleeplessness, anxiety    Savella [Milnacipran Hcl] Unspecified     Muscle aches, feet swelling    Sulfa Drugs Hives, Shortness of Breath and Anxiety     Hard breathing, shortness of breath    Butalbital-Acetaminophen Unspecified     Dizzy, can't walk, hard to focus    Cefaclor Nausea and Unspecified     Ceclor causes light headedness and dizziness    Morphine Itching     \"Redness\"    Penicillins Itching and Swelling     Skin itching and redness    Tizanidine Hcl Nausea     \"Dizziness\" hard to focus    Milnacipran Unspecified    Amlactin Rash and Itching     Lotion causes itching, redness and burnng    Tape Rash and Itching     Skin tears, paper tape is OK per Pt       ROS: As per HPI       Objective:     BP (!) 146/76 (BP Location: Right arm, Patient Position: Sitting, BP Cuff Size: Large adult)   Pulse 77   Temp 36.3 °C (97.4 °F) (Temporal)   Resp 16   Ht 1.676 m (5' 6\")   Wt 122 kg (268 lb 9.6 oz)   SpO2 93%  Body mass index is 43.35 kg/m².    Physical Exam:  Constitutional: Well-developed and well-nourished. Not diaphoretic. No distress. Lucid and fluent.  Patient, physician and staff all wearing masks.  Skin: Skin is " warm and dry. No rash noted.  Head: Atraumatic without lesions.  Eyes: Conjunctivae and extraocular motions are normal. Pupils are equal, round, and reactive to light. No scleral icterus.   Ears:  External ears unremarkable. Tympanic membranes clear and intact.  Nose: Nares patent. Mucosa without edema or erythema. No discharge. No facial tenderness.  Mouth/Throat: Tongue normal. Oropharynx is clear and moist. Posterior pharynx mild erythema, no exudates.  Neck: Supple, trachea midline. No thyromegaly present. No cervical or supraclavicular lymphadenopathy. No JVD or carotid bruits appreciated  Cardiovascular: Regular rate and rhythm.  Normal S1, S2 without murmur appreciated.  Chest: Effort normal. Clear to auscultation throughout. No adventitious sounds.   Extremities: No cyanosis, clubbing, erythema, nor edema.   Neurological: Alert and oriented x 3. Stable coarse tremor.  Psychiatric:  Behavior, mood, and affect are appropriate.       Assessment and Plan:     72 y.o. female with the following issues:    1. Acute cough  POCT SARS-COV Antigen ALFREDO (Symptomatic Only)      2. Sinus congestion  POCT SARS-COV Antigen ALFREDO (Symptomatic Only)      3. Body aches  POCT SARS-COV Antigen ALFREDO (Symptomatic Only)      4. Positive self-administered antigen test for COVID-19  POCT SARS-COV Antigen ALFREDO (Symptomatic Only)      5. History of 2019 novel coronavirus disease (COVID-19)        6. Essential hypertension        7. Chronic gout of foot, unspecified cause, unspecified laterality  traMADol (ULTRAM) 50 MG Tab    Consent for Opiate Prescription      8. Inflammatory neuropathy (HCC)  traMADol (ULTRAM) 50 MG Tab    Consent for Opiate Prescription      9. Bilateral hip pain  traMADol (ULTRAM) 50 MG Tab    Consent for Opiate Prescription      10. Hypothyroidism due to acquired atrophy of thyroid  levothyroxine (SYNTHROID) 150 MCG Tab        Consequences of Chronic Opiate therapy:  (5 A's)  Analgesia:  significantly  improved  Activity:  improved  Adverse Events: None reported or observed  Aberrant Behaviors: None reported or observed  Affect/Mood: good grooming, full facial expressions, normal speech pattern and content, normal thought patterns, normal perception, good insight, normal reasoning  Last CMP:   August this year, essentially normal  Appropriate Imaging done:   Yes and extensive testing        Followup: Return in about 2 months (around 12/14/2022), or if symptoms worsen or fail to improve.

## 2022-10-27 ENCOUNTER — HOSPITAL ENCOUNTER (OUTPATIENT)
Facility: MEDICAL CENTER | Age: 72
End: 2022-10-27
Attending: STUDENT IN AN ORGANIZED HEALTH CARE EDUCATION/TRAINING PROGRAM
Payer: MEDICARE

## 2022-10-27 PROCEDURE — 87186 SC STD MICRODIL/AGAR DIL: CPT

## 2022-10-27 PROCEDURE — 87077 CULTURE AEROBIC IDENTIFY: CPT

## 2022-10-27 PROCEDURE — 87086 URINE CULTURE/COLONY COUNT: CPT

## 2022-10-28 DIAGNOSIS — K21.9 GASTROESOPHAGEAL REFLUX DISEASE WITHOUT ESOPHAGITIS: ICD-10-CM

## 2022-10-30 DIAGNOSIS — A49.8 ENTEROCOCCUS FAECALIS INFECTION: ICD-10-CM

## 2022-10-30 RX ORDER — NITROFURANTOIN 25; 75 MG/1; MG/1
100 CAPSULE ORAL 2 TIMES DAILY
Qty: 28 CAPSULE | Refills: 0 | Status: SHIPPED | OUTPATIENT
Start: 2022-10-30 | End: 2022-12-14 | Stop reason: SDUPTHER

## 2022-10-30 RX ORDER — PANTOPRAZOLE SODIUM 40 MG/1
TABLET, DELAYED RELEASE ORAL
Qty: 180 TABLET | Refills: 0 | Status: SHIPPED | OUTPATIENT
Start: 2022-10-30 | End: 2022-12-14 | Stop reason: SDUPTHER

## 2022-11-03 ENCOUNTER — PATIENT MESSAGE (OUTPATIENT)
Dept: HEALTH INFORMATION MANAGEMENT | Facility: OTHER | Age: 72
End: 2022-11-03

## 2022-11-16 ENCOUNTER — HOSPITAL ENCOUNTER (OUTPATIENT)
Dept: LAB | Facility: MEDICAL CENTER | Age: 72
End: 2022-11-16
Attending: PSYCHIATRY & NEUROLOGY
Payer: MEDICARE

## 2022-11-16 DIAGNOSIS — G91.9 ACQUIRED HYDROCEPHALUS (HCC): ICD-10-CM

## 2022-11-16 DIAGNOSIS — R79.1 ABNORMAL COAGULATION PROFILE: ICD-10-CM

## 2022-11-16 DIAGNOSIS — Z01.812 PRE-PROCEDURE LAB EXAM: ICD-10-CM

## 2022-11-16 LAB
APTT PPP: 34.2 SEC (ref 24.7–36)
ERYTHROCYTE [DISTWIDTH] IN BLOOD BY AUTOMATED COUNT: 48.9 FL (ref 35.9–50)
HCT VFR BLD AUTO: 39.9 % (ref 37–47)
HGB BLD-MCNC: 12.6 G/DL (ref 12–16)
INR PPP: 1.07 (ref 0.87–1.13)
MCH RBC QN AUTO: 28.7 PG (ref 27–33)
MCHC RBC AUTO-ENTMCNC: 31.6 G/DL (ref 33.6–35)
MCV RBC AUTO: 90.9 FL (ref 81.4–97.8)
PLATELET # BLD AUTO: 197 K/UL (ref 164–446)
PMV BLD AUTO: 10.8 FL (ref 9–12.9)
PROTHROMBIN TIME: 13.8 SEC (ref 12–14.6)
RBC # BLD AUTO: 4.39 M/UL (ref 4.2–5.4)
WBC # BLD AUTO: 9.5 K/UL (ref 4.8–10.8)

## 2022-11-16 PROCEDURE — 36415 COLL VENOUS BLD VENIPUNCTURE: CPT

## 2022-11-16 PROCEDURE — 85730 THROMBOPLASTIN TIME PARTIAL: CPT

## 2022-11-16 PROCEDURE — 85027 COMPLETE CBC AUTOMATED: CPT

## 2022-11-16 PROCEDURE — 85610 PROTHROMBIN TIME: CPT

## 2022-11-18 ENCOUNTER — OFFICE VISIT (OUTPATIENT)
Dept: NEUROLOGY | Facility: MEDICAL CENTER | Age: 72
End: 2022-11-18
Attending: PSYCHIATRY & NEUROLOGY
Payer: COMMERCIAL

## 2022-11-18 VITALS
HEART RATE: 72 BPM | SYSTOLIC BLOOD PRESSURE: 138 MMHG | TEMPERATURE: 98.3 F | DIASTOLIC BLOOD PRESSURE: 72 MMHG | BODY MASS INDEX: 43.08 KG/M2 | OXYGEN SATURATION: 92 % | RESPIRATION RATE: 16 BRPM | WEIGHT: 268.08 LBS | HEIGHT: 66 IN

## 2022-11-18 DIAGNOSIS — G91.9 ACQUIRED HYDROCEPHALUS (HCC): ICD-10-CM

## 2022-11-18 PROCEDURE — 62270 DX LMBR SPI PNXR: CPT | Performed by: PSYCHIATRY & NEUROLOGY

## 2022-11-18 ASSESSMENT — FIBROSIS 4 INDEX: FIB4 SCORE: 1.91

## 2022-11-18 NOTE — PROGRESS NOTES
Acquired hydrocephalus (HCC) [G91.9]          Reason for procedure; Acquired Hydrocephalus- question of NPH or not.  To remove 36 cc's or so of CSF to determine if walking improves or not in the 1st 24-48 hours. This was discretely discussed with Ashly and her daughter today.     PT,PTT,CBC recently done- normal.     Risks of Post LP Headaches and potential need for 1 or more blood patches reviewed with Ashly and daughter if Ashly would develop persisting or severe post lumbar puncture headaches which usually are posturally evoked upon standing and lessened upon laying down.     Lumbar Puncture     Date/Time: 11/18/2022 1:52 PM  Performed by: Jeffy June M.D.  Authorized by: Jeffy June M.D.      Consent:     Consent obtained:  Written    Consent given by:  Patient    Risks discussed:  Bleeding, infection, pain, repeat procedure, nerve damage and headache    Alternatives discussed:  No treatment, delayed treatment and alternative treatment  Pre-procedure details:     Procedure purpose:  Diagnostic  Sedation:     Sedation type:  None  Anesthesia:     Anesthesia method:  Local infiltration    Local anesthetic:  Lidocaine 1% w/o epi  Procedure details:     Lumbar space:  L3-L4 interspace    Patient position:  L lateral decubitus    Needle gauge:  22    Needle type:  Spinal needle - Quincke tip    Needle length (in):  5.0    Number of attempts:  5 or more  Post-procedure:     Puncture site:  Adhesive bandage applied    Patient tolerance of procedure:  Tolerated with difficulty  Comments:      5 separate needle attempts at 2 levels (L3-4 and L4-5).  Difficulty positioning patient with knees to chest due to significant truncal obesity.  2 times with needle insertion- brief neuritic pain felt at left hip area so needle withdrawal and   Pain went away immediately.        Unable to get fluid despite numerous attempts at 2 levels.     Recommend that LARGE VOLUME CSF REMOVAL BE DONE WITH INTERVENTIONAL RADIOLOGY to  better gauge spinal anatomy.     I told daughter of patient that I would be placing referral to the Interventional Radiology Department at [facility] - this is NOT an urgent procedure to do for Ashly.     My recommendations are to try and get around 36 cc's  (36 to 40 ccs)  of CSF and then send some of the CSF to the lab and do a random blood glucose test after the procedure.      Patient/daughter advised by me to email me with results of walking (improvement or not in this ability) about 2-3 days after CSF is removed.

## 2022-11-18 NOTE — PROCEDURES
Reason for procedure; Acquired Hydrocephalus- question of NPH or not.  To remove 36 cc's or so of CSF to determine if walking improves or not in the 1st 24-48 hours. This was discretely discussed with Ashly and her daughter today.    PT,PTT,CBC recently done- normal.    Risks of Post LP Headaches and potential need for 1 or more blood patches reviewed with Ashly and daughter if Ashly would develop persisting or severe post lumbar puncture headaches which usually are posturally evoked upon standing and lessened upon laying down.    Lumbar Puncture    Date/Time: 11/18/2022 1:52 PM  Performed by: Jeffy June M.D.  Authorized by: Jeffy June M.D.     Consent:     Consent obtained:  Written    Consent given by:  Patient    Risks discussed:  Bleeding, infection, pain, repeat procedure, nerve damage and headache    Alternatives discussed:  No treatment, delayed treatment and alternative treatment  Pre-procedure details:     Procedure purpose:  Diagnostic  Sedation:     Sedation type:  None  Anesthesia:     Anesthesia method:  Local infiltration    Local anesthetic:  Lidocaine 1% w/o epi  Procedure details:     Lumbar space:  L3-L4 interspace    Patient position:  L lateral decubitus    Needle gauge:  22    Needle type:  Spinal needle - Quincke tip    Needle length (in):  5.0    Number of attempts:  5 or more  Post-procedure:     Puncture site:  Adhesive bandage applied    Patient tolerance of procedure:  Tolerated with difficulty  Comments:      5 separate needle attempts at 2 levels (L3-4 and L4-5).  Difficulty positioning patient with knees to chest due to significant truncal obesity.  2 times with needle insertion- brief neuritic pain felt at left hip area so needle withdrawal and   Pain went away immediately.      Unable to get fluid despite numerous attempts at 2 levels.    Recommend that LARGE VOLUME CSF REMOVAL BE DONE WITH INTERVENTIONAL RADIOLOGY to better gauge spinal anatomy.    I told daughter of  patient that I would be placing referral to the Interventional Radiology Department at [facility] - this is NOT an urgent procedure to do for Ashly.    My recommendations are to try and get around 36 cc's of CSF and then send some of the CSF to the lab and do a random blood glucose test after the procedure.

## 2022-12-09 DIAGNOSIS — F33.41 RECURRENT MAJOR DEPRESSIVE DISORDER, IN PARTIAL REMISSION (HCC): ICD-10-CM

## 2022-12-12 RX ORDER — QUETIAPINE FUMARATE 25 MG/1
TABLET, FILM COATED ORAL
Qty: 90 TABLET | Refills: 0 | Status: SHIPPED | OUTPATIENT
Start: 2022-12-12 | End: 2022-12-14 | Stop reason: SDUPTHER

## 2022-12-14 ENCOUNTER — HOSPITAL ENCOUNTER (OUTPATIENT)
Dept: LAB | Facility: MEDICAL CENTER | Age: 72
End: 2022-12-14
Attending: FAMILY MEDICINE
Payer: MEDICARE

## 2022-12-14 ENCOUNTER — OFFICE VISIT (OUTPATIENT)
Dept: MEDICAL GROUP | Facility: MEDICAL CENTER | Age: 72
End: 2022-12-14
Payer: MEDICARE

## 2022-12-14 VITALS
DIASTOLIC BLOOD PRESSURE: 80 MMHG | HEART RATE: 89 BPM | HEIGHT: 66 IN | TEMPERATURE: 98.5 F | SYSTOLIC BLOOD PRESSURE: 146 MMHG | WEIGHT: 261.02 LBS | OXYGEN SATURATION: 97 % | BODY MASS INDEX: 41.95 KG/M2

## 2022-12-14 DIAGNOSIS — K21.9 GASTROESOPHAGEAL REFLUX DISEASE WITHOUT ESOPHAGITIS: ICD-10-CM

## 2022-12-14 DIAGNOSIS — E87.6 HYPOKALEMIA: ICD-10-CM

## 2022-12-14 DIAGNOSIS — I10 ESSENTIAL HYPERTENSION: ICD-10-CM

## 2022-12-14 DIAGNOSIS — R23.2 FLUSHING: ICD-10-CM

## 2022-12-14 DIAGNOSIS — F33.41 RECURRENT MAJOR DEPRESSIVE DISORDER, IN PARTIAL REMISSION (HCC): ICD-10-CM

## 2022-12-14 DIAGNOSIS — R51.9 TEMPORAL PAIN: ICD-10-CM

## 2022-12-14 DIAGNOSIS — A49.8 ENTEROCOCCUS FAECALIS INFECTION: ICD-10-CM

## 2022-12-14 LAB
ALBUMIN SERPL BCP-MCNC: 4.4 G/DL (ref 3.2–4.9)
ALBUMIN/GLOB SERPL: 1.3 G/DL
ALP SERPL-CCNC: 137 U/L (ref 30–99)
ALT SERPL-CCNC: 21 U/L (ref 2–50)
ANION GAP SERPL CALC-SCNC: 14 MMOL/L (ref 7–16)
AST SERPL-CCNC: 21 U/L (ref 12–45)
BASOPHILS # BLD AUTO: 0.5 % (ref 0–1.8)
BASOPHILS # BLD: 0.05 K/UL (ref 0–0.12)
BILIRUB SERPL-MCNC: 0.5 MG/DL (ref 0.1–1.5)
BUN SERPL-MCNC: 26 MG/DL (ref 8–22)
CALCIUM ALBUM COR SERPL-MCNC: 9.6 MG/DL (ref 8.5–10.5)
CALCIUM SERPL-MCNC: 9.9 MG/DL (ref 8.5–10.5)
CHLORIDE SERPL-SCNC: 103 MMOL/L (ref 96–112)
CO2 SERPL-SCNC: 24 MMOL/L (ref 20–33)
CREAT SERPL-MCNC: 0.99 MG/DL (ref 0.5–1.4)
EOSINOPHIL # BLD AUTO: 0.31 K/UL (ref 0–0.51)
EOSINOPHIL NFR BLD: 3.2 % (ref 0–6.9)
ERYTHROCYTE [DISTWIDTH] IN BLOOD BY AUTOMATED COUNT: 50 FL (ref 35.9–50)
ERYTHROCYTE [SEDIMENTATION RATE] IN BLOOD BY WESTERGREN METHOD: 20 MM/HOUR (ref 0–25)
GFR SERPLBLD CREATININE-BSD FMLA CKD-EPI: 60 ML/MIN/1.73 M 2
GLOBULIN SER CALC-MCNC: 3.4 G/DL (ref 1.9–3.5)
GLUCOSE SERPL-MCNC: 104 MG/DL (ref 65–99)
HCT VFR BLD AUTO: 43.7 % (ref 37–47)
HGB BLD-MCNC: 13.8 G/DL (ref 12–16)
IMM GRANULOCYTES # BLD AUTO: 0.05 K/UL (ref 0–0.11)
IMM GRANULOCYTES NFR BLD AUTO: 0.5 % (ref 0–0.9)
LYMPHOCYTES # BLD AUTO: 2.44 K/UL (ref 1–4.8)
LYMPHOCYTES NFR BLD: 25 % (ref 22–41)
MCH RBC QN AUTO: 28.8 PG (ref 27–33)
MCHC RBC AUTO-ENTMCNC: 31.6 G/DL (ref 33.6–35)
MCV RBC AUTO: 91.2 FL (ref 81.4–97.8)
MONOCYTES # BLD AUTO: 0.8 K/UL (ref 0–0.85)
MONOCYTES NFR BLD AUTO: 8.2 % (ref 0–13.4)
NEUTROPHILS # BLD AUTO: 6.1 K/UL (ref 2–7.15)
NEUTROPHILS NFR BLD: 62.6 % (ref 44–72)
NRBC # BLD AUTO: 0 K/UL
NRBC BLD-RTO: 0 /100 WBC
PLATELET # BLD AUTO: 204 K/UL (ref 164–446)
PMV BLD AUTO: 11.3 FL (ref 9–12.9)
POTASSIUM SERPL-SCNC: 4.2 MMOL/L (ref 3.6–5.5)
PROT SERPL-MCNC: 7.8 G/DL (ref 6–8.2)
RBC # BLD AUTO: 4.79 M/UL (ref 4.2–5.4)
SODIUM SERPL-SCNC: 141 MMOL/L (ref 135–145)
WBC # BLD AUTO: 9.8 K/UL (ref 4.8–10.8)

## 2022-12-14 PROCEDURE — 36415 COLL VENOUS BLD VENIPUNCTURE: CPT

## 2022-12-14 PROCEDURE — 85025 COMPLETE CBC W/AUTO DIFF WBC: CPT

## 2022-12-14 PROCEDURE — 85652 RBC SED RATE AUTOMATED: CPT

## 2022-12-14 PROCEDURE — 99214 OFFICE O/P EST MOD 30 MIN: CPT | Performed by: FAMILY MEDICINE

## 2022-12-14 PROCEDURE — 80053 COMPREHEN METABOLIC PANEL: CPT

## 2022-12-14 RX ORDER — POTASSIUM CHLORIDE 20 MEQ/1
20 TABLET, EXTENDED RELEASE ORAL 2 TIMES DAILY
Qty: 180 TABLET | Refills: 2 | Status: SHIPPED
Start: 2022-12-14 | End: 2023-03-13

## 2022-12-14 RX ORDER — AMLODIPINE BESYLATE 2.5 MG/1
2.5 TABLET ORAL DAILY
Qty: 90 TABLET | Refills: 3 | Status: ON HOLD
Start: 2022-12-14 | End: 2022-12-29

## 2022-12-14 RX ORDER — PANTOPRAZOLE SODIUM 40 MG/1
40 TABLET, DELAYED RELEASE ORAL 2 TIMES DAILY
Qty: 180 TABLET | Refills: 3 | Status: SHIPPED | OUTPATIENT
Start: 2022-12-14 | End: 2023-03-13 | Stop reason: SDUPTHER

## 2022-12-14 RX ORDER — NITROFURANTOIN 25; 75 MG/1; MG/1
100 CAPSULE ORAL 2 TIMES DAILY
Qty: 28 CAPSULE | Refills: 0 | Status: ON HOLD
Start: 2022-12-14 | End: 2022-12-29

## 2022-12-14 RX ORDER — VIBEGRON 75 MG/1
TABLET, FILM COATED ORAL
COMMUNITY
Start: 2022-11-28 | End: 2022-12-20

## 2022-12-14 RX ORDER — QUETIAPINE FUMARATE 25 MG/1
25 TABLET, FILM COATED ORAL
Qty: 90 TABLET | Refills: 3 | Status: SHIPPED | OUTPATIENT
Start: 2022-12-14 | End: 2024-03-18

## 2022-12-14 ASSESSMENT — FIBROSIS 4 INDEX: FIB4 SCORE: 1.91

## 2022-12-14 NOTE — PROGRESS NOTES
Chief Complaint   Patient presents with    Diabetes Follow-up    Medication Management       Subjective:     HPI:   Ashly Meyer presents today with the followin. Temporal pain  Recurrence of temporal pain.  Past biopsies were negative.  Has had a high sed rate in the past.  Lab order discussed and placed.    2. Hypokalemia  I have renewed her potassium.  The pills are quite large.  She knows that she can break them and states she really has very little trouble swallowing the pills.  Follow-up lab orders discussed and placed.    3. Essential hypertension  Patient continues her amlodipine and the rest of her regimen.  She cannot tolerate a larger dose of amlodipine as she gets significant edema.  Blood pressure today is not ideal.  Follow-up lab order discussed and placed.    4. Recurrent major depressive disorder, in partial remission (HCC)  The Seroquel does continue to help her to sleep at night.  She does have some complex depression and anxiety with occasional mood swings.    5. Flushing  The amlodipine seems to help with her flushing as well and is continued.    6. Gastroesophageal reflux disease without esophagitis  She is on pantoprazole twice daily per GI.  Renewal is placed.  Patient states this continues to be helpful.    7. Enterococcus faecalis infection  Patient did have some improvement with the first course of treatment of nitrofurantoin for the Enterococcus.  However, her symptoms have recurred.  I am putting her back on the antibiotics.  She will let me know if her symptoms do not improve.        Patient Active Problem List    Diagnosis Date Noted    Temporal pain 2022    History of 2019 novel coronavirus disease (COVID-19) 10/11/2022    Positive self-administered antigen test for COVID-19 10/11/2022    Trochanteric bursitis of both hips 2022    Mild persistent asthma, uncomplicated 2022    Incomplete rectal prolapse 2021    Benign essential tremor 2021     Chronic respiratory failure with hypoxia ([facility]) 06/03/2021    Controlled type 2 diabetes mellitus without complication, without long-term current use of insulin ([facility]) 06/12/2019    Chronic gout of foot 04/30/2019    Dry eye syndrome, bilateral     Rotator cuff syndrome of right shoulder and allied disorders 10/10/2018    Vitamin D deficiency 12/28/2017    Morbid obesity with BMI of 40.0-44.9, adult ([facility])     Neural foraminal stenosis of cervical spine 05/18/2017    Tinnitus of both ears 05/18/2017    Dyslipidemia, goal LDL below 130 01/06/2017    Nonalcoholic fatty liver disease 01/05/2017    Menopausal symptoms 04/28/2016    Lumbar facet arthropathy 12/09/2015    Candidal intertrigo 06/01/2015    Elevated sed rate 12/09/2014    Osteoarthritis of left thumb 07/23/2014    H/O fibromyalgia 03/11/2014    KERRI (obstructive sleep apnea) 03/11/2014    Peripheral neuropathy     Rectocele 11/25/2013    Pelvic floor dysfunction     Chronic constipation 10/02/2013    Nocturnal hypoxia     Lumbar radiculopathy 11/01/2011    Migraine without aura     Seasonal allergies 07/08/2011    GERD (gastroesophageal reflux disease) 12/10/2010    Osteoarthritis of multiple joints 04/13/2010    Eczema, dyshidrotic 08/03/2009    Essential hypertension 05/20/2009    Hypothyroidism due to acquired atrophy of thyroid 05/20/2009       Current medicines (including changes today)  Current Outpatient Medications   Medication Sig Dispense Refill    GEMTESA 75 MG Tab       potassium chloride SA (KDUR) 20 MEQ Tab CR Take 1 Tablet by mouth 2 times a day. 180 Tablet 2    QUEtiapine (SEROQUEL) 25 MG Tab Take 1 Tablet by mouth at bedtime. 90 Tablet 3    pantoprazole (PROTONIX) 40 MG Tablet Delayed Response Take 1 Tablet by mouth 2 times a day. 180 Tablet 3    amLODIPine (NORVASC) 2.5 MG Tab Take 1 Tablet by mouth every day. 90 Tablet 3    nitrofurantoin (MACROBID) 100 MG Cap Take 1 Capsule by mouth 2 times a day for 14 days. 28 Capsule 0    traMADol  "(ULTRAM) 50 MG Tab Take 1 Tablet by mouth every 6 hours as needed for Moderate Pain for up to 90 days. 120 tablets is a 30 day quantity 120 Tablet 2    levothyroxine (SYNTHROID) 150 MCG Tab Take 1 Tablet by mouth every morning on an empty stomach. 90 Tablet 3    nabumetone (RELAFEN) 750 MG Tab Take 1 Tablet by mouth 2 times a day with meals. 180 Tablet 3    Misc. Devices Misc Referral to Nona Valenzuela DDS in [place] for mouth device for Obstructive Sleep Apnea 1 Each 1    gabapentin (NEURONTIN) 800 MG tablet Take 1 Tablet by mouth 2 times a day.      SOOLANTRA 1 % Cream       DULoxetine (CYMBALTA) 60 MG Cap DR Particles delayed-release capsule Take 1 Capsule by mouth every evening. 90 Capsule 3    Misc. Devices Misc Referral to Nona Valenzuela DDS for mouth device for Obstructive Sleep Apnea. 1 Each 1    furosemide (LASIX) 20 MG Tab Take 1 Tablet by mouth every morning. 90 Tablet 3    Cholecalciferol (VITAMIN D-3) 125 MCG (5000 UT) Tab Take 5,000 Units by mouth every evening.      vitamin E 180 mg (400 units) Cap Take 1 Capsule by mouth every morning.      minocycline (MINOCIN) 50 MG Cap  (Patient not taking: Reported on 11/18/2022)      albuterol 108 (90 Base) MCG/ACT Aero Soln inhalation aerosol Inhale 2 Puffs every 6 hours as needed for Shortness of Breath. (Patient not taking: Reported on 11/18/2022) 8.5 g 11     No current facility-administered medications for this visit.       Allergies   Allergen Reactions    Ace Inhibitors Cough    Benadryl Allergy Hives     Hot skin itching    Iodine Shortness of Breath and Unspecified     Labored breathing- Occasionally can take it    Lamictal Rash and Swelling     \"hot\", unable to function.  Severe rash, scarring    Savella [Kdc:Milnacipran+Ci Pigment Blue 63] Nausea, Swelling and Anxiety     Swelling, bone and muscle pain, sweating, nausea, dizziness, sleeplessness, anxiety    Savella [Milnacipran Hcl] Unspecified     Muscle aches, feet swelling    Sulfa Drugs Hives, Shortness of " "Breath and Anxiety     Hard breathing, shortness of breath    Butalbital-Acetaminophen Unspecified     Dizzy, can't walk, hard to focus    Cefaclor Nausea and Unspecified     Ceclor causes light headedness and dizziness    Morphine Itching     \"Redness\"    Penicillins Itching and Swelling     Skin itching and redness    Tizanidine Hcl Nausea     \"Dizziness\" hard to focus    Milnacipran Unspecified    Amlactin Rash and Itching     Lotion causes itching, redness and burnng    Tape Rash and Itching     Skin tears, paper tape is OK per Pt       ROS: As per HPI       Objective:     BP (!) 146/80   Pulse 89   Temp 36.9 °C (98.5 °F) (Temporal)   Ht 1.664 m (5' 5.5\")   Wt 118 kg (261 lb 0.4 oz)   SpO2 97%  Body mass index is 42.78 kg/m².    Physical Exam:  Constitutional: Well-developed and well-nourished. Not diaphoretic. No distress. Lucid and fluent.  Patient, daughter, physician and staff all wearing masks.  Skin: Skin is warm and dry. No rash noted.  Head: Atraumatic without lesions.  Temporal regions very tender bilateral.   Eyes: Conjunctivae and extraocular motions are normal. Pupils are equal, round, and reactive to light. No scleral icterus.   Ears:  External ears unremarkable.   Neck: Supple, trachea midline. No thyromegaly present. No cervical or supraclavicular lymphadenopathy. No JVD or carotid bruits appreciated  Cardiovascular: Regular rate and rhythm.  Normal S1, S2 without murmur appreciated.  Chest: Effort normal. Clear to auscultation throughout. No adventitious sounds.   Abdomen: Soft, non tender, and without distention. Active bowel sounds in all four quadrants. No rebound, guarding, masses or hepatosplenomegaly.  Extremities: No cyanosis, clubbing, erythema, nor edema.   Neurological: Alert and oriented x 3.   Psychiatric:  Behavior, mood, and affect are appropriate.       Assessment and Plan:     72 y.o. female with the following issues:    1. Temporal pain  Sed Rate      2. Hypokalemia  " potassium chloride SA (KDUR) 20 MEQ Tab CR    Comp Metabolic Panel      3. Essential hypertension  amLODIPine (NORVASC) 2.5 MG Tab    Comp Metabolic Panel      4. Recurrent major depressive disorder, in partial remission (HCC)  QUEtiapine (SEROQUEL) 25 MG Tab      5. Flushing  amLODIPine (NORVASC) 2.5 MG Tab      6. Gastroesophageal reflux disease without esophagitis  pantoprazole (PROTONIX) 40 MG Tablet Delayed Response      7. Enterococcus faecalis infection  nitrofurantoin (MACROBID) 100 MG Cap    CBC WITH DIFFERENTIAL            Followup: Return in about 3 months (around 3/14/2023), or if symptoms worsen or fail to improve.

## 2022-12-20 ENCOUNTER — HOSPITAL ENCOUNTER (OUTPATIENT)
Facility: MEDICAL CENTER | Age: 72
End: 2022-12-29
Attending: EMERGENCY MEDICINE | Admitting: HOSPITALIST
Payer: MEDICARE

## 2022-12-20 ENCOUNTER — APPOINTMENT (OUTPATIENT)
Dept: RADIOLOGY | Facility: MEDICAL CENTER | Age: 72
End: 2022-12-20
Attending: EMERGENCY MEDICINE
Payer: MEDICARE

## 2022-12-20 ENCOUNTER — APPOINTMENT (OUTPATIENT)
Dept: RADIOLOGY | Facility: MEDICAL CENTER | Age: 72
End: 2022-12-20
Payer: MEDICARE

## 2022-12-20 DIAGNOSIS — R11.2 NAUSEA AND VOMITING, UNSPECIFIED VOMITING TYPE: ICD-10-CM

## 2022-12-20 DIAGNOSIS — R27.0 ATAXIA: Chronic | ICD-10-CM

## 2022-12-20 DIAGNOSIS — J96.11 CHRONIC RESPIRATORY FAILURE WITH HYPOXIA (HCC): ICD-10-CM

## 2022-12-20 DIAGNOSIS — I65.23 BILATERAL CAROTID ARTERY STENOSIS: ICD-10-CM

## 2022-12-20 DIAGNOSIS — E66.01 MORBID OBESITY WITH BMI OF 40.0-44.9, ADULT (HCC): ICD-10-CM

## 2022-12-20 DIAGNOSIS — I10 ESSENTIAL HYPERTENSION: ICD-10-CM

## 2022-12-20 DIAGNOSIS — R42 DIZZINESS: ICD-10-CM

## 2022-12-20 DIAGNOSIS — M54.16 LUMBAR RADICULOPATHY: ICD-10-CM

## 2022-12-20 DIAGNOSIS — M1A.0790 CHRONIC GOUT OF FOOT, UNSPECIFIED CAUSE, UNSPECIFIED LATERALITY: ICD-10-CM

## 2022-12-20 DIAGNOSIS — R26.89 IMPAIRED GAIT AND MOBILITY: ICD-10-CM

## 2022-12-20 DIAGNOSIS — I44.7 LBBB (LEFT BUNDLE BRANCH BLOCK): ICD-10-CM

## 2022-12-20 PROBLEM — I65.29 CAROTID ARTERY STENOSIS: Status: ACTIVE | Noted: 2022-12-20

## 2022-12-20 LAB
ALBUMIN SERPL BCP-MCNC: 4.2 G/DL (ref 3.2–4.9)
ALBUMIN/GLOB SERPL: 1.2 G/DL
ALP SERPL-CCNC: 138 U/L (ref 30–99)
ALT SERPL-CCNC: 17 U/L (ref 2–50)
ANION GAP SERPL CALC-SCNC: 15 MMOL/L (ref 7–16)
AST SERPL-CCNC: 19 U/L (ref 12–45)
BASOPHILS # BLD AUTO: 0.5 % (ref 0–1.8)
BASOPHILS # BLD: 0.05 K/UL (ref 0–0.12)
BILIRUB SERPL-MCNC: 0.4 MG/DL (ref 0.1–1.5)
BUN SERPL-MCNC: 19 MG/DL (ref 8–22)
CALCIUM ALBUM COR SERPL-MCNC: 9.5 MG/DL (ref 8.5–10.5)
CALCIUM SERPL-MCNC: 9.7 MG/DL (ref 8.4–10.2)
CHLORIDE SERPL-SCNC: 100 MMOL/L (ref 96–112)
CO2 SERPL-SCNC: 25 MMOL/L (ref 20–33)
CREAT SERPL-MCNC: 0.82 MG/DL (ref 0.5–1.4)
EKG IMPRESSION: NORMAL
EOSINOPHIL # BLD AUTO: 0.34 K/UL (ref 0–0.51)
EOSINOPHIL NFR BLD: 3.1 % (ref 0–6.9)
ERYTHROCYTE [DISTWIDTH] IN BLOOD BY AUTOMATED COUNT: 46.3 FL (ref 35.9–50)
GFR SERPLBLD CREATININE-BSD FMLA CKD-EPI: 76 ML/MIN/1.73 M 2
GLOBULIN SER CALC-MCNC: 3.4 G/DL (ref 1.9–3.5)
GLUCOSE SERPL-MCNC: 94 MG/DL (ref 65–99)
HCT VFR BLD AUTO: 43.2 % (ref 37–47)
HGB BLD-MCNC: 13.9 G/DL (ref 12–16)
IMM GRANULOCYTES # BLD AUTO: 0.05 K/UL (ref 0–0.11)
IMM GRANULOCYTES NFR BLD AUTO: 0.5 % (ref 0–0.9)
LYMPHOCYTES # BLD AUTO: 2.28 K/UL (ref 1–4.8)
LYMPHOCYTES NFR BLD: 20.9 % (ref 22–41)
MCH RBC QN AUTO: 28.5 PG (ref 27–33)
MCHC RBC AUTO-ENTMCNC: 32.2 G/DL (ref 33.6–35)
MCV RBC AUTO: 88.7 FL (ref 81.4–97.8)
MONOCYTES # BLD AUTO: 0.81 K/UL (ref 0–0.85)
MONOCYTES NFR BLD AUTO: 7.4 % (ref 0–13.4)
NEUTROPHILS # BLD AUTO: 7.39 K/UL (ref 2–7.15)
NEUTROPHILS NFR BLD: 67.6 % (ref 44–72)
NRBC # BLD AUTO: 0 K/UL
NRBC BLD-RTO: 0 /100 WBC
PLATELET # BLD AUTO: 175 K/UL (ref 164–446)
PMV BLD AUTO: 11.1 FL (ref 9–12.9)
POTASSIUM SERPL-SCNC: 3.9 MMOL/L (ref 3.6–5.5)
PROT SERPL-MCNC: 7.6 G/DL (ref 6–8.2)
RBC # BLD AUTO: 4.87 M/UL (ref 4.2–5.4)
SODIUM SERPL-SCNC: 140 MMOL/L (ref 135–145)
TROPONIN T SERPL-MCNC: 8 NG/L (ref 6–19)
TROPONIN T SERPL-MCNC: 8 NG/L (ref 6–19)
WBC # BLD AUTO: 10.9 K/UL (ref 4.8–10.8)

## 2022-12-20 PROCEDURE — 99220 PR INITIAL OBSERVATION CARE,LEVL III: CPT | Mod: AI | Performed by: HOSPITALIST

## 2022-12-20 PROCEDURE — G0378 HOSPITAL OBSERVATION PER HR: HCPCS

## 2022-12-20 PROCEDURE — 83036 HEMOGLOBIN GLYCOSYLATED A1C: CPT

## 2022-12-20 PROCEDURE — 96375 TX/PRO/DX INJ NEW DRUG ADDON: CPT

## 2022-12-20 PROCEDURE — 85025 COMPLETE CBC W/AUTO DIFF WBC: CPT

## 2022-12-20 PROCEDURE — 700117 HCHG RX CONTRAST REV CODE 255: Performed by: EMERGENCY MEDICINE

## 2022-12-20 PROCEDURE — 36415 COLL VENOUS BLD VENIPUNCTURE: CPT

## 2022-12-20 PROCEDURE — 71045 X-RAY EXAM CHEST 1 VIEW: CPT

## 2022-12-20 PROCEDURE — 700111 HCHG RX REV CODE 636 W/ 250 OVERRIDE (IP): Performed by: HOSPITALIST

## 2022-12-20 PROCEDURE — 99285 EMERGENCY DEPT VISIT HI MDM: CPT

## 2022-12-20 PROCEDURE — 93005 ELECTROCARDIOGRAM TRACING: CPT | Performed by: EMERGENCY MEDICINE

## 2022-12-20 PROCEDURE — A9270 NON-COVERED ITEM OR SERVICE: HCPCS | Performed by: HOSPITALIST

## 2022-12-20 PROCEDURE — 700102 HCHG RX REV CODE 250 W/ 637 OVERRIDE(OP): Performed by: HOSPITALIST

## 2022-12-20 PROCEDURE — 84484 ASSAY OF TROPONIN QUANT: CPT

## 2022-12-20 PROCEDURE — 96374 THER/PROPH/DIAG INJ IV PUSH: CPT

## 2022-12-20 PROCEDURE — 700111 HCHG RX REV CODE 636 W/ 250 OVERRIDE (IP)

## 2022-12-20 PROCEDURE — 80053 COMPREHEN METABOLIC PANEL: CPT

## 2022-12-20 PROCEDURE — 70496 CT ANGIOGRAPHY HEAD: CPT

## 2022-12-20 PROCEDURE — 700111 HCHG RX REV CODE 636 W/ 250 OVERRIDE (IP): Performed by: EMERGENCY MEDICINE

## 2022-12-20 PROCEDURE — 70498 CT ANGIOGRAPHY NECK: CPT

## 2022-12-20 PROCEDURE — 96376 TX/PRO/DX INJ SAME DRUG ADON: CPT

## 2022-12-20 RX ORDER — AMLODIPINE BESYLATE 5 MG/1
2.5 TABLET ORAL DAILY
Status: DISCONTINUED | OUTPATIENT
Start: 2022-12-21 | End: 2022-12-29

## 2022-12-20 RX ORDER — ONDANSETRON 2 MG/ML
4 INJECTION INTRAMUSCULAR; INTRAVENOUS ONCE
Status: COMPLETED | OUTPATIENT
Start: 2022-12-20 | End: 2022-12-20

## 2022-12-20 RX ORDER — POLYETHYLENE GLYCOL 3350 17 G/17G
1 POWDER, FOR SOLUTION ORAL
Status: DISCONTINUED | OUTPATIENT
Start: 2022-12-20 | End: 2022-12-29 | Stop reason: HOSPADM

## 2022-12-20 RX ORDER — OMEPRAZOLE 20 MG/1
20 CAPSULE, DELAYED RELEASE ORAL 2 TIMES DAILY
Status: DISCONTINUED | OUTPATIENT
Start: 2022-12-20 | End: 2022-12-29 | Stop reason: HOSPADM

## 2022-12-20 RX ORDER — FUROSEMIDE 20 MG/1
20 TABLET ORAL EVERY MORNING
Status: DISCONTINUED | OUTPATIENT
Start: 2022-12-21 | End: 2022-12-29 | Stop reason: HOSPADM

## 2022-12-20 RX ORDER — GABAPENTIN 400 MG/1
400 CAPSULE ORAL 2 TIMES DAILY
Status: DISCONTINUED | OUTPATIENT
Start: 2022-12-20 | End: 2022-12-22

## 2022-12-20 RX ORDER — DULOXETIN HYDROCHLORIDE 30 MG/1
60 CAPSULE, DELAYED RELEASE ORAL EVERY EVENING
Status: DISCONTINUED | OUTPATIENT
Start: 2022-12-20 | End: 2022-12-29 | Stop reason: HOSPADM

## 2022-12-20 RX ORDER — LEVOTHYROXINE SODIUM 0.07 MG/1
150 TABLET ORAL
Status: DISCONTINUED | OUTPATIENT
Start: 2022-12-21 | End: 2022-12-29 | Stop reason: HOSPADM

## 2022-12-20 RX ORDER — CLOPIDOGREL BISULFATE 75 MG/1
75 TABLET ORAL DAILY
Status: DISCONTINUED | OUTPATIENT
Start: 2022-12-21 | End: 2022-12-20

## 2022-12-20 RX ORDER — ONDANSETRON 2 MG/ML
INJECTION INTRAMUSCULAR; INTRAVENOUS
Status: COMPLETED
Start: 2022-12-20 | End: 2022-12-20

## 2022-12-20 RX ORDER — ONDANSETRON 2 MG/ML
4 INJECTION INTRAMUSCULAR; INTRAVENOUS EVERY 4 HOURS PRN
Status: DISCONTINUED | OUTPATIENT
Start: 2022-12-20 | End: 2022-12-29 | Stop reason: HOSPADM

## 2022-12-20 RX ORDER — AMLODIPINE BESYLATE 5 MG/1
2.5 TABLET ORAL DAILY
Status: DISCONTINUED | OUTPATIENT
Start: 2022-12-21 | End: 2022-12-20

## 2022-12-20 RX ORDER — ALBUTEROL SULFATE 90 UG/1
2 AEROSOL, METERED RESPIRATORY (INHALATION) EVERY 6 HOURS PRN
Status: DISCONTINUED | OUTPATIENT
Start: 2022-12-20 | End: 2022-12-29 | Stop reason: HOSPADM

## 2022-12-20 RX ORDER — TRAMADOL HYDROCHLORIDE 50 MG/1
100 TABLET ORAL
COMMUNITY
End: 2023-01-23

## 2022-12-20 RX ORDER — ATORVASTATIN CALCIUM 40 MG/1
40 TABLET, FILM COATED ORAL EVERY EVENING
Status: DISCONTINUED | OUTPATIENT
Start: 2022-12-20 | End: 2022-12-29 | Stop reason: HOSPADM

## 2022-12-20 RX ORDER — BISACODYL 10 MG
10 SUPPOSITORY, RECTAL RECTAL
Status: DISCONTINUED | OUTPATIENT
Start: 2022-12-20 | End: 2022-12-29 | Stop reason: HOSPADM

## 2022-12-20 RX ORDER — NYSTATIN 100000 [USP'U]/G
1 POWDER TOPICAL 2 TIMES DAILY PRN
COMMUNITY
End: 2023-07-05

## 2022-12-20 RX ORDER — QUETIAPINE FUMARATE 25 MG/1
25 TABLET, FILM COATED ORAL
Status: DISCONTINUED | OUTPATIENT
Start: 2022-12-20 | End: 2022-12-29 | Stop reason: HOSPADM

## 2022-12-20 RX ORDER — TRAMADOL HYDROCHLORIDE 50 MG/1
50 TABLET ORAL EVERY 8 HOURS PRN
Status: DISCONTINUED | OUTPATIENT
Start: 2022-12-20 | End: 2022-12-23

## 2022-12-20 RX ADMIN — ONDANSETRON 4 MG: 2 INJECTION INTRAMUSCULAR; INTRAVENOUS at 22:25

## 2022-12-20 RX ADMIN — FENTANYL CITRATE 50 MCG: 50 INJECTION, SOLUTION INTRAMUSCULAR; INTRAVENOUS at 17:02

## 2022-12-20 RX ADMIN — ASPIRIN 81 MG: 81 TABLET, COATED ORAL at 22:26

## 2022-12-20 RX ADMIN — IOHEXOL 80 ML: 350 INJECTION, SOLUTION INTRAVENOUS at 18:47

## 2022-12-20 RX ADMIN — OMEPRAZOLE 20 MG: 20 CAPSULE, DELAYED RELEASE ORAL at 19:47

## 2022-12-20 RX ADMIN — ONDANSETRON 4 MG: 2 INJECTION INTRAMUSCULAR; INTRAVENOUS at 17:02

## 2022-12-20 RX ADMIN — ONDANSETRON 4 MG: 2 INJECTION INTRAMUSCULAR; INTRAVENOUS at 18:43

## 2022-12-20 RX ADMIN — ATORVASTATIN CALCIUM 40 MG: 40 TABLET, FILM COATED ORAL at 22:25

## 2022-12-20 RX ADMIN — TRAMADOL HYDROCHLORIDE 50 MG: 50 TABLET, COATED ORAL at 22:53

## 2022-12-20 RX ADMIN — DULOXETINE HYDROCHLORIDE 60 MG: 30 CAPSULE, DELAYED RELEASE PELLETS ORAL at 19:47

## 2022-12-20 RX ADMIN — GABAPENTIN 400 MG: 400 CAPSULE ORAL at 19:47

## 2022-12-20 RX ADMIN — RIVAROXABAN 10 MG: 10 TABLET, FILM COATED ORAL at 22:25

## 2022-12-20 RX ADMIN — QUETIAPINE FUMARATE 25 MG: 25 TABLET ORAL at 22:26

## 2022-12-20 ASSESSMENT — ENCOUNTER SYMPTOMS
DIZZINESS: 1
STRIDOR: 0
COUGH: 0
FLANK PAIN: 0
EYE DISCHARGE: 0
FEVER: 0
NERVOUS/ANXIOUS: 0
EYE REDNESS: 0
VOMITING: 0
MYALGIAS: 0
ABDOMINAL PAIN: 0
BRUISES/BLEEDS EASILY: 0
FOCAL WEAKNESS: 0
CHILLS: 0
SHORTNESS OF BREATH: 0

## 2022-12-20 ASSESSMENT — LIFESTYLE VARIABLES
ON A TYPICAL DAY WHEN YOU DRINK ALCOHOL HOW MANY DRINKS DO YOU HAVE: 0
HAVE PEOPLE ANNOYED YOU BY CRITICIZING YOUR DRINKING: NO
CONSUMPTION TOTAL: NEGATIVE
TOTAL SCORE: 0
HOW MANY TIMES IN THE PAST YEAR HAVE YOU HAD 5 OR MORE DRINKS IN A DAY: 0
AVERAGE NUMBER OF DAYS PER WEEK YOU HAVE A DRINK CONTAINING ALCOHOL: 0
ALCOHOL_USE: NO
EVER FELT BAD OR GUILTY ABOUT YOUR DRINKING: NO
TOTAL SCORE: 0
TOTAL SCORE: 0
HAVE YOU EVER FELT YOU SHOULD CUT DOWN ON YOUR DRINKING: NO
EVER HAD A DRINK FIRST THING IN THE MORNING TO STEADY YOUR NERVES TO GET RID OF A HANGOVER: NO

## 2022-12-20 ASSESSMENT — COGNITIVE AND FUNCTIONAL STATUS - GENERAL
SUGGESTED CMS G CODE MODIFIER MOBILITY: CL
MOVING TO AND FROM BED TO CHAIR: A LOT
DAILY ACTIVITIY SCORE: 13
CLIMB 3 TO 5 STEPS WITH RAILING: A LOT
STANDING UP FROM CHAIR USING ARMS: A LOT
SUGGESTED CMS G CODE MODIFIER DAILY ACTIVITY: CL
DRESSING REGULAR LOWER BODY CLOTHING: A LOT
MOVING FROM LYING ON BACK TO SITTING ON SIDE OF FLAT BED: A LOT
TURNING FROM BACK TO SIDE WHILE IN FLAT BAD: A LOT
TOILETING: A LOT
WALKING IN HOSPITAL ROOM: A LOT
EATING MEALS: A LITTLE
DRESSING REGULAR UPPER BODY CLOTHING: A LOT
MOBILITY SCORE: 12
HELP NEEDED FOR BATHING: A LOT
PERSONAL GROOMING: A LOT

## 2022-12-20 ASSESSMENT — PAIN DESCRIPTION - PAIN TYPE
TYPE: ACUTE PAIN

## 2022-12-20 ASSESSMENT — PATIENT HEALTH QUESTIONNAIRE - PHQ9
2. FEELING DOWN, DEPRESSED, IRRITABLE, OR HOPELESS: NOT AT ALL
SUM OF ALL RESPONSES TO PHQ9 QUESTIONS 1 AND 2: 0
1. LITTLE INTEREST OR PLEASURE IN DOING THINGS: NOT AT ALL

## 2022-12-20 ASSESSMENT — FIBROSIS 4 INDEX
FIB4 SCORE: 1.9
FIB4 SCORE: 1.62

## 2022-12-20 NOTE — ED TRIAGE NOTES
"Chief Complaint   Patient presents with    Dizziness     Pt started experiencing dizziness last night as well as left arm/shoulder pain. Denies any syncope or fall. Pt also states had diaphoretic episode with dizziness. Denies any chest pain currently.     Headache     Pt having posterior headache ongoing for 2 weeks, intermittent blurry vision with headache. Hx of TIA 20 years ago per pt.     BP (!) 200/87   Pulse 85   Temp 36.2 °C (97.1 °F) (Temporal)   Resp 18   Ht 1.676 m (5' 6\")   Wt 119 kg (262 lb)   SpO2 97%   BMI 42.29 kg/m²     "

## 2022-12-20 NOTE — ED PROVIDER NOTES
"ED Provider Note    CHIEF COMPLAINT  Chief Complaint   Patient presents with    Dizziness     Pt started experiencing dizziness last night as well as left arm/shoulder pain. Denies any syncope or fall. Pt also states had diaphoretic episode with dizziness. Denies any chest pain currently.     Headache     Pt having posterior headache ongoing for 2 weeks, intermittent blurry vision with headache. Hx of TIA 20 years ago per pt.        LIMITATION TO HISTORY   Select: : None    HPI  Ashly Meyer is a 72 y.o. female who presents for evaluation of dizziness and headache.  Patient reports the last 2 weeks she has been having intermittent headaches with associated blurred vision.  She describes these headaches as a tension pressure-like headache that is diffuse.  She states that yesterday she started experiencing dizziness that seems worse with standing.  She describes the dizziness as if she may pass out.  She also reports that she becomes diaphoretic when she gets these episodes.  She does not believe she has specific chest pain, she states that she has fibromyalgia and generally has pain all over but does not believe that she has been having any chest pain.  Denies nausea, vomiting or syncope.  She reports a remote history of TIA approximately 20 years ago at which time she states her symptoms were, \"I just did not feel well.\"    OUTSIDE HISTORIAN(S):  Select: Family daughter assisted with history    EXTERNAL RECORDS REVIEWED  Select: Outpatient Notes Per record review patient last saw her primary care physician Dr. Kimble on 12/14/2022 for regular follow-up appointment.  She has a history of recurrent temporal pain, hypokalemia, essential hypertension and major depressive disorder, GERD    REVIEW OF SYSTEMS  See HPI for further details.   Positive for dizziness, headache, blurred vision  Negative for chest pain, shortness of breath, nausea, vomiting  All other systems are negative.     PAST MEDICAL HISTORY   has " a past medical history of Anginal syndrome ([facility]), Anxiety, Arthritis, Atrophic rhinitis (10/3/2016), Back pain, Bronchitis (08/2021), Carotid artery stenosis, unilateral, Carpal tunnel syndrome (9/5/2011), Chronic pain, Constipation, Controlled type 2 diabetes mellitus without complication ([facility]), Controlled type 2 diabetes mellitus without complication, without long-term current use of insulin ([facility]) (6/12/2019), Cough (08/2021), Current severe episode of major depressive disorder without psychotic features without prior episode ([facility]) (6/3/2021), Daytime sleepiness, Degenerative disc disease, Depression (5/20/2009), Deviated nasal septum (8/1/2016), Diarrhea, Disease of accessory sinus (10/3/2016), Dizziness, Dry eye syndrome, bilateral, Elevated sedimentation rate, Fatigue, Fatty liver, Fibromyalgia, Frequent headaches, GERD (gastroesophageal reflux disease), GOUT (5/20/2009), H/O foot surgery, Hearing difficulty, Heart burn, Hemorrhagic disorder ([facility]) (2016), Hiatus hernia syndrome, History of 2019 novel coronavirus disease (COVID-19) (10/11/2022), History of anemia, Hypertension, Hypothyroidism (5/20/2009), Incipient cataract of both eyes, Incomplete rectal prolapse (9/3/2021), Insomnia, Intention tremor (6/2/2022), Migraine without aura, without mention of intractable migraine without mention of status migrainosus, Mild intermittent asthma without complication, Mixed dyslipidemia, Morbid obesity with BMI of 45.0-49.9, adult ([facility]), Morning headache, Mumps, Nasal drainage, Nocturnal hypoxia, Obesity, Obesity hypoventilation syndrome ([facility]) (5/31/2017), Pelvic floor dysfunction, Peripheral neuropathy, Pleomorphic adenoma, Polymyalgia rheumatica ([facility]), Restless leg syndrome, Ringing in ears, Rotator cuff syndrome of right shoulder and allied disorders (10/10/2018), S/P tonsillectomy, Seasonal allergies, Skin cancer (1990's), Sleep apnea syndrome, Snoring, Sore muscles, Sweat, sweating, excessive, Swelling of lower  extremity, Trochanteric bursitis of both hips (3/3/2022), Urinary incontinence, Vision loss, and Weakness.    SOCIAL HISTORY  Social History     Tobacco Use    Smoking status: Former     Packs/day: 1.00     Years: 43.00     Pack years: 43.00     Types: Cigarettes     Start date: 1962     Quit date: 3/1/2007     Years since quitting: 15.8    Smokeless tobacco: Never    Tobacco comments:     Started smoking at age 15, continued abstinance    Vaping Use    Vaping Use: Never used   Substance and Sexual Activity    Alcohol use: Not Currently    Drug use: Not Currently     Comment: CBD topical pain cream    Sexual activity: Not Currently     Partners: Male       SURGICAL HISTORY   has a past surgical history that includes [facility] stat fine needle aspiration (11/16/2009); tonsillectomy (1953); bladder suspension (1983); other orthopedic surgery (1962/1980/1983/1985); hysterectomy, vaginal (1983); colonoscopy (2006); septoplasty (N/A, 8/1/2016); turbinoplasty (Bilateral, 8/1/2016); nasal fracture reduction open (N/A, 8/1/2016); septal reconstruction (10/3/2016); gastroscopy (2/6/2017); colonoscopy (2/6/2017); hysterectomy laparoscopy; tonsillectomy; shoulder decompression arthroscopic (Right, 2/1/2019); shoulder arthroscopy w/ bicipital tenodesis repair (Right, 2/1/2019); carpal tunnel release (Right, 2/1/2019); cataract extraction with iol (Bilateral); cholecystectomy; other surgical procedure (1998); anal sphincterotomy; tubal ligation (1980); mass excision general (Right, 1956); scar revision (Right); upper gi endoscopy,diagnosis (N/A, 8/26/2021); and colonoscopy,diagnostic (8/26/2021).    FAMILY HISTORY  Family History   Problem Relation Age of Onset    Heart Disease Mother         Arotic vaulve replacement    GI Disease Mother         bile duct problem    Bladder cancer Mother     GI Disease Sister         celiac    Arthritis Sister     Other Sister         gout and lupus    Arthritis Sister     Kidney Disease Sister      "GI Disease Sister     Bladder cancer Maternal Aunt     Arthritis Maternal Grandmother     Hypertension Maternal Grandmother     Heart Disease Maternal Grandmother     Hypertension Maternal Grandfather     Heart Disease Maternal Grandfather     Arthritis Maternal Grandfather     No Known Problems Paternal Grandmother     No Known Problems Paternal Grandfather     Cancer Brother         Larynx    Cancer Daughter         non hopskins limphoma    GI Disease Daughter     Bipolar disorder Daughter     Seizures Daughter     Bipolar disorder Daughter        CURRENT MEDICATIONS  Home Medications       Reviewed by Pablo Go (Pharmacy Tech) on 12/20/22 at 1619  Med List Status: Complete     Medication Last Dose Status   albuterol 108 (90 Base) MCG/ACT Aero Soln inhalation aerosol > 5 days Active   amLODIPine (NORVASC) 2.5 MG Tab 12/19/2022 Active   Cholecalciferol (VITAMIN D-3) 125 MCG (5000 UT) Tab 12/19/2022 Active   DULoxetine (CYMBALTA) 60 MG Cap DR Particles delayed-release capsule 12/19/2022 Active   furosemide (LASIX) 20 MG Tab 12/19/2022 Active   gabapentin (NEURONTIN) 800 MG tablet 12/19/2022 Active   levothyroxine (SYNTHROID) 150 MCG Tab 12/20/2022 Active   Misc. Devices Misc Unknown Active   Misc. Devices Misc Unknown Active   nabumetone (RELAFEN) 750 MG Tab 12/19/2022 Active   nitrofurantoin (MACROBID) 100 MG Cap 12/19/2022 Active   nystatin (MYCOSTATIN) powder > 2 days Active   pantoprazole (PROTONIX) 40 MG Tablet Delayed Response 12/19/2022 Active   potassium chloride SA (KDUR) 20 MEQ Tab CR 12/19/2022 Active   QUEtiapine (SEROQUEL) 25 MG Tab 12/19/2022 Active   traMADol (ULTRAM) 50 MG Tab 12/19/2022 Active   VITAMIN E PO 12/19/2022 Active                    ALLERGIES  Allergies   Allergen Reactions    Ace Inhibitors Cough    Benadryl Allergy Hives     Hot skin itching    Iodine Nausea     Pt received CT w/ contrast 12/20/22 pt had nausea post contrast     Lamictal Rash and Swelling     \"hot\", unable " "to function.  Severe rash, scarring    Savella [Kdc:Milnacipran+Ci Pigment Blue 63] Nausea, Swelling and Anxiety     Swelling, bone and muscle pain, sweating, nausea, dizziness, sleeplessness, anxiety    Savella [Milnacipran Hcl] Unspecified     Muscle aches, feet swelling    Sulfa Drugs Hives, Shortness of Breath and Anxiety     Hard breathing, shortness of breath    Butalbital-Acetaminophen Unspecified     Dizzy, can't walk, hard to focus    Cefaclor Nausea and Unspecified     Ceclor causes light headedness and dizziness    Morphine Itching     \"Redness\"    Penicillins Itching and Swelling     Skin itching and redness    Tizanidine Hcl Nausea     \"Dizziness\" hard to focus    Milnacipran Vomiting and Nausea    Amlactin Rash and Itching     Lotion causes itching, redness and burnng    Tape Rash and Itching     Skin tears, paper tape is OK per Pt       PHYSICAL EXAM  VITAL SIGNS: BP (!) 200/87   Pulse 85   Temp 36.2 °C (97.1 °F) (Temporal)   Resp 18   Ht 1.676 m (5' 6\")   Wt 119 kg (262 lb)   SpO2 97%   BMI 42.29 kg/m²    Nursing note and vitals reviewed.  Constitutional: Well-developed and well-nourished. No distress.   HENT: Head is normocephalic and atraumatic. Oropharynx is clear and moist without exudate or erythema.   Eyes: Pupils are equal, round, and reactive to light. No horizontal or vertical nystagmus. Conjunctiva are normal.   Cardiovascular: Normal rate and regular rhythm. No murmur heard. Normal radial pulses.  Pulmonary/Chest: Breath sounds normal. No wheezes or rales.   Abdominal: Soft and non-tender. No distention.    Musculoskeletal: Extremities exhibit normal range of motion without edema or tenderness.   Neurological: Awake, alert and oriented to person, place, and time. No focal deficits noted. Cranial nerves II - XII intact. No pronator drift.  No dysmetria on cerebellar testing. Normal speech and language.  5 out of 5 strength in bilateral upper extremities.  Patient reports weakness in " bilateral lower extremities, this is equal 4 out of 5 in bilateral lower extremities.    Skin: Skin is warm and dry. No rash.   Psychiatric: Normal mood and affect. Appropriate for clinical situation.      COURSE & MEDICAL DECISION MAKING      DIAGNOSTIC STUDIES / PROCEDURES  EKG  EKG at 1517: Normal sinus rhythm, heart rate 72, normal axis, intervals notable for prolonged QRS of 150 with associated left bundle branch block, , QTc 484, no acute ST-T segment changes, when compared to prior EKG from August 2021 left bundle branch block is new, no evidence of acute arrhythmia or ischemia per my interpretation    LABS  Labs Reviewed   CBC WITH DIFFERENTIAL - Abnormal; Notable for the following components:       Result Value    WBC 10.9 (*)     MCHC 32.2 (*)     Lymphocytes 20.90 (*)     Neutrophils (Absolute) 7.39 (*)     All other components within normal limits    Narrative:     Biotin intake of greater than 5 mg per day may interfere with  troponin levels, causing false low values.   COMP METABOLIC PANEL - Abnormal; Notable for the following components:    Alkaline Phosphatase 138 (*)     All other components within normal limits   TROPONIN    Narrative:     Biotin intake of greater than 5 mg per day may interfere with  troponin levels, causing false low values.   TROPONIN   CORRECTED CALCIUM   ESTIMATED GFR   HEMOGLOBIN A1C   LIPID PROFILE   CBC WITHOUT DIFFERENTIAL   COMP METABOLIC PANEL   MAGNESIUM   TROPONIN         RADIOLOGY  CT-CTA NECK WITH & W/O-POST PROCESSING   Final Result      1.  Mild atherosclerotic plaque in bilateral carotid bulbs and proximal internal carotid arteries with less than 50% stenosis.      2.  2.8 cm left thyroid gland nodule. Recommend follow-up thyroid ultrasound.      CT-CTA HEAD WITH & W/O-POST PROCESS   Final Result      1.  No large vessel occlusion, high-grade stenosis or aneurysm of the [place].   2.  No CT evidence of acute infarct, hemorrhage or mass.   3.  Mild  atrophy and chronic small vessel ischemic changes.      DX-CHEST-PORTABLE (1 VIEW)   Final Result      Similar hypoventilatory chest with linear opacity most likely from atelectasis although some scarring is possible              Discussion:  Pertinent Labs & Imaging studies reviewed. (See chart for details)    Patient is a 72-year-old female who presents for evaluation of headache and dizziness.  Differential diagnosis includes tension headache, atypical migraine, arrhythmia, electrolyte derangement, dehydration, ACS.  Diagnostic work-up includes labs, EKG and CT imaging of the head.    Patient symptoms have been ongoing for 2 weeks, she is not a thrombolytic candidate, not in window for IR intervention.  On exam vitals notable for hypertension, she does have weakness in bilateral lower extremities which she reports has been going on for the last couple weeks with her headaches.  EKG returns and demonstrates a new left bundle branch block which is new when compared to prior EKG from 1 year ago.  At this time she is not having any cardiac symptoms and troponin is within normal limits.  Labs returned unremarkable.  Patient is noted to have mild atherosclerotic plaque in the bilateral carotid bulbs, no large vessel occlusion stroke.  With her persistent dizziness, nausea and vomiting on exam I am concerned about possible central etiology of her dizziness.  Therefore she will be hospitalized for ongoing work-up and management.  Discussed the case with Dr. Talamantes who has accepted patient for hospitalization.  Patient is in guarded condition.      DISCUSSION OF MANAGEMENT WITH OTHER PHYSICIANS, [facility] OR APPROPRIATE SOURCE  Select: Admitting team Dr. Talamantes    INDEPENDENT INTERPRETATION OF STUDIES  Select: EKG(s) see above    ESCALATION OF CARE  Patient hospitalized for ongoing work-up    SOCIAL DETERMINANTS THAT SIGNIFICANTLY AFFECT CARE  Select: None    PRESCRIPTION DRUG CONSIDERED  Select: n/a    DIAGNOSTICS TESTS  CONSIDERED  none    ADDITIONAL PROBLEMS ADDRESSED - COPA  In addition to the chief complaint, I have addressed the following problems none       \  The patient will not drink alcohol nor drive with prescribed medications. The patient will return for worsening symptoms and is stable at the time of discharge. The patient verbalizes understanding and will comply.    FINAL IMPRESSION  1. Dizziness    2. Nausea and vomiting, unspecified vomiting type    3. LBBB (left bundle branch block)           Electronically signed by: Margaux Shah M.D., 12/20/2022 3:36 PM

## 2022-12-20 NOTE — ED NOTES
Last night pt was dizzy with a headache. HA has been lingering for 2 weeks. She states she has had some mild blurry vision in the left eye over the last week as well. She has been very weak and ahving trouble getting up from a sitting or laying position due to the dizziness.     Pt has a history of a TIA and hypertension. Per daughter, pt has had mild slurred speech with trouble finding her words over the last 5 years. Tremors at baseline. Pt is seeing a neurologist currently.

## 2022-12-21 ENCOUNTER — APPOINTMENT (OUTPATIENT)
Dept: CARDIOLOGY | Facility: MEDICAL CENTER | Age: 72
End: 2022-12-21
Attending: INTERNAL MEDICINE
Payer: MEDICARE

## 2022-12-21 ENCOUNTER — HOSPITAL ENCOUNTER (INPATIENT)
Facility: REHABILITATION | Age: 72
End: 2022-12-21
Attending: PHYSICAL MEDICINE & REHABILITATION | Admitting: PHYSICAL MEDICINE & REHABILITATION
Payer: COMMERCIAL

## 2022-12-21 ENCOUNTER — APPOINTMENT (OUTPATIENT)
Dept: RADIOLOGY | Facility: MEDICAL CENTER | Age: 72
End: 2022-12-21
Attending: HOSPITALIST
Payer: MEDICARE

## 2022-12-21 PROBLEM — R26.89 IMPAIRED GAIT AND MOBILITY: Chronic | Status: ACTIVE | Noted: 2022-12-21

## 2022-12-21 PROBLEM — R27.0 ATAXIA: Chronic | Status: ACTIVE | Noted: 2022-12-20

## 2022-12-21 LAB
ALBUMIN SERPL BCP-MCNC: 4.1 G/DL (ref 3.2–4.9)
ALBUMIN/GLOB SERPL: 1.3 G/DL
ALP SERPL-CCNC: 128 U/L (ref 30–99)
ALT SERPL-CCNC: 17 U/L (ref 2–50)
ANION GAP SERPL CALC-SCNC: 11 MMOL/L (ref 7–16)
AST SERPL-CCNC: 19 U/L (ref 12–45)
BILIRUB SERPL-MCNC: 0.4 MG/DL (ref 0.1–1.5)
BUN SERPL-MCNC: 17 MG/DL (ref 8–22)
CALCIUM ALBUM COR SERPL-MCNC: 9.4 MG/DL (ref 8.5–10.5)
CALCIUM SERPL-MCNC: 9.5 MG/DL (ref 8.4–10.2)
CHLORIDE SERPL-SCNC: 103 MMOL/L (ref 96–112)
CHOLEST SERPL-MCNC: 181 MG/DL (ref 100–199)
CO2 SERPL-SCNC: 26 MMOL/L (ref 20–33)
CREAT SERPL-MCNC: 0.77 MG/DL (ref 0.5–1.4)
ERYTHROCYTE [DISTWIDTH] IN BLOOD BY AUTOMATED COUNT: 46.5 FL (ref 35.9–50)
EST. AVERAGE GLUCOSE BLD GHB EST-MCNC: 123 MG/DL
GFR SERPLBLD CREATININE-BSD FMLA CKD-EPI: 82 ML/MIN/1.73 M 2
GLOBULIN SER CALC-MCNC: 3.2 G/DL (ref 1.9–3.5)
GLUCOSE SERPL-MCNC: 107 MG/DL (ref 65–99)
HBA1C MFR BLD: 5.9 % (ref 4–5.6)
HCT VFR BLD AUTO: 40.3 % (ref 37–47)
HDLC SERPL-MCNC: 60 MG/DL
HGB BLD-MCNC: 12.8 G/DL (ref 12–16)
LDLC SERPL CALC-MCNC: 101 MG/DL
LV EJECT FRACT  99904: 70
LV EJECT FRACT MOD 2C 99903: 58.94
LV EJECT FRACT MOD 4C 99902: 78.06
LV EJECT FRACT MOD BP 99901: 68.91
MAGNESIUM SERPL-MCNC: 2 MG/DL (ref 1.5–2.5)
MCH RBC QN AUTO: 28.1 PG (ref 27–33)
MCHC RBC AUTO-ENTMCNC: 31.8 G/DL (ref 33.6–35)
MCV RBC AUTO: 88.6 FL (ref 81.4–97.8)
PLATELET # BLD AUTO: 189 K/UL (ref 164–446)
PMV BLD AUTO: 11 FL (ref 9–12.9)
POTASSIUM SERPL-SCNC: 3.7 MMOL/L (ref 3.6–5.5)
PROT SERPL-MCNC: 7.3 G/DL (ref 6–8.2)
RBC # BLD AUTO: 4.55 M/UL (ref 4.2–5.4)
SODIUM SERPL-SCNC: 140 MMOL/L (ref 135–145)
TRIGL SERPL-MCNC: 99 MG/DL (ref 0–149)
TROPONIN T SERPL-MCNC: 11 NG/L (ref 6–19)
WBC # BLD AUTO: 10.9 K/UL (ref 4.8–10.8)

## 2022-12-21 PROCEDURE — 93306 TTE W/DOPPLER COMPLETE: CPT | Mod: 26 | Performed by: INTERNAL MEDICINE

## 2022-12-21 PROCEDURE — A9270 NON-COVERED ITEM OR SERVICE: HCPCS | Performed by: INTERNAL MEDICINE

## 2022-12-21 PROCEDURE — 36415 COLL VENOUS BLD VENIPUNCTURE: CPT

## 2022-12-21 PROCEDURE — 97535 SELF CARE MNGMENT TRAINING: CPT

## 2022-12-21 PROCEDURE — 83735 ASSAY OF MAGNESIUM: CPT

## 2022-12-21 PROCEDURE — 700102 HCHG RX REV CODE 250 W/ 637 OVERRIDE(OP): Performed by: INTERNAL MEDICINE

## 2022-12-21 PROCEDURE — 80061 LIPID PANEL: CPT

## 2022-12-21 PROCEDURE — 84484 ASSAY OF TROPONIN QUANT: CPT

## 2022-12-21 PROCEDURE — 97162 PT EVAL MOD COMPLEX 30 MIN: CPT

## 2022-12-21 PROCEDURE — 94760 N-INVAS EAR/PLS OXIMETRY 1: CPT

## 2022-12-21 PROCEDURE — 70551 MRI BRAIN STEM W/O DYE: CPT

## 2022-12-21 PROCEDURE — 93306 TTE W/DOPPLER COMPLETE: CPT

## 2022-12-21 PROCEDURE — 85027 COMPLETE CBC AUTOMATED: CPT

## 2022-12-21 PROCEDURE — A9270 NON-COVERED ITEM OR SERVICE: HCPCS | Performed by: HOSPITALIST

## 2022-12-21 PROCEDURE — G0378 HOSPITAL OBSERVATION PER HR: HCPCS

## 2022-12-21 PROCEDURE — 97165 OT EVAL LOW COMPLEX 30 MIN: CPT

## 2022-12-21 PROCEDURE — 700102 HCHG RX REV CODE 250 W/ 637 OVERRIDE(OP): Performed by: HOSPITALIST

## 2022-12-21 PROCEDURE — 99226 PR SUBSEQUENT OBSERVATION CARE,LEVEL III: CPT | Performed by: INTERNAL MEDICINE

## 2022-12-21 PROCEDURE — 80053 COMPREHEN METABOLIC PANEL: CPT

## 2022-12-21 PROCEDURE — 700111 HCHG RX REV CODE 636 W/ 250 OVERRIDE (IP): Performed by: INTERNAL MEDICINE

## 2022-12-21 RX ORDER — LORAZEPAM 2 MG/ML
1 INJECTION INTRAMUSCULAR ONCE
Status: COMPLETED | OUTPATIENT
Start: 2022-12-21 | End: 2022-12-21

## 2022-12-21 RX ORDER — POTASSIUM CHLORIDE 20 MEQ/1
20 TABLET, EXTENDED RELEASE ORAL 2 TIMES DAILY
Status: DISCONTINUED | OUTPATIENT
Start: 2022-12-21 | End: 2022-12-29 | Stop reason: HOSPADM

## 2022-12-21 RX ADMIN — ATORVASTATIN CALCIUM 40 MG: 40 TABLET, FILM COATED ORAL at 18:33

## 2022-12-21 RX ADMIN — LEVOTHYROXINE SODIUM 150 MCG: 0.07 TABLET ORAL at 05:25

## 2022-12-21 RX ADMIN — OMEPRAZOLE 20 MG: 20 CAPSULE, DELAYED RELEASE ORAL at 18:33

## 2022-12-21 RX ADMIN — GABAPENTIN 400 MG: 400 CAPSULE ORAL at 05:25

## 2022-12-21 RX ADMIN — LORAZEPAM 1 MG: 2 INJECTION INTRAMUSCULAR; INTRAVENOUS at 10:43

## 2022-12-21 RX ADMIN — FUROSEMIDE 20 MG: 20 TABLET ORAL at 05:24

## 2022-12-21 RX ADMIN — GABAPENTIN 400 MG: 400 CAPSULE ORAL at 18:33

## 2022-12-21 RX ADMIN — POTASSIUM CHLORIDE 20 MEQ: 20 TABLET, EXTENDED RELEASE ORAL at 20:20

## 2022-12-21 RX ADMIN — QUETIAPINE FUMARATE 25 MG: 25 TABLET ORAL at 20:20

## 2022-12-21 RX ADMIN — RIVAROXABAN 10 MG: 10 TABLET, FILM COATED ORAL at 18:33

## 2022-12-21 RX ADMIN — AMLODIPINE BESYLATE 2.5 MG: 5 TABLET ORAL at 05:24

## 2022-12-21 RX ADMIN — DULOXETINE HYDROCHLORIDE 60 MG: 30 CAPSULE, DELAYED RELEASE PELLETS ORAL at 18:34

## 2022-12-21 RX ADMIN — OMEPRAZOLE 20 MG: 20 CAPSULE, DELAYED RELEASE ORAL at 05:24

## 2022-12-21 ASSESSMENT — GAIT ASSESSMENTS
DEVIATION: STEP TO
DISTANCE (FEET): 100
ASSISTIVE DEVICE: FRONT WHEEL WALKER
GAIT LEVEL OF ASSIST: STANDBY ASSIST
DISTANCE (FEET): 15

## 2022-12-21 ASSESSMENT — ENCOUNTER SYMPTOMS
DIARRHEA: 0
CONSTIPATION: 0
DIZZINESS: 0
NAUSEA: 0
COUGH: 0
ABDOMINAL PAIN: 0
PALPITATIONS: 0
FEVER: 0
HEADACHES: 0
WEAKNESS: 0
CHILLS: 0
SHORTNESS OF BREATH: 0
BACK PAIN: 0
VOMITING: 0

## 2022-12-21 ASSESSMENT — COGNITIVE AND FUNCTIONAL STATUS - GENERAL
TOILETING: A LOT
DRESSING REGULAR UPPER BODY CLOTHING: A LITTLE
SUGGESTED CMS G CODE MODIFIER DAILY ACTIVITY: CK
MOBILITY SCORE: 20
DRESSING REGULAR LOWER BODY CLOTHING: A LOT
PERSONAL GROOMING: A LITTLE
CLIMB 3 TO 5 STEPS WITH RAILING: A LITTLE
SUGGESTED CMS G CODE MODIFIER MOBILITY: CJ
DAILY ACTIVITIY SCORE: 16
WALKING IN HOSPITAL ROOM: A LITTLE
MOVING FROM LYING ON BACK TO SITTING ON SIDE OF FLAT BED: A LITTLE
STANDING UP FROM CHAIR USING ARMS: A LITTLE
HELP NEEDED FOR BATHING: A LOT

## 2022-12-21 ASSESSMENT — FIBROSIS 4 INDEX: FIB4 SCORE: 1.76

## 2022-12-21 ASSESSMENT — PAIN DESCRIPTION - PAIN TYPE
TYPE: ACUTE PAIN

## 2022-12-21 ASSESSMENT — ACTIVITIES OF DAILY LIVING (ADL): TOILETING: INDEPENDENT

## 2022-12-21 NOTE — DIETARY
NUTRITION SERVICES: BMI - Pt with BMI >40 (=Body mass index is 42.02 kg/m².), Class III obesity. Weight loss counseling not appropriate in acute care setting.     RECOMMEND - If appropriate at DC please refer to outpatient nutrition services for weight management.

## 2022-12-21 NOTE — PROGRESS NOTES
Telemetry Shift Summary     Rhythm: SR-ST  Rate:   Measurements: .20/.16/.40  Ectopy (reported by Monitor Tech): r PVC     Normal Values  Rhythm: Sinus  HR:   Measurements: 0.12-0.20/0.06-0.10/0.30-0.52

## 2022-12-21 NOTE — H&P
Hospital Medicine History & Physical Note    Date of Service  12/20/2022    Primary Care Physician  Colton Ahn M.D.    Consultants  None     Code Status  Full Code    Chief Complaint  Chief Complaint   Patient presents with    Dizziness     Pt started experiencing dizziness last night as well as left arm/shoulder pain. Denies any syncope or fall. Pt also states had diaphoretic episode with dizziness. Denies any chest pain currently.     Headache     Pt having posterior headache ongoing for 2 weeks, intermittent blurry vision with headache. Hx of TIA 20 years ago per pt.      History of Presenting Illness  Ashly Meyer is a 72 y.o. female with a past medical history of primary hypertension, hypothyroidism who presented 12/20/2022 with dizziness, lightheadedness, headache and difficulty maintaining balance over the past 2 days.  Patient reports that she has not been feeling well over the past 2 weeks.  She has been having progressively worsening generalized weakness intermittent headaches and lightheadedness over the past 2 days she started to have difficulty maintaining balance and near falling.  Denied falling denies losing consciousness.  Denies focal motor weakness.  Denies abnormal jerking movement, fecal or urinary incontinence.     I discussed the plan of care with emergency department physician, the patient and patient family present at bedside in the emergency room.    Review of Systems  Review of Systems   Constitutional:  Negative for chills and fever.   Eyes:  Negative for discharge and redness.   Respiratory:  Negative for cough, shortness of breath and stridor.    Cardiovascular:  Negative for chest pain and leg swelling.   Gastrointestinal:  Negative for abdominal pain and vomiting.   Genitourinary:  Negative for flank pain.   Musculoskeletal:  Negative for myalgias.   Skin: Negative.    Neurological:  Positive for dizziness. Negative for focal weakness.        Difficulty maintaining  balance while walking   Endo/Heme/Allergies:  Does not bruise/bleed easily.   Psychiatric/Behavioral:  The patient is not nervous/anxious.      Past Medical History   has a past medical history of Anginal syndrome ([facility]), Anxiety, Arthritis, Atrophic rhinitis (10/3/2016), Back pain, Bronchitis (08/2021), Carotid artery stenosis, unilateral, Carpal tunnel syndrome (9/5/2011), Chronic pain, Constipation, Controlled type 2 diabetes mellitus without complication ([facility]), Controlled type 2 diabetes mellitus without complication, without long-term current use of insulin ([facility]) (6/12/2019), Cough (08/2021), Current severe episode of major depressive disorder without psychotic features without prior episode ([facility]) (6/3/2021), Daytime sleepiness, Degenerative disc disease, Depression (5/20/2009), Deviated nasal septum (8/1/2016), Diarrhea, Disease of accessory sinus (10/3/2016), Dizziness, Dry eye syndrome, bilateral, Elevated sedimentation rate, Fatigue, Fatty liver, Fibromyalgia, Frequent headaches, GERD (gastroesophageal reflux disease), GOUT (5/20/2009), H/O foot surgery, Hearing difficulty, Heart burn, Hemorrhagic disorder ([facility]) (2016), Hiatus hernia syndrome, History of 2019 novel coronavirus disease (COVID-19) (10/11/2022), History of anemia, Hypertension, Hypothyroidism (5/20/2009), Incipient cataract of both eyes, Incomplete rectal prolapse (9/3/2021), Insomnia, Intention tremor (6/2/2022), Migraine without aura, without mention of intractable migraine without mention of status migrainosus, Mild intermittent asthma without complication, Mixed dyslipidemia, Morbid obesity with BMI of 45.0-49.9, adult ([facility]), Morning headache, Mumps, Nasal drainage, Nocturnal hypoxia, Obesity, Obesity hypoventilation syndrome ([facility]) (5/31/2017), Pelvic floor dysfunction, Peripheral neuropathy, Pleomorphic adenoma, Polymyalgia rheumatica ([facility]), Restless leg syndrome, Ringing in ears, Rotator cuff syndrome of right shoulder and allied  disorders (10/10/2018), S/P tonsillectomy, Seasonal allergies, Skin cancer (1990's), Sleep apnea syndrome, Snoring, Sore muscles, Sweat, sweating, excessive, Swelling of lower extremity, Trochanteric bursitis of both hips (3/3/2022), Urinary incontinence, Vision loss, and Weakness.    Surgical History   has a past surgical history that includes [facility] stat fine needle aspiration (11/16/2009); tonsillectomy (1953); bladder suspension (1983); other orthopedic surgery (1962/1980/1983/1985); hysterectomy, vaginal (1983); colonoscopy (2006); septoplasty (N/A, 8/1/2016); turbinoplasty (Bilateral, 8/1/2016); nasal fracture reduction open (N/A, 8/1/2016); septal reconstruction (10/3/2016); gastroscopy (2/6/2017); colonoscopy (2/6/2017); hysterectomy laparoscopy; tonsillectomy; shoulder decompression arthroscopic (Right, 2/1/2019); shoulder arthroscopy w/ bicipital tenodesis repair (Right, 2/1/2019); carpal tunnel release (Right, 2/1/2019); cataract extraction with iol (Bilateral); cholecystectomy; other surgical procedure (1998); anal sphincterotomy; tubal ligation (1980); mass excision general (Right, 1956); scar revision (Right); pr upper gi endoscopy,diagnosis (N/A, 8/26/2021); and pr colonoscopy,diagnostic (8/26/2021).     Family History  family history includes Arthritis in her maternal grandfather, maternal grandmother, sister, and sister; Bipolar disorder in her daughter and daughter; Bladder cancer in her maternal aunt and mother; Cancer in her brother and daughter; GI Disease in her daughter, mother, sister, and sister; Heart Disease in her maternal grandfather, maternal grandmother, and mother; Hypertension in her maternal grandfather and maternal grandmother; Kidney Disease in her sister; No Known Problems in her paternal grandfather and paternal grandmother; Other in her sister; Seizures in her daughter.      Social History   reports that she quit smoking about 15 years ago. Her smoking use included cigarettes.  "She started smoking about 61 years ago. She has a 43.00 pack-year smoking history. She has never used smokeless tobacco. She reports that she does not currently use alcohol. She reports that she does not currently use drugs.    Allergies  Allergies   Allergen Reactions    Ace Inhibitors Cough    Benadryl Allergy Hives     Hot skin itching    Iodine Nausea     Pt received CT w/ contrast 12/20/22 pt had nausea post contrast     Lamictal Rash and Swelling     \"hot\", unable to function.  Severe rash, scarring    Savella [Kdc:Milnacipran+Ci Pigment Blue 63] Nausea, Swelling and Anxiety     Swelling, bone and muscle pain, sweating, nausea, dizziness, sleeplessness, anxiety    Savella [Milnacipran Hcl] Unspecified     Muscle aches, feet swelling    Sulfa Drugs Hives, Shortness of Breath and Anxiety     Hard breathing, shortness of breath    Butalbital-Acetaminophen Unspecified     Dizzy, can't walk, hard to focus    Cefaclor Nausea and Unspecified     Ceclor causes light headedness and dizziness    Morphine Itching     \"Redness\"    Penicillins Itching and Swelling     Skin itching and redness    Tizanidine Hcl Nausea     \"Dizziness\" hard to focus    Milnacipran Vomiting and Nausea    Amlactin Rash and Itching     Lotion causes itching, redness and burnng    Tape Rash and Itching     Skin tears, paper tape is OK per Pt     Medications  Prior to Admission Medications   Prescriptions Last Dose Informant Patient Reported? Taking?   Cholecalciferol (VITAMIN D-3) 125 MCG (5000 UT) Tab 12/19/2022 at 2230 Patient Yes No   Sig: Take 5,000 Units by mouth every evening.   DULoxetine (CYMBALTA) 60 MG Cap DR Particles delayed-release capsule 12/19/2022 at 2230 Patient No No   Sig: Take 1 Capsule by mouth every evening.   Misc. Devices Misc Unknown at Unknown Patient No No   Sig: Referral to Nona Valenzuela DDS for mouth device for Obstructive Sleep Apnea.   Misc. Devices Misc Unknown at Unknown Patient No No   Sig: Referral to Nona Valenzuela " DDS in [place] for mouth device for Obstructive Sleep Apnea   QUEtiapine (SEROQUEL) 25 MG Tab 12/19/2022 at 2230 Patient No No   Sig: Take 1 Tablet by mouth at bedtime.   VITAMIN E PO 12/19/2022 at 0630 Patient Yes No   Sig: Take 1 Capsule by mouth every morning.   albuterol 108 (90 Base) MCG/ACT Aero Soln inhalation aerosol > 5 days at Unknown Patient No No   Sig: Inhale 2 Puffs every 6 hours as needed for Shortness of Breath.   amLODIPine (NORVASC) 2.5 MG Tab 12/19/2022 at 0630 Patient No No   Sig: Take 1 Tablet by mouth every day.   furosemide (LASIX) 20 MG Tab 12/19/2022 at 0630 Patient No No   Sig: Take 1 Tablet by mouth every morning.   gabapentin (NEURONTIN) 800 MG tablet 12/19/2022 at 2230 Patient Yes No   Sig: Take 1 Tablet by mouth 2 times a day.   levothyroxine (SYNTHROID) 150 MCG Tab 12/20/2022 at 0900 Patient No No   Sig: Take 1 Tablet by mouth every morning on an empty stomach.   nabumetone (RELAFEN) 750 MG Tab 12/19/2022 at 2230 Patient No No   Sig: Take 1 Tablet by mouth 2 times a day with meals.   nitrofurantoin (MACROBID) 100 MG Cap 12/19/2022 at 2230 Patient No No   Sig: Take 1 Capsule by mouth 2 times a day for 14 days.   Patient taking differently: Take 100 mg by mouth 2 times a day. Pt started on 12/17/2022 for 14 day course   nystatin (MYCOSTATIN) powder > 2 days at Unknown Patient Yes Yes   Sig: Apply 1 Application topically 2 times a day as needed (Apply's under breast).   pantoprazole (PROTONIX) 40 MG Tablet Delayed Response 12/19/2022 at 2230 Patient No No   Sig: Take 1 Tablet by mouth 2 times a day.   potassium chloride SA (KDUR) 20 MEQ Tab CR 12/19/2022 at 2230 Patient No No   Sig: Take 1 Tablet by mouth 2 times a day.   traMADol (ULTRAM) 50 MG Tab 12/19/2022 at 2230 Patient Yes Yes   Sig: Take 100 mg by mouth at bedtime.      Facility-Administered Medications: None     Physical Exam  Temp:  [36.2 °C (97.1 °F)] 36.2 °C (97.1 °F)  Pulse:  [62-85] 68  Resp:  [15-38] 19  BP: (149200)/(68-91)  165/71  SpO2:  [88 %-100 %] 100 %  Blood Pressure : (!) 149/68   Temperature: 36.2 °C (97.1 °F)   Pulse: 67   Respiration: 20   Pulse Oximetry: 98 %     Physical Exam  Constitutional:       General: She is not in acute distress.  HENT:      Head: Normocephalic and atraumatic.      Right Ear: External ear normal.      Left Ear: External ear normal.      Nose: No congestion or rhinorrhea.      Mouth/Throat:      Mouth: Mucous membranes are moist.      Pharynx: No oropharyngeal exudate or posterior oropharyngeal erythema.   Eyes:      General: No scleral icterus.        Right eye: No discharge.         Left eye: No discharge.      Conjunctiva/sclera: Conjunctivae normal.      Pupils: Pupils are equal, round, and reactive to light.   Cardiovascular:      Rate and Rhythm: Normal rate and regular rhythm.      Heart sounds:     No friction rub. No gallop.   Pulmonary:      Effort: Pulmonary effort is normal.   Abdominal:      General: Abdomen is flat. There is no distension.      Tenderness: There is no guarding.   Musculoskeletal:         General: No swelling.      Cervical back: Neck supple. No rigidity. No muscular tenderness.      Right lower leg: No edema.      Left lower leg: No edema.   Skin:     Capillary Refill: Capillary refill takes 2 to 3 seconds.      Coloration: Skin is not jaundiced or pale.      Findings: No bruising or erythema.   Neurological:      Mental Status: She is alert and oriented to person, place, and time.      Coordination: Coordination abnormal.      Comments: Ataxic gait.  Power 5/5 in both upper and lower extremities bilaterally   Psychiatric:         Mood and Affect: Mood normal.         Judgment: Judgment normal.     Laboratory:  Recent Labs     12/20/22  1522   WBC 10.9*   RBC 4.87   HEMOGLOBIN 13.9   HEMATOCRIT 43.2   MCV 88.7   MCH 28.5   MCHC 32.2*   RDW 46.3   PLATELETCT 175   MPV 11.1     Recent Labs     12/20/22  1655   SODIUM 140   POTASSIUM 3.9   CHLORIDE 100   CO2 25   GLUCOSE 94    BUN 19   CREATININE 0.82   CALCIUM 9.7     Recent Labs     12/20/22  1655   ALTSGPT 17   ASTSGOT 19   ALKPHOSPHAT 138*   TBILIRUBIN 0.4   GLUCOSE 94         No results for input(s): NTPROBNP in the last 72 hours.      Recent Labs     12/20/22  1655 12/20/22  1913   TROPONINT 8 8     Imaging:  CT-CTA NECK WITH & W/O-POST PROCESSING   Final Result      1.  Mild atherosclerotic plaque in bilateral carotid bulbs and proximal internal carotid arteries with less than 50% stenosis.      2.  2.8 cm left thyroid gland nodule. Recommend follow-up thyroid ultrasound.      CT-CTA HEAD WITH & W/O-POST PROCESS   Final Result      1.  No large vessel occlusion, high-grade stenosis or aneurysm of the [place].   2.  No CT evidence of acute infarct, hemorrhage or mass.   3.  Mild atrophy and chronic small vessel ischemic changes.      DX-CHEST-PORTABLE (1 VIEW)   Final Result      Similar hypoventilatory chest with linear opacity most likely from atelectasis although some scarring is possible      EC-ECHOCARDIOGRAM COMPLETE W/ CONT    (Results Pending)   MR-BRAIN-W/O    (Results Pending)     I personally reviewed patient EKG shows sinus rhythm with a rate of 72, there is left bundle branch block pattern not seen on prior EKGs.  No ST elevation, abnormal repolarization.  QTc 484.    Assessment/Plan:  Justification for Admission Status  I anticipate this patient is appropriate for observation status at this time because likely discharge after 1 midnight    Patient will need a  bed on telemetry/neurology service.  Here with stroke work-up    * Ataxia- (present on admission)  Assessment & Plan  Admit to Neuro, with continuous cardiac monitoring  CT, CT-angiography, head, neck showed no acute infarction, or hemorrhage   CT angiography shows bilateral carotid artery stenosis less than 50%.  Will start aspirin and high intensity statin  Echo with contrast ordered  MRI ordered  NPO, till screened by speech  Aspiration, Fall, and  seizure precautions    Neuro checks   Speech, physical, occupational therapy evaluation ordered     Left bundle branch block- (present on admission)  Assessment & Plan  Currently without chest pain   No troponin elevation  EKG shows sinus rhythm with a rate of 72, there is left bundle branch block pattern not seen on prior EKGs.  No ST elevation, abnormal repolarization.  QTc 484.  Continuous cardiac monitoring.  We will check echo    Carotid artery stenosis- (present on admission)  Assessment & Plan  CT imaging shows evidence of bilateral proximal internal carotid artery stenosis of less than 50%  Will start aspirin and high intensity statin  Consider refer to vascular surgery    Essential hypertension- (present on admission)  Assessment & Plan  Resume home amlodipine with hold parameters    H/O fibromyalgia- (present on admission)  Assessment & Plan  Multimodal pain control    GERD (gastroesophageal reflux disease)- (present on admission)  Assessment & Plan  Resume home pantoprazole    Hypothyroidism due to acquired atrophy of thyroid- (present on admission)  Assessment & Plan  Resume home levothyroxine    VTE prophylaxis: SCDs/TEDs and Xarelto 10 mg daily as prophylaxis

## 2022-12-21 NOTE — DISCHARGE SUMMARY
"Discharge Summary    CHIEF COMPLAINT ON ADMISSION  Chief Complaint   Patient presents with    Dizziness     Pt started experiencing dizziness last night as well as left arm/shoulder pain. Denies any syncope or fall. Pt also states had diaphoretic episode with dizziness. Denies any chest pain currently.     Headache     Pt having posterior headache ongoing for 2 weeks, intermittent blurry vision with headache. Hx of TIA 20 years ago per pt.        Reason for Admission  Dizziness, Body Aches      Admission Date  12/20/2022    CODE STATUS  Full Code    HPI & HOSPITAL COURSE  This is \"Ashly Meyer is a 72 y.o. female with a past medical history of primary hypertension, hypothyroidism who presented 12/20/2022 with dizziness, lightheadedness, headache and difficulty maintaining balance over the past 2 days.  Patient reports that she has not been feeling well over the past 2 weeks.  She has been having progressively worsening generalized weakness intermittent headaches and lightheadedness over the past 2 days she started to have difficulty maintaining balance and near falling.  Denied falling denies losing consciousness.  Denies focal motor weakness.  Denies abnormal jerking movement, fecal or urinary incontinence.\" (As per Dr. Talamantes, admitting physician).    Patient stated she has had chronic ataxia, uses walker and her walls at home. She stated she was evaluated by her PCP but has not improved.  She is living alone at home, but is able to get her own groceries and still drives.    She reported on Monday 12/19 she had dizziness start. She was prescribed Nitrofurantoin on 12/17, which does have a 1% side effect of dizziness.     MRI brain showed no acute infarcts.    Orthostatic vitals were negative.  She did present with severe hypertension 200/87 into the ED. BP upon discharge was 136/65, she only takes lasix and amlodipine 2.5mg.  At this time, it is unclear on the cause of her dizziness, but may be a " combination of her chronic medications with nitrofurantoin.    Unfortunately, patient did not qualify for IRF. She was discharged to SNF [facility].    Therefore, she is discharged in good and stable condition to home with close outpatient follow-up.    The patient recovered much more quickly than anticipated on admission.    Discharge Date  12/23/2022    FOLLOW UP ITEMS POST DISCHARGE  With PCP    DISCHARGE DIAGNOSES  Principal Problem:    Ataxia (Chronic) POA: Yes  Active Problems:    Impaired gait and mobility (Chronic) POA: Yes    Essential hypertension POA: Yes    Hypothyroidism due to acquired atrophy of thyroid POA: Yes    GERD (gastroesophageal reflux disease) POA: Yes    H/O fibromyalgia POA: Yes    Chronic respiratory failure with hypoxia (HCC) POA: Yes    Carotid artery stenosis POA: Yes    Left bundle branch block POA: Yes  Resolved Problems:    * No resolved hospital problems. *      FOLLOW UP  No future appointments.  Colton Ahn M.D.  [address] [place] [id number]  [facility] [id number]  [phone]    Go on 12/29/2022  please go to your scheduled PCP appointment      MEDICATIONS ON DISCHARGE     Medication List        CHANGE how you take these medications        Instructions   nitrofurantoin 100 MG Caps  What changed: additional instructions  Commonly known as: MACROBID   Take 1 Capsule by mouth 2 times a day for 14 days.  Dose: 100 mg            CONTINUE taking these medications        Instructions   albuterol 108 (90 Base) MCG/ACT Aers inhalation aerosol   Inhale 2 Puffs every 6 hours as needed for Shortness of Breath.  Dose: 2 Puff     amLODIPine 2.5 MG Tabs  Commonly known as: NORVASC   Take 1 Tablet by mouth every day.  Dose: 2.5 mg     DULoxetine 60 MG Cpep delayed-release capsule  Commonly known as: CYMBALTA   Take 1 Capsule by mouth every evening.  Dose: 60 mg     furosemide 20 MG Tabs  Commonly known as: LASIX   Take 1 Tablet by mouth every morning.  Dose: 20 mg     gabapentin 800 MG  tablet  Commonly known as: NEURONTIN   Take 1 Tablet by mouth 2 times a day.  Dose: 800 mg     levothyroxine 150 MCG Tabs  Commonly known as: SYNTHROID   Doctor's comments: Note dose reduction  Take 1 Tablet by mouth every morning on an empty stomach.  Dose: 150 mcg     * Misc. Devices Misc   Doctor's comments: This is her website: [email or web address]  Referral to Nona Valenzuela DDS for mouth device for Obstructive Sleep Apnea.     * Misc. Devices Misc   Doctor's comments: Please call Ashly for this appointment at [phone]. [email or web address]  Referral to Nona Valenzuela DDS in [place] for mouth device for Obstructive Sleep Apnea     nabumetone 750 MG Tabs  Commonly known as: RELAFEN   Take 1 Tablet by mouth 2 times a day with meals.  Dose: 750 mg     nystatin powder  Commonly known as: MYCOSTATIN   Apply 1 Application topically 2 times a day as needed (Apply's under breast).  Dose: 1 Application     pantoprazole 40 MG Tbec  Commonly known as: PROTONIX   Take 1 Tablet by mouth 2 times a day.  Dose: 40 mg     potassium chloride SA 20 MEQ Tbcr  Commonly known as: Kdur   Take 1 Tablet by mouth 2 times a day.  Dose: 20 mEq     QUEtiapine 25 MG Tabs  Commonly known as: Seroquel   Take 1 Tablet by mouth at bedtime.  Dose: 25 mg     traMADol 50 MG Tabs  Commonly known as: Ultram   Take 100 mg by mouth at bedtime.  Dose: 100 mg     Vitamin D-3 125 MCG (5000 UT) Tabs   Take 5,000 Units by mouth every evening.  Dose: 5,000 Units     VITAMIN E PO   Take 1 Capsule by mouth every morning.  Dose: 1 Capsule           * This list has 2 medication(s) that are the same as other medications prescribed for you. Read the directions carefully, and ask your doctor or other care provider to review them with you.                  Allergies  Allergies   Allergen Reactions    Ace Inhibitors Cough    Benadryl Allergy Hives     Hot skin itching    Iodine  "Nausea     Pt received CT w/ contrast 12/20/22 pt had nausea post contrast     Lamictal Rash and Swelling     \"hot\", unable to function.  Severe rash, scarring    Savella [Kdc:Milnacipran+Ci Pigment Blue 63] Nausea, Swelling and Anxiety     Swelling, bone and muscle pain, sweating, nausea, dizziness, sleeplessness, anxiety    Savella [Milnacipran Hcl] Unspecified     Muscle aches, feet swelling    Sulfa Drugs Hives, Shortness of Breath and Anxiety     Hard breathing, shortness of breath    Butalbital-Acetaminophen Unspecified     Dizzy, can't walk, hard to focus    Cefaclor Nausea and Unspecified     Ceclor causes light headedness and dizziness    Morphine Itching     \"Redness\"    Penicillins Itching and Swelling     Skin itching and redness    Tizanidine Hcl Nausea     \"Dizziness\" hard to focus    Milnacipran Vomiting and Nausea    Amlactin Rash and Itching     Lotion causes itching, redness and burnng    Tape Rash and Itching     Skin tears, paper tape is OK per Pt       DIET  Orders Placed This Encounter   Procedures    Diet Order Diet: Cardiac     Standing Status:   Standing     Number of Occurrences:   1     Order Specific Question:   Diet:     Answer:   Cardiac [6]       ACTIVITY  As tolerated. With walker  Weight bearing as tolerated    CONSULTATIONS  none    PROCEDURES  none    LABORATORY  Lab Results   Component Value Date    SODIUM 141 12/22/2022    POTASSIUM 3.6 12/22/2022    CHLORIDE 103 12/22/2022    CO2 27 12/22/2022    GLUCOSE 108 (H) 12/22/2022    BUN 18 12/22/2022    CREATININE 0.94 12/22/2022    CREATININE 0.84 04/18/2011    GLOMRATE >59 01/14/2011        Lab Results   Component Value Date    WBC 9.5 12/22/2022    WBC 10.8 (H) 10/06/2009    HEMOGLOBIN 13.2 12/22/2022    HEMATOCRIT 41.2 12/22/2022    PLATELETCT 186 12/22/2022        Total time of the discharge process exceeds 26 minutes.  More than 50% of time was spent face to face with patient.  This included but not limited to review of hospital " course with patient, treatment goals upon discharge, recommendations to PCP, continued and new medications and their adverse reactions, and nursing instructions for patient.

## 2022-12-21 NOTE — CARE PLAN
The patient is Stable - Low risk of patient condition declining or worsening    Shift Goals  Clinical Goals: Neuro checks, MRI, Echo, PT/OT  Patient Goals: Rest    Progress made toward(s) clinical / shift goals:  Met    Patient is not progressing towards the following goals:

## 2022-12-21 NOTE — THERAPY
"Occupational Therapy   Initial Evaluation     Patient Name: Ashly Meyer  Age:  72 y.o., Sex:  female  Medical Record #: [id number]  Today's Date: 12/21/2022     Precautions  Precautions: Fall Risk    Assessment  Patient is 72 y.o. female with a diagnosis of Ataxia.  Additional factors influencing patient status / progress: Generalized weakness, impaired balance, limited activity tolerance and intermittent dizziness.  Patient reported living alone in a [facility] in [place], [place].  Daughter lives in area and is available to assist on an inyermittent basis.  PLOF Mod Indep to indep for ADL's, transfers and functional mobility via \"furniture\" walking and FWW in community.  Patient demonstrated Mod Indep to Supervision for bed mobility, CGA sit/stand, CGA functional mobility via FWW, CGA transfers, Max assist LB clothing management, Min assist UB clothing management, Mod assist toileting via DME.  Therapist reviewed environmental/safety awareness, fall precautions, NRG conservation, AE/DME, ADL's and transfers.  Recommend continued skilled occupational therapy while in acute care.    Plan    Occupational Therapy Initial Treatment Plan   Treatment Interventions: (P) Self Care / Activities of Daily Living, Adaptive Equipment, Therapeutic Activity  Treatment Frequency: (P) 3 Times per Week  Duration: (P) Until Therapy Goals Met    DC Equipment Recommendations: (P) Tub Transfer Bench, Other (Comments) (Hip kit)  Discharge Recommendations: (P) Recommend post-acute placement for additional occupational therapy services prior to discharge home     Subjective    Patient was alert and cooperative w/ tx.     Objective       12/21/22 1301   Initial Contact Note    Initial Contact Note Order Received and Verified, Occupational Therapy Evaluation in Progress with Full Report to Follow.   Prior Living Situation   Prior Services None   Housing / Facility 1 Story House   Steps Into Home 2   Steps In Home 0   Bathroom Set up Bathtub / Shower " "Combination;Shower Curtain   Equipment Owned Front-Wheel Walker;Hand Held Shower   Lives with - Patient's Self Care Capacity Alone and Able to Care For Self   Comments Patient reported living alone in a [facility] in [place], [place].  Daughter lives in area and is available to assist on an inyermittent basis.  PLOF Mod Indep to indep for ADL's, transfers and functional mobility via \"furniture\" walking and FWW in community.   Prior Level of ADL Function   Self Feeding Independent   Grooming / Hygiene Independent   Bathing Independent   Dressing Independent   Toileting Independent   Comments Patient reported Mod [place] for dressing - requiring additional time and AE   Prior Level of IADL Function   Kitchen Mobility Independent   Shopping Requires Assist   Prior Level Of Mobility Independent Without Device in Home;Independent With Device in Community;Independent With Steps in Home   Driving / Transportation Driving Independent   Precautions   Precautions Fall Risk   Vitals   Patient BP Position Sitting   Blood Pressure  114/71   Respiration 17   Pulse Oximetry 96 %   O2 (LPM) 3   O2 Delivery Device Silicone Nasal Cannula   Pain   Intervention Declines   Non Verbal Descriptors   Non Verbal Scale  Calm;Unlabored Breathing   Cognition    Cognition / Consciousness WDL   Level of Consciousness Alert   Active ROM Upper Body   Active ROM Upper Body  X   Dominant Hand Right   Rt Shoulder Flexion Degrees 90   Rt Shoulder Abduction Degrees 60   Lt Shoulder Flexion Degrees 90   Lt. Shoulder Abduction Degrees 60   Comments Patient reported chronic bilateral shoulder condition   Strength Upper Body   Upper Body Strength  X   Comments Generalized weakness bilaterally   Upper Body Muscle Tone   Upper Body Muscle Tone  WDL   Coordination Upper Body   Coordination WDL   Balance Assessment   Sitting Balance (Static) Fair +   Sitting Balance (Dynamic) Fair +   Standing Balance (Static) Fair   Standing Balance (Dynamic) Fair -   Weight Shift " Sitting Good   Weight Shift Standing Fair   Comments OOB FWW   Bed Mobility    Supine to Sit Modified Independent   Sit to Supine Supervised   Scooting Modified Independent   ADL Assessment   Eating Independent   Upper Body Dressing Minimal Assist   Lower Body Dressing Maximal Assist   Toileting Moderate Assist   How much help from another person does the patient currently need...   Putting on and taking off regular lower body clothing? 2   Bathing (including washing, rinsing, and drying)? 2   Toileting, which includes using a toilet, bedpan, or urinal? 2   Putting on and taking off regular upper body clothing? 3   Taking care of personal grooming such as brushing teeth? 3   Eating meals? 4   6 Clicks Daily Activity Score 16   Functional Mobility   Sit to Stand Standby Assist   Bed, Chair, Wheelchair Transfer Standby Assist   Toilet Transfers Contact Guard Assist   Transfer Method Stand Step   Mobility CGA FWW, EOB<>commode + sink   Distance (Feet) 15   # of Times Distance was Traveled 2   Edema / Skin Assessment   Edema / Skin  Not Assessed   Activity Tolerance   Sitting Edge of Bed 10   Standing 5x2   Comments sitting/commode 10   Short Term Goals   Short Term Goal # 1 Mod Indep UB clothing management   Short Term Goal # 2 Min assist LB clothing management via AE/DME PRN   Short Term Goal # 3 Mod indep toiletinbg task via AE/DME   Short Term Goal # 4 Sup/SBA commode transfer via DME   Education Group   Education Provided Transfers;Role of Occupational Therapist;Activities of Daily Living;Adaptive Equipment;Use of Call Light   Role of Occupational Therapist Patient Response Patient;Acceptance;Explanation;Verbal Demonstration   Transfers Patient Response Patient;Acceptance;Explanation;Demonstration;Verbal Demonstration;Action Demonstration;Reinforcement Needed   ADL Patient Response Patient;Acceptance;Explanation;Demonstration;Verbal Demonstration;Action Demonstration;Reinforcement Needed   Adaptive Equipment  Patient Response Patient;Acceptance;Explanation;Demonstration;Verbal Demonstration;Action Demonstration;Reinforcement Needed   Use of Call Light Patient Response Patient;Acceptance;Explanation;Verbal Demonstration   Occupational Therapy Initial Treatment Plan    Treatment Interventions Self Care / Activities of Daily Living;Adaptive Equipment;Therapeutic Activity   Treatment Frequency 3 Times per Week   Duration Until Therapy Goals Met   Problem List   Problem List Decreased Active Daily Living Skills;Decreased Upper Extremity Strength Right;Decreased Upper Extremity Strength Left;Decreased Functional Mobility;Decreased Activity Tolerance;Safety Awareness Deficits / Cognition;Impaired Postural Control / Balance   Anticipated Discharge Equipment and Recommendations   DC Equipment Recommendations Tub Transfer Bench;Other (Comments)  (Hip kit)   Discharge Recommendations Recommend post-acute placement for additional occupational therapy services prior to discharge home

## 2022-12-21 NOTE — ASSESSMENT & PLAN NOTE
Currently without chest pain   No troponin elevation  EKG shows sinus rhythm with a rate of 72, there is left bundle branch block pattern not seen on prior EKGs.  No ST elevation, abnormal repolarization.  QTc 484.  Continuous cardiac monitoring.    No abnormal heart structure on echo

## 2022-12-21 NOTE — ED NOTES
Med rec updated and complete, per pt   Allergies reviewed, per pt  Interviewed pt with daughter at bedside with permission from pt.

## 2022-12-21 NOTE — DISCHARGE PLANNING
Case Management Discharge Planning        Anticipated Discharge Dispo:  Likely home, pending PT/OT    DME Needed: No    Action(s) Taken: Discussed pt in IDT rounds. Per MD, pt has been living by self independently. Pt is pending MRI jaye and echo. Per MD, possible d/c today.    Escalations Completed: None    Next Steps: LSW to follow and assist as needed.    Barriers to Discharge: None

## 2022-12-21 NOTE — ASSESSMENT & PLAN NOTE
Patient mentioned she has had chronic (1 year) of difficulty walking. She has seen her PCP but so far no diagnosis or improvement.     CT, CT-angiography, head, neck showed no acute infarction, or hemorrhage   CT angiography shows bilateral carotid artery stenosis less than 50%.    - Continue aspirin and high intensity statin  - Echo LVEF 70%, no valvulopathy, RVSP 30mmHg  - MRI brain showed no acute infarcts.    Fall precautions    physical, occupational therapy evaluation ordered - patient agreeing she needs more therapy, requested SNF.   Placed referrals for SNF  [facility] vs [facility], IRF rejected patient.

## 2022-12-21 NOTE — CARE PLAN
The patient is Stable - Low risk of patient condition declining or worsening    Shift Goals  Clinical Goals: Neuro checks, MRI, Echo  Patient Goals: rest, comfort    Progress made toward(s) clinical / shift goals:  Patient is pending MRI and Echo. NIHSS is 3. Q4 neuro checks in effect. Patient medicated for 8/10 headache. Complains of profound weakness in lower extremities.       Problem: Pain - Standard  Goal: Alleviation of pain or a reduction in pain to the patient’s comfort goal  Outcome: Progressing     Problem: Optimal Care of the Stroke Patient  Goal: Optimal emergency care for the stroke patient  Outcome: Progressing     Problem: Knowledge Deficit - Stroke Education  Goal: Patient's knowledge of stroke and risk factors will improve  Outcome: Progressing       Patient is not progressing towards the following goals:

## 2022-12-21 NOTE — PROGRESS NOTES
4 Eyes Skin Assessment Completed by GENE Franco and GENE Hernandez.    Head WDL  Ears WDL  Nose WDL  Mouth WDL  Neck WDL  Breast/Chest WDL  Shoulder Blades WDL  Spine WDL  (R) Arm/Elbow/Hand WDL  (L) Arm/Elbow/Hand WDL  Abdomen Redness in abdominal folds  Groin WDL  Scrotum/Coccyx/Buttocks WDL  (R) Leg WDL  (L) Leg WDL  (R) Heel/Foot/Toe WDL  (L) Heel/Foot/Toe WDL          Devices In Places Tele Box      Interventions In Place Pressure Redistribution Mattress    Possible Skin Injury No    Pictures Uploaded Into Epic N/A  Wound Consult Placed N/A  RN Wound Prevention Protocol Ordered No

## 2022-12-21 NOTE — ASSESSMENT & PLAN NOTE
CT imaging shows evidence of bilateral proximal internal carotid artery stenosis of less than 50%  , HDL 60, total 181  Continue aspirin and high intensity statin  outpatient vascular surgery referral if symptoms worsen.

## 2022-12-21 NOTE — ED NOTES
Repeat trop collected and sent to lab. Per ERP pt may have water.     1923 Pt passed dysphagia screening, water left bedside.

## 2022-12-21 NOTE — ED NOTES
Pt came back from CT complaining of nausea. Pt provided with emesis bag and ERP made aware.   Zofran ordered and administered. Pt declines any other issues at this time. Pts allergic updated with pharmacy approval.

## 2022-12-21 NOTE — ED NOTES
Spoke with Christ.  on Tele. 303-2 room clean and ready for pt. Report called to RN. Tech transferring pt.

## 2022-12-21 NOTE — THERAPY
Physical Therapy   Initial Evaluation     Patient Name: Ashly Meyer  Age:  72 y.o., Sex:  female  Medical Record #: [id number]  Today's Date: 12/21/2022     Precautions  Precautions: (P) Fall Risk    Assessment  Patient is 72 y.o. female with a diagnosis of Ataxia.obesity.Pt lives at home alone and is mod active.Pt is dizzy with sitting or standing and is a fall risk.Acute PT needed to improve transfers,ambulation and stairs    Plan    Physical Therapy Initial Treatment Plan   Treatment Plan : (P) Therapeutic Exercise, Therapeutic Activities, Stair Training, Gait Training, Neuro Re-Education / Balance  Treatment Frequency: (P) 3 Times per Week    DC Equipment Recommendations: (P) None  Discharge Recommendations: (P) Recommend post-acute placement for additional physical therapy services prior to discharge home        Objective     12/21/22 1400   Charge Group   PT Evaluation PT Evaluation Mod   Total Time Spent   PT Total Time Yes   PT Evaluation Time Spent (Mins) 30   Initial Contact Note    Initial Contact Note Order Received and Verified, Physical Therapy Evaluation in Progress with Full Report to Follow.   Precautions   Precautions Fall Risk   Prior Living Situation   Prior Services None   Housing / Facility 1 Story House   Steps Into Home 2   Steps In Home 0   Equipment Owned Front-Wheel Walker   Lives with - Patient's Self Care Capacity Alone and Able to Care For Self   Prior Level of Functional Mobility   Bed Mobility Independent   Transfer Status Independent   Ambulation Independent   Distance Ambulation (Feet)   (limited community)   Assistive Devices Used Front-Wheel Walker   Stairs Independent   Cognition    Cognition / Consciousness WDL   Level of Consciousness Alert   Passive ROM Lower Body   Passive ROM Lower Body WDL   Active ROM Lower Body    Active ROM Lower Body  WDL   Strength Lower Body   Comments general weakness   Coordination Lower Body    Coordination Lower Body  WDL   Balance  Assessment   Sitting Balance (Static) Fair +   Sitting Balance (Dynamic) Fair +   Standing Balance (Static) Fair   Standing Balance (Dynamic) Fair   Weight Shift Sitting Good   Weight Shift Standing Fair   Gait Analysis   Gait Level Of Assist Standby Assist   Assistive Device Front Wheel Walker   Distance (Feet) 100   # of Times Distance was Traveled 1   Deviation Step To   Weight Bearing Status full   Bed Mobility    Supine to Sit Modified Independent   Sit to Supine Modified Independent   Scooting Modified Independent   Functional Mobility   Sit to Stand Standby Assist   Bed, Chair, Wheelchair Transfer Standby Assist   Transfer Method Stand Step   How much difficulty does the patient currently have...   Turning over in bed (including adjusting bedclothes, sheets and blankets)? 4   Sitting down on and standing up from a chair with arms (e.g., wheelchair, bedside commode, etc.) 3   Moving from lying on back to sitting on the side of the bed? 4   How much help from another person does the patient currently need...   Moving to and from a bed to a chair (including a wheelchair)? 3   Need to walk in a hospital room? 3   Climbing 3-5 steps with a railing? 3   6 clicks Mobility Score 20   Activity Tolerance   Sitting Edge of Bed 10   Standing 10   Patient / Family Goals    Patient / Family Goal #1 SNF   Short Term Goals    Short Term Goal # 1 I transfers in 4 V   Short Term Goal # 2 I amb x 200 feet in 4 V   Short Term Goal # 3 I x 2 stairs in 4 V   Physical Therapy Initial Treatment Plan    Treatment Plan  Therapeutic Exercise;Therapeutic Activities;Stair Training;Gait Training;Neuro Re-Education / Balance   Treatment Frequency 3 Times per Week   Problem List    Problems Impaired Transfers;Impaired Ambulation;Functional Strength Deficit;Impaired Balance;Decreased Activity Tolerance   Anticipated Discharge Equipment and Recommendations   DC Equipment Recommendations None   Discharge Recommendations Recommend post-acute  placement for additional physical therapy services prior to discharge home   Interdisciplinary Plan of Care Collaboration   IDT Collaboration with  Nursing   Session Information   Date / Session Number  12/21-1 1/3 12/26

## 2022-12-22 LAB
ALBUMIN SERPL BCP-MCNC: 4 G/DL (ref 3.2–4.9)
BUN SERPL-MCNC: 18 MG/DL (ref 8–22)
CALCIUM ALBUM COR SERPL-MCNC: 9.4 MG/DL (ref 8.5–10.5)
CALCIUM SERPL-MCNC: 9.4 MG/DL (ref 8.4–10.2)
CHLORIDE SERPL-SCNC: 103 MMOL/L (ref 96–112)
CO2 SERPL-SCNC: 27 MMOL/L (ref 20–33)
CREAT SERPL-MCNC: 0.94 MG/DL (ref 0.5–1.4)
ERYTHROCYTE [DISTWIDTH] IN BLOOD BY AUTOMATED COUNT: 47.2 FL (ref 35.9–50)
GFR SERPLBLD CREATININE-BSD FMLA CKD-EPI: 64 ML/MIN/1.73 M 2
GLUCOSE SERPL-MCNC: 108 MG/DL (ref 65–99)
HCT VFR BLD AUTO: 41.2 % (ref 37–47)
HGB BLD-MCNC: 13.2 G/DL (ref 12–16)
MAGNESIUM SERPL-MCNC: 2 MG/DL (ref 1.5–2.5)
MCH RBC QN AUTO: 28.7 PG (ref 27–33)
MCHC RBC AUTO-ENTMCNC: 32 G/DL (ref 33.6–35)
MCV RBC AUTO: 89.6 FL (ref 81.4–97.8)
PHOSPHATE SERPL-MCNC: 3.9 MG/DL (ref 2.5–4.5)
PLATELET # BLD AUTO: 186 K/UL (ref 164–446)
PMV BLD AUTO: 11.2 FL (ref 9–12.9)
POTASSIUM SERPL-SCNC: 3.6 MMOL/L (ref 3.6–5.5)
RBC # BLD AUTO: 4.6 M/UL (ref 4.2–5.4)
SODIUM SERPL-SCNC: 141 MMOL/L (ref 135–145)
VIT B12 SERPL-MCNC: 610 PG/ML (ref 211–911)
WBC # BLD AUTO: 9.5 K/UL (ref 4.8–10.8)

## 2022-12-22 PROCEDURE — 84207 ASSAY OF VITAMIN B-6: CPT

## 2022-12-22 PROCEDURE — A9270 NON-COVERED ITEM OR SERVICE: HCPCS | Performed by: HOSPITALIST

## 2022-12-22 PROCEDURE — 83735 ASSAY OF MAGNESIUM: CPT

## 2022-12-22 PROCEDURE — A9270 NON-COVERED ITEM OR SERVICE: HCPCS | Performed by: INTERNAL MEDICINE

## 2022-12-22 PROCEDURE — 700102 HCHG RX REV CODE 250 W/ 637 OVERRIDE(OP): Performed by: HOSPITALIST

## 2022-12-22 PROCEDURE — 700102 HCHG RX REV CODE 250 W/ 637 OVERRIDE(OP): Performed by: INTERNAL MEDICINE

## 2022-12-22 PROCEDURE — 82607 VITAMIN B-12: CPT

## 2022-12-22 PROCEDURE — 80069 RENAL FUNCTION PANEL: CPT

## 2022-12-22 PROCEDURE — 36415 COLL VENOUS BLD VENIPUNCTURE: CPT

## 2022-12-22 PROCEDURE — 99232 SBSQ HOSP IP/OBS MODERATE 35: CPT | Performed by: INTERNAL MEDICINE

## 2022-12-22 PROCEDURE — 85027 COMPLETE CBC AUTOMATED: CPT

## 2022-12-22 PROCEDURE — G0378 HOSPITAL OBSERVATION PER HR: HCPCS

## 2022-12-22 RX ORDER — ACETAMINOPHEN 325 MG/1
650 TABLET ORAL EVERY 6 HOURS PRN
Status: DISCONTINUED | OUTPATIENT
Start: 2022-12-22 | End: 2022-12-23

## 2022-12-22 RX ADMIN — POTASSIUM CHLORIDE 20 MEQ: 20 TABLET, EXTENDED RELEASE ORAL at 17:28

## 2022-12-22 RX ADMIN — POTASSIUM CHLORIDE 20 MEQ: 20 TABLET, EXTENDED RELEASE ORAL at 05:46

## 2022-12-22 RX ADMIN — OMEPRAZOLE 20 MG: 20 CAPSULE, DELAYED RELEASE ORAL at 17:28

## 2022-12-22 RX ADMIN — LEVOTHYROXINE SODIUM 150 MCG: 0.07 TABLET ORAL at 05:46

## 2022-12-22 RX ADMIN — TRAMADOL HYDROCHLORIDE 50 MG: 50 TABLET, COATED ORAL at 10:58

## 2022-12-22 RX ADMIN — ATORVASTATIN CALCIUM 40 MG: 40 TABLET, FILM COATED ORAL at 17:28

## 2022-12-22 RX ADMIN — RIVAROXABAN 10 MG: 10 TABLET, FILM COATED ORAL at 17:27

## 2022-12-22 RX ADMIN — GABAPENTIN 500 MG: 400 CAPSULE ORAL at 17:27

## 2022-12-22 RX ADMIN — TRAMADOL HYDROCHLORIDE 50 MG: 50 TABLET, COATED ORAL at 21:18

## 2022-12-22 RX ADMIN — FUROSEMIDE 20 MG: 20 TABLET ORAL at 05:47

## 2022-12-22 RX ADMIN — ASPIRIN 81 MG: 81 TABLET, COATED ORAL at 05:46

## 2022-12-22 RX ADMIN — AMLODIPINE BESYLATE 2.5 MG: 5 TABLET ORAL at 05:47

## 2022-12-22 RX ADMIN — QUETIAPINE FUMARATE 25 MG: 25 TABLET ORAL at 21:18

## 2022-12-22 RX ADMIN — OMEPRAZOLE 20 MG: 20 CAPSULE, DELAYED RELEASE ORAL at 05:46

## 2022-12-22 RX ADMIN — GABAPENTIN 400 MG: 400 CAPSULE ORAL at 05:46

## 2022-12-22 RX ADMIN — DULOXETINE HYDROCHLORIDE 60 MG: 30 CAPSULE, DELAYED RELEASE PELLETS ORAL at 17:28

## 2022-12-22 ASSESSMENT — ENCOUNTER SYMPTOMS
BACK PAIN: 0
PALPITATIONS: 0
VOMITING: 0
CHILLS: 0
FEVER: 0
COUGH: 0
SHORTNESS OF BREATH: 0
DIZZINESS: 0
WEAKNESS: 0
HEADACHES: 0
NAUSEA: 0
ABDOMINAL PAIN: 0

## 2022-12-22 ASSESSMENT — PAIN DESCRIPTION - PAIN TYPE
TYPE: CHRONIC PAIN

## 2022-12-22 ASSESSMENT — FIBROSIS 4 INDEX: FIB4 SCORE: 1.78

## 2022-12-22 NOTE — CARE PLAN
The patient is Stable - Low risk of patient condition declining or worsening    Shift Goals  Clinical Goals: Find placement for discharge  Patient Goals: rest, comfort    Progress made toward(s) clinical / shift goals: Patient on 2 L nc. No acute neuro changes. PT/OT consult completed. Patient awaiting placement.     Problem: Pain - Standard  Goal: Alleviation of pain or a reduction in pain to the patient’s comfort goal  Outcome: Progressing     Problem: Knowledge Deficit - Stroke Education  Goal: Patient's knowledge of stroke and risk factors will improve  Outcome: Progressing     Problem: Mobility - Stroke  Goal: Patient's capacity to carry out activities will improve  Outcome: Progressing       Patient is not progressing towards the following goals:

## 2022-12-22 NOTE — DISCHARGE PLANNING
Case Management Discharge Planning        Anticipated Discharge Dispo:  SNF    DME Needed: No    Action(s) Taken: LSW completed chart review. PT/OT recommended post acute placement. Per review, rehab declined due to not meeting 2/3 disciplines. Discussed pt in morning rounds. Per MD, pt is clear for SNF. LSW to collect SNF choice from pt.    0930: LSW met with pt at bedside to discuss SNF. Pt gave consent to send referrals to [facility] and [facility]. Pt would also like to discuss d/c to SNF with her dtr Sandra. Pt states she has oxygen at night with [facility]. LSW faxed SNF choice form to [facility].     Escalations Completed: None    Next Steps: LSW to collect SNF choice from pt.    Barriers to Discharge: pending placement        Care Transition Team Assessment    Information Source  Orientation Level: Oriented X4  Information Given By: Patient  Informant's Name: Ashly  Who is responsible for making decisions for patient? : Patient         Elopement Risk  Legal Hold: No  Ambulatory or Self Mobile in Wheelchair: No-Not an Elopement Risk  Elopement Risk: Not at Risk for Elopement    Interdisciplinary Discharge Planning  Primary Care Physician: Colton Ahn MD  Lives with - Patient's Self Care Capacity: Alone and Able to Care For Self  Patient or legal guardian wants to designate a caregiver: No  Support Systems: Family Member(s)  Housing / Facility: [address]  Prior Services: None  Durable Medical Equipment: Home Oxygen, Walker  DME Provider / Phone: [facility]    Discharge Preparedness  What is your plan after discharge?: Skilled nursing facility  What are your discharge supports?: Child  Prior Functional Level: Drives Self, Independent with Activities of Daily Living, Ambulatory  Difficulity with ADLs: None  Difficulity with IADLs: None    Functional Assesment  Prior Functional Level: Drives Self, Independent with Activities of Daily Living, Ambulatory    Finances  Financial Barriers to Discharge: No    Vision / Hearing  Impairment  Vision Impairment : Yes  Right Eye Vision: Impaired, Wears Glasses  Left Eye Vision: Impaired, Wears Glasses  Hearing Impairment : No              Domestic Abuse  Have you ever been the victim of abuse or violence?: No  Physical Abuse or Sexual Abuse: No  Verbal Abuse or Emotional Abuse: No  Possible Abuse/Neglect Reported to:: Not Applicable         Discharge Risks or Barriers  Discharge risks or barriers?: No    Anticipated Discharge Information  Discharge Disposition: D/T to SNF with Medicare cert in anticipation of skilled care (03)

## 2022-12-22 NOTE — ASSESSMENT & PLAN NOTE
Patient mentioned she has had chronic (1 year) of difficulty walking.  She is dependent to using a walker and her walls at home for stability. She stated she was evaluated by her PCP but has not improved.  She is living alone at home, but is able to get her own groceries and still drives.  She mentioned she was in swim therapy, but her medical insurance decided to stop paying for them around summer 2022, since then she states she has been declining in her functional abilities.  - placed referral for IRF and SNF  - continue PT/OT  - b12 normal  - b6 pending

## 2022-12-22 NOTE — DISCHARGE PLANNING
Received Choice form at 1518  Agency/Facility Name: [facility]  Referral sent per Choice form @ [phone]

## 2022-12-22 NOTE — PROGRESS NOTES
[facility] Medicine Daily Progress Note    Date of Service  12/21/2022    Chief Complaint  Ashly Meyer is a 72 y.o. female admitted 12/20/2022 with   Chief Complaint   Patient presents with    Dizziness     Pt started experiencing dizziness last night as well as left arm/shoulder pain. Denies any syncope or fall. Pt also states had diaphoretic episode with dizziness. Denies any chest pain currently.     Headache     Pt having posterior headache ongoing for 2 weeks, intermittent blurry vision with headache. Hx of TIA 20 years ago per pt.        Hospital Course  72F PMH HTN, hypothyroidism, obesity class 3, chronic hypoxemic respiratory failure on home oxygen 2LNC (nocturnal and daytime), admitted on 12/20/22 for reports of dizziness starting on Monday 12/29. She stated she could not get up and worsened over the last 2 days, difficulty managing her balance. She was admitted to rule out stroke.    Interval Problem Update  Patient stated her dizziness was her main problem. 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  - MRI brain showed no acute infarcts.  - Echo completed, no aortic or mitral valve dysfunction noted, LVEF 70%      I have discussed this patient's plan of care and discharge plan at IDT rounds today with Case Management, Nursing, Nursing leadership, and other members of the IDT team.    Consultants/Specialty  none    Code Status  Full Code    Disposition  Patient is medically cleared for discharge.   Anticipate discharge to  IRF vs SNF, referrals placed .  I have placed the appropriate orders for post-discharge needs.    Review of Systems  Review of Systems   Constitutional:  Negative for chills, fever and malaise/fatigue.   Respiratory:  Negative for cough and shortness of breath.    Cardiovascular:  Negative for chest pain and palpitations.   Gastrointestinal:  Negative for abdominal pain, constipation, diarrhea, nausea and vomiting.   Musculoskeletal:  Negative for back pain and joint pain.    Neurological:  Negative for dizziness, weakness and headaches.   All other systems reviewed and are negative.     Physical Exam  Temp:  [36.4 °C (97.6 °F)-36.9 °C (98.5 °F)] 36.4 °C (97.6 °F)  Pulse:  [62-94] 77  Resp:  [15-38] 18  BP: (114-180)/(54-91) 134/61  SpO2:  [88 %-100 %] 96 %    Physical Exam  Vitals and nursing note reviewed.   Constitutional:       General: She is not in acute distress.     Appearance: She is obese. She is not ill-appearing.      Comments: Frail appearing elderly female   HENT:      Head: Normocephalic and atraumatic.      Comments: Temporal muscle wasting positive     Mouth/Throat:      Mouth: Mucous membranes are dry.      Pharynx: No oropharyngeal exudate.   Eyes:      General: No scleral icterus.     Extraocular Movements: Extraocular movements intact.   Cardiovascular:      Rate and Rhythm: Normal rate and regular rhythm.      Pulses: Normal pulses.      Heart sounds: Normal heart sounds. No murmur heard.  Pulmonary:      Effort: Pulmonary effort is normal. No respiratory distress.      Breath sounds: Normal breath sounds. No wheezing.   Abdominal:      General: Abdomen is flat. Bowel sounds are normal. There is no distension.      Palpations: Abdomen is soft.      Tenderness: There is no abdominal tenderness.   Musculoskeletal:         General: No swelling or tenderness.      Cervical back: Normal range of motion. No rigidity.      Comments: Sarcopenic. Bilateral hand muscle atrophy noted.   Skin:     General: Skin is warm.      Capillary Refill: Capillary refill takes less than 2 seconds.      Coloration: Skin is not jaundiced.      Findings: No erythema.   Neurological:      Mental Status: She is alert and oriented to person, place, and time. Mental status is at baseline.      Motor: Weakness present.      Comments: Noted mild essential tremors   Psychiatric:         Mood and Affect: Mood normal.         Behavior: Behavior normal.         Thought Content: Thought content  normal.         Judgment: Judgment normal.       Fluids    Intake/Output Summary (Last 24 hours) at 12/21/2022 1641  Last data filed at 12/21/2022 1200  Gross per 24 hour   Intake 360 ml   Output --   Net 360 ml       Laboratory  Recent Labs     12/20/22  1522 12/21/22  0138   WBC 10.9* 10.9*   RBC 4.87 4.55   HEMOGLOBIN 13.9 12.8   HEMATOCRIT 43.2 40.3   MCV 88.7 88.6   MCH 28.5 28.1   MCHC 32.2* 31.8*   RDW 46.3 46.5   PLATELETCT 175 189   MPV 11.1 11.0     Recent Labs     12/20/22  1655 12/21/22  0138   SODIUM 140 140   POTASSIUM 3.9 3.7   CHLORIDE 100 103   CO2 25 26   GLUCOSE 94 107*   BUN 19 17   CREATININE 0.82 0.77   CALCIUM 9.7 9.5             Recent Labs     12/21/22  0138   TRIGLYCERIDE 99   HDL 60   *       Imaging  EC-ECHOCARDIOGRAM COMPLETE W/O CONT   Final Result      MR-BRAIN-W/O   Final Result      1.  No acute abnormality.   2.  Mild chronic microvascular ischemic disease.      CT-CTA NECK WITH & W/O-POST PROCESSING   Final Result      1.  Mild atherosclerotic plaque in bilateral carotid bulbs and proximal internal carotid arteries with less than 50% stenosis.      2.  2.8 cm left thyroid gland nodule. Recommend follow-up thyroid ultrasound.      CT-CTA HEAD WITH & W/O-POST PROCESS   Final Result      1.  No large vessel occlusion, high-grade stenosis or aneurysm of the [place].   2.  No CT evidence of acute infarct, hemorrhage or mass.   3.  Mild atrophy and chronic small vessel ischemic changes.      DX-CHEST-PORTABLE (1 VIEW)   Final Result      Similar hypoventilatory chest with linear opacity most likely from atelectasis although some scarring is possible           Assessment/Plan  * Ataxia- (present on admission)  Assessment & Plan  Patient mentioned she has had chronic (1 year) of difficulty walking. She has seen her PCP but so far no diagnosis or improvement.     CT, CT-angiography, head, neck showed no acute infarction, or hemorrhage   CT angiography shows bilateral carotid  artery stenosis less than 50%.    - Continue aspirin and high intensity statin  - Echo LVEF 70%, no valvulopathy, RVSP 30mmHg  - MRI brain showed no acute infarcts.    Fall precautions    physical, occupational therapy evaluation ordered - patient agreeing she needs more therapy, requested SNF. Placed referrals for IRF and SNF    Impaired gait and mobility- (present on admission)  Assessment & Plan  Patient mentioned she has had chronic (1 year) of difficulty walking.  She is dependent to using a walker and her walls at home for stability. She stated she was evaluated by her PCP but has not improved.  She is living alone at home, but is able to get her own groceries and still drives.  She mentioned she was in swim therapy, but her medical insurance decided to stop paying for them around summer 2022, since then she states she has been declining in her functional abilities.  - placed referral for IRF and SNF  - continue PT/OT    Left bundle branch block- (present on admission)  Assessment & Plan  Currently without chest pain   No troponin elevation  EKG shows sinus rhythm with a rate of 72, there is left bundle branch block pattern not seen on prior EKGs.  No ST elevation, abnormal repolarization.  QTc 484.  Continuous cardiac monitoring.    No abnormal heart structure on echo    Carotid artery stenosis- (present on admission)  Assessment & Plan  CT imaging shows evidence of bilateral proximal internal carotid artery stenosis of less than 50%  , HDL 60, total 181  Continue aspirin and high intensity statin  outpatient vascular surgery referral if symptoms worsen.    Chronic respiratory failure with hypoxia (HCC)- (present on admission)  Assessment & Plan  Patient on nocturnal oxygen at home, but does use continuously during the day at times.  continue    H/O fibromyalgia- (present on admission)  Assessment & Plan  Multimodal pain control    GERD (gastroesophageal reflux disease)- (present on  admission)  Assessment & Plan  Resume home pantoprazole    Hypothyroidism due to acquired atrophy of thyroid- (present on admission)  Assessment & Plan  Resume home levothyroxine    Essential hypertension- (present on admission)  Assessment & Plan  Continue home amlodipine with hold parameters       VTE prophylaxis: SCDs/TEDs    I have performed a physical exam and reviewed and updated ROS and Plan today (12/21/2022). In review of yesterday's note (12/20/2022), there are no changes except as documented above.

## 2022-12-22 NOTE — PROGRESS NOTES
Pt inquiring about home medication Potassium Chloride BID and Nabumetone BID. RN notified pt of her Potassium level of 3.7 this AM, however pt states that she is on this at home BID and prefers RN to ask MD.    RN asked MD per pt request. New order received for Potassium Chloride. However, no new order for Nabumetone as this medication is in the NSAID class, contraindicated at this point when looking at Risks vs. Benefit per MD as pt is admitted for rule out CVA. Pt understands and agrees with plan of care.

## 2022-12-22 NOTE — HOSPITAL COURSE
72F PMH HTN, hypothyroidism, obesity class 3, chronic hypoxemic respiratory failure on home oxygen 2LNC (nocturnal and daytime), admitted on 12/20/22 for reports of dizziness starting on Monday 12/29. She stated she could not get up and worsened over the last 2 days, difficulty managing her balance. She was admitted to rule out stroke.    Currently, MRI brain showed no acute infarcts. Echo completed, no aortic or mitral valve dysfunction noted, LVEF 70%. Acute stroke ruled out.    Patient had complaints of severe right foot pain. XR negative for fractures or deformities.  Appeared swollen and red. Uric acid 9.0. elevated ESR and CRP.  Treating for acute gout flare, with improvement of symptoms.

## 2022-12-22 NOTE — CARE PLAN
The patient is Stable - Low risk of patient condition declining or worsening    Shift Goals  Clinical Goals: SNF placement  Patient Goals: SNF placement  Family Goals: MARIAMA    Progress made toward(s) clinical / shift goals:  CM working on placement; referrals sent      Problem: Pain - Standard  Goal: Alleviation of pain or a reduction in pain to the patient’s comfort goal  Description: Target End Date:  Prior to discharge or change in level of care    Document on Vitals flowsheet    1.  Document pain using the appropriate pain scale per order or unit policy  2.  Educate and implement non-pharmacologic comfort measures (i.e. relaxation, distraction, massage, cold/heat therapy, etc.)  3.  Pain management medications as ordered  4.  Reassess pain after pain med administration per policy  5.  If opiods administered assess patient's response to pain medication is appropriate per POSS sedation scale  6.  Follow pain management plan developed in collaboration with patient and interdisciplinary team (including palliative care or pain specialists if applicable)  Outcome: Progressing     Problem: Optimal Care of the Stroke Patient  Goal: Optimal emergency care for the stroke patient  Description: Target End Date:  End of day 1    Time of Onset    1.  Time of last known well obtained  2.  Patient and family/caregiver verbalize understanding of diagnosis, medications and testing  3.  NIHSS performed and documented, including date and time, for ischemic stroke patients prior to any acute recanalization therapy (thrombolytics or mechanical) or within 12 hours of arrival if no intervention is warranted  4.  Consults and referrals placed to appropriate departments    Medications Administration as Ordered:    1.  Implement appropriate reversal agents for INR greater than 1.5  2.  Pre-alteplase administration of antihypertensives for SBP >185 DBP >110  3.  Post-alteplase administration of antihypertensives for SBP >185, DBP >105  4.   Thrombolytic Therapy for qualifying ischemic stroke patients who arrive within 4.5 hours of time of Last Known Well. Thrombolytic therapy administered within 30 minutes or a documented reason for delay  Outcome: Progressing  Goal: Optimal acute care for the stroke patient  Description: Target End Date:  1 to 3 days    - Vital signs and neuro checks performed and documented per order  - NIHSS completed and documented per order  - Continuous telemetry monitoring for 72 hours or until discontinued by provider  - Head CT without contrast obtained  - Consideration of MRI/MRA  - MRI screening form completed in worklist if MRI ordered  - Echocardiogram with Bubble Study ordered/completed with consideration of BRIGIDO  - Carotid Doppler ordered/completed (Not required if CTA of neck completed in ED)  - Lipid Panel obtained within 48 hours of admission  - PT, PTT, INR obtained per Anticoagulation orders (if applicable)  - Antithrombotic therapy by end of hospital day 2 for ischemic stroke. Provider must document reason if contraindicated.  - Venous Thromboembolism (VTE) Prophylaxis by end of hospital day 2 for ischemic and hemorrhagic stroke. Provider must document reason if contraindicated  - Dysphagia screen completed and documented prior to any PO intake. Patient to remain NPO until Speech Therapy evaluation if thrombolytic or thrombectomy performed  - Rehabilitation assessment including PT/OT/SLP evaluations for referral to Physical Medicine and Rehabilitation services. If none needed, provider needs to document reason  - Neurology consult placed  - Consideration of cardiology consult for cryptogenic strokes  Outcome: Progressing     Problem: Knowledge Deficit - Stroke Education  Goal: Patient's knowledge of stroke and risk factors will improve  Description: Target End Date:  1-3 days or as soon as patient condition allows    Document in Patient Education    1.  Stroke education booklet provided  2.  Education regarding EMS  activation, need for follow up, medication prescribed at discharge, risk factors for stroke/lifestyle modifications, warning signs and symptoms of stroke provided  Outcome: Progressing     Problem: Psychosocial - Patient Condition  Goal: Patient's ability to verbalize feelings about condition will improve  Description: Target End Date:  Prior to discharge or change in level of care    1.  Discuss coping with medical condition and its effects  2.  Encourage patient participation in care  3.  Encourage acknowledgement of body changes and accompanying emotions  4.  Perform depression screening  Outcome: Progressing  Goal: Patient's ability to re-evaluate and adapt role responsibilities will improve  Description: Target End Date:  Prior to discharge or change in level of care    1.  Assess family support  2.  Encourage support system participation in care  3.  Encouraged verbalization of feelings regarding caregiver responsibilities  4.  Discuss changes in role and responsibilities caused by patient's condition  Outcome: Progressing     Problem: Discharge Planning - Stroke  Goal: Ensure Stroke Core Measures are met prior to discharge  Description: Target End Date:  Prior to discharge or change in level of care    1. Patient discharged on antithrombotic therapy (Ischemic Stroke)  2. Patient discharged on intensive statin if LDL is greater than or equal to 70 mg/dl (Ischemic Stroke)  3. Patient discharged on anticoagulation therapy for patients with atrial fibrillation/flutter (Ischemic Stroke)  4. Smoking education/cessation provided if applicable  Outcome: Progressing  Goal: Patient’s continuum of care needs will be met  Description: Target End Date:  Prior to discharge or change in level of care    1.  Potential discharge barriers identified upon admission and throughout hospital stay  2.  Ensure appropriate referrals in place for follow up with specialists after discharge  3.  Ensure appropriate referrals in place for  DMEs if applicable  4.  Collaboration with transitional care team and interdisciplinary team to meet discharge needs  5.  Involve patient and family/caregiver in prioritizing goals for discharge planning  6.  Educate patient and caregiver about discharge instructions, medications, and follow up appointments  7.  Referral placed to [facility]  8.  Assure orders and follow up appointments are made for outpatient extended cardiac monitoring if appropriate  Outcome: Progressing     Problem: Neuro Status  Goal: Neuro status will remain stable or improve  Description: Target End Date:  Prior to discharge or change in level of care    Document on Neuro assessment in the Assessment flowsheet    1.  Assess and monitor neurologic status per provider order/protocol/unit policy  2.  Assess level of consciousness and orientation  3.  Assess for speech, dysarthria, dysphagia, facial symmetry  4.  Assess visual field, eye movements, gaze preference, pupil reaction and size  5.  Assess muscle strength and motor response in all four extremities  6.  Assess for sensation (numbness and tingling)  7.  Assess basic neuro reflexes (cough, gag, corneal)  8.  Identify changes in neuro status and report to provider for testing/treatment orders  Outcome: Progressing     Problem: Hemodynamic Monitoring  Goal: Patient's hemodynamics, fluid balance and neurologic status will be stable or improve  Description: Target End Date:  Prior to discharge or change in level of care    1.  Vital signs, pulse oximetry and cardiac monitor per provider order and/or policy  2.  Frequent pulse checks performed post thrombectomy  3.  Frequent monitoring for signs of bleeding post TPA administration  4.  Proper management of IV infusions  5.  Intake and output monitored per provider order  6.  Daily weight obtained per unit policy or provider order  7.  Peripheral pulses and capillary refill assessed as needed  8.  Monitor for signs/symptoms of  excessive bleeding  9.  Body temperature assessed and fevers treated  10. Patient positioned for maximum circulation/cardiac output  Outcome: Progressing     Problem: Respiratory - Stroke Patient  Goal: Patient will achieve/maintain optimum respiratory rate/effort  Description: Target End Date:  Prior to discharge or change in level of care    Document on Assessment flowsheet    1.  Assess and monitor respiratory rate, rhythm, depth and effort of respiration  2.  Oxygenation assessed throughout shift (recommendation of >94% for new stroke patients)  3.  Oxygen administered and/or titrated per order  4.  Collaboration with RT to administer medication/treatments per order  5.  Patient educated on importance of turning, coughing, and deep breathing  6.  Patient positioned for maximum ventilatory efficiency  7.  Airway suctioning provided as needed  8.  Incentive spirometry encouraged 5-10 times every hour or while awake  Outcome: Progressing     Problem: Dysphagia  Goal: Dysphagia will improve  Description: Target End Date:  Prior to discharge or change in level of care    1.  Assess and monitor ability to swallow  2.  Collaborate with Speech Therapy to determine appropriate adaptation for safe administration of medications and oral nutrition  3.  Elevate head of bed to 90 degrees during feedings and for 30 minutes after each feeding  4.  Encourage proper swallowing techniques  5.  Screening on admission or as soon as possible  Outcome: Progressing     Problem: Risk for Aspiration  Goal: Patient's risk for aspiration will be absent or decrease  Description: Target End Date:  Prior to discharge or change in level of care    1.   Complete dysphagia screening on admission  2.   NPO until dysphagia screening complete or medically cleared  3.   Collaborate with Speech Therapy, Clinical Dietitian and interdisciplinary team  4.   Implement aspiration precautions  5.   Assist patient up to chair for meals  6.   Elevate head of  bed 90 degrees if patient is unable to get out of bed  7.   Encourage small bites  8.   Ensure foods/liquids are of appropriate consistency  9.   Assess for any signs/symptoms of aspiration  10. Assess breath sounds and vital signs after oral intake  Outcome: Progressing     Problem: Urinary Elimination  Goal: Establish and maintain regular urinary output  Description: Target End Date:  Prior to discharge or change in level of care    Document on I/O and Assessment flowsheets    1.  Evaluate need to continue indwelling catheter every shift  2.  Assess signs and symptoms of urinary retention  3.  Assess post-void residual volumes  4.  Implement bladder training program  5.  Encourage scheduled voidings  6.  Assist patient to sit on bedside commode or toilet for voiding  7.  Educate patient and family/caregiver on use and purpose of urine collection devices (document in Patient Education)  Outcome: Progressing     Problem: Bowel Elimination  Goal: Establish and maintain regular bowel function  Description: Target End Date:  Prior to discharge or change in level of care    1.   Note date of last BM  2.   Educate about diet, fluid intake, medication and activity to promote bowel function  3.   Educate signs and symptoms of constipation and interventions to implement  4.   Pharmacologic bowel management per provider order  5.   Regular toileting schedule  6.   Upright position for toileting  7.   High fiber diet  8.   Encourage hydration  9.   Collaborate with Clinical Dietician  10. Care and maintenance of ostomy if applicable  Outcome: Progressing     Problem: Mobility - Stroke  Goal: Patient's capacity to carry out activities will improve  Description: Target End Date:  Prior to discharge or change in level of care    1.  Assess for barriers to mobility/activity  2.  Implement activity per interdisciplinary team recommendations  3.  Target activity level identified and patient/family/caregiver aware of goal  4.   Provide assistive devices  5.  Instruct patient/caregiver on proper use of assistive/adaptive devices  6.  Schedule activities and rest periods to decrease effects of fatigue  7.  Encourage mobilization to extent of ability  8.  Maintain proper body alignment  9.  Provide adequate pain management to allow progressive mobilization  10. Implement pace maker precautions as needed  Outcome: Progressing  Goal: Spasticity will be prevented or improved  Description: Target End Date:  Prior to discharge or change in level of care    1.  Muscle relaxing agents considered or administered per order  2.  Splints applied properly and used accordingly to therapist's recommendations  3.  Assistance with stretching and range of motion exercises provided  Outcome: Progressing  Goal: Subluxation will be prevented or improved  Description: Target End Date:  Prior to discharge or change in level of care    1.   Ensure proper handling during transfers, ambulation, and repositioning in bed  2.   Reduce traction to affected limb and provide adequate support of weight  3.   Perform passive range of motion  4.  Assist with active range of motion  Outcome: Progressing     Problem: Self Care  Goal: Patient will have the ability to perform ADLs independently or with assistance (bathe, groom, dress, toilet and feed)  Description: Target End Date:  Prior to discharge or change in level of care    Document on ADL flowsheet    1.  Assess the capability and level of deficiency to perform ADLs  2.  Encourage family/care giver involvement  3.  Provide assistive devices  4.  Consider PT/OT evaluations  5.  Maintain support, give positive feedback, encourage self-care allowing extra time and verbal cuing as needed  6.  Avoid doing something for patients they can do themselves, but provide assistance as needed  7.  Assist in anticipating/planning individual needs  8.  Collaborate with Case Management and  to meet discharge needs  Outcome:  Progressing     Problem: Knowledge Deficit - Standard  Goal: Patient and family/care givers will demonstrate understanding of plan of care, disease process/condition, diagnostic tests and medications  Description: Target End Date:  1-3 days or as soon as patient condition allows    Document in Patient Education    1.  Patient and family/caregiver oriented to unit, equipment, visitation policy and means for communicating concern  2.  Complete/review Learning Assessment  3.  Assess knowledge level of disease process/condition, treatment plan, diagnostic tests and medications  4.  Explain disease process/condition, treatment plan, diagnostic tests and medications  Outcome: Progressing     Problem: Skin Integrity  Goal: Skin integrity is maintained or improved  Description: Target End Date:  Prior to discharge or change in level of care    Document interventions on Skin Risk/Juanjo flowsheet groups and corresponding LDA    1.  Assess and monitor skin integrity, appearance and/or temperature  2.  Assess risk factors for impaired skin integrity and/or pressures ulcers  3.  Implement precautions to protect skin integrity in collaboration with interdisciplinary team  4.  Implement pressure ulcer prevention protocol if at risk for skin breakdown  5.  Confirm wound care consult if at risk for skin breakdown  6.  Ensure patient use of pressure relieving devices  (Low air loss bed, waffle overlay, heel protectors, ROHO cushion, etc)  Outcome: Progressing     Problem: Fall Risk  Goal: Patient will remain free from falls  Description: Target End Date:  Prior to discharge or change in level of care    Document interventions on the Jocelyne Morataya Fall Risk Assessment    1.  Assess for fall risk factors  2.  Implement fall precautions  Outcome: Progressing       Patient is not progressing towards the following goals:

## 2022-12-22 NOTE — DISCHARGE PLANNING
[facility] Transitional Care Coordination    Referral from:  Dr. Montoya  Insurance Provider on Facesheet:  Medicare/[facility]  Potential Rehab diagnosis:  Possible stroke    Chart review indicates patient may have ongoing medical management, limited therapy needs to possibly meet inpatient rehab facility criteria with the goal of returning to community.      D/C Support:  TBD    Physiatry referral will not be forwarded for consult.  Current documentation does not reflect ongoing acute therapy need in 2 of 3 disciplines meeting CMS criteria for IRF level care.  [facility] will not follow.    Please reach out if PMR consult requested for medical management.     Last Covid test:    Thank you for the referral.

## 2022-12-22 NOTE — PROGRESS NOTES
[facility] Medicine Daily Progress Note    Date of Service  12/22/2022    Chief Complaint  Ashly Meyer is a 72 y.o. female admitted 12/20/2022 with   Chief Complaint   Patient presents with    Dizziness     Pt started experiencing dizziness last night as well as left arm/shoulder pain. Denies any syncope or fall. Pt also states had diaphoretic episode with dizziness. Denies any chest pain currently.     Headache     Pt having posterior headache ongoing for 2 weeks, intermittent blurry vision with headache. Hx of TIA 20 years ago per pt.        Hospital Course  72F PMH HTN, hypothyroidism, obesity class 3, chronic hypoxemic respiratory failure on home oxygen 2LNC (nocturnal and daytime), admitted on 12/20/22 for reports of dizziness starting on Monday 12/29. She stated she could not get up and worsened over the last 2 days, difficulty managing her balance. She was admitted to rule out stroke.    Interval Problem Update  Patient stated her dizziness was her main problem. 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  - MRI brain showed no acute infarcts.  - Echo completed, no aortic or mitral valve dysfunction noted, LVEF 70%  Pending SNF  B12 = 610    I have discussed this patient's plan of care and discharge plan at IDT rounds today with Case Management, Nursing, Nursing leadership, and other members of the IDT team.    Consultants/Specialty  none    Code Status  Full Code    Disposition  Patient is medically cleared for discharge.   Anticipate discharge to  IRF vs SNF, referrals placed .  I have placed the appropriate orders for post-discharge needs.    Review of Systems  Review of Systems   Constitutional:  Negative for chills, fever and malaise/fatigue.   Respiratory:  Negative for cough and shortness of breath.    Cardiovascular:  Negative for chest pain and palpitations.   Gastrointestinal:  Negative for abdominal pain, nausea and vomiting.   Musculoskeletal:  Negative for back pain and joint pain.    Neurological:  Negative for dizziness, weakness and headaches.   All other systems reviewed and are negative.     Physical Exam  Temp:  [36.4 °C (97.6 °F)-36.9 °C (98.4 °F)] 36.9 °C (98.4 °F)  Pulse:  [77-88] 83  Resp:  [14-18] 14  BP: (118-134)/(45-71) 118/71  SpO2:  [89 %-96 %] 94 %    Physical Exam  Vitals and nursing note reviewed.   Constitutional:       General: She is not in acute distress.     Appearance: She is obese. She is not ill-appearing.      Comments: Frail appearing elderly female   HENT:      Head:      Comments: Temporal muscle wasting positive     Mouth/Throat:      Mouth: Mucous membranes are dry.      Pharynx: No oropharyngeal exudate.   Cardiovascular:      Rate and Rhythm: Normal rate and regular rhythm.      Pulses: Normal pulses.      Heart sounds: Normal heart sounds. No murmur heard.  Pulmonary:      Effort: Pulmonary effort is normal. No respiratory distress.      Breath sounds: Normal breath sounds.   Abdominal:      General: Bowel sounds are normal. There is no distension.      Palpations: Abdomen is soft.   Musculoskeletal:         General: No swelling or tenderness.      Comments: Sarcopenic. Bilateral hand muscle atrophy noted.   Skin:     General: Skin is warm.      Capillary Refill: Capillary refill takes less than 2 seconds.      Coloration: Skin is not jaundiced.      Findings: No erythema.   Neurological:      Mental Status: She is alert and oriented to person, place, and time. Mental status is at baseline.      Motor: Weakness present.      Comments: Noted mild essential tremors   Psychiatric:         Mood and Affect: Mood normal.         Behavior: Behavior normal.       Fluids    Intake/Output Summary (Last 24 hours) at 12/22/2022 1354  Last data filed at 12/21/2022 2200  Gross per 24 hour   Intake 480 ml   Output --   Net 480 ml       Laboratory  Recent Labs     12/20/22  1522 12/21/22  0138 12/22/22  0230   WBC 10.9* 10.9* 9.5   RBC 4.87 4.55 4.60   HEMOGLOBIN 13.9 12.8  13.2   HEMATOCRIT 43.2 40.3 41.2   MCV 88.7 88.6 89.6   MCH 28.5 28.1 28.7   MCHC 32.2* 31.8* 32.0*   RDW 46.3 46.5 47.2   PLATELETCT 175 189 186   MPV 11.1 11.0 11.2     Recent Labs     12/20/22  1655 12/21/22  0138 12/22/22  0230   SODIUM 140 140 141   POTASSIUM 3.9 3.7 3.6   CHLORIDE 100 103 103   CO2 25 26 27   GLUCOSE 94 107* 108*   BUN 19 17 18   CREATININE 0.82 0.77 0.94   CALCIUM 9.7 9.5 9.4             Recent Labs     12/21/22  0138   TRIGLYCERIDE 99   HDL 60   *       Imaging  EC-ECHOCARDIOGRAM COMPLETE W/O CONT   Final Result      MR-BRAIN-W/O   Final Result      1.  No acute abnormality.   2.  Mild chronic microvascular ischemic disease.      CT-CTA NECK WITH & W/O-POST PROCESSING   Final Result      1.  Mild atherosclerotic plaque in bilateral carotid bulbs and proximal internal carotid arteries with less than 50% stenosis.      2.  2.8 cm left thyroid gland nodule. Recommend follow-up thyroid ultrasound.      CT-CTA HEAD WITH & W/O-POST PROCESS   Final Result      1.  No large vessel occlusion, high-grade stenosis or aneurysm of the [place].   2.  No CT evidence of acute infarct, hemorrhage or mass.   3.  Mild atrophy and chronic small vessel ischemic changes.      DX-CHEST-PORTABLE (1 VIEW)   Final Result      Similar hypoventilatory chest with linear opacity most likely from atelectasis although some scarring is possible           Assessment/Plan  * Ataxia- (present on admission)  Assessment & Plan  Patient mentioned she has had chronic (1 year) of difficulty walking. She has seen her PCP but so far no diagnosis or improvement.     CT, CT-angiography, head, neck showed no acute infarction, or hemorrhage   CT angiography shows bilateral carotid artery stenosis less than 50%.    - Continue aspirin and high intensity statin  - Echo LVEF 70%, no valvulopathy, RVSP 30mmHg  - MRI brain showed no acute infarcts.    Fall precautions    physical, occupational therapy evaluation ordered -  patient agreeing she needs more therapy, requested SNF.   Placed referrals for SNF, IRF rejected patient.    Impaired gait and mobility- (present on admission)  Assessment & Plan  Patient mentioned she has had chronic (1 year) of difficulty walking.  She is dependent to using a walker and her walls at home for stability. She stated she was evaluated by her PCP but has not improved.  She is living alone at home, but is able to get her own groceries and still drives.  She mentioned she was in swim therapy, but her medical insurance decided to stop paying for them around summer 2022, since then she states she has been declining in her functional abilities.  - placed referral for IRF and SNF  - continue PT/OT    Left bundle branch block- (present on admission)  Assessment & Plan  Currently without chest pain   No troponin elevation  EKG shows sinus rhythm with a rate of 72, there is left bundle branch block pattern not seen on prior EKGs.  No ST elevation, abnormal repolarization.  QTc 484.  Continuous cardiac monitoring.    No abnormal heart structure on echo    Carotid artery stenosis- (present on admission)  Assessment & Plan  CT imaging shows evidence of bilateral proximal internal carotid artery stenosis of less than 50%  , HDL 60, total 181  Continue aspirin and high intensity statin  outpatient vascular surgery referral if symptoms worsen.    Chronic respiratory failure with hypoxia (HCC)- (present on admission)  Assessment & Plan  Patient on nocturnal oxygen at home, but does use continuously during the day at times.  continue    H/O fibromyalgia- (present on admission)  Assessment & Plan  Multimodal pain control    GERD (gastroesophageal reflux disease)- (present on admission)  Assessment & Plan  Resume home pantoprazole    Hypothyroidism due to acquired atrophy of thyroid- (present on admission)  Assessment & Plan  Resume home levothyroxine    Essential hypertension- (present on admission)  Assessment &  Plan  Continue home amlodipine with hold parameters       VTE prophylaxis: SCDs/TEDs    I have performed a physical exam and reviewed and updated ROS and Plan today (12/22/2022). In review of yesterday's note (12/21/2022), there are no changes except as documented above.

## 2022-12-23 ENCOUNTER — APPOINTMENT (OUTPATIENT)
Dept: RADIOLOGY | Facility: MEDICAL CENTER | Age: 72
End: 2022-12-23
Attending: INTERNAL MEDICINE
Payer: MEDICARE

## 2022-12-23 LAB
ALBUMIN SERPL BCP-MCNC: 4 G/DL (ref 3.2–4.9)
BUN SERPL-MCNC: 15 MG/DL (ref 8–22)
CALCIUM ALBUM COR SERPL-MCNC: 9.6 MG/DL (ref 8.5–10.5)
CALCIUM SERPL-MCNC: 9.6 MG/DL (ref 8.4–10.2)
CHLORIDE SERPL-SCNC: 95 MMOL/L (ref 96–112)
CO2 SERPL-SCNC: 19 MMOL/L (ref 20–33)
CREAT SERPL-MCNC: 0.78 MG/DL (ref 0.5–1.4)
CRP SERPL HS-MCNC: 14.83 MG/DL (ref 0–0.75)
ERYTHROCYTE [SEDIMENTATION RATE] IN BLOOD BY WESTERGREN METHOD: 56 MM/HOUR (ref 0–25)
GFR SERPLBLD CREATININE-BSD FMLA CKD-EPI: 80 ML/MIN/1.73 M 2
GLUCOSE SERPL-MCNC: 132 MG/DL (ref 65–99)
PHOSPHATE SERPL-MCNC: 3 MG/DL (ref 2.5–4.5)
POTASSIUM SERPL-SCNC: 4.3 MMOL/L (ref 3.6–5.5)
SODIUM SERPL-SCNC: 129 MMOL/L (ref 135–145)
URATE SERPL-MCNC: 9 MG/DL (ref 1.9–8.2)

## 2022-12-23 PROCEDURE — 700102 HCHG RX REV CODE 250 W/ 637 OVERRIDE(OP): Performed by: INTERNAL MEDICINE

## 2022-12-23 PROCEDURE — A9270 NON-COVERED ITEM OR SERVICE: HCPCS | Performed by: INTERNAL MEDICINE

## 2022-12-23 PROCEDURE — 700102 HCHG RX REV CODE 250 W/ 637 OVERRIDE(OP): Performed by: HOSPITALIST

## 2022-12-23 PROCEDURE — 80069 RENAL FUNCTION PANEL: CPT

## 2022-12-23 PROCEDURE — 36415 COLL VENOUS BLD VENIPUNCTURE: CPT

## 2022-12-23 PROCEDURE — 97535 SELF CARE MNGMENT TRAINING: CPT

## 2022-12-23 PROCEDURE — G0378 HOSPITAL OBSERVATION PER HR: HCPCS

## 2022-12-23 PROCEDURE — 85652 RBC SED RATE AUTOMATED: CPT

## 2022-12-23 PROCEDURE — 84550 ASSAY OF BLOOD/URIC ACID: CPT

## 2022-12-23 PROCEDURE — 99232 SBSQ HOSP IP/OBS MODERATE 35: CPT | Performed by: INTERNAL MEDICINE

## 2022-12-23 PROCEDURE — 73620 X-RAY EXAM OF FOOT: CPT | Mod: RT

## 2022-12-23 PROCEDURE — 97530 THERAPEUTIC ACTIVITIES: CPT

## 2022-12-23 PROCEDURE — 86140 C-REACTIVE PROTEIN: CPT

## 2022-12-23 PROCEDURE — A9270 NON-COVERED ITEM OR SERVICE: HCPCS | Performed by: HOSPITALIST

## 2022-12-23 RX ORDER — ACETAMINOPHEN 500 MG
500 TABLET ORAL EVERY 6 HOURS
Status: DISCONTINUED | OUTPATIENT
Start: 2022-12-23 | End: 2022-12-29 | Stop reason: HOSPADM

## 2022-12-23 RX ORDER — TRAMADOL HYDROCHLORIDE 50 MG/1
75 TABLET ORAL EVERY 8 HOURS PRN
Status: DISCONTINUED | OUTPATIENT
Start: 2022-12-23 | End: 2022-12-27

## 2022-12-23 RX ORDER — ACETAMINOPHEN 500 MG
500 TABLET ORAL EVERY 6 HOURS PRN
Status: DISCONTINUED | OUTPATIENT
Start: 2022-12-23 | End: 2022-12-28

## 2022-12-23 RX ORDER — GABAPENTIN 400 MG/1
400 CAPSULE ORAL 2 TIMES DAILY
Status: DISCONTINUED | OUTPATIENT
Start: 2022-12-23 | End: 2022-12-29 | Stop reason: HOSPADM

## 2022-12-23 RX ADMIN — GABAPENTIN 500 MG: 400 CAPSULE ORAL at 05:58

## 2022-12-23 RX ADMIN — OMEPRAZOLE 20 MG: 20 CAPSULE, DELAYED RELEASE ORAL at 17:24

## 2022-12-23 RX ADMIN — TRAMADOL HYDROCHLORIDE 75 MG: 50 TABLET, COATED ORAL at 11:08

## 2022-12-23 RX ADMIN — POLYETHYLENE GLYCOL 3350 1 PACKET: 17 POWDER, FOR SOLUTION ORAL at 08:02

## 2022-12-23 RX ADMIN — ASPIRIN 81 MG: 81 TABLET, COATED ORAL at 05:59

## 2022-12-23 RX ADMIN — DULOXETINE HYDROCHLORIDE 60 MG: 30 CAPSULE, DELAYED RELEASE PELLETS ORAL at 17:23

## 2022-12-23 RX ADMIN — ATORVASTATIN CALCIUM 40 MG: 40 TABLET, FILM COATED ORAL at 17:23

## 2022-12-23 RX ADMIN — RIVAROXABAN 10 MG: 10 TABLET, FILM COATED ORAL at 17:24

## 2022-12-23 RX ADMIN — POTASSIUM CHLORIDE 20 MEQ: 20 TABLET, EXTENDED RELEASE ORAL at 17:24

## 2022-12-23 RX ADMIN — TRAMADOL HYDROCHLORIDE 75 MG: 50 TABLET, COATED ORAL at 20:29

## 2022-12-23 RX ADMIN — LEVOTHYROXINE SODIUM 150 MCG: 0.07 TABLET ORAL at 05:59

## 2022-12-23 RX ADMIN — OMEPRAZOLE 20 MG: 20 CAPSULE, DELAYED RELEASE ORAL at 05:58

## 2022-12-23 RX ADMIN — POTASSIUM CHLORIDE 20 MEQ: 20 TABLET, EXTENDED RELEASE ORAL at 05:58

## 2022-12-23 RX ADMIN — QUETIAPINE FUMARATE 25 MG: 25 TABLET ORAL at 20:29

## 2022-12-23 RX ADMIN — GABAPENTIN 400 MG: 400 CAPSULE ORAL at 17:23

## 2022-12-23 RX ADMIN — ACETAMINOPHEN 500 MG: 500 TABLET ORAL at 17:24

## 2022-12-23 ASSESSMENT — PAIN DESCRIPTION - PAIN TYPE
TYPE: CHRONIC PAIN

## 2022-12-23 ASSESSMENT — COGNITIVE AND FUNCTIONAL STATUS - GENERAL
CLIMB 3 TO 5 STEPS WITH RAILING: TOTAL
HELP NEEDED FOR BATHING: A LOT
MOVING TO AND FROM BED TO CHAIR: UNABLE
TOILETING: TOTAL
SUGGESTED CMS G CODE MODIFIER MOBILITY: CM
STANDING UP FROM CHAIR USING ARMS: TOTAL
SUGGESTED CMS G CODE MODIFIER DAILY ACTIVITY: CK
DRESSING REGULAR UPPER BODY CLOTHING: A LITTLE
DRESSING REGULAR LOWER BODY CLOTHING: TOTAL
PERSONAL GROOMING: A LITTLE
WALKING IN HOSPITAL ROOM: TOTAL
TURNING FROM BACK TO SIDE WHILE IN FLAT BAD: A LITTLE
DAILY ACTIVITIY SCORE: 14
MOBILITY SCORE: 8
MOVING FROM LYING ON BACK TO SITTING ON SIDE OF FLAT BED: UNABLE

## 2022-12-23 ASSESSMENT — FIBROSIS 4 INDEX: FIB4 SCORE: 1.78

## 2022-12-23 ASSESSMENT — ENCOUNTER SYMPTOMS
SHORTNESS OF BREATH: 0
WEAKNESS: 0
VOMITING: 0
COUGH: 0
PALPITATIONS: 0
CHILLS: 0
ABDOMINAL PAIN: 0
DIZZINESS: 0
NAUSEA: 0
BACK PAIN: 0
HEADACHES: 0
FEVER: 0

## 2022-12-23 ASSESSMENT — GAIT ASSESSMENTS: GAIT LEVEL OF ASSIST: UNABLE TO PARTICIPATE

## 2022-12-23 NOTE — CARE PLAN
The patient is Stable - Low risk of patient condition declining or worsening    Shift Goals  Clinical Goals: SNF placement  Patient Goals: rest and comfort  Family Goals: MARIAMA    Progress made toward(s) clinical / shift goals: Pt updated on POC, waiting SNF placement, pt verbalizes understanding. Pt c/o LE pain, pt medicated per MAR.      Problem: Pain - Standard  Goal: Alleviation of pain or a reduction in pain to the patient’s comfort goal  Outcome: Progressing     Problem: Knowledge Deficit - Stroke Education  Goal: Patient's knowledge of stroke and risk factors will improve  Outcome: Progressing     Problem: Psychosocial - Patient Condition  Goal: Patient's ability to verbalize feelings about condition will improve  Outcome: Progressing       Patient is not progressing towards the following goals:

## 2022-12-23 NOTE — PROGRESS NOTES
Received bedside report from GENE Hernandez, pt care assumed. VS stable, pt assessment complete. Pt A&Ox4, pt c/o 3/10 foot pain at this time. POC discussed with pt and verbalizes no questions. Pt denies any additional needs at this time. Bed locked and in lowest position, bed alarm on. Pt educated on fall risk and verbalized understanding, call light within reach, hourly rounding initiated.

## 2022-12-23 NOTE — DISCHARGE PLANNING
Case Management Discharge Planning    Admission Date: 12/20/2022  GMLOS:    ALOS: 0    6-Clicks ADL Score: 16  6-Clicks Mobility Score: 20  PT and/or OT Eval ordered: Yes  Post-acute Referrals Ordered: Yes  Post-acute Choice Obtained: Yes  Has referral(s) been sent to post-acute provider:  Yes      Anticipated Discharge Dispo: Discharge Disposition: D/T to SNF with Medicare cert in anticipation of skilled care (03)    DME Needed: No    Action(s) Taken: Per DPA, [facility] currently on admission hold. [facility] referral faxed to [facility].    Spoke to pt at bedside. Informed pt that [facility] have accepted. Received verbal approval for [facility]. Per Negro from [facility], she will begin auth.    Escalations Completed: None    Medically Clear: No    Next Steps: f/u with [facility] regarding SNF acceptance    Barriers to Discharge: Medical clearance

## 2022-12-23 NOTE — DISCHARGE PLANNING
Agency/Facility Name: [facility]  Spoke To: Anu  Outcome: Per Anu, the state is at [facility], resulting in an admissions hold until next Tuesday, 12/27.    Received Choice form at 0843  Agency/Facility Name: [facility]  Referral sent per Choice form @ 0916     @1121  Agency/Facility Name: [facility]  Spoke To: Violeta  Outcome: Per Violeta, she has a bed available today and can  the pt at 1630.  RN CM notified    Agency/Facility Name: [facility]  Spoke To: Violeta  Outcome: Per Violeta, she will have to obtain prior auth before transporting pt to [facility]. Per Violeta, she will cancel the transport for 1630. DPA to follow up on insurance auth.  RN CM notified    @[phone]  Agency/Facility Name: [facility]  Spoke To: Violeta  Outcome: Per RN CM, the pt prefers [facility] over [facility]. DPA called [facility] to ask staff to stop the insurance auth for the pt.    Agency/Facility Name: [facility]  Spoke To: Negro  Outcome: Per Negro, she will start auth, but notified staff that she will not have a bed until next Tuesday 12/27.  RN CM notified

## 2022-12-23 NOTE — THERAPY
"Occupational Therapy  Daily Treatment     Patient Name: Ashly Meyer  Age:  72 y.o., Sex:  female  Medical Record #: [id number]  Today's Date: 12/23/2022     Precautions  Precautions: (P) Fall Risk    Assessment    Patient was alert and cooperative w/ tx.  Noted flinching with light handling of bilateral feet.  Pt reported bilateral foot pain at 5/10 at rest/supine in bed, 10/10 standing via FWW.  Patient demonstrated Mod to max assist with bed mobility (prior day at Mod Indep), Max assist sit/stand (prior day at SBA), Pt tolerated standing via FWW for up to 1 min 5x's and unable to take steps (prior session ambulated 100' via FWW)  Total assist LB clothing management.  Noted significant change in function when compared to previous tx sessions.  Therapist recommends continued skilled occupational therapy and Recommend post-acute placement for additional occupational therapy services prior to discharge home.    Plan    DC Equipment Recommendations: (P) Tub Transfer Bench, Other (Comments) (Hip kit)  Discharge Recommendations: (P) Recommend post-acute placement for additional occupational therapy services prior to discharge home    Subjective    \"My feet are hurting so much\"     Objective       12/23/22 1352    Services   Is patient using  services for this encounter? No   Precautions   Precautions Fall Risk   Vitals   Pulse Oximetry 92 %   O2 (LPM) 2   O2 Delivery Device Nasal Cannula   Pain   Intervention Heat Applied;Rest;Repositioned   Pain 0 - 10 Group   Location Foot   Location Orientation Right;Left   Comfort Goal Perform Activity   Therapist Pain Assessment Post Activity Pain Same as Prior to Activity;10;Nurse Notified   Non Verbal Descriptors   Non Verbal Scale  Calm;Unlabored Breathing   Cognition    Level of Consciousness Alert   Balance   Sitting Balance (Static) Fair +   Sitting Balance (Dynamic) Fair   Standing Balance (Static) Fair -   Standing Balance (Dynamic) Poor + "   Weight Shift Sitting Fair   Weight Shift Standing Poor   Skilled Intervention Tactile Cuing;Verbal Cuing;Postural Facilitation   Comments OOB FWW   Bed Mobility    Supine to Sit Moderate Assist   Sit to Supine Maximal Assist   Activities of Daily Living   Upper Body Dressing Minimal Assist   Lower Body Dressing Total Assist   Comments Attempted functional mobility via FWW for commode - pain primary barrier - patient unable to take steps, requested return to bed   How much help from another person does the patient currently need...   Putting on and taking off regular lower body clothing? 1   Bathing (including washing, rinsing, and drying)? 2   Toileting, which includes using a toilet, bedpan, or urinal? 1   Putting on and taking off regular upper body clothing? 3   Taking care of personal grooming such as brushing teeth? 3   Eating meals? 4   6 Clicks Daily Activity Score 14   Functional Mobility   Sit to Stand Maximal Assist   Toilet Transfers Unable to Participate   Comments patient unable to take steps secondary bilateral foot pain 10/10  Noted Mod.Max assist bed mobility compared to Mod Indep yesterday for bed mobility   Activity Tolerance   Sitting Edge of Bed 10   Standing 1min x 5   Short Term Goals   Goal Outcome # 1 Goal not met  (Min assist UBCM)   Goal Outcome # 2 Goal not met  (Total assist LBCM)   Anticipated Discharge Equipment and Recommendations   DC Equipment Recommendations Tub Transfer Bench;Other (Comments)  (Hip kit)   Discharge Recommendations Recommend post-acute placement for additional occupational therapy services prior to discharge home

## 2022-12-23 NOTE — PROGRESS NOTES
[facility] Medicine Daily Progress Note    Date of Service  12/23/2022    Chief Complaint  Ashly Meyer is a 72 y.o. female admitted 12/20/2022 with   Chief Complaint   Patient presents with    Dizziness     Pt started experiencing dizziness last night as well as left arm/shoulder pain. Denies any syncope or fall. Pt also states had diaphoretic episode with dizziness. Denies any chest pain currently.     Headache     Pt having posterior headache ongoing for 2 weeks, intermittent blurry vision with headache. Hx of TIA 20 years ago per pt.        Hospital Course  72F PMH HTN, hypothyroidism, obesity class 3, chronic hypoxemic respiratory failure on home oxygen 2LNC (nocturnal and daytime), admitted on 12/20/22 for reports of dizziness starting on Monday 12/29. She stated she could not get up and worsened over the last 2 days, difficulty managing her balance. She was admitted to rule out stroke.    - MRI brain showed no acute infarcts.  - Echo completed, no aortic or mitral valve dysfunction noted, LVEF 70%    Interval Problem Update  Patient stated she was doing well today. No acute complaints.  Increased toradol PRN doses.    [facility] SNF[facility]    I have discussed this patient's plan of care and discharge plan at IDT rounds today with Case Management, Nursing, Nursing leadership, and other members of the IDT team.    Consultants/Specialty  none    Code Status  Full Code    Disposition  Patient is medically cleared for discharge.   Anticipate discharge to  SNF .  I have placed the appropriate orders for post-discharge needs.    Review of Systems  Review of Systems   Constitutional:  Negative for chills, fever and malaise/fatigue.   Respiratory:  Negative for cough and shortness of breath.    Cardiovascular:  Negative for chest pain and palpitations.   Gastrointestinal:  Negative for abdominal pain, nausea and vomiting.   Musculoskeletal:  Negative for back pain and joint pain.   Neurological:   Negative for dizziness, weakness and headaches.   All other systems reviewed and are negative.     Physical Exam  Temp:  [36.7 °C (98.1 °F)-37.1 °C (98.7 °F)] 36.7 °C (98.1 °F)  Pulse:  [79-89] 79  Resp:  [16-18] 18  BP: (102-146)/(57-87) 133/79  SpO2:  [90 %-94 %] 92 %    Physical Exam  Vitals and nursing note reviewed.   Constitutional:       General: She is not in acute distress.     Appearance: She is obese. She is not ill-appearing.      Comments: Frail appearing elderly female   HENT:      Head:      Comments: Temporal muscle wasting positive     Mouth/Throat:      Mouth: Mucous membranes are dry.      Pharynx: No oropharyngeal exudate.   Cardiovascular:      Rate and Rhythm: Normal rate and regular rhythm.      Pulses: Normal pulses.      Heart sounds: Normal heart sounds. No murmur heard.  Pulmonary:      Effort: Pulmonary effort is normal. No respiratory distress.      Breath sounds: Normal breath sounds.   Abdominal:      General: Bowel sounds are normal. There is no distension.      Palpations: Abdomen is soft.   Musculoskeletal:         General: No swelling or tenderness.      Comments: Sarcopenic. Bilateral hand muscle atrophy noted.   Skin:     General: Skin is warm.      Capillary Refill: Capillary refill takes less than 2 seconds.      Coloration: Skin is not jaundiced.      Findings: No erythema.   Neurological:      Mental Status: She is alert and oriented to person, place, and time. Mental status is at baseline.      Motor: Weakness present.      Comments: Noted mild essential tremors   Psychiatric:         Mood and Affect: Mood normal.         Behavior: Behavior normal.       Fluids    Intake/Output Summary (Last 24 hours) at 12/23/2022 1501  Last data filed at 12/23/2022 0800  Gross per 24 hour   Intake 750 ml   Output 300 ml   Net 450 ml       Laboratory  Recent Labs     12/20/22  1522 12/21/22  0138 12/22/22  0230   WBC 10.9* 10.9* 9.5   RBC 4.87 4.55 4.60   HEMOGLOBIN 13.9 12.8 13.2    HEMATOCRIT 43.2 40.3 41.2   MCV 88.7 88.6 89.6   MCH 28.5 28.1 28.7   MCHC 32.2* 31.8* 32.0*   RDW 46.3 46.5 47.2   PLATELETCT 175 189 186   MPV 11.1 11.0 11.2     Recent Labs     12/20/22  1655 12/21/22  0138 12/22/22  0230   SODIUM 140 140 141   POTASSIUM 3.9 3.7 3.6   CHLORIDE 100 103 103   CO2 25 26 27   GLUCOSE 94 107* 108*   BUN 19 17 18   CREATININE 0.82 0.77 0.94   CALCIUM 9.7 9.5 9.4             Recent Labs     12/21/22  0138   TRIGLYCERIDE 99   HDL 60   *       Imaging  EC-ECHOCARDIOGRAM COMPLETE W/O CONT   Final Result      MR-BRAIN-W/O   Final Result      1.  No acute abnormality.   2.  Mild chronic microvascular ischemic disease.      CT-CTA NECK WITH & W/O-POST PROCESSING   Final Result      1.  Mild atherosclerotic plaque in bilateral carotid bulbs and proximal internal carotid arteries with less than 50% stenosis.      2.  2.8 cm left thyroid gland nodule. Recommend follow-up thyroid ultrasound.      CT-CTA HEAD WITH & W/O-POST PROCESS   Final Result      1.  No large vessel occlusion, high-grade stenosis or aneurysm of the [place].   2.  No CT evidence of acute infarct, hemorrhage or mass.   3.  Mild atrophy and chronic small vessel ischemic changes.      DX-CHEST-PORTABLE (1 VIEW)   Final Result      Similar hypoventilatory chest with linear opacity most likely from atelectasis although some scarring is possible           Assessment/Plan  * Ataxia- (present on admission)  Assessment & Plan  Patient mentioned she has had chronic (1 year) of difficulty walking. She has seen her PCP but so far no diagnosis or improvement.     CT, CT-angiography, head, neck showed no acute infarction, or hemorrhage   CT angiography shows bilateral carotid artery stenosis less than 50%.    - Continue aspirin and high intensity statin  - Echo LVEF 70%, no valvulopathy, RVSP 30mmHg  - MRI brain showed no acute infarcts.    Fall precautions    physical, occupational therapy evaluation ordered - patient  agreeing she needs more therapy, requested SNF.   Placed referrals for SNF  [facility], IRF rejected patient.    Impaired gait and mobility- (present on admission)  Assessment & Plan  Patient mentioned she has had chronic (1 year) of difficulty walking.  She is dependent to using a walker and her walls at home for stability. She stated she was evaluated by her PCP but has not improved.  She is living alone at home, but is able to get her own groceries and still drives.  She mentioned she was in swim therapy, but her medical insurance decided to stop paying for them around summer 2022, since then she states she has been declining in her functional abilities.  - placed referral for IRF and SNF  - continue PT/OT  - b12 normal  - b6 pending    Left bundle branch block- (present on admission)  Assessment & Plan  Currently without chest pain   No troponin elevation  EKG shows sinus rhythm with a rate of 72, there is left bundle branch block pattern not seen on prior EKGs.  No ST elevation, abnormal repolarization.  QTc 484.  Continuous cardiac monitoring.    No abnormal heart structure on echo    Carotid artery stenosis- (present on admission)  Assessment & Plan  CT imaging shows evidence of bilateral proximal internal carotid artery stenosis of less than 50%  , HDL 60, total 181  Continue aspirin and high intensity statin  outpatient vascular surgery referral if symptoms worsen.    Chronic respiratory failure with hypoxia (HCC)- (present on admission)  Assessment & Plan  Patient on nocturnal oxygen at home, but does use continuously during the day at times.  continue    H/O fibromyalgia- (present on admission)  Assessment & Plan  Multimodal pain control    GERD (gastroesophageal reflux disease)- (present on admission)  Assessment & Plan  Resume home pantoprazole    Hypothyroidism due to acquired atrophy of thyroid- (present on admission)  Assessment & Plan  Resume home levothyroxine    Essential  hypertension- (present on admission)  Assessment & Plan  Continue home amlodipine with hold parameters       VTE prophylaxis: SCDs/TEDs    I have performed a physical exam and reviewed and updated ROS and Plan today (12/23/2022). In review of yesterday's note (12/22/2022), there are no changes except as documented above.

## 2022-12-24 LAB
ANION GAP SERPL CALC-SCNC: 10 MMOL/L (ref 7–16)
BUN SERPL-MCNC: 15 MG/DL (ref 8–22)
CALCIUM SERPL-MCNC: 9.2 MG/DL (ref 8.4–10.2)
CHLORIDE SERPL-SCNC: 97 MMOL/L (ref 96–112)
CO2 SERPL-SCNC: 26 MMOL/L (ref 20–33)
CREAT SERPL-MCNC: 0.79 MG/DL (ref 0.5–1.4)
GFR SERPLBLD CREATININE-BSD FMLA CKD-EPI: 79 ML/MIN/1.73 M 2
GLUCOSE SERPL-MCNC: 189 MG/DL (ref 65–99)
POTASSIUM SERPL-SCNC: 3.6 MMOL/L (ref 3.6–5.5)
SODIUM SERPL-SCNC: 133 MMOL/L (ref 135–145)

## 2022-12-24 PROCEDURE — A9270 NON-COVERED ITEM OR SERVICE: HCPCS | Performed by: HOSPITALIST

## 2022-12-24 PROCEDURE — G0378 HOSPITAL OBSERVATION PER HR: HCPCS

## 2022-12-24 PROCEDURE — 94760 N-INVAS EAR/PLS OXIMETRY 1: CPT

## 2022-12-24 PROCEDURE — 36415 COLL VENOUS BLD VENIPUNCTURE: CPT

## 2022-12-24 PROCEDURE — 700102 HCHG RX REV CODE 250 W/ 637 OVERRIDE(OP): Performed by: HOSPITALIST

## 2022-12-24 PROCEDURE — 700102 HCHG RX REV CODE 250 W/ 637 OVERRIDE(OP): Performed by: INTERNAL MEDICINE

## 2022-12-24 PROCEDURE — 80048 BASIC METABOLIC PNL TOTAL CA: CPT

## 2022-12-24 PROCEDURE — 99232 SBSQ HOSP IP/OBS MODERATE 35: CPT | Performed by: INTERNAL MEDICINE

## 2022-12-24 PROCEDURE — A9270 NON-COVERED ITEM OR SERVICE: HCPCS | Performed by: INTERNAL MEDICINE

## 2022-12-24 RX ORDER — COLCHICINE 0.6 MG/1
1.2 TABLET ORAL ONCE
Status: COMPLETED | OUTPATIENT
Start: 2022-12-24 | End: 2022-12-24

## 2022-12-24 RX ORDER — COLCHICINE 0.6 MG/1
0.6 TABLET ORAL DAILY
Status: DISCONTINUED | OUTPATIENT
Start: 2022-12-25 | End: 2022-12-29

## 2022-12-24 RX ORDER — ALLOPURINOL 100 MG/1
100 TABLET ORAL DAILY
Status: DISCONTINUED | OUTPATIENT
Start: 2022-12-24 | End: 2022-12-29 | Stop reason: HOSPADM

## 2022-12-24 RX ORDER — GUAIFENESIN 200 MG/10ML
10 LIQUID ORAL EVERY 4 HOURS PRN
Status: DISCONTINUED | OUTPATIENT
Start: 2022-12-24 | End: 2022-12-26

## 2022-12-24 RX ADMIN — QUETIAPINE FUMARATE 25 MG: 25 TABLET ORAL at 21:24

## 2022-12-24 RX ADMIN — ACETAMINOPHEN 500 MG: 500 TABLET ORAL at 18:01

## 2022-12-24 RX ADMIN — RIVAROXABAN 10 MG: 10 TABLET, FILM COATED ORAL at 18:00

## 2022-12-24 RX ADMIN — ALLOPURINOL 100 MG: 100 TABLET ORAL at 08:51

## 2022-12-24 RX ADMIN — POTASSIUM CHLORIDE 20 MEQ: 20 TABLET, EXTENDED RELEASE ORAL at 18:01

## 2022-12-24 RX ADMIN — OMEPRAZOLE 20 MG: 20 CAPSULE, DELAYED RELEASE ORAL at 18:01

## 2022-12-24 RX ADMIN — GABAPENTIN 400 MG: 400 CAPSULE ORAL at 18:01

## 2022-12-24 RX ADMIN — ASPIRIN 81 MG: 81 TABLET, COATED ORAL at 06:54

## 2022-12-24 RX ADMIN — DULOXETINE HYDROCHLORIDE 60 MG: 30 CAPSULE, DELAYED RELEASE PELLETS ORAL at 18:00

## 2022-12-24 RX ADMIN — LEVOTHYROXINE SODIUM 150 MCG: 0.07 TABLET ORAL at 06:54

## 2022-12-24 RX ADMIN — POLYETHYLENE GLYCOL 3350 1 PACKET: 17 POWDER, FOR SOLUTION ORAL at 09:09

## 2022-12-24 RX ADMIN — ACETAMINOPHEN 500 MG: 500 TABLET ORAL at 11:36

## 2022-12-24 RX ADMIN — POTASSIUM CHLORIDE 20 MEQ: 20 TABLET, EXTENDED RELEASE ORAL at 06:53

## 2022-12-24 RX ADMIN — TRAMADOL HYDROCHLORIDE 75 MG: 50 TABLET, COATED ORAL at 21:29

## 2022-12-24 RX ADMIN — OMEPRAZOLE 20 MG: 20 CAPSULE, DELAYED RELEASE ORAL at 06:53

## 2022-12-24 RX ADMIN — ACETAMINOPHEN 500 MG: 500 TABLET ORAL at 06:53

## 2022-12-24 RX ADMIN — COLCHICINE 1.2 MG: 0.6 TABLET ORAL at 11:36

## 2022-12-24 RX ADMIN — GABAPENTIN 400 MG: 400 CAPSULE ORAL at 06:53

## 2022-12-24 ASSESSMENT — ENCOUNTER SYMPTOMS
VOMITING: 0
SHORTNESS OF BREATH: 0
HEADACHES: 0
ABDOMINAL PAIN: 0
CHILLS: 0
FEVER: 0
DIZZINESS: 0
PALPITATIONS: 0
NAUSEA: 0
WEAKNESS: 0
COUGH: 0
BACK PAIN: 0

## 2022-12-24 ASSESSMENT — PAIN DESCRIPTION - PAIN TYPE
TYPE: CHRONIC PAIN
TYPE: ACUTE PAIN;CHRONIC PAIN
TYPE: ACUTE PAIN;CHRONIC PAIN

## 2022-12-24 NOTE — THERAPY
Physical Therapy   Daily Treatment     Patient Name: Ashly Meyer  Age:  72 y.o., Sex:  female  Medical Record #: [id number]  Today's Date: 12/23/2022     Precautions  Precautions: (P) Fall Risk    Assessment    Pt is progressing slower than expected and demonstrating with worsening upright mobility. Pt currently requires Mod to Max A for all upright mobility. Pt is limited due severe pain in feet and unable to WB well on B LE. Pt reports of worse pain in R LE vs. L LE. Pt was able to tolerate about 3 sit<>stands with Max A and facilitation for hip extension, however, only able to stand for 1-2 mins max. Pt attempted to take side steps, however, only able to take 1 and required to back to supine due to high pain level. RN aware of change in functional status and pain in B feet. Recommend post-acute placement for continued physical therapy services prior to discharge home.       Plan    Physical Therapy Treatment Plan  Physical Therapy Treatment Plan: (P) Continue Current Treatment Plan    DC Equipment Recommendations: (P) Unable to determine at this time  Discharge Recommendations: (P) Recommend post-acute placement for additional physical therapy services prior to discharge home    Objective       12/23/22 1515   Precautions   Precautions Fall Risk   Vitals   O2 (LPM) 2   O2 Delivery Device Nasal Cannula   Pain 0 - 10 Group   Location Foot   Location Orientation Right;Left   Description Deep;Exhausting   Therapist Pain Assessment Prior to Activity;During Activity;Post Activity;Nurse Notified;8   Cognition    Cognition / Consciousness WDL   Level of Consciousness Alert   Comments pleasant/cooperative   Passive ROM Lower Body   Passive ROM Lower Body WDL   Active ROM Lower Body    Active ROM Lower Body  WDL   Strength Lower Body   Lower Body Strength  X   Comments limited due to pain in B LE; presents with generalized weakness   Sensation Lower Body   Lower Extremity Sensation   X   Comments hypersensitivity to  the touch in B LE; worse on R LE   Lower Body Muscle Tone   Lower Body Muscle Tone  WDL   Balance   Sitting Balance (Static) Fair -   Sitting Balance (Dynamic) Poor +   Standing Balance (Static) Fair -   Standing Balance (Dynamic) Poor   Weight Shift Sitting Fair   Weight Shift Standing Poor   Skilled Intervention Verbal Cuing;Facilitation   Comments FWW   Bed Mobility    Supine to Sit Moderate Assist   Sit to Supine Maximal Assist   Scooting Minimal Assist   Skilled Intervention Verbal Cuing;Facilitation   Gait Analysis   Gait Level Of Assist Unable to Participate   Weight Bearing Status full   Comments unable to ambulate due to pain in B LE; 1 side step towards HOB   Functional Mobility   Sit to Stand Maximal Assist   Bed, Chair, Wheelchair Transfer Unable to Participate   Mobility EOB, sit<>stand, 1 side step towards HOB back to supine   How much difficulty does the patient currently have...   Turning over in bed (including adjusting bedclothes, sheets and blankets)? 3   Sitting down on and standing up from a chair with arms (e.g., wheelchair, bedside commode, etc.) 1   Moving from lying on back to sitting on the side of the bed? 1   How much help from another person does the patient currently need...   Moving to and from a bed to a chair (including a wheelchair)? 1   Need to walk in a hospital room? 1   Climbing 3-5 steps with a railing? 1   6 clicks Mobility Score 8   Activity Tolerance   Sitting in Chair NT   Sitting Edge of Bed 10 mins   Standing 2 mins x 3   Comments limited due to pain in B LE   Patient / Family Goals    Patient / Family Goal #1 SNF   Short Term Goals    Short Term Goal # 1 I transfers in 4 V   Goal Outcome # 1 goal not met   Short Term Goal # 2 I amb x 200 feet in 4 V   Goal Outcome # 2 Goal not met   Short Term Goal # 3 I x 2 stairs in 4 V   Goal Outcome # 3 Goal not met   Education Group   Education Provided Role of Physical Therapist   Role of Physical Therapist Patient Response  Patient;Acceptance;Explanation;Demonstration;Verbal Demonstration;Action Demonstration   Physical Therapy Treatment Plan   Physical Therapy Treatment Plan Continue Current Treatment Plan   Anticipated Discharge Equipment and Recommendations   DC Equipment Recommendations Unable to determine at this time   Discharge Recommendations Recommend post-acute placement for additional physical therapy services prior to discharge home   Interdisciplinary Plan of Care Collaboration   IDT Collaboration with  Nursing;Family / Caregiver   Patient Position at End of Therapy In Bed;Bed Alarm On;Call Light within Reach;Tray Table within Reach;Phone within Reach;Family / Friend in Room   Collaboration Comments aware of visit and recs   Session Information   Date / Session Number  12/23-2 (2/3, 12/26)

## 2022-12-24 NOTE — PROGRESS NOTES
[facility] Medicine Daily Progress Note    Date of Service  12/24/2022    Chief Complaint  Ashly Meyer is a 72 y.o. female admitted 12/20/2022 with   Chief Complaint   Patient presents with    Dizziness     Pt started experiencing dizziness last night as well as left arm/shoulder pain. Denies any syncope or fall. Pt also states had diaphoretic episode with dizziness. Denies any chest pain currently.     Headache     Pt having posterior headache ongoing for 2 weeks, intermittent blurry vision with headache. Hx of TIA 20 years ago per pt.        Hospital Course  72F PMH HTN, hypothyroidism, obesity class 3, chronic hypoxemic respiratory failure on home oxygen 2LNC (nocturnal and daytime), admitted on 12/20/22 for reports of dizziness starting on Monday 12/29. She stated she could not get up and worsened over the last 2 days, difficulty managing her balance. She was admitted to rule out stroke.    - MRI brain showed no acute infarcts.  - Echo completed, no aortic or mitral valve dysfunction noted, LVEF 70%    Interval Problem Update  Patient stated her right foot still hurts.   XR negative for fractures or deformities.  Appears swollen and red. Uric acid 9.0. elevated ESR and CRP. Will treat for gout flare with colchicine and allopurinol. Avoid NSAIDs.    [facility] SNF[facility]    I have discussed this patient's plan of care and discharge plan at IDT rounds today with Case Management, Nursing, Nursing leadership, and other members of the IDT team.    Consultants/Specialty  none    Code Status  Full Code    Disposition  Patient is medically cleared for discharge.   Anticipate discharge to  SNF .  I have placed the appropriate orders for post-discharge needs.    Review of Systems  Review of Systems   Constitutional:  Negative for chills, fever and malaise/fatigue.   Respiratory:  Negative for cough and shortness of breath.    Cardiovascular:  Negative for chest pain and palpitations.   Gastrointestinal:   Negative for abdominal pain, nausea and vomiting.   Musculoskeletal:  Positive for joint pain (R ankle). Negative for back pain.   Neurological:  Negative for dizziness, weakness and headaches.   All other systems reviewed and are negative.     Physical Exam  Temp:  [36.7 °C (98 °F)-36.9 °C (98.5 °F)] 36.8 °C (98.2 °F)  Pulse:  [73-85] 73  Resp:  [17-18] 18  BP: (124-144)/(56-76) 130/56  SpO2:  [92 %-98 %] 93 %    Physical Exam  Vitals and nursing note reviewed.   Constitutional:       General: She is not in acute distress.     Appearance: She is obese.      Comments: Frail appearing elderly female   HENT:      Head:      Comments: Temporal muscle wasting positive     Mouth/Throat:      Mouth: Mucous membranes are dry.      Pharynx: No oropharyngeal exudate.   Cardiovascular:      Rate and Rhythm: Normal rate and regular rhythm.      Pulses: Normal pulses.      Heart sounds: Normal heart sounds. No murmur heard.  Pulmonary:      Effort: Pulmonary effort is normal. No respiratory distress.      Breath sounds: Normal breath sounds.   Abdominal:      General: Bowel sounds are normal. There is no distension.      Palpations: Abdomen is soft.   Musculoskeletal:      Comments: Sarcopenic. Bilateral hand muscle atrophy noted.  Right ankle swollen, tender and red, no warmth.  Left first toe MCP red.   Skin:     General: Skin is warm.      Capillary Refill: Capillary refill takes less than 2 seconds.      Coloration: Skin is not jaundiced.      Findings: No erythema.   Neurological:      Mental Status: She is alert and oriented to person, place, and time. Mental status is at baseline.      Motor: Weakness present.      Comments: Noted mild essential tremors   Psychiatric:         Mood and Affect: Mood normal.         Behavior: Behavior normal.       Fluids    Intake/Output Summary (Last 24 hours) at 12/24/2022 1033  Last data filed at 12/23/2022 2250  Gross per 24 hour   Intake 120 ml   Output 425 ml   Net -305 ml        Laboratory  Recent Labs     12/22/22  0230   WBC 9.5   RBC 4.60   HEMOGLOBIN 13.2   HEMATOCRIT 41.2   MCV 89.6   MCH 28.7   MCHC 32.0*   RDW 47.2   PLATELETCT 186   MPV 11.2     Recent Labs     12/22/22  0230 12/23/22  1843 12/24/22  0910   SODIUM 141 129* 133*   POTASSIUM 3.6 4.3 3.6   CHLORIDE 103 95* 97   CO2 27 19* 26   GLUCOSE 108* 132* 189*   BUN 18 15 15   CREATININE 0.94 0.78 0.79   CALCIUM 9.4 9.6 9.2                     Imaging  DX-FOOT-2- RIGHT   Final Result         No acute osseous abnormality.      EC-ECHOCARDIOGRAM COMPLETE W/O CONT   Final Result      MR-BRAIN-W/O   Final Result      1.  No acute abnormality.   2.  Mild chronic microvascular ischemic disease.      CT-CTA NECK WITH & W/O-POST PROCESSING   Final Result      1.  Mild atherosclerotic plaque in bilateral carotid bulbs and proximal internal carotid arteries with less than 50% stenosis.      2.  2.8 cm left thyroid gland nodule. Recommend follow-up thyroid ultrasound.      CT-CTA HEAD WITH & W/O-POST PROCESS   Final Result      1.  No large vessel occlusion, high-grade stenosis or aneurysm of the [place].   2.  No CT evidence of acute infarct, hemorrhage or mass.   3.  Mild atrophy and chronic small vessel ischemic changes.      DX-CHEST-PORTABLE (1 VIEW)   Final Result      Similar hypoventilatory chest with linear opacity most likely from atelectasis although some scarring is possible           Assessment/Plan  * Ataxia- (present on admission)  Assessment & Plan  Patient mentioned she has had chronic (1 year) of difficulty walking. She has seen her PCP but so far no diagnosis or improvement.     CT, CT-angiography, head, neck showed no acute infarction, or hemorrhage   CT angiography shows bilateral carotid artery stenosis less than 50%.    - Continue aspirin and high intensity statin  - Echo LVEF 70%, no valvulopathy, RVSP 30mmHg  - MRI brain showed no acute infarcts.    Fall precautions    physical, occupational therapy  evaluation ordered - patient agreeing she needs more therapy, requested SNF.   [facility]aced [facility]errals for SN[facility]  [facility], [facility] rejected patient.    Impaired gait and mobility- (present on admission)  Assessment & Plan  Patient mentioned she has had chronic (1 year) of difficulty walking.  She is dependent to using a walker and her walls at home for stability. She stated she was evaluated by her PCP but has not improved.  She is living alone at home, but is able to get her own groceries and still drives.  She mentioned she was in swim therapy, but her medical insurance decided to stop paying for them around summer 2022, since then she states she has been declining in her functional abilities.  - placed referral for IRF and SNF  - continue PT/OT  - b12 normal  - b6 pending    Chronic gout of foot- (present on admission)  Assessment & Plan  C/o right foot swelling and pain. Acute on chronic gout. Not on allopurinol at home.  Pt uric acid, ESR, CRP elevated  Xray of foot showed no fractures or deformity  - starting allopurinol    Left bundle branch block- (present on admission)  Assessment & Plan  Currently without chest pain   No troponin elevation  EKG shows sinus rhythm with a rate of 72, there is left bundle branch block pattern not seen on prior EKGs.  No ST elevation, abnormal repolarization.  QTc 484.  Continuous cardiac monitoring.    No abnormal heart structure on echo    Carotid artery stenosis- (present on admission)  Assessment & Plan  CT imaging shows evidence of bilateral proximal internal carotid artery stenosis of less than 50%  , HDL 60, total 181  Continue aspirin and high intensity statin  outpatient vascular surgery referral if symptoms worsen.    Chronic respiratory failure with hypoxia (HCC)- (present on admission)  Assessment & Plan  Patient on nocturnal oxygen at home, but does use continuously during the day at times.  continue    H/O fibromyalgia- (present on admission)  Assessment &  Plan  Multimodal pain control    GERD (gastroesophageal reflux disease)- (present on admission)  Assessment & Plan  Resume home pantoprazole    Hypothyroidism due to acquired atrophy of thyroid- (present on admission)  Assessment & Plan  Resume home levothyroxine    Essential hypertension- (present on admission)  Assessment & Plan  Continue home amlodipine with hold parameters       VTE prophylaxis: Xarelto 10 mg daily as prophylaxis    I have performed a physical exam and reviewed and updated ROS and Plan today (12/24/2022). In review of yesterday's note (12/23/2022), there are no changes except as documented above.

## 2022-12-24 NOTE — CARE PLAN
The patient is Stable - Low risk of patient condition declining or worsening    Shift Goals  Clinical Goals: SNF placement  Patient Goals: SNF placement  Family Goals: MARIAMA    Progress made toward(s) clinical / shift goals:  Pt has been limited in movement today by pain. Working with provider to adequately treat pain.       Problem: Pain - Standard  Goal: Alleviation of pain or a reduction in pain to the patient’s comfort goal  Description: Target End Date:  Prior to discharge or change in level of care    Document on Vitals flowsheet    1.  Document pain using the appropriate pain scale per order or unit policy  2.  Educate and implement non-pharmacologic comfort measures (i.e. relaxation, distraction, massage, cold/heat therapy, etc.)  3.  Pain management medications as ordered  4.  Reassess pain after pain med administration per policy  5.  If opiods administered assess patient's response to pain medication is appropriate per POSS sedation scale  6.  Follow pain management plan developed in collaboration with patient and interdisciplinary team (including palliative care or pain specialists if applicable)  Outcome: Progressing     Problem: Knowledge Deficit - Standard  Goal: Patient and family/care givers will demonstrate understanding of plan of care, disease process/condition, diagnostic tests and medications  Description: Target End Date:  1-3 days or as soon as patient condition allows    Document in Patient Education    1.  Patient and family/caregiver oriented to unit, equipment, visitation policy and means for communicating concern  2.  Complete/review Learning Assessment  3.  Assess knowledge level of disease process/condition, treatment plan, diagnostic tests and medications  4.  Explain disease process/condition, treatment plan, diagnostic tests and medications  Outcome: Progressing     Problem: Fall Risk  Goal: Patient will remain free from falls  Description: Target End Date:  Prior to discharge or  change in level of care    Document interventions on the [place] Fall Risk Assessment    1.  Assess for fall risk factors  2.  Implement fall precautions  Outcome: Progressing       Patient is not progressing towards the following goals:

## 2022-12-24 NOTE — CARE PLAN
The patient is Stable - Low risk of patient condition declining or worsening    Shift Goals  Clinical Goals: SNF  Patient Goals: SNF  Family Goals: MARIAMA    Progress made toward(s) clinical / shift goals:    Problem: Pain - Standard  Goal: Alleviation of pain or a reduction in pain to the patient’s comfort goal  Outcome: Progressing     Problem: Optimal Care of the Stroke Patient  Goal: Optimal emergency care for the stroke patient  Outcome: Progressing     Problem: Knowledge Deficit - Stroke Education  Goal: Patient's knowledge of stroke and risk factors will improve  Outcome: Progressing       Patient is not progressing towards the following goals:

## 2022-12-24 NOTE — PROGRESS NOTES
Received report from GENE Nettles. Safety precautions in place. Bed locked and low. Offered assist. Call light and belongings within reach.

## 2022-12-24 NOTE — CARE PLAN
The patient is Stable - Low risk of patient condition declining or worsening    Shift Goals  Clinical Goals: advance bowel protocol, pain control, gout flare up, encourage movement  Patient Goals: rest, pain control, plan of care  Family Goals: MARIAMA    Progress made toward(s) clinical / shift goals: Medications added for gout, pain has been well controlled with scheduled tylenol, pt agreeable to work with therapies and is active in care.         Problem: Pain - Standard  Goal: Alleviation of pain or a reduction in pain to the patient’s comfort goal  Outcome: Progressing     Problem: Psychosocial - Patient Condition  Goal: Patient's ability to verbalize feelings about condition will improve  Outcome: Progressing  Goal: Patient's ability to re-evaluate and adapt role responsibilities will improve  Outcome: Progressing     Problem: Neuro Status  Goal: Neuro status will remain stable or improve  Outcome: Progressing     Problem: Hemodynamic Monitoring  Goal: Patient's hemodynamics, fluid balance and neurologic status will be stable or improve  Outcome: Progressing     Problem: Respiratory - Stroke Patient  Goal: Patient will achieve/maintain optimum respiratory rate/effort  Outcome: Progressing     Problem: Dysphagia  Goal: Dysphagia will improve  Outcome: Progressing     Problem: Risk for Aspiration  Goal: Patient's risk for aspiration will be absent or decrease  Outcome: Progressing     Problem: Urinary Elimination  Goal: Establish and maintain regular urinary output  Outcome: Progressing     Problem: Self Care  Goal: Patient will have the ability to perform ADLs independently or with assistance (bathe, groom, dress, toilet and feed)  Outcome: Progressing     Problem: Knowledge Deficit - Standard  Goal: Patient and family/care givers will demonstrate understanding of plan of care, disease process/condition, diagnostic tests and medications  Outcome: Progressing     Problem: Skin Integrity  Goal: Skin integrity is  maintained or improved  Outcome: Progressing     Problem: Fall Risk  Goal: Patient will remain free from falls  Outcome: Progressing       Patient is not progressing towards the following goals: Bowel regimen advanced, waiting for BM, patient received miralax and prune juice. MD aware, will advance bowel protocol if miralax not effective.      Problem: Bowel Elimination  Goal: Establish and maintain regular bowel function  Outcome: Not Progressing

## 2022-12-24 NOTE — ASSESSMENT & PLAN NOTE
C/o right foot swelling and pain. Acute on chronic gout. Not on allopurinol at home.  Pt uric acid, ESR, CRP elevated  Xray of foot showed no fractures or deformity  Continue gout flare treatment with colchicine and allopurinol. Avoid NSAIDs.

## 2022-12-25 LAB — VIT B6 SERPL-MCNC: 24.4 NMOL/L (ref 20–125)

## 2022-12-25 PROCEDURE — 700102 HCHG RX REV CODE 250 W/ 637 OVERRIDE(OP): Performed by: HOSPITALIST

## 2022-12-25 PROCEDURE — A9270 NON-COVERED ITEM OR SERVICE: HCPCS | Performed by: INTERNAL MEDICINE

## 2022-12-25 PROCEDURE — 94760 N-INVAS EAR/PLS OXIMETRY 1: CPT

## 2022-12-25 PROCEDURE — G0378 HOSPITAL OBSERVATION PER HR: HCPCS

## 2022-12-25 PROCEDURE — 99232 SBSQ HOSP IP/OBS MODERATE 35: CPT | Performed by: INTERNAL MEDICINE

## 2022-12-25 PROCEDURE — A9270 NON-COVERED ITEM OR SERVICE: HCPCS | Performed by: HOSPITALIST

## 2022-12-25 PROCEDURE — 700102 HCHG RX REV CODE 250 W/ 637 OVERRIDE(OP): Performed by: INTERNAL MEDICINE

## 2022-12-25 RX ORDER — LACTULOSE 20 G/30ML
15 SOLUTION ORAL ONCE
Status: COMPLETED | OUTPATIENT
Start: 2022-12-25 | End: 2022-12-25

## 2022-12-25 RX ADMIN — ACETAMINOPHEN 500 MG: 500 TABLET ORAL at 17:58

## 2022-12-25 RX ADMIN — OMEPRAZOLE 20 MG: 20 CAPSULE, DELAYED RELEASE ORAL at 06:42

## 2022-12-25 RX ADMIN — COLCHICINE 0.6 MG: 0.6 TABLET ORAL at 06:42

## 2022-12-25 RX ADMIN — TRAMADOL HYDROCHLORIDE 75 MG: 50 TABLET, COATED ORAL at 12:11

## 2022-12-25 RX ADMIN — OMEPRAZOLE 20 MG: 20 CAPSULE, DELAYED RELEASE ORAL at 17:59

## 2022-12-25 RX ADMIN — DULOXETINE HYDROCHLORIDE 60 MG: 30 CAPSULE, DELAYED RELEASE PELLETS ORAL at 17:59

## 2022-12-25 RX ADMIN — TRAMADOL HYDROCHLORIDE 75 MG: 50 TABLET, COATED ORAL at 21:33

## 2022-12-25 RX ADMIN — RIVAROXABAN 10 MG: 10 TABLET, FILM COATED ORAL at 17:59

## 2022-12-25 RX ADMIN — POTASSIUM CHLORIDE 20 MEQ: 20 TABLET, EXTENDED RELEASE ORAL at 06:00

## 2022-12-25 RX ADMIN — GABAPENTIN 400 MG: 400 CAPSULE ORAL at 06:42

## 2022-12-25 RX ADMIN — ASPIRIN 81 MG: 81 TABLET, COATED ORAL at 06:42

## 2022-12-25 RX ADMIN — ACETAMINOPHEN 500 MG: 500 TABLET ORAL at 06:43

## 2022-12-25 RX ADMIN — LACTULOSE 15 ML: 20 SOLUTION ORAL at 12:04

## 2022-12-25 RX ADMIN — ALLOPURINOL 100 MG: 100 TABLET ORAL at 06:42

## 2022-12-25 RX ADMIN — POLYETHYLENE GLYCOL 3350 1 PACKET: 17 POWDER, FOR SOLUTION ORAL at 06:42

## 2022-12-25 RX ADMIN — ACETAMINOPHEN 500 MG: 500 TABLET ORAL at 12:03

## 2022-12-25 RX ADMIN — QUETIAPINE FUMARATE 25 MG: 25 TABLET ORAL at 21:33

## 2022-12-25 RX ADMIN — LEVOTHYROXINE SODIUM 150 MCG: 0.07 TABLET ORAL at 06:42

## 2022-12-25 RX ADMIN — POTASSIUM CHLORIDE 20 MEQ: 20 TABLET, EXTENDED RELEASE ORAL at 17:59

## 2022-12-25 RX ADMIN — GABAPENTIN 400 MG: 400 CAPSULE ORAL at 17:59

## 2022-12-25 ASSESSMENT — ENCOUNTER SYMPTOMS
FEVER: 0
BACK PAIN: 0
CHILLS: 0
ABDOMINAL PAIN: 0
COUGH: 0
VOMITING: 0
SHORTNESS OF BREATH: 0
NAUSEA: 0
HEADACHES: 0
PALPITATIONS: 0
WEAKNESS: 0
DIZZINESS: 0

## 2022-12-25 ASSESSMENT — PAIN DESCRIPTION - PAIN TYPE
TYPE: ACUTE PAIN
TYPE: ACUTE PAIN;CHRONIC PAIN
TYPE: ACUTE PAIN;CHRONIC PAIN

## 2022-12-25 ASSESSMENT — FIBROSIS 4 INDEX: FIB4 SCORE: 1.78

## 2022-12-25 NOTE — PROGRESS NOTES
[facility] Medicine Daily Progress Note    Date of Service  12/25/2022    Chief Complaint  Ahsly Meyer is a 72 y.o. female admitted 12/20/2022 with   Chief Complaint   Patient presents with    Dizziness     Pt started experiencing dizziness last night as well as left arm/shoulder pain. Denies any syncope or fall. Pt also states had diaphoretic episode with dizziness. Denies any chest pain currently.     Headache     Pt having posterior headache ongoing for 2 weeks, intermittent blurry vision with headache. Hx of TIA 20 years ago per pt.        Hospital Course  72F PMH HTN, hypothyroidism, obesity class 3, chronic hypoxemic respiratory failure on home oxygen 2LNC (nocturnal and daytime), admitted on 12/20/22 for reports of dizziness starting on Monday 12/29. She stated she could not get up and worsened over the last 2 days, difficulty managing her balance. She was admitted to rule out stroke.    - MRI brain showed no acute infarcts.  - Echo completed, no aortic or mitral valve dysfunction noted, LVEF 70%    Interval Problem Update  Patient stated her right foot is much better.  XR negative for fractures or deformities.  Appears swollen and red. Uric acid 9.0. elevated ESR and CRP.   Continue gout flare treatment with colchicine and allopurinol. Avoid NSAIDs.    [facility] SNF[facility]    I have discussed this patient's plan of care and discharge plan at IDT rounds today with Case Management, Nursing, Nursing leadership, and other members of the IDT team.    Consultants/Specialty  none    Code Status  Full Code    Disposition  Patient is medically cleared for discharge.   Anticipate discharge to  SNF .  I have placed the appropriate orders for post-discharge needs.    Review of Systems  Review of Systems   Constitutional:  Negative for chills, fever and malaise/fatigue.   Respiratory:  Negative for cough and shortness of breath.    Cardiovascular:  Negative for chest pain and palpitations.    Gastrointestinal:  Negative for abdominal pain, nausea and vomiting.   Musculoskeletal:  Positive for joint pain (R ankle). Negative for back pain.   Neurological:  Negative for dizziness, weakness and headaches.   All other systems reviewed and are negative.     Physical Exam  Temp:  [36.7 °C (98.1 °F)-37.7 °C (99.8 °F)] 36.9 °C (98.5 °F)  Pulse:  [77-98] 92  Resp:  [17-18] 18  BP: (100-146)/(54-77) 100/69  SpO2:  [90 %-96 %] 90 %    Physical Exam  Vitals and nursing note reviewed.   Constitutional:       General: She is not in acute distress.     Appearance: She is obese.      Comments: Frail appearing elderly female   HENT:      Head:      Comments: Temporal muscle wasting positive     Mouth/Throat:      Mouth: Mucous membranes are dry.      Pharynx: No oropharyngeal exudate.   Cardiovascular:      Rate and Rhythm: Normal rate and regular rhythm.      Pulses: Normal pulses.      Heart sounds: Normal heart sounds. No murmur heard.  Pulmonary:      Effort: Pulmonary effort is normal. No respiratory distress.      Breath sounds: Normal breath sounds.   Abdominal:      General: Bowel sounds are normal. There is no distension.      Palpations: Abdomen is soft.   Musculoskeletal:      Comments: Sarcopenic. Bilateral hand muscle atrophy noted.  Right ankle swollen, tender and red, no warmth.  Left first toe MCP red.   Skin:     General: Skin is warm.      Capillary Refill: Capillary refill takes less than 2 seconds.      Coloration: Skin is not jaundiced.      Findings: No erythema.   Neurological:      Mental Status: She is alert and oriented to person, place, and time. Mental status is at baseline.      Motor: Weakness present.      Comments: Noted mild essential tremors   Psychiatric:         Mood and Affect: Mood normal.         Behavior: Behavior normal.       Fluids    Intake/Output Summary (Last 24 hours) at 12/25/2022 1403  Last data filed at 12/25/2022 0713  Gross per 24 hour   Intake 800 ml   Output 2450 ml    Net -1650 ml       Laboratory        Recent Labs     12/23/22  1843 12/24/22  0910   SODIUM 129* 133*   POTASSIUM 4.3 3.6   CHLORIDE 95* 97   CO2 19* 26   GLUCOSE 132* 189*   BUN 15 15   CREATININE 0.78 0.79   CALCIUM 9.6 9.2                     Imaging  DX-FOOT-2- RIGHT   Final Result         No acute osseous abnormality.      EC-ECHOCARDIOGRAM COMPLETE W/O CONT   Final Result      MR-BRAIN-W/O   Final Result      1.  No acute abnormality.   2.  Mild chronic microvascular ischemic disease.      CT-CTA NECK WITH & W/O-POST PROCESSING   Final Result      1.  Mild atherosclerotic plaque in bilateral carotid bulbs and proximal internal carotid arteries with less than 50% stenosis.      2.  2.8 cm left thyroid gland nodule. Recommend follow-up thyroid ultrasound.      CT-CTA HEAD WITH & W/O-POST PROCESS   Final Result      1.  No large vessel occlusion, high-grade stenosis or aneurysm of the [place].   2.  No CT evidence of acute infarct, hemorrhage or mass.   3.  Mild atrophy and chronic small vessel ischemic changes.      DX-CHEST-PORTABLE (1 VIEW)   Final Result      Similar hypoventilatory chest with linear opacity most likely from atelectasis although some scarring is possible           Assessment/Plan  * Ataxia- (present on admission)  Assessment & Plan  Patient mentioned she has had chronic (1 year) of difficulty walking. She has seen her PCP but so far no diagnosis or improvement.     CT, CT-angiography, head, neck showed no acute infarction, or hemorrhage   CT angiography shows bilateral carotid artery stenosis less than 50%.    - Continue aspirin and high intensity statin  - Echo LVEF 70%, no valvulopathy, RVSP 30mmHg  - MRI brain showed no acute infarcts.    Fall precautions    physical, occupational therapy evaluation ordered - patient agreeing she needs more therapy, requested SNF.   [facility]errals for [facility]  [facility], [facility] rejected patient.    Impaired gait and mobility- (present on  admission)  Assessment & Plan  Patient mentioned she has had chronic (1 year) of difficulty walking.  She is dependent to using a walker and her walls at home for stability. She stated she was evaluated by her PCP but has not improved.  She is living alone at home, but is able to get her own groceries and still drives.  She mentioned she was in swim therapy, but her medical insurance decided to stop paying for them around summer 2022, since then she states she has been declining in her functional abilities.  - placed referral for IRF and SNF  - continue PT/OT  - b12 normal  - b6 pending    Chronic gout of foot- (present on admission)  Assessment & Plan  C/o right foot swelling and pain. Acute on chronic gout. Not on allopurinol at home.  Pt uric acid, ESR, CRP elevated  Xray of foot showed no fractures or deformity  Continue gout flare treatment with colchicine and allopurinol. Avoid NSAIDs.    Left bundle branch block- (present on admission)  Assessment & Plan  Currently without chest pain   No troponin elevation  EKG shows sinus rhythm with a rate of 72, there is left bundle branch block pattern not seen on prior EKGs.  No ST elevation, abnormal repolarization.  QTc 484.  Continuous cardiac monitoring.    No abnormal heart structure on echo    Carotid artery stenosis- (present on admission)  Assessment & Plan  CT imaging shows evidence of bilateral proximal internal carotid artery stenosis of less than 50%  , HDL 60, total 181  Continue aspirin and high intensity statin  outpatient vascular surgery referral if symptoms worsen.    Chronic respiratory failure with hypoxia (HCC)- (present on admission)  Assessment & Plan  Patient on nocturnal oxygen at home, but does use continuously during the day at times.  continue    H/O fibromyalgia- (present on admission)  Assessment & Plan  Multimodal pain control    GERD (gastroesophageal reflux disease)- (present on admission)  Assessment & Plan  Resume home  pantoprazole    Hypothyroidism due to acquired atrophy of thyroid- (present on admission)  Assessment & Plan  Resume home levothyroxine    Essential hypertension- (present on admission)  Assessment & Plan  Continue home amlodipine with hold parameters       VTE prophylaxis: Xarelto 10 mg daily as prophylaxis    I have performed a physical exam and reviewed and updated ROS and Plan today (12/25/2022). In review of yesterday's note (12/24/2022), there are no changes except as documented above.

## 2022-12-25 NOTE — PROGRESS NOTES
Received report from GENE Almendarez. Safety precautions in place. Bed locked and low. Offered assist. Call light and belongings within reach.

## 2022-12-25 NOTE — CARE PLAN
The patient is Stable - Low risk of patient condition declining or worsening    Shift Goals  Clinical Goals: Have BM, mobilize, treat gout, SNF placement  Patient Goals: Rest  Family Goals: MARIAMA    Progress made toward(s) clinical / shift goals:    Problem: Pain - Standard  Goal: Alleviation of pain or a reduction in pain to the patient’s comfort goal  Outcome: Progressing     Problem: Neuro Status  Goal: Neuro status will remain stable or improve  Outcome: Progressing     Problem: Hemodynamic Monitoring  Goal: Patient's hemodynamics, fluid balance and neurologic status will be stable or improve  Outcome: Progressing       Patient is not progressing towards the following goals:

## 2022-12-26 PROBLEM — K59.03 DRUG-INDUCED CONSTIPATION: Status: ACTIVE | Noted: 2022-12-26

## 2022-12-26 PROCEDURE — 94760 N-INVAS EAR/PLS OXIMETRY 1: CPT

## 2022-12-26 PROCEDURE — G0378 HOSPITAL OBSERVATION PER HR: HCPCS

## 2022-12-26 PROCEDURE — 99232 SBSQ HOSP IP/OBS MODERATE 35: CPT | Performed by: INTERNAL MEDICINE

## 2022-12-26 PROCEDURE — A9270 NON-COVERED ITEM OR SERVICE: HCPCS | Performed by: INTERNAL MEDICINE

## 2022-12-26 PROCEDURE — A9270 NON-COVERED ITEM OR SERVICE: HCPCS | Performed by: HOSPITALIST

## 2022-12-26 PROCEDURE — 700102 HCHG RX REV CODE 250 W/ 637 OVERRIDE(OP): Performed by: INTERNAL MEDICINE

## 2022-12-26 PROCEDURE — 700102 HCHG RX REV CODE 250 W/ 637 OVERRIDE(OP): Performed by: HOSPITALIST

## 2022-12-26 RX ORDER — LACTULOSE 20 G/30ML
15 SOLUTION ORAL ONCE
Status: ACTIVE | OUTPATIENT
Start: 2022-12-26 | End: 2022-12-27

## 2022-12-26 RX ORDER — BENZONATATE 100 MG/1
100 CAPSULE ORAL 3 TIMES DAILY PRN
Status: DISCONTINUED | OUTPATIENT
Start: 2022-12-26 | End: 2022-12-29 | Stop reason: HOSPADM

## 2022-12-26 RX ADMIN — POTASSIUM CHLORIDE 20 MEQ: 20 TABLET, EXTENDED RELEASE ORAL at 05:29

## 2022-12-26 RX ADMIN — ACETAMINOPHEN 500 MG: 500 TABLET ORAL at 12:13

## 2022-12-26 RX ADMIN — ACETAMINOPHEN 500 MG: 500 TABLET ORAL at 05:29

## 2022-12-26 RX ADMIN — ACETAMINOPHEN 500 MG: 500 TABLET ORAL at 17:09

## 2022-12-26 RX ADMIN — LEVOTHYROXINE SODIUM 150 MCG: 0.07 TABLET ORAL at 05:29

## 2022-12-26 RX ADMIN — OMEPRAZOLE 20 MG: 20 CAPSULE, DELAYED RELEASE ORAL at 17:09

## 2022-12-26 RX ADMIN — GABAPENTIN 400 MG: 400 CAPSULE ORAL at 05:29

## 2022-12-26 RX ADMIN — POTASSIUM CHLORIDE 20 MEQ: 20 TABLET, EXTENDED RELEASE ORAL at 17:09

## 2022-12-26 RX ADMIN — ASPIRIN 81 MG: 81 TABLET, COATED ORAL at 05:29

## 2022-12-26 RX ADMIN — GABAPENTIN 400 MG: 400 CAPSULE ORAL at 17:09

## 2022-12-26 RX ADMIN — FUROSEMIDE 20 MG: 20 TABLET ORAL at 05:30

## 2022-12-26 RX ADMIN — BENZONATATE 100 MG: 100 CAPSULE ORAL at 20:55

## 2022-12-26 RX ADMIN — QUETIAPINE FUMARATE 25 MG: 25 TABLET ORAL at 21:59

## 2022-12-26 RX ADMIN — ALLOPURINOL 100 MG: 100 TABLET ORAL at 05:29

## 2022-12-26 RX ADMIN — RIVAROXABAN 10 MG: 10 TABLET, FILM COATED ORAL at 17:09

## 2022-12-26 RX ADMIN — DULOXETINE HYDROCHLORIDE 60 MG: 30 CAPSULE, DELAYED RELEASE PELLETS ORAL at 17:09

## 2022-12-26 RX ADMIN — COLCHICINE 0.6 MG: 0.6 TABLET ORAL at 05:29

## 2022-12-26 RX ADMIN — OMEPRAZOLE 20 MG: 20 CAPSULE, DELAYED RELEASE ORAL at 05:28

## 2022-12-26 ASSESSMENT — ENCOUNTER SYMPTOMS
PALPITATIONS: 0
DIZZINESS: 0
NAUSEA: 0
HEADACHES: 0
BACK PAIN: 0
VOMITING: 0
CHILLS: 0
COUGH: 0
SHORTNESS OF BREATH: 0
ABDOMINAL PAIN: 0
WEAKNESS: 0
FEVER: 0

## 2022-12-26 ASSESSMENT — PAIN DESCRIPTION - PAIN TYPE
TYPE: CHRONIC PAIN
TYPE: ACUTE PAIN
TYPE: ACUTE PAIN

## 2022-12-26 ASSESSMENT — FIBROSIS 4 INDEX: FIB4 SCORE: 1.78

## 2022-12-26 ASSESSMENT — PATIENT HEALTH QUESTIONNAIRE - PHQ9
2. FEELING DOWN, DEPRESSED, IRRITABLE, OR HOPELESS: NOT AT ALL
1. LITTLE INTEREST OR PLEASURE IN DOING THINGS: NOT AT ALL
SUM OF ALL RESPONSES TO PHQ9 QUESTIONS 1 AND 2: 0

## 2022-12-26 NOTE — CARE PLAN
The patient is Stable - Low risk of patient condition declining or worsening    Shift Goals  Clinical Goals: movement, out of bed, BM, pain control  Patient Goals: rest, pain control  Family Goals: MARIAMA    Progress made toward(s) clinical / shift goals:  Pain has improved after addition of treatment of gout flare up.  Bowel protocol followed with miralax and dose of lactulose.  Vitals stable, O2 stable on 2L nasal cannula, borderline at 90% while awake and talking on room air. Patient can maneuver well in bed and calls appropriately, understanding importance of safety precautions.       Problem: Pain - Standard  Goal: Alleviation of pain or a reduction in pain to the patient’s comfort goal  Outcome: Progressing     Problem: Neuro Status  Goal: Neuro status will remain stable or improve  Outcome: Progressing     Problem: Hemodynamic Monitoring  Goal: Patient's hemodynamics, fluid balance and neurologic status will be stable or improve  Outcome: Progressing     Problem: Respiratory - Stroke Patient  Goal: Patient will achieve/maintain optimum respiratory rate/effort  Outcome: Progressing     Problem: Dysphagia  Goal: Dysphagia will improve  Outcome: Progressing     Problem: Urinary Elimination  Goal: Establish and maintain regular urinary output  Outcome: Progressing     Problem: Bowel Elimination  Goal: Establish and maintain regular bowel function  Outcome: Progressing     Problem: Self Care  Goal: Patient will have the ability to perform ADLs independently or with assistance (bathe, groom, dress, toilet and feed)  Outcome: Progressing     Problem: Knowledge Deficit - Standard  Goal: Patient and family/care givers will demonstrate understanding of plan of care, disease process/condition, diagnostic tests and medications  Outcome: Progressing     Problem: Skin Integrity  Goal: Skin integrity is maintained or improved  Outcome: Progressing     Problem: Fall Risk  Goal: Patient will remain free from falls  Outcome:  Progressing        Patient is not progressing towards the following goals: n/a

## 2022-12-26 NOTE — CARE PLAN
The patient is Stable - Low risk of patient condition declining or worsening    Shift Goals  Clinical Goals: BM, manage pain, increase mobility  Patient Goals: Feel better  Family Goals: MARIAMA    Progress made toward(s) clinical / shift goals:    Problem: Pain - Standard  Goal: Alleviation of pain or a reduction in pain to the patient’s comfort goal  Outcome: Progressing  Note: Patient appropriately verbalizes needs related to pain. Educated patient about pharmacological and nonpharmacological pain management methods available to her.      Problem: Bowel Elimination  Goal: Establish and maintain regular bowel function  Outcome: Progressing  Note: Patient able to have BM today without any further intervention.      Problem: Mobility - Stroke  Goal: Patient's capacity to carry out activities will improve  Outcome: Progressing  Note: Patient ambulated with encouragement using FWW for 25 ft. RN educated patient to mobilize OOB at least once a shift.      Problem: Knowledge Deficit - Standard  Goal: Patient and family/care givers will demonstrate understanding of plan of care, disease process/condition, diagnostic tests and medications  Outcome: Progressing  Note: Updated patient on plan of care including medications and treatments. Encouraged verbalization of questions and concerns. All concerns have been addressed at this time.      Problem: Fall Risk  Goal: Patient will remain free from falls  Outcome: Progressing  Note: Patient calls appropriately for assistance with ADLs       Patient is not progressing towards the following goals:

## 2022-12-26 NOTE — PROGRESS NOTES
[facility] Medicine Daily Progress Note    Date of Service  12/26/2022    Chief Complaint  Ashly Meyer is a 72 y.o. female admitted 12/20/2022 with   Chief Complaint   Patient presents with    Dizziness     Pt started experiencing dizziness last night as well as left arm/shoulder pain. Denies any syncope or fall. Pt also states had diaphoretic episode with dizziness. Denies any chest pain currently.     Headache     Pt having posterior headache ongoing for 2 weeks, intermittent blurry vision with headache. Hx of TIA 20 years ago per pt.        Hospital Course  72F PMH HTN, hypothyroidism, obesity class 3, chronic hypoxemic respiratory failure on home oxygen 2LNC (nocturnal and daytime), admitted on 12/20/22 for reports of dizziness starting on Monday 12/29. She stated she could not get up and worsened over the last 2 days, difficulty managing her balance. She was admitted to rule out stroke.    Currently, MRI brain showed no acute infarcts. Echo completed, no aortic or mitral valve dysfunction noted, LVEF 70%. Acute stroke ruled out.    Patient had complaints of severe right foot pain. XR negative for fractures or deformities.  Appeared swollen and red. Uric acid 9.0. elevated ESR and CRP.  Treating for acute gout flare, with improvement of symptoms.    Interval Problem Update  Patient stated she was feeling well, minimal right foot pain, but remains constipated no BM.  She opted for enema and continue lactulose.    Continue gout flare treatment with colchicine and allopurinol. Avoid NSAIDs.    [facility] SN[facility]    I have discussed this patient's plan of care and discharge plan at IDT rounds today with Case Management, Nursing, Nursing leadership, and other members of the IDT team.    Consultants/Specialty  none    Code Status  Full Code    Disposition  Patient is medically cleared for discharge.   Anticipate discharge to  SNF .  I have placed the appropriate orders for  post-discharge needs.    Review of Systems  Review of Systems   Constitutional:  Negative for chills, fever and malaise/fatigue.   Respiratory:  Negative for cough and shortness of breath.    Cardiovascular:  Negative for chest pain and palpitations.   Gastrointestinal:  Negative for abdominal pain, nausea and vomiting.   Musculoskeletal:  Positive for joint pain (R ankle). Negative for back pain.   Neurological:  Negative for dizziness, weakness and headaches.   All other systems reviewed and are negative.     Physical Exam  Temp:  [36.7 °C (98.1 °F)-37 °C (98.6 °F)] 36.7 °C (98.1 °F)  Pulse:  [72-78] 73  Resp:  [16-18] 18  BP: (114-146)/(58-90) 139/72  SpO2:  [90 %-96 %] 90 %    Physical Exam  Vitals and nursing note reviewed.   Constitutional:       General: She is not in acute distress.     Appearance: She is obese.      Comments: Frail appearing elderly female   HENT:      Head:      Comments: Temporal muscle wasting positive     Mouth/Throat:      Mouth: Mucous membranes are dry.      Pharynx: No oropharyngeal exudate.   Cardiovascular:      Rate and Rhythm: Normal rate and regular rhythm.      Pulses: Normal pulses.      Heart sounds: Normal heart sounds. No murmur heard.  Pulmonary:      Effort: Pulmonary effort is normal. No respiratory distress.      Breath sounds: Normal breath sounds.   Abdominal:      General: Bowel sounds are normal. There is no distension.      Palpations: Abdomen is soft.   Musculoskeletal:      Comments: Sarcopenic. Bilateral hand muscle atrophy noted.  Right ankle swollen, tender and red, no warmth.  Left first toe MCP red.   Skin:     General: Skin is warm.      Capillary Refill: Capillary refill takes less than 2 seconds.      Coloration: Skin is not jaundiced.      Findings: No erythema.   Neurological:      Mental Status: She is alert and oriented to person, place, and time. Mental status is at baseline.      Motor: Weakness present.      Comments: Noted mild essential tremors    Psychiatric:         Mood and Affect: Mood normal.         Behavior: Behavior normal.       Fluids    Intake/Output Summary (Last 24 hours) at 12/26/2022 0953  Last data filed at 12/25/2022 1844  Gross per 24 hour   Intake --   Output 1325 ml   Net -1325 ml       Laboratory        Recent Labs     12/23/22  1843 12/24/22  0910   SODIUM 129* 133*   POTASSIUM 4.3 3.6   CHLORIDE 95* 97   CO2 19* 26   GLUCOSE 132* 189*   BUN 15 15   CREATININE 0.78 0.79   CALCIUM 9.6 9.2                     Imaging  DX-FOOT-2- RIGHT   Final Result         No acute osseous abnormality.      EC-ECHOCARDIOGRAM COMPLETE W/O CONT   Final Result      MR-BRAIN-W/O   Final Result      1.  No acute abnormality.   2.  Mild chronic microvascular ischemic disease.      CT-CTA NECK WITH & W/O-POST PROCESSING   Final Result      1.  Mild atherosclerotic plaque in bilateral carotid bulbs and proximal internal carotid arteries with less than 50% stenosis.      2.  2.8 cm left thyroid gland nodule. Recommend follow-up thyroid ultrasound.      CT-CTA HEAD WITH & W/O-POST PROCESS   Final Result      1.  No large vessel occlusion, high-grade stenosis or aneurysm of the [place].   2.  No CT evidence of acute infarct, hemorrhage or mass.   3.  Mild atrophy and chronic small vessel ischemic changes.      DX-CHEST-PORTABLE (1 VIEW)   Final Result      Similar hypoventilatory chest with linear opacity most likely from atelectasis although some scarring is possible           Assessment/Plan  * Ataxia- (present on admission)  Assessment & Plan  Patient mentioned she has had chronic (1 year) of difficulty walking. She has seen her PCP but so far no diagnosis or improvement.     CT, CT-angiography, head, neck showed no acute infarction, or hemorrhage   CT angiography shows bilateral carotid artery stenosis less than 50%.    - Continue aspirin and high intensity statin  - Echo LVEF 70%, no valvulopathy, RVSP 30mmHg  - MRI brain showed no acute  infarcts.    Fall precautions    physical, occupational therapy evaluation ordered - patient agreeing she needs more therapy, requested SNF.   [facility], [facility] patient.    Impaired gait and mobility- (present on admission)  Assessment & Plan  Patient mentioned she has had chronic (1 year) of difficulty walking.  She is dependent to using a walker and her walls at home for stability. She stated she was evaluated by her PCP but has not improved.  She is living alone at home, but is able to get her own groceries and still drives.  She mentioned she was in swim therapy, but her medical insurance decided to stop paying for them around summer 2022, since then she states she has been declining in her functional abilities.  - placed referral for IRF and SNF  - continue PT/OT  - b12 normal  - b6 pending    Chronic gout of foot- (present on admission)  Assessment & Plan  C/o right foot swelling and pain. Acute on chronic gout. Not on allopurinol at home.  Pt uric acid, ESR, CRP elevated  Xray of foot showed no fractures or deformity  Continue gout flare treatment with colchicine and allopurinol. Avoid NSAIDs.    Drug-induced constipation  Assessment & Plan  No BM for 3 days  Giving lactulose and tap water enema     Left bundle branch block- (present on admission)  Assessment & Plan  Currently without chest pain   No troponin elevation  EKG shows sinus rhythm with a rate of 72, there is left bundle branch block pattern not seen on prior EKGs.  No ST elevation, abnormal repolarization.  QTc 484.  Continuous cardiac monitoring.    No abnormal heart structure on echo    Carotid artery stenosis- (present on admission)  Assessment & Plan  CT imaging shows evidence of bilateral proximal internal carotid artery stenosis of less than 50%  , HDL 60, total 181  Continue aspirin and high intensity statin  outpatient vascular surgery referral if symptoms worsen.    Chronic respiratory failure  with hypoxia (HCC)- (present on admission)  Assessment & Plan  Patient on nocturnal oxygen at home, but does use continuously during the day at times.  continue    H/O fibromyalgia- (present on admission)  Assessment & Plan  Multimodal pain control    GERD (gastroesophageal reflux disease)- (present on admission)  Assessment & Plan  Resume home pantoprazole    Hypothyroidism due to acquired atrophy of thyroid- (present on admission)  Assessment & Plan  Resume home levothyroxine    Essential hypertension- (present on admission)  Assessment & Plan  Continue home amlodipine with hold parameters       VTE prophylaxis: Xarelto 10 mg daily as prophylaxis    I have performed a physical exam and reviewed and updated ROS and Plan today (12/26/2022). In review of yesterday's note (12/25/2022), there are no changes except as documented above.

## 2022-12-26 NOTE — DISCHARGE PLANNING
Case Management Discharge Planning      Anticipated Discharge Dispo: Discharge Disposition: D/T to SNF with Medicare cert in anticipation of skilled care (03)    DME Needed: No    Action(s) Taken: Discussed pt in morning rounds, pt remains medically cleared. Insurance auth pending at [facility], LSW to follow up.    0850: LSW called [facility] to follow up on insurance auth, LSW left VM for Negro in admissions.     1050: Discussed pt in IDT rounds. Per RN, pt last BM 12/20. LSW informed team pt needs to have BM prior to d/c to SNF.      Next Steps: LSW to follow up with [facility] for insurance auth.    Barriers to Discharge: Pending Insurance Authorization

## 2022-12-26 NOTE — CARE PLAN
The patient is Stable - Low risk of patient condition declining or worsening    Shift Goals  Clinical Goals: BM, manage pain, SNF placement  Patient Goals: Rest  Family Goals: MARIAMA    Progress made toward(s) clinical / shift goals:  Pt updated on pOC to include pain management, bowel protocol, D/C planning. Pt is alert and oriented, calls appropriately and is able to turn self on bed. Call light in place, bed in low position, and personal effects within reach.     Problem: Pain - Standard  Goal: Alleviation of pain or a reduction in pain to the patient’s comfort goal  Outcome: Progressing     Problem: Neuro Status  Goal: Neuro status will remain stable or improve  Outcome: Progressing  Flowsheets (Taken 12/25/2022 2343)  Level of Consciousness: Alert     Problem: Bowel Elimination  Goal: Establish and maintain regular bowel function  Outcome: Progressing     Problem: Knowledge Deficit - Standard  Goal: Patient and family/care givers will demonstrate understanding of plan of care, disease process/condition, diagnostic tests and medications  Outcome: Progressing     Problem: Skin Integrity  Goal: Skin integrity is maintained or improved  Outcome: Progressing     Problem: Fall Risk  Goal: Patient will remain free from falls  Outcome: Progressing       Patient is not progressing towards the following goals:

## 2022-12-26 NOTE — PROGRESS NOTES
Received bedside report from GENE Almendarez, pt care assumed. VSS, pt assessment complete. Pt A&Ox4, pt c/o 5/10 back, gout pain at this time. POC discussed with pt and verbalizes no questions. Pt denies any additional needs at this time. Bed locked and in lowest position, bed alarm active. Pt educated on fall risk and verbalized understanding, call light within reach, hourly rounding initiated.

## 2022-12-27 PROCEDURE — 99225 PR SUBSEQUENT OBSERVATION CARE,LEVEL II: CPT | Performed by: INTERNAL MEDICINE

## 2022-12-27 PROCEDURE — A9270 NON-COVERED ITEM OR SERVICE: HCPCS | Performed by: INTERNAL MEDICINE

## 2022-12-27 PROCEDURE — 94760 N-INVAS EAR/PLS OXIMETRY 1: CPT

## 2022-12-27 PROCEDURE — 700102 HCHG RX REV CODE 250 W/ 637 OVERRIDE(OP): Performed by: HOSPITALIST

## 2022-12-27 PROCEDURE — G0378 HOSPITAL OBSERVATION PER HR: HCPCS

## 2022-12-27 PROCEDURE — 700102 HCHG RX REV CODE 250 W/ 637 OVERRIDE(OP): Performed by: INTERNAL MEDICINE

## 2022-12-27 PROCEDURE — A9270 NON-COVERED ITEM OR SERVICE: HCPCS | Performed by: HOSPITALIST

## 2022-12-27 RX ORDER — TRAMADOL HYDROCHLORIDE 50 MG/1
50 TABLET ORAL EVERY 6 HOURS PRN
Status: DISPENSED | OUTPATIENT
Start: 2022-12-27 | End: 2022-12-28

## 2022-12-27 RX ADMIN — ACETAMINOPHEN 500 MG: 500 TABLET ORAL at 23:13

## 2022-12-27 RX ADMIN — OMEPRAZOLE 20 MG: 20 CAPSULE, DELAYED RELEASE ORAL at 17:25

## 2022-12-27 RX ADMIN — FUROSEMIDE 20 MG: 20 TABLET ORAL at 05:10

## 2022-12-27 RX ADMIN — POTASSIUM CHLORIDE 20 MEQ: 20 TABLET, EXTENDED RELEASE ORAL at 05:09

## 2022-12-27 RX ADMIN — ACETAMINOPHEN 500 MG: 500 TABLET ORAL at 05:09

## 2022-12-27 RX ADMIN — ASPIRIN 81 MG: 81 TABLET, COATED ORAL at 05:10

## 2022-12-27 RX ADMIN — TRAMADOL HYDROCHLORIDE 75 MG: 50 TABLET, COATED ORAL at 00:16

## 2022-12-27 RX ADMIN — QUETIAPINE FUMARATE 25 MG: 25 TABLET ORAL at 22:18

## 2022-12-27 RX ADMIN — ALLOPURINOL 100 MG: 100 TABLET ORAL at 05:10

## 2022-12-27 RX ADMIN — RIVAROXABAN 10 MG: 10 TABLET, FILM COATED ORAL at 17:25

## 2022-12-27 RX ADMIN — LEVOTHYROXINE SODIUM 150 MCG: 0.07 TABLET ORAL at 05:10

## 2022-12-27 RX ADMIN — POTASSIUM CHLORIDE 20 MEQ: 20 TABLET, EXTENDED RELEASE ORAL at 17:25

## 2022-12-27 RX ADMIN — AMLODIPINE BESYLATE 2.5 MG: 5 TABLET ORAL at 05:10

## 2022-12-27 RX ADMIN — BENZONATATE 100 MG: 100 CAPSULE ORAL at 22:18

## 2022-12-27 RX ADMIN — ACETAMINOPHEN 500 MG: 500 TABLET ORAL at 11:36

## 2022-12-27 RX ADMIN — ACETAMINOPHEN 500 MG: 500 TABLET ORAL at 17:24

## 2022-12-27 RX ADMIN — DULOXETINE HYDROCHLORIDE 60 MG: 30 CAPSULE, DELAYED RELEASE PELLETS ORAL at 17:25

## 2022-12-27 RX ADMIN — GABAPENTIN 400 MG: 400 CAPSULE ORAL at 17:25

## 2022-12-27 RX ADMIN — COLCHICINE 0.6 MG: 0.6 TABLET ORAL at 05:10

## 2022-12-27 RX ADMIN — GABAPENTIN 400 MG: 400 CAPSULE ORAL at 05:10

## 2022-12-27 RX ADMIN — OMEPRAZOLE 20 MG: 20 CAPSULE, DELAYED RELEASE ORAL at 05:10

## 2022-12-27 RX ADMIN — ACETAMINOPHEN 500 MG: 500 TABLET ORAL at 00:08

## 2022-12-27 RX ADMIN — TRAMADOL HYDROCHLORIDE 50 MG: 50 TABLET, COATED ORAL at 23:14

## 2022-12-27 ASSESSMENT — ENCOUNTER SYMPTOMS
BACK PAIN: 0
CHILLS: 0
COUGH: 0
NAUSEA: 0
DIZZINESS: 0
FEVER: 0
VOMITING: 0
ABDOMINAL PAIN: 0
WEAKNESS: 0
SHORTNESS OF BREATH: 0
HEADACHES: 0
PALPITATIONS: 0

## 2022-12-27 ASSESSMENT — PATIENT HEALTH QUESTIONNAIRE - PHQ9
SUM OF ALL RESPONSES TO PHQ9 QUESTIONS 1 AND 2: 0
1. LITTLE INTEREST OR PLEASURE IN DOING THINGS: NOT AT ALL
1. LITTLE INTEREST OR PLEASURE IN DOING THINGS: NOT AT ALL
2. FEELING DOWN, DEPRESSED, IRRITABLE, OR HOPELESS: NOT AT ALL
SUM OF ALL RESPONSES TO PHQ9 QUESTIONS 1 AND 2: 0
2. FEELING DOWN, DEPRESSED, IRRITABLE, OR HOPELESS: NOT AT ALL

## 2022-12-27 ASSESSMENT — COGNITIVE AND FUNCTIONAL STATUS - GENERAL
MOVING TO AND FROM BED TO CHAIR: A LITTLE
TURNING FROM BACK TO SIDE WHILE IN FLAT BAD: A LITTLE
SUGGESTED CMS G CODE MODIFIER DAILY ACTIVITY: CK
STANDING UP FROM CHAIR USING ARMS: A LITTLE
PERSONAL GROOMING: A LITTLE
TOILETING: A LITTLE
CLIMB 3 TO 5 STEPS WITH RAILING: A LITTLE
EATING MEALS: A LITTLE
DRESSING REGULAR UPPER BODY CLOTHING: A LITTLE
MOVING FROM LYING ON BACK TO SITTING ON SIDE OF FLAT BED: A LITTLE
DAILY ACTIVITIY SCORE: 18
HELP NEEDED FOR BATHING: A LITTLE
DRESSING REGULAR LOWER BODY CLOTHING: A LITTLE

## 2022-12-27 ASSESSMENT — PAIN DESCRIPTION - PAIN TYPE
TYPE: CHRONIC PAIN
TYPE: ACUTE PAIN
TYPE: CHRONIC PAIN
TYPE: ACUTE PAIN
TYPE: ACUTE PAIN
TYPE: CHRONIC PAIN
TYPE: CHRONIC PAIN

## 2022-12-27 NOTE — PROGRESS NOTES
Received report from day med/tele nurse Danna MILLS. Assumed pt care at 2115. Pt is A&Ox4, resting comfortably in bed. Pt on O2 2-3 LPM via NC at night. No signs of SOB/respiratory distress. Labs noted, VSS. Fall precautions in place. Bed at lowest position. Call light and personal belongings within reach. Continue to monitor.

## 2022-12-27 NOTE — CARE PLAN
The patient is Stable - Low risk of patient condition declining or worsening    Shift Goals  Clinical Goals: Pain decrease from 7 to 3-5/10  Patient Goals: Sleep comfortably tonight  Family Goals: Get better    Progress made toward(s) clinical / shift goals:  PRN pain medications given as ordered, hourly rounding done.    Patient is not progressing towards the following goals: n/a

## 2022-12-27 NOTE — PROGRESS NOTES
4 Eyes Skin Assessment Completed by Kel RN and Som RN.    Head WDL  Ears WDL  Nose WDL  Mouth WDL  Neck WDL  Breast/Chest WDL  Shoulder Blades WDL  Spine WDL  (R) Arm/Elbow/Hand WDL  (L) Arm/Elbow/Hand WDL  Abdomen Redness, excoriation  Groin Redness  Scrotum/Coccyx/Buttocks Blanching  (R) Leg WDL  (L) Leg WDL  (R) Heel/Foot/Toe Redness on the toes  (L) Heel/Foot/Toe Redness on the toes          Devices In Places Nasal Cannula and Purewick      Interventions In Place Gray Ear Foams    Possible Skin Injury No    Pictures Uploaded Into Epic N/A  Wound Consult Placed N/A  RN Wound Prevention Protocol Ordered No

## 2022-12-27 NOTE — PROGRESS NOTES
Received report from NOC RN. Pt is A&O 4. Pt has no complaints of pain. Denies N/V as of assessment. On RA with spo2 at 93%, no signs of respiratory distress noted. All needs met at this time.

## 2022-12-27 NOTE — CARE PLAN
The patient is Stable - Low risk of patient condition declining or worsening    Shift Goals  Clinical Goals: Pt is able to ambulate safely throughout the shift. Remain free from falls throughout the shift  Patient Goals: move comfortably  Family Goals: Get better    Progress made toward(s) clinical / shift goals:  Pt is able to ambulate safely to the bathroom. Patient remain free from falls throughout the shift. Pt is able to report pain control of about 2/10 and rest comfortably.   Problem: Pain - Standard  Goal: Alleviation of pain or a reduction in pain to the patient’s comfort goal  Outcome: Progressing       Patient is not progressing towards the following goals: n/a

## 2022-12-27 NOTE — PROGRESS NOTES
"Hospital Medicine Daily Progress Note    Date of Service  12/27/2022    Chief Complaint  Ashly Meyer is a 72 y.o. female admitted 12/20/2022 with   Chief Complaint   Patient presents with    Dizziness     Pt started experiencing dizziness last night as well as left arm/shoulder pain. Denies any syncope or fall. Pt also states had diaphoretic episode with dizziness. Denies any chest pain currently.     Headache     Pt having posterior headache ongoing for 2 weeks, intermittent blurry vision with headache. Hx of TIA 20 years ago per pt.        Hospital Course  As per chart review:  \"  72F PMH HTN, hypothyroidism, obesity class 3, chronic hypoxemic respiratory failure on home oxygen 2LNC (nocturnal and daytime), admitted on 12/20/22 for reports of dizziness starting on Monday 12/29. She stated she could not get up and worsened over the last 2 days, difficulty managing her balance. She was admitted to rule out stroke.    Currently, MRI brain showed no acute infarcts. Echo completed, no aortic or mitral valve dysfunction noted, LVEF 70%. Acute stroke ruled out.    Patient had complaints of severe right foot pain. XR negative for fractures or deformities.  Appeared swollen and red. Uric acid 9.0. elevated ESR and CRP.  Treating for acute gout flare, with improvement of symptoms.\"    Interval Problem Update  Patient stated she was feeling well, minimal right foot pain, but remains constipated no BM.  She opted for enema and continue lactulose.    PATIENT SEEN BY PREVIOUS HOSPITALIST UNTIL 12/26 12/27: Patient lying in bed comfortably.  The patient mentions that she had already bowel movements.  The patient is medically clear for discharge, pending placement.  We appreciate further recommendations by case management.  The patient states that her pain in her foot is better today, continue with colchicine for now.    Continue gout flare treatment with colchicine and allopurinol. Avoid NSAIDs.    Pending SNF, [facility]    I have discussed this patient's plan of care and discharge plan at IDT rounds today with Case Management, Nursing, Nursing leadership, and other members of the IDT team.    Consultants/Specialty  none    Code Status  Full Code    Disposition  Patient is medically cleared for discharge.   Anticipate discharge to  SNF .  I have placed the appropriate orders for post-discharge needs.    Review of Systems  Review of Systems   Constitutional:  Negative for chills, fever and malaise/fatigue.   Respiratory:  Negative for cough and shortness of breath.    Cardiovascular:  Negative for chest pain and palpitations.   Gastrointestinal:  Negative for abdominal pain, nausea and vomiting.   Musculoskeletal:  Positive for joint pain (R ankle). Negative for back pain.   Neurological:  Negative for dizziness, weakness and headaches.   All other systems reviewed and are negative.     Physical Exam  Temp:  [36.6 °C (97.8 °F)-37 °C (98.6 °F)] 36.6 °C (97.8 °F)  Pulse:  [70-81] 77  Resp:  [16-18] 18  BP: (116-152)/(60-94) 152/76  SpO2:  [91 %-95 %] 91 %    Physical Exam  Vitals and nursing note reviewed.   Constitutional:       General: She is not in acute distress.     Appearance: She is obese.      Comments: Frail appearing elderly female   HENT:      Head:      Comments: Temporal muscle wasting positive     Mouth/Throat:      Mouth: Mucous membranes are dry.      Pharynx: No oropharyngeal exudate.   Cardiovascular:      Rate and Rhythm: Normal rate and regular rhythm.      Pulses: Normal pulses.      Heart sounds: Normal heart sounds. No murmur heard.  Pulmonary:      Effort: Pulmonary effort is normal. No respiratory distress.      Breath sounds: Normal breath sounds.   Abdominal:      General: Bowel sounds are normal. There is no distension.      Palpations: Abdomen is soft.   Skin:     General: Skin is warm.      Capillary Refill: Capillary refill takes less than 2 seconds.       Coloration: Skin is not jaundiced.      Findings: No erythema.   Neurological:      Mental Status: She is alert and oriented to person, place, and time. Mental status is at baseline.      Motor: Weakness present.   Psychiatric:         Mood and Affect: Mood normal.         Behavior: Behavior normal.       Fluids    Intake/Output Summary (Last 24 hours) at 12/27/2022 1116  Last data filed at 12/27/2022 0900  Gross per 24 hour   Intake 360 ml   Output 2050 ml   Net -1690 ml         Laboratory                              Imaging  DX-FOOT-2- RIGHT   Final Result         No acute osseous abnormality.      EC-ECHOCARDIOGRAM COMPLETE W/O CONT   Final Result      MR-BRAIN-W/O   Final Result      1.  No acute abnormality.   2.  Mild chronic microvascular ischemic disease.      CT-CTA NECK WITH & W/O-POST PROCESSING   Final Result      1.  Mild atherosclerotic plaque in bilateral carotid bulbs and proximal internal carotid arteries with less than 50% stenosis.      2.  2.8 cm left thyroid gland nodule. Recommend follow-up thyroid ultrasound.      CT-CTA HEAD WITH & W/O-POST PROCESS   Final Result      1.  No large vessel occlusion, high-grade stenosis or aneurysm of the [place].   2.  No CT evidence of acute infarct, hemorrhage or mass.   3.  Mild atrophy and chronic small vessel ischemic changes.      DX-CHEST-PORTABLE (1 VIEW)   Final Result      Similar hypoventilatory chest with linear opacity most likely from atelectasis although some scarring is possible             Assessment/Plan  * Ataxia- (present on admission)  Assessment & Plan  Patient mentioned she has had chronic (1 year) of difficulty walking. She has seen her PCP but so far no diagnosis or improvement.     CT, CT-angiography, head, neck showed no acute infarction, or hemorrhage   CT angiography shows bilateral carotid artery stenosis less than 50%.    - Continue aspirin and high intensity statin  - Echo LVEF 70%, no valvulopathy, RVSP 30mmHg  - MRI  brain showed no acute infarcts.    Fall precautions    physical, occupational therapy evaluation ordered - patient agreeing she needs more therapy, requested SNF.   [facility]  [facility], IRF rejected patient.    Drug-induced constipation  Assessment & Plan  No BM for 3 days  Giving lactulose and tap water enema     Impaired gait and mobility- (present on admission)  Assessment & Plan  Patient mentioned she has had chronic (1 year) of difficulty walking.  She is dependent to using a walker and her walls at home for stability. She stated she was evaluated by her PCP but has not improved.  She is living alone at home, but is able to get her own groceries and still drives.  She mentioned she was in swim therapy, but her medical insurance decided to stop paying for them around summer 2022, since then she states she has been declining in her functional abilities.  - placed referral for IRF and SNF  - continue PT/OT  - b12 normal  - b6 pending    Left bundle branch block- (present on admission)  Assessment & Plan  Currently without chest pain   No troponin elevation  EKG shows sinus rhythm with a rate of 72, there is left bundle branch block pattern not seen on prior EKGs.  No ST elevation, abnormal repolarization.  QTc 484.  Continuous cardiac monitoring.    No abnormal heart structure on echo    Carotid artery stenosis- (present on admission)  Assessment & Plan  CT imaging shows evidence of bilateral proximal internal carotid artery stenosis of less than 50%  , HDL 60, total 181  Continue aspirin and high intensity statin  outpatient vascular surgery referral if symptoms worsen.    Chronic respiratory failure with hypoxia (HCC)- (present on admission)  Assessment & Plan  Patient on nocturnal oxygen at home, but does use continuously during the day at times.  continue    Chronic gout of foot- (present on admission)  Assessment & Plan  C/o right foot swelling and pain. Acute on chronic gout.  Not on allopurinol at home.  Pt uric acid, ESR, CRP elevated  Xray of foot showed no fractures or deformity  Continue gout flare treatment with colchicine and allopurinol. Avoid NSAIDs.    H/O fibromyalgia- (present on admission)  Assessment & Plan  Multimodal pain control    GERD (gastroesophageal reflux disease)- (present on admission)  Assessment & Plan  Resume home pantoprazole    Hypothyroidism due to acquired atrophy of thyroid- (present on admission)  Assessment & Plan  Resume home levothyroxine    Essential hypertension- (present on admission)  Assessment & Plan  Continue home amlodipine with hold parameters       VTE prophylaxis: Xarelto 10 mg daily as prophylaxis    I have performed a physical exam and reviewed and updated ROS and Plan today (12/27/2022). In review of yesterday's note (12/26/2022), there are no changes except as documented above.

## 2022-12-27 NOTE — DISCHARGE PLANNING
Case Management Discharge Planning    Admission Date: 12/20/2022  GMLOS:    ALOS: 0    6-Clicks ADL Score: 14  6-Clicks Mobility Score: 8  PT and/or OT Eval ordered: Yes  Post-acute Referrals Ordered: Yes  Post-acute Choice Obtained: Yes  Has referral(s) been sent to post-acute provider:  Yes      Anticipated Discharge Dispo: Discharge Disposition: D/T to SNF with Medicare cert in anticipation of skilled care (03)    DME Needed: No    Action(s) Taken: Spoke to Negro from [place], insurance auth still pending.    Escalations Completed: None    Medically Clear: Yes    Next Steps: f/u with [facility] regarding insurance auth and bed availability    Barriers to Discharge: Pending Placement/insurance auth

## 2022-12-28 PROCEDURE — A9270 NON-COVERED ITEM OR SERVICE: HCPCS | Performed by: HOSPITALIST

## 2022-12-28 PROCEDURE — 94760 N-INVAS EAR/PLS OXIMETRY 1: CPT

## 2022-12-28 PROCEDURE — A9270 NON-COVERED ITEM OR SERVICE: HCPCS | Performed by: INTERNAL MEDICINE

## 2022-12-28 PROCEDURE — G0378 HOSPITAL OBSERVATION PER HR: HCPCS

## 2022-12-28 PROCEDURE — 700102 HCHG RX REV CODE 250 W/ 637 OVERRIDE(OP): Performed by: INTERNAL MEDICINE

## 2022-12-28 PROCEDURE — 99224 PR SUBSEQUENT OBSERVATION CARE,LEVEL I: CPT | Performed by: INTERNAL MEDICINE

## 2022-12-28 PROCEDURE — 700102 HCHG RX REV CODE 250 W/ 637 OVERRIDE(OP): Performed by: HOSPITALIST

## 2022-12-28 RX ORDER — TRAMADOL HYDROCHLORIDE 50 MG/1
50 TABLET ORAL EVERY 6 HOURS PRN
Status: DISPENSED | OUTPATIENT
Start: 2022-12-28 | End: 2022-12-29

## 2022-12-28 RX ADMIN — ALLOPURINOL 100 MG: 100 TABLET ORAL at 05:03

## 2022-12-28 RX ADMIN — ACETAMINOPHEN 500 MG: 500 TABLET ORAL at 05:02

## 2022-12-28 RX ADMIN — BENZONATATE 100 MG: 100 CAPSULE ORAL at 12:09

## 2022-12-28 RX ADMIN — POTASSIUM CHLORIDE 20 MEQ: 20 TABLET, EXTENDED RELEASE ORAL at 05:02

## 2022-12-28 RX ADMIN — GABAPENTIN 400 MG: 400 CAPSULE ORAL at 05:02

## 2022-12-28 RX ADMIN — RIVAROXABAN 10 MG: 10 TABLET, FILM COATED ORAL at 17:32

## 2022-12-28 RX ADMIN — OMEPRAZOLE 20 MG: 20 CAPSULE, DELAYED RELEASE ORAL at 17:32

## 2022-12-28 RX ADMIN — ACETAMINOPHEN 500 MG: 500 TABLET ORAL at 17:32

## 2022-12-28 RX ADMIN — OMEPRAZOLE 20 MG: 20 CAPSULE, DELAYED RELEASE ORAL at 05:03

## 2022-12-28 RX ADMIN — QUETIAPINE FUMARATE 25 MG: 25 TABLET ORAL at 21:03

## 2022-12-28 RX ADMIN — FUROSEMIDE 20 MG: 20 TABLET ORAL at 05:02

## 2022-12-28 RX ADMIN — COLCHICINE 0.6 MG: 0.6 TABLET ORAL at 05:02

## 2022-12-28 RX ADMIN — AMLODIPINE BESYLATE 2.5 MG: 5 TABLET ORAL at 05:03

## 2022-12-28 RX ADMIN — GABAPENTIN 400 MG: 400 CAPSULE ORAL at 17:32

## 2022-12-28 RX ADMIN — ASPIRIN 81 MG: 81 TABLET, COATED ORAL at 05:02

## 2022-12-28 RX ADMIN — DULOXETINE HYDROCHLORIDE 60 MG: 30 CAPSULE, DELAYED RELEASE PELLETS ORAL at 17:32

## 2022-12-28 RX ADMIN — POTASSIUM CHLORIDE 20 MEQ: 20 TABLET, EXTENDED RELEASE ORAL at 17:32

## 2022-12-28 RX ADMIN — TRAMADOL HYDROCHLORIDE 50 MG: 50 TABLET, COATED ORAL at 22:35

## 2022-12-28 RX ADMIN — LEVOTHYROXINE SODIUM 150 MCG: 0.07 TABLET ORAL at 05:03

## 2022-12-28 RX ADMIN — ACETAMINOPHEN 500 MG: 500 TABLET ORAL at 12:09

## 2022-12-28 ASSESSMENT — COGNITIVE AND FUNCTIONAL STATUS - GENERAL
STANDING UP FROM CHAIR USING ARMS: A LITTLE
DRESSING REGULAR UPPER BODY CLOTHING: A LITTLE
CLIMB 3 TO 5 STEPS WITH RAILING: A LOT
MOVING TO AND FROM BED TO CHAIR: A LITTLE
TOILETING: A LITTLE
DRESSING REGULAR LOWER BODY CLOTHING: A LOT
WALKING IN HOSPITAL ROOM: A LITTLE
HELP NEEDED FOR BATHING: A LITTLE
SUGGESTED CMS G CODE MODIFIER MOBILITY: CK
SUGGESTED CMS G CODE MODIFIER DAILY ACTIVITY: CK
PERSONAL GROOMING: A LITTLE
MOVING FROM LYING ON BACK TO SITTING ON SIDE OF FLAT BED: A LITTLE
MOBILITY SCORE: 17
TURNING FROM BACK TO SIDE WHILE IN FLAT BAD: A LITTLE
DAILY ACTIVITIY SCORE: 18

## 2022-12-28 ASSESSMENT — PAIN DESCRIPTION - PAIN TYPE
TYPE: CHRONIC PAIN

## 2022-12-28 ASSESSMENT — ENCOUNTER SYMPTOMS
COUGH: 0
WEAKNESS: 0
ABDOMINAL PAIN: 0
CHILLS: 0
NAUSEA: 0
SHORTNESS OF BREATH: 0
HEADACHES: 0
DIZZINESS: 0
VOMITING: 0
BACK PAIN: 0
PALPITATIONS: 0
FEVER: 0

## 2022-12-28 NOTE — DISCHARGE PLANNING
Agency/Facility Name: [facility]  Spoke To: Negro  Outcome: Per Negro, the insurance auth has not been approved at this time. DPA to follow up tomorrow.  RN CM notified    @[phone]  Agency/Facility Name: [facility]  Spoke To: Negro  Outcome: Per Negro, they are not contracted with the pt's insurance.    Agency/Facility Name: [facility]  Spoke To: Tere  Outcome: Per Tere, she will start auth for the pt now.    Agency/Facility Name: [facility]  Spoke To: Guerda  Outcome: Per Guerda, [facility] is not contracted with the pt's inusrance. DPA notified Guerda insurance auth has been started with [facility].  RN CM notified    @[phone]  Agency/Facility Name: [facility]  Spoke To: Violeta  Outcome: Per Violeta, she obtained insurance auth and will have a bed tomorrow. Per Violeta, she requested case management follow up tomorrow for transport confirmation. Per Violeta, the pt will have a one time payment of $600.00.  RN CM notified

## 2022-12-28 NOTE — DISCHARGE PLANNING
Case Management Discharge Planning    Admission Date: 12/20/2022  GMLOS:    ALOS: 0    6-Clicks ADL Score: 18  6-Clicks Mobility Score: 8  PT and/or OT Eval ordered: Yes  Post-acute Referrals Ordered: Yes  Post-acute Choice Obtained: Yes  Has referral(s) been sent to post-acute provider:  Yes      Anticipated Discharge Dispo: Discharge Disposition: D/T to SNF with Medicare cert in anticipation of skilled care (03)    DME Needed: No    Action(s) Taken: Per DPA, [facility] cannot accept pt's insurance. CM requested [facility] begin auth.     Per DPA, [facility] obtained auth and can accept pt tomorrow, however pt has a one-time payment of $600.    RNCM spoke to pt at bedside regarding [facility] receiving auth and requiring $600 one-time payment. Pt stated she will talk to her daughter about it and let RNCM know if this is something they can pay.    CM to follow-up tomorrow with pt regarding ability to pay $600.  If pt decides to DC to [facility], CM to f/u with [facility] for transport coordination.    Escalations Completed: None    Medically Clear: Yes    Next Steps: f/u with pt and [facility] tomorrow    Barriers to Discharge: Pending Placement

## 2022-12-28 NOTE — CARE PLAN
The patient is Stable - Low risk of patient condition declining or worsening    Shift Goals  Clinical Goals: Pt. will verbalizes on decrease pain on her feet from 7 to 3/10.  Patient Goals: sleep comfortably tonight  Family Goals: Get better    Progress made toward(s) clinical / shift goals:  Pt verbalizes a decrease in pain sensation on the feet with a scale of 4/10 from 7/10. PRN pain medications given as ordered. Hourly rounding done.    Patient is not progressing towards the following goals: n/a

## 2022-12-28 NOTE — PROGRESS NOTES
"Hospital Medicine Daily Progress Note    Date of Service  12/28/2022    Chief Complaint  Ashly Meyer is a 72 y.o. female admitted 12/20/2022 with   Chief Complaint   Patient presents with    Dizziness     Pt started experiencing dizziness last night as well as left arm/shoulder pain. Denies any syncope or fall. Pt also states had diaphoretic episode with dizziness. Denies any chest pain currently.     Headache     Pt having posterior headache ongoing for 2 weeks, intermittent blurry vision with headache. Hx of TIA 20 years ago per pt.        Hospital Course  As per chart review:  \"  72F PMH HTN, hypothyroidism, obesity class 3, chronic hypoxemic respiratory failure on home oxygen 2LNC (nocturnal and daytime), admitted on 12/20/22 for reports of dizziness starting on Monday 12/29. She stated she could not get up and worsened over the last 2 days, difficulty managing her balance. She was admitted to rule out stroke.    Currently, MRI brain showed no acute infarcts. Echo completed, no aortic or mitral valve dysfunction noted, LVEF 70%. Acute stroke ruled out.    Patient had complaints of severe right foot pain. XR negative for fractures or deformities.  Appeared swollen and red. Uric acid 9.0. elevated ESR and CRP.  Treating for acute gout flare, with improvement of symptoms.\"    Interval Problem Update  Patient stated she was feeling well, minimal right foot pain, but remains constipated no BM.  She opted for enema and continue lactulose.    PATIENT SEEN BY PREVIOUS HOSPITALIST UNTIL 12/26 12/27: Patient lying in bed comfortably.  The patient mentions that she had already bowel movements.  The patient is medically clear for discharge, pending placement.  We appreciate further recommendations by case management.  The patient states that her pain in her foot is better today, continue with colchicine for now.    12/28: Patient seen at bedside this morning. No overnight events reported. We are still pending " placement. Patient is medically cleared for discharge.    Continue gout flare treatment with colchicine and allopurinol. Avoid NSAIDs.    [facility] SNF[facility]    I have discussed this patient's plan of care and discharge plan at IDT rounds today with Case Management, Nursing, Nursing leadership, and other members of the IDT team.    Consultants/Specialty  none    Code Status  Full Code    Disposition  Patient is medically cleared for discharge.   Anticipate discharge to  SNF .  I have placed the appropriate orders for post-discharge needs.    Review of Systems  Review of Systems   Constitutional:  Negative for chills, fever and malaise/fatigue.   Respiratory:  Negative for cough and shortness of breath.    Cardiovascular:  Negative for chest pain and palpitations.   Gastrointestinal:  Negative for abdominal pain, nausea and vomiting.   Musculoskeletal:  Positive for joint pain (R ankle). Negative for back pain.   Neurological:  Negative for dizziness, weakness and headaches.   All other systems reviewed and are negative.     Physical Exam  Temp:  [36.6 °C (97.8 °F)-37 °C (98.6 °F)] 36.8 °C (98.2 °F)  Pulse:  [75-96] 75  Resp:  [17-18] 18  BP: (143-172)/(76-87) 153/83  SpO2:  [90 %-95 %] 95 %    Physical Exam  Vitals and nursing note reviewed.   Constitutional:       General: She is not in acute distress.     Appearance: She is obese.      Comments: Frail appearing elderly female   HENT:      Head:      Comments: Temporal muscle wasting positive     Mouth/Throat:      Mouth: Mucous membranes are dry.      Pharynx: No oropharyngeal exudate.   Cardiovascular:      Rate and Rhythm: Normal rate and regular rhythm.      Pulses: Normal pulses.      Heart sounds: Normal heart sounds. No murmur heard.  Pulmonary:      Effort: Pulmonary effort is normal. No respiratory distress.      Breath sounds: Normal breath sounds.   Abdominal:      General: Bowel sounds are normal. There is no  distension.      Palpations: Abdomen is soft.   Skin:     General: Skin is warm.      Capillary Refill: Capillary refill takes less than 2 seconds.      Coloration: Skin is not jaundiced.      Findings: No erythema.   Neurological:      Mental Status: She is alert and oriented to person, place, and time. Mental status is at baseline.      Motor: Weakness present.   Psychiatric:         Mood and Affect: Mood normal.         Behavior: Behavior normal.       Fluids    Intake/Output Summary (Last 24 hours) at 12/28/2022 0847  Last data filed at 12/27/2022 2200  Gross per 24 hour   Intake 340 ml   Output 300 ml   Net 40 ml         Laboratory                              Imaging  DX-FOOT-2- RIGHT   Final Result         No acute osseous abnormality.      EC-ECHOCARDIOGRAM COMPLETE W/O CONT   Final Result      MR-BRAIN-W/O   Final Result      1.  No acute abnormality.   2.  Mild chronic microvascular ischemic disease.      CT-CTA NECK WITH & W/O-POST PROCESSING   Final Result      1.  Mild atherosclerotic plaque in bilateral carotid bulbs and proximal internal carotid arteries with less than 50% stenosis.      2.  2.8 cm left thyroid gland nodule. Recommend follow-up thyroid ultrasound.      CT-CTA HEAD WITH & W/O-POST PROCESS   Final Result      1.  No large vessel occlusion, high-grade stenosis or aneurysm of the [place] of [place].   2.  No CT evidence of acute infarct, hemorrhage or mass.   3.  Mild atrophy and chronic small vessel ischemic changes.      DX-CHEST-PORTABLE (1 VIEW)   Final Result      Similar hypoventilatory chest with linear opacity most likely from atelectasis although some scarring is possible             Assessment/Plan  * Ataxia- (present on admission)  Assessment & Plan  Patient mentioned she has had chronic (1 year) of difficulty walking. She has seen her PCP but so far no diagnosis or improvement.     CT, CT-angiography, head, neck showed no acute infarction, or hemorrhage   CT angiography shows  bilateral carotid artery stenosis less than 50%.    - Continue aspirin and high intensity statin  - Echo LVEF 70%, no valvulopathy, RVSP 30mmHg  - MRI brain showed no acute infarcts.    Fall precautions    physical, occupational therapy evaluation ordered - patient agreeing she needs more therapy, requested SNF.   [facility]  [facility], IRF rejected patient.    Drug-induced constipation  Assessment & Plan  No BM for 3 days  Giving lactulose and tap water enema     Impaired gait and mobility- (present on admission)  Assessment & Plan  Patient mentioned she has had chronic (1 year) of difficulty walking.  She is dependent to using a walker and her walls at home for stability. She stated she was evaluated by her PCP but has not improved.  She is living alone at home, but is able to get her own groceries and still drives.  She mentioned she was in swim therapy, but her medical insurance decided to stop paying for them around summer 2022, since then she states she has been declining in her functional abilities.  - placed referral for IRF and SNF  - continue PT/OT  - b12 normal  - b6 pending    Left bundle branch block- (present on admission)  Assessment & Plan  Currently without chest pain   No troponin elevation  EKG shows sinus rhythm with a rate of 72, there is left bundle branch block pattern not seen on prior EKGs.  No ST elevation, abnormal repolarization.  QTc 484.  Continuous cardiac monitoring.    No abnormal heart structure on echo    Carotid artery stenosis- (present on admission)  Assessment & Plan  CT imaging shows evidence of bilateral proximal internal carotid artery stenosis of less than 50%  , HDL 60, total 181  Continue aspirin and high intensity statin  outpatient vascular surgery referral if symptoms worsen.    Chronic respiratory failure with hypoxia (HCC)- (present on admission)  Assessment & Plan  Patient on nocturnal oxygen at home, but does use continuously during the  day at times.  continue    Chronic gout of foot- (present on admission)  Assessment & Plan  C/o right foot swelling and pain. Acute on chronic gout. Not on allopurinol at home.  Pt uric acid, ESR, CRP elevated  Xray of foot showed no fractures or deformity  Continue gout flare treatment with colchicine and allopurinol. Avoid NSAIDs.    H/O fibromyalgia- (present on admission)  Assessment & Plan  Multimodal pain control    GERD (gastroesophageal reflux disease)- (present on admission)  Assessment & Plan  Resume home pantoprazole    Hypothyroidism due to acquired atrophy of thyroid- (present on admission)  Assessment & Plan  Resume home levothyroxine    Essential hypertension- (present on admission)  Assessment & Plan  Continue home amlodipine with hold parameters       VTE prophylaxis: Xarelto 10 mg daily as prophylaxis    I have performed a physical exam and reviewed and updated ROS and Plan today (12/28/2022). In review of yesterday's note (12/27/2022), there are no changes except as documented above.

## 2022-12-28 NOTE — PROGRESS NOTES
Pt. Complains of chronic pain on both feet due to gout, verbalized that routine tylenol is not helping with the pain on her gout, wanted to take tramadol as she is taking it at home for her chronic pain. Referred to Dr. Talamantes with orders made.

## 2022-12-28 NOTE — CARE PLAN
The patient is Stable - Low risk of patient condition declining or worsening    Shift Goals  Clinical Goals: Patients pain will be at a stated tolerable 3/10 throughout shift  Patient Goals: sleep comfortably tonight  Family Goals: Get better    Progress made toward(s) clinical / shift goals:  Patient has stated that her pain has been tolerable throughout shift.      Problem: Pain - Standard  Goal: Alleviation of pain or a reduction in pain to the patient’s comfort goal  Outcome: Progressing

## 2022-12-28 NOTE — PROGRESS NOTES
Received report from nightshift RN and assumed care of patient at 0700. Patient is resting in bed and denies needs at this time. Call light in reach. Bed in lowest locked position. Hourly rounding to continue.

## 2022-12-28 NOTE — PROGRESS NOTES
Received report from day shift nurse. Assumed pt care at 1915. Pt is A&Ox4, resting comfortably in bed. Pt on r.a. No signs of SOB/respiratory distress. Labs noted, VSS. Fall precautions in place. Bed at lowest position. Call light and personal belongings within reach. Continue to monitor.

## 2022-12-29 ENCOUNTER — APPOINTMENT (OUTPATIENT)
Dept: MEDICAL GROUP | Facility: MEDICAL CENTER | Age: 72
End: 2022-12-29
Payer: MEDICARE

## 2022-12-29 VITALS
WEIGHT: 259.7 LBS | RESPIRATION RATE: 18 BRPM | OXYGEN SATURATION: 92 % | TEMPERATURE: 97.8 F | HEART RATE: 82 BPM | SYSTOLIC BLOOD PRESSURE: 163 MMHG | DIASTOLIC BLOOD PRESSURE: 85 MMHG | BODY MASS INDEX: 41.74 KG/M2 | HEIGHT: 66 IN

## 2022-12-29 PROCEDURE — 97116 GAIT TRAINING THERAPY: CPT

## 2022-12-29 PROCEDURE — 99217 PR OBSERVATION CARE DISCHARGE: CPT | Performed by: INTERNAL MEDICINE

## 2022-12-29 PROCEDURE — A9270 NON-COVERED ITEM OR SERVICE: HCPCS | Performed by: INTERNAL MEDICINE

## 2022-12-29 PROCEDURE — 700102 HCHG RX REV CODE 250 W/ 637 OVERRIDE(OP): Performed by: INTERNAL MEDICINE

## 2022-12-29 PROCEDURE — A9270 NON-COVERED ITEM OR SERVICE: HCPCS | Performed by: HOSPITALIST

## 2022-12-29 PROCEDURE — 97535 SELF CARE MNGMENT TRAINING: CPT

## 2022-12-29 PROCEDURE — 700101 HCHG RX REV CODE 250: Performed by: INTERNAL MEDICINE

## 2022-12-29 PROCEDURE — 700102 HCHG RX REV CODE 250 W/ 637 OVERRIDE(OP): Performed by: HOSPITALIST

## 2022-12-29 PROCEDURE — G0378 HOSPITAL OBSERVATION PER HR: HCPCS

## 2022-12-29 RX ORDER — AMLODIPINE BESYLATE 5 MG/1
5 TABLET ORAL DAILY
Qty: 30 TABLET | Status: SHIPPED
Start: 2022-12-30 | End: 2023-01-23 | Stop reason: SDUPTHER

## 2022-12-29 RX ORDER — LABETALOL HYDROCHLORIDE 5 MG/ML
10 INJECTION, SOLUTION INTRAVENOUS EVERY 6 HOURS PRN
Status: DISCONTINUED | OUTPATIENT
Start: 2022-12-29 | End: 2022-12-29 | Stop reason: HOSPADM

## 2022-12-29 RX ORDER — ALLOPURINOL 100 MG/1
100 TABLET ORAL DAILY
Qty: 30 TABLET | Status: SHIPPED
Start: 2022-12-30 | End: 2023-01-23 | Stop reason: SDUPTHER

## 2022-12-29 RX ORDER — ATORVASTATIN CALCIUM 40 MG/1
40 TABLET, FILM COATED ORAL EVERY EVENING
Qty: 30 TABLET | Status: SHIPPED
Start: 2022-12-29 | End: 2023-05-12 | Stop reason: SDUPTHER

## 2022-12-29 RX ORDER — ASPIRIN 81 MG/1
81 TABLET ORAL DAILY
Qty: 30 TABLET | Status: SHIPPED
Start: 2022-12-30 | End: 2023-03-13

## 2022-12-29 RX ORDER — AMLODIPINE BESYLATE 5 MG/1
5 TABLET ORAL DAILY
Status: DISCONTINUED | OUTPATIENT
Start: 2022-12-30 | End: 2022-12-29 | Stop reason: HOSPADM

## 2022-12-29 RX ADMIN — GABAPENTIN 400 MG: 400 CAPSULE ORAL at 05:06

## 2022-12-29 RX ADMIN — FUROSEMIDE 20 MG: 20 TABLET ORAL at 05:06

## 2022-12-29 RX ADMIN — AMLODIPINE BESYLATE 2.5 MG: 5 TABLET ORAL at 05:06

## 2022-12-29 RX ADMIN — COLCHICINE 0.6 MG: 0.6 TABLET ORAL at 05:06

## 2022-12-29 RX ADMIN — ASPIRIN 81 MG: 81 TABLET, COATED ORAL at 05:06

## 2022-12-29 RX ADMIN — LEVOTHYROXINE SODIUM 150 MCG: 0.07 TABLET ORAL at 05:07

## 2022-12-29 RX ADMIN — OMEPRAZOLE 20 MG: 20 CAPSULE, DELAYED RELEASE ORAL at 05:06

## 2022-12-29 RX ADMIN — ACETAMINOPHEN 500 MG: 500 TABLET ORAL at 05:06

## 2022-12-29 RX ADMIN — POTASSIUM CHLORIDE 20 MEQ: 20 TABLET, EXTENDED RELEASE ORAL at 05:07

## 2022-12-29 RX ADMIN — LABETALOL HYDROCHLORIDE 10 MG: 5 INJECTION, SOLUTION INTRAVENOUS at 14:06

## 2022-12-29 RX ADMIN — ACETAMINOPHEN 500 MG: 500 TABLET ORAL at 11:44

## 2022-12-29 RX ADMIN — ALLOPURINOL 100 MG: 100 TABLET ORAL at 05:06

## 2022-12-29 ASSESSMENT — ENCOUNTER SYMPTOMS
COUGH: 0
DIZZINESS: 0
ABDOMINAL PAIN: 0
WEAKNESS: 0
VOMITING: 0
SHORTNESS OF BREATH: 0
HEADACHES: 0
NAUSEA: 0
FEVER: 0
PALPITATIONS: 0
CHILLS: 0
BACK PAIN: 0

## 2022-12-29 ASSESSMENT — COGNITIVE AND FUNCTIONAL STATUS - GENERAL
MOVING TO AND FROM BED TO CHAIR: A LITTLE
WALKING IN HOSPITAL ROOM: A LITTLE
MOVING FROM LYING ON BACK TO SITTING ON SIDE OF FLAT BED: A LITTLE
TOILETING: A LITTLE
SUGGESTED CMS G CODE MODIFIER DAILY ACTIVITY: CK
DRESSING REGULAR UPPER BODY CLOTHING: A LITTLE
CLIMB 3 TO 5 STEPS WITH RAILING: A LOT
HELP NEEDED FOR BATHING: A LITTLE
SUGGESTED CMS G CODE MODIFIER MOBILITY: CK
STANDING UP FROM CHAIR USING ARMS: A LITTLE
DAILY ACTIVITIY SCORE: 19
DRESSING REGULAR LOWER BODY CLOTHING: A LITTLE
PERSONAL GROOMING: A LITTLE
MOBILITY SCORE: 18

## 2022-12-29 ASSESSMENT — GAIT ASSESSMENTS
DISTANCE (FEET): 100
DEVIATION: STEP TO;ANTALGIC
GAIT LEVEL OF ASSIST: CONTACT GUARD ASSIST
ASSISTIVE DEVICE: FRONT WHEEL WALKER

## 2022-12-29 ASSESSMENT — PAIN DESCRIPTION - PAIN TYPE: TYPE: CHRONIC PAIN

## 2022-12-29 NOTE — THERAPY
Physical Therapy   Daily Treatment     Patient Name: [place]ly Meyer  Age:  72 y.o., Sex:  female  Medical Record #: [id number]  Today's Date: 12/29/2022     Precautions  Precautions: Fall Risk    Assessment    Pt was agreeable to therapy session and was seen with OT for co-txt. Pt was able to demonstrate with improved upright mobility, increased ambulation distance as well as improved upright activity tolerance. Pt reports of improved pain in B LE and is able to demonstrate appropriate WB for functional ambulation distances. Pt continues to require CGA for all upright mobility and requires significant use of FWW during ambulation. Pt continues to be a fall risk and will benefit from post acute therapy prior to d/c home.     Plan    Physical Therapy Treatment Plan  Physical Therapy Treatment Plan: (P) Continue Current Treatment Plan    DC Equipment Recommendations: (P) Unable to determine at this time  Discharge Recommendations: (P) Recommend post-acute placement for additional physical therapy services prior to discharge home    Objective       12/29/22 1135   Precautions   Precautions Fall Risk   Pain 0 - 10 Group   Location Leg   Location Orientation Right;Left   Description Aching   Therapist Pain Assessment Prior to Activity;During Activity;Post Activity;Nurse Notified  (reports pain is well managed now)   Cognition    Cognition / Consciousness WDL   Level of Consciousness Alert   Comments pleasant/cooperative   Balance   Sitting Balance (Static) Fair +   Sitting Balance (Dynamic) Fair   Standing Balance (Static) Fair   Standing Balance (Dynamic) Fair   Weight Shift Sitting Fair   Weight Shift Standing Fair   Skilled Intervention Verbal Cuing;Postural Facilitation   Comments w/fww   Bed Mobility    Supine to Sit Contact Guard Assist   Scooting Standby Assist   Skilled Intervention Verbal Cuing;Compensatory Strategies   Gait Analysis   Gait Level Of Assist Contact Guard Assist   Assistive Device Front Wheel  Walker   Distance (Feet) 100   # of Times Distance was Traveled 1   Deviation Step To;Antalgic   Weight Bearing Status fwb   Skilled Intervention Verbal Cuing;Compensatory Strategies   Comments provided with compensatory strategies to use B UE in order to offload LE and improve tolerance with ambulation   Functional Mobility   Sit to Stand Contact Guard Assist   Bed, Chair, Wheelchair Transfer Contact Guard Assist   Transfer Method Stand Step   Mobility EOB, sit<>stand, ambulation, left with OT in room   Skilled Intervention Verbal Cuing;Compensatory Strategies   Comments VC for handplacement in order to stand with correct mechanics using FWW   How much difficulty does the patient currently have...   Turning over in bed (including adjusting bedclothes, sheets and blankets)? 4   Sitting down on and standing up from a chair with arms (e.g., wheelchair, bedside commode, etc.) 3   Moving from lying on back to sitting on the side of the bed? 3   How much help from another person does the patient currently need...   Moving to and from a bed to a chair (including a wheelchair)? 3   Need to walk in a hospital room? 3   Climbing 3-5 steps with a railing? 2   6 clicks Mobility Score 18   Activity Tolerance   Sitting in Chair NT   Sitting Edge of Bed 10 mins   Standing 5 mins   Comments limited due to pain and fatigue   Patient / Family Goals    Patient / Family Goal #1 SNF   Short Term Goals    Short Term Goal # 1 I transfers in 4 V   Goal Outcome # 1 goal not met   Short Term Goal # 2 I amb x 200 feet in 4 V   Goal Outcome # 2 Goal not met   Short Term Goal # 3 I x 2 stairs in 4 V   Goal Outcome # 3 Goal not met   Education Group   Education Provided Role of Physical Therapist   Role of Physical Therapist Patient Response Patient;Acceptance;Explanation;Demonstration;Verbal Demonstration;Action Demonstration   Physical Therapy Treatment Plan   Physical Therapy Treatment Plan Continue Current Treatment Plan   Anticipated  Discharge Equipment and Recommendations   DC Equipment Recommendations Unable to determine at this time   Discharge Recommendations Recommend post-acute placement for additional physical therapy services prior to discharge home   Interdisciplinary Plan of Care Collaboration   IDT Collaboration with  Nursing;Occupational Therapist   Patient Position at End of Therapy Other (Comments)  (left with OT in room)   Collaboration Comments aware of visit and recs   Session Information   Date / Session Number  12/29-3 (1/3, 1/2)

## 2022-12-29 NOTE — THERAPY
Occupational Therapy  Daily Treatment     Patient Name: Ashly Meyer  Age:  72 y.o., Sex:  female  Medical Record #: [id number]  Today's Date: 12/29/2022     Precautions  Precautions: Fall Risk    Assessment    Pt seen for OT treatment. She is making progress toward her goals. She demonstrates improvement in pain, balance, activity tolerance, functional mobility and ADLs. She would benefit from continued OT services as she lives alone and needs to be fairly independent.     Plan    Occupational Therapy Treatment Plan  O.T. Treatment Plan: Continue Current Treatment Plan    DC Equipment Recommendations: Unable to determine at this time  Discharge Recommendations: Recommend post-acute placement for additional occupational therapy services prior to discharge home    Subjective    Agreeable to therapy session      Objective       12/29/22 1145   Pain 0 - 10 Group   Therapist Pain Assessment During Activity;Nurse Notified  (reports her pain is well managed)   Balance   Sitting Balance (Static) Fair +   Sitting Balance (Dynamic) Fair   Standing Balance (Static) Fair -   Standing Balance (Dynamic) Fair -   Weight Shift Sitting Fair   Weight Shift Standing Fair   Skilled Intervention Verbal Cuing;Compensatory Strategies   Comments standing with FWW   Bed Mobility    Supine to Sit Contact Guard Assist   Scooting Standby Assist   Skilled Intervention Verbal Cuing;Compensatory Strategies   Activities of Daily Living   Grooming Standby Assist;Standing  (oral care at the sink, rests with forearms on the sink and bent slightly forward)   Upper Body Dressing Independent  (gown)   Lower Body Dressing Standby Assist  (socks, seated in chair)   Toileting   (declined, recently used the BR with nsg)   Skilled Intervention Verbal Cuing   Comments Pt demonstrates good self-pacing , knows her limitations   6 Clicks Daily Activity Score 19   Functional Mobility   Sit to Stand Contact Guard Assist   Bed, Chair, Wheelchair Transfer  Contact Guard Assist   Toilet Transfers Contact Guard Assist   Mobility walked to/from sink with FWW   Skilled Intervention Verbal Cuing;Compensatory Strategies   Comments verbal cues for sequence with use of FWW for sit to stand   Activity Tolerance   Sitting in Chair 10+ min   Patient / Family Goals   Patient / Family Goal #1 to get better and return home   Short Term Goals   Short Term Goal # 1 Mod Indep UB clothing management   Goal Outcome # 1 Goal met   Short Term Goal # 2 Min assist LB clothing management via AE/DME PRN   Goal Outcome # 2 Goal met   Short Term Goal # 3 Mod indep toiletinbg task via AE/DME   Goal Outcome # 3 Progressing as expected   Short Term Goal # 4 Sup/SBA commode transfer via DME   Goal Outcome # 4 Goal met   Education Group   Role of Occupational Therapist Patient Response Patient;Acceptance;Verbal Demonstration   Occupational Therapy Treatment Plan   O.T. Treatment Plan Continue Current Treatment Plan

## 2022-12-29 NOTE — DISCHARGE PLANNING
Follow up for post acute services chronic Ataxia ? Vertigo. Decline in function per therapy notes. Remains on observation status. Indicating limited medical management need. Per chart review looking into skilled nursing. Patient lives alone. Please consider an outpatient PM&R referral with Dr. Ignacia Yin.

## 2022-12-29 NOTE — CARE PLAN
The patient is Stable - Low risk of patient condition declining or worsening    Shift Goals  Clinical Goals: Free from falls or injuries, rest and sleep at least 4 hours, pain controlled 3/10  Patient Goals: Free from falls or injuries, rest and sleep at least 4 hours, pain controlled 3/10  Family Goals: Get better    Progress made toward(s) clinical / shift goals:  No falls or injuries, rested and slept, pain controlled 3/10    Patient is not progressing towards the following goals:

## 2022-12-29 NOTE — PROGRESS NOTES
Received bedside patient report from GENE Damico. Patient resting comfortably in bed, no complaints at this time. Safety precautions in place. Will continue to monitor.

## 2022-12-29 NOTE — PROGRESS NOTES
Pt discharged with MD order. Report called in to [facility]. Discharge instructions, prescriptions and follow-up appointments reviewed. Pt verbalized understanding. Copies of discharge papers given, all questions answered. Transported via [facility] with [facility].

## 2022-12-29 NOTE — PROGRESS NOTES
Bedside report received from night RN. Assumed care of patient. Alert and oriented, wakes easily to voice. Daily plan of care discussed. Pt denies intolerable pain to left and right leg. Hourly rounding in place.

## 2022-12-29 NOTE — DISCHARGE PLANNING
Agency/Facility Name: [facility]  Spoke To: Violeta  Outcome: Per Violeta, she left a voicemail requesting updated PT or OT notes on the pt.  RN CM notified    @1240  Agency/Facility Name: [facility]  Spoke To: Violeta  Outcome: Per Violeta, the 600.00 owed can paid through a payment plan and can be coordinated after the pt is admitted. Per Violeta, she has a bed today and can take the pt today. Case management will set up transport and follow up with [facility] on transport time.  RN CM notified    @1322  Agency/Facility Name: [facility]  Spoke To: Zamzam  Outcome: Per Zamzam, transport is confirmed for 1430 going to [facility].  RN CM notified    Agency/Facility Name: [facility]  Outcome: Left a voicemail regarding transport time. DPA also called to ask if a negative COVID is necessary for admission.   RN CM notified

## 2022-12-29 NOTE — DISCHARGE SUMMARY
"Discharge Summary    CHIEF COMPLAINT ON ADMISSION  Chief Complaint   Patient presents with    Dizziness     Pt started experiencing dizziness last night as well as left arm/shoulder pain. Denies any syncope or fall. Pt also states had diaphoretic episode with dizziness. Denies any chest pain currently.     Headache     Pt having posterior headache ongoing for 2 weeks, intermittent blurry vision with headache. Hx of TIA 20 years ago per pt.        Reason for Admission  Dizziness, Body Aches     Admission Date  12/20/2022     DISCHARGE DATE:  12/29/2022    CODE STATUS  Full Code    HPI & HOSPITAL COURSE  As per chart review:  \"[place] Meyer is a 72 y.o. female with a past medical history of primary hypertension, hypothyroidism who presented 12/20/2022 with dizziness, lightheadedness, headache and difficulty maintaining balance over the past 2 days.  Patient reports that she has not been feeling well over the past 2 weeks.  She has been having progressively worsening generalized weakness intermittent headaches and lightheadedness over the past 2 days she started to have difficulty maintaining balance and near falling.  Denied falling denies losing consciousness.  Denies focal motor weakness.  Denies abnormal jerking movement, fecal or urinary incontinence.\" (As per Dr. Talamantes, admitting physician).     Patient stated she has had chronic ataxia, uses walker and her walls at home. She stated she was evaluated by her PCP but has not improved.  She is living alone at home, but is able to get her own groceries and still drives.     She reported on Monday 12/19 she had dizziness start. She was prescribed Nitrofurantoin on 12/17, which does have a 1% side effect of dizziness.      MRI brain showed no acute infarcts.     Orthostatic vitals were negative.  She did present with severe hypertension 200/87 into the ED. BP upon discharge was in the 150's, however we just increased the dose of amlodipine to 5 mg. she only " "takes lasix and amlodipine. At this time, it is unclear on the cause of her dizziness, but may be a combination of her chronic medications with nitrofurantoin.\"    It seems the patient a gout flare and patient was on cholchicine and allopurinol. Patient not complaining of foot pain anymore so we have stopped colchicine.    Patient continued to improve while hospitalized. As per case management patient has been accepted to SNF and she will be discharged today to SNF.      Therefore, she is discharged in fair and stable condition to skilled nursing facility.    The patient met 2-midnight criteria for an inpatient stay at the time of discharge.      FOLLOW UP ITEMS POST DISCHARGE  Patient to be discharged to SNF.    DISCHARGE DIAGNOSES  Principal Problem:    Ataxia (Chronic) POA: Yes  Active Problems:    Essential hypertension POA: Yes    Hypothyroidism due to acquired atrophy of thyroid POA: Yes    GERD (gastroesophageal reflux disease) POA: Yes    H/O fibromyalgia POA: Yes    Chronic gout of foot POA: Yes    Chronic respiratory failure with hypoxia (HCC) POA: Yes    Carotid artery stenosis POA: Yes    Left bundle branch block POA: Yes    Impaired gait and mobility (Chronic) POA: Yes    Drug-induced constipation POA: Clinically Undetermined  Resolved Problems:    * No resolved hospital problems. *      FOLLOW UP  No future appointments.  Colton Ahn M.D.  [address] [place] [id number]  [phone]    Go on 12/29/2022  please go to your scheduled PCP appointment      MEDICATIONS ON DISCHARGE     Medication List        START taking these medications        Instructions   allopurinol 100 MG Tabs  Start taking on: December 30, 2022  Commonly known as: ZYLOPRIM   Take 1 Tablet by mouth every day.  Dose: 100 mg     aspirin 81 MG EC tablet  Start taking on: December 30, 2022   Take 1 Tablet by mouth every day.  Dose: 81 mg     atorvastatin 40 MG Tabs  Commonly known as: LIPITOR   Take 1 Tablet by mouth " every evening.  Dose: 40 mg            CONTINUE taking these medications        Instructions   albuterol 108 (90 Base) MCG/ACT Aers inhalation aerosol   Inhale 2 Puffs every 6 hours as needed for Shortness of Breath.  Dose: 2 Puff     amLODIPine 2.5 MG Tabs  Commonly known as: NORVASC   Take 1 Tablet by mouth every day.  Dose: 2.5 mg     DULoxetine 60 MG Cpep delayed-release capsule  Commonly known as: CYMBALTA   Take 1 Capsule by mouth every evening.  Dose: 60 mg     furosemide 20 MG Tabs  Commonly known as: LASIX   Take 1 Tablet by mouth every morning.  Dose: 20 mg     gabapentin 800 MG tablet  Commonly known as: NEURONTIN   Take 1 Tablet by mouth 2 times a day.  Dose: 800 mg     levothyroxine 150 MCG Tabs  Commonly known as: SYNTHROID   Doctor's comments: Note dose reduction  Take 1 Tablet by mouth every morning on an empty stomach.  Dose: 150 mcg     * Misc. Devices Misc   Doctor's comments: This is her website: [email or web address]  Referral to Nona Valenzuela DDS for mouth device for Obstructive Sleep Apnea.     * Misc. Devices Misc   Doctor's comments: Please call [place]ly for this appointment at [phone]. [email or web address]  Referral to Nona Valenzuela DDS in [place] for mouth device for Obstructive Sleep Apnea     nabumetone 750 MG Tabs  Commonly known as: RELAFEN   Take 1 Tablet by mouth 2 times a day with meals.  Dose: 750 mg     nystatin powder  Commonly known as: MYCOSTATIN   Apply 1 Application topically 2 times a day as needed (Apply's under breast).  Dose: 1 Application     pantoprazole 40 MG Tbec  Commonly known as: PROTONIX   Take 1 Tablet by mouth 2 times a day.  Dose: 40 mg     potassium chloride SA 20 MEQ Tbcr  Commonly known as: Kdur   Take 1 Tablet by mouth 2 times a day.  Dose: 20 mEq     QUEtiapine 25 MG Tabs  Commonly known as: Seroquel   Take 1 Tablet by mouth at bedtime.  Dose: 25 mg     traMADol 50 MG  "Tabs  Commonly known as: Ultram   Take 100 mg by mouth at bedtime.  Dose: 100 mg     Vitamin D-3 125 MCG (5000 UT) Tabs   Take 5,000 Units by mouth every evening.  Dose: 5,000 Units     VITAMIN E PO   Take 1 Capsule by mouth every morning.  Dose: 1 Capsule           * This list has 2 medication(s) that are the same as other medications prescribed for you. Read the directions carefully, and ask your doctor or other care provider to review them with you.                STOP taking these medications      nitrofurantoin 100 MG Caps  Commonly known as: MACROBID              Allergies  Allergies   Allergen Reactions    Ace Inhibitors Cough    Benadryl Allergy Hives     Hot skin itching    Iodine Nausea     Pt received CT w/ contrast 12/20/22 pt had nausea post contrast     Lamictal Rash and Swelling     \"hot\", unable to function.  Severe rash, scarring    Savella [Kdc:Milnacipran+Ci Pigment Blue 63] Nausea, Swelling and Anxiety     Swelling, bone and muscle pain, sweating, nausea, dizziness, sleeplessness, anxiety    Savella [Milnacipran Hcl] Unspecified     Muscle aches, feet swelling    Sulfa Drugs Hives, Shortness of Breath and Anxiety     Hard breathing, shortness of breath    Butalbital-Acetaminophen Unspecified     Dizzy, can't walk, hard to focus    Cefaclor Nausea and Unspecified     Ceclor causes light headedness and dizziness    Morphine Itching     \"Redness\"    Penicillins Itching and Swelling     Skin itching and redness    Tizanidine Hcl Nausea     \"Dizziness\" hard to focus    Milnacipran Vomiting and Nausea    Amlactin Rash and Itching     Lotion causes itching, redness and burnng    Tape Rash and Itching     Skin tears, paper tape is OK per Pt       DIET  Orders Placed This Encounter   Procedures    Diet Order Diet: Cardiac     Standing Status:   Standing     Number of Occurrences:   1     Order Specific Question:   Diet:     Answer:   Cardiac [6]       ACTIVITY  As tolerated and directed by skilled " nursing.  Weight bearing as tolerated and directed by skilled nursing.    LINES, DRAINS, AND WOUNDS  This is an automated list. Peripheral IVs will be removed prior to discharge.  Peripheral IV 12/20/22 20 G Left Forearm (Active)   Site Assessment Clean;Dry;Intact 12/29/22 0700   Dressing Type Transparent 12/29/22 0700   Line Status Scrubbed the hub prior to access;Flushed;Saline locked 12/29/22 0700   Dressing Status Clean;Dry;Intact 12/29/22 0700   Dressing Intervention N/A 12/29/22 0700   Infiltration Grading ([facility]) 0 12/29/22 0700   Phlebitis Scale ([facility] Only) 0 12/29/22 0700          Peripheral IV 12/20/22 20 G Left Forearm (Active)   Site Assessment Clean;Dry;Intact 12/29/22 0700   Dressing Type Transparent 12/29/22 0700   Line Status Scrubbed the hub prior to access;Flushed;Saline locked 12/29/22 0700   Dressing Status Clean;Dry;Intact 12/29/22 0700   Dressing Intervention N/A 12/29/22 0700   Infiltration Grading ([facility]) 0 12/29/22 0700   Phlebitis Scale ([facility] Only) 0 12/29/22 0700               MENTAL STATUS ON TRANSFER  Level of Consciousness: Alert          CONSULTATIONS      PROCEDURES      DX-FOOT-2- RIGHT   Final Result         No acute osseous abnormality.      EC-ECHOCARDIOGRAM COMPLETE W/O CONT   Final Result      MR-BRAIN-W/O   Final Result      1.  No acute abnormality.   2.  Mild chronic microvascular ischemic disease.      CT-CTA NECK WITH & W/O-POST PROCESSING   Final Result      1.  Mild atherosclerotic plaque in bilateral carotid bulbs and proximal internal carotid arteries with less than 50% stenosis.      2.  2.8 cm left thyroid gland nodule. Recommend follow-up thyroid ultrasound.      CT-CTA HEAD WITH & W/O-POST PROCESS   Final Result      1.  No large vessel occlusion, high-grade stenosis or aneurysm of the [place].   2.  No CT evidence of acute infarct, hemorrhage or mass.   3.  Mild atrophy and chronic small vessel ischemic changes.      DX-CHEST-PORTABLE (1  VIEW)   Final Result      Similar hypoventilatory chest with linear opacity most likely from atelectasis although some scarring is possible           LABORATORY  Lab Results   Component Value Date    SODIUM 133 (L) 12/24/2022    POTASSIUM 3.6 12/24/2022    CHLORIDE 97 12/24/2022    CO2 26 12/24/2022    GLUCOSE 189 (H) 12/24/2022    BUN 15 12/24/2022    CREATININE 0.79 12/24/2022    CREATININE 0.84 04/18/2011    GLOMRATE >59 01/14/2011        Lab Results   Component Value Date    WBC 9.5 12/22/2022    WBC 10.8 (H) 10/06/2009    HEMOGLOBIN 13.2 12/22/2022    HEMATOCRIT 41.2 12/22/2022    PLATELETCT 186 12/22/2022        Total time of the discharge process exceeds 35 minutes.

## 2022-12-29 NOTE — PROGRESS NOTES
"Hospital Medicine Daily Progress Note    Date of Service  12/29/2022    Chief Complaint  Ashly Meyer is a 72 y.o. female admitted 12/20/2022 with   Chief Complaint   Patient presents with    Dizziness     Pt started experiencing dizziness last night as well as left arm/shoulder pain. Denies any syncope or fall. Pt also states had diaphoretic episode with dizziness. Denies any chest pain currently.     Headache     Pt having posterior headache ongoing for 2 weeks, intermittent blurry vision with headache. Hx of TIA 20 years ago per pt.        Hospital Course  As per chart review:  \"  72F PMH HTN, hypothyroidism, obesity class 3, chronic hypoxemic respiratory failure on home oxygen 2LNC (nocturnal and daytime), admitted on 12/20/22 for reports of dizziness starting on Monday 12/29. She stated she could not get up and worsened over the last 2 days, difficulty managing her balance. She was admitted to rule out stroke.    Currently, MRI brain showed no acute infarcts. Echo completed, no aortic or mitral valve dysfunction noted, LVEF 70%. Acute stroke ruled out.    Patient had complaints of severe right foot pain. XR negative for fractures or deformities.  Appeared swollen and red. Uric acid 9.0. elevated ESR and CRP.  Treating for acute gout flare, with improvement of symptoms.\"    Interval Problem Update  Patient stated she was feeling well, minimal right foot pain, but remains constipated no BM.  She opted for enema and continue lactulose.    PATIENT SEEN BY PREVIOUS HOSPITALIST UNTIL 12/26 12/27: Patient lying in bed comfortably.  The patient mentions that she had already bowel movements.  The patient is medically clear for discharge, pending placement.  We appreciate further recommendations by case management.  The patient states that her pain in her foot is better today, continue with colchicine for now.    12/28: Patient seen at bedside this morning. No overnight events reported. We are still pending " placement. Patient is medically cleared for discharge.    12/29: Patient seen at bedside this morning.  I also spoke to the patient's daughter over the phone.  This seems that the patient is not able to pay the $600 that she needs for skilled nursing facility placement.  It seems that the patient lives by herself.  Case management on board, they are trying to see if they can set up a group home.  No overnight events reported.    Continue gout flare treatment with colchicine and allopurinol. Avoid NSAIDs.    P[facility] SNF[facility]    I have discussed this patient's plan of care and discharge plan at IDT rounds today with Case Management, Nursing, Nursing leadership, and other members of the IDT team.    Consultants/Specialty  none    Code Status  Full Code    Disposition  Patient is medically cleared for discharge.   Anticipate discharge to  SNF .  I have placed the appropriate orders for post-discharge needs.    Review of Systems  Review of Systems   Constitutional:  Negative for chills, fever and malaise/fatigue.   Respiratory:  Negative for cough and shortness of breath.    Cardiovascular:  Negative for chest pain and palpitations.   Gastrointestinal:  Negative for abdominal pain, nausea and vomiting.   Musculoskeletal:  Positive for joint pain (R ankle). Negative for back pain.   Neurological:  Negative for dizziness, weakness and headaches.   All other systems reviewed and are negative.     Physical Exam  Temp:  [36.8 °C (98.3 °F)-37 °C (98.6 °F)] 37 °C (98.6 °F)  Pulse:  [67-84] 70  Resp:  [17-18] 18  BP: (148-161)/(77-86) 153/80  SpO2:  [90 %-93 %] 93 %    Physical Exam  Vitals and nursing note reviewed.   Constitutional:       General: She is not in acute distress.     Appearance: She is obese.      Comments: Frail appearing elderly female   HENT:      Head:      Comments: Temporal muscle wasting positive     Mouth/Throat:      Mouth: Mucous membranes are dry.      Pharynx: No  oropharyngeal exudate.   Cardiovascular:      Rate and Rhythm: Normal rate and regular rhythm.      Pulses: Normal pulses.      Heart sounds: Normal heart sounds. No murmur heard.  Pulmonary:      Effort: Pulmonary effort is normal. No respiratory distress.      Breath sounds: Normal breath sounds.   Abdominal:      General: Bowel sounds are normal. There is no distension.      Palpations: Abdomen is soft.   Skin:     General: Skin is warm.      Capillary Refill: Capillary refill takes less than 2 seconds.      Coloration: Skin is not jaundiced.      Findings: No erythema.   Neurological:      Mental Status: She is alert and oriented to person, place, and time. Mental status is at baseline.      Motor: Weakness present.   Psychiatric:         Mood and Affect: Mood normal.         Behavior: Behavior normal.       Fluids    Intake/Output Summary (Last 24 hours) at 12/29/2022 1118  Last data filed at 12/29/2022 1000  Gross per 24 hour   Intake 240 ml   Output --   Net 240 ml         Laboratory                              Imaging  DX-FOOT-2- RIGHT   Final Result         No acute osseous abnormality.      EC-ECHOCARDIOGRAM COMPLETE W/O CONT   Final Result      MR-BRAIN-W/O   Final Result      1.  No acute abnormality.   2.  Mild chronic microvascular ischemic disease.      CT-CTA NECK WITH & W/O-POST PROCESSING   Final Result      1.  Mild atherosclerotic plaque in bilateral carotid bulbs and proximal internal carotid arteries with less than 50% stenosis.      2.  2.8 cm left thyroid gland nodule. Recommend follow-up thyroid ultrasound.      CT-CTA HEAD WITH & W/O-POST PROCESS   Final Result      1.  No large vessel occlusion, high-grade stenosis or aneurysm of the [place].   2.  No CT evidence of acute infarct, hemorrhage or mass.   3.  Mild atrophy and chronic small vessel ischemic changes.      DX-CHEST-PORTABLE (1 VIEW)   Final Result      Similar hypoventilatory chest with linear opacity most likely from  atelectasis although some scarring is possible             Assessment/Plan  * Ataxia- (present on admission)  Assessment & Plan  Patient mentioned she has had chronic (1 year) of difficulty walking. She has seen her PCP but so far no diagnosis or improvement.     CT, CT-angiography, head, neck showed no acute infarction, or hemorrhage   CT angiography shows bilateral carotid artery stenosis less than 50%.    - Continue aspirin and high intensity statin  - Echo LVEF 70%, no valvulopathy, RVSP 30mmHg  - MRI brain showed no acute infarcts.    Fall precautions    physical, occupational therapy evaluation ordered - patient agreeing she needs more therapy, requested SNF.   Placed referrals for [facility]  [facility], IRF rejected patient.    Drug-induced constipation  Assessment & Plan  No BM for 3 days  Giving lactulose and tap water enema     Impaired gait and mobility- (present on admission)  Assessment & Plan  Patient mentioned she has had chronic (1 year) of difficulty walking.  She is dependent to using a walker and her walls at home for stability. She stated she was evaluated by her PCP but has not improved.  She is living alone at home, but is able to get her own groceries and still drives.  She mentioned she was in swim therapy, but her medical insurance decided to stop paying for them around summer 2022, since then she states she has been declining in her functional abilities.  - placed referral for IRF and SNF  - continue PT/OT  - b12 normal  - b6 pending    Left bundle branch block- (present on admission)  Assessment & Plan  Currently without chest pain   No troponin elevation  EKG shows sinus rhythm with a rate of 72, there is left bundle branch block pattern not seen on prior EKGs.  No ST elevation, abnormal repolarization.  QTc 484.  Continuous cardiac monitoring.    No abnormal heart structure on echo    Carotid artery stenosis- (present on admission)  Assessment & Plan  CT imaging shows evidence of  bilateral proximal internal carotid artery stenosis of less than 50%  , HDL 60, total 181  Continue aspirin and high intensity statin  outpatient vascular surgery referral if symptoms worsen.    Chronic respiratory failure with hypoxia (HCC)- (present on admission)  Assessment & Plan  Patient on nocturnal oxygen at home, but does use continuously during the day at times.  continue    Chronic gout of foot- (present on admission)  Assessment & Plan  C/o right foot swelling and pain. Acute on chronic gout. Not on allopurinol at home.  Pt uric acid, ESR, CRP elevated  Xray of foot showed no fractures or deformity  Continue gout flare treatment with colchicine and allopurinol. Avoid NSAIDs.    H/O fibromyalgia- (present on admission)  Assessment & Plan  Multimodal pain control    GERD (gastroesophageal reflux disease)- (present on admission)  Assessment & Plan  Resume home pantoprazole    Hypothyroidism due to acquired atrophy of thyroid- (present on admission)  Assessment & Plan  Resume home levothyroxine    Essential hypertension- (present on admission)  Assessment & Plan  Continue home amlodipine with hold parameters       VTE prophylaxis: Xarelto 10 mg daily as prophylaxis    I have performed a physical exam and reviewed and updated ROS and Plan today (12/29/2022). In review of yesterday's note (12/28/2022), there are no changes except as documented above.

## 2022-12-29 NOTE — DISCHARGE PLANNING
Case Management Discharge Planning    Admission Date: 12/20/2022  GMLOS:    ALOS: 0    6-Clicks ADL Score: 18  6-Clicks Mobility Score: 17  PT and/or OT Eval ordered: Yes  Post-acute Referrals Ordered: Yes  Post-acute Choice Obtained: Yes  Has referral(s) been sent to post-acute provider:  Yes      Anticipated Discharge Dispo: Discharge Disposition: D/T to SNF with Medicare cert in anticipation of skilled care (03)    DME Needed: No    Action(s) Taken: Updated Provider/Nurse on Discharge Plan    [facility] is requesting a $600 one time copay.  Patient states she cannot afford this.  MD states he does not feel comfortable sending patient home with HH as he believes she requires more assistance.  Patient lives home alone.  Patients daughter lives in town but has a seizure disorder that prevents her from driving and would be unable to help patient.  Patient states she has no other family in town. Patient states she makes roughly $4,000/month in income that she uses to pay for her and her daughters bills. Patient is open to GH placement but only short term.  RN CM notified patient that she would probably have to pay OOP for GH to which patients states she would be unable to afford as she plans to continue paying mortgage for her own home as well. RN CM also opened conversation about HAFSA to patient to which patient stated she would like to keep her home and not do [facility]. MD notified. PT/OT to reeval.    1300: Per [facility], [facility] states patient can set up paymenet plan after she is admitted.  Patient is able to do this.  Transportation request form faxed to [facility].     1339: Transportation confirmed for today at 1430.  MD GENE, and patient notified.     Escalations Completed: None    Medically Clear: No    Next Steps: DC to [facility]    Barriers to Discharge: None

## 2023-01-23 ENCOUNTER — OFFICE VISIT (OUTPATIENT)
Dept: MEDICAL GROUP | Facility: MEDICAL CENTER | Age: 73
End: 2023-01-23
Payer: MEDICARE

## 2023-01-23 ENCOUNTER — HOSPITAL ENCOUNTER (OUTPATIENT)
Facility: MEDICAL CENTER | Age: 73
End: 2023-01-23
Attending: FAMILY MEDICINE
Payer: MEDICARE

## 2023-01-23 VITALS
SYSTOLIC BLOOD PRESSURE: 134 MMHG | HEIGHT: 66 IN | HEART RATE: 72 BPM | WEIGHT: 255.8 LBS | RESPIRATION RATE: 14 BRPM | BODY MASS INDEX: 41.11 KG/M2 | DIASTOLIC BLOOD PRESSURE: 68 MMHG | OXYGEN SATURATION: 94 % | TEMPERATURE: 98.2 F

## 2023-01-23 DIAGNOSIS — G61.9 INFLAMMATORY NEUROPATHY (HCC): ICD-10-CM

## 2023-01-23 DIAGNOSIS — E04.1 THYROID NODULE: ICD-10-CM

## 2023-01-23 DIAGNOSIS — Z91.81 AT RISK FOR FALLS: ICD-10-CM

## 2023-01-23 DIAGNOSIS — N34.3 DYSURIA-FREQUENCY SYNDROME: ICD-10-CM

## 2023-01-23 DIAGNOSIS — M1A.0790 CHRONIC GOUT OF FOOT, UNSPECIFIED CAUSE, UNSPECIFIED LATERALITY: ICD-10-CM

## 2023-01-23 DIAGNOSIS — E11.9 CONTROLLED TYPE 2 DIABETES MELLITUS WITHOUT COMPLICATION, WITHOUT LONG-TERM CURRENT USE OF INSULIN (HCC): ICD-10-CM

## 2023-01-23 DIAGNOSIS — R26.89 IMPAIRED GAIT AND MOBILITY: Chronic | ICD-10-CM

## 2023-01-23 DIAGNOSIS — F33.2 SEVERE EPISODE OF RECURRENT MAJOR DEPRESSIVE DISORDER, WITHOUT PSYCHOTIC FEATURES (HCC): ICD-10-CM

## 2023-01-23 DIAGNOSIS — I10 ESSENTIAL HYPERTENSION: ICD-10-CM

## 2023-01-23 DIAGNOSIS — M25.552 BILATERAL HIP PAIN: ICD-10-CM

## 2023-01-23 DIAGNOSIS — M25.551 BILATERAL HIP PAIN: ICD-10-CM

## 2023-01-23 DIAGNOSIS — E03.4 HYPOTHYROIDISM DUE TO ACQUIRED ATROPHY OF THYROID: ICD-10-CM

## 2023-01-23 DIAGNOSIS — R27.0 ATAXIA: Chronic | ICD-10-CM

## 2023-01-23 DIAGNOSIS — E66.01 MORBID OBESITY WITH BMI OF 40.0-44.9, ADULT (HCC): ICD-10-CM

## 2023-01-23 LAB
APPEARANCE UR: NORMAL
BILIRUB UR STRIP-MCNC: NORMAL MG/DL
COLOR UR AUTO: YELLOW
GLUCOSE UR STRIP.AUTO-MCNC: NEGATIVE MG/DL
KETONES UR STRIP.AUTO-MCNC: NEGATIVE MG/DL
LEUKOCYTE ESTERASE UR QL STRIP.AUTO: NORMAL
NITRITE UR QL STRIP.AUTO: POSITIVE
PH UR STRIP.AUTO: 5.5 [PH] (ref 5–8)
PROT UR QL STRIP: NEGATIVE MG/DL
RBC UR QL AUTO: NEGATIVE
SP GR UR STRIP.AUTO: 1.02
UROBILINOGEN UR STRIP-MCNC: NORMAL MG/DL

## 2023-01-23 PROCEDURE — 81002 URINALYSIS NONAUTO W/O SCOPE: CPT | Performed by: FAMILY MEDICINE

## 2023-01-23 PROCEDURE — 99214 OFFICE O/P EST MOD 30 MIN: CPT | Performed by: FAMILY MEDICINE

## 2023-01-23 RX ORDER — AMLODIPINE BESYLATE 5 MG/1
5 TABLET ORAL DAILY
Qty: 90 TABLET | Refills: 3 | Status: SHIPPED
Start: 2023-01-23 | End: 2023-03-13

## 2023-01-23 RX ORDER — KETOCONAZOLE 20 MG/G
CREAM TOPICAL
COMMUNITY
End: 2023-07-05

## 2023-01-23 RX ORDER — LEVOTHYROXINE SODIUM 175 UG/1
TABLET ORAL
COMMUNITY
End: 2023-01-23

## 2023-01-23 RX ORDER — TRAMADOL HYDROCHLORIDE 50 MG/1
50 TABLET ORAL EVERY 6 HOURS PRN
Qty: 120 TABLET | Refills: 2 | Status: SHIPPED | OUTPATIENT
Start: 2023-01-23 | End: 2023-03-13

## 2023-01-23 RX ORDER — LEVOTHYROXINE SODIUM 0.15 MG/1
150 TABLET ORAL
Qty: 90 TABLET | Refills: 0 | Status: SHIPPED | OUTPATIENT
Start: 2023-01-23 | End: 2023-03-13 | Stop reason: SDUPTHER

## 2023-01-23 RX ORDER — IVERMECTIN 10 MG/G
CREAM TOPICAL
COMMUNITY
End: 2023-07-05

## 2023-01-23 RX ORDER — ALLOPURINOL 100 MG/1
100 TABLET ORAL DAILY
Qty: 90 TABLET | Refills: 3 | Status: SHIPPED | OUTPATIENT
Start: 2023-01-23 | End: 2023-03-13 | Stop reason: SDUPTHER

## 2023-01-23 ASSESSMENT — PATIENT HEALTH QUESTIONNAIRE - PHQ9
SUM OF ALL RESPONSES TO PHQ QUESTIONS 1-9: 20
CLINICAL INTERPRETATION OF PHQ2 SCORE: 6
5. POOR APPETITE OR OVEREATING: 3 - NEARLY EVERY DAY

## 2023-01-23 ASSESSMENT — FIBROSIS 4 INDEX: FIB4 SCORE: 1.78

## 2023-01-24 ENCOUNTER — HOSPITAL ENCOUNTER (OUTPATIENT)
Dept: RADIOLOGY | Facility: MEDICAL CENTER | Age: 73
End: 2023-01-24
Attending: FAMILY MEDICINE
Payer: MEDICARE

## 2023-01-24 DIAGNOSIS — N34.3 DYSURIA-FREQUENCY SYNDROME: ICD-10-CM

## 2023-01-24 DIAGNOSIS — E04.1 THYROID NODULE: ICD-10-CM

## 2023-01-24 PROCEDURE — 76536 US EXAM OF HEAD AND NECK: CPT

## 2023-01-24 RX ORDER — CEPHALEXIN 500 MG/1
500 CAPSULE ORAL 3 TIMES DAILY
Qty: 15 CAPSULE | Refills: 0 | Status: SHIPPED | OUTPATIENT
Start: 2023-01-24 | End: 2023-01-29

## 2023-01-24 NOTE — PROGRESS NOTES
Subjective:     Ashly Meyer is a 72 y.o. female who presents for Hospital Follow-up.      HPI:   Recently hospitalized for ataxia, labile hypertension.  She is now having dysuria/frequency. History of severe urinary tract infection.  Brain MRI basically stable.   Her CTA  of neck showed mild carotid plaque.  Her CTA of her brain shows no occlusion.      Current medicines (including reconciliation performed today)  Current Outpatient Medications   Medication Sig Dispense Refill    amLODIPine (NORVASC) 5 MG Tab Take 1 Tablet by mouth every day. 90 Tablet 3    traMADol (ULTRAM) 50 MG Tab Take 1 Tablet by mouth every 6 hours as needed for Moderate Pain for up to 90 days. 120 tablets is a 30 day quantity 120 Tablet 2    allopurinol (ZYLOPRIM) 100 MG Tab Take 1 Tablet by mouth every day. 90 Tablet 3    levothyroxine (SYNTHROID) 150 MCG Tab Take 1 Tablet by mouth every morning on an empty stomach. 90 Tablet 0    Ivermectin (SOOLANTRA) 1 % Cream Soolantra 1 % topical cream      ketoconazole (NIZORAL) 2 % Cream ketoconazole 2 % topical cream      aspirin EC 81 MG EC tablet Take 1 Tablet by mouth every day. 30 Tablet     atorvastatin (LIPITOR) 40 MG Tab Take 1 Tablet by mouth every evening. 30 Tablet     nystatin (MYCOSTATIN) powder Apply 1 Application topically 2 times a day as needed (Apply's under breast).      potassium chloride SA (KDUR) 20 MEQ Tab CR Take 1 Tablet by mouth 2 times a day. 180 Tablet 2    QUEtiapine (SEROQUEL) 25 MG Tab Take 1 Tablet by mouth at bedtime. 90 Tablet 3    pantoprazole (PROTONIX) 40 MG Tablet Delayed Response Take 1 Tablet by mouth 2 times a day. 180 Tablet 3    nabumetone (RELAFEN) 750 MG Tab Take 1 Tablet by mouth 2 times a day with meals. 180 Tablet 3    Misc. Devices Misc Referral to Nona Valenzuela DDS in [place] for mouth device for Obstructive Sleep Apnea 1 Each 1    gabapentin (NEURONTIN) 800 MG tablet Take 1 Tablet by mouth 2 times a day.      DULoxetine (CYMBALTA) 60 MG Cap   "Particles delayed-release capsule Take 1 Capsule by mouth every evening. 90 Capsule 3    albuterol 108 (90 Base) MCG/ACT Aero Soln inhalation aerosol Inhale 2 Puffs every 6 hours as needed for Shortness of Breath. 8.5 g 11    furosemide (LASIX) 20 MG Tab Take 1 Tablet by mouth every morning. 90 Tablet 3    Cholecalciferol (VITAMIN D-3) 125 MCG (5000 UT) Tab Take 5,000 Units by mouth every evening.      VITAMIN E PO Take 1 Capsule by mouth every morning.       No current facility-administered medications for this visit.       Allergies:   Ace inhibitors, Benadryl allergy, Iodine, Lamictal, Savella [kdc:milnacipran+ci pigment blue 63], Savella [milnacipran hcl], Sulfa drugs, Butalbital-acetaminophen, Cefaclor, Morphine, Penicillins, Tizanidine hcl, Milnacipran, Amlactin, and Tape    Social History     Tobacco Use    Smoking status: Former     Packs/day: 1.00     Years: 43.00     Pack years: 43.00     Types: Cigarettes     Start date: 1962     Quit date: 3/1/2007     Years since quitting: 15.9    Smokeless tobacco: Never    Tobacco comments:     Started smoking at age 15, continued abstinance    Vaping Use    Vaping Use: Never used   Substance Use Topics    Alcohol use: Not Currently    Drug use: Not Currently     Comment: CBD topical pain cream       ROS:  Denies further chest pain.  Ataxia is better.  Denies change in bowel pattern.  Has new symptoms of dysuria and frequency the last few days.  Some chills.  No flank pain.    Objective:     Vitals:    01/23/23 1525   BP: 134/68   BP Location: Right arm   Patient Position: Sitting   BP Cuff Size: Large adult   Pulse: 72   Resp: 14   Temp: 36.8 °C (98.2 °F)   TempSrc: Temporal   SpO2: 94%   Weight: 116 kg (255 lb 12.8 oz)   Height: 1.676 m (5' 6\")     Body mass index is 41.29 kg/m².    Physical Exam:  Vital signs reviewed  Alert and well oriented  Lungs CTS, good air movement  Heart RRR, normal S1, S2 without murmur  Persistent dry skin rash  Trace pitting edema " bilaterally    Imaging and lab results from hospitalization reviewed    Assessment and Plan:     1. Dysuria-frequency syndrome  Symptom onset: 5 days ago   Current symptoms: painful, urgent, frequent voids. No blood noted in urine.  Since onset symptoms are: more frequent, some chills  Treatments tried: AZO  Associated symptoms: negative for fever, flank pain, nausea and vomiting, vaginal discharge, pelvic pain.  History is positive for frequent UTI.   - POCT Urinalysis  - URINE CULTURE(NEW); Future    2. Essential hypertension  HTN - Chronic condition stable. Currently taking all meds as directed.   She is taking baby aspirin daily.   She is not monitoring BP at home. Blood pressure good here today  Denies symptoms low BP: light-headed, tunnel-vision, unusual fatigue.   Denies symptoms high BP:pounding headache, visual changes, palpitations, flushed face.   Denies medicine side effects: unusual fatigue, slow heartbeat, foot/leg swelling, cough.  - amLODIPine (NORVASC) 5 MG Tab; Take 1 Tablet by mouth every day.  Dispense: 90 Tablet; Refill: 3    3. Hypothyroidism due to acquired atrophy of thyroid  Patient reports good energy level on the medication. Patient denies insomnia, tremor or change in appetite.  Patient is taking the medication on an empty stomach in the morning and waiting at least 30 minutes before eating.  Last TSH in august 2022 was below target.  - levothyroxine (SYNTHROID) 150 MCG Tab; Take 1 Tablet by mouth every morning on an empty stomach.  Dispense: 90 Tablet; Refill: 0    4. Thyroid nodule  Incidentally seen thyroid nodule.  US discussed and ordered.  - US-THYROID; Future    5. Chronic gout of foot, unspecified cause, unspecified laterality  Patient has gout.  Allopurinol is well tolerated and is renewed  tramadol continues to be used for rescue.    - traMADol (ULTRAM) 50 MG Tab; Take 1 Tablet by mouth every 6 hours as needed for Moderate Pain for up to 90 days. 120 tablets is a 30 day quantity   Dispense: 120 Tablet; Refill: 2  - allopurinol (ZYLOPRIM) 100 MG Tab; Take 1 Tablet by mouth every day.  Dispense: 90 Tablet; Refill: 3    6. Inflammatory neuropathy (HCC)  Thorough work up by neurology.  Unfortunately had a severe adverse reaction to lamictal.  Nothing else has worked much.  States the tramadol is helpful for rescue  - traMADol (ULTRAM) 50 MG Tab; Take 1 Tablet by mouth every 6 hours as needed for Moderate Pain for up to 90 days. 120 tablets is a 30 day quantity  Dispense: 120 Tablet; Refill: 2    7. Bilateral hip pain  The tramadol is helpful for her hip pain.  - traMADol (ULTRAM) 50 MG Tab; Take 1 Tablet by mouth every 6 hours as needed for Moderate Pain for up to 90 days. 120 tablets is a 30 day quantity  Dispense: 120 Tablet; Refill: 2    8. Controlled type 2 diabetes mellitus without complication, without long-term current use of insulin ([facility])  Last A1c 5.9%.  continues to be in good control.    9. Ataxia/Impaired gait and mobility/At risk for falls  Still broad based gait.  Using a walker today.  States she is now using regularly.  - Patient identified as fall risk.  Appropriate orders and counseling given.    10. Severe episode of recurrent major depressive disorder, without psychotic features ([facility])  Stable depression.  Taking duloxetine and low dose seroquel.    - Patient has been identified as having a positive depression screening. Appropriate orders and counseling have been given.    11. Morbid obesity with BMI of 40.0-44.9, adult ([facility])  Continues to struggle with her weight.  Has been stress eating.    - Patient identified as having weight management issue.  Appropriate orders and counseling given.      - Chart and discharge summary were reviewed.   - Hospitalization and results reviewed with patient.   - Medications reviewed including instructions regarding high risk medications, dosing and side effects.  - Recommended Services: No services needed at this time  - Advance  directive/POLST on file?  No     Follow-up:Return in about 3 months (around 4/23/2023), or if symptoms worsen or fail to improve.    Face-to-face transitional care management services with MODERATE (today's visit is within 14 days post discharge & LACE+ score of 28-58) medical decision complexity were provided.     LACE+ Historical Score Over Time (0-28: Low, 29-58: Medium, 59+: High): 78      Critical Care

## 2023-01-27 ENCOUNTER — HOSPITAL ENCOUNTER (OUTPATIENT)
Dept: LAB | Facility: MEDICAL CENTER | Age: 73
End: 2023-01-27
Attending: FAMILY MEDICINE
Payer: MEDICARE

## 2023-01-27 PROCEDURE — 87086 URINE CULTURE/COLONY COUNT: CPT

## 2023-01-29 LAB
BACTERIA UR CULT: NORMAL
SIGNIFICANT IND 70042: NORMAL
SITE SITE: NORMAL
SOURCE SOURCE: NORMAL

## 2023-01-31 ENCOUNTER — HOSPITAL ENCOUNTER (OUTPATIENT)
Facility: MEDICAL CENTER | Age: 73
End: 2023-01-31
Attending: UROLOGY
Payer: MEDICARE

## 2023-01-31 PROCEDURE — 87077 CULTURE AEROBIC IDENTIFY: CPT

## 2023-01-31 PROCEDURE — 87086 URINE CULTURE/COLONY COUNT: CPT

## 2023-01-31 PROCEDURE — 87186 SC STD MICRODIL/AGAR DIL: CPT

## 2023-02-04 DIAGNOSIS — N39.0 URINARY TRACT INFECTION DUE TO KLEBSIELLA SPECIES: ICD-10-CM

## 2023-02-04 DIAGNOSIS — B96.89 URINARY TRACT INFECTION DUE TO KLEBSIELLA SPECIES: ICD-10-CM

## 2023-02-04 RX ORDER — CIPROFLOXACIN 500 MG/1
500 TABLET, FILM COATED ORAL 2 TIMES DAILY
Qty: 14 TABLET | Refills: 0 | Status: SHIPPED | OUTPATIENT
Start: 2023-02-04 | End: 2023-02-13 | Stop reason: SDUPTHER

## 2023-02-06 ENCOUNTER — OFFICE VISIT (OUTPATIENT)
Dept: MEDICAL GROUP | Facility: MEDICAL CENTER | Age: 73
End: 2023-02-06
Payer: MEDICARE

## 2023-02-06 VITALS
OXYGEN SATURATION: 95 % | WEIGHT: 250.6 LBS | SYSTOLIC BLOOD PRESSURE: 132 MMHG | TEMPERATURE: 97.5 F | HEIGHT: 66 IN | HEART RATE: 87 BPM | DIASTOLIC BLOOD PRESSURE: 76 MMHG | BODY MASS INDEX: 40.27 KG/M2

## 2023-02-06 DIAGNOSIS — J96.11 CHRONIC RESPIRATORY FAILURE WITH HYPOXIA (HCC): ICD-10-CM

## 2023-02-06 DIAGNOSIS — R05.1 ACUTE COUGH: ICD-10-CM

## 2023-02-06 PROCEDURE — 99214 OFFICE O/P EST MOD 30 MIN: CPT | Performed by: STUDENT IN AN ORGANIZED HEALTH CARE EDUCATION/TRAINING PROGRAM

## 2023-02-06 ASSESSMENT — ENCOUNTER SYMPTOMS
SHORTNESS OF BREATH: 0
NAUSEA: 0
CHILLS: 0
VOMITING: 0
FEVER: 0
WHEEZING: 0
COUGH: 1

## 2023-02-06 ASSESSMENT — FIBROSIS 4 INDEX: FIB4 SCORE: 1.78

## 2023-02-06 NOTE — PROGRESS NOTES
"Subjective:     CC:  Cough    HPI:   Ashly presents today with    # Night time oxygen  # Cough and have phlegm since 1/30/2023  - She report 100.3 F fever  - she also has possible active UTI  - increase in phlegm.     # UTI  - follows with Urology reports completed cephalexin       Health Maintenance: follow with primary    ROS:  Review of Systems   Constitutional:  Negative for chills and fever.   Respiratory:  Positive for cough. Negative for shortness of breath and wheezing.    Gastrointestinal:  Negative for nausea and vomiting.     Objective:     Exam:  /76 (BP Location: Left arm, Patient Position: Sitting, BP Cuff Size: Large adult)   Pulse 87   Temp 36.4 °C (97.5 °F) (Temporal)   Ht 1.676 m (5' 6\")   Wt 114 kg (250 lb 9.6 oz)   SpO2 95%   BMI 40.45 kg/m²  Body mass index is 40.45 kg/m².    Physical Exam  Constitutional:       Appearance: Normal appearance.   Neck:      Comments: Patient had marked tenderness behind the year and under the jaw line however, when palpating her neck and posterior auricular region by herself she did not experience pain. There is no erythema along hte neck. She is speaking fluently without issue.   Cardiovascular:      Rate and Rhythm: Normal rate. Rhythm irregular.   Pulmonary:      Effort: Pulmonary effort is normal.      Breath sounds: Normal breath sounds.   Neurological:      Mental Status: She is alert.         Labs: egfr 79 1 month ago    Assessment & Plan:     72 y.o. female with the following -     1. Acute cough  2. Chronic respiratory failure with hypoxia (HCC)  Acute cough and pharyngeal pain-7 days she reports 7 days ago she developed cough with significant amount of phlegm she had some difficulty sleeping she presents today because she concerned as developed into something more serious.  On examination there is posterior oropharynx erythema.  Lungs with mild crackles however this is nonspecific..  She reports a fever of 100.3 yesterday.  On examination " vital signs stable currently afebrile.  On my examination she had some tenderness along her neck however patient was able to palpate her neck and behind her ear without significant tenderness or grimace.  She denies difficulty swallowing.  She is able to speak in complete sentences without issue.  No current fever or chill.  She does not look toxic on exam  - report use nocturnal oxygen, currently saturation on room air without issue  Plan  Will refrain from additional abx prescription as patient has a new ciprofloxacin prescribed to be started on 2/6 x 7 days for possible UTI  Ciprofloxacin would give good gram-negative, anaerobes and some gram-positive coverage  Signs and symptoms requiring emergent/urgent visit discussed with patient  Patient will follow with 7 days of or earlier if her symptoms does not resolve or gets worse                Return in about 1 week (around 2/13/2023).    Please note that this dictation was created using voice recognition software. I have made every reasonable attempt to correct obvious errors, but I expect that there are errors of grammar and possibly content that I did not discover before finalizing the note.

## 2023-02-13 ENCOUNTER — OFFICE VISIT (OUTPATIENT)
Dept: MEDICAL GROUP | Facility: MEDICAL CENTER | Age: 73
End: 2023-02-13
Payer: MEDICARE

## 2023-02-13 VITALS
OXYGEN SATURATION: 97 % | BODY MASS INDEX: 38.8 KG/M2 | HEIGHT: 66 IN | TEMPERATURE: 97.9 F | HEART RATE: 83 BPM | SYSTOLIC BLOOD PRESSURE: 136 MMHG | DIASTOLIC BLOOD PRESSURE: 78 MMHG | WEIGHT: 241.4 LBS

## 2023-02-13 DIAGNOSIS — B96.89 URINARY TRACT INFECTION DUE TO KLEBSIELLA SPECIES: ICD-10-CM

## 2023-02-13 DIAGNOSIS — R27.0 ATAXIA: Chronic | ICD-10-CM

## 2023-02-13 DIAGNOSIS — R11.14 BILIOUS VOMITING WITH NAUSEA: ICD-10-CM

## 2023-02-13 DIAGNOSIS — N39.0 URINARY TRACT INFECTION DUE TO KLEBSIELLA SPECIES: ICD-10-CM

## 2023-02-13 PROCEDURE — 99213 OFFICE O/P EST LOW 20 MIN: CPT | Performed by: FAMILY MEDICINE

## 2023-02-13 RX ORDER — ONDANSETRON 8 MG/1
8 TABLET, ORALLY DISINTEGRATING ORAL EVERY 8 HOURS PRN
Qty: 15 TABLET | Refills: 6 | Status: SHIPPED
Start: 2023-02-13 | End: 2023-03-13

## 2023-02-13 RX ORDER — CIPROFLOXACIN 250 MG/1
250 TABLET, FILM COATED ORAL 2 TIMES DAILY
Qty: 14 TABLET | Refills: 0 | Status: SHIPPED | OUTPATIENT
Start: 2023-02-13 | End: 2023-02-20

## 2023-02-13 ASSESSMENT — FIBROSIS 4 INDEX: FIB4 SCORE: 1.78

## 2023-02-14 NOTE — PROGRESS NOTES
Chief Complaint   Patient presents with    Nausea     Pt has not been taking her medications due to nausea.     Loss of Appetite    New Med Request     Pt requests new sublingual nausea medication.        Subjective:     HPI:   Ashly Meyer presents today with the followin. Bilious vomiting with nausea  Requests the ondansetron oral dissolving tablet.  She is having a hard time keeping anything down.  Stopped most of her medications.  I have now indicated which medications I believe it is most crucial for her to take including the antibiotics.  I have sent a prescription for the oral dissolving tablet to the pharmacy.  This was a suggestion of the visiting nurse and I feel it is an excellent suggestion.    2. Urinary tract infection due to Klebsiella species  I believe she is having vomiting due to untreated UTI.  She read the product insert on the Cipro and the slot but it said it was poisonous and would kill her.  I do not think she is thinking clearly, possibly due to the infection.  Her daughter is here with her today and clearly it is news to the daughter that she stopped most of her medications.    3. Ataxia  She is somewhat unsteady and ataxic today.  She is using her walker.  She is walking reasonably steadily when using the walker.        Patient Active Problem List    Diagnosis Date Noted    Urinary tract infection due to Klebsiella species 2023    Bilious vomiting with nausea 2023    Drug-induced constipation 2022    Impaired gait and mobility 2022    Ataxia 2022    Carotid artery stenosis 2022    Left bundle branch block 2022    Temporal pain 2022    History of 2019 novel coronavirus disease (COVID-19) 10/11/2022    Positive self-administered antigen test for COVID-19 10/11/2022    Trochanteric bursitis of both hips 2022    Mild persistent asthma, uncomplicated 2022    Incomplete rectal prolapse 2021    Benign essential  tremor 09/03/2021    Chronic respiratory failure with hypoxia ([facility]) 06/03/2021    Controlled type 2 diabetes mellitus without complication, without long-term current use of insulin ([facility]) 06/12/2019    Chronic gout of foot 04/30/2019    Dry eye syndrome, bilateral     Rotator cuff syndrome of right shoulder and allied disorders 10/10/2018    Vitamin D deficiency 12/28/2017    Morbid obesity with BMI of 40.0-44.9, adult ([facility])     Neural foraminal stenosis of cervical spine 05/18/2017    Tinnitus of both ears 05/18/2017    Dyslipidemia, goal LDL below 130 01/06/2017    Nonalcoholic fatty liver disease 01/05/2017    Menopausal symptoms 04/28/2016    Lumbar facet arthropathy 12/09/2015    Candidal intertrigo 06/01/2015    Elevated sed rate 12/09/2014    Osteoarthritis of left thumb 07/23/2014    H/O fibromyalgia 03/11/2014    KERRI (obstructive sleep apnea) 03/11/2014    Peripheral neuropathy     Rectocele 11/25/2013    Pelvic floor dysfunction     Chronic constipation 10/02/2013    Nocturnal hypoxia     Lumbar radiculopathy 11/01/2011    Migraine without aura     Seasonal allergies 07/08/2011    GERD (gastroesophageal reflux disease) 12/10/2010    Osteoarthritis of multiple joints 04/13/2010    Eczema, dyshidrotic 08/03/2009    Essential hypertension 05/20/2009    Hypothyroidism due to acquired atrophy of thyroid 05/20/2009       Current medicines (including changes today)  Current Outpatient Medications   Medication Sig Dispense Refill    ondansetron (ZOFRAN ODT) 8 MG TABLET DISPERSIBLE Take 1 Tablet by mouth every 8 hours as needed for Nausea. 15 Tablet 6    ciprofloxacin (CIPRO) 250 MG Tab Take 1 Tablet by mouth 2 times a day for 7 days. 14 Tablet 0    Ivermectin 1 % Cream Soolantra 1 % topical cream      ketoconazole (NIZORAL) 2 % Cream ketoconazole 2 % topical cream      amLODIPine (NORVASC) 5 MG Tab Take 1 Tablet by mouth every day. 90 Tablet 3    traMADol (ULTRAM) 50 MG Tab Take 1 Tablet by mouth every 6 hours as  "needed for Moderate Pain for up to 90 days. 120 tablets is a 30 day quantity 120 Tablet 2    allopurinol (ZYLOPRIM) 100 MG Tab Take 1 Tablet by mouth every day. 90 Tablet 3    levothyroxine (SYNTHROID) 150 MCG Tab Take 1 Tablet by mouth every morning on an empty stomach. 90 Tablet 0    aspirin EC 81 MG EC tablet Take 1 Tablet by mouth every day. 30 Tablet     atorvastatin (LIPITOR) 40 MG Tab Take 1 Tablet by mouth every evening. 30 Tablet     nystatin (MYCOSTATIN) powder Apply 1 Application topically 2 times a day as needed (Apply's under breast).      potassium chloride SA (KDUR) 20 MEQ Tab CR Take 1 Tablet by mouth 2 times a day. 180 Tablet 2    QUEtiapine (SEROQUEL) 25 MG Tab Take 1 Tablet by mouth at bedtime. 90 Tablet 3    pantoprazole (PROTONIX) 40 MG Tablet Delayed Response Take 1 Tablet by mouth 2 times a day. 180 Tablet 3    nabumetone (RELAFEN) 750 MG Tab Take 1 Tablet by mouth 2 times a day with meals. 180 Tablet 3    Misc. Devices Misc Referral to Nona Valenzuela DDS in [place] for mouth device for Obstructive Sleep Apnea 1 Each 1    gabapentin (NEURONTIN) 800 MG tablet Take 1 Tablet by mouth 2 times a day.      DULoxetine (CYMBALTA) 60 MG Cap DR Particles delayed-release capsule Take 1 Capsule by mouth every evening. 90 Capsule 3    albuterol 108 (90 Base) MCG/ACT Aero Soln inhalation aerosol Inhale 2 Puffs every 6 hours as needed for Shortness of Breath. 8.5 g 11    furosemide (LASIX) 20 MG Tab Take 1 Tablet by mouth every morning. 90 Tablet 3    Cholecalciferol (VITAMIN D-3) 125 MCG (5000 UT) Tab Take 5,000 Units by mouth every evening.      VITAMIN E PO Take 1 Capsule by mouth every morning.       No current facility-administered medications for this visit.       Allergies   Allergen Reactions    Ace Inhibitors Cough    Benadryl Allergy Hives     Hot skin itching    Iodine Nausea     Pt received CT w/ contrast 12/20/22 pt had nausea post contrast     Lamictal Rash and Swelling     \"hot\", unable to function. " " Severe rash, scarring    Savella [Kdc:Milnacipran+Ci Pigment Blue 63] Nausea, Swelling and Anxiety     Swelling, bone and muscle pain, sweating, nausea, dizziness, sleeplessness, anxiety    Savella [Milnacipran Hcl] Unspecified     Muscle aches, feet swelling    Sulfa Drugs Hives, Shortness of Breath and Anxiety     Hard breathing, shortness of breath    Butalbital-Acetaminophen Unspecified     Dizzy, can't walk, hard to focus    Cefaclor Nausea and Unspecified     Ceclor causes light headedness and dizziness    Morphine Itching     \"Redness\"    Penicillins Itching and Swelling     Skin itching and redness    Tizanidine Hcl Nausea     \"Dizziness\" hard to focus    Milnacipran Vomiting and Nausea    Amlactin Rash and Itching     Lotion causes itching, redness and burnng    Tape Rash and Itching     Skin tears, paper tape is OK per Pt       ROS: As per HPI       Objective:     /78 (BP Location: Right arm, Patient Position: Sitting, BP Cuff Size: Large adult)   Pulse 83   Temp 36.6 °C (97.9 °F) (Temporal)   Ht 1.676 m (5' 6\")   Wt 110 kg (241 lb 6.5 oz)   SpO2 97%  Body mass index is 38.96 kg/m².    Physical Exam:  Constitutional: Well-developed and well-nourished. Not diaphoretic. No distress. Lucid and fluent.  Patient, daughter, physician and staff all wearing masks.  She is belching.  Skin: Skin is warm and dry. No rash noted.  Head: Atraumatic without lesions.  Eyes: Conjunctivae and extraocular motions are normal. Pupils are equal, round, and reactive to light. No scleral icterus.   Ears:  External ears unremarkable.   Neck: Supple, trachea midline. No thyromegaly present. No cervical or supraclavicular lymphadenopathy. No JVD or carotid bruits appreciated  Cardiovascular: Regular rate and rhythm.  Normal S1, S2 without murmur appreciated.  Chest: Effort normal. Clear to auscultation throughout. No adventitious sounds.   Abdomen: Soft, non tender, and without distention. Active bowel sounds in all four " quadrants. No rebound, guarding, masses or hepatosplenomegaly.  Extremities: No cyanosis, clubbing, erythema, nor edema.   Neurological: Alert and oriented x 3.   Psychiatric:  Behavior, mood, and affect are appropriate.       Assessment and Plan:     72 y.o. female with the following issues:    1. Bilious vomiting with nausea  ondansetron (ZOFRAN ODT) 8 MG TABLET DISPERSIBLE      2. Urinary tract infection due to Klebsiella species  ciprofloxacin (CIPRO) 250 MG Tab      3. Ataxia              Followup: Return in about 2 months (around 4/26/2023), or if symptoms worsen or fail to improve.

## 2023-03-13 ENCOUNTER — OFFICE VISIT (OUTPATIENT)
Dept: MEDICAL GROUP | Facility: MEDICAL CENTER | Age: 73
End: 2023-03-13
Payer: MEDICARE

## 2023-03-13 VITALS
WEIGHT: 239 LBS | HEART RATE: 82 BPM | RESPIRATION RATE: 16 BRPM | TEMPERATURE: 98.5 F | HEIGHT: 67 IN | OXYGEN SATURATION: 94 % | SYSTOLIC BLOOD PRESSURE: 136 MMHG | BODY MASS INDEX: 37.51 KG/M2 | DIASTOLIC BLOOD PRESSURE: 70 MMHG

## 2023-03-13 DIAGNOSIS — M25.551 BILATERAL HIP PAIN: ICD-10-CM

## 2023-03-13 DIAGNOSIS — K21.9 GASTROESOPHAGEAL REFLUX DISEASE WITHOUT ESOPHAGITIS: ICD-10-CM

## 2023-03-13 DIAGNOSIS — E03.4 HYPOTHYROIDISM DUE TO ACQUIRED ATROPHY OF THYROID: ICD-10-CM

## 2023-03-13 DIAGNOSIS — B96.89 URINARY TRACT INFECTION DUE TO KLEBSIELLA SPECIES: ICD-10-CM

## 2023-03-13 DIAGNOSIS — G61.9 INFLAMMATORY NEUROPATHY (HCC): ICD-10-CM

## 2023-03-13 DIAGNOSIS — M25.552 BILATERAL HIP PAIN: ICD-10-CM

## 2023-03-13 DIAGNOSIS — M15.9 PRIMARY OSTEOARTHRITIS INVOLVING MULTIPLE JOINTS: ICD-10-CM

## 2023-03-13 DIAGNOSIS — N39.0 URINARY TRACT INFECTION DUE TO KLEBSIELLA SPECIES: ICD-10-CM

## 2023-03-13 DIAGNOSIS — M1A.0790 CHRONIC GOUT OF FOOT, UNSPECIFIED CAUSE, UNSPECIFIED LATERALITY: ICD-10-CM

## 2023-03-13 DIAGNOSIS — M19.011 PRIMARY OSTEOARTHRITIS OF RIGHT SHOULDER: ICD-10-CM

## 2023-03-13 DIAGNOSIS — G62.9 PERIPHERAL POLYNEUROPATHY: ICD-10-CM

## 2023-03-13 DIAGNOSIS — R11.2 NAUSEA AND VOMITING IN ADULT: ICD-10-CM

## 2023-03-13 PROCEDURE — 99214 OFFICE O/P EST MOD 30 MIN: CPT | Performed by: FAMILY MEDICINE

## 2023-03-13 RX ORDER — ALLOPURINOL 100 MG/1
100 TABLET ORAL DAILY
Qty: 90 TABLET | Refills: 3 | Status: SHIPPED
Start: 2023-03-13 | End: 2024-01-29

## 2023-03-13 RX ORDER — GABAPENTIN 800 MG/1
800 TABLET ORAL
Qty: 180 TABLET | Refills: 3 | Status: SHIPPED | OUTPATIENT
Start: 2023-03-13

## 2023-03-13 RX ORDER — TRAMADOL HYDROCHLORIDE 50 MG/1
50 TABLET ORAL EVERY 8 HOURS PRN
Qty: 90 TABLET | Refills: 2 | Status: SHIPPED | OUTPATIENT
Start: 2023-03-13 | End: 2023-05-12 | Stop reason: SDUPTHER

## 2023-03-13 RX ORDER — CIPROFLOXACIN 500 MG/1
500 TABLET, FILM COATED ORAL 2 TIMES DAILY
Qty: 14 TABLET | Refills: 0 | Status: SHIPPED | OUTPATIENT
Start: 2023-03-13 | End: 2023-03-20

## 2023-03-13 RX ORDER — PROMETHAZINE HYDROCHLORIDE 25 MG/1
25 TABLET ORAL EVERY 8 HOURS PRN
Qty: 40 TABLET | Refills: 4 | Status: SHIPPED | OUTPATIENT
Start: 2023-03-13 | End: 2023-11-27

## 2023-03-13 RX ORDER — PANTOPRAZOLE SODIUM 40 MG/1
40 TABLET, DELAYED RELEASE ORAL 2 TIMES DAILY
Qty: 180 TABLET | Refills: 3 | Status: SHIPPED | OUTPATIENT
Start: 2023-03-13 | End: 2024-01-29 | Stop reason: SDUPTHER

## 2023-03-13 RX ORDER — LEVOTHYROXINE SODIUM 137 UG/1
137 TABLET ORAL
Qty: 90 TABLET | Refills: 2 | Status: SHIPPED | OUTPATIENT
Start: 2023-03-13 | End: 2023-12-19

## 2023-03-13 RX ORDER — NABUMETONE 750 MG/1
750 TABLET, FILM COATED ORAL 2 TIMES DAILY WITH MEALS
Qty: 180 TABLET | Refills: 3 | Status: SHIPPED
Start: 2023-03-13 | End: 2024-01-29

## 2023-03-13 ASSESSMENT — FIBROSIS 4 INDEX: FIB4 SCORE: 1.78

## 2023-03-13 NOTE — PROGRESS NOTES
Chief Complaint   Patient presents with    Follow-Up     2 week     Nausea    Gastrophageal Reflux    Arthritis    Hypothyroidism       Subjective:     HPI:   Ashly Meyer presents today with the followin. Nausea and vomiting in adult  Patient had a severe reaction to ondansetron, actually made her worse.  Her daughter states that this has a similar reaction to her.  The daughter does well with promethazine.  Patient feels she did well with that in the past.  Medication is changed.  However, she now has less vomiting.  She will use this as needed only.    2. Gastroesophageal reflux disease without esophagitis  Patient would like a renewal on the pantoprazole.  This does help her reflux even though she still has some reflux and acidity.  Denies any current dysphagia.  Denies hematemesis.    3. Hypothyroidism due to acquired atrophy of thyroid  Patient reports good energy level on the medication. Patient denies insomnia, tremor or change in appetite.  Patient is taking the medication on an empty stomach in the morning and waiting at least 30 minutes before eating.  Last TSH in August was below target.  Have renewed her thyroid medication as apparently her pharmacy is labeling her prescriptions as 0 refills.  Have adjusted the dose.    4. Primary osteoarthritis involving multiple joints/Primary osteoarthritis of right shoulder  The nabumetone continues to be helpful for arthritis.  In particular she is having problems with the right shoulder.  She has been diagnosed with some joint narrowing in that shoulder in the past.  There may be rotator cuff pathology.  She had stopped the nabumetone for over a week due to some miscommunication that we had.  She did feel much worse off it and will resume.  - Referral to Physical Therapy    5. Bilateral hip pain  Patient does have bilateral hip pain which is also felt to be primarily degenerative and arthritic.  The tramadol does help as long as she also takes the  gabapentin and nabumetone.  Denies somnolence or confusion from the tramadol regimen.  PDMP review shows no inconsistencies.  MME is within CDC guidelines.  The medication is renewed.  - traMADol (ULTRAM) 50 MG Tab; Take 1 Tablet by mouth every 8 hours as needed for Moderate Pain for up to 90 days. 90 tablets is a 30 day quantity  Dispense: 90 Tablet; Refill: 2    6. Chronic gout of foot, unspecified cause, unspecified laterality  Patient does have chronic gout and feels she is doing worse off the allopurinol.  It is a modest dose and her kidney function has been stable.  This is renewed.  - allopurinol (ZYLOPRIM) 100 MG Tab; Take 1 Tablet by mouth every day.  Dispense: 90 Tablet; Refill: 3  - traMADol (ULTRAM) 50 MG Tab; Take 1 Tablet by mouth every 8 hours as needed for Moderate Pain for up to 90 days. 90 tablets is a 30 day quantity  Dispense: 90 Tablet; Refill: 2    7. Peripheral polyneuropathy  Patient has a complex polyneuropathy.  She has had a thorough neurologic work-up.  The gabapentin does continue to be helpful and well-tolerated and is renewed.  Other medications have been tried with little to no success.  - gabapentin (NEURONTIN) 800 MG tablet; Take 1 Tablet by mouth 2 times a day.  Dispense: 180 Tablet; Refill: 3    8. Inflammatory neuropathy (HCC)  Patient does have a diagnosis of inflammatory neuropathy.  The tramadol does continue to be helpful.  She feels the gabapentin is essential and without it the tramadol does not work but the tramadol still has some effect for her.  She is taking 2 to 3/day and her prescription is adjusted to reflect that.  Her maximum MME is 15 which is well within CDC guidelines.  - traMADol (ULTRAM) 50 MG Tab; Take 1 Tablet by mouth every 8 hours as needed for Moderate Pain for up to 90 days. 90 tablets is a 30 day quantity  Dispense: 90 Tablet; Refill: 2    9. Urinary tract infection due to Klebsiella species  Patient had a recent Klebsiella infection.  She is now  getting burning and urinary frequency again.  I will go ahead and treat as I feel the previous infection did not fully resolve.  - ciprofloxacin (CIPRO) 500 MG Tab; Take 1 Tablet by mouth 2 times a day for 7 days.  Dispense: 14 Tablet; Refill: 0        Patient Active Problem List    Diagnosis Date Noted    Urinary tract infection due to Klebsiella species 02/13/2023    Bilious vomiting with nausea 02/13/2023    Drug-induced constipation 12/26/2022    Impaired gait and mobility 12/21/2022    Ataxia 12/20/2022    Carotid artery stenosis 12/20/2022    Left bundle branch block 12/20/2022    Temporal pain 12/14/2022    History of 2019 novel coronavirus disease (COVID-19) 10/11/2022    Positive self-administered antigen test for COVID-19 10/11/2022    Trochanteric bursitis of both hips 03/03/2022    Mild persistent asthma, uncomplicated 03/03/2022    Incomplete rectal prolapse 09/03/2021    Benign essential tremor 09/03/2021    Chronic respiratory failure with hypoxia ([facility]) 06/03/2021    Controlled type 2 diabetes mellitus without complication, without long-term current use of insulin ([facility]) 06/12/2019    Chronic gout of foot 04/30/2019    Dry eye syndrome, bilateral     Rotator cuff syndrome of right shoulder and allied disorders 10/10/2018    Vitamin D deficiency 12/28/2017    Morbid obesity with BMI of 40.0-44.9, adult ([facility])     Neural foraminal stenosis of cervical spine 05/18/2017    Tinnitus of both ears 05/18/2017    Dyslipidemia, goal LDL below 130 01/06/2017    Nonalcoholic fatty liver disease 01/05/2017    Menopausal symptoms 04/28/2016    Lumbar facet arthropathy 12/09/2015    Candidal intertrigo 06/01/2015    Elevated sed rate 12/09/2014    Osteoarthritis of left thumb 07/23/2014    H/O fibromyalgia 03/11/2014    KERRI (obstructive sleep apnea) 03/11/2014    Peripheral neuropathy     Rectocele 11/25/2013    Pelvic floor dysfunction     Chronic constipation 10/02/2013    Nocturnal hypoxia     Lumbar radiculopathy  11/01/2011    Migraine without aura     Seasonal allergies 07/08/2011    GERD (gastroesophageal reflux disease) 12/10/2010    Osteoarthritis of multiple joints 04/13/2010    Eczema, dyshidrotic 08/03/2009    Essential hypertension 05/20/2009    Hypothyroidism due to acquired atrophy of thyroid 05/20/2009       Current medicines (including changes today)  Current Outpatient Medications   Medication Sig Dispense Refill    promethazine (PHENERGAN) 25 MG Tab Take 1 Tablet by mouth every 8 hours as needed for Nausea/Vomiting. 40 Tablet 4    pantoprazole (PROTONIX) 40 MG Tablet Delayed Response Take 1 Tablet by mouth 2 times a day. 180 Tablet 3    nabumetone (RELAFEN) 750 MG Tab Take 1 Tablet by mouth 2 times a day with meals. 180 Tablet 3    gabapentin (NEURONTIN) 800 MG tablet Take 1 Tablet by mouth 2 times a day. 180 Tablet 3    allopurinol (ZYLOPRIM) 100 MG Tab Take 1 Tablet by mouth every day. 90 Tablet 3    levothyroxine (SYNTHROID) 137 MCG Tab Take 1 Tablet by mouth every morning on an empty stomach. 90 Tablet 2    traMADol (ULTRAM) 50 MG Tab Take 1 Tablet by mouth every 8 hours as needed for Moderate Pain for up to 90 days. 90 tablets is a 30 day quantity 90 Tablet 2    ciprofloxacin (CIPRO) 500 MG Tab Take 1 Tablet by mouth 2 times a day for 7 days. 14 Tablet 0    Ivermectin 1 % Cream Soolantra 1 % topical cream      ketoconazole (NIZORAL) 2 % Cream ketoconazole 2 % topical cream      atorvastatin (LIPITOR) 40 MG Tab Take 1 Tablet by mouth every evening. 30 Tablet     nystatin (MYCOSTATIN) powder Apply 1 Application topically 2 times a day as needed (Apply's under breast).      QUEtiapine (SEROQUEL) 25 MG Tab Take 1 Tablet by mouth at bedtime. 90 Tablet 3    Misc. Devices Misc Referral to Nona Valenzuela DDS in [place] for mouth device for Obstructive Sleep Apnea 1 Each 1    DULoxetine (CYMBALTA) 60 MG Cap DR Particles delayed-release capsule Take 1 Capsule by mouth every evening. 90 Capsule 3    albuterol 108 (90 Base)  "MCG/ACT Aero Soln inhalation aerosol Inhale 2 Puffs every 6 hours as needed for Shortness of Breath. 8.5 g 11    Cholecalciferol (VITAMIN D-3) 125 MCG (5000 UT) Tab Take 5,000 Units by mouth every evening.      VITAMIN E PO Take 1 Capsule by mouth every morning.       No current facility-administered medications for this visit.       Allergies   Allergen Reactions    Ace Inhibitors Cough    Benadryl Allergy Hives     Hot skin itching    Iodine Nausea     Pt received CT w/ contrast 12/20/22 pt had nausea post contrast     Lamictal Rash and Swelling     \"hot\", unable to function.  Severe rash, scarring    Savella [Kdc:Milnacipran+Ci Pigment Blue 63] Nausea, Swelling and Anxiety     Swelling, bone and muscle pain, sweating, nausea, dizziness, sleeplessness, anxiety    Savella [Milnacipran Hcl] Unspecified     Muscle aches, feet swelling    Sulfa Drugs Hives, Shortness of Breath and Anxiety     Hard breathing, shortness of breath    Butalbital-Acetaminophen Unspecified     Dizzy, can't walk, hard to focus    Cefaclor Nausea and Unspecified     Ceclor causes light headedness and dizziness    Morphine Itching     \"Redness\"    Penicillins Itching and Swelling     Skin itching and redness    Tizanidine Hcl Nausea     \"Dizziness\" hard to focus    Milnacipran Vomiting and Nausea    Amlactin Rash and Itching     Lotion causes itching, redness and burnng    Tape Rash and Itching     Skin tears, paper tape is OK per Pt       ROS: As per HPI       Objective:     /70 (BP Location: Right arm, Patient Position: Sitting, BP Cuff Size: Adult)   Pulse 82   Temp 36.9 °C (98.5 °F) (Temporal)   Resp 16   Ht 1.689 m (5' 6.5\")   Wt 108 kg (239 lb)   SpO2 94%  Body mass index is 38 kg/m².    Physical Exam:  Constitutional: Well-developed and well-nourished. Not diaphoretic. No distress. Lucid and fluent. Patient, daughter physician and staff all wearing masks.  Skin: Skin is warm and dry.  Stable rash, very dry.  Head: Atraumatic " without lesions.  Eyes: Conjunctivae and extraocular motions are normal. Pupils are equal, round, and reactive to light. No scleral icterus.   Ears:  External ears unremarkable.   Neck: Supple, trachea midline. No thyromegaly present. No cervical or supraclavicular lymphadenopathy. No JVD or carotid bruits appreciated  Cardiovascular: Regular rate and rhythm.  Normal S1, S2 without murmur appreciated.  Chest: Effort normal. Clear to auscultation throughout. No adventitious sounds.   Abdomen: Soft, non tender, and without distention. Active bowel sounds in all four quadrants. No rebound, guarding, masses or hepatosplenomegaly.  Extremities: No cyanosis, clubbing, erythema, nor edema.   Neurological: Alert and oriented x 3.  Patient does have some intention tremor.  Movements are symmetric.  She is using a walker which helps her balance.  Psychiatric:  Behavior, mood, and affect are appropriate.       Assessment and Plan:     72 y.o. female with the following issues:    1. Nausea and vomiting in adult  promethazine (PHENERGAN) 25 MG Tab      2. Gastroesophageal reflux disease without esophagitis  pantoprazole (PROTONIX) 40 MG Tablet Delayed Response      3. Hypothyroidism due to acquired atrophy of thyroid  levothyroxine (SYNTHROID) 137 MCG Tab      4. Primary osteoarthritis involving multiple joints  nabumetone (RELAFEN) 750 MG Tab      5. Primary osteoarthritis of right shoulder  Referral to Physical Therapy      6. Bilateral hip pain  traMADol (ULTRAM) 50 MG Tab      7. Chronic gout of foot, unspecified cause, unspecified laterality  allopurinol (ZYLOPRIM) 100 MG Tab    traMADol (ULTRAM) 50 MG Tab      8. Peripheral polyneuropathy  gabapentin (NEURONTIN) 800 MG tablet      9. Inflammatory neuropathy (HCC)  traMADol (ULTRAM) 50 MG Tab      10. Urinary tract infection due to Klebsiella species  ciprofloxacin (CIPRO) 500 MG Tab        Consequences of Chronic Opiate therapy:  (5 A's)  Analgesia:  improved  Activity:   improved  Adverse Events: None reported or observed  Aberrant Behaviors: None reported or observed  Affect/Mood: good grooming, full facial expressions, normal speech pattern and content, normal thought patterns, normal perception, good insight, normal reasoning, still some issues with short-term memory  Last CMP:   December  Appropriate Imaging done:   Yes        Followup: Return in about 6 weeks (around 4/26/2023), or if symptoms worsen or fail to improve.

## 2023-03-27 ENCOUNTER — HOSPITAL ENCOUNTER (OUTPATIENT)
Dept: LAB | Facility: MEDICAL CENTER | Age: 73
End: 2023-03-27
Attending: UROLOGY
Payer: MEDICARE

## 2023-03-27 PROCEDURE — 87086 URINE CULTURE/COLONY COUNT: CPT

## 2023-03-29 LAB
BACTERIA UR CULT: NORMAL
SIGNIFICANT IND 70042: NORMAL
SITE SITE: NORMAL
SOURCE SOURCE: NORMAL

## 2023-04-14 ENCOUNTER — HOSPITAL ENCOUNTER (OUTPATIENT)
Dept: RADIOLOGY | Facility: MEDICAL CENTER | Age: 73
End: 2023-04-14
Attending: OTOLARYNGOLOGY
Payer: MEDICARE

## 2023-04-14 DIAGNOSIS — E04.1 THYROID NODULE: ICD-10-CM

## 2023-04-14 LAB — CYTOLOGY REG CYTOL: NORMAL

## 2023-04-14 PROCEDURE — 88173 CYTOPATH EVAL FNA REPORT: CPT

## 2023-04-14 PROCEDURE — 10006 FNA BX W/US GDN EA ADDL: CPT

## 2023-04-14 NOTE — PROGRESS NOTES
US guided left thyroid nodule fine needle aspiration done by Dr. Abrams; NON-SEDATION (no H&P required as this is a NON SEDATION procedure) left anterior aspect of neck access site; 1 jar of cytolyt obtained, 1 vial affirma obtained and sent to pathology lab; pt tolerated the procedure well; pt hemodynamically stable pre/intra/post procedure; all questions and concerns answered prior to being d/c; patient provided with appropriate education for procedure; pt d/c home.

## 2023-05-03 ENCOUNTER — PHYSICAL THERAPY (OUTPATIENT)
Dept: PHYSICAL THERAPY | Facility: REHABILITATION | Age: 73
End: 2023-05-03
Attending: FAMILY MEDICINE
Payer: MEDICARE

## 2023-05-03 DIAGNOSIS — R52 GENERALIZED PAIN: ICD-10-CM

## 2023-05-03 DIAGNOSIS — M19.011 PRIMARY OSTEOARTHRITIS OF RIGHT SHOULDER: ICD-10-CM

## 2023-05-03 DIAGNOSIS — M62.81 MUSCLE WEAKNESS (GENERALIZED): ICD-10-CM

## 2023-05-03 PROCEDURE — 97163 PT EVAL HIGH COMPLEX 45 MIN: CPT

## 2023-05-03 ASSESSMENT — ENCOUNTER SYMPTOMS
PAIN TIMING: INTERMITTENT
PAIN SCALE AT HIGHEST: 10
QUALITY: ACHING
QUALITY: SHARP
PAIN SCALE AT LOWEST: 5
PAIN SCALE: 8
QUALITY: SHOOTING
EXACERBATED BY: ACTIVITY

## 2023-05-03 ASSESSMENT — ACTIVITIES OF DAILY LIVING (ADL): POOR_BALANCE: 1

## 2023-05-03 NOTE — OP THERAPY EVALUATION
"  Outpatient Physical Therapy  INITIAL EVALUATION    [facility] [place]  [address], [address]  [place] [address]  Phone:  [phone]  Fax:  [phone]    Date of Evaluation: 05/03/2023    Patient: Ashly Meyer  YOB: 1950  MRN: [id number]     Referring Provider: Colton Ahn M.D.  [address] [place] [address] [address]  [facility]  [place] [address]   Referring Diagnosis Primary osteoarthritis of right shoulder [M19.011]     Time Calculation  Start time: 1346  Stop time: 1435 Time Calculation (min): 49 minutes         Chief Complaint: Shoulder Problem, Hip Problem (Patient reports generalized pain ), and Knee Problem    Visit Diagnoses     ICD-10-CM   1. Primary osteoarthritis of right shoulder  M19.011   2. Generalized pain  R52   3. Muscle weakness (generalized)  M62.81       Date of onset of impairment: 3/13/2023    Subjective:   History of Present Illness:     Date of onset:  3/13/2023    Mechanism of injury:  Patient is a pleasant 72 year old female who presents to PT evaluation with complaints of generalized pain, specifically to B feet, R knee, B hips, and B shoulders (R>L). Patient reports that she did fall about 4 weeks ago and now is having R knee pain. Patient reports that her knee has been swollen but has improved, despite remaining. PT and patient discussed potential need for X-ray as patient reports continued pain and swelling, and difficulty weightbearing since fall. PT encouraged patient to receive xrays, even if at urgent care, however, patient reports she would rather wait until follow up next week with Dr. Ahn. Patient reports she plans to message Dr. Ahn in the meantime. Also educated patient on symptoms that require ER or urgent care visit with patient verbalizing understanding.     Patient reports her R shoulder pain is a chronic issue and her B hip pain has been going on for \"years and years and years.\" Patient states she saw an MD at [facility] who gave her a \"shot\" " "in her hip and because it did not last \"that long\", she could not get a second one. She reports she has not been recommended for hip replacements but they do \"see arthritis\" in her hips.     Patient reports she does get numbness and tingling in B hands/arms and B feet and legs.   Patient reports a baseline history of incontinence (for years).    She has had PT in the past, which did not help much.     Patient reports she takes Tramadol and sleeping pill to help sleep.   Prior level of function:  Limited by pain  Headache comments: reports she does regularly have headaches  Ear problems: none  Sleep disturbance:  Non-restful sleep  Pain:     Current pain ratin    At best pain ratin    At worst pain rating:  10    Location:  R shoulder, L hip, B knees, B ankles/feet (currently)    Quality:  Aching, shooting and sharp    Pain timing:  Intermittent    Alleviating factors: reports nothing makes it feel better.    Aggravating factors:  Activity    Progression:  Worsening    Pain Comments::  Various joints have worsened (R knee, B shoulder, L hip) and others have stayed the same  Social Support:     Lives in:  One-story house ([address], reports some difficulty entering)    Lives with:  Alone  Hand dominance:  Right  Diagnostic Tests:     Diagnostic Tests Comments:  Refer to chart for imaging; no current shoulder/hip/knee imaging noted   Treatments:     Previous treatment:  Physical therapy (recent SNF stay in January)    Current treatment:  Physical therapy  Activities of Daily Living:     Patient reported ADL status: Reports difficulty with dressing and bathing. Patient has a shower chair but she reports difficulty using it and has it close for \"if\" she needs it.   Patient Goals:     Patient goals for therapy:  Decreased pain, improved balance and increased strength    Past Medical History:   Diagnosis Date    Anginal syndrome (HCC)     Anxiety     Arthritis     Everywhere.     Atrophic rhinitis 10/3/2016    Back " pain     Bronchitis 08/2021    finished 1st course of ABX to start a 2nd course soon.     Carotid artery stenosis, unilateral     moderate left carotid artery stenosis    Carpal tunnel syndrome 9/5/2011    Chronic pain     Constipation     Controlled type 2 diabetes mellitus without complication ([facility])     states borderline    Controlled type 2 diabetes mellitus without complication, without long-term current use of insulin ([facility]) 6/12/2019    Cough 08/2021    r/t bronchitis    Current severe episode of major depressive disorder without psychotic features without prior episode ([facility]) 6/3/2021    Daytime sleepiness     Degenerative disc disease     Depression 5/20/2009    Deviated nasal septum 8/1/2016    Diarrhea     and constipation    Disease of accessory sinus 10/3/2016    Dizziness     Dry eye syndrome, bilateral     Elevated sedimentation rate     history of negative temporal artery biopsy around 2006    Fatigue     Fatty liver     Fibromyalgia     confirmed by Dr. Ann    Frequent headaches     GERD (gastroesophageal reflux disease)     hiatal hernia    GOUT 5/20/2009    H/O foot surgery     Hearing difficulty     Heart burn     Hemorrhagic disorder ([facility]) 2016    nose bleeds    Hiatus hernia syndrome     History of 2019 novel coronavirus disease (COVID-19) 10/11/2022    9/29/2022 home test positive    History of anemia     none currently    Hypertension     Hypothyroidism 5/20/2009    Incipient cataract of both eyes     Incomplete rectal prolapse 9/3/2021    Insomnia     Intention tremor 6/2/2022    Migraine without aura, without mention of intractable migraine without mention of status migrainosus     Mild intermittent asthma without complication     inhalers as needed    Mixed dyslipidemia     Morbid obesity with BMI of 45.0-49.9, adult ([facility])     Morning headache     Mumps     Nasal drainage     Nocturnal hypoxia     severe, to 69% O2 sat    Obesity     Obesity hypoventilation syndrome ([facility]) 5/31/2017     Pelvic floor dysfunction     Peripheral neuropathy     Pleomorphic adenoma     Polymyalgia rheumatica (HCC)     Dr. Ann    Restless leg syndrome     Ringing in ears     Rotator cuff syndrome of right shoulder and allied disorders 10/10/2018    S/P tonsillectomy     Seasonal allergies     Skin cancer 1990's    skin    Sleep apnea syndrome     she is not using CPAP, claustrophobia, uses 2-3l at night via concentrator occ 2l during daytime ([facility])    Snoring     Sore muscles     Sweat, sweating, excessive     Swelling of lower extremity     Trochanteric bursitis of both hips 3/3/2022    Urinary incontinence     will not wear a pad    Vision loss     Weakness      Past Surgical History:   Procedure Laterality Date    [place] UPPER GI ENDOSCOPY,DIAGNOSIS N/A 8/26/2021    Procedure: GASTROSCOPY;  Surgeon: Marty Lopez M.D.;  Location: [facility];  Service: Gastroenterology    NJ COLONOSCOPY,DIAGNOSTIC  8/26/2021    Procedure: COLONOSCOPY - WITH BIOPSIES.;  Surgeon: Marty Lopez M.D.;  Location: [facility];  Service: Gastroenterology    SHOULDER DECOMPRESSION ARTHROSCOPIC Right 2/1/2019    Procedure: SHOULDER DECOMPRESSION ARTHROSCOPIC - SUBACROMIAL, LABRAL DEBRIDEMENT;  Surgeon: Damaris Knutson M.D.;  Location: [facility];  Service: Orthopedics    SHOULDER ARTHROSCOPY W/ BICIPITAL TENODESIS REPAIR Right 2/1/2019    Procedure: SHOULDER ARTHROSCOPY W/ BICIPITAL TENOTOMY;  Surgeon: Damaris Knutson M.D.;  Location: [facility];  Service: Orthopedics    CARPAL TUNNEL RELEASE Right 2/1/2019    Procedure: CARPAL TUNNEL RELEASE;  Surgeon: Damaris Knutson M.D.;  Location: [facility];  Service: Orthopedics    GASTROSCOPY  2/6/2017    Procedure: GASTROSCOPY;  Surgeon: Pau Foster M.D.;  Location: SURGERY SAME DAY [facility];  Service:     COLONOSCOPY  2/6/2017    Procedure: COLONOSCOPY;  Surgeon: Pau Foster M.D.;  Location: SURGERY [facility]  DAY [facility];  Service:     SEPTAL RECONSTRUCTION  10/3/2016    Procedure: SEPTAL RECONSTRUCTION with  grafts ;  Surgeon: PIETER Dickson M.D.;  Location: SURGERY SAME DAY [facility];  Service:     SEPTOPLASTY N/A 2016    Procedure: SEPTOPLASTY;  Surgeon: PIETER Dickson M.D.;  Location: SURGERY SAME DAY [facility];  Service:     TURBINOPLASTY Bilateral 2016    Procedure: TURBINOPLASTY;  Surgeon: PIETER Dickson M.D.;  Location: SURGERY SAME DAY [facility];  Service:     NASAL FRACTURE REDUCTION OPEN N/A 2016    Procedure: SEPTAL FRACTURE REDUCTION OPEN;  Surgeon: PIETER Dickson M.D.;  Location: SURGERY SAME DAY [facility];  Service:     FINE NEEDLE ASPIRATION  2009    Performed by DA CALLOWAY at ENDOSCOPY [facility]    COLONOSCOPY      ? unclear date    OTHER SURGICAL PROCEDURE      vaginal wall repair    BLADDER SUSPENSION      HYSTERECTOMY, VAGINAL      ovaries are present, excessive bleeding    TUBAL LIGATION      MASS EXCISION GENERAL Right     had carbuncle removal R thigh    TONSILLECTOMY      ANAL SPHINCTEROTOMY      anal muscle repair    CATARACT EXTRACTION WITH IOL Bilateral     CHOLECYSTECTOMY      HYSTERECTOMY LAPAROSCOPY      OTHER ORTHOPEDIC SURGERY  ///    foot surgery     SCAR REVISION Right     thigh scar     TONSILLECTOMY       Social History     Tobacco Use    Smoking status: Former     Packs/day: 1.00     Years: 43.00     Pack years: 43.00     Types: Cigarettes     Start date:      Quit date: 3/1/2007     Years since quittin.1    Smokeless tobacco: Never    Tobacco comments:     Started smoking at age 15, continued abstinance    Substance Use Topics    Alcohol use: Not Currently     Family and Occupational History     Socioeconomic History    Marital status:      Spouse name: Not on file    Number of children: Not on file    Years of education: Not on file    Highest  education level: Not on file   Occupational History    Not on file       Objective     Postural Observations  Seated posture: fair  Standing posture: fair    Additional Postural Observation Details  Rounded shoulders noted; kyphotic posture     Cervical Screen    Cervical range of motion within normal limits with the following exceptions: Limited cervical ROM noted; no pain reported     Neurological Testing     Sensation     Shoulder   Left Shoulder   Diminished: light touch    Right Shoulder   Diminished: Light touch    Additional Neurological Details  B LE sensation is diminished; frequent numbness from gout and neuropathy     Palpation   Left   No palpable tenderness to the biceps, subscapularis, thoracic paraspinals and triceps.   Hypertonic in the middle trapezius and upper trapezius.   Tenderness of the anterior deltoid, cervical paraspinals and supraspinatus.     Right   No palpable tenderness to the subscapularis, thoracic paraspinals and triceps.   Hypertonic in the middle trapezius and upper trapezius.   Tenderness of the anterior deltoid, biceps, cervical paraspinals and supraspinatus.     Tenderness     Left Shoulder   Tenderness in the AC joint, acromion, biceps tendon (proximal), bicipital groove and supraspinatus tendon. No tenderness in the scapular spine.     Right Shoulder  Tenderness in the biceps tendon (proximal), bicipital groove, scapular spine and supraspinatus tendon. No tenderness in the AC joint and acromion.     Additional Tenderness Details  Generalized tenderness noted to B shoulders     Active Range of Motion   Left Shoulder   Flexion: 97 degrees with pain  Abduction: 90 degrees with pain    Right Shoulder   Flexion: 100 degrees with pain  Abduction: 104 degrees with pain    Additional Active Range of Motion Details  BTH ER: B ears; pain  BTB IR: B sacrum; pain    Strength:      Left Shoulder   Planes of Motion   Flexion: 3   Abduction: 3   Adduction: 3+   External rotation at 0°: 3+    Internal rotation at 0°: 4   Isolated Muscles   Biceps: 4   Triceps: 4     Right Shoulder   Planes of Motion   Flexion: 3   Abduction: 3   Adduction: 3+   External rotation at 0°: 3   Internal rotation at 0°: 4   Isolated Muscles   Biceps: 4   Triceps: 4     Left Hip   Planes of Motion   Flexion: 3  Abduction: 4  Adduction: 3    Right Hip   Planes of Motion   Flexion: 3  Abduction: 4  Adduction: 3    Left Knee   Flexion: 3  Extension: 3    Right Knee   Flexion: 3  Extension: 3    Left Ankle/Foot   Dorsiflexion: 4  Plantar flexion: 4    Right Ankle/Foot   Dorsiflexion: 4  Plantar flexion: 4    Tests     Left Shoulder   Positive empty can, full can and Hawkin's.     Right Shoulder   Positive empty can.   Negative full can and Hawkin's.     Additional Tests Details  Deferred due to time    General Comments     Spine Comments   R knee: swelling noted; no redness, no warmth; Discussed RICE with patient who verbalizes understanding, including safety with ice and barrier between ice and skin; discussed when to present to ER or urgent care with worsening symptoms as patient requests to wait until follow up with Dr. Ahn next week regarding knee pain; Knee is tender to touch in all areas. PT reached out to Dr. Ahn to apprise.       Gait: noted decreased B LE clearance with gait, wide LINDA; no overt LOB but patient will benefit from further balance assessment upon follow ups      Therapeutic Exercises (CPT 70758):     1. PT evaluation completed and PT POC discussed. Goals discussed. Patient in agreement.    20. PN due 6/3; Recert due 6/28/23    Time-based treatments/modalities:           Assessment, Response and Plan:   Impairments: abnormal gait, abnormal or restricted ROM, impaired functional mobility, impaired balance, impaired physical strength, limited mobility, pain with function and swelling    Assessment details:  Patient is a pleasant 72 year old female who presents to PT evaluation with complaints of generalized  joint pain, including but not limited to B feet/ankles, B knees (R>L), B hips, and B shoulders. Patient also reports weakness and demonstrates antalgic gait pattern with wide LINDA and minimal B LE clearance with decreased heel to toe gait pattern noted. Patient demonstrates decreased B shoulder AROM, and strength deficits to B LE and B shoulders. Patient will benefit from further balance assessment as well upon follow up sessions. At this time, skilled PT will be beneficial to promote improved strength, ROM, and balance for decreased pain and improved functional mobility skills and activity tolerance.   Barriers to therapy:  Comorbidities  Prognosis: fair    Prognosis details:  Patient reports history of PT with minimal progression  Goals:   Short Term Goals:   1. Patient will demonstrate independent HEP to promote increased strength and ROM for improved functional mobility skills.    2. Patient will demonstrate improved B shoulder flexion AROM by 10 or more degrees to promote ability to reach overhead into cupboards.     3. Patient will demonstrate improved B UE/LE strength to grossly 3+/5 for improved functional mobility skills.      4. Patient will complete balance assessment (ALDANA/TUG/DGI) to indicate further need for balance training to promote decreased risk for falls.   Short term goal time span:  2-4 weeks      Long Term Goals:    1. Patient will demonstrate improved B LE strength to grossly 4+/5 to promote improved functional mobility skills.    2. Patient will demonstrate improved B UE strength to grossly 4+/5 to promote improved functional mobility skills.    3. Patient will demonstrate improved B UE flexion/abduction ROM by 25 or more degrees to promote improved functional mobility skills.    4. Patient will demonstrate improved LEFS and SPADI score by 10 or more points, each, to indicate improving quality of life.   Long term goal time span:  6-8 weeks    Plan:   Therapy options:  Physical therapy  treatment to continue  Planned therapy interventions:  Neuromuscular Re-education (CPT 27089), Therapeutic Exercise (CPT 63736), Therapeutic Activities (CPT 52503) and Manual Therapy (CPT 20800)  Frequency:  2x week  Duration in weeks:  8  Duration in visits:  15  Discussed with:  Patient  Plan details:  Generalized strengthening and ROM to promote improved functional mobility skills     Functional Assessment Used  PT Functional Assessment Tool Used: SPADI  PT Functional Assessment Score: 107/130  Lower Extremity Functional Scale Percentage: 28.75  23/100     Referring provider co-signature:  I have reviewed this plan of care and my co-signature certifies the need for services.    Certification Period: 05/03/2023 to  06/28/23    Physician Signature: ________________________________ Date: ______________

## 2023-05-05 ENCOUNTER — PHYSICAL THERAPY (OUTPATIENT)
Dept: PHYSICAL THERAPY | Facility: REHABILITATION | Age: 73
End: 2023-05-05
Attending: FAMILY MEDICINE
Payer: MEDICARE

## 2023-05-05 DIAGNOSIS — R52 GENERALIZED PAIN: ICD-10-CM

## 2023-05-05 DIAGNOSIS — M19.011 PRIMARY OSTEOARTHRITIS OF RIGHT SHOULDER: ICD-10-CM

## 2023-05-05 DIAGNOSIS — M62.81 MUSCLE WEAKNESS (GENERALIZED): ICD-10-CM

## 2023-05-05 PROCEDURE — 97110 THERAPEUTIC EXERCISES: CPT

## 2023-05-05 PROCEDURE — 97112 NEUROMUSCULAR REEDUCATION: CPT

## 2023-05-05 NOTE — OP THERAPY DAILY TREATMENT
"  Outpatient Physical Therapy  DAILY TREATMENT     [facility] [place]  [address], [address]  [facility] [place] [facility] [address]  Phone:  [phone]  Fax:  [phone]    Date: 05/05/2023    Patient: Ashly Meyer  YOB: 1950  MRN: [id number]     Time Calculation    Start time: 1420  Stop time: 1505 Time Calculation (min): 45 minutes         Chief Complaint: Shoulder Problem, Hip Problem, and Knee Problem (Generalized pain)    Visit #: 2    SUBJECTIVE:  Patient presents to PT session and reports she does not feel too bad today but has been sitting most of the day. Patient reports her hands go \"kind of numb\" with laying down. Patient encouraged to utilize FWW at home and in community as needed to decrease risk of falls. Patient verbalizes understanding. Patient did not have her R knee further checked out but was able to participate in activities today.     OBJECTIVE:  Current objective measures:   Patient demonstrates gait with decreased B LE clearance and fatigues quickly during all activities. Provided frequent rest breaks to promote energy conservation and safety.   Patient has her FWW with her and requests PT look at the height. PT adjusted height of walker as it was slightly low. Patient feels better with adjustment.    Gait: with FWW around clinic, demonstrates improved stability, slightly improved B LE clearance noted with FWW as well. Safety education regarding sit>stand provided as patient initially demonstrates pushing up with FWW support. Patient responds well to education for increased safety and push off from seated support position. Patient demonstrates good safety with stand>sit, reaching back for chair.     TUG: Average: 23.3 seconds; no AD  Trial 1: 23.33 seconds  Trial 2: 22.78 seconds  Trial 3: 23.93 seconds  Timed Up and Go (TUG) Test  Time, in seconds (up from chair, walk 3m and return to chair and sit): 23.3 seconds       Therapeutic Exercises (CPT 08458):     1. " "NuStep, level 1; x5 minutes    2. pulleys, x3 minutes; flexion/scaption    3. Shoulder flexion, wall walks, x5, B    4. seated marches, x10 B    5. seated hip abduction, x10, reports some L LE pain, potentially radiating    6. seated hip adduction, x10, 2-3 second holds, ball    18. TUG, 23.3 seconds, 5/5/23; increased time to complete task    20. PN due 6/3; Recert due 6/28/23      Therapeutic Exercise Summary: Educated patient for HEP every other day to start, and for all activities to be within pain free range. Patient verbalizes understanding.     Access Code: [email or web address]  URL: [email or web address]  Date: 05/05/2023  Prepared by: Obdulia Torres    Exercises  - Seated March  - 1 x daily - 1 sets - 10 reps  - Seated Hip Abduction  - 1 x daily - 1 sets - 10 reps  - Seated Hip Adduction Isometrics with Ball  - 1 x daily - 1 sets - 10 reps - 2-3 seconds hold  - Standing Shoulder Flexion Wall Walk  - 1 x daily - 1 sets - 10 reps    Time-based treatments/modalities:    Physical Therapy Timed Treatment Charges  Neuromusc re-ed, balance, coor, post minutes (CPT 97988): 10 minutes  Therapeutic exercise minutes (CPT 15417): 35 minutes      Pain rating (1-10) before treatment: reports \"feeling pretty good\" today   Pain rating (1-10) after treatment:  reports increased shoulder pain with activity, B LE soreness; appears fatigued        ASSESSMENT:   Response to treatment: Patient tolerates PT treatment well today. Patient fatigues quickly with all activities and demonstrates a TUG score of 23.3 seconds. Patient encouraged to utilize FWW for increased safety at home and within the community as patient demonstrates decreased B LE strength and balance. Patient verbalizes understanding. At this time, patient will benefit from continued skilled PT to promote improved functional mobility skills and increased quality of life.     PLAN/RECOMMENDATIONS:   Plan for treatment: therapy treatment to continue next visit.  Planned " interventions for next visit: continue with current treatment.

## 2023-05-08 ENCOUNTER — PHYSICAL THERAPY (OUTPATIENT)
Dept: PHYSICAL THERAPY | Facility: REHABILITATION | Age: 73
End: 2023-05-08
Attending: FAMILY MEDICINE
Payer: MEDICARE

## 2023-05-08 DIAGNOSIS — M62.81 MUSCLE WEAKNESS (GENERALIZED): ICD-10-CM

## 2023-05-08 DIAGNOSIS — M19.011 PRIMARY OSTEOARTHRITIS OF RIGHT SHOULDER: ICD-10-CM

## 2023-05-08 DIAGNOSIS — R52 GENERALIZED PAIN: ICD-10-CM

## 2023-05-08 PROCEDURE — 97110 THERAPEUTIC EXERCISES: CPT

## 2023-05-08 NOTE — OP THERAPY DAILY TREATMENT
"  Outpatient Physical Therapy  DAILY TREATMENT     [facility] [place]  [address], [address]  [facility] [address]  Phone:  [phone]  Fax:  [phone]    Date: 05/08/2023    Patient: Ashly Meyer  YOB: 1950  MRN: [id number]     Time Calculation    Start time: 1431  Stop time: 1511 Time Calculation (min): 40 minutes         Chief Complaint: Shoulder Problem, Hip Problem, and Knee Problem    Visit #: 3    SUBJECTIVE:  Patient presents to PT session and reports that over the weekend she has been having some L hip pain. She struggles to explain her pain but states it's like a strong \"aching\" from her hip to her knee, anterior/laterally. She reports she does not believe that she \"over did\" her hip exercises at last session.     She reports no falls. She reports she was sore after last session. Discussed needing to take it slowly with progress to promote improved tolerance to activities.     OBJECTIVE:  Current objective measures:   Gait: remains antalgic but improved from previous visit; demonstrates increased B LE clearance with gait; no AD present for PT session; patient reports she did not utilize her AD over the weekend           Therapeutic Exercises (CPT 90779):     1. NuStep, level 1; x5 minutes, nt    2. pulleys, x3 minutes; flexion/scaption    3. Shoulder flexion, wall walks, x10 B, no increase in pain; tolerates activity well    4. seated marches, x10 B, held    5. seated hip abduction, x10, held    6. seated hip adduction, x10, 2-3 second holds, held    7. scapular retraction, x10, 1-2 second holds    8. seated shoulder flexion with dowel tod, AAROM, x10    9. seated shoulder abduction with dowel tod, AAROM, x10    18. TUG, 23.3 seconds, 5/5/23; increased time to complete task    20. PN due 6/3; Recert due 6/28/23      Therapeutic Exercise Summary: Educated patient for HEP every other day to start, and for all activities to be within pain free range. Patient " verbalizes understanding.     Access Code: [email or web address]  URL: [email or web address]  Date: 05/05/2023  Prepared by: Obdulia Buening    Exercises  - Seated March  - 1 x daily - 1 sets - 10 reps  - Seated Hip Abduction  - 1 x daily - 1 sets - 10 reps  - Seated Hip Adduction Isometrics with Ball  - 1 x daily - 1 sets - 10 reps - 2-3 seconds hold  - Standing Shoulder Flexion Wall Walk  - 1 x daily - 1 sets - 10 reps    Access Code: [email or web address]  URL: [email or web address]  Date: 05/08/2023  Prepared by: Obdulia Buening    Exercises  - Seated Scapular Retraction  - 1 x daily - 1 sets - 5 reps - 2 seconds hold    Time-based treatments/modalities:    Physical Therapy Timed Treatment Charges  Therapeutic exercise minutes (CPT 72523): 40 minutes      Pain rating (1-10) before treatment:  7-8; L hip   Pain rating (1-10) after treatment:  no increase in pain reported    ASSESSMENT:   Response to treatment: Patient tolerates PT treatment well today. Patient reported soreness after previous session therefore PT and patient discussed slow progression with exercises and held on LE activities this date. Discussed slow progression of HEP as well with patient verbalizing understanding. At this time, patient will benefit from continued skilled PT to promote further strength and balance for decreased pain and improved quality of life.     PLAN/RECOMMENDATIONS:   Plan for treatment: therapy treatment to continue next visit.  Planned interventions for next visit: continue with current treatment.

## 2023-05-10 ENCOUNTER — APPOINTMENT (OUTPATIENT)
Dept: PHYSICAL THERAPY | Facility: REHABILITATION | Age: 73
End: 2023-05-10
Attending: FAMILY MEDICINE
Payer: MEDICARE

## 2023-05-11 ENCOUNTER — APPOINTMENT (OUTPATIENT)
Dept: PHYSICAL THERAPY | Facility: REHABILITATION | Age: 73
End: 2023-05-11
Attending: FAMILY MEDICINE
Payer: MEDICARE

## 2023-05-11 NOTE — OP THERAPY DAILY TREATMENT
"  Outpatient Physical Therapy  DAILY TREATMENT     [facility] [place]  [address], [address]  [place] [place] [address]  Phone:  [phone]  Fax:  [phone]    Date: 05/11/2023    Patient: Ashly Meyer  YOB: 1950  MRN: [id number]     Time Calculation                   Chief Complaint: No chief complaint on file.    Visit #: 4    SUBJECTIVE:  Patient presents to PT session and reports that over the weekend she has been having some L hip pain. She struggles to explain her pain but states it's like a strong \"aching\" from her hip to her knee, anterior/laterally. She reports she does not believe that she \"over did\" her hip exercises at last session.     She reports no falls. She reports she was sore after last session. Discussed needing to take it slowly with progress to promote improved tolerance to activities.     OBJECTIVE:  Current objective measures:   Gait: remains antalgic but improved from previous visit; demonstrates increased B LE clearance with gait; no AD present for PT session; patient reports she did not utilize her AD over the weekend           Therapeutic Exercises (CPT 43230):     1. NuStep, level 1; x5 minutes, nt    2. pulleys, x3 minutes; flexion/scaption    3. Shoulder flexion, wall walks, x10 B, no increase in pain; tolerates activity well    4. seated marches, x10 B, held    5. seated hip abduction, x10, held    6. seated hip adduction, x10, 2-3 second holds, held    7. scapular retraction, x10, 1-2 second holds    8. seated shoulder flexion with dowel tod, AAROM, x10    9. seated shoulder abduction with dowel tod, AAROM, x10    18. TUG, 23.3 seconds, 5/5/23; increased time to complete task    20. PN due 6/3; Recert due 6/28/23      Therapeutic Exercise Summary: Educated patient for HEP every other day to start, and for all activities to be within pain free range. Patient verbalizes understanding.     Access Code: [email or web address]  URL: " [email or web address]  Date: 05/05/2023  Prepared by: Obdulia Buening    Exercises  - Seated March  - 1 x daily - 1 sets - 10 reps  - Seated Hip Abduction  - 1 x daily - 1 sets - 10 reps  - Seated Hip Adduction Isometrics with Ball  - 1 x daily - 1 sets - 10 reps - 2-3 seconds hold  - Standing Shoulder Flexion Wall Walk  - 1 x daily - 1 sets - 10 reps    Access Code: [email or web address]  URL: [email or web address]  Date: 05/08/2023  Prepared by: Obdulia Buening    Exercises  - Seated Scapular Retraction  - 1 x daily - 1 sets - 5 reps - 2 seconds hold      Time-based treatments/modalities:           Pain rating (1-10) before treatment:  7-8; L hip   Pain rating (1-10) after treatment:  no increase in pain reported    ASSESSMENT:   Response to treatment: Patient tolerates PT treatment well today. Patient reported soreness after previous session therefore PT and patient discussed slow progression with exercises and held on LE activities this date. Discussed slow progression of HEP as well with patient verbalizing understanding. At this time, patient will benefit from continued skilled PT to promote further strength and balance for decreased pain and improved quality of life.     PLAN/RECOMMENDATIONS:   Plan for treatment: therapy treatment to continue next visit.  Planned interventions for next visit: continue with current treatment.

## 2023-05-12 ENCOUNTER — OFFICE VISIT (OUTPATIENT)
Dept: MEDICAL GROUP | Facility: MEDICAL CENTER | Age: 73
End: 2023-05-12
Payer: MEDICARE

## 2023-05-12 VITALS
TEMPERATURE: 97.8 F | HEART RATE: 73 BPM | WEIGHT: 243.17 LBS | DIASTOLIC BLOOD PRESSURE: 74 MMHG | BODY MASS INDEX: 38.17 KG/M2 | HEIGHT: 67 IN | SYSTOLIC BLOOD PRESSURE: 122 MMHG | OXYGEN SATURATION: 94 %

## 2023-05-12 DIAGNOSIS — Z12.31 ENCOUNTER FOR SCREENING MAMMOGRAM FOR BREAST CANCER: ICD-10-CM

## 2023-05-12 DIAGNOSIS — M25.552 BILATERAL HIP PAIN: ICD-10-CM

## 2023-05-12 DIAGNOSIS — G61.9 INFLAMMATORY NEUROPATHY (HCC): ICD-10-CM

## 2023-05-12 DIAGNOSIS — S80.01XS TRAUMATIC HEMATOMA OF KNEE, RIGHT, SEQUELA: ICD-10-CM

## 2023-05-12 DIAGNOSIS — M25.551 BILATERAL HIP PAIN: ICD-10-CM

## 2023-05-12 DIAGNOSIS — I65.23 BILATERAL CAROTID ARTERY STENOSIS: ICD-10-CM

## 2023-05-12 DIAGNOSIS — J45.30 MILD PERSISTENT ASTHMA, UNCOMPLICATED: ICD-10-CM

## 2023-05-12 DIAGNOSIS — M19.079 PRIMARY OSTEOARTHRITIS OF FIRST METATARSOPHALANGEAL (MTP) JOINT: ICD-10-CM

## 2023-05-12 DIAGNOSIS — I10 ESSENTIAL HYPERTENSION: ICD-10-CM

## 2023-05-12 DIAGNOSIS — M1A.0790 CHRONIC GOUT OF FOOT, UNSPECIFIED CAUSE, UNSPECIFIED LATERALITY: ICD-10-CM

## 2023-05-12 DIAGNOSIS — E11.9 CONTROLLED TYPE 2 DIABETES MELLITUS WITHOUT COMPLICATION, WITHOUT LONG-TERM CURRENT USE OF INSULIN (HCC): ICD-10-CM

## 2023-05-12 DIAGNOSIS — D34 HURTHLE CELL TUMOR: ICD-10-CM

## 2023-05-12 DIAGNOSIS — E78.5 DYSLIPIDEMIA, GOAL LDL BELOW 130: ICD-10-CM

## 2023-05-12 DIAGNOSIS — E55.9 VITAMIN D DEFICIENCY: ICD-10-CM

## 2023-05-12 PROCEDURE — 3078F DIAST BP <80 MM HG: CPT | Performed by: FAMILY MEDICINE

## 2023-05-12 PROCEDURE — 1125F AMNT PAIN NOTED PAIN PRSNT: CPT | Performed by: FAMILY MEDICINE

## 2023-05-12 PROCEDURE — 3074F SYST BP LT 130 MM HG: CPT | Performed by: FAMILY MEDICINE

## 2023-05-12 PROCEDURE — 1170F FXNL STATUS ASSESSED: CPT | Performed by: FAMILY MEDICINE

## 2023-05-12 PROCEDURE — 99214 OFFICE O/P EST MOD 30 MIN: CPT | Performed by: FAMILY MEDICINE

## 2023-05-12 RX ORDER — ATORVASTATIN CALCIUM 40 MG/1
40 TABLET, FILM COATED ORAL EVERY EVENING
Qty: 90 TABLET | Refills: 3 | Status: SHIPPED | OUTPATIENT
Start: 2023-05-12

## 2023-05-12 RX ORDER — IBUPROFEN 600 MG/1
TABLET ORAL
COMMUNITY
Start: 2023-04-03 | End: 2023-05-12

## 2023-05-12 RX ORDER — TRAMADOL HYDROCHLORIDE 50 MG/1
50 TABLET ORAL EVERY 8 HOURS PRN
Qty: 90 TABLET | Refills: 2 | Status: SHIPPED | OUTPATIENT
Start: 2023-05-12 | End: 2023-08-10

## 2023-05-12 RX ORDER — ALBUTEROL SULFATE 90 UG/1
2 AEROSOL, METERED RESPIRATORY (INHALATION) EVERY 6 HOURS PRN
Qty: 8.5 G | Refills: 11 | Status: SHIPPED | OUTPATIENT
Start: 2023-05-12

## 2023-05-12 RX ORDER — DULOXETIN HYDROCHLORIDE 60 MG/1
60 CAPSULE, DELAYED RELEASE ORAL EVERY EVENING
Qty: 90 CAPSULE | Refills: 3 | Status: SHIPPED | OUTPATIENT
Start: 2023-05-12

## 2023-05-12 ASSESSMENT — FIBROSIS 4 INDEX: FIB4 SCORE: 1.81

## 2023-05-12 NOTE — PROGRESS NOTES
Diabetes Focused Exam:    Chief Complaint   Patient presents with    Knee Injury     Pt tripped over her dog x1m ago. Pt states the right knee is still swollen and bruised.     Medication Refill    Headache     Pt has been having an increased amount of headaches.       Subjective:   HPI  Ashly Meyer is a 73 y.o. female who presents for follow up of chronic conditions of diabetes mellitus, hypertension and hyperlipidemia. She indicates that she is feeling well and denies any symptoms referable to the above diagnoses. Specifically denies chest pain, palpitations, dyspnea, orthopnea, PND or peripheral edema. Also denies polyuria, polydipsia, urinary complaints, abdominal complaints, has chronic myalgias, numbness, weakness and pain.  No other related symptoms.     She has chronic pain from osteoarthritis of multiple joints, muscle pain and tissue pain.  The nabumetone helps but is not sufficient.  Tramadol renewal placed.  [facility] ZUCHEM of pharmacy interface is reviewed. This is the PDMP.  No inconsistencies are found.  The patient's maximum MME is 15.  This is within CDC guideline for chronic pain prescribing by primary care.    Recent fall onto right knee with hematoma.  Possible patellar bone bruise.    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    The patient is not taking ASA every day.  She is taking all other medications as prescribed. Patient denies any side effects of medication.  DM: A1c goal <7  Glucose monitoring frequency: not monitoring  Hypoglycemic episodes none  Diabetic complications: none  ACR Albumin/Creatinine Ratio goal <30  HTN: Blood pressure goal <140/<80. Currently Rx ACE/ARB: No  Hyperlipidemia:Cholesterol goal LDL <100, total/HDL <5. Currently Rx Statin: Yes  Last eye exam 1/2023  Denies visual blurring, double vision, eye pain and floaters  Last monofilament foot exam: today.   She has chronic numbness, denies ulcers.  She has chronic foot calluses and foot pain.    See  medications and orders placed in encounter report.  Past medical history, family history, social history reviewed and updated as documented in medical record.  Current medications including changes today:  Current Outpatient Medications   Medication Sig Dispense Refill    DULoxetine (CYMBALTA) 60 MG Cap DR Particles delayed-release capsule Take 1 Capsule by mouth every evening. 90 Capsule 3    albuterol 108 (90 Base) MCG/ACT Aero Soln inhalation aerosol Inhale 2 Puffs every 6 hours as needed for Shortness of Breath. 8.5 g 11    traMADol (ULTRAM) 50 MG Tab Take 1 Tablet by mouth every 8 hours as needed for Moderate Pain for up to 90 days. 90 tablets is a 30 day quantity 90 Tablet 2    atorvastatin (LIPITOR) 40 MG Tab Take 1 Tablet by mouth every evening. 90 Tablet 3    promethazine (PHENERGAN) 25 MG Tab Take 1 Tablet by mouth every 8 hours as needed for Nausea/Vomiting. 40 Tablet 4    pantoprazole (PROTONIX) 40 MG Tablet Delayed Response Take 1 Tablet by mouth 2 times a day. 180 Tablet 3    nabumetone (RELAFEN) 750 MG Tab Take 1 Tablet by mouth 2 times a day with meals. 180 Tablet 3    gabapentin (NEURONTIN) 800 MG tablet Take 1 Tablet by mouth 2 times a day. 180 Tablet 3    allopurinol (ZYLOPRIM) 100 MG Tab Take 1 Tablet by mouth every day. 90 Tablet 3    levothyroxine (SYNTHROID) 137 MCG Tab Take 1 Tablet by mouth every morning on an empty stomach. 90 Tablet 2    Ivermectin 1 % Cream Soolantra 1 % topical cream      ketoconazole (NIZORAL) 2 % Cream ketoconazole 2 % topical cream      nystatin (MYCOSTATIN) powder Apply 1 Application topically 2 times a day as needed (Apply's under breast).      QUEtiapine (SEROQUEL) 25 MG Tab Take 1 Tablet by mouth at bedtime. 90 Tablet 3    Misc. Devices Misc Referral to Nona Valenzuela DDS in [place] for mouth device for Obstructive Sleep Apnea 1 Each 1    Cholecalciferol (VITAMIN D-3) 125 MCG (5000 UT) Tab Take 5,000 Units by mouth every evening.      VITAMIN E PO Take 1 Capsule by  "mouth every morning.       No current facility-administered medications for this visit.     Allergies:   Allergies   Allergen Reactions    Ace Inhibitors Cough    Benadryl Allergy Hives     Hot skin itching    Iodine Nausea     Pt received CT w/ contrast 22 pt had nausea post contrast     Lamictal Rash and Swelling     \"hot\", unable to function.  Severe rash, scarring    Savella [Kdc:Milnacipran+Ci Pigment Blue 63] Nausea, Swelling and Anxiety     Swelling, bone and muscle pain, sweating, nausea, dizziness, sleeplessness, anxiety    Savella [Milnacipran Hcl] Unspecified     Muscle aches, feet swelling    Sulfa Drugs Hives, Shortness of Breath and Anxiety     Hard breathing, shortness of breath    Butalbital-Acetaminophen Unspecified     Dizzy, can't walk, hard to focus    Cefaclor Nausea and Unspecified     Ceclor causes light headedness and dizziness    Morphine Itching     \"Redness\"    Penicillins Itching and Swelling     Skin itching and redness    Tizanidine Hcl Nausea     \"Dizziness\" hard to focus    Milnacipran Vomiting and Nausea    Amlactin Rash and Itching     Lotion causes itching, redness and burnng    Tape Rash and Itching     Skin tears, paper tape is OK per Pt      Social History     Tobacco Use    Smoking status: Former     Packs/day: 1.00     Years: 43.00     Pack years: 43.00     Types: Cigarettes     Start date:      Quit date: 3/1/2007     Years since quittin.2    Smokeless tobacco: Never    Tobacco comments:     Started smoking at age 15, continued abstinance    Vaping Use    Vaping Use: Never used   Substance Use Topics    Alcohol use: Not Currently    Drug use: Not Currently     Comment: CBD topical pain cream     Exercise: she is working with PT but very limited.  Health Maintenance/Immunizations: discussed, she is up to date.    ROS  Pertinent  ROS findings as above. Denies chest pain.  Still short of breath.        Objective:     OBJECTIVE:  /74 (BP Location: Right arm, " "Patient Position: Sitting, BP Cuff Size: Large adult)   Pulse 73   Temp 36.6 °C (97.8 °F) (Temporal)   Ht 1.689 m (5' 6.5\")   Wt 110 kg (243 lb 2.7 oz)   SpO2 94%   BMI 38.66 kg/m²  Body mass index is 38.66 kg/m². BMI: moderately obese  General: No apparent distress, conversant, cooperative and pleasant with the examination.  Psych: Alert and oriented x4, judgment and insight normal  Neck: No JVD or bruits, no adenopathy, limited range of motion, crepitus.  Stiffness.  Thyroid: normal to inspection and palpation  Lungs: negative findings: normal respiratory rate and rhythm, lungs clear to auscultation  Heart: negative findings: regular rate and rhythm, S1 normal, S2 normal, no murmurs, clicks, or gallops  Abdomen: Soft, nontender, no hepatosplenomegaly or masses, normal bowel sounds  Skin: No rashes, no cyanosis, no lesions or ulcers  Extremities: No cyanosis clubbing or edema.   Feet are examined  Monofilament testing with a 10 gram force: sensation intact: intact bilaterally  Visual Inspection: Feet without maceration, ulcers, fissures.  Does have prominent callus and violaceous change.  Pedal pulses: intact bilaterally    POC labs:   Lab Results   Component Value Date/Time    POCGLUCOSE 105 (H) 08/26/2021 08:01 AM          Last labs    Lab Results   Component Value Date/Time    CHOLSTRLTOT 181 12/21/2022 01:38 AM     (H) 12/21/2022 01:38 AM    HDL 60 12/21/2022 01:38 AM    TRIGLYCERIDE 99 12/21/2022 01:38 AM       Lab Results   Component Value Date/Time    SODIUM 133 (L) 12/24/2022 09:10 AM    POTASSIUM 3.6 12/24/2022 09:10 AM    GLUCOSE 189 (H) 12/24/2022 09:10 AM    BUNCREATRAT 27 12/03/2021 10:01 AM    BUNCREATRAT 25 04/18/2011 03:41 PM    GLOMRATE >59 01/14/2011 10:30 AM     Lab Results   Component Value Date/Time    HBA1C 5.9 (H) 12/20/2022 04:55 PM    HBA1C 6.1 (H) 08/29/2022 12:40 PM    HBA1C 6.1 (H) 12/03/2021 10:01 AM    HBA1C 5.7 (A) 06/03/2021 08:29 AM     Lab Results   Component Value " Date/Time    MALBCRT see below 08/30/2022 02:00 PM    MICROALBUR <1.2 08/30/2022 02:00 PM    MICRALB 52.2 12/03/2021 10:01 AM          Assessment/Plan:   Medications, refills, and referrals per orders.   1. Controlled type 2 diabetes mellitus without complication, without long-term current use of insulin (HCC)  Diabetic Monofilament LE Exam    Comp Metabolic Panel    TSH    HEMOGLOBIN A1C      2. Essential hypertension  Comp Metabolic Panel    CBC WITHOUT DIFFERENTIAL    TSH    Lipid Profile      3. Hurthle cell tumor        4. Dyslipidemia, goal LDL below 130  Comp Metabolic Panel    TSH    Lipid Profile    atorvastatin (LIPITOR) 40 MG Tab      5. Traumatic hematoma of knee, right, sequela        6. Primary osteoarthritis of first metatarsophalangeal (MTP) joint        7. Encounter for screening mammogram for breast cancer  MA-SCREENING MAMMO BILAT W/TOMOSYNTHESIS W/CAD      8. Inflammatory neuropathy (HCC)  DULoxetine (CYMBALTA) 60 MG Cap DR Particles delayed-release capsule    traMADol (ULTRAM) 50 MG Tab      9. Mild persistent asthma, uncomplicated  albuterol 108 (90 Base) MCG/ACT Aero Soln inhalation aerosol      10. Chronic gout of foot, unspecified cause, unspecified laterality  traMADol (ULTRAM) 50 MG Tab      11. Bilateral hip pain  traMADol (ULTRAM) 50 MG Tab      12. Bilateral carotid artery stenosis  atorvastatin (LIPITOR) 40 MG Tab      13. Vitamin D deficiency  VITAMIN D,25 HYDROXY (DEFICIENCY)        DM2 A1c is at goal   Patient to monitor sugars: declines.  Not needed.   Discussed diet, exercise, disease management and weight loss goals.  Working with PT.   Education and advise provided today:All medications, side effects and compliance (discussed carefully)  Annual eye examinations at Ophthalmology  Diabetic diet discussed in detail, written exchange diet given  Foot care discussed and Podiatry visits  Glycohemoglobin and other lab monitoring  Labs immediately prior to next visit  Long term diabetic  complications  Low cholesterol diet, weight control and daily exercise    Followup:   Do labs and RTC 4 months, sooner should new symptoms or problems arise.

## 2023-05-15 ENCOUNTER — PHYSICAL THERAPY (OUTPATIENT)
Dept: PHYSICAL THERAPY | Facility: REHABILITATION | Age: 73
End: 2023-05-15
Attending: FAMILY MEDICINE
Payer: MEDICARE

## 2023-05-15 DIAGNOSIS — R52 GENERALIZED PAIN: ICD-10-CM

## 2023-05-15 DIAGNOSIS — M19.011 PRIMARY OSTEOARTHRITIS OF RIGHT SHOULDER: ICD-10-CM

## 2023-05-15 PROCEDURE — 97110 THERAPEUTIC EXERCISES: CPT

## 2023-05-15 NOTE — OP THERAPY DAILY TREATMENT
Outpatient Physical Therapy  DAILY TREATMENT     [facility] [place]  [address], [address]  [facility] [address]  Phone:  [phone]  Fax:  [phone]    Date: 05/15/2023    Patient: Ashly Meyer  YOB: 1950  MRN: [id number]     Time Calculation    Start time: 0149  Stop time: 0228 Time Calculation (min): 39 minutes         Chief Complaint: chronic pain  Visit #: 4    SUBJECTIVE:  Pt states she wasn't able to do her exercises bc her brother was in the hospital. She's feeling pretty sore all over.     OBJECTIVE:        Therapeutic Exercises (CPT 30620):     1. NuStep, level 1; x7 minutes    2. pulleys, x3 minutes; flexion/scaption    3. Shoulder flexion, wall walks, x10 B, no increase in pain; tolerates activity well    4. seated marches, x10 B    5. seated hip abduction, x10    6. seated hip adduction, x10, 2-3 second holds    7. scapular retraction, x10, 1-2 second holds    8. seated shoulder flexion with dowel tod, AAROM, x10    9. seated shoulder abduction with dowel tod, AAROM, x10    10. supine clam shells with pink tbb, 2 x 10    11. TKE over bolster, 10x    20. PN due 6/3; Recert due 6/28/23      Time-based treatments/modalities:    Physical Therapy Timed Treatment Charges  Therapeutic exercise minutes (CPT 01700): 39 minutes        ASSESSMENT:   Response to treatment: pt needs short breaks/rest between exercises. She had difficulty with SLS on left side.     PLAN/RECOMMENDATIONS:   Plan for treatment: therapy treatment to continue next visit.  Planned interventions for next visit: manual therapy (CPT 89536), neuromuscular re-education (CPT 36940), and therapeutic exercise (CPT 73107).

## 2023-05-16 ENCOUNTER — HOSPITAL ENCOUNTER (OUTPATIENT)
Dept: RADIOLOGY | Facility: MEDICAL CENTER | Age: 73
End: 2023-05-16
Attending: FAMILY MEDICINE
Payer: MEDICARE

## 2023-05-16 ENCOUNTER — HOSPITAL ENCOUNTER (OUTPATIENT)
Dept: LAB | Facility: MEDICAL CENTER | Age: 73
End: 2023-05-16
Attending: FAMILY MEDICINE
Payer: MEDICARE

## 2023-05-16 DIAGNOSIS — E55.9 VITAMIN D DEFICIENCY: ICD-10-CM

## 2023-05-16 DIAGNOSIS — E78.5 DYSLIPIDEMIA, GOAL LDL BELOW 130: ICD-10-CM

## 2023-05-16 DIAGNOSIS — E11.9 CONTROLLED TYPE 2 DIABETES MELLITUS WITHOUT COMPLICATION, WITHOUT LONG-TERM CURRENT USE OF INSULIN (HCC): ICD-10-CM

## 2023-05-16 DIAGNOSIS — I10 ESSENTIAL HYPERTENSION: ICD-10-CM

## 2023-05-16 DIAGNOSIS — Z12.31 ENCOUNTER FOR SCREENING MAMMOGRAM FOR BREAST CANCER: ICD-10-CM

## 2023-05-16 LAB
25(OH)D3 SERPL-MCNC: 62 NG/ML (ref 30–100)
ALBUMIN SERPL BCP-MCNC: 4.1 G/DL (ref 3.2–4.9)
ALBUMIN/GLOB SERPL: 1.1 G/DL
ALP SERPL-CCNC: 148 U/L (ref 30–99)
ALT SERPL-CCNC: 15 U/L (ref 2–50)
ANION GAP SERPL CALC-SCNC: 14 MMOL/L (ref 7–16)
AST SERPL-CCNC: 18 U/L (ref 12–45)
BILIRUB SERPL-MCNC: 0.5 MG/DL (ref 0.1–1.5)
BUN SERPL-MCNC: 20 MG/DL (ref 8–22)
CALCIUM ALBUM COR SERPL-MCNC: 9.4 MG/DL (ref 8.5–10.5)
CALCIUM SERPL-MCNC: 9.5 MG/DL (ref 8.5–10.5)
CHLORIDE SERPL-SCNC: 107 MMOL/L (ref 96–112)
CHOLEST SERPL-MCNC: 192 MG/DL (ref 100–199)
CO2 SERPL-SCNC: 23 MMOL/L (ref 20–33)
CREAT SERPL-MCNC: 0.78 MG/DL (ref 0.5–1.4)
ERYTHROCYTE [DISTWIDTH] IN BLOOD BY AUTOMATED COUNT: 50.6 FL (ref 35.9–50)
EST. AVERAGE GLUCOSE BLD GHB EST-MCNC: 123 MG/DL
GFR SERPLBLD CREATININE-BSD FMLA CKD-EPI: 80 ML/MIN/1.73 M 2
GLOBULIN SER CALC-MCNC: 3.6 G/DL (ref 1.9–3.5)
GLUCOSE SERPL-MCNC: 97 MG/DL (ref 65–99)
HBA1C MFR BLD: 5.9 % (ref 4–5.6)
HCT VFR BLD AUTO: 43.5 % (ref 37–47)
HDLC SERPL-MCNC: 61 MG/DL
HGB BLD-MCNC: 13.6 G/DL (ref 12–16)
LDLC SERPL CALC-MCNC: 106 MG/DL
MCH RBC QN AUTO: 28.3 PG (ref 27–33)
MCHC RBC AUTO-ENTMCNC: 31.3 G/DL (ref 33.6–35)
MCV RBC AUTO: 90.6 FL (ref 81.4–97.8)
PLATELET # BLD AUTO: 207 K/UL (ref 164–446)
PMV BLD AUTO: 11.3 FL (ref 9–12.9)
POTASSIUM SERPL-SCNC: 4.1 MMOL/L (ref 3.6–5.5)
PROT SERPL-MCNC: 7.7 G/DL (ref 6–8.2)
RBC # BLD AUTO: 4.8 M/UL (ref 4.2–5.4)
SODIUM SERPL-SCNC: 144 MMOL/L (ref 135–145)
TRIGL SERPL-MCNC: 125 MG/DL (ref 0–149)
TSH SERPL DL<=0.005 MIU/L-ACNC: 0.14 UIU/ML (ref 0.38–5.33)
WBC # BLD AUTO: 9.3 K/UL (ref 4.8–10.8)

## 2023-05-16 PROCEDURE — 80053 COMPREHEN METABOLIC PANEL: CPT

## 2023-05-16 PROCEDURE — 84443 ASSAY THYROID STIM HORMONE: CPT

## 2023-05-16 PROCEDURE — 83036 HEMOGLOBIN GLYCOSYLATED A1C: CPT | Mod: GA

## 2023-05-16 PROCEDURE — 77063 BREAST TOMOSYNTHESIS BI: CPT

## 2023-05-16 PROCEDURE — 85027 COMPLETE CBC AUTOMATED: CPT

## 2023-05-16 PROCEDURE — 80061 LIPID PANEL: CPT

## 2023-05-16 PROCEDURE — 82306 VITAMIN D 25 HYDROXY: CPT

## 2023-05-16 PROCEDURE — 36415 COLL VENOUS BLD VENIPUNCTURE: CPT | Mod: GA

## 2023-05-17 ENCOUNTER — PHYSICAL THERAPY (OUTPATIENT)
Dept: PHYSICAL THERAPY | Facility: REHABILITATION | Age: 73
End: 2023-05-17
Attending: FAMILY MEDICINE
Payer: MEDICARE

## 2023-05-17 DIAGNOSIS — R52 GENERALIZED PAIN: ICD-10-CM

## 2023-05-17 DIAGNOSIS — M19.011 PRIMARY OSTEOARTHRITIS OF RIGHT SHOULDER: ICD-10-CM

## 2023-05-17 PROCEDURE — 97110 THERAPEUTIC EXERCISES: CPT

## 2023-05-17 NOTE — OP THERAPY DAILY TREATMENT
Outpatient Physical Therapy  DAILY TREATMENT     [facility] [place]  [address]  [facility] [place] [facility] [address]  Phone:  [phone]  Fax:  [phone]    Date: 05/17/2023    Patient: Ashly Meyer  YOB: 1950  MRN: [id number]     Time Calculation    Start time: 0230  Stop time: 0315 Time Calculation (min): 45 minutes         Chief Complaint: back and hip pain   Visit #: 5    SUBJECTIVE:  Pt states she did too much last time and was pretty sore.     OBJECTIVE:          Therapeutic Exercises (CPT 85902):     1. NuStep, level 1; [id number]    2. pulleys, x3 minutes; flexion/scaption    3. Shoulder flexion, wall walks, x10 B, no increase in pain; tolerates activity well    4. seated marches, x10 B    7. scapular retraction, x10, 1-2 second holds    8. seated shoulder flexion with dowel tod, AAROM, x10    9. seated shoulder abduction with dowel tod, AAROM, x10    10. supine clam shells with pink tbb, 2 x 10, 10x    20. PN due 6/3; Recert due 6/28/23      Time-based treatments/modalities:    Physical Therapy Timed Treatment Charges  Therapeutic exercise minutes (CPT 95272): 43 minutes      ASSESSMENT:   Response to treatment: pt needed to do short bouts and rest but tolerated treatment fairly well.     PLAN/RECOMMENDATIONS:   Plan for treatment: therapy treatment to continue next visit.  Planned interventions for next visit: continue with current treatment, gait training (CPT 54916), manual therapy (CPT 71890), neuromuscular re-education (CPT 35259), and therapeutic exercise (CPT 13349).

## 2023-05-22 ENCOUNTER — APPOINTMENT (OUTPATIENT)
Dept: PHYSICAL THERAPY | Facility: REHABILITATION | Age: 73
End: 2023-05-22
Attending: FAMILY MEDICINE
Payer: MEDICARE

## 2023-05-24 ENCOUNTER — PHYSICAL THERAPY (OUTPATIENT)
Dept: PHYSICAL THERAPY | Facility: REHABILITATION | Age: 73
End: 2023-05-24
Attending: FAMILY MEDICINE
Payer: MEDICARE

## 2023-05-24 DIAGNOSIS — M62.81 MUSCLE WEAKNESS (GENERALIZED): ICD-10-CM

## 2023-05-24 DIAGNOSIS — R52 GENERALIZED PAIN: ICD-10-CM

## 2023-05-24 PROCEDURE — 97110 THERAPEUTIC EXERCISES: CPT

## 2023-05-24 NOTE — OP THERAPY DAILY TREATMENT
Outpatient Physical Therapy  DAILY TREATMENT     [facility] [place]  [address]  [facility] [address]  Phone:  [phone]  Fax:  [phone]    Date: 05/24/2023    Patient: Ashly Meyer  YOB: 1950  MRN: [id number]     Time Calculation    Start time: 0145  Stop time: 0230 Time Calculation (min): 45 minutes         Chief Complaint:   Visit #: 6    SUBJECTIVE:  Pt states she's feeling stiff today.    OBJECTIVE:    Pulse ox 93/76      Therapeutic Exercises (CPT 06988):     1. NuStep, level 1; [id number]    2. pulleys, x3 minutes; flexion/scaption    3. Shoulder flexion, wall walks, x10 B, no increase in pain; tolerates activity well    4. seated marches, x10 B    5. sit to stand, 2 x 5 reps    7. scapular retraction, x10, 1-2 second holds    8. seated shoulder flexion with dowel tod, AAROM, x10    9. seated shoulder abduction with dowel tod, AAROM, x10    10. supine clam shells with pink tbb, 2 x 10    20. PN due 6/3; Recert due 6/28/23      Time-based treatments/modalities:    Physical Therapy Timed Treatment Charges  Therapeutic exercise minutes (CPT 44272): 45 minutes          ASSESSMENT:   Response to treatment: pt continues to have low tolerance to exercises and needs multiple breaks. O2 sat rates drop quickly.pt fairly unstable and is a fall risk.     PLAN/RECOMMENDATIONS:   Plan for treatment: therapy treatment to continue next visit.  Planned interventions for next visit: neuromuscular re-education (CPT 58791) and therapeutic exercise (CPT 32566).

## 2023-06-02 ENCOUNTER — PHYSICAL THERAPY (OUTPATIENT)
Dept: PHYSICAL THERAPY | Facility: REHABILITATION | Age: 73
End: 2023-06-02
Attending: FAMILY MEDICINE
Payer: MEDICARE

## 2023-06-02 DIAGNOSIS — M19.011 PRIMARY OSTEOARTHRITIS OF RIGHT SHOULDER: ICD-10-CM

## 2023-06-02 DIAGNOSIS — M62.81 MUSCLE WEAKNESS (GENERALIZED): ICD-10-CM

## 2023-06-02 DIAGNOSIS — R52 GENERALIZED PAIN: ICD-10-CM

## 2023-06-02 PROCEDURE — 97110 THERAPEUTIC EXERCISES: CPT

## 2023-06-02 NOTE — OP THERAPY DAILY TREATMENT
"  Outpatient Physical Therapy  DAILY TREATMENT     [facility]patient Physical Therapy [place]  [address], [address]  [place] [address]  Phone:  [phone]  Fax:  [phone]    Date: 06/02/2023    Patient: Ashly Meyer  YOB: 1950  MRN: [id number]     Time Calculation    Start time: 1449  Stop time: 1515 Time Calculation (min): 26 minutes         Chief Complaint: Shoulder Problem, Hip Problem, and Knee Problem    Visit #: 7    SUBJECTIVE:  Patient presents to PT session 15 minutes late, shortened session. Patient reports her R knee pain continues as well as L shoulder pain. She reports some L forearm \"muscle pain.\"     OBJECTIVE:  Current objective measures:   02: 95%; 84 bpm            Therapeutic Exercises (CPT 27527):     1. NuStep, level 1; [id number], NT    2. pulleys, x3 minutes; flexion/scaption    3. Shoulder flexion, wall walks, x15 B, no increase in pain; tolerates activity well    4. seated marches, x15 B    5. sit to stand, 2 x 5 reps    7. scapular retraction, x15, 2 second holds    8. seated shoulder flexion with dowel tod, AAROM, x15    9. seated shoulder abduction with dowel tod, AAROM, x15    10. supine clam shells with pink tbb, 2 x 10, nt    20. PN due 6/3; Recert due 6/28/23      Time-based treatments/modalities:    Physical Therapy Timed Treatment Charges  Therapeutic exercise minutes (CPT 36675): 26 minutes      Pain rating (1-10) before treatment:  8, R knee, L shoulder 7  Pain rating (1-10) after treatment:  patient denies increased pain after session    ASSESSMENT:   Response to treatment: Patient tolerates PT treatment well today. Requires frequent rest breaks and shortened session as patient arrived late. Patient continues to report generalized pain and demonstrates ambulation with decreased B LE clearance. At this time, patient will continue to benefit from skilled PT to promote improved balance and strength for increased functional mobility skills and quality of " life.     PLAN/RECOMMENDATIONS:   Plan for treatment: therapy treatment to continue next visit.  Planned interventions for next visit: continue with current treatment.

## 2023-06-09 ENCOUNTER — PHYSICAL THERAPY (OUTPATIENT)
Dept: PHYSICAL THERAPY | Facility: REHABILITATION | Age: 73
End: 2023-06-09
Attending: FAMILY MEDICINE
Payer: MEDICARE

## 2023-06-09 DIAGNOSIS — R52 GENERALIZED PAIN: ICD-10-CM

## 2023-06-09 DIAGNOSIS — M62.81 MUSCLE WEAKNESS (GENERALIZED): ICD-10-CM

## 2023-06-09 DIAGNOSIS — M19.011 PRIMARY OSTEOARTHRITIS OF RIGHT SHOULDER: ICD-10-CM

## 2023-06-09 PROCEDURE — 97110 THERAPEUTIC EXERCISES: CPT

## 2023-06-09 NOTE — OP THERAPY DAILY TREATMENT
"  Outpatient Physical Therapy  DAILY TREATMENT     [facility] Physical Therapy [place]  [address]  [facility] [address]  Phone:  [phone]  Fax:  [phone]    Date: 06/09/2023    Patient: Ashly Meyer  YOB: 1950  MRN: [id number]     Time Calculation    Start time: 1430  Stop time: 1512 Time Calculation (min): 42 minutes         Chief Complaint: Shoulder Problem, Hip Problem, and Knee Problem    Visit #: 8    SUBJECTIVE:  Patient presents to PT session and reports she woke up with generalized pain to her shoulders, back, and hips this morning. Patient reports she has fallen out of her bed twice in the last ~2 weeks (unable to recall if this week or last). Denies injuries. Discussed with patient her current fall risk and utilizing AD, however, patient is resistance to AD use, states it doesn't fit in her house. Patient also educated on carrying a cell phone or looking into a \"life alert\" due to fall risk. Patient verbalizes understanding.     OBJECTIVE:  Current objective measures:     TUG: Average: no AD; 23.89 seconds  Trial 1: 23.08 seconds  Trial 2: 25.0 seconds  Trial 3: 23.59 seconds    Left Shoulder Strength:  Flexion: 3+  Abduction: 4  Adduction: 3+   External rotation at 0°: 3+  Internal rotation at 0°: 4   Isolated Muscles   Biceps: 4   Triceps: 4      Right Shoulder Strength:   Flexion: 3+    Abduction: 4  Adduction: 3+   External rotation at 0°: 4-  Internal rotation at 0°: 4  Isolated Muscles   Biceps: 4   Triceps: 4      Left Hip Strength:  Flexion: 3  Abduction: 4  Adduction: 3+     Right Hip Strength:    Flexion: 3  Abduction: 4  Adduction: 3+     Left Knee   Flexion: 3  Extension: 3     Right Knee   Flexion: 3  Extension: 3     Left Ankle/Foot   Dorsiflexion: 4  Plantar flexion: 4     Right Ankle/Foot   Dorsiflexion: 4  Plantar flexion: 4    Active Range of Motion   Left Shoulder   Flexion: 130 degrees with pain  Abduction: 117 degrees with pain     Right " Shoulder   Flexion: 131 degrees with pain  Abduction: 126 degrees with pain          Therapeutic Exercises (CPT 36980):     1. NuStep, level 1; x10 minutes    2. pulleys, x3 minutes; flexion/scaption, nt    3. Shoulder flexion, wall walks, x15 B, nt    4. seated marches, x15 B, nt    5. sit to stand, 2 x 5 reps, nt    7. scapular retraction, x15, 2 second holds, nt    8. seated shoulder flexion with dowel tod, AAROM, x15, nt    9. seated shoulder abduction with dowel tod, AAROM, x15, nt    10. supine clam shells with pink tbb, 2 x 10, nt    18. TUG, 23.89 seconds, no AD, 6/9    20. PN due 7/9; Recert due      Time-based treatments/modalities:    Physical Therapy Timed Treatment Charges  Therapeutic exercise minutes (CPT 13601): 42 minutes      Pain rating (1-10) before treatment: reports 10 pain to L hip; able to participate in activities  Pain rating (1-10) after treatment:  reports no change in pain    Goals:     Short Term Goals:   1. Patient will demonstrate independent HEP to promote increased strength and ROM for improved functional mobility skills.-not met; patient reports she has been active with home activities instead     2. Patient will demonstrate improved B shoulder flexion AROM by 10 or more degrees to promote ability to reach overhead into cupboards. -MET     3. Patient will demonstrate improved B UE/LE strength to grossly 3+/5 for improved functional mobility skills.  -progressing     4. Patient will complete balance assessment (ALDANA/TUG/DGI) to indicate further need for balance training to promote decreased risk for falls. -MET    Short term goal time span:  2-4 weeks      Long Term Goals:    1. Patient will demonstrate improved B LE strength to grossly 4+/5 to promote improved functional mobility skills.-progressing     2. Patient will demonstrate improved B UE strength to grossly 4+/5 to promote improved functional mobility skills.-progressing     3. Patient will demonstrate improved B UE  "flexion/abduction ROM by 25 or more degrees to promote improved functional mobility skills.-MET     4. Patient will demonstrate improved LEFS and SPADI score by 10 or more points, each, to indicate improving quality of life. -not addressed     Long term goal time span:  6-8 weeks      ASSESSMENT:   Response to treatment: Patient tolerates PT treatments well thus far. Patient continues to be limited by pain however she does demonstrate good progress with B shoulder AROM and some progress toward B shoulder strength. B LE strength and balance deficits continue and patient is educated on fall risk and benefit of utilizing AD, however, patient remains hesitant for AD. Also encouraged patient for \"life alert\" or carrying a cell phone to be able to call for help in case of fall, which patient verbalizes understanding but hesitancy continues. At this time, patient will benefit from continued skilled PT to promote further strength and balance for increased quality of life and functional mobility skills.     PLAN/RECOMMENDATIONS:   Plan for treatment: therapy treatment to continue next visit.  Planned interventions for next visit: continue with current treatment.         "

## 2023-06-09 NOTE — OP THERAPY PROGRESS SUMMARY
Outpatient Physical Therapy  PROGRESS SUMMARY NOTE      [facility] [place]  [address]  [place] [address]  Phone:  [phone]  Fax:  [phone]    Date of Visit: 06/09/2023    Patient: Ashly Meyer  YOB: 1950  MRN: [id number]     Referring Provider: Colton Ahn M.D.  [address] [place] [address] [address]  [place] [address]   Referring Diagnosis Primary osteoarthritis, right shoulder [M19.011]     Visit Diagnoses     ICD-10-CM   1. Generalized pain  R52   2. Muscle weakness (generalized)  M62.81   3. Primary osteoarthritis of right shoulder  M19.011       Rehab Potential: fair    Progress Report Period: 5/3/23-6/8/23    Functional Assessment Used: TUG 23.89 seconds, No AD           Objective Findings and Assessment:   Patient progression towards goals: Goals:      Short Term Goals:   1. Patient will demonstrate independent HEP to promote increased strength and ROM for improved functional mobility skills.-not met; patient reports she has been active with home activities instead     2. Patient will demonstrate improved B shoulder flexion AROM by 10 or more degrees to promote ability to reach overhead into cupboards. -MET     3. Patient will demonstrate improved B UE/LE strength to grossly 3+/5 for improved functional mobility skills.  -progressing     4. Patient will complete balance assessment (ALDANA/TUG/DGI) to indicate further need for balance training to promote decreased risk for falls. -MET     Short term goal time span:  2-4 weeks      Long Term Goals:    1. Patient will demonstrate improved B LE strength to grossly 4+/5 to promote improved functional mobility skills.-progressing     2. Patient will demonstrate improved B UE strength to grossly 4+/5 to promote improved functional mobility skills.-progressing     3. Patient will demonstrate improved B UE flexion/abduction ROM by 25 or more degrees to promote improved functional mobility skills.-MET     4.  "Patient will demonstrate improved LEFS and SPADI score by 10 or more points, each, to indicate improving quality of life. -not addressed      Long term goal time span:  6-8 weeks    Objective findings and assessment details: TUG: Average: no AD; 23.89 seconds  Trial 1: 23.08 seconds  Trial 2: 25.0 seconds  Trial 3: 23.59 seconds     Left Shoulder Strength:  Flexion: 3+  Abduction: 4  Adduction: 3+   External rotation at 0°: 3+  Internal rotation at 0°: 4   Isolated Muscles   Biceps: 4   Triceps: 4      Right Shoulder Strength:   Flexion: 3+    Abduction: 4  Adduction: 3+   External rotation at 0°: 4-  Internal rotation at 0°: 4  Isolated Muscles   Biceps: 4   Triceps: 4      Left Hip Strength:  Flexion: 3  Abduction: 4  Adduction: 3+     Right Hip Strength:    Flexion: 3  Abduction: 4  Adduction: 3+     Left Knee   Flexion: 3  Extension: 3     Right Knee   Flexion: 3  Extension: 3     Left Ankle/Foot   Dorsiflexion: 4  Plantar flexion: 4     Right Ankle/Foot   Dorsiflexion: 4  Plantar flexion: 4     Active Range of Motion   Left Shoulder   Flexion: 130 degrees with pain  Abduction: 117 degrees with pain     Right Shoulder   Flexion: 131 degrees with pain  Abduction: 126 degrees with pain    ASSESSMENT:   Response to treatment: Patient tolerates PT treatments well thus far. Patient continues to be limited by pain however she does demonstrate good progress with B shoulder AROM and some progress toward B shoulder strength. B LE strength and balance deficits continue and patient is educated on fall risk and benefit of utilizing AD, however, patient remains hesitant for AD. Also encouraged patient for \"life alert\" or carrying a cell phone to be able to call for help in case of fall, which patient verbalizes understanding but hesitancy continues. At this time, patient will benefit from continued skilled PT to promote further strength and balance for increased quality of life and functional mobility skills.     Goals: "   Short Term Goals:   1. Patient will demonstrate independent HEP to promote increased strength and ROM for improved functional mobility skills.     2. Patient will demonstrate improved B shoulder flexion AROM by 10 or more degrees to promote ability to reach overhead into cupboards.      3. Patient will demonstrate improved B UE/LE strength to grossly 3+/5 for improved functional mobility skills.  Short term goal time span:  2-4 weeks      Long Term Goals:    1. Patient will demonstrate improved B LE strength to grossly 4+/5 to promote improved functional mobility skills.     2. Patient will demonstrate improved B UE strength to grossly 4+/5 to promote improved functional mobility skills.     3. Patient will demonstrate improved B UE flexion/abduction ROM to WFL to promote improved functional mobility skills.     4. Patient will demonstrate improved LEFS and SPADI score by 10 or more points, each, to indicate improving quality of life.    5. Patient will demonstrate ability to transition from floor to stand with least external supports required.   Long term goal time span:  6-8 weeks    Plan:   Planned therapy interventions:  Neuromuscular Re-education (CPT 95904), Group Therapy (CPT 18912), Therapeutic Exercise (CPT 34741), Therapeutic Activities (CPT 28513) and Manual Therapy (CPT 14120)  Frequency: 1-2x/wee.  Duration in weeks:  6  Plan details:  Continue strength and balance training       Referring provider co-signature:  I have reviewed this plan of care and my co-signature certifies the need for services.     Certification Period: 06/09/2023 to 08/04/23    Physician Signature: ________________________________ Date: ______________

## 2023-06-12 ENCOUNTER — PHYSICAL THERAPY (OUTPATIENT)
Dept: PHYSICAL THERAPY | Facility: REHABILITATION | Age: 73
End: 2023-06-12
Attending: FAMILY MEDICINE
Payer: MEDICARE

## 2023-06-12 ENCOUNTER — APPOINTMENT (OUTPATIENT)
Dept: ADMISSIONS | Facility: MEDICAL CENTER | Age: 73
End: 2023-06-12
Attending: OTOLARYNGOLOGY
Payer: MEDICARE

## 2023-06-12 DIAGNOSIS — M19.011 PRIMARY OSTEOARTHRITIS OF RIGHT SHOULDER: ICD-10-CM

## 2023-06-12 DIAGNOSIS — M62.81 MUSCLE WEAKNESS (GENERALIZED): ICD-10-CM

## 2023-06-12 DIAGNOSIS — R52 GENERALIZED PAIN: ICD-10-CM

## 2023-06-12 PROCEDURE — 97110 THERAPEUTIC EXERCISES: CPT

## 2023-06-12 NOTE — OP THERAPY DAILY TREATMENT
Outpatient Physical Therapy  DAILY TREATMENT     [facility] Outpatient Physical Therapy [place]  [address]  [facility] [address]  Phone:  [phone]  Fax:  [phone]    Date: 06/12/2023    Patient: Ashyl Meyer  YOB: 1950  MRN: [id number]     Time Calculation    Start time: 1357  Stop time: 1430 Time Calculation (min): 33 minutes         Chief Complaint: Shoulder Problem, Knee Problem, and Hip Problem    Visit #: 9    SUBJECTIVE:  Patient arrived late to session. Patient reports she is feeling okay today.     OBJECTIVE:  Current objective measures:           Therapeutic Exercises (CPT 68595):     1. NuStep, level 1; x10 minutes, nt    2. pulleys, x3 minutes; flexion/scaption    3. Shoulder flexion,swiss ball wall walks, x15    4. seated marches, pink theraband, 2x15 B    5. sit to stand, x5    7. scapular retraction, x15, 3 second holds    8. seated shoulder flexion with dowel tod, AAROM, 2x15    9. seated shoulder abduction with dowel tod, AAROM, 2x15    10. supine clam shells with pink tb, 2x15    11. B adduction squeezes, x15, x3 seconds holds    18. TUG, 23.89 seconds, no AD, 6/9    20. PN due 7/9; Recert due 8/4/23      Time-based treatments/modalities:    Physical Therapy Timed Treatment Charges  Therapeutic exercise minutes (CPT 55897): 33 minutes      Pain rating (1-10) before treatment:  8  Pain rating (1-10) after treatment:  8, no change in pain reported     ASSESSMENT:   Response to treatment: Patient tolerates PT treatment well today. Patient running late for session, therefore session is cut short. Patient tolerates activities well, specifically shoulder AROM with dowel tod and pulleys. Balance remains limited. At this time, patient will benefit from continued skilled PT to promote further strength and AROM for increased functional mobility skills and improved quality of life and safety.     PLAN/RECOMMENDATIONS:   Plan for treatment: therapy treatment to continue next  visit.  Planned interventions for next visit: continue with current treatment.

## 2023-06-14 ENCOUNTER — PHYSICAL THERAPY (OUTPATIENT)
Dept: PHYSICAL THERAPY | Facility: REHABILITATION | Age: 73
End: 2023-06-14
Attending: FAMILY MEDICINE
Payer: MEDICARE

## 2023-06-14 DIAGNOSIS — M19.011 PRIMARY OSTEOARTHRITIS OF RIGHT SHOULDER: ICD-10-CM

## 2023-06-14 DIAGNOSIS — M62.81 MUSCLE WEAKNESS (GENERALIZED): ICD-10-CM

## 2023-06-14 DIAGNOSIS — R52 GENERALIZED PAIN: ICD-10-CM

## 2023-06-14 PROCEDURE — 97110 THERAPEUTIC EXERCISES: CPT

## 2023-06-14 NOTE — OP THERAPY DAILY TREATMENT
"  Outpatient Physical Therapy  DAILY TREATMENT     [facility] Outpatient Physical Therapy [place]  [address]  [facility] [address]  Phone:  [phone]  Fax:  [phone]    Date: 06/14/2023    Patient: Ashly Meyer  YOB: 1950  MRN: [id number]     Time Calculation    Start time: 0803  Stop time: 0843 Time Calculation (min): 40 minutes         Chief Complaint: Shoulder Problem, Knee Problem, and Hip Problem    Visit #: 10    SUBJECTIVE:  Patient presents to PT session and reports that she is feeling okay today. Reports pain at 8/10, generalized, which she states is typical for the mornings.     OBJECTIVE:  Current objective measures:           Therapeutic Exercises (CPT 23069):     1. NuStep, level 1; x10 minutes, nt    2. pulleys, x3 minutes; flexion/scaption    3. Shoulder flexion,swiss ball wall walks, x15    4. seated marches, pink theraband, 2x15 B    5. sit to stand, x5    6. piriformis stretch, seated, x15 seconds on R; x30 seconds on L    7. scapular retraction, x15, 3 second holds    8. seated shoulder flexion with dowel tod, AAROM, 2x10    9. seated shoulder abduction with dowel tod, AAROM, 2x10    10. supine clam shells with pink tb, 2x15    11. B adduction squeezes, x15, x3 seconds holds, nt    14. pec stretch, against wall with slight opposing rotation, 2x 15 seconds, B, after stretching, reports some B arm numbness; improves when task is ended    15. postural training against wall, standing 1/2 foam roller, x1 minute    18. TUG, 23.89 seconds, no AD, 6/9    20. PN due 7/9; Recert due 8/4/23      Time-based treatments/modalities:    Physical Therapy Timed Treatment Charges  Therapeutic exercise minutes (CPT 61504): 40 minutes      Pain rating (1-10) before treatment:  8; generalized   Pain rating (1-10) after treatment:  8, reports dull pain    ASSESSMENT:   Response to treatment: Patient tolerates PT treatment well today. Demonstrates increased \"stiffness\" resulting in " increased difficulty with shoulder AAROM with flexion. Patient will continue to benefit from skilled PT for strength and balance to promote improved functional mobility skills and quality of life.     PLAN/RECOMMENDATIONS:   Plan for treatment: therapy treatment to continue next visit.  Planned interventions for next visit: continue with current treatment.

## 2023-06-23 ENCOUNTER — PHYSICAL THERAPY (OUTPATIENT)
Dept: PHYSICAL THERAPY | Facility: REHABILITATION | Age: 73
End: 2023-06-23
Attending: FAMILY MEDICINE
Payer: MEDICARE

## 2023-06-23 DIAGNOSIS — R52 GENERALIZED PAIN: ICD-10-CM

## 2023-06-23 DIAGNOSIS — M19.011 PRIMARY OSTEOARTHRITIS OF RIGHT SHOULDER: ICD-10-CM

## 2023-06-23 DIAGNOSIS — M62.81 MUSCLE WEAKNESS (GENERALIZED): ICD-10-CM

## 2023-06-23 PROCEDURE — 97110 THERAPEUTIC EXERCISES: CPT

## 2023-06-23 PROCEDURE — 97530 THERAPEUTIC ACTIVITIES: CPT

## 2023-06-23 NOTE — OP THERAPY DAILY TREATMENT
"  Outpatient Physical Therapy  DAILY TREATMENT     [facility] Outpatient Physical Therapy [place]  [address]  [place] [address]  Phone:  [phone]  Fax:  [phone]    Date: 06/23/2023    Patient: Ashly Meyer  YOB: 1950  MRN: [id number]     Time Calculation    Start time: 1300  Stop time: 1340 Time Calculation (min): 40 minutes         Chief Complaint: Shoulder Problem, Knee Problem, and Hip Problem    Visit #: 11    SUBJECTIVE:  Patient presents to PT session and reports she wasn't feeling well earlier this week as she does not typically miss appointments. Discussed addressing goals at upcoming visit to determine further need for skilled PT at this time secondary to progress verse plateau as patient continues to report generalized pain but states she does feel overall improvement as well.     She requests to practice transitions in and out of bed today.     OBJECTIVE:  Current objective measures:     Bed mobility: per patient request as patient reports/demonstrates bed mobility with \"crawling\" into bed; patient reports she wants to work on bed mobility like a \"normal\" person. Discussed log rolling; supine<>sit<>stand transition via log rolling technique; x5 trials of practice per patient request. Utilized bed table with height adjusted to patient's reported bed height. Initially patient demonstrates difficulty with positioning however after initial cues, is able to complete skill with increased ease. Patient educated on safety with transition. X25 minutes, increased time with education and rests for activity. Patient verbalizes increased confidence after task; fatigued    Increased time spent educating patient on risk for falls as well. Continued to encourage patient for AD utilization with patient denying need. Patient reports she has \"been told\" she is a fall risk but does not like to utilize AD. Encouraged patient to bring in AD for utilization with PT to practice if wanted. " "Patient verbalizes understanding but remains hesitant for AD.             Therapeutic Exercises (CPT 27050):     1. NuStep, level 1; x10 minutes, nt    2. pulleys, x5 minutes; flexion/scaption    3. Shoulder flexion,swiss ball wall walks, x15, nt    4. seated marches, pink theraband, 2x15 B, nt    5. sit to stand, x5    6. piriformis stretch, seated, x15 seconds on R; x30 seconds on L, nt    7. scapular retraction, x15, 3 second holds, nt    8. seated shoulder flexion with dowel tod, AAROM, x20    9. seated shoulder abduction with dowel tod, AAROM, x20    10. supine clam shells with pink tb, 2x15, nt    11. B adduction squeezes, x15, x3 seconds holds, nt    14. pec stretch, against wall with slight opposing rotation, 2x 15 seconds, B, nt    15. postural training against wall, standing 1/2 foam roller, x1 minute, nt    17. bed mobility, to mimic supine<>sit at home, x25 minutes, initially requires verbal cues and increased time; as trials progress, requires decreased time and cues; educated patient on safety with transition, including safety getting in/out of bed as patient reports her bed is \"taller\"    18. TUG, 23.89 seconds, no AD, 6/9    20. PN due 7/9; Recert due 8/4/23    Therapeutic Treatments and Modalities:     1. Therapeutic Activities (CPT 55728), x30 minutes, bed mobility and education on PT concern for fall risk and recommendation for FWW utilization; patient verbalizes understanding of fall risk but refuses overall use of FWW; encouraged patient to bring walker in to session for further training to help; patient remains hesistant.    Time-based treatments/modalities:    Physical Therapy Timed Treatment Charges  Therapeutic activity minutes (CPT 84089): 30 minutes  Therapeutic exercise minutes (CPT 17697): 10 minutes      Pain rating (1-10) before treatment:  7 B UE, B hips; overall generalized   Pain rating (1-10) after treatment:  no change in pain reported     ASSESSMENT:   Response to treatment: " Patient tolerates PT treatment well today. Difficulty with exercises remains as well as reports of continued generalized pains. Patient continues to demonstrate wide LINDA with gait as well and continues to require encouragement for walker utilization for increased safety as patient remains a fall risk. Patient continues to be hesitant of FWW utilization. Worked on bed mobility for increased safety per patient request as well this date with patient fatiguing but demonstrating improved ability to complete task.     PLAN/RECOMMENDATIONS:   Plan for treatment: therapy treatment to continue next visit.  Planned interventions for next visit: continue with current treatment.

## 2023-06-28 ENCOUNTER — APPOINTMENT (RX ONLY)
Dept: URBAN - METROPOLITAN AREA CLINIC 22 | Facility: CLINIC | Age: 73
Setting detail: DERMATOLOGY
End: 2023-06-28

## 2023-06-28 DIAGNOSIS — Z85.828 PERSONAL HISTORY OF OTHER MALIGNANT NEOPLASM OF SKIN: ICD-10-CM

## 2023-06-28 DIAGNOSIS — D22 MELANOCYTIC NEVI: ICD-10-CM

## 2023-06-28 DIAGNOSIS — Z71.89 OTHER SPECIFIED COUNSELING: ICD-10-CM

## 2023-06-28 DIAGNOSIS — L82.1 OTHER SEBORRHEIC KERATOSIS: ICD-10-CM

## 2023-06-28 DIAGNOSIS — L91.8 OTHER HYPERTROPHIC DISORDERS OF THE SKIN: ICD-10-CM

## 2023-06-28 DIAGNOSIS — L57.0 ACTINIC KERATOSIS: ICD-10-CM

## 2023-06-28 DIAGNOSIS — D18.0 HEMANGIOMA: ICD-10-CM

## 2023-06-28 DIAGNOSIS — L82.0 INFLAMED SEBORRHEIC KERATOSIS: ICD-10-CM

## 2023-06-28 DIAGNOSIS — I78.8 OTHER DISEASES OF CAPILLARIES: ICD-10-CM

## 2023-06-28 DIAGNOSIS — L81.4 OTHER MELANIN HYPERPIGMENTATION: ICD-10-CM

## 2023-06-28 DIAGNOSIS — L30.4 ERYTHEMA INTERTRIGO: ICD-10-CM | Status: WELL CONTROLLED

## 2023-06-28 PROBLEM — D22.5 MELANOCYTIC NEVI OF TRUNK: Status: ACTIVE | Noted: 2023-06-28

## 2023-06-28 PROBLEM — D18.01 HEMANGIOMA OF SKIN AND SUBCUTANEOUS TISSUE: Status: ACTIVE | Noted: 2023-06-28

## 2023-06-28 PROCEDURE — ? LIQUID NITROGEN

## 2023-06-28 PROCEDURE — 99213 OFFICE O/P EST LOW 20 MIN: CPT | Mod: 25

## 2023-06-28 PROCEDURE — 17110 DESTRUCTION B9 LES UP TO 14: CPT

## 2023-06-28 PROCEDURE — ? ADDITIONAL NOTES

## 2023-06-28 PROCEDURE — ? COUNSELING

## 2023-06-28 PROCEDURE — 17000 DESTRUCT PREMALG LESION: CPT | Mod: 59

## 2023-06-28 PROCEDURE — ? SUNSCREEN RECOMMENDATIONS

## 2023-06-28 ASSESSMENT — LOCATION DETAILED DESCRIPTION DERM
LOCATION DETAILED: NASAL TIP
LOCATION DETAILED: RIGHT ANTERIOR MEDIAL MALLEOLUS
LOCATION DETAILED: LEFT PROXIMAL DORSAL FOREARM
LOCATION DETAILED: LEFT DISTAL DORSAL FOREARM
LOCATION DETAILED: LEFT INFERIOR MEDIAL MALAR CHEEK
LOCATION DETAILED: LEFT LATERAL ABDOMEN
LOCATION DETAILED: LEFT PROXIMAL PRETIBIAL REGION
LOCATION DETAILED: LEFT MEDIAL SUPERIOR CHEST
LOCATION DETAILED: LEFT NASAL ALA
LOCATION DETAILED: RIGHT MEDIAL SUPERIOR CHEST
LOCATION DETAILED: LEFT ANTERIOR MEDIAL MALLEOLUS
LOCATION DETAILED: PERIUMBILICAL SKIN
LOCATION DETAILED: RIGHT MID-UPPER BACK
LOCATION DETAILED: LEFT INFERIOR UPPER BACK

## 2023-06-28 ASSESSMENT — LOCATION SIMPLE DESCRIPTION DERM
LOCATION SIMPLE: LEFT ANKLE
LOCATION SIMPLE: LEFT FOREARM
LOCATION SIMPLE: LEFT UPPER BACK
LOCATION SIMPLE: NOSE
LOCATION SIMPLE: LEFT CHEEK
LOCATION SIMPLE: CHEST
LOCATION SIMPLE: LEFT NOSE
LOCATION SIMPLE: LEFT PRETIBIAL REGION
LOCATION SIMPLE: RIGHT UPPER BACK
LOCATION SIMPLE: RIGHT ANKLE
LOCATION SIMPLE: ABDOMEN

## 2023-06-28 ASSESSMENT — LOCATION ZONE DERM
LOCATION ZONE: NOSE
LOCATION ZONE: ARM
LOCATION ZONE: TRUNK
LOCATION ZONE: FACE
LOCATION ZONE: LEG

## 2023-06-28 NOTE — PROCEDURE: LIQUID NITROGEN
Consent: The patient's consent was obtained including but not limited to risks of crusting, scabbing, blistering, scarring, darker or lighter pigmentary change, recurrence, incomplete removal and infection.
Show Aperture Variable?: Yes
Number Of Freeze-Thaw Cycles: 2 freeze-thaw cycles
Render Post-Care Instructions In Note?: no
Detail Level: Detailed
Post-Care Instructions: I reviewed with the patient in detail post-care instructions. Patient is to wear sunprotection, and avoid picking at any of the treated lesions. Pt may apply Vaseline to crusted or scabbing areas.
Duration Of Freeze Thaw-Cycle (Seconds): 3
Medical Necessity Clause: This procedure was medically necessary because the lesions that were treated were:
Medical Necessity Information: It is in your best interest to select a reason for this procedure from the list below. All of these items fulfill various CMS LCD requirements except the new and changing color options.
Duration Of Freeze Thaw-Cycle (Seconds): 0
Spray Paint Text: The liquid nitrogen was applied to the skin utilizing a spray paint frosting technique.
Consent: The patient's mom’s consent was obtained including but not limited to risks of crusting, scabbing, blistering, scarring, darker or lighter pigmentary change, recurrence, incomplete removal and infection.

## 2023-06-28 NOTE — PROCEDURE: ADDITIONAL NOTES
Additional Notes: Pt states topical nystatin didn’t help, so she stopped using it. She states it has been under control
Render Risk Assessment In Note?: no
Detail Level: Simple
Additional Notes: 2-3 month follow up to recheck

## 2023-07-05 ENCOUNTER — PRE-ADMISSION TESTING (OUTPATIENT)
Dept: ADMISSIONS | Facility: MEDICAL CENTER | Age: 73
End: 2023-07-05
Attending: OTOLARYNGOLOGY
Payer: MEDICARE

## 2023-07-05 DIAGNOSIS — Z01.812 PRE-OPERATIVE LABORATORY EXAMINATION: ICD-10-CM

## 2023-07-05 DIAGNOSIS — Z01.810 PRE-OPERATIVE CARDIOVASCULAR EXAMINATION: ICD-10-CM

## 2023-07-07 ENCOUNTER — PHYSICAL THERAPY (OUTPATIENT)
Dept: PHYSICAL THERAPY | Facility: REHABILITATION | Age: 73
End: 2023-07-07
Attending: FAMILY MEDICINE
Payer: MEDICARE

## 2023-07-07 DIAGNOSIS — M19.011 PRIMARY OSTEOARTHRITIS OF RIGHT SHOULDER: ICD-10-CM

## 2023-07-07 DIAGNOSIS — R52 GENERALIZED PAIN: ICD-10-CM

## 2023-07-07 DIAGNOSIS — M62.81 MUSCLE WEAKNESS (GENERALIZED): ICD-10-CM

## 2023-07-07 PROCEDURE — 97110 THERAPEUTIC EXERCISES: CPT

## 2023-07-07 NOTE — OP THERAPY DAILY TREATMENT
"  Outpatient Physical Therapy  DAILY TREATMENT     [facility] [place]  [address]  [facility] [address]  Phone:  [phone]  Fax:  [phone]    Date: 07/07/2023    Patient: Ashly Meyer  YOB: 1950  MRN: [id number]     Time Calculation    Start time: 1316  Stop time: 1345 Time Calculation (min): 29 minutes         Chief Complaint: Shoulder Problem, Knee Problem, and Hip Problem    Visit #: 12    SUBJECTIVE:  Patient arrived late to session, therefore, shortened session noted. Patient reports that she has been getting a lot of solicitor pphone calls and its making her \"uptight.\"     Discussed with patient slow progression with strength however patient continues to report significant difficulty with functional mobility skills and remains a fall risk however patient does not use AD despite PT recommendations. Discussed x3 more follow up visits planned. Patient reports upcoming procedure that may result in a multi day overnight stay in hospital. Discussed that if patient is admitted, new PT referral may be required for continued services. Patient verbalizes understanding and is in agreement with plan.     Patient arrived late to session this date.     OBJECTIVE:  Current objective measures:     Left Shoulder Strength:  Flexion: 4-  Abduction: 4  Adduction: 3+   External rotation at 0°: 4-  Internal rotation at 0°: 4   Isolated Muscles   Biceps: 4+  Triceps: 5     Right Shoulder Strength:   Flexion: 4-    Abduction: 4  Adduction: 3+   External rotation at 0°: 4  Internal rotation at 0°: 4+  Isolated Muscles   Biceps: 4+  Triceps: 5      Left Hip Strength:  Flexion: 3  Abduction: 4+  Adduction: 3+     Right Hip Strength:    Flexion: 3+  Abduction: 4+  Adduction: 3+     Left Knee   Flexion: 3+  Extension: 4-     Right Knee   Flexion: 3+  Extension: 4-     Left Ankle/Foot   Dorsiflexion: 4+  Plantar flexion: 5     Right Ankle/Foot   Dorsiflexion: 4+  Plantar flexion: " "5     Active Range of Motion   Left Shoulder   Flexion: 151 degrees with pain  Abduction: 151 degrees with pain     Right Shoulder   Flexion: 133 degrees with pain  Abduction: 157 degrees with pain  PT Functional Assessment Tool Used: SPADI  PT Functional Assessment Score: 114/130  Lower Extremity Functional Scale Percentage: 25 score of 20/80      Therapeutic Exercises (CPT 30994):     1. NuStep, level 1; x10 minutes, nt    2. pulleys, x5 minutes; flexion/scaption, nt    3. Shoulder flexion,swiss ball wall walks, x15, nt    4. seated marches, pink theraband, 2x15 B, nt    5. sit to stand, x5, nt    6. piriformis stretch, seated, x15 seconds on R; x30 seconds on L, nt    7. scapular retraction, x15, 3 second holds, nt    8. seated shoulder flexion with dowel tod, AAROM, x20, nt    9. seated shoulder abduction with dowel tod, AAROM, x20, nt    10. supine clam shells with pink tb, 2x15, nt    11. B adduction squeezes, x15, x3 seconds holds, nt    14. pec stretch, against wall with slight opposing rotation, 2x 15 seconds, B, nt    15. postural training against wall, standing 1/2 foam roller, x1 minute, nt    17. bed mobility, to mimic supine<>sit at home, x25 minutes, initially requires verbal cues and increased time; as trials progress, requires decreased time and cues; educated patient on safety with transition, including safety getting in/out of bed as patient reports her bed is \"taller\"    18. TUG, 23.89 seconds, no AD, 6/9    20. PN due 8/7; Recert due 9/1    Therapeutic Treatments and Modalities:     1. Therapeutic Activities (CPT 41907), x30 minutes, bed mobility and education on PT concern for fall risk and recommendation for FWW utilization; patient verbalizes understanding of fall risk but refuses overall use of FWW; encouraged patient to bring walker in to session for further training to help; patient remains hesistant.    Time-based treatments/modalities:    Physical Therapy Timed Treatment " Charges  Therapeutic exercise minutes (CPT 77601): 29 minutes      Pain rating (1-10) before treatment:  7;neck/back/hips/B Les (generalized pain)   Pain rating (1-10) after treatment:  7    Goals:   Short Term Goals:   1. Patient will demonstrate independent HEP to promote increased strength and ROM for improved functional mobility skills.-reports she is doing some walking and exercises at home; requires encouragement      2. Patient will demonstrate improved B shoulder flexion AROM by 10 or more degrees to promote ability to reach overhead into cupboards. -partially met     3. Patient will demonstrate improved B UE/LE strength to grossly 3+/5 for improved functional mobility skills.-MET, L hip flexion remains at 3/5     Short term goal time span:  2-4 weeks      Long Term Goals:    1. Patient will demonstrate improved B LE strength to grossly 4+/5 to promote improved functional mobility skills.-progressing     2. Patient will demonstrate improved B UE strength to grossly 4+/5 to promote improved functional mobility skills.-progressing     3. Patient will demonstrate improved B UE flexion/abduction ROM to WFL to promote improved functional mobility skills.-partially met     4. Patient will demonstrate improved LEFS and SPADI score by 10 or more points, each, to indicate improving quality of life.-not met     5. Patient will demonstrate ability to transition from floor to stand with least external supports required. -not met, continues to be difficult    Long term goal time span:  6-8 weeks    ASSESSMENT:   Response to treatment: Patient tolerates PT treatments well thus far. Patient remains a fall risk and continues to be educated on PT recommendation for AD utilization however, patient continues to not use AD, per patient choice. Some progress is noted with B UE/LE strength and UE ROM, however patient continues to report significant functional mobility deficits. Discussed with patient plan for ~3 more visits to  promote further progression but possible need for D/C at that time if plateau continues. Patient verbalizes understanding.     PLAN/RECOMMENDATIONS:   Plan for treatment: therapy treatment to continue next visit.  Planned interventions for next visit: continue with current treatment.

## 2023-07-08 NOTE — OP THERAPY PROGRESS SUMMARY
Outpatient Physical Therapy  PROGRESS SUMMARY NOTE      [facility] [place]  [address]  [place] [address]  Phone:  [phone]  Fax:  [phone]    Date of Visit: 07/07/2023    Patient: Ashly Meyer  YOB: 1950  MRN: [id number]     Referring Provider: Colton Ahn M.D.  [address] [place] [address] [address]  [place] [address]   Referring Diagnosis Primary osteoarthritis, right shoulder [M19.011]     Visit Diagnoses     ICD-10-CM   1. Generalized pain  R52   2. Muscle weakness (generalized)  M62.81   3. Primary osteoarthritis of right shoulder  M19.011       Rehab Potential: fair    Progress Report Period: 6/9/23-7/7/23    Functional Assessment Used  PT Functional Assessment Tool Used: SPADI  PT Functional Assessment Score: 114/130  Lower Extremity Functional Scale Percentage: 25       Objective Findings and Assessment:   Patient progression towards goals: Goals:   Short Term Goals:   1. Patient will demonstrate independent HEP to promote increased strength and ROM for improved functional mobility skills.-reports she is doing some walking and exercises at home; requires encouragement      2. Patient will demonstrate improved B shoulder flexion AROM by 10 or more degrees to promote ability to reach overhead into cupboards. -partially met     3. Patient will demonstrate improved B UE/LE strength to grossly 3+/5 for improved functional mobility skills.-MET, L hip flexion remains at 3/5     Short term goal time span:  2-4 weeks      Long Term Goals:    1. Patient will demonstrate improved B LE strength to grossly 4+/5 to promote improved functional mobility skills.-progressing     2. Patient will demonstrate improved B UE strength to grossly 4+/5 to promote improved functional mobility skills.-progressing     3. Patient will demonstrate improved B UE flexion/abduction ROM to WFL to promote improved functional mobility skills.-partially met     4. Patient will  demonstrate improved LEFS and SPADI score by 10 or more points, each, to indicate improving quality of life.-not met     5. Patient will demonstrate ability to transition from floor to stand with least external supports required. -not met, continues to be difficult    Long term goal time span:  6-8 weeks    Objective findings and assessment details: Current objective measures:      Left Shoulder Strength:  Flexion: 4-  Abduction: 4  Adduction: 3+   External rotation at 0°: 4-  Internal rotation at 0°: 4   Isolated Muscles   Biceps: 4+  Triceps: 5     Right Shoulder Strength:   Flexion: 4-    Abduction: 4  Adduction: 3+   External rotation at 0°: 4  Internal rotation at 0°: 4+  Isolated Muscles   Biceps: 4+  Triceps: 5      Left Hip Strength:  Flexion: 3  Abduction: 4+  Adduction: 3+     Right Hip Strength:    Flexion: 3+  Abduction: 4+  Adduction: 3+     Left Knee   Flexion: 3+  Extension: 4-     Right Knee   Flexion: 3+  Extension: 4-     Left Ankle/Foot   Dorsiflexion: 4+  Plantar flexion: 5     Right Ankle/Foot   Dorsiflexion: 4+  Plantar flexion: 5     Active Range of Motion   Left Shoulder   Flexion: 151 degrees with pain  Abduction: 151 degrees with pain     Right Shoulder   Flexion: 133 degrees with pain  Abduction: 157 degrees with pain  PT Functional Assessment Tool Used: SPADI  PT Functional Assessment Score: 114/130  Lower Extremity Functional Scale Percentage: 25 score of 20/80    ASSESSMENT:   Response to treatment: Patient tolerates PT treatments well thus far. Patient remains a fall risk and continues to be educated on PT recommendation for AD utilization however, patient continues to not use AD, per patient choice. Some progress is noted with B UE/LE strength and UE ROM, however patient continues to report significant functional mobility deficits. Discussed with patient plan for ~3 more visits to promote further progression but possible need for D/C at that time if plateau continues. Patient  verbalizes understanding.     Note: Patient reports upcoming procedure that may result in a multi day overnight stay in hospital. Discussed that if patient is admitted, new PT referral may be required for continued services. Patient verbalizes understanding and is in agreement with plan.     Goals:   Short Term Goals:   1. Patient will demonstrate independent HEP to promote increased strength and ROM for improved functional mobility skills.     2. Patient will demonstrate improved B shoulder flexion AROM by 10 or more degrees to promote ability to reach overhead into cupboards.      3. Patient will demonstrate improved B UE/LE strength to grossly 3+/5 for improved functional mobility skills  Short term goal time span:  2-4 weeks      Long Term Goals:    1. Patient will demonstrate improved B LE strength to grossly 4+/5 to promote improved functional mobility skills.     2. Patient will demonstrate improved B UE strength to grossly 4+/5 to promote improved functional mobility skills.     3. Patient will demonstrate improved B UE flexion/abduction ROM to WFL to promote improved functional mobility skills.     4. Patient will demonstrate improved LEFS and SPADI score by 10 or more points, each, to indicate improving quality of life.     5. Patient will demonstrate ability to transition from floor to stand with least external supports required.  Long term goal time span:  6-8 weeks    Plan:   Planned therapy interventions:  Neuromuscular Re-education (CPT 31434), Manual Therapy (CPT 33243), Therapeutic Exercise (CPT 73550) and Therapeutic Activities (CPT 79757)  Frequency:  1x week  Duration in weeks:  6  Plan details:  Plan for ~3 more visits, based on progress verse plateau       Referring provider co-signature:  I have reviewed this plan of care and my co-signature certifies the need for services.     Certification Period: 07/07/2023 to 09/01/23    Physician Signature: ________________________________ Date:  ______________

## 2023-07-12 ENCOUNTER — PRE-ADMISSION TESTING (OUTPATIENT)
Dept: ADMISSIONS | Facility: MEDICAL CENTER | Age: 73
End: 2023-07-12
Attending: OTOLARYNGOLOGY
Payer: MEDICARE

## 2023-07-12 DIAGNOSIS — Z01.812 PRE-OPERATIVE LABORATORY EXAMINATION: ICD-10-CM

## 2023-07-12 DIAGNOSIS — Z01.810 PRE-OPERATIVE CARDIOVASCULAR EXAMINATION: ICD-10-CM

## 2023-07-12 NOTE — OR NURSING
Ashly was a no show for her testing appointment today, I called her and she rescheduled to Friday 7/14,  at 1245

## 2023-07-14 ENCOUNTER — PRE-ADMISSION TESTING (OUTPATIENT)
Dept: ADMISSIONS | Facility: MEDICAL CENTER | Age: 73
End: 2023-07-14
Attending: OTOLARYNGOLOGY
Payer: MEDICARE

## 2023-07-14 DIAGNOSIS — Z01.812 PRE-OPERATIVE LABORATORY EXAMINATION: ICD-10-CM

## 2023-07-14 DIAGNOSIS — Z01.810 PRE-OPERATIVE CARDIOVASCULAR EXAMINATION: ICD-10-CM

## 2023-07-14 LAB
ANION GAP SERPL CALC-SCNC: 11 MMOL/L (ref 7–16)
BUN SERPL-MCNC: 21 MG/DL (ref 8–22)
CALCIUM SERPL-MCNC: 9.5 MG/DL (ref 8.5–10.5)
CHLORIDE SERPL-SCNC: 104 MMOL/L (ref 96–112)
CO2 SERPL-SCNC: 25 MMOL/L (ref 20–33)
CREAT SERPL-MCNC: 0.83 MG/DL (ref 0.5–1.4)
EKG IMPRESSION: NORMAL
ERYTHROCYTE [DISTWIDTH] IN BLOOD BY AUTOMATED COUNT: 49.3 FL (ref 35.9–50)
GFR SERPLBLD CREATININE-BSD FMLA CKD-EPI: 74 ML/MIN/1.73 M 2
GLUCOSE SERPL-MCNC: 113 MG/DL (ref 65–99)
HCT VFR BLD AUTO: 41.4 % (ref 37–47)
HGB BLD-MCNC: 13.5 G/DL (ref 12–16)
MCH RBC QN AUTO: 29.3 PG (ref 27–33)
MCHC RBC AUTO-ENTMCNC: 32.6 G/DL (ref 32.2–35.5)
MCV RBC AUTO: 89.8 FL (ref 81.4–97.8)
PLATELET # BLD AUTO: 187 K/UL (ref 164–446)
PMV BLD AUTO: 10.8 FL (ref 9–12.9)
POTASSIUM SERPL-SCNC: 4.1 MMOL/L (ref 3.6–5.5)
RBC # BLD AUTO: 4.61 M/UL (ref 4.2–5.4)
SODIUM SERPL-SCNC: 140 MMOL/L (ref 135–145)
WBC # BLD AUTO: 9.8 K/UL (ref 4.8–10.8)

## 2023-07-14 PROCEDURE — 93010 ELECTROCARDIOGRAM REPORT: CPT | Performed by: INTERNAL MEDICINE

## 2023-07-14 PROCEDURE — 80048 BASIC METABOLIC PNL TOTAL CA: CPT

## 2023-07-14 PROCEDURE — 36415 COLL VENOUS BLD VENIPUNCTURE: CPT

## 2023-07-14 PROCEDURE — 85027 COMPLETE CBC AUTOMATED: CPT

## 2023-07-14 PROCEDURE — 93005 ELECTROCARDIOGRAM TRACING: CPT

## 2023-07-17 ENCOUNTER — ANESTHESIA EVENT (OUTPATIENT)
Dept: SURGERY | Facility: MEDICAL CENTER | Age: 73
End: 2023-07-17
Payer: MEDICARE

## 2023-07-17 ENCOUNTER — ANESTHESIA (OUTPATIENT)
Dept: SURGERY | Facility: MEDICAL CENTER | Age: 73
End: 2023-07-17
Payer: MEDICARE

## 2023-07-17 ENCOUNTER — HOSPITAL ENCOUNTER (OUTPATIENT)
Facility: MEDICAL CENTER | Age: 73
End: 2023-07-17
Attending: OTOLARYNGOLOGY | Admitting: OTOLARYNGOLOGY
Payer: MEDICARE

## 2023-07-17 VITALS
HEART RATE: 90 BPM | BODY MASS INDEX: 38.55 KG/M2 | RESPIRATION RATE: 16 BRPM | DIASTOLIC BLOOD PRESSURE: 64 MMHG | TEMPERATURE: 97.8 F | SYSTOLIC BLOOD PRESSURE: 135 MMHG | OXYGEN SATURATION: 92 % | WEIGHT: 245.59 LBS | HEIGHT: 67 IN

## 2023-07-17 DIAGNOSIS — G89.18 ACUTE POST-OPERATIVE PAIN: ICD-10-CM

## 2023-07-17 LAB
GLUCOSE BLD STRIP.AUTO-MCNC: 101 MG/DL (ref 65–99)
GLUCOSE BLD STRIP.AUTO-MCNC: 96 MG/DL (ref 65–99)
PATHOLOGY CONSULT NOTE: NORMAL

## 2023-07-17 PROCEDURE — 160029 HCHG SURGERY MINUTES - 1ST 30 MINS LEVEL 4: Performed by: OTOLARYNGOLOGY

## 2023-07-17 PROCEDURE — 88360 TUMOR IMMUNOHISTOCHEM/MANUAL: CPT

## 2023-07-17 PROCEDURE — 88331 PATH CONSLTJ SURG 1 BLK 1SPC: CPT

## 2023-07-17 PROCEDURE — 700101 HCHG RX REV CODE 250: Performed by: ANESTHESIOLOGY

## 2023-07-17 PROCEDURE — 99100 ANES PT EXTEME AGE<1 YR&>70: CPT | Performed by: ANESTHESIOLOGY

## 2023-07-17 PROCEDURE — 00320 ANES ALL PX NECK NOS 1YR/>: CPT | Performed by: ANESTHESIOLOGY

## 2023-07-17 PROCEDURE — 82962 GLUCOSE BLOOD TEST: CPT | Mod: 91

## 2023-07-17 PROCEDURE — 160009 HCHG ANES TIME/MIN: Performed by: OTOLARYNGOLOGY

## 2023-07-17 PROCEDURE — 160041 HCHG SURGERY MINUTES - EA ADDL 1 MIN LEVEL 4: Performed by: OTOLARYNGOLOGY

## 2023-07-17 PROCEDURE — 160035 HCHG PACU - 1ST 60 MINS PHASE I: Performed by: OTOLARYNGOLOGY

## 2023-07-17 PROCEDURE — 160047 HCHG PACU  - EA ADDL 30 MINS PHASE II: Performed by: OTOLARYNGOLOGY

## 2023-07-17 PROCEDURE — 88307 TISSUE EXAM BY PATHOLOGIST: CPT

## 2023-07-17 PROCEDURE — 160036 HCHG PACU - EA ADDL 30 MINS PHASE I: Performed by: OTOLARYNGOLOGY

## 2023-07-17 PROCEDURE — 700111 HCHG RX REV CODE 636 W/ 250 OVERRIDE (IP): Mod: JZ | Performed by: ANESTHESIOLOGY

## 2023-07-17 PROCEDURE — 160025 RECOVERY II MINUTES (STATS): Performed by: OTOLARYNGOLOGY

## 2023-07-17 PROCEDURE — 160048 HCHG OR STATISTICAL LEVEL 1-5: Performed by: OTOLARYNGOLOGY

## 2023-07-17 PROCEDURE — 160046 HCHG PACU - 1ST 60 MINS PHASE II: Performed by: OTOLARYNGOLOGY

## 2023-07-17 PROCEDURE — A9270 NON-COVERED ITEM OR SERVICE: HCPCS | Performed by: OTOLARYNGOLOGY

## 2023-07-17 PROCEDURE — 700102 HCHG RX REV CODE 250 W/ 637 OVERRIDE(OP): Performed by: OTOLARYNGOLOGY

## 2023-07-17 PROCEDURE — 700105 HCHG RX REV CODE 258: Mod: JZ | Performed by: OTOLARYNGOLOGY

## 2023-07-17 PROCEDURE — 160002 HCHG RECOVERY MINUTES (STAT): Performed by: OTOLARYNGOLOGY

## 2023-07-17 PROCEDURE — 700101 HCHG RX REV CODE 250: Performed by: OTOLARYNGOLOGY

## 2023-07-17 RX ORDER — HYDROCODONE BITARTRATE AND ACETAMINOPHEN 7.5; 325 MG/1; MG/1
.5-1 TABLET ORAL EVERY 4 HOURS PRN
Qty: 15 TABLET | Refills: 0 | Status: SHIPPED | OUTPATIENT
Start: 2023-07-17 | End: 2023-07-22

## 2023-07-17 RX ORDER — LIDOCAINE HYDROCHLORIDE 10 MG/ML
INJECTION, SOLUTION EPIDURAL; INFILTRATION; INTRACAUDAL; PERINEURAL
Status: DISCONTINUED
Start: 2023-07-17 | End: 2023-07-17 | Stop reason: HOSPADM

## 2023-07-17 RX ORDER — PHENYLEPHRINE HYDROCHLORIDE 10 MG/ML
INJECTION, SOLUTION INTRAMUSCULAR; INTRAVENOUS; SUBCUTANEOUS PRN
Status: DISCONTINUED | OUTPATIENT
Start: 2023-07-17 | End: 2023-07-17 | Stop reason: SURG

## 2023-07-17 RX ORDER — OXYCODONE HCL 5 MG/5 ML
10 SOLUTION, ORAL ORAL
Status: DISCONTINUED | OUTPATIENT
Start: 2023-07-17 | End: 2023-07-17 | Stop reason: HOSPADM

## 2023-07-17 RX ORDER — ONDANSETRON 2 MG/ML
4 INJECTION INTRAMUSCULAR; INTRAVENOUS
Status: DISCONTINUED | OUTPATIENT
Start: 2023-07-17 | End: 2023-07-17 | Stop reason: HOSPADM

## 2023-07-17 RX ORDER — BACITRACIN ZINC 500 [USP'U]/G
OINTMENT TOPICAL
Status: DISCONTINUED
Start: 2023-07-17 | End: 2023-07-17 | Stop reason: HOSPADM

## 2023-07-17 RX ORDER — EPINEPHRINE 1 MG/ML(1)
AMPUL (ML) INJECTION
Status: DISCONTINUED
Start: 2023-07-17 | End: 2023-07-17 | Stop reason: HOSPADM

## 2023-07-17 RX ORDER — SODIUM CHLORIDE, SODIUM LACTATE, POTASSIUM CHLORIDE, CALCIUM CHLORIDE 600; 310; 30; 20 MG/100ML; MG/100ML; MG/100ML; MG/100ML
INJECTION, SOLUTION INTRAVENOUS CONTINUOUS
Status: DISCONTINUED | OUTPATIENT
Start: 2023-07-17 | End: 2023-07-17

## 2023-07-17 RX ORDER — SODIUM CHLORIDE, SODIUM LACTATE, POTASSIUM CHLORIDE, CALCIUM CHLORIDE 600; 310; 30; 20 MG/100ML; MG/100ML; MG/100ML; MG/100ML
INJECTION, SOLUTION INTRAVENOUS CONTINUOUS
Status: DISCONTINUED | OUTPATIENT
Start: 2023-07-17 | End: 2023-07-17 | Stop reason: HOSPADM

## 2023-07-17 RX ORDER — CEFAZOLIN SODIUM 1 G/3ML
INJECTION, POWDER, FOR SOLUTION INTRAMUSCULAR; INTRAVENOUS PRN
Status: DISCONTINUED | OUTPATIENT
Start: 2023-07-17 | End: 2023-07-17 | Stop reason: SURG

## 2023-07-17 RX ORDER — HYDRALAZINE HYDROCHLORIDE 20 MG/ML
5 INJECTION INTRAMUSCULAR; INTRAVENOUS
Status: DISCONTINUED | OUTPATIENT
Start: 2023-07-17 | End: 2023-07-17 | Stop reason: HOSPADM

## 2023-07-17 RX ORDER — BACITRACIN ZINC 500 [USP'U]/G
OINTMENT TOPICAL
Status: DISCONTINUED | OUTPATIENT
Start: 2023-07-17 | End: 2023-07-17 | Stop reason: HOSPADM

## 2023-07-17 RX ORDER — LIDOCAINE HYDROCHLORIDE AND EPINEPHRINE 10; 10 MG/ML; UG/ML
INJECTION, SOLUTION INFILTRATION; PERINEURAL
Status: DISCONTINUED | OUTPATIENT
Start: 2023-07-17 | End: 2023-07-17 | Stop reason: HOSPADM

## 2023-07-17 RX ORDER — DEXAMETHASONE SODIUM PHOSPHATE 4 MG/ML
INJECTION, SOLUTION INTRA-ARTICULAR; INTRALESIONAL; INTRAMUSCULAR; INTRAVENOUS; SOFT TISSUE PRN
Status: DISCONTINUED | OUTPATIENT
Start: 2023-07-17 | End: 2023-07-17 | Stop reason: SURG

## 2023-07-17 RX ORDER — EPHEDRINE SULFATE 50 MG/ML
5 INJECTION, SOLUTION INTRAVENOUS
Status: DISCONTINUED | OUTPATIENT
Start: 2023-07-17 | End: 2023-07-17 | Stop reason: HOSPADM

## 2023-07-17 RX ORDER — OXYCODONE HCL 5 MG/5 ML
5 SOLUTION, ORAL ORAL
Status: DISCONTINUED | OUTPATIENT
Start: 2023-07-17 | End: 2023-07-17 | Stop reason: HOSPADM

## 2023-07-17 RX ORDER — LIDOCAINE HYDROCHLORIDE 20 MG/ML
INJECTION, SOLUTION EPIDURAL; INFILTRATION; INTRACAUDAL; PERINEURAL PRN
Status: DISCONTINUED | OUTPATIENT
Start: 2023-07-17 | End: 2023-07-17 | Stop reason: SURG

## 2023-07-17 RX ORDER — ONDANSETRON 2 MG/ML
INJECTION INTRAMUSCULAR; INTRAVENOUS PRN
Status: DISCONTINUED | OUTPATIENT
Start: 2023-07-17 | End: 2023-07-17 | Stop reason: SURG

## 2023-07-17 RX ADMIN — Medication 200 MG: at 13:59

## 2023-07-17 RX ADMIN — HYDRALAZINE HYDROCHLORIDE 5 MG: 20 INJECTION, SOLUTION INTRAMUSCULAR; INTRAVENOUS at 16:22

## 2023-07-17 RX ADMIN — SODIUM CHLORIDE, POTASSIUM CHLORIDE, SODIUM LACTATE AND CALCIUM CHLORIDE: 600; 310; 30; 20 INJECTION, SOLUTION INTRAVENOUS at 13:44

## 2023-07-17 RX ADMIN — ALBUTEROL SULFATE 2.5 MG: 2.5 SOLUTION RESPIRATORY (INHALATION) at 17:23

## 2023-07-17 RX ADMIN — CEFAZOLIN 2 G: 1 INJECTION, POWDER, FOR SOLUTION INTRAMUSCULAR; INTRAVENOUS at 14:03

## 2023-07-17 RX ADMIN — LIDOCAINE HYDROCHLORIDE 80 MG: 20 INJECTION, SOLUTION EPIDURAL; INFILTRATION; INTRACAUDAL at 13:59

## 2023-07-17 RX ADMIN — FENTANYL CITRATE 25 MCG: 50 INJECTION, SOLUTION INTRAMUSCULAR; INTRAVENOUS at 17:10

## 2023-07-17 RX ADMIN — PROPOFOL 150 MG: 10 INJECTION, EMULSION INTRAVENOUS at 13:59

## 2023-07-17 RX ADMIN — PHENYLEPHRINE HYDROCHLORIDE 100 MCG: 10 INJECTION INTRAVENOUS at 14:55

## 2023-07-17 RX ADMIN — FENTANYL CITRATE 100 MCG: 50 INJECTION, SOLUTION INTRAMUSCULAR; INTRAVENOUS at 13:59

## 2023-07-17 RX ADMIN — GLYCOPYRROLATE 0.3 MG: 0.2 INJECTION INTRAMUSCULAR; INTRAVENOUS at 14:47

## 2023-07-17 RX ADMIN — ONDANSETRON 4 MG: 2 INJECTION INTRAMUSCULAR; INTRAVENOUS at 15:11

## 2023-07-17 RX ADMIN — HYDRALAZINE HYDROCHLORIDE 5 MG: 20 INJECTION, SOLUTION INTRAMUSCULAR; INTRAVENOUS at 17:14

## 2023-07-17 RX ADMIN — DEXAMETHASONE SODIUM PHOSPHATE 4 MG: 4 INJECTION INTRA-ARTICULAR; INTRALESIONAL; INTRAMUSCULAR; INTRAVENOUS; SOFT TISSUE at 14:03

## 2023-07-17 RX ADMIN — HYDRALAZINE HYDROCHLORIDE 5 MG: 20 INJECTION, SOLUTION INTRAMUSCULAR; INTRAVENOUS at 16:07

## 2023-07-17 RX ADMIN — FENTANYL CITRATE 100 MCG: 50 INJECTION, SOLUTION INTRAMUSCULAR; INTRAVENOUS at 14:28

## 2023-07-17 RX ADMIN — FENTANYL CITRATE 25 MCG: 50 INJECTION, SOLUTION INTRAMUSCULAR; INTRAVENOUS at 16:50

## 2023-07-17 ASSESSMENT — PAIN DESCRIPTION - PAIN TYPE
TYPE: SURGICAL PAIN

## 2023-07-17 ASSESSMENT — FIBROSIS 4 INDEX: FIB4 SCORE: 1.81

## 2023-07-17 NOTE — ANESTHESIA PREPROCEDURE EVALUATION
Case: [id number] Date/Time: 07/17/23 1445    Procedure: LEFT ELOY- THYROIDECTOMY, TOTAL THYROIDECTOMY    Pre-op diagnosis: NONTOXIC SINGLE THYROID NODULE    Location: [facility] ROOM 28 / SURGERY SAME DAY [facility]    Surgeons: Michael Mancuso M.D.          Relevant Problems   ANESTHESIA   (positive) KERRI (obstructive sleep apnea)      PULMONARY   (positive) Mild persistent asthma, uncomplicated      NEURO   (positive) Migraine without aura   (positive) Temporal pain      CARDIAC   (positive) Carotid artery stenosis   (positive) Essential hypertension   (positive) Left bundle branch block   (positive) Migraine without aura      GI   (positive) GERD (gastroesophageal reflux disease)         (positive) Nonalcoholic fatty liver disease      ENDO   (positive) Controlled type 2 diabetes mellitus without complication, without long-term current use of insulin (HCC)   (positive) Hypothyroidism due to acquired atrophy of thyroid      Other   (positive) Ataxia   (positive) Chronic gout of foot   (positive) Impaired gait and mobility   (positive) Peripheral neuropathy     Anes H&P:  PAST MEDICAL HISTORY:   73 y.o. female who presents for Procedure(s):  LEFT ELOY- THYROIDECTOMY, TOTAL THYROIDECTOMY.  She has current and past medical problems significant for:    Past Medical History:   Diagnosis Date   • Anginal syndrome (HCC)    • Anxiety    • Arthritis     Everywhere.    • Atrophic rhinitis 10/3/2016   • Back pain    • Bronchitis 08/2021    finished 1st course of ABX to start a 2nd course soon.    • Carotid artery stenosis, unilateral     moderate left carotid artery stenosis   • Carpal tunnel syndrome 9/5/2011   • Chronic pain    • Constipation    • Controlled type 2 diabetes mellitus without complication (HCC)     states borderline   • Controlled type 2 diabetes mellitus without complication, without long-term current use of insulin ([facility]) 6/12/2019   • Cough 08/2021    r/t bronchitis   • Current severe episode of major depressive  disorder without psychotic features without prior episode ([facility]) 6/3/2021   • Daytime sleepiness    • Degenerative disc disease    • Depression 5/20/2009   • Deviated nasal septum 8/1/2016   • Diarrhea     and constipation   • Disease of accessory sinus 10/3/2016   • Dizziness    • Dry eye syndrome, bilateral    • Elevated sedimentation rate     history of negative temporal artery biopsy around 2006   • Fatigue    • Fatty liver    • Fibromyalgia     confirmed by Dr. 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   • Hypertension    • Hypothyroidism 5/20/2009   • Incipient cataract of both eyes    • Incomplete rectal prolapse 9/3/2021   • Insomnia    • Intention tremor 6/2/2022   • Migraine without aura, without mention of intractable migraine without mention of status migrainosus    • Mild intermittent asthma without complication     inhalers as needed   • Mixed dyslipidemia    • Morbid obesity with BMI of 45.0-49.9, adult ([facility])    • Morning headache    • Mumps    • Nasal drainage    • Nocturnal hypoxia     severe, to 69% O2 sat   • Obesity    • Obesity hypoventilation syndrome ([facility]) 5/31/2017   • Pelvic floor dysfunction    • Peripheral neuropathy    • Pleomorphic adenoma    • Polymyalgia rheumatica ([facility])     Dr. Ann   • Restless leg syndrome    • Ringing in ears    • Rotator cuff syndrome of right shoulder and allied disorders 10/10/2018   • S/P tonsillectomy    • Seasonal allergies    • Skin cancer 1990's    skin   • Sleep apnea syndrome     she is not using CPAP, claustrophobia, uses 2-3l at night via concentrator occ 2l during daytime  ([employer])   • Snoring    • Sore muscles    • Sweat, sweating, excessive    • Swelling of lower extremity    • Trochanteric bursitis of both hips 3/3/2022   • Urinary incontinence     will not wear a pad   • Vision loss    • Weakness        SMOKING/ALCOHOL/RECREATIONAL DRUG USE:  Social History     Tobacco Use   • Smoking status: Former     Packs/day: 1.00     Years: 43.00     Pack years: 43.00     Types: Cigarettes     Start date:      Quit date: 3/1/2007     Years since quittin.3   • Smokeless tobacco: Never   • Tobacco comments:     Started smoking at age 15, continued abstinance    Vaping Use   • Vaping Use: Never used   Substance Use Topics   • Alcohol use: Not Currently   • Drug use: Not Currently     Comment: CBD topical pain cream     Social History     Substance and Sexual Activity   Drug Use Not Currently    Comment: CBD topical pain cream       PAST SURGICAL HISTORY:  Past Surgical History:   Procedure Laterality Date   • MI UPPER GI ENDOSCOPY,DIAGNOSIS N/A 2021    Procedure: GASTROSCOPY;  Surgeon: Marty Lopez M.D.;  Location: [facility];  Service: Gastroenterology   • MI COLONOSCOPY,DIAGNOSTIC  2021    Procedure: COLONOSCOPY - WITH BIOPSIES.;  Surgeon: Marty Lopez M.D.;  Location: [facility];  Service: Gastroenterology   • SHOULDER DECOMPRESSION ARTHROSCOPIC Right 2019    Procedure: SHOULDER DECOMPRESSION ARTHROSCOPIC - SUBACROMIAL, LABRAL DEBRIDEMENT;  Surgeon: Damaris Knutson M.D.;  Location: [facility];  Service: Orthopedics   • SHOULDER ARTHROSCOPY W/ BICIPITAL TENODESIS REPAIR Right 2019    Procedure: SHOULDER ARTHROSCOPY W/ BICIPITAL TENOTOMY;  Surgeon: Damaris Knutson M.D.;  Location: [facility];  Service: Orthopedics   • CARPAL TUNNEL RELEASE Right 2019    Procedure: CARPAL TUNNEL RELEASE;  Surgeon: Damaris Knutson M.D.;  Location: [facility];  Service: Orthopedics   •  "GASTROSCOPY  02/06/2017    Procedure: GASTROSCOPY;  Surgeon: Pua Foster M.D.;  Location: SURGERY SAME DAY [facility];  Service:    • COLONOSCOPY  02/06/2017    Procedure: COLONOSCOPY;  Surgeon: Pau Foster M.D.;  Location: SURGERY SAME DAY [facility];  Service:    • SEPTAL RECONSTRUCTION  10/03/2016    Procedure: SEPTAL RECONSTRUCTION with  grafts ;  Surgeon: PIETER Dickson M.D.;  Location: SURGERY SAME DAY [facility];  Service:    • SEPTOPLASTY N/A 08/01/2016    Procedure: SEPTOPLASTY;  Surgeon: PIETER Dickson M.D.;  Location: SURGERY SAME DAY [facility];  Service:    • TURBINOPLASTY Bilateral 08/01/2016    Procedure: TURBINOPLASTY;  Surgeon: PIETER Dickson M.D.;  Location: SURGERY SAME DAY [facility];  Service:    • NASAL FRACTURE REDUCTION OPEN N/A 08/01/2016    Procedure: SEPTAL FRACTURE REDUCTION OPEN;  Surgeon: PIETER Dickson M.D.;  Location: SURGERY SAME DAY [facility];  Service:    • FINE NEEDLE ASPIRATION  11/16/2009    Performed by DA CALLOWAY at ENDOSCOPY [facility]S   • COLONOSCOPY  2006    ? unclear date   • OTHER SURGICAL PROCEDURE  1998    vaginal wall repair   • BLADDER SUSPENSION  1983   • HYSTERECTOMY, VAGINAL  1983    ovaries are present, excessive bleeding   • TUBAL LIGATION  1980   • MASS EXCISION GENERAL Right 1956    had carbuncle removal R thigh   • TONSILLECTOMY  1953   • ANAL SPHINCTEROTOMY      anal muscle repair   • CATARACT EXTRACTION WITH IOL Bilateral    • CHOLECYSTECTOMY     • HYSTERECTOMY LAPAROSCOPY     • OTHER ORTHOPEDIC SURGERY  1962/1980/1983/1985    foot surgery    • SCAR REVISION Right     thigh scar        ALLERGIES:   Allergies   Allergen Reactions   • Ace Inhibitors Cough   • Benadryl Allergy Hives     Hot skin itching   • Iodine Nausea     Pt received CT w/ contrast 12/20/22 pt had nausea post contrast    • Lamictal Rash and Swelling     \"hot\", unable to function.  Severe rash, scarring   • Savella " "[Kdc:Milnacipran+Ci Pigment Blue 63] Nausea, Swelling and Anxiety     Swelling, bone and muscle pain, sweating, nausea, dizziness, sleeplessness, anxiety   • Savella [Milnacipran Hcl] Unspecified     Muscle aches, feet swelling   • Sulfa Drugs Hives, Shortness of Breath and Anxiety     Hard breathing, shortness of breath   • Butalbital-Acetaminophen Unspecified     Dizzy, can't walk, hard to focus   • Cefaclor Nausea and Unspecified     Ceclor causes light headedness and dizziness   • Morphine Itching     \"Redness\"   • Penicillins Itching and Swelling     Skin itching and redness   • Tizanidine Hcl Nausea     \"Dizziness\" hard to focus   • Milnacipran Vomiting and Nausea   • Amlactin Rash and Itching     Lotion causes itching, redness and burnng   • Tape Rash and Itching     Skin tears, paper tape is OK per Pt       MEDICATIONS:  No current facility-administered medications on file prior to encounter.     Current Outpatient Medications on File Prior to Encounter   Medication Sig Dispense Refill   • DULoxetine (CYMBALTA) 60 MG Cap DR Particles delayed-release capsule Take 1 Capsule by mouth every evening. 90 Capsule 3   • albuterol 108 (90 Base) MCG/ACT Aero Soln inhalation aerosol Inhale 2 Puffs every 6 hours as needed for Shortness of Breath. 8.5 g 11   • traMADol (ULTRAM) 50 MG Tab Take 1 Tablet by mouth every 8 hours as needed for Moderate Pain for up to 90 days. 90 tablets is a 30 day quantity 90 Tablet 2   • atorvastatin (LIPITOR) 40 MG Tab Take 1 Tablet by mouth every evening. 90 Tablet 3   • promethazine (PHENERGAN) 25 MG Tab Take 1 Tablet by mouth every 8 hours as needed for Nausea/Vomiting. 40 Tablet 4   • pantoprazole (PROTONIX) 40 MG Tablet Delayed Response Take 1 Tablet by mouth 2 times a day. 180 Tablet 3   • nabumetone (RELAFEN) 750 MG Tab Take 1 Tablet by mouth 2 times a day with meals. 180 Tablet 3   • gabapentin (NEURONTIN) 800 MG tablet Take 1 Tablet by mouth 2 times a day. 180 Tablet 3   • " allopurinol (ZYLOPRIM) 100 MG Tab Take 1 Tablet by mouth every day. 90 Tablet 3   • levothyroxine (SYNTHROID) 137 MCG Tab Take 1 Tablet by mouth every morning on an empty stomach. 90 Tablet 2   • QUEtiapine (SEROQUEL) 25 MG Tab Take 1 Tablet by mouth at bedtime. 90 Tablet 3   • Misc. Devices Misc Referral to Nona Valenzuela DDS in [place] for mouth device for Obstructive Sleep Apnea 1 Each 1   • Cholecalciferol (VITAMIN D-3) 125 MCG (5000 UT) Tab Take 5,000 Units by mouth every evening.     • VITAMIN E PO Take 1 Capsule by mouth every morning.         LABS:  Lab Results   Component Value Date/Time    HEMOGLOBIN 13.5 07/14/2023 1236    HEMATOCRIT 41.4 07/14/2023 1236    WBC 9.8 07/14/2023 1236     Lab Results   Component Value Date/Time    SODIUM 140 07/14/2023 1236    POTASSIUM 4.1 07/14/2023 1236    CHLORIDE 104 07/14/2023 1236    CO2 25 07/14/2023 1236    GLUCOSE 113 (H) 07/14/2023 1236    BUN 21 07/14/2023 1236    CALCIUM 9.5 07/14/2023 1236         PREVIOUS ANESTHETICS: See EMR  __________________________________________      Physical Exam    Airway   Mallampati: II  TM distance: >3 FB  Neck ROM: full       Cardiovascular - normal exam  Rhythm: regular  Rate: normal  (-) murmur     Dental - normal exam      Very poor dentition   Pulmonary - normal exam  Breath sounds clear to auscultation     Abdominal   (+) obese     Neurological - normal exam                 Anesthesia Plan    ASA 3 (KERRI, asthma, carotid stenosis)   ASA physical status 3 criteria: other (comment)    Plan - general       Airway plan will be ETT          Induction: intravenous    Postoperative Plan: Postoperative administration of opioids is intended.    Pertinent diagnostic labs and testing reviewed    Informed Consent:    Anesthetic plan and risks discussed with patient.    Use of blood products discussed with: patient whom consented to blood products.

## 2023-07-17 NOTE — DISCHARGE INSTRUCTIONS
HOME CARE INSTRUCTIONS    ACTIVITY: Rest and take it easy for the first 24 hours.  A responsible adult is recommended to remain with you during that time.  It is normal to feel sleepy.  We encourage you to not do anything that requires balance, judgment or coordination.    FOR 24 HOURS DO NOT:  Drive, operate machinery or run household appliances.  Drink beer or alcoholic beverages.  Make important decisions or sign legal documents.    SPECIAL INSTRUCTIONS: -Keep incision dry for 48 hours, then may get wet but do not soak. -Clean incision twice a day with gentle soap and keep covered with ointment such as bacitracin Neosporin or Vaseline    DIET: To avoid nausea, slowly advance diet as tolerated, avoiding spicy or greasy foods for the first day.  Add more substantial food to your diet according to your physician's instructions.  Babies can be fed formula or breast milk as soon as they are hungry.  INCREASE FLUIDS AND FIBER TO AVOID CONSTIPATION.    MEDICATIONS: Resume taking daily medication.  Take prescribed pain medication with food.  If no medication is prescribed, you may take non-aspirin pain medication if needed.  PAIN MEDICATION CAN BE VERY CONSTIPATING.  Take a stool softener or laxative such as senokot, pericolace, or milk of magnesia if needed.    Prescription given for: Hydrocodone-acetaminophen (Norco)       Last pain medication given:         A follow-up appointment should be arranged with your doctor in; call to schedule.    You should CALL YOUR PHYSICIAN if you develop:  Fever greater than 101 degrees F.  Pain not relieved by medication, or persistent nausea or vomiting.  Excessive bleeding (blood soaking through dressing) or unexpected drainage from the wound.  Extreme redness or swelling around the incision site, drainage of pus or foul smelling drainage.  Inability to urinate or empty your bladder within 8 hours.  Problems with breathing or chest pain.    You should call 911 if you develop  problems with breathing or chest pain.  If you are unable to contact your doctor or surgical center, you should go to the nearest emergency room or urgent care center.  Physician's telephone #:   Dr. Mancuso [phone]    MILD FLU-LIKE SYMPTOMS ARE NORMAL.  YOU MAY EXPERIENCE GENERALIZED MUSCLE ACHES, THROAT IRRITATION, HEADACHE AND/OR SOME NAUSEA.    If any questions arise, call your doctor.  If your doctor is not available, please feel free to call the Surgical Center at [phone].  The Center is open Monday through Friday from 7AM to 7PM.      A registered nurse may call you a few days after your surgery to see how you are doing after your procedure.    You may also receive a survey in the mail within the next two weeks and we ask that you take a few moments to complete the survey and return it to us.  Our goal is to provide you with very good care and we value your comments.     Depression / Suicide Risk    As you are discharged from this [facility] facility, it is important to learn how to keep safe from harming yourself.    Recognize the warning signs:  Abrupt changes in personality, positive or negative- including increase in energy   Giving away possessions  Change in eating patterns- significant weight changes-  positive or negative  Change in sleeping patterns- unable to sleep or sleeping all the time   Unwillingness or inability to communicate  Depression  Unusual sadness, discouragement and loneliness  Talk of wanting to die  Neglect of personal appearance   Rebelliousness- reckless behavior  Withdrawal from people/activities they love  Confusion- inability to concentrate     If you or a loved one observes any of these behaviors or has concerns about self-harm, here's what you can do:  Talk about it- your feelings and reasons for harming yourself  Remove any means that you might use to hurt yourself (examples: pills, rope, extension cords, firearm)  Get professional help from the community  (Mental Health, Substance Abuse, psychological counseling)  Do not be alone:Call your Safe Contact- someone whom you trust who will be there for you.  Call your local CRISIS HOTLINE 943-7268 or 713-058-6025  Call your local Children's Mobile Crisis Response Team [place] (773) 358-8294 or www.[employer]  Call the toll free National Suicide Prevention Hotlines   National Suicide Prevention Lifeline 624-782-YGZC (4424)  [facility] Line Network 800-SUICIDE (745-2771)    I acknowledge receipt and understanding of these Home Care instructions    Thyroidectomy Discharge Instructions    You had surgery called thyroidectomy. This means that part or all of your thyroid gland was removed. The main job of the thyroid gland is to make thyroid hormone. This hormone controls your body's metabolism. This is the way your body creates and uses energy. Removing the thyroid gland removes your body's source of thyroid hormone. So after the surgery, you will need to take thyroid hormone pills daily. This helps keep the level of thyroid hormone in your body steady. This sheet tells you more about how to care for yourself after surgery.    Recovering After Surgery:    Get plenty of rest.  Care for your incision as directed.  Avoid heavy lifting and strenuous activity for 3-5 weeks.  Walk a few times daily. But don't push yourself too hard. Slowly increase your pace and distance, as you feel able.  Return to work when your doctor says it's okay.    Taking Your Thyroid Medication:    Take your medication as directed.  Use a pillbox labeled with the days of the week. This will help you remember whether you've taken your medication each day.  Take your medication with a liquid. But avoid taking it with soy milk. Soy milk can affect how well your body absorbs the medication. The pill needs to reach your stomach and not dissolve in your throat.  Try to take your medication with the same types and amounts of food and liquid each  day. This helps control the amount of thyroid hormone in your system.    After taking your medication:    Wait 4 hours before eating or drinking anything that contains soy.  Wait 4 hours before taking certain medications. These include:     Iron supplements   Calcium supplements   Antacids that contain either calcium or aluminum hydroxide   Medications that lower your cholesterol    Do not stop taking your medication even if you become pregnant.  Never stop taking medications on your own.

## 2023-07-17 NOTE — ANESTHESIA TIME REPORT
Anesthesia Start and Stop Event Times     Date Time Event    7/17/2023 1340 Ready for Procedure     1353 Anesthesia Start     1530 Anesthesia Stop        Responsible Staff  07/17/23    Name Role Begin End    Tim Mendoza M.D. Anesth 1353 1515    Deepak Tolbert M.D. Anesth 1515 1530        Overtime Reason:  overtime    Comments: Overtime for Dr. Mendoza only

## 2023-07-17 NOTE — OP REPORT
DATE OF OPERATION: 7/17/2023     PREOPERATIVE DIAGNOSIS:Right Thyroid Lobe mass    POSTOPERATIVE DIAGNOSIS:Same     PROCEDURE PERFORMED:   1. Right Thyroid lobectomy  2. Intraoperative Recurrent Laryngeal Nerve Monitoring for 120 minutes    SURGEON: Michael Mancuso M.D.    ANESTHESIA: General endotracheal anesthesia    INDICATIONS: The patient is a 73 y.o.-year-old female with a a 3 to 4 cm left thyroid lobe mass with follicular cells with atypia. She  is taken to the operating room for left thyroid lobectomy.     FINDINGS:  1.  The left recurrent laryngeal nerve was identified, preserved, and stimulated well at the end of the case.  2.  The left parathyroid glands were identified and left viable in the patient    SPECIMEN: Left thyroid lobe    ESTIMATED BLOOD LOSS: 30 mL     ROCEDURE: Following informed consent, the patient was properly identified, taken to the operating room and placed in supine position where general endotracheal anesthesia was administered with NIMS monitoring. Intravenous antibiotics were administered by the anesthesiologist in correct time interval. Sequential compression devices were employed. The neck was prepped and draped into a sterile field.   Patient was draped and prepared in the normal   surgical fashion for this procedure. They were injected with 8 mL of lidocaine   with epinephrine along a proposed incision line along the anterior neck.   Incision was made with a 15-blade, carried down to the strap muscles were   superior and inferior flaps were elevated from the clavicle to the thyroid   notch. The median raphe was divided down to the thyroid isthmus. The strap   muscles were then carefully dissected off the anterior border of the thyroid   gland on the left side as the gland was rolled out. The middle thyroid vein   was identified, ligated and tied. Next, the inferior pole was carefully   released peeling the inferior parathyroid gland off of it. The inferior pole   vessels were  also ligated and tied. Next, the superior pole vessels were   released. They were bipolar electrocauterized ligated and tied. At this   point, the recurrent laryngeal nerve was identified within the thyroid bed and   the thyroid was carefully peeled off of it as it was finally resected off of   Villasenor's ligament along the anterior tracheal wall and the thyroid was released at the thyroid isthmus and sent off for specimen.     The Nims monitor was used during the case to stimulate   the recurrent laryngeal nerve several times to verify its location and lack of   injury. The right thyroid bed was then rinsed with copious amounts of   sterile saline irrigation. Any bleeding was bipolar electrocauterized. Small   amount of fibrilar was placed at the superior pole and over the recurrent   laryngeal nerve to ensure meticulous hemostasis. The wound was then closed using 3-0 Vicryl for the strap   muscles in the midline, 3-0 Vicryl for the platysmal layer, and a 5-0 running   Monocryl subcuticular for the skin with overlying bacitracin. At this point,   my portion of the procedure was terminated and patient was turned back over to   anesthesia for emergence.     The patient tolerated the procedure well and there were no apparent complication. All sponge, needle, and instrument counts were correct on 2 separate occasions. She  was awakened, extubated, and transferred to the recovery room in satisfactory condition.         ____________________________________   Michael Mancuso M.D.    DD: 7/17/2023  3:25 PM

## 2023-07-18 NOTE — ANESTHESIA POSTPROCEDURE EVALUATION
Patient: Ashly Meyer    Procedure Summary     Date: 07/17/23 Room / Location: [facility] ROOM 28 / SURGERY SAME DAY [facility]    Anesthesia Start: 1353 Anesthesia Stop: 1530    Procedure: LEFT ELOY- THYROIDECTOMY (Left: Neck) Diagnosis: (NONTOXIC SINGLE THYROID NODULE)    Surgeons: Michael Mancuso M.D. Responsible Provider: Deepak Tolbert M.D.    Anesthesia Type: general ASA Status: 3          Final Anesthesia Type: general  Last vitals  BP   Blood Pressure : (!) 170/81    Temp   36.6 °C (97.8 °F)    Pulse   95   Resp   15    SpO2   95 %      Anesthesia Post Evaluation    Patient location during evaluation: PACU    Airway patency: patent  Anesthetic complications: no  Cardiovascular status: hemodynamically stable  Respiratory status: acceptable  Hydration status: euvolemic    PONV: none          No notable events documented.     Nurse Pain Score: 0 (NPRS)

## 2023-07-22 PROBLEM — C73 THYROID CARCINOMA (HCC): Status: ACTIVE | Noted: 2023-07-22

## 2023-07-27 ENCOUNTER — PHYSICAL THERAPY (OUTPATIENT)
Dept: PHYSICAL THERAPY | Facility: REHABILITATION | Age: 73
End: 2023-07-27
Attending: FAMILY MEDICINE
Payer: MEDICARE

## 2023-07-27 DIAGNOSIS — R52 GENERALIZED PAIN: ICD-10-CM

## 2023-07-27 DIAGNOSIS — M19.011 PRIMARY OSTEOARTHRITIS OF RIGHT SHOULDER: ICD-10-CM

## 2023-07-27 DIAGNOSIS — M62.81 MUSCLE WEAKNESS (GENERALIZED): ICD-10-CM

## 2023-07-27 PROCEDURE — 97110 THERAPEUTIC EXERCISES: CPT

## 2023-07-27 NOTE — OP THERAPY DAILY TREATMENT
"  Outpatient Physical Therapy  DAILY TREATMENT     [facility] [address].  [address]  [place] [address]  Phone:  [phone]  Fax:  [phone]    Date: 07/27/2023    Patient: Ashly Meyer  YOB: 1950  MRN: [id number]     Time Calculation    Start time: 1433  Stop time: 1515 Time Calculation (min): 42 minutes         Chief Complaint: Shoulder Problem, Knee Problem, and Hip Problem    Visit #: 13    SUBJECTIVE:  Patient presents to PT session and reports that her procedure went well and was outpatient. She reports she had no restrictions overall. Patient states she feels like she has been \"shakey\" this week. Continues to ambulate without AD. Patient educated on PT's continued recommendation for utilization of walker for increased safety. Patient verbalizes understanding.     OBJECTIVE:  Current objective measures:   Demonstrates increased \"shakiness\" with activities today; BP taken L wrist: 178/89; 73 bpm; retaken after seated rest break, manually to L UE, 142/72; discussed s/s of heart attack/stroke with patient verbalizing understanding of when to present to ER. Patient also reports she has a BP monitor at home which she does occasionally utilize. PT recommended monitoring BP at home and discussed presenting to urgent care/ER/calling MD if readings are showing increased elevation. Patient verbalizes understanding and in agreement with plan.             Therapeutic Exercises (CPT 01966):     1. NuStep, level 1; x10 minutes, UE utilization as preferred    2. pulleys, x5 minutes; flexion/scaption    3. Shoulder flexion,swiss ball wall walks, x15, nt    4. seated marches, pink theraband, 2x15 B, nt    5. sit to stand, 2x5    6. piriformis stretch, seated, x15 seconds on R; x30 seconds on L, nt    7. scapular retraction, x15, 3 second holds, nt    8. seated shoulder flexion with dowel tod, AAROM, x20    9. seated shoulder abduction with dowel tod, AAROM, x20, nt    " 10. supine clam shells with pink tb, 2x15, nt    11. B adduction squeezes, x15, x3 seconds holds, nt    12. standing marches, x10, B; B UE support    13. stading hip abduction, nt    14. pec stretch, against wall with slight opposing rotation, 2x 15 seconds, B, nt    15. postural training against wall, standing 1/2 foam roller, x1 minute, nt    17. bed mobility, to mimic supine<>sit at home, x25 minutes, nt    18. TUG, 23.89 seconds, no AD, 6/9    20. PN due 8/7; Recert due 9/1    Therapeutic Treatments and Modalities:     1. Therapeutic Activities (CPT 33809), x30 minutes, nt    Time-based treatments/modalities:    Physical Therapy Timed Treatment Charges  Therapeutic exercise minutes (CPT 61089): 42 minutes      Pain rating (1-10) before treatment:  7; generalized but also is reporting a headache  Pain rating (1-10) after treatment:  5, reports improved pain overall after session       ASSESSMENT:   Response to treatment: Patient tolerates PT treatment well today. Demonstrates continued balance and strength deficits with PT recommending use of AD however patient continues to deny need/refuses recommendation. UE and LE strength and ROM activities completed to promoted increased functional mobility skills and quality of life as well as increased safety overall through further strength and balance training.    PLAN/RECOMMENDATIONS:   Plan for treatment: therapy treatment to continue next visit.  Planned interventions for next visit: continue with current treatment.

## 2023-08-03 ENCOUNTER — PHYSICAL THERAPY (OUTPATIENT)
Dept: PHYSICAL THERAPY | Facility: REHABILITATION | Age: 73
End: 2023-08-03
Attending: FAMILY MEDICINE
Payer: MEDICARE

## 2023-08-03 DIAGNOSIS — M19.011 PRIMARY OSTEOARTHRITIS OF RIGHT SHOULDER: ICD-10-CM

## 2023-08-03 DIAGNOSIS — R52 GENERALIZED PAIN: ICD-10-CM

## 2023-08-03 DIAGNOSIS — M62.81 MUSCLE WEAKNESS (GENERALIZED): ICD-10-CM

## 2023-08-03 PROCEDURE — 97110 THERAPEUTIC EXERCISES: CPT

## 2023-08-03 NOTE — OP THERAPY DAILY TREATMENT
"  Outpatient Physical Therapy  DAILY TREATMENT     [facility] [address].  [address]  [place] [address]  Phone:  [phone]  Fax:  [phone]    Date: 08/03/2023    Patient: Ashly Meyer  YOB: 1950  MRN: [id number]     Time Calculation    Start time: 1434  Stop time: 1514 Time Calculation (min): 40 minutes         Chief Complaint: Shoulder Problem, Knee Problem, and Hip Problem    Visit #: 14    SUBJECTIVE:  Patient reports she is having pain today from her shoulders to her knees. Discussed continued progression versus plateau and potential for upcoming discharge as patient is demonstrating minimal progress at this time. Patient verbalizes understanding. Will address goals and determine continued medical necessity at next visit.     OBJECTIVE:  Current objective measures:           Therapeutic Exercises (CPT 11006):     1. NuStep, level 1; x10 minutes, SPM above 45    2. pulleys, x5 minutes; flexion/scaption, nt    3. Shoulder flexion,swiss ball wall walks, x15, nt    4. seated marches, pink theraband, 2x15 B, nt    5. sit to stand, 2x5    6. piriformis stretch, seated, x15 seconds on R; x30 seconds on L, nt    7. scapular retraction, x15, 3 second holds    8. seated shoulder flexion with dowel tod, AAROM, x20    9. seated shoulder abduction with dowel tod, AAROM, x20    10. supine clam shells, 2x20, pink theraband    11. B adduction squeezes, x15, x3 seconds holds, nt    12. standing marches, nt    13. stading hip abduction, nt    16. supine marches, 2x20, pink theraband, TrA engagement    18. TUG, 23.89 seconds, no AD, 6/9    20. PN due 8/7; Recert due 9/1      Time-based treatments/modalities:    Physical Therapy Timed Treatment Charges  Therapeutic exercise minutes (CPT 53886): 40 minutes      Pain rating (1-10) before treatment:  8; generalized   Pain rating (1-10) after treatment:  5-6 shoulders to \"middle\"; \"bottom\" rated at 7 or 8; reports feeling a " little better overall     ASSESSMENT:   Response to treatment: Patient tolerates PT treatment well today. Patient continues to ambulate without AD, remains a fall risk. Minimal progression remains, therefore PT discussed with patient potential for upcoming D/C secondary to patient being near plateau. Plan for x1 more visit to determine continued medical necessity verse D/C due to plateau. Patient in agreement with plan.     PLAN/RECOMMENDATIONS:   Plan for treatment: therapy treatment to continue next visit.  Planned interventions for next visit: continue with current treatment.

## 2023-08-04 ENCOUNTER — APPOINTMENT (OUTPATIENT)
Dept: RADIOLOGY | Facility: MEDICAL CENTER | Age: 73
End: 2023-08-04
Attending: OTOLARYNGOLOGY
Payer: MEDICARE

## 2023-08-07 ENCOUNTER — APPOINTMENT (OUTPATIENT)
Dept: ADMISSIONS | Facility: MEDICAL CENTER | Age: 73
End: 2023-08-07
Attending: OTOLARYNGOLOGY
Payer: MEDICARE

## 2023-08-08 ENCOUNTER — APPOINTMENT (OUTPATIENT)
Dept: PHYSICAL THERAPY | Facility: REHABILITATION | Age: 73
End: 2023-08-08
Attending: FAMILY MEDICINE
Payer: MEDICARE

## 2023-08-08 ENCOUNTER — HOSPITAL ENCOUNTER (OUTPATIENT)
Dept: RADIOLOGY | Facility: MEDICAL CENTER | Age: 73
End: 2023-08-08
Attending: OTOLARYNGOLOGY
Payer: MEDICARE

## 2023-08-08 DIAGNOSIS — E04.1 NONTOXIC UNINODULAR GOITER: ICD-10-CM

## 2023-08-08 PROCEDURE — 70491 CT SOFT TISSUE NECK W/DYE: CPT

## 2023-08-08 PROCEDURE — 700117 HCHG RX CONTRAST REV CODE 255: Performed by: OTOLARYNGOLOGY

## 2023-08-08 RX ADMIN — IOHEXOL 80 ML: 350 INJECTION, SOLUTION INTRAVENOUS at 10:46

## 2023-08-08 NOTE — OP THERAPY DAILY TREATMENT
"  Outpatient Physical Therapy  DAILY TREATMENT     [facility] [address]  [address]  [place] [address]  Phone:  [phone]  Fax:  [phone]    Date: 08/08/2023    Patient: Ashly Meyer  YOB: 1950  MRN: [id number]     Time Calculation                   Chief Complaint: No chief complaint on file.    Visit #: 15    SUBJECTIVE:  ***    OBJECTIVE:  Current objective measures: ***        Exercises/Treatment  Time-based treatments/modalities:           Pain rating (1-10) before treatment:  {PAIN NUMBERS_1-10:57393}  Pain rating (1-10) after treatment:  {PAIN NUMBERS_1-10:88513}    Goals:   Short Term Goals:   1. Patient will demonstrate independent HEP to promote increased strength and ROM for improved functional mobility skills.     2. Patient will demonstrate improved B shoulder flexion AROM by 10 or more degrees to promote ability to reach overhead into cupboards.      3. Patient will demonstrate improved B UE/LE strength to grossly 3+/5 for improved functional mobility skills    Short term goal time span:  2-4 weeks      Long Term Goals:    1. Patient will demonstrate improved B LE strength to grossly 4+/5 to promote improved functional mobility skills.     2. Patient will demonstrate improved B UE strength to grossly 4+/5 to promote improved functional mobility skills.     3. Patient will demonstrate improved B UE flexion/abduction ROM to WFL to promote improved functional mobility skills.     4. Patient will demonstrate improved LEFS and SPADI score by 10 or more points, each, to indicate improving quality of life.     5. Patient will demonstrate ability to transition from floor to stand with least external supports required.    Long term goal time span:  6-8 weeks    ASSESSMENT:   Response to treatment: ***    PLAN/RECOMMENDATIONS:   Plan for treatment: {AMB OP THERAPY - THERAPY PLAN:802828625::\"therapy treatment to continue next visit\"}.  Planned " "interventions for next visit: {PT PLANNED THERAPY INTERVENTIONS:607870740::\"continue with current treatment\"}.         "

## 2023-08-10 ENCOUNTER — TELEPHONE (OUTPATIENT)
Dept: PHYSICAL THERAPY | Facility: REHABILITATION | Age: 73
End: 2023-08-10
Payer: MEDICARE

## 2023-08-10 NOTE — OP THERAPY DISCHARGE SUMMARY
Outpatient Physical Therapy  DISCHARGE SUMMARY NOTE      [facility]  [address]  [address]  [place] [address]  Phone:  [phone]  Fax:  [phone]    Date of Visit: 08/10/2023    Patient: Ashly Meyer  YOB: 1950  MRN: [id number]     Referring Provider: Colton Ahn M.D.  [address] [place] [address] [address]  [place] [address]   Referring Diagnosis Primary osteoarthritis, right shoulder [M19.011]                 Your patient is being discharged from Physical Therapy with the following comments:   Patient D/C'd per attendance policy.     Comments:  Prior to missed visits, patient and PT had discussed potential for upcoming D/C from skilled PT secondary to patient appearing near plateau, plan was to address goals to determine further medical necessity. At this time, patient is D/C'd secondary to attendance policy.     Limitations Remaining:  Pain, strength, and ROM deficits remained at time of last visit on 8/3/23.    Recommendations:  Follow up with MD. Retrieve new PT referral if/when appropriate.     Thank you for allowing me to participate in this patient's care.       Obdulia Torres, PT    Date: 8/10/2023

## 2023-08-11 ENCOUNTER — PRE-ADMISSION TESTING (OUTPATIENT)
Dept: ADMISSIONS | Facility: MEDICAL CENTER | Age: 73
End: 2023-08-11
Attending: OTOLARYNGOLOGY
Payer: MEDICARE

## 2023-08-15 ENCOUNTER — OFFICE VISIT (OUTPATIENT)
Dept: MEDICAL GROUP | Facility: MEDICAL CENTER | Age: 73
End: 2023-08-15
Payer: MEDICARE

## 2023-08-15 VITALS
DIASTOLIC BLOOD PRESSURE: 72 MMHG | WEIGHT: 243 LBS | HEIGHT: 67 IN | SYSTOLIC BLOOD PRESSURE: 124 MMHG | RESPIRATION RATE: 16 BRPM | OXYGEN SATURATION: 93 % | BODY MASS INDEX: 38.14 KG/M2 | HEART RATE: 87 BPM

## 2023-08-15 DIAGNOSIS — M15.9 PRIMARY OSTEOARTHRITIS INVOLVING MULTIPLE JOINTS: ICD-10-CM

## 2023-08-15 DIAGNOSIS — C73 THYROID CARCINOMA (HCC): ICD-10-CM

## 2023-08-15 DIAGNOSIS — M25.551 BILATERAL HIP PAIN: ICD-10-CM

## 2023-08-15 DIAGNOSIS — M25.552 BILATERAL HIP PAIN: ICD-10-CM

## 2023-08-15 DIAGNOSIS — M19.011 PRIMARY OSTEOARTHRITIS, RIGHT SHOULDER: ICD-10-CM

## 2023-08-15 PROCEDURE — 1170F FXNL STATUS ASSESSED: CPT | Performed by: FAMILY MEDICINE

## 2023-08-15 PROCEDURE — 3078F DIAST BP <80 MM HG: CPT | Performed by: FAMILY MEDICINE

## 2023-08-15 PROCEDURE — 3074F SYST BP LT 130 MM HG: CPT | Performed by: FAMILY MEDICINE

## 2023-08-15 PROCEDURE — 99213 OFFICE O/P EST LOW 20 MIN: CPT | Performed by: FAMILY MEDICINE

## 2023-08-15 ASSESSMENT — FIBROSIS 4 INDEX: FIB4 SCORE: 1.81

## 2023-08-15 NOTE — PROGRESS NOTES
Chief Complaint   Patient presents with    Follow-Up     3 month        Subjective:     HPI:   Ashly Meyer presents today with the followin. Primary osteoarthritis, right shoulder  Patient's shoulder range of motion has improved working with physical therapy.  However, it has stagnated a little bit.  She spoke with her therapist and they talked about working more on the upper back and lower back.  PT referral discussed and placed.    2. Bilateral hip pain  Patient does have chronic bilateral hip pain.  She had an injection in the hip in the past which helped for about a month.  She wanted to do another but I really do not think that is a great idea and with such a short period of relief.  Physical therapy referral discussed and placed.    3. Primary osteoarthritis involving multiple joints  She has arthritis of multiple joints, PT referral discussed and placed.    4. Thyroid carcinoma (HCC)  She is handling the diagnosis extremely well.  She states she always expected this as her family does tend to get cancer.  She will undergo total thyroidectomy on the .  She has an endocrinology referral in place.        Patient Active Problem List    Diagnosis Date Noted    Primary osteoarthritis, right shoulder 08/15/2023    Thyroid carcinoma (HCC) 2023    Hurthle cell tumor 2023    Primary osteoarthritis of first metatarsophalangeal (MTP) joint 2023    Urinary tract infection due to Klebsiella species 2023    Bilious vomiting with nausea 2023    Drug-induced constipation 2022    Impaired gait and mobility 2022    Ataxia 2022    Carotid artery stenosis 2022    Left bundle branch block 2022    Temporal pain 2022    History of 2019 novel coronavirus disease (COVID-19) 10/11/2022    Positive self-administered antigen test for COVID-19 10/11/2022    Trochanteric bursitis of both hips 2022    Mild persistent asthma, uncomplicated 2022     Incomplete rectal prolapse 09/03/2021    Benign essential tremor 09/03/2021    Chronic respiratory failure with hypoxia ([facility]) 06/03/2021    Controlled type 2 diabetes mellitus without complication, without long-term current use of insulin ([facility]) 06/12/2019    Chronic gout of foot 04/30/2019    Dry eye syndrome, bilateral     Rotator cuff syndrome of right shoulder and allied disorders 10/10/2018    Vitamin D deficiency 12/28/2017    Morbid obesity with BMI of 40.0-44.9, adult ([facility])     Neural foraminal stenosis of cervical spine 05/18/2017    Tinnitus of both ears 05/18/2017    Dyslipidemia, goal LDL below 130 01/06/2017    Nonalcoholic fatty liver disease 01/05/2017    Menopausal symptoms 04/28/2016    Lumbar facet arthropathy 12/09/2015    Candidal intertrigo 06/01/2015    Elevated sed rate 12/09/2014    Osteoarthritis of left thumb 07/23/2014    Bilateral hip pain 04/23/2014    H/O fibromyalgia 03/11/2014    KERRI (obstructive sleep apnea) 03/11/2014    Peripheral neuropathy     Rectocele 11/25/2013    Pelvic floor dysfunction     Chronic constipation 10/02/2013    Nocturnal hypoxia     Lumbar radiculopathy 11/01/2011    Migraine without aura     Seasonal allergies 07/08/2011    GERD (gastroesophageal reflux disease) 12/10/2010    Osteoarthritis of multiple joints 04/13/2010    Eczema, dyshidrotic 08/03/2009    Essential hypertension 05/20/2009    Hypothyroidism due to acquired atrophy of thyroid 05/20/2009       Current medicines (including changes today)  Current Outpatient Medications   Medication Sig Dispense Refill    DULoxetine (CYMBALTA) 60 MG Cap DR Particles delayed-release capsule Take 1 Capsule by mouth every evening. 90 Capsule 3    albuterol 108 (90 Base) MCG/ACT Aero Soln inhalation aerosol Inhale 2 Puffs every 6 hours as needed for Shortness of Breath. 8.5 g 11    atorvastatin (LIPITOR) 40 MG Tab Take 1 Tablet by mouth every evening. 90 Tablet 3    promethazine (PHENERGAN) 25 MG Tab Take 1 Tablet  "by mouth every 8 hours as needed for Nausea/Vomiting. 40 Tablet 4    pantoprazole (PROTONIX) 40 MG Tablet Delayed Response Take 1 Tablet by mouth 2 times a day. 180 Tablet 3    nabumetone (RELAFEN) 750 MG Tab Take 1 Tablet by mouth 2 times a day with meals. 180 Tablet 3    gabapentin (NEURONTIN) 800 MG tablet Take 1 Tablet by mouth 2 times a day. 180 Tablet 3    allopurinol (ZYLOPRIM) 100 MG Tab Take 1 Tablet by mouth every day. 90 Tablet 3    levothyroxine (SYNTHROID) 137 MCG Tab Take 1 Tablet by mouth every morning on an empty stomach. 90 Tablet 2    QUEtiapine (SEROQUEL) 25 MG Tab Take 1 Tablet by mouth at bedtime. 90 Tablet 3    Cholecalciferol (VITAMIN D-3) 125 MCG (5000 UT) Tab Take 5,000 Units by mouth every evening.      VITAMIN E PO Take 1 Capsule by mouth every morning.       No current facility-administered medications for this visit.       Allergies   Allergen Reactions    Ace Inhibitors Cough    Benadryl Allergy Hives     Hot skin itching    Iodine Nausea     Pt received CT w/ contrast 12/20/22 pt had nausea post contrast     Lamictal Rash and Swelling     \"hot\", unable to function.  Severe rash, scarring    Savella [Kdc:Milnacipran+Ci Pigment Blue 63] Nausea, Swelling and Anxiety     Swelling, bone and muscle pain, sweating, nausea, dizziness, sleeplessness, anxiety    Savella [Milnacipran Hcl] Unspecified     Muscle aches, feet swelling    Sulfa Drugs Hives, Shortness of Breath and Anxiety     Hard breathing, shortness of breath    Butalbital-Acetaminophen Unspecified     Dizzy, can't walk, hard to focus    Cefaclor Nausea and Unspecified     Ceclor causes light headedness and dizziness    Morphine Itching     \"Redness\"    Penicillins Itching and Swelling     Skin itching and redness    Tizanidine Hcl Nausea     \"Dizziness\" hard to focus    Milnacipran Vomiting and Nausea    Amlactin Rash and Itching     Lotion causes itching, redness and burnng    Tape Rash and Itching     Skin tears, paper tape is " "OK per Pt       ROS: As per HPI       Objective:     /72 (BP Location: Right arm, Patient Position: Sitting, BP Cuff Size: Adult)   Pulse 87   Resp 16   Ht 1.689 m (5' 6.5\")   Wt 110 kg (243 lb)   SpO2 93%  Body mass index is 38.63 kg/m².    Physical Exam:  Constitutional: Well-developed and well-nourished. Not diaphoretic. No distress. Lucid and fluent.  Skin: Skin is warm and dry. No rash noted.  Head: Atraumatic without lesions.  Eyes: Conjunctivae and extraocular motions are normal. Pupils are equal, round, and reactive to light. No scleral icterus.   Ears:  External ears unremarkable.   Neck: Supple, trachea midline. No thyromegaly present. No cervical or supraclavicular lymphadenopathy. No JVD or carotid bruits appreciated  Cardiovascular: Regular rate and rhythm.  Normal S1, S2 without murmur appreciated.  Chest: Effort normal. Clear to auscultation throughout. No adventitious sounds.   Back: Spinous process tenderness, muscle spasm.    Extremities: No cyanosis, clubbing, erythema, nor edema. Right shoulder cannot lift all the way up, now has around 100 degrees.    Neurological: Alert and oriented x 3.  Psychiatric:  Behavior, mood, and affect are appropriate.       Assessment and Plan:     73 y.o. female with the following issues:    1. Primary osteoarthritis, right shoulder  Referral to Physical Therapy      2. Bilateral hip pain  Referral to Physical Therapy      3. Primary osteoarthritis involving multiple joints  Referral to Physical Therapy      4. Thyroid carcinoma (HCC)              Followup: Return in about 3 months (around 11/15/2023), or if symptoms worsen or fail to improve.  "

## 2023-08-22 ENCOUNTER — ANESTHESIA EVENT (OUTPATIENT)
Dept: SURGERY | Facility: MEDICAL CENTER | Age: 73
End: 2023-08-22
Payer: MEDICARE

## 2023-08-23 ENCOUNTER — ANESTHESIA (OUTPATIENT)
Dept: SURGERY | Facility: MEDICAL CENTER | Age: 73
End: 2023-08-23
Payer: MEDICARE

## 2023-08-23 ENCOUNTER — HOSPITAL ENCOUNTER (OUTPATIENT)
Facility: MEDICAL CENTER | Age: 73
End: 2023-08-23
Attending: OTOLARYNGOLOGY | Admitting: OTOLARYNGOLOGY
Payer: MEDICARE

## 2023-08-23 VITALS
BODY MASS INDEX: 39.65 KG/M2 | HEIGHT: 66 IN | DIASTOLIC BLOOD PRESSURE: 68 MMHG | WEIGHT: 246.69 LBS | SYSTOLIC BLOOD PRESSURE: 143 MMHG | RESPIRATION RATE: 20 BRPM | HEART RATE: 80 BPM | OXYGEN SATURATION: 91 % | TEMPERATURE: 98.2 F

## 2023-08-23 LAB
GLUCOSE BLD STRIP.AUTO-MCNC: 162 MG/DL (ref 65–99)
PATHOLOGY CONSULT NOTE: NORMAL

## 2023-08-23 PROCEDURE — 700101 HCHG RX REV CODE 250: Performed by: ANESTHESIOLOGY

## 2023-08-23 PROCEDURE — 700105 HCHG RX REV CODE 258: Mod: JZ | Performed by: ANESTHESIOLOGY

## 2023-08-23 PROCEDURE — 88307 TISSUE EXAM BY PATHOLOGIST: CPT

## 2023-08-23 PROCEDURE — 160047 HCHG PACU  - EA ADDL 30 MINS PHASE II: Performed by: OTOLARYNGOLOGY

## 2023-08-23 PROCEDURE — 88305 TISSUE EXAM BY PATHOLOGIST: CPT

## 2023-08-23 PROCEDURE — 160009 HCHG ANES TIME/MIN: Performed by: OTOLARYNGOLOGY

## 2023-08-23 PROCEDURE — 700105 HCHG RX REV CODE 258: Mod: JZ | Performed by: OTOLARYNGOLOGY

## 2023-08-23 PROCEDURE — 160048 HCHG OR STATISTICAL LEVEL 1-5: Performed by: OTOLARYNGOLOGY

## 2023-08-23 PROCEDURE — 160041 HCHG SURGERY MINUTES - EA ADDL 1 MIN LEVEL 4: Performed by: OTOLARYNGOLOGY

## 2023-08-23 PROCEDURE — A9270 NON-COVERED ITEM OR SERVICE: HCPCS | Performed by: ANESTHESIOLOGY

## 2023-08-23 PROCEDURE — 160046 HCHG PACU - 1ST 60 MINS PHASE II: Performed by: OTOLARYNGOLOGY

## 2023-08-23 PROCEDURE — 700101 HCHG RX REV CODE 250: Performed by: OTOLARYNGOLOGY

## 2023-08-23 PROCEDURE — 160002 HCHG RECOVERY MINUTES (STAT): Performed by: OTOLARYNGOLOGY

## 2023-08-23 PROCEDURE — 700111 HCHG RX REV CODE 636 W/ 250 OVERRIDE (IP): Performed by: ANESTHESIOLOGY

## 2023-08-23 PROCEDURE — 160025 RECOVERY II MINUTES (STATS): Performed by: OTOLARYNGOLOGY

## 2023-08-23 PROCEDURE — 700102 HCHG RX REV CODE 250 W/ 637 OVERRIDE(OP): Performed by: ANESTHESIOLOGY

## 2023-08-23 PROCEDURE — 160035 HCHG PACU - 1ST 60 MINS PHASE I: Performed by: OTOLARYNGOLOGY

## 2023-08-23 PROCEDURE — A9270 NON-COVERED ITEM OR SERVICE: HCPCS | Performed by: OTOLARYNGOLOGY

## 2023-08-23 PROCEDURE — 160029 HCHG SURGERY MINUTES - 1ST 30 MINS LEVEL 4: Performed by: OTOLARYNGOLOGY

## 2023-08-23 PROCEDURE — 700102 HCHG RX REV CODE 250 W/ 637 OVERRIDE(OP): Performed by: OTOLARYNGOLOGY

## 2023-08-23 PROCEDURE — 82962 GLUCOSE BLOOD TEST: CPT

## 2023-08-23 RX ORDER — HYDROMORPHONE HYDROCHLORIDE 1 MG/ML
0.5 INJECTION, SOLUTION INTRAMUSCULAR; INTRAVENOUS; SUBCUTANEOUS
Status: DISCONTINUED | OUTPATIENT
Start: 2023-08-23 | End: 2023-08-23 | Stop reason: HOSPADM

## 2023-08-23 RX ORDER — MEPERIDINE HYDROCHLORIDE 25 MG/ML
12.5 INJECTION INTRAMUSCULAR; INTRAVENOUS; SUBCUTANEOUS
Status: DISCONTINUED | OUTPATIENT
Start: 2023-08-23 | End: 2023-08-23 | Stop reason: HOSPADM

## 2023-08-23 RX ORDER — TRANEXAMIC ACID 100 MG/ML
INJECTION, SOLUTION INTRAVENOUS PRN
Status: DISCONTINUED | OUTPATIENT
Start: 2023-08-23 | End: 2023-08-23 | Stop reason: SURG

## 2023-08-23 RX ORDER — EPHEDRINE SULFATE 50 MG/ML
5 INJECTION, SOLUTION INTRAVENOUS
Status: DISCONTINUED | OUTPATIENT
Start: 2023-08-23 | End: 2023-08-23 | Stop reason: HOSPADM

## 2023-08-23 RX ORDER — HYDROMORPHONE HYDROCHLORIDE 2 MG/ML
INJECTION, SOLUTION INTRAMUSCULAR; INTRAVENOUS; SUBCUTANEOUS PRN
Status: DISCONTINUED | OUTPATIENT
Start: 2023-08-23 | End: 2023-08-23 | Stop reason: SURG

## 2023-08-23 RX ORDER — OXYCODONE HCL 5 MG/5 ML
10 SOLUTION, ORAL ORAL
Status: COMPLETED | OUTPATIENT
Start: 2023-08-23 | End: 2023-08-23

## 2023-08-23 RX ORDER — LABETALOL HYDROCHLORIDE 5 MG/ML
5 INJECTION, SOLUTION INTRAVENOUS
Status: DISCONTINUED | OUTPATIENT
Start: 2023-08-23 | End: 2023-08-23 | Stop reason: HOSPADM

## 2023-08-23 RX ORDER — BACITRACIN ZINC 500 [USP'U]/G
OINTMENT TOPICAL
Status: DISCONTINUED | OUTPATIENT
Start: 2023-08-23 | End: 2023-08-23 | Stop reason: HOSPADM

## 2023-08-23 RX ORDER — CEFAZOLIN SODIUM 1 G/3ML
INJECTION, POWDER, FOR SOLUTION INTRAMUSCULAR; INTRAVENOUS PRN
Status: DISCONTINUED | OUTPATIENT
Start: 2023-08-23 | End: 2023-08-23 | Stop reason: SURG

## 2023-08-23 RX ORDER — PHENYLEPHRINE HYDROCHLORIDE 10 MG/ML
INJECTION, SOLUTION INTRAMUSCULAR; INTRAVENOUS; SUBCUTANEOUS PRN
Status: DISCONTINUED | OUTPATIENT
Start: 2023-08-23 | End: 2023-08-23 | Stop reason: SURG

## 2023-08-23 RX ORDER — LIDOCAINE HYDROCHLORIDE AND EPINEPHRINE 10; 10 MG/ML; UG/ML
INJECTION, SOLUTION INFILTRATION; PERINEURAL
Status: DISCONTINUED | OUTPATIENT
Start: 2023-08-23 | End: 2023-08-23 | Stop reason: HOSPADM

## 2023-08-23 RX ORDER — LIDOCAINE HYDROCHLORIDE 20 MG/ML
INJECTION, SOLUTION EPIDURAL; INFILTRATION; INTRACAUDAL; PERINEURAL PRN
Status: DISCONTINUED | OUTPATIENT
Start: 2023-08-23 | End: 2023-08-23 | Stop reason: SURG

## 2023-08-23 RX ORDER — MIDAZOLAM HYDROCHLORIDE 1 MG/ML
1 INJECTION INTRAMUSCULAR; INTRAVENOUS
Status: DISCONTINUED | OUTPATIENT
Start: 2023-08-23 | End: 2023-08-23 | Stop reason: HOSPADM

## 2023-08-23 RX ORDER — SODIUM CHLORIDE, SODIUM LACTATE, POTASSIUM CHLORIDE, CALCIUM CHLORIDE 600; 310; 30; 20 MG/100ML; MG/100ML; MG/100ML; MG/100ML
INJECTION, SOLUTION INTRAVENOUS
Status: DISCONTINUED | OUTPATIENT
Start: 2023-08-23 | End: 2023-08-23 | Stop reason: SURG

## 2023-08-23 RX ORDER — TRAMADOL HYDROCHLORIDE 50 MG/1
50 TABLET ORAL EVERY 4 HOURS PRN
COMMUNITY
End: 2024-03-18

## 2023-08-23 RX ORDER — HYDROMORPHONE HYDROCHLORIDE 1 MG/ML
0.4 INJECTION, SOLUTION INTRAMUSCULAR; INTRAVENOUS; SUBCUTANEOUS
Status: DISCONTINUED | OUTPATIENT
Start: 2023-08-23 | End: 2023-08-23 | Stop reason: HOSPADM

## 2023-08-23 RX ORDER — DEXAMETHASONE SODIUM PHOSPHATE 4 MG/ML
INJECTION, SOLUTION INTRA-ARTICULAR; INTRALESIONAL; INTRAMUSCULAR; INTRAVENOUS; SOFT TISSUE PRN
Status: DISCONTINUED | OUTPATIENT
Start: 2023-08-23 | End: 2023-08-23 | Stop reason: SURG

## 2023-08-23 RX ORDER — SODIUM CHLORIDE, SODIUM LACTATE, POTASSIUM CHLORIDE, CALCIUM CHLORIDE 600; 310; 30; 20 MG/100ML; MG/100ML; MG/100ML; MG/100ML
INJECTION, SOLUTION INTRAVENOUS CONTINUOUS
Status: DISCONTINUED | OUTPATIENT
Start: 2023-08-23 | End: 2023-08-23 | Stop reason: HOSPADM

## 2023-08-23 RX ORDER — ONDANSETRON 2 MG/ML
4 INJECTION INTRAMUSCULAR; INTRAVENOUS
Status: DISCONTINUED | OUTPATIENT
Start: 2023-08-23 | End: 2023-08-23 | Stop reason: HOSPADM

## 2023-08-23 RX ORDER — HYDROMORPHONE HYDROCHLORIDE 1 MG/ML
0.2 INJECTION, SOLUTION INTRAMUSCULAR; INTRAVENOUS; SUBCUTANEOUS
Status: DISCONTINUED | OUTPATIENT
Start: 2023-08-23 | End: 2023-08-23 | Stop reason: HOSPADM

## 2023-08-23 RX ORDER — SODIUM CHLORIDE, SODIUM LACTATE, POTASSIUM CHLORIDE, CALCIUM CHLORIDE 600; 310; 30; 20 MG/100ML; MG/100ML; MG/100ML; MG/100ML
INJECTION, SOLUTION INTRAVENOUS CONTINUOUS
Status: ACTIVE | OUTPATIENT
Start: 2023-08-23 | End: 2023-08-23

## 2023-08-23 RX ORDER — LIDOCAINE HYDROCHLORIDE 10 MG/ML
INJECTION, SOLUTION EPIDURAL; INFILTRATION; INTRACAUDAL; PERINEURAL
Status: DISCONTINUED
Start: 2023-08-23 | End: 2023-08-23 | Stop reason: HOSPADM

## 2023-08-23 RX ORDER — OXYCODONE HCL 5 MG/5 ML
5 SOLUTION, ORAL ORAL
Status: COMPLETED | OUTPATIENT
Start: 2023-08-23 | End: 2023-08-23

## 2023-08-23 RX ORDER — BACITRACIN ZINC 500 [USP'U]/G
OINTMENT TOPICAL
Status: DISCONTINUED
Start: 2023-08-23 | End: 2023-08-23 | Stop reason: HOSPADM

## 2023-08-23 RX ORDER — EPINEPHRINE 1 MG/ML(1)
AMPUL (ML) INJECTION
Status: DISCONTINUED
Start: 2023-08-23 | End: 2023-08-23 | Stop reason: HOSPADM

## 2023-08-23 RX ORDER — HYDRALAZINE HYDROCHLORIDE 20 MG/ML
5 INJECTION INTRAMUSCULAR; INTRAVENOUS
Status: DISCONTINUED | OUTPATIENT
Start: 2023-08-23 | End: 2023-08-23 | Stop reason: HOSPADM

## 2023-08-23 RX ORDER — ONDANSETRON 2 MG/ML
INJECTION INTRAMUSCULAR; INTRAVENOUS PRN
Status: DISCONTINUED | OUTPATIENT
Start: 2023-08-23 | End: 2023-08-23 | Stop reason: SURG

## 2023-08-23 RX ORDER — HALOPERIDOL 5 MG/ML
1 INJECTION INTRAMUSCULAR
Status: DISCONTINUED | OUTPATIENT
Start: 2023-08-23 | End: 2023-08-23 | Stop reason: HOSPADM

## 2023-08-23 RX ADMIN — ALBUTEROL SULFATE 2.5 MG: 2.5 SOLUTION RESPIRATORY (INHALATION) at 16:16

## 2023-08-23 RX ADMIN — PHENYLEPHRINE HYDROCHLORIDE 100 MCG: 10 INJECTION INTRAVENOUS at 14:23

## 2023-08-23 RX ADMIN — SODIUM CHLORIDE, POTASSIUM CHLORIDE, SODIUM LACTATE AND CALCIUM CHLORIDE: 600; 310; 30; 20 INJECTION, SOLUTION INTRAVENOUS at 13:51

## 2023-08-23 RX ADMIN — EPHEDRINE SULFATE 5 MG: 50 INJECTION, SOLUTION INTRAVENOUS at 15:00

## 2023-08-23 RX ADMIN — FENTANYL CITRATE 100 MCG: 50 INJECTION, SOLUTION INTRAMUSCULAR; INTRAVENOUS at 13:58

## 2023-08-23 RX ADMIN — DEXAMETHASONE SODIUM PHOSPHATE 8 MG: 4 INJECTION INTRA-ARTICULAR; INTRALESIONAL; INTRAMUSCULAR; INTRAVENOUS; SOFT TISSUE at 14:06

## 2023-08-23 RX ADMIN — LIDOCAINE HYDROCHLORIDE 100 MG: 20 INJECTION, SOLUTION EPIDURAL; INFILTRATION; INTRACAUDAL at 13:58

## 2023-08-23 RX ADMIN — ONDANSETRON 4 MG: 2 INJECTION INTRAMUSCULAR; INTRAVENOUS at 15:29

## 2023-08-23 RX ADMIN — Medication 160 MG: at 13:58

## 2023-08-23 RX ADMIN — TRANEXAMIC ACID 1000 MG: 100 INJECTION, SOLUTION INTRAVENOUS at 14:34

## 2023-08-23 RX ADMIN — OXYCODONE HYDROCHLORIDE 10 MG: 5 SOLUTION ORAL at 17:09

## 2023-08-23 RX ADMIN — PHENYLEPHRINE HYDROCHLORIDE 100 MCG: 10 INJECTION INTRAVENOUS at 15:01

## 2023-08-23 RX ADMIN — SODIUM CHLORIDE, POTASSIUM CHLORIDE, SODIUM LACTATE AND CALCIUM CHLORIDE: 600; 310; 30; 20 INJECTION, SOLUTION INTRAVENOUS at 12:09

## 2023-08-23 RX ADMIN — CEFAZOLIN 2 G: 1 INJECTION, POWDER, FOR SOLUTION INTRAMUSCULAR; INTRAVENOUS at 14:06

## 2023-08-23 RX ADMIN — PROPOFOL 50 MCG/KG/MIN: 10 INJECTION, EMULSION INTRAVENOUS at 14:12

## 2023-08-23 RX ADMIN — PROPOFOL 160 MG: 10 INJECTION, EMULSION INTRAVENOUS at 13:58

## 2023-08-23 RX ADMIN — HYDROMORPHONE HYDROCHLORIDE 0.25 MG: 2 INJECTION INTRAMUSCULAR; INTRAVENOUS; SUBCUTANEOUS at 14:44

## 2023-08-23 RX ADMIN — HYDROMORPHONE HYDROCHLORIDE 0.25 MG: 2 INJECTION INTRAMUSCULAR; INTRAVENOUS; SUBCUTANEOUS at 14:26

## 2023-08-23 ASSESSMENT — FIBROSIS 4 INDEX
FIB4 SCORE: 1.81
FIB4 SCORE: 1.81

## 2023-08-23 ASSESSMENT — PAIN DESCRIPTION - PAIN TYPE
TYPE: SURGICAL PAIN
TYPE: SURGICAL PAIN

## 2023-08-23 NOTE — ANESTHESIA PROCEDURE NOTES
Airway    Date/Time: 8/23/2023 2:06 PM    Performed by: Elidia Martinez M.D.  Authorized by: Elidia Martinez M.D.    Location:  OR  Urgency:  Elective  Indications for Airway Management:  Anesthesia      Spontaneous Ventilation: absent    Sedation Level:  Deep  Preoxygenated: Yes    Patient Position:  Sniffing  Final Airway Type:  Endotracheal airway  Final Endotracheal Airway:  NIM tube and ETT  Cuffed: Yes    Technique Used for Successful ETT Placement:  Video laryngoscopy    Insertion Site:  Oral  Blade Type:  Glide  Laryngoscope Blade/Videolaryngoscope Blade Size:  3  ETT Size (mm):  8.0  Measured from:  Teeth  ETT to Teeth (cm):  22  Placement Verified by: auscultation and capnometry    Cormack-Lehane Classification:  Grade I - full view of glottis  Number of Attempts at Approach:  1

## 2023-08-23 NOTE — ANESTHESIA TIME REPORT
Anesthesia Start and Stop Event Times     Date Time Event    8/23/2023 1306 Ready for Procedure     1351 Anesthesia Start     1616 Anesthesia Stop        Responsible Staff  08/23/23    Name Role Begin End    Elidia Martinez M.D. Anesth 1351 1616        Overtime Reason:  overtime    Comments:

## 2023-08-23 NOTE — OR NURSING
1600 - Pt to PACU from OR. Report from anesthesia and OR RN. On 10L O2 via mask. Oral airway in place. Oxygen sats low at first anesthesiologist stayed at bedside coaching patient to deep breath until 90%. VSS. Incision to anterior neck with bacitracin.    1610 - Oral airway DC'd.    1616- Albuterol nebulizer given.    1647 - Update given to daughter over the phone, patient placed on 3L NC, ice pack placed to incision.    1709 - Medicated per MAR for pain. Drinking water without complications.    1845 - Spoke with Dr. Ignacio about patient's O2 sats. While using IS patient's oxygen is any where from 87-90%. Orderes received to keep watching patient for now. Patient up to a recliner.    2000 - Patient's oxygen consistently 88-90% on RA and is around 88-91% while using IS. Patient denies pain at this time, denies nausea and drinking water and juice. Spoke with Dr. Ignacio, okay for patient to discharge as long as patient goes home and places her 3L NC oxygen on right away. 3L NC at night is patient's baseline. Patient in agreement with plan.    2005 - Discharge instructions discussed with patient's daughter Sandra. All questions answered. Verbalized understanding. Sandra is in agreement to have patient use IS in the car and place on oxygen as soon as she gets home.    2030 - Pt escorted out of department in wheelchair with all belongings. Discharged home to responsible adult. PIV removed with tip intact.

## 2023-08-23 NOTE — DISCHARGE INSTRUCTIONS
ACTIVITY: Rest and take it easy for the first 24 hours.  A responsible adult is recommended to remain with you during that time.  It is normal to feel sleepy.  We encourage you to not do anything that requires balance, judgment or coordination.    MILD FLU-LIKE SYMPTOMS ARE NORMAL. YOU MAY EXPERIENCE GENERALIZED MUSCLE ACHES, THROAT IRRITATION, HEADACHE AND/OR SOME NAUSEA.    FOR 24 HOURS DO NOT:  Drive, operate machinery or run household appliances.  Drink beer or alcoholic beverages.   Make important decisions or sign legal documents.    SPECIAL INSTRUCTIONS: Keep incision dry for 48 hours then may get wet but do not soak     DIET: To avoid nausea, slowly advance diet as tolerated, avoiding spicy or greasy foods for the first day.  Add more substantial food to your diet according to your physician's instructions.  Babies can be fed formula or breast milk as soon as they are hungry.  INCREASE FLUIDS AND FIBER TO AVOID CONSTIPATION.    SURGICAL DRESSING/BATHING: Clean incision twice daily with gentle soap and keep covered with ointment such as bacitracin Neosporin or Vaseline    FOLLOW-UP APPOINTMENT:  A follow-up appointment should be arranged with your doctor; call to schedule.    You should CALL YOUR PHYSICIAN if you develop:  Fever greater than 101 degrees F.  Pain not relieved by medication, or persistent nausea or vomiting.  Excessive bleeding (blood soaking through dressing) or unexpected drainage from the wound.  Extreme redness or swelling around the incision site, drainage of pus or foul smelling drainage.  Inability to urinate or empty your bladder within 8 hours.  Problems with breathing or chest pain.    You should call 911 if you develop problems with breathing or chest pain.  If you are unable to contact your doctor or surgical center, you should go to the nearest emergency room or urgent care center.      Physician's telephone #: Dr. Mancuso [phone]    If any questions arise, call your doctor.       A registered nurse may call you a few days after your surgery to see how you are doing after your procedure.    MEDICATIONS: Resume taking daily medication.  Take prescribed pain medication with food.  If no medication is prescribed, you may take non-aspirin pain medication if needed.  PAIN MEDICATION CAN BE VERY CONSTIPATING.  Take a stool softener or laxative such as senokot, pericolace, or milk of magnesia if needed.    Last pain medication given Oxycodone at 5:00pm.    If your physician has prescribed pain medication that includes Acetaminophen (Tylenol), do not take additional Acetaminophen (Tylenol) while taking the prescribed medication.    Depression / Suicide Risk    As you are discharged from this [facility] facility, it is important to learn how to keep safe from harming yourself.    Recognize the warning signs:  Abrupt changes in personality, positive or negative- including increase in energy   Giving away possessions  Change in eating patterns- significant weight changes-  positive or negative  Change in sleeping patterns- unable to sleep or sleeping all the time   Unwillingness or inability to communicate  Depression  Unusual sadness, discouragement and loneliness  Talk of wanting to die  Neglect of personal appearance   Rebelliousness- reckless behavior  Withdrawal from people/activities they love  Confusion- inability to concentrate     If you or a loved one observes any of these behaviors or has concerns about self-harm, here's what you can do:  Talk about it- your feelings and reasons for harming yourself  Remove any means that you might use to hurt yourself (examples: pills, rope, extension cords, firearm)  Get professional help from the community (Mental Health, Substance Abuse, psychological counseling)  Do not be alone:Call your Safe Contact- someone whom you trust who will be there for you.  Call your local CRISIS HOTLINE 705-6050 or 690-535-5162  Call your local Children's Mobile  Crisis Response Team [place] (793) 331-7360 or www.[employer]  Call the toll free National Suicide Prevention Hotlines   National Suicide Prevention Lifeline 986-823-ZSYO (2294)  [place] [facility] Line Network 800-SUICIDE (691-9635)

## 2023-08-23 NOTE — ANESTHESIA PREPROCEDURE EVALUATION
Case: [id number] Date/Time: 08/23/23 1315    Procedure: RIGHT COMPLETION THYROIDECTOMY    Pre-op diagnosis: NONTOXIC SINGLE THYROID NODULE    Location: [facility] ROOM 21 / SURGERY SAME DAY [facility]    Surgeons: Michael Mancuso M.D.          Relevant Problems   ANESTHESIA   (positive) KERRI (obstructive sleep apnea)      PULMONARY   (positive) Mild persistent asthma, uncomplicated      NEURO   (positive) Migraine without aura   (positive) Temporal pain      CARDIAC   (positive) Carotid artery stenosis   (positive) Essential hypertension   (positive) Left bundle branch block   (positive) Migraine without aura      GI   (positive) GERD (gastroesophageal reflux disease)         (positive) Nonalcoholic fatty liver disease      ENDO   (positive) Controlled type 2 diabetes mellitus without complication, without long-term current use of insulin (HCC)   (positive) Hypothyroidism due to acquired atrophy of thyroid      Other   (positive) Chronic gout of foot     HTN  Carotid artery stenosis  ? Hx TIA  KERRI, no CPAP  Uses home O2 overnight and prn during day  NPO >8h  Denies problems with anesthesia      Echo 12/22: Normal left ventricular systolic function.  The left ventricular ejection fraction is visually estimated to be 70%.  Normal diastolic function.  The right ventricle is normal in size and systolic function.  Mild aortic valve stenosis.    Physical Exam    Airway   Mallampati: II  TM distance: >3 FB  Neck ROM: full       Cardiovascular - normal exam  Rhythm: regular  Rate: normal  (-) murmur     Dental - normal exam           Pulmonary - normal exam  Breath sounds clear to auscultation     Abdominal    Neurological - normal exam                 Anesthesia Plan    ASA 3   ASA physical status 3 criteria: other (comment) and morbid obesity - BMI greater than or equal to 40    Plan - general       Airway plan will be ETT          Induction: intravenous    Postoperative Plan: Postoperative administration of opioids is  intended.    Pertinent diagnostic labs and testing reviewed    Informed Consent:    Anesthetic plan and risks discussed with patient.    Use of blood products discussed with: patient whom consented to blood products.

## 2023-08-23 NOTE — OP REPORT
DATE OF OPERATION: 8/23/2023     PREOPERATIVE DIAGNOSIS: Thyroid carcinoma    POSTOPERATIVE DIAGNOSIS:Same     PROCEDURE PERFORMED:   1.  Right completion thyroid lobectomy  2. Intraoperative Recurrent Laryngeal Nerve Monitoring for 120 minutes  3.  Right inferior parathyroid reimplantation    SURGEON: Michael Mancuso M.D.    ANESTHESIA: General endotracheal anesthesia    INDICATIONS: The patient is a 73 y.o.-year-old female with a left oncocytic 4.4 cm thyroid carcinoma previously resected. She  is taken to the operating room for a completion right thyroid lobectomy.     FINDINGS:  1.  The right recurrent laryngeal nerve was identified, preserved, and stimulated well at the end of the case.  2.  The right superior parathyroid gland was identified and left viable in the patient.  A possible inferior gland was reimplanted into the right sternocleidomastoid in 2 locations after cut up into small 1 x 1 mm.    SPECIMEN: 1 Right thyroid lobe.  2 Possible right inferior parathyroid    ESTIMATED BLOOD LOSS: 30 mL     ROCEDURE: Following informed consent, the patient was properly identified, taken to the operating room and placed in supine position where general endotracheal anesthesia was administered with NIMS monitoring. Intravenous antibiotics were administered by the anesthesiologist in correct time interval. Sequential compression devices were employed. The neck was prepped and draped into a sterile field.   Patient was draped and prepared in the normal   surgical fashion for this procedure. They were injected with 8 mL of lidocaine   with epinephrine along the previous incision line along the anterior neck.   Incision was made with a 15-blade, carried down to the strap muscles were   superior and inferior flaps were elevated from the clavicle to the thyroid   notch. The median raphe was divided down to the anterior trach and cricoid where the remaining thyroid isthmus stump was isolated. The strap   muscles were then  carefully dissected off the anterior border of the thyroid   gland on the right side as the gland was rolled out. The middle thyroid vein   was identified and transected with harmonic scalpel. Next, the inferior pole was carefully   released peeling the inferior parathyroid gland off of it.  Unfortunately it became unattached with the surrounding soft tissue.  Therefore it was cut into small 1 x 1 mm devices and reimplanted in the right sternocleidomastoid into pockets which were oversewn with a 3-0 Vicryl. The inferior pole   vessels were also ligated and tied. Next, the superior pole vessels were   released. They were transected with harmonic scalpel. At this   point, the recurrent laryngeal nerve was identified within the thyroid bed and   the thyroid was carefully peeled off of it as it was finally resected off of   Villasenor's ligament along the anterior tracheal wall and the thyroid was released at the thyroid isthmus and sent off for specimen.     The Nims monitor was used during the case to stimulate   the recurrent laryngeal nerve several times to verify its location and lack of   injury. The right thyroid bed was then rinsed with copious amounts of   sterile saline irrigation. Any bleeding was bipolar electrocauterized. Small   amount of fibrilar was placed at the superior pole and over the recurrent   laryngeal nerve to ensure meticulous hemostasis. The wound was then closed using 3-0 Vicryl for the strap   muscles in the midline, 3-0 Vicryl for the platysmal layer, and a 5-0 running   Monocryl subcuticular for the skin with overlying bacitracin. At this point,   my portion of the procedure was terminated and patient was turned back over to   anesthesia for emergence.     The patient tolerated the procedure well and there were no apparent complication. All sponge, needle, and instrument counts were correct on 2 separate occasions. She  was awakened, extubated, and transferred to the recovery room in  satisfactory condition.         ____________________________________   Michael Mancuso M.D.    DD: 8/23/2023  3:46 PM

## 2023-08-24 NOTE — ANESTHESIA POSTPROCEDURE EVALUATION
Patient: Ashly Meyer    Procedure Summary     Date: 08/23/23 Room / Location: [facility] ROOM 21 / SURGERY SAME DAY [facility]    Anesthesia Start: 1351 Anesthesia Stop: 1616    Procedure: RIGHT COMPLETION THYROIDECTOMY (Right: Neck) Diagnosis: (NONTOXIC SINGLE THYROID NODULE)    Surgeons: Michael Mancuso M.D. Responsible Provider: Elidia Martinez M.D.    Anesthesia Type: general ASA Status: 3          Final Anesthesia Type: general  Last vitals  BP   Blood Pressure : 126/60    Temp   36.8 °C (98.2 °F)    Pulse   80   Resp   16    SpO2   92 %      Anesthesia Post Evaluation    Patient location during evaluation: PACU  Patient participation: complete - patient participated  Level of consciousness: awake and alert    Airway patency: patent  Anesthetic complications: no  Cardiovascular status: hemodynamically stable  Respiratory status: acceptable  Hydration status: euvolemic    PONV: none          There were no known notable events for this encounter.     Nurse Pain Score: 3 (NPRS)

## 2023-08-29 ENCOUNTER — APPOINTMENT (RX ONLY)
Dept: URBAN - METROPOLITAN AREA CLINIC 22 | Facility: CLINIC | Age: 73
Setting detail: DERMATOLOGY
End: 2023-08-29

## 2023-08-29 DIAGNOSIS — L73.8 OTHER SPECIFIED FOLLICULAR DISORDERS: ICD-10-CM

## 2023-08-29 DIAGNOSIS — L82.0 INFLAMED SEBORRHEIC KERATOSIS: ICD-10-CM

## 2023-08-29 DIAGNOSIS — L57.0 ACTINIC KERATOSIS: ICD-10-CM

## 2023-08-29 PROCEDURE — ? COUNSELING

## 2023-08-29 PROCEDURE — 17110 DESTRUCTION B9 LES UP TO 14: CPT

## 2023-08-29 PROCEDURE — 17000 DESTRUCT PREMALG LESION: CPT | Mod: 59

## 2023-08-29 PROCEDURE — 17003 DESTRUCT PREMALG LES 2-14: CPT | Mod: 59

## 2023-08-29 PROCEDURE — 99213 OFFICE O/P EST LOW 20 MIN: CPT | Mod: 25

## 2023-08-29 PROCEDURE — ? LIQUID NITROGEN

## 2023-08-29 ASSESSMENT — LOCATION SIMPLE DESCRIPTION DERM
LOCATION SIMPLE: LEFT CHEEK
LOCATION SIMPLE: RIGHT CHEEK
LOCATION SIMPLE: RIGHT NOSE
LOCATION SIMPLE: NOSE

## 2023-08-29 ASSESSMENT — LOCATION ZONE DERM
LOCATION ZONE: FACE
LOCATION ZONE: NOSE

## 2023-08-29 ASSESSMENT — LOCATION DETAILED DESCRIPTION DERM
LOCATION DETAILED: NASAL SUPRATIP
LOCATION DETAILED: RIGHT INFERIOR CENTRAL MALAR CHEEK
LOCATION DETAILED: NASAL DORSUM
LOCATION DETAILED: RIGHT NASAL ALA
LOCATION DETAILED: LEFT INFERIOR CENTRAL MALAR CHEEK

## 2023-08-29 NOTE — PROCEDURE: LIQUID NITROGEN
Show Aperture Variable?: Yes
Spray Paint Technique: No
Spray Paint Text: The liquid nitrogen was applied to the skin utilizing a spray paint frosting technique.
Post-Care Instructions: I reviewed with the patient in detail post-care instructions. Patient is to wear sunprotection, and avoid picking at any of the treated lesions. Pt may apply Vaseline to crusted or scabbing areas.
Medical Necessity Information: It is in your best interest to select a reason for this procedure from the list below. All of these items fulfill various CMS LCD requirements except the new and changing color options.
Detail Level: Detailed
Consent: The patient's mom’s consent was obtained including but not limited to risks of crusting, scabbing, blistering, scarring, darker or lighter pigmentary change, recurrence, incomplete removal and infection.
Medical Necessity Clause: This procedure was medically necessary because the lesions that were treated were:
Consent: The patient's consent was obtained including but not limited to risks of crusting, scabbing, blistering, scarring, darker or lighter pigmentary change, recurrence, incomplete removal and infection.
Number Of Freeze-Thaw Cycles: 2 freeze-thaw cycles
Duration Of Freeze Thaw-Cycle (Seconds): 3

## 2023-09-07 ENCOUNTER — PHYSICAL THERAPY (OUTPATIENT)
Dept: PHYSICAL THERAPY | Facility: REHABILITATION | Age: 73
End: 2023-09-07
Attending: FAMILY MEDICINE
Payer: MEDICARE

## 2023-09-07 DIAGNOSIS — M15.9 PRIMARY OSTEOARTHRITIS INVOLVING MULTIPLE JOINTS: ICD-10-CM

## 2023-09-07 DIAGNOSIS — R26.89 BALANCE PROBLEM: ICD-10-CM

## 2023-09-07 DIAGNOSIS — M25.551 BILATERAL HIP PAIN: ICD-10-CM

## 2023-09-07 DIAGNOSIS — M19.011 PRIMARY OSTEOARTHRITIS, RIGHT SHOULDER: ICD-10-CM

## 2023-09-07 DIAGNOSIS — M25.552 BILATERAL HIP PAIN: ICD-10-CM

## 2023-09-07 PROCEDURE — 97162 PT EVAL MOD COMPLEX 30 MIN: CPT

## 2023-09-07 ASSESSMENT — ENCOUNTER SYMPTOMS
EXACERBATED BY: PROLONGED STANDING
PAIN SCALE AT HIGHEST: 10
PAIN SCALE AT LOWEST: 5
PAIN SCALE: 8
ALLEVIATING FACTORS: PAIN MEDICATION
QUALITY: ACHING
QUALITY: KNIFE-LIKE
PAIN TIMING: CONSTANT
EXACERBATED BY: WALKING
EXACERBATED BY: ACTIVITY

## 2023-09-07 ASSESSMENT — ACTIVITIES OF DAILY LIVING (ADL): POOR_BALANCE: 1

## 2023-09-07 NOTE — OP THERAPY EVALUATION
"  Outpatient Physical Therapy  INITIAL EVALUATION    [facility] [place] [address]  [address]  [place] [address]  Phone:  [phone]  Fax:  [phone]    Date of Evaluation: 09/07/2023    Patient: Ashly Meyer  YOB: 1950  MRN: [id number]     Referring Provider: Colton Ahn M.D.  [address] [place] [address] [address]  [place] [address]   Referring Diagnosis Primary osteoarthritis, right shoulder [M19.011];Bilateral hip pain [M25.551, M25.552];Primary osteoarthritis involving multiple joints [M15.9]     Time Calculation  Start time: 1101  Stop time: 1145 Time Calculation (min): 44 minutes         Chief Complaint: Difficulty Walking, Back Problem, Shoulder Problem, and Hip Problem    Visit Diagnoses     ICD-10-CM   1. Primary osteoarthritis, right shoulder  M19.011   2. Bilateral hip pain  M25.551    M25.552   3. Primary osteoarthritis involving multiple joints  M15.9   4. Balance problem  R26.89       Date of onset of impairment: No data found    Subjective:   History of Present Illness:     Mechanism of injury:  Patient is a pleasant 73 year old female who presents to PT evaluation with complaint of multiple joint pain secondary to osteoarthritis. Patient was recently D/C'd from services due to attendance policy. She is currently being treated for thyroid cancer. Patient reports her FWW is in her car and she has not been using it much because she hasn't been out of the house.     Patient reports she has not had any recent falls. Patient denies numbness and tingling.   Denies changes to bowel or bladder in regards to back pain. Patient reports main complaint is B hip/back/LE pain.     Patient reports she takes Tramadol for pain, mostly takes it at night. Patient reports it takes the \"edge\" off.  Prior level of function:  Independent but limited by pain  Headache comments: reports headaches, states they are \"kind of normal\"  Ear problems: none  Sleep disturbance:  " "Interrupted sleep  Pain:     Current pain ratin    At best pain ratin    At worst pain rating:  10    Location:  Down thoracic spine and in B hips    Quality:  Aching and knife-like    Pain timing:  Constant    Relieving factors:  Pain medication    Aggravating factors:  Activity, prolonged standing and walking    Progression:  Worsening  Social Support:     Lives in:  One-story house ([address], reports ability to get into house)    Lives with:  Adult children  Hand dominance:  Right  Diagnostic Tests:     Diagnostic Tests Comments:  22, R foot xray:   \"IMPRESSION:  No acute osseous abnormality\"    22, Pelvis xray:   \"IMPRESSION:  1.  Marked degenerative changes lower lumbar spine and mildly sclerotic SI joints.  2.  Mild degenerative changes right hip joint.\"    Please refer to chart for further imaging needs.   Treatments:     Previous treatment:  Physical therapy    Current treatment:  Physical therapy  Activities of Daily Living:     Patient reported ADL status: Difficulty with bathing, reports she has a tub bench when needed; reports she sits on her bed to get dressed, reports difficulty with shoes and socks   Patient Goals:     Patient goals for therapy:  Decreased pain, increased strength, increased motion and improved balance    Other patient goals:  Improve walking      Past Medical History:   Diagnosis Date    Anginal syndrome ([facility])     Anxiety     Arthritis     Everywhere.     Atrophic rhinitis 10/3/2016    Back pain     Bronchitis 2021    finished 1st course of ABX to start a 2nd course soon.     Carotid artery stenosis, unilateral     moderate left carotid artery stenosis    Carpal tunnel syndrome 2011    Chronic pain     Constipation     Controlled type 2 diabetes mellitus without complication (HCC)     states borderline    Controlled type 2 diabetes mellitus without complication, without long-term current use of insulin ([facility]) 2019    Cough 2021    r/t bronchitis    " Current severe episode of major depressive disorder without psychotic features without prior episode ([facility]) 6/3/2021    Daytime sleepiness     Degenerative disc disease     Depression 5/20/2009    Deviated nasal septum 8/1/2016    Diarrhea     and constipation    Disease of accessory sinus 10/3/2016    Dizziness     Dry eye syndrome, bilateral     Elevated sedimentation rate     history of negative temporal artery biopsy around 2006    Fatigue     Fatty liver     Fibromyalgia     confirmed by Dr. 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)     Morning headache     Mumps     Nasal drainage     Nocturnal hypoxia     severe, to 69% O2 sat    Obesity     Obesity hypoventilation syndrome ([facility]) 5/31/2017    Pelvic floor dysfunction     Peripheral neuropathy     Pleomorphic adenoma     Polymyalgia rheumatica ([facility])     Dr. Ann    Restless leg syndrome     Ringing in ears     Rotator cuff syndrome of right shoulder and allied disorders 10/10/2018    S/P tonsillectomy     Seasonal allergies     Skin cancer 1990's    skin    Sleep apnea syndrome     she is not using CPAP, claustrophobia, uses 2-3l at night via concentrator occ 2l during daytime ([facility])    Snoring     Sore muscles     Sweat, sweating, excessive     Swelling of lower extremity     Thyroid carcinoma (HCC) 7/22/2023    Found surgically July 2023    Trochanteric bursitis of both hips 3/3/2022    Urinary incontinence     will not wear a pad    Vision loss     Weakness      Past Surgical History:   Procedure Laterality Date    THYROIDECTOMY TOTAL Right 8/23/2023    Procedure: RIGHT COMPLETION THYROIDECTOMY;  Surgeon: Michael Mancuso M.D.;  Location: SURGERY SAME DAY [facility];  Service: Ent    THYROIDECTOMY Left 7/17/2023    Procedure: LEFT ELOY- THYROIDECTOMY;  Surgeon: Michael Mancuso M.D.;  Location: SURGERY SAME DAY [facility];  Service: Ent    OK UPPER GI ENDOSCOPY,DIAGNOSIS N/A 08/26/2021    Procedure: GASTROSCOPY;  Surgeon: Marty Lopez M.D.;  Location: [facility];  Service: Gastroenterology    OK COLONOSCOPY,DIAGNOSTIC  08/26/2021    Procedure: COLONOSCOPY - WITH BIOPSIES.;  Surgeon: Marty Lopez M.D.;  Location: [facility];  Service: Gastroenterology    SHOULDER DECOMPRESSION ARTHROSCOPIC Right 02/01/2019    Procedure: SHOULDER DECOMPRESSION ARTHROSCOPIC - SUBACROMIAL, LABRAL DEBRIDEMENT;  Surgeon: Damaris Knutson M.D.;  Location: [facility];  Service: Orthopedics    SHOULDER ARTHROSCOPY W/ BICIPITAL TENODESIS REPAIR Right 02/01/2019    Procedure: SHOULDER ARTHROSCOPY W/ BICIPITAL TENOTOMY;  Surgeon: Damaris Knutson M.D.;  Location: [facility];  Service: Orthopedics    CARPAL TUNNEL RELEASE Right 02/01/2019    Procedure: CARPAL TUNNEL RELEASE;  Surgeon: Damaris Knutson M.D.;  Location: [facility];  Service: Orthopedics    GASTROSCOPY  02/06/2017    Procedure: GASTROSCOPY;  Surgeon: Pau Foster M.D.;  Location: SURGERY SAME DAY [facility];  Service:     COLONOSCOPY  02/06/2017    Procedure: COLONOSCOPY;  Surgeon: Pau Foster M.D.;  Location: SURGERY SAME DAY [facility];  Service:     SEPTAL  RECONSTRUCTION  10/03/2016    Procedure: SEPTAL RECONSTRUCTION with  grafts ;  Surgeon: PIETER Dickson M.D.;  Location: SURGERY SAME DAY [facility];  Service:     SEPTOPLASTY N/A 2016    Procedure: SEPTOPLASTY;  Surgeon: PIETER Dickson M.D.;  Location: SURGERY SAME DAY [facility];  Service:     TURBINOPLASTY Bilateral 2016    Procedure: TURBINOPLASTY;  Surgeon: PIETER Dickson M.D.;  Location: SURGERY SAME DAY [facility];  Service:     NASAL FRACTURE REDUCTION OPEN N/A 2016    Procedure: SEPTAL FRACTURE REDUCTION OPEN;  Surgeon: PIETER Dickson M.D.;  Location: SURGERY SAME DAY [facility];  Service:     FINE NEEDLE ASPIRATION  2009    Performed by DA CALLOWAY at ENDOSCOPY [facility]    COLONOSCOPY      ? unclear date    OTHER SURGICAL PROCEDURE      vaginal wall repair    BLADDER SUSPENSION      HYSTERECTOMY, VAGINAL      ovaries are present, excessive bleeding    TUBAL LIGATION      MASS EXCISION GENERAL Right     had carbuncle removal R thigh    TONSILLECTOMY      ANAL SPHINCTEROTOMY      anal muscle repair    CATARACT EXTRACTION WITH IOL Bilateral     CHOLECYSTECTOMY      HYSTERECTOMY LAPAROSCOPY      OTHER ORTHOPEDIC SURGERY  ///    foot surgery     SCAR REVISION Right     thigh scar      Social History     Tobacco Use    Smoking status: Former     Current packs/day: 0.00     Average packs/day: 1 pack/day for 45.2 years (45.2 ttl pk-yrs)     Types: Cigarettes     Start date:      Quit date: 3/1/2007     Years since quittin.5    Smokeless tobacco: Never    Tobacco comments:     Started smoking at age 15, continued abstinance    Substance Use Topics    Alcohol use: Not Currently     Family and Occupational History     Socioeconomic History    Marital status:      Spouse name: Not on file    Number of children: Not on file    Years of education: Not on file    Highest education level:  "Not on file   Occupational History    Not on file       Objective     Observations     Additional Observation Details  Poor posture with rounded shoulders, forward head noted     Lumbar Screen  Lumbar range of motion within normal limits with the following exceptions:Lumbar AROM limited and painful in all directions    Neurological Testing     Sensation     Hip   Left Hip   Diminished: light touch    Right Hip   Intact: light touch    Reflexes   Left   Patellar (L4): normal (2+)  Clonus sign: negative    Right   Patellar (L4): normal (2+)  Clonus sign: negative    Additional Neurological Details  Reports L below knee feels like \"vibration\" with light tough; R LE WFL; B feet neuropathy at baseline   B UE sensation to light touch reported intact    Palpation   Left   No palpable tenderness to the lumbar paraspinals and piriformis.   Hypertonic in the gluteus yue and gluteus medius.   Tenderness of the adductor brevis, adductor longus, adductor ana, iliopsoas, proximal biceps femoris, proximal semimembranosus, proximal semitendinosus, rectus femoris and TFL.     Right   No palpable tenderness to the lumbar paraspinals.   Hypertonic in the gluteus yue, gluteus medius and piriformis.   Tenderness of the adductor brevis, adductor longus, adductor ana, iliopsoas, piriformis, proximal biceps femoris, proximal semimembranosus, proximal semitendinosus, rectus femoris and TFL.     Tenderness     Left Hip   Tenderness in the PSIS and greater trochanter.      Right Hip   Tenderness in the PSIS and greater trochanter.     Active Range of Motion     Additional Active Range of Motion Details  L shoulder AROM, grossly WFL  R shoulder AROM, ER/IR WFL; Flexion: 120 degrees; Abduction: 100 degrees     Strength:      Left Hip   Planes of Motion   Flexion: 3  Abduction: 3+  Adduction: 3    Right Hip   Planes of Motion   Flexion: 3  Abduction: 3+  Adduction: 3    Additional Strength Details  B shoulder strength grossly WFL; " limited shoulder abduction/flexion above 90 degrees   R knee flexion: 3+/5  L knee flexion: 3+/5  R knee extension: 4/5  L knee extension: 4/5    B knee PF: 5/5  B knee DF: 5/5     Tests     Left Hip   Positive Gaenslen's.   SLR: Positive.     Right Hip   Negative Gaenslen's.   SLR: Positive.     Additional Tests Details  Deferred this date due to known osteoarthritis     Difficulty laying supine        Therapeutic Exercises (CPT 02307):     1. PT evaluation completed, POC and goals discussed. Patient in agreement.    20. PN due 10/7; Recert due 12/6      Time-based treatments/modalities:           Assessment, Response and Plan:   Impairments: abnormal gait, abnormal or restricted ROM, activity intolerance, impaired functional mobility, impaired balance, impaired physical strength, lacks appropriate home exercise program, limited mobility, pain with function and safety issue    Assessment details:  Patient is a pleasant 73 year old female who presents to PT evaluation with complaint of multiple joint pain due to known osteoarthritis, reporting main complaint of B hip, back, and B LE pain. She also has R shoulder pain. Patient demonstrates B LE strength deficits and PT observes balance deficits during gait, to be further assessed at upcoming appointments. At this time, patient will benefit from skilled PT to promote generalized improvements in strength and balance for decreased pain and improved quality of life.   Barriers to therapy:  Comorbidities and age  Prognosis: fair    Goals:   Short Term Goals:   1. Patient will demonstrate independent HEP to promote increased strength and balance for improve functional activity tolerance and mobility skills.     2. Patient will demonstrate improved B LE strength to grossly 4-/5 to promote improved functional mobility skills.     3. Patient will participate in balance assessment to determine further needs for skilled balance training.   Short term goal time span:  4-6  weeks      Long Term Goals:    1. Patient will demonstrate improved LEFS to indicate increased quality of life.     2. Patient will demonstrate improved B LE strength to grossly 4+/5 to promote improved functional mobility skills.     3. Patient will demonstrate improved balance assessment score, as necessary after initial assessment.     4. Patient will report decreased generalized pain by 25% or more to allow for increased quality of life.   Long term goal time span:  2-4 months    Plan:   Therapy options:  Physical therapy treatment to continue  Planned therapy interventions:  Neuromuscular Re-education (CPT 79156), Manual Therapy (CPT 77444), Therapeutic Exercise (CPT 22292) and Therapeutic Activities (CPT 47119)  Frequency:  2x week  Duration in visits:  15  Discussed with:  Patient      Functional Assessment Used  Lower Extremity Functional Scale Percentage: 11.25 Score of 9    Referring provider co-signature:  I have reviewed this plan of care and my co-signature certifies the need for services.    Certification Period: 09/07/2023 to  12/06/23    Physician Signature: ________________________________ Date: ______________

## 2023-09-12 ENCOUNTER — PHYSICAL THERAPY (OUTPATIENT)
Dept: PHYSICAL THERAPY | Facility: REHABILITATION | Age: 73
End: 2023-09-12
Attending: FAMILY MEDICINE
Payer: MEDICARE

## 2023-09-12 DIAGNOSIS — M25.551 BILATERAL HIP PAIN: ICD-10-CM

## 2023-09-12 DIAGNOSIS — M25.552 BILATERAL HIP PAIN: ICD-10-CM

## 2023-09-12 DIAGNOSIS — R26.89 BALANCE PROBLEM: ICD-10-CM

## 2023-09-12 DIAGNOSIS — M19.011 PRIMARY OSTEOARTHRITIS, RIGHT SHOULDER: ICD-10-CM

## 2023-09-12 DIAGNOSIS — M15.9 PRIMARY OSTEOARTHRITIS INVOLVING MULTIPLE JOINTS: ICD-10-CM

## 2023-09-12 PROCEDURE — 97110 THERAPEUTIC EXERCISES: CPT

## 2023-09-12 NOTE — OP THERAPY DAILY TREATMENT
Outpatient Physical Therapy  DAILY TREATMENT     [address]  [address]  [place] [address]  Phone:  [phone]  Fax:  [phone]    Date: 09/12/2023    Patient: Ashly Meyer  YOB: 1950  MRN: [id number]     Time Calculation    Start time: 0849  Stop time: 0930 Time Calculation (min): 41 minutes         Chief Complaint: Difficulty Walking, Back Problem, Shoulder Problem, and Hip Problem    Visit #: 2    SUBJECTIVE:  Patient reports she had a fall when she was walking into her house. She report that her foot caught on a rug when stepping inside. Provided education regarding throw rugs and fall safety within the house. Also discussed with patient that she may benefit from having a grab bar installed to assist with transitions in/out of her house and utilizing night lights if needed for extra light. Scrape to L knee noted. Reports soreness only otherwise. Patient verbalizes understanding regarding education. She is using her FWW today which PT has been and continues to highly encourage but reports she hasn't been using it regularly since fall.     OBJECTIVE:  Current objective measures: PT highly encouraged and recommended use of FWW for increased safety     TUG: Average: 38.25 seconds, no AD   Trial 1: 36.43 seconds  Trial 2: 39.18 seconds  Trial 3: 39.13 seconds    5x sit<>stand: 34.18 seconds            Therapeutic Exercises (CPT 60087):     1. TrA bracing, supine, x10, 3 second holds    2. LTR, 2x1 minute    3. supine marches, x20, B    4. knee fallouts, x20, B    5. hip adduction squeezes, x15, 3 second holds    18. TUG, 38.25 seconds, 9/12    19. 5x sit<>stand, UE utilized as needed, 34.18 seconds, 9/12    20. PN due 10/7; Recert due 12/6      Therapeutic Exercise Summary: Access Code: [email or web address]  URL: [email or web address]  Date: 09/12/2023  Prepared by: Obdulia Torres    Exercises  - Supine Lower Trunk Rotation  - 1-2 x daily - 1-2 sets  - 20 reps  - Supine Transversus Abdominis Bracing - Hands on Stomach  - 1-2 x daily - 1 sets - 10 reps - 3 seconds hold  - Supine March  - 1-2 x daily - 2 sets - 10 reps  - Bent Knee Fallouts  - 1-2 x daily - 1-2 sets - 10 reps  - Supine Hip Adduction Isometric with Ball  - 1-2 x daily - 1-2 sets - 10 reps - 3 seconds hold      Time-based treatments/modalities:    Physical Therapy Timed Treatment Charges  Therapeutic exercise minutes (CPT 69164): 41 minutes      Pain rating (1-10) before treatment:  8, generalized pain, B hips; lower back   Pain rating (1-10) after treatment:  6    ASSESSMENT:   Response to treatment: Patient tolerates PT treatment well today. Patient remains a significant fall risk however has a history of refusing to utilize FWW for increased safety. PT discussed safety within the home, including installing a grab bar, utilizing appropriate lighting, and highly recommending use of FWW to promote increased stability. Patient verbalizes understanding however appears reluctant to PT recommendations. At this time, patient will benefit from skilled PT to promote improved strength and balance for decreased risk of falls, decreased pain, and improved functional mobility skills.     PLAN/RECOMMENDATIONS:   Plan for treatment: therapy treatment to continue next visit.  Planned interventions for next visit: continue with current treatment.

## 2023-09-25 ENCOUNTER — PHYSICAL THERAPY (OUTPATIENT)
Dept: PHYSICAL THERAPY | Facility: REHABILITATION | Age: 73
End: 2023-09-25
Attending: FAMILY MEDICINE
Payer: MEDICARE

## 2023-09-25 ENCOUNTER — HOSPITAL ENCOUNTER (OUTPATIENT)
Dept: LAB | Facility: MEDICAL CENTER | Age: 73
End: 2023-09-25
Attending: UROLOGY
Payer: MEDICARE

## 2023-09-25 DIAGNOSIS — M19.011 PRIMARY OSTEOARTHRITIS, RIGHT SHOULDER: ICD-10-CM

## 2023-09-25 DIAGNOSIS — M15.9 PRIMARY OSTEOARTHRITIS INVOLVING MULTIPLE JOINTS: ICD-10-CM

## 2023-09-25 DIAGNOSIS — R26.89 BALANCE PROBLEM: ICD-10-CM

## 2023-09-25 DIAGNOSIS — M25.551 BILATERAL HIP PAIN: ICD-10-CM

## 2023-09-25 DIAGNOSIS — M25.552 BILATERAL HIP PAIN: ICD-10-CM

## 2023-09-25 PROCEDURE — 97110 THERAPEUTIC EXERCISES: CPT

## 2023-09-25 PROCEDURE — 87086 URINE CULTURE/COLONY COUNT: CPT

## 2023-09-25 NOTE — OP THERAPY DAILY TREATMENT
"  Outpatient Physical Therapy  DAILY TREATMENT     [facility] Physical [address]  [address]  [place] [address]  Phone:  [phone]  Fax:  [phone]    Date: 09/25/2023    Patient: Ashly Meyer  YOB: 1950  MRN: [id number]     Time Calculation    Start time: 1029  Stop time: 1115 Time Calculation (min): 46 minutes         Chief Complaint: Back Problem, Difficulty Walking, Shoulder Problem, and Hip Problem    Visit #: 3    SUBJECTIVE:  Patient reports that morning sessions are hard for her to get to on time, which is why she is running late. Looking for later appointment times. Patient reports she feels like her arthritis is kicking up in her hands and fingers as well. She reports LOB but no falls. She reports she was not using her walker when this occurred. She reports she cannot use the walker in her home.   Does have her FWW present for session today and PT adjusted height for improved safety and patient comfort. Patient also reports she has had some gradual vision changes and feels her eyesight is worsening. She reports she plans to follow up with her eye doctor. Discussed signs and symptoms of CVA as well with patient verbalizing understanding. Patient reports her vision has been a gradual change and she has had no symptoms of CVA.     Patient reports she \"weed eated\" her driveway which took all day and then she was sore for a few days after. She reports that is why she believes her shoulder and back pain is worse today. She reports she was limited in her HEP completion due to this as well.     OBJECTIVE:  Current objective measures:   Adjusted height of FWW for increased safety. Also demonstrated safety with gait and transitions from sit<>stand with FWW. Patient verbalizes understanding.           Therapeutic Exercises (CPT 60671):     1. TrA bracing, supine, x10, 3 second holds    2. LTR, x2 minutes    3. supine marches, x20, B    4. knee fallouts, x20, B    5. " hip adduction squeezes, x15, 3 second holds    6. supine bridges, attempted, unable to complete    7. swiss ball hamstring curls, x1 minute    8. NuStep, x7 minutes, level 2    9. sit<>stand, x7, with FWW; increased time to discuss safety with FWW and transition    10. scap retract, x5    18. TUG, 38.25 seconds, 9/12    19. 5x sit<>stand, UE utilized as needed, 34.18 seconds, 9/12    20. PN due 10/7; Recert due 12/6      Therapeutic Exercise Summary: Access Code: [email or web address]  URL: [email or web address]  Date: 09/12/2023  Prepared by: Obdulia Buening    Exercises  - Supine Lower Trunk Rotation  - 1-2 x daily - 1-2 sets - 20 reps  - Supine Transversus Abdominis Bracing - Hands on Stomach  - 1-2 x daily - 1 sets - 10 reps - 3 seconds hold  - Supine March  - 1-2 x daily - 2 sets - 10 reps  - Bent Knee Fallouts  - 1-2 x daily - 1-2 sets - 10 reps  - Supine Hip Adduction Isometric with Ball  - 1-2 x daily - 1-2 sets - 10 reps - 3 seconds hold      Time-based treatments/modalities:    Physical Therapy Timed Treatment Charges  Therapeutic exercise minutes (CPT 27517): 46 minutes      Pain rating (1-10) before treatment:  8; generalized pain, including back and R shoulder  Pain rating (1-10) after treatment:  8; generalized pain    ASSESSMENT:   Response to treatment: Patient tolerates PT treatment well today. Continues to demonstrate fall risk and reviewed safety with FWW. Also highly recommended patient make room for FWW to utilize within her home, as able, to increase safety. Encouraged HEP follow through and follow up with eye doctor as well with patient verbalizing understanding.  At this time, patient will benefit from continued skilled PT for improved strength and balance for decreased fall risk, improved safety, and increased functional mobility skills.     PLAN/RECOMMENDATIONS:   Plan for treatment: therapy treatment to continue next visit.  Planned interventions for next visit: continue with current  treatment.

## 2023-09-27 ENCOUNTER — APPOINTMENT (OUTPATIENT)
Dept: PHYSICAL THERAPY | Facility: REHABILITATION | Age: 73
End: 2023-09-27
Attending: FAMILY MEDICINE
Payer: MEDICARE

## 2023-09-27 LAB
BACTERIA UR CULT: NORMAL
SIGNIFICANT IND 70042: NORMAL
SITE SITE: NORMAL
SOURCE SOURCE: NORMAL

## 2023-09-28 ENCOUNTER — HOSPITAL ENCOUNTER (OUTPATIENT)
Dept: LAB | Facility: MEDICAL CENTER | Age: 73
End: 2023-09-28
Attending: INTERNAL MEDICINE
Payer: MEDICARE

## 2023-09-28 LAB
ALBUMIN SERPL BCP-MCNC: 4.4 G/DL (ref 3.2–4.9)
ALBUMIN/GLOB SERPL: 1.5 G/DL
ALP SERPL-CCNC: 155 U/L (ref 30–99)
ALT SERPL-CCNC: 12 U/L (ref 2–50)
ANION GAP SERPL CALC-SCNC: 13 MMOL/L (ref 7–16)
AST SERPL-CCNC: 17 U/L (ref 12–45)
BILIRUB SERPL-MCNC: 0.7 MG/DL (ref 0.1–1.5)
BUN SERPL-MCNC: 23 MG/DL (ref 8–22)
CALCIUM ALBUM COR SERPL-MCNC: 9.3 MG/DL (ref 8.5–10.5)
CALCIUM SERPL-MCNC: 9.6 MG/DL (ref 8.5–10.5)
CHLORIDE SERPL-SCNC: 106 MMOL/L (ref 96–112)
CO2 SERPL-SCNC: 25 MMOL/L (ref 20–33)
CREAT SERPL-MCNC: 0.88 MG/DL (ref 0.5–1.4)
CREAT UR-MCNC: 112.98 MG/DL
GFR SERPLBLD CREATININE-BSD FMLA CKD-EPI: 69 ML/MIN/1.73 M 2
GLOBULIN SER CALC-MCNC: 3 G/DL (ref 1.9–3.5)
GLUCOSE SERPL-MCNC: 106 MG/DL (ref 65–99)
POTASSIUM SERPL-SCNC: 4.4 MMOL/L (ref 3.6–5.5)
POTASSIUM UR-SCNC: 55 MMOL/L
PROT SERPL-MCNC: 7.4 G/DL (ref 6–8.2)
SODIUM SERPL-SCNC: 144 MMOL/L (ref 135–145)
SODIUM UR-SCNC: 120 MMOL/L
T3FREE SERPL-MCNC: 1.96 PG/ML (ref 2–4.4)
T4 FREE SERPL-MCNC: 1.22 NG/DL (ref 0.93–1.7)
TSH SERPL DL<=0.005 MIU/L-ACNC: 2.63 UIU/ML (ref 0.38–5.33)

## 2023-09-28 PROCEDURE — 84133 ASSAY OF URINE POTASSIUM: CPT

## 2023-09-28 PROCEDURE — 84300 ASSAY OF URINE SODIUM: CPT

## 2023-09-28 PROCEDURE — 36415 COLL VENOUS BLD VENIPUNCTURE: CPT

## 2023-09-28 PROCEDURE — 84105 ASSAY OF URINE PHOSPHORUS: CPT

## 2023-09-28 PROCEDURE — 84443 ASSAY THYROID STIM HORMONE: CPT

## 2023-09-28 PROCEDURE — 84481 FREE ASSAY (FT-3): CPT

## 2023-09-28 PROCEDURE — 82340 ASSAY OF CALCIUM IN URINE: CPT

## 2023-09-28 PROCEDURE — 82570 ASSAY OF URINE CREATININE: CPT

## 2023-09-28 PROCEDURE — 80053 COMPREHEN METABOLIC PANEL: CPT

## 2023-09-28 PROCEDURE — 84439 ASSAY OF FREE THYROXINE: CPT

## 2023-09-30 LAB
CALCIUM 24H UR-MCNC: 3.1 MG/DL
CALCIUM 24H UR-MRATE: NORMAL MG/D (ref 100–250)
CALCIUM/CREAT 24H UR: 29 MG/G (ref 20–300)
COLLECT DURATION TIME SPEC: NORMAL HRS
COLLECT DURATION TIME SPEC: NORMAL HRS
CREAT 24H UR-MCNC: 108 MG/DL
CREAT 24H UR-MRATE: NORMAL MG/D (ref 500–1400)
PHOSPHATE 24H UR-MCNC: 57 MG/DL
PHOSPHATE 24H UR-MRATE: NORMAL MG/D (ref 400–1300)
PHOSPHATE/CREAT 24H UR: 528 MG/G
SPECIMEN VOL ?TM UR: NORMAL ML
TOTAL VOLUME 1105: NORMAL ML

## 2023-10-02 ENCOUNTER — PHYSICAL THERAPY (OUTPATIENT)
Dept: PHYSICAL THERAPY | Facility: REHABILITATION | Age: 73
End: 2023-10-02
Attending: FAMILY MEDICINE
Payer: MEDICARE

## 2023-10-02 DIAGNOSIS — M25.551 BILATERAL HIP PAIN: ICD-10-CM

## 2023-10-02 DIAGNOSIS — R26.89 BALANCE PROBLEM: ICD-10-CM

## 2023-10-02 DIAGNOSIS — M19.011 PRIMARY OSTEOARTHRITIS, RIGHT SHOULDER: ICD-10-CM

## 2023-10-02 DIAGNOSIS — M15.9 PRIMARY OSTEOARTHRITIS INVOLVING MULTIPLE JOINTS: ICD-10-CM

## 2023-10-02 DIAGNOSIS — M25.552 BILATERAL HIP PAIN: ICD-10-CM

## 2023-10-02 PROCEDURE — 97110 THERAPEUTIC EXERCISES: CPT

## 2023-10-02 NOTE — OP THERAPY DAILY TREATMENT
Outpatient Physical Therapy  DAILY TREATMENT     [address]  [address]  [place] [address]  Phone:  [phone]  Fax:  [phone]    Date: 10/02/2023    Patient: Ashly Meyer  YOB: 1950  MRN: [id number]     Time Calculation    Start time: 1103  Stop time: 1143 Time Calculation (min): 40 minutes         Chief Complaint: Back Problem, Difficulty Walking, Shoulder Problem, and Hip Problem    Visit #: 4    SUBJECTIVE:  Patient presents to PT session and reports she has not had any falls since last visit. She reports her R hip is bothersome today.     OBJECTIVE:  Current objective measures:           Therapeutic Exercises (CPT 42107):     1. TrA bracing, supine, x10, 3 second holds, nt    2. LTR, x2 minutes    3. supine marches, x20, B    4. knee fallouts, x20, B    5. hip adduction squeezes, x15, 3 second holds, nt    6. supine bridges, attempted, unable to complete, nt    7. swiss ball hamstring curls, x1 minute, nt    8. NuStep, x7 minutes, level 2    9. sit<>stand, x8 with minimal UE support required    10. scap retract, x5, nt    11. seated hamstring stretch, 2x 30 seconds B    12. seated piriformis stretch, 2x 30 seconds, R LE    18. TUG, 38.25 seconds, 9/12    19. 5x sit<>stand, UE utilized as needed, 34.18 seconds, 9/12    20. PN due 10/7; Recert due 12/6      Therapeutic Exercise Summary: Access Code: [email or web address]  URL: [email or web address]  Date: 09/12/2023  Prepared by: Obdulia Torres    Exercises  - Supine Lower Trunk Rotation  - 1-2 x daily - 1-2 sets - 20 reps  - Supine Transversus Abdominis Bracing - Hands on Stomach  - 1-2 x daily - 1 sets - 10 reps - 3 seconds hold  - Supine March  - 1-2 x daily - 2 sets - 10 reps  - Bent Knee Fallouts  - 1-2 x daily - 1-2 sets - 10 reps  - Supine Hip Adduction Isometric with Ball  - 1-2 x daily - 1-2 sets - 10 reps - 3 seconds hold    Therapeutic Treatments and Modalities:     1. Neuromuscular  Re-education (CPT 42883), feet together, NT due to R hip pain today, held; plan to complete balance first upon next session, feet apart, staggered stance, B LE lead, air ex pad    Time-based treatments/modalities:    Physical Therapy Timed Treatment Charges  Therapeutic exercise minutes (CPT 54252): 40 minutes      Pain rating (1-10) before treatment:  6; R hip   Pain rating (1-10) after treatment:  6; R hip    ASSESSMENT:   Response to treatment: Patient tolerates PT treatment well today. Presents with FWW utilization, remains a fall risk. Continued generalized pain reported, mostly to R hip this date. Completed strength and balance activities to tolerance. At this time, patient will benefit from continued skilled PT to promote improved functional mobility skills and safety with daily tasks.     PLAN/RECOMMENDATIONS:   Plan for treatment: therapy treatment to continue next visit.  Planned interventions for next visit: continue with current treatment.

## 2023-10-04 ENCOUNTER — PHYSICAL THERAPY (OUTPATIENT)
Dept: PHYSICAL THERAPY | Facility: REHABILITATION | Age: 73
End: 2023-10-04
Attending: FAMILY MEDICINE
Payer: MEDICARE

## 2023-10-04 DIAGNOSIS — R26.89 BALANCE PROBLEM: ICD-10-CM

## 2023-10-04 DIAGNOSIS — M25.552 BILATERAL HIP PAIN: ICD-10-CM

## 2023-10-04 DIAGNOSIS — M15.9 PRIMARY OSTEOARTHRITIS INVOLVING MULTIPLE JOINTS: ICD-10-CM

## 2023-10-04 DIAGNOSIS — M25.551 BILATERAL HIP PAIN: ICD-10-CM

## 2023-10-04 DIAGNOSIS — M19.011 PRIMARY OSTEOARTHRITIS, RIGHT SHOULDER: ICD-10-CM

## 2023-10-04 PROCEDURE — 97110 THERAPEUTIC EXERCISES: CPT

## 2023-10-04 NOTE — OP THERAPY DAILY TREATMENT
"  Outpatient Physical Therapy  DAILY TREATMENT     [facility] Physical [address]  [address]  [place] [address]  Phone:  [phone]  Fax:  [phone]    Date: 10/04/2023    Patient: Ashly Meyer  YOB: 1950  MRN: [id number]     Time Calculation    Start time: 1100  Stop time: 1145 Time Calculation (min): 45 minutes         Chief Complaint: Back Problem, Difficulty Walking, Shoulder Problem, and Hip Problem    Visit #: 5    SUBJECTIVE:  Patient presents to PT session and reports that her B LE pain remains today. She reports she does not feel like we overworked last session, she states \"it's just me.\" Patient and PT discussed her largest sharon at this point appears to be her pain.     OBJECTIVE:  Current objective measures:     Left Hip    Flexion: 3+  Abduction: 4  Adduction: 3+     Right Hip   Flexion: 3  Abduction: 4  Adduction: 3+     R knee flexion: 4-/5  L knee flexion: 4/5  R knee extension: 4/5  L knee extension: 4+/5    TUG: Average: 33.87 seconds, no AD   Trial 1: 34.29 seconds  Trial 2: 35.97 seconds  Trial 3: 31.37 seconds     5x sit<>stand: 29.63 seconds; B UE utilized as needed   PT Functional Assessment Tool Used: 5x sit<>stand; UE as needed  PT Functional Assessment Score: 29.63 seconds  Timed Up and Go (TUG) Test  Time, in seconds (up from chair, walk 3m and return to chair and sit): 33.87 seconds       Therapeutic Exercises (CPT 68076):     1. TrA bracing, supine, x10, 3 second holds, nt    2. LTR, x2 minutes, nt    3. supine marches, x20, B, nt    4. knee fallouts, x20, B, nt    5. hip adduction squeezes, x15, 3 second holds, nt    6. supine bridges, attempted, unable to complete, nt    7. swiss ball hamstring curls, x1 minute, nt    8. NuStep, x7 minutes, level 2, nt    9. sit<>stand, x5, via testing    10. scap retract, x5, nt    11. seated hamstring stretch, 2x 30 seconds B    12. seated piriformis stretch, 2x 30 seconds, R LE, nt    13. seated " calf stretch, x30 seconds, B LE; with belt assist    14. seated heel raises, x20, B    18. TUG, 33.87 seconds, 10/4    19. 5x sit<>stand, UE utilized as needed, 29.63 seconds, 10/4    20. PN due 11/4      Therapeutic Exercise Summary: Access Code: [email or web address]  URL: [email or web address]  Date: 09/12/2023  Prepared by: Obdulia Buening    Exercises  - Supine Lower Trunk Rotation  - 1-2 x daily - 1-2 sets - 20 reps  - Supine Transversus Abdominis Bracing - Hands on Stomach  - 1-2 x daily - 1 sets - 10 reps - 3 seconds hold  - Supine March  - 1-2 x daily - 2 sets - 10 reps  - Bent Knee Fallouts  - 1-2 x daily - 1-2 sets - 10 reps  - Supine Hip Adduction Isometric with Ball  - 1-2 x daily - 1-2 sets - 10 reps - 3 seconds hold    Therapeutic Treatments and Modalities:     1. Neuromuscular Re-education (CPT 46288), feet together, NT, feet apart, staggered stance, B LE lead, air ex pad    Time-based treatments/modalities:    Physical Therapy Timed Treatment Charges  Therapeutic exercise minutes (CPT 06299): 45 minutes      Pain rating (1-10) before treatment:  8; generalized; B hips/knees  Pain rating (1-10) after treatment:  8      Goals:   Short Term Goals:   1. Patient will demonstrate independent HEP to promote increased strength and balance for improve functional activity tolerance and mobility skills. -progressing     2. Patient will demonstrate improved B LE strength to grossly 4-/5 to promote improved functional mobility skills. -progressing     3. Patient will participate in balance assessment to determine further needs for skilled balance training. -MET  Short term goal time span:  4-6 weeks      Long Term Goals:    1. Patient will demonstrate improved LEFS to indicate increased quality of life. -not addressed      2. Patient will demonstrate improved B LE strength to grossly 4+/5 to promote improved functional mobility skills. -progressing     3. Patient will demonstrate improved balance assessment score, as  necessary after initial assessment. -progressing     4. Patient will report decreased generalized pain by 25% or more to allow for increased quality of life. -progressing, reports 20% improvement overall at this time    Long term goal time span:  2-4 months    ASSESSMENT:   Response to treatment: Patient tolerates PT treatment well today and thus far. Patient appears mainly limited by generalized pain that varies day by day. Patient does demonstrate some progression with B LE strength, however R hip flexion remains significantly limited. TUG and 5x sit<>stands are slightly improved, however, continue to indicate patient as a fall risk. PT continues to recommend and encourage utilization of FWW at all times to promote increased safety. At this time, patient will benefit from skilled PT to promote further strength and balance for decreased pain and improved quality of life and functional mobility skills.     PLAN/RECOMMENDATIONS:   Plan for treatment: therapy treatment to continue next visit.  Planned interventions for next visit: continue with current treatment.

## 2023-10-04 NOTE — OP THERAPY PROGRESS SUMMARY
Outpatient Physical Therapy  PROGRESS SUMMARY NOTE      [facility] Physical Therapy [place] [address]  [address]  [place] [address]  Phone:  [phone]  Fax:  [phone]    Date of Visit: 10/04/2023    Patient: Ashly Meyer  YOB: 1950  MRN: [id number]     Referring Provider: Colton Ahn M.D.  [address] [place] [address] [address]  [place] [address]   Referring Diagnosis Primary osteoarthritis, right shoulder [M19.011];Pain in right hip [M25.551];Pain in left hip [M25.552];Polyosteoarthritis, unspecified [M15.9]     Visit Diagnoses     ICD-10-CM   1. Primary osteoarthritis, right shoulder  M19.011   2. Bilateral hip pain  M25.551    M25.552   3. Primary osteoarthritis involving multiple joints  M15.9   4. Balance problem  R26.89       Rehab Potential: fair-good; may be limited by pain     Progress Report Period: 9/7/23-10/4/23    Functional Assessment:   PT Functional Assessment Tool Used: 5x sit<>stand; UE as needed  PT Functional Assessment Score: 29.63 seconds  Timed Up and Go (TUG) Test  Time, in seconds (up from chair, walk 3m and return to chair and sit): 33.87 seconds       Objective Findings and Assessment:   Patient progression towards goals: Goals:   Short Term Goals:   1. Patient will demonstrate independent HEP to promote increased strength and balance for improve functional activity tolerance and mobility skills. -progressing     2. Patient will demonstrate improved B LE strength to grossly 4-/5 to promote improved functional mobility skills. -progressing     3. Patient will participate in balance assessment to determine further needs for skilled balance training. -MET  Short term goal time span:  4-6 weeks      Long Term Goals:    1. Patient will demonstrate improved LEFS to indicate increased quality of life. -not addressed      2. Patient will demonstrate improved B LE strength to grossly 4+/5 to promote improved functional mobility skills. -progressing     3. Patient  will demonstrate improved balance assessment score, as necessary after initial assessment. -progressing     4. Patient will report decreased generalized pain by 25% or more to allow for increased quality of life. -progressing, reports 20% improvement overall at this time    Long term goal time span:  2-4 months         Objective findings and assessment details: Current objective measures:      Left Hip    Flexion: 3+  Abduction: 4  Adduction: 3+     Right Hip   Flexion: 3  Abduction: 4  Adduction: 3+     R knee flexion: 4-/5  L knee flexion: 4/5  R knee extension: 4/5  L knee extension: 4+/5     TUG: Average: 33.87 seconds, no AD   Trial 1: 34.29 seconds  Trial 2: 35.97 seconds  Trial 3: 31.37 seconds     5x sit<>stand: 29.63 seconds; B UE utilized as needed        ASSESSMENT:   Response to treatment: Patient tolerates PT treatment well today and thus far. Patient appears mainly limited by generalized pain that varies day by day. Patient does demonstrate some progression with B LE strength, however R hip flexion remains significantly limited. TUG and 5x sit<>stands are slightly improved, however, continue to indicate patient as a fall risk. PT continues to recommend and encourage utilization of FWW at all times to promote increased safety. At this time, patient will benefit from skilled PT to promote further strength and balance for decreased pain and improved quality of life and functional mobility skills.     Goals:   Short Term Goals:   1. Patient will demonstrate independent HEP to promote increased strength and balance for improve functional activity tolerance and mobility skills.      2. Patient will demonstrate improved B LE strength to grossly 4-/5 to promote improved functional mobility skills.    3. Patient will demonstrate improved 5x sit<>stand to below 25 seconds, with UE utilized as needed.   Short term goal time span:  4-6 weeks      Long Term Goals:    1. Patient will demonstrate improved LEFS to  indicate increased quality of life.     2. Patient will demonstrate improved B LE strength to grossly 4+/5 to promote improved functional mobility skills.      3. Patient will demonstrate improved TUG to 25 seconds or below to indicate improving balance.      4. Patient will report decreased generalized pain by 50% or more to allow for increased quality of life.  Long term goal time span:  2-4 months    Plan:   Planned therapy interventions:  Neuromuscular Re-education (CPT 77108), Manual Therapy (CPT 27891), Gait Training (CPT 85041), Therapeutic Activities (CPT 78241) and Therapeutic Exercise (CPT 87124)  Frequency: 1-2x/week.  Duration in weeks:  12      Referring provider co-signature:  I have reviewed this plan of care and my co-signature certifies the need for services.     Certification Period: 10/04/2023 to 01/02/24    Physician Signature: ________________________________ Date: ______________

## 2023-10-09 ENCOUNTER — PHYSICAL THERAPY (OUTPATIENT)
Dept: PHYSICAL THERAPY | Facility: REHABILITATION | Age: 73
End: 2023-10-09
Attending: FAMILY MEDICINE
Payer: MEDICARE

## 2023-10-09 DIAGNOSIS — M19.011 PRIMARY OSTEOARTHRITIS, RIGHT SHOULDER: ICD-10-CM

## 2023-10-09 DIAGNOSIS — M25.552 BILATERAL HIP PAIN: ICD-10-CM

## 2023-10-09 DIAGNOSIS — M15.9 PRIMARY OSTEOARTHRITIS INVOLVING MULTIPLE JOINTS: ICD-10-CM

## 2023-10-09 DIAGNOSIS — R26.89 BALANCE PROBLEM: ICD-10-CM

## 2023-10-09 DIAGNOSIS — M25.551 BILATERAL HIP PAIN: ICD-10-CM

## 2023-10-09 PROCEDURE — 97112 NEUROMUSCULAR REEDUCATION: CPT

## 2023-10-09 PROCEDURE — 97110 THERAPEUTIC EXERCISES: CPT

## 2023-10-09 NOTE — OP THERAPY DAILY TREATMENT
Outpatient Physical Therapy  DAILY TREATMENT     [address]  [address]  [place] [address]  Phone:  [phone]  Fax:  [phone]    Date: 10/09/2023    Patient: Ashly Meyer  YOB: 1950  MRN: [id number]     Time Calculation    Start time: 1102  Stop time: 1145 Time Calculation (min): 43 minutes         Chief Complaint: Back Problem, Difficulty Walking, Shoulder Problem, and Hip Problem    Visit #: 6    SUBJECTIVE:  Patient presents to PT session and reports her R groin pain remains but otherwise she feels pretty good today. She reports that she has not had any falls since last visit.     OBJECTIVE:  Current objective measures: Presents with FWW.            Therapeutic Exercises (CPT 05939):     2. LTR, x3 minutes, reports improved symptoms with mobility    3. supine marches, x20, B; orange theraband, TrA engagement    4. knee fallouts, x20, B; orange theraband    5. hip adduction squeezes, x15, 3 second holds, nt    6. supine bridges, attempted, unable to complete, nt    7. swiss ball hamstring curls, x1 minute, nt    8. NuStep, x10 minutes, level 1; LE only    9. sit<>stand, 2x5, minimal to no UE support required throughout    10. scap retract, x5, nt    11. seated hamstring stretch, 2x 30 seconds B    12. seated piriformis stretch, 2x 30 seconds, R LE, nt    13. seated calf stretch, x30 seconds, B LE; with belt assist, nt    14. seated heel raises, x20, B    18. TUG, 33.87 seconds, 10/4    19. 5x sit<>stand, UE utilized as needed, 29.63 seconds, 10/4    20. PN due 11/4      Therapeutic Exercise Summary: Access Code: [email or web address]  URL: [email or web address]  Date: 09/12/2023  Prepared by: Obdulia Torres    Exercises  - Supine Lower Trunk Rotation  - 1-2 x daily - 1-2 sets - 20 reps  - Supine Transversus Abdominis Bracing - Hands on Stomach  - 1-2 x daily - 1 sets - 10 reps - 3 seconds hold  - Supine March  - 1-2 x daily - 2 sets - 10 reps  -  Bent Knee Fallouts  - 1-2 x daily - 1-2 sets - 10 reps  - Supine Hip Adduction Isometric with Ball  - 1-2 x daily - 1-2 sets - 10 reps - 3 seconds hold    Therapeutic Treatments and Modalities:     1. Neuromuscular Re-education (CPT 18681), feet together; mild sway with 1-2 intermittent tactile cues required during each trial, staggered stance, B LE lead; increased difficulty with L LE lead noted, moderate sway, air ex pad; moderate sway; intermittent UE support; feet shoulder width apart, x10 minutes total    Time-based treatments/modalities:    Physical Therapy Timed Treatment Charges  Neuromusc re-ed, balance, coor, post minutes (CPT 51376): 10 minutes  Therapeutic exercise minutes (CPT 97912): 33 minutes      Pain rating (1-10) before treatment:  8; R groin  Pain rating (1-10) after treatment:  5; R groin    ASSESSMENT:   Response to treatment: Patient tolerates PT treatment well today. Reports her R hip pain decreased somewhat with activities. Continued with strength and balance activities to promote improved functional mobility skills and activity tolerance for increased safety with daily tasks.    PLAN/RECOMMENDATIONS:   Plan for treatment: therapy treatment to continue next visit.  Planned interventions for next visit: continue with current treatment.

## 2023-10-11 ENCOUNTER — PHYSICAL THERAPY (OUTPATIENT)
Dept: PHYSICAL THERAPY | Facility: REHABILITATION | Age: 73
End: 2023-10-11
Attending: FAMILY MEDICINE
Payer: MEDICARE

## 2023-10-11 DIAGNOSIS — R26.89 BALANCE PROBLEM: ICD-10-CM

## 2023-10-11 DIAGNOSIS — M25.552 BILATERAL HIP PAIN: ICD-10-CM

## 2023-10-11 DIAGNOSIS — M25.551 BILATERAL HIP PAIN: ICD-10-CM

## 2023-10-11 DIAGNOSIS — M19.011 PRIMARY OSTEOARTHRITIS, RIGHT SHOULDER: ICD-10-CM

## 2023-10-11 DIAGNOSIS — M15.9 PRIMARY OSTEOARTHRITIS INVOLVING MULTIPLE JOINTS: ICD-10-CM

## 2023-10-11 PROCEDURE — 97112 NEUROMUSCULAR REEDUCATION: CPT

## 2023-10-11 PROCEDURE — 97110 THERAPEUTIC EXERCISES: CPT

## 2023-10-11 NOTE — OP THERAPY DAILY TREATMENT
Outpatient Physical Therapy  DAILY TREATMENT     [address]  [address]  [place] [address]  Phone:  [phone]  Fax:  [phone]    Date: 10/11/2023    Patient: Ashly Meyer  YOB: 1950  MRN: [id number]     Time Calculation    Start time: 1102  Stop time: 1145 Time Calculation (min): 43 minutes         Chief Complaint: Back Problem, Difficulty Walking, Shoulder Problem, and Hip Problem    Visit #: 7    SUBJECTIVE:  Patient presents to PT session and denies falls. She reports her R hip pain remains overall.     OBJECTIVE:  Current objective measures:           Therapeutic Exercises (CPT 79409):     2. LTR, x3 minutes, nt    3. supine marches, x20, B; orange theraband, nt    4. knee fallouts, x20, B; orange theraband, nt    5. hip adduction squeezes, x15, 3 second holds, nt    6. supine bridges, attempted, unable to complete, nt    7. swiss ball hamstring curls, x1 minute, nt    8. NuStep, x10 minutes, level 1; LE only    9. sit<>stand, x5, minimal to no UE support required throughout    10. scap retract, x5, nt    11. seated hamstring stretch, 2x 30 seconds B, nt    12. seated piriformis stretch, 2x 30 seconds, R LE, nt    13. seated calf stretch, x30 seconds, B LE; with belt assist, nt    14. standing hamstring curls, x20, B LE; B UE support    15. standing heel raises, x20, B LE;  B UE support    16. standing hip abduction, x20, B LE; B UE support    17. standing marches, x20, B LE; B UE support    18. TUG, 33.87 seconds, 10/4    19. 5x sit<>stand, UE utilized as needed, 29.63 seconds, 10/4    20. PN due 11/4      Therapeutic Exercise Summary: Access Code: [email or web address]  URL: [email or web address]  Date: 09/12/2023  Prepared by: Obdulia Torres    Exercises  - Supine Lower Trunk Rotation  - 1-2 x daily - 1-2 sets - 20 reps  - Supine Transversus Abdominis Bracing - Hands on Stomach  - 1-2 x daily - 1 sets - 10 reps - 3 seconds hold  - Supine  March  - 1-2 x daily - 2 sets - 10 reps  - Bent Knee Fallouts  - 1-2 x daily - 1-2 sets - 10 reps  - Supine Hip Adduction Isometric with Ball  - 1-2 x daily - 1-2 sets - 10 reps - 3 seconds hold    Therapeutic Treatments and Modalities:     1. Neuromuscular Re-education (CPT 02529), 2x 1 minute; feet together; mild sway with 1-2 intermittent tactile cues required throughout; demonstrates posterior/L lateral sway, air ex pad; 3 x 1 minute; feet comfortable width apart; demonstrates R lateral sway and verbal cues required for equal WB, staggered stance, B LE lead; increased difficulty with L LE lead noted, moderate sway, nt, dynamic air ex pad stance while reaching outside LINDA and overhead to engage in balloon taps; x5 minutes; rest breaks as needed; demonstrates posterior lean with self correction with verbal and tactile cues from therapist; CGA-min A to maintain balance and safety, x10 minutes total    Time-based treatments/modalities:    Physical Therapy Timed Treatment Charges  Neuromusc re-ed, balance, coor, post minutes (CPT 67079): 10 minutes  Therapeutic exercise minutes (CPT 01800): 33 minutes      Pain rating (1-10) before treatment:  7 R hip/leg; back  Pain rating (1-10) after treatment:  0; reports improved pain after session    ASSESSMENT:   Response to treatment: Patient tolerates PT treatment well today. Patient demonstrates good tolerance to standing therex and balance activities with reports of decreased pain after session. Gait appears to be more upright and improving with FWW utilization as well. At this time, patient will benefit from continued skilled PT for further strength and balance training for decreased pain and improved quality of life.     PLAN/RECOMMENDATIONS:   Plan for treatment: therapy treatment to continue next visit.  Planned interventions for next visit: continue with current treatment.

## 2023-10-13 ENCOUNTER — HOSPITAL ENCOUNTER (OUTPATIENT)
Facility: MEDICAL CENTER | Age: 73
End: 2023-10-13
Attending: UROLOGY
Payer: MEDICARE

## 2023-10-13 PROCEDURE — 87086 URINE CULTURE/COLONY COUNT: CPT

## 2023-10-15 LAB
BACTERIA UR CULT: NORMAL
SIGNIFICANT IND 70042: NORMAL
SITE SITE: NORMAL
SOURCE SOURCE: NORMAL

## 2023-10-16 ENCOUNTER — PHYSICAL THERAPY (OUTPATIENT)
Dept: PHYSICAL THERAPY | Facility: REHABILITATION | Age: 73
End: 2023-10-16
Attending: FAMILY MEDICINE
Payer: MEDICARE

## 2023-10-16 DIAGNOSIS — M25.551 BILATERAL HIP PAIN: ICD-10-CM

## 2023-10-16 DIAGNOSIS — M19.011 PRIMARY OSTEOARTHRITIS, RIGHT SHOULDER: ICD-10-CM

## 2023-10-16 DIAGNOSIS — R26.89 BALANCE PROBLEM: ICD-10-CM

## 2023-10-16 DIAGNOSIS — M25.552 BILATERAL HIP PAIN: ICD-10-CM

## 2023-10-16 DIAGNOSIS — M15.9 PRIMARY OSTEOARTHRITIS INVOLVING MULTIPLE JOINTS: ICD-10-CM

## 2023-10-16 PROCEDURE — 97110 THERAPEUTIC EXERCISES: CPT

## 2023-10-16 PROCEDURE — 97112 NEUROMUSCULAR REEDUCATION: CPT

## 2023-10-16 NOTE — OP THERAPY DAILY TREATMENT
Outpatient Physical Therapy  DAILY TREATMENT     [address]  [address]  [place] [address]  Phone:  [phone]  Fax:  [phone]    Date: 10/16/2023    Patient: Ashly Meyer  YOB: 1950  MRN: [id number]     Time Calculation    Start time: 1103  Stop time: 1147 Time Calculation (min): 44 minutes         Chief Complaint: Hip Problem, Back Problem, Difficulty Walking, and Shoulder Problem    Visit #: 8    SUBJECTIVE:  Patient reports she is having generalized pain today. She presents to PT session with FWW. Patient reports she was sore for a few days after last session.     OBJECTIVE:  Current objective measures:           Therapeutic Exercises (CPT 42111):     2. LTR, x3 minutes, nt    3. supine marches, x20, B; orange theraband, nt    4. knee fallouts, x20, B; orange theraband, nt    5. hip adduction squeezes, x15, 3 second holds, nt    6. supine bridges, attempted, unable to complete, nt    7. swiss ball hamstring curls, x1 minute, nt    8. NuStep, x10 minutes, level 2    9. sit<>stand, 2x 5, minimal to no UE support required throughout    11. seated hamstring stretch, 2x 30 seconds B    12. seated piriformis stretch, 2x 30 seconds, R LE, nt    13. seated calf stretch, x30 seconds, B LE; with belt assist, nt    14. standing hamstring curls, x20, B LE; B UE support, nt    15. seated heel raises, x20, B LE    16. seated hip abduction, x20, B LE, pink theraband    17. seated marches, x30, B LE, pink theraband, instructed for 15 B; completes x30 B    18. TUG, 33.87 seconds, 10/4    19. 5x sit<>stand, UE utilized as needed, 29.63 seconds, 10/4    20. PN due 11/4      Therapeutic Exercise Summary: Access Code: [email or web address]  URL: [email or web address]  Date: 09/12/2023  Prepared by: Obdulia Torres    Exercises  - Supine Lower Trunk Rotation  - 1-2 x daily - 1-2 sets - 20 reps  - Supine Transversus Abdominis Bracing - Hands on Stomach  - 1-2 x daily -  1 sets - 10 reps - 3 seconds hold  - Supine March  - 1-2 x daily - 2 sets - 10 reps  - Bent Knee Fallouts  - 1-2 x daily - 1-2 sets - 10 reps  - Supine Hip Adduction Isometric with Ball  - 1-2 x daily - 1-2 sets - 10 reps - 3 seconds hold    Therapeutic Treatments and Modalities:     1. Neuromuscular Re-education (CPT 45131), 2x 1 minute; feet together; mild sway with 1-2 intermittent tactile cues required throughout; demonstrates posterior/L lateral sway, nt, air ex pad, feet comfortable width apart, 2x 1 minute, staggered stance weight shifting, x10 B LE lead, dynamic air ex pad stance while reaching outside LINDA and overhead to engage in balloon taps; x5 minutes; rest breaks as needed; demonstrates posterior lean with self correction with verbal and tactile cues from therapist; CGA-min A to maintain balance and safety, // bars, lateral steps with x2 small sharon step overs each trial; x10 laps, fatigues quickly today; requires rest breaks    Time-based treatments/modalities:    Physical Therapy Timed Treatment Charges  Neuromusc re-ed, balance, coor, post minutes (CPT 13781): 24 minutes  Therapeutic exercise minutes (CPT 01850): 20 minutes      Pain rating (1-10) before treatment:  6; generalized pain  Pain rating (1-10) after treatment:  7; generalized pain    ASSESSMENT:   Response to treatment: Patient tolerates PT treatment well today. Appears to fatigue quickly, requires rest breaks but is able to continue with participation. At this time, patient will benefit from continued skilled PT for further strength and balance for improved functional mobility skills and increased quality of life.     PLAN/RECOMMENDATIONS:   Plan for treatment: therapy treatment to continue next visit.  Planned interventions for next visit: continue with current treatment.

## 2023-10-23 ENCOUNTER — PHYSICAL THERAPY (OUTPATIENT)
Dept: PHYSICAL THERAPY | Facility: REHABILITATION | Age: 73
End: 2023-10-23
Attending: FAMILY MEDICINE
Payer: MEDICARE

## 2023-10-23 DIAGNOSIS — M25.552 BILATERAL HIP PAIN: ICD-10-CM

## 2023-10-23 DIAGNOSIS — M15.9 PRIMARY OSTEOARTHRITIS INVOLVING MULTIPLE JOINTS: ICD-10-CM

## 2023-10-23 DIAGNOSIS — R26.89 BALANCE PROBLEM: ICD-10-CM

## 2023-10-23 DIAGNOSIS — M25.551 BILATERAL HIP PAIN: ICD-10-CM

## 2023-10-23 DIAGNOSIS — M19.011 PRIMARY OSTEOARTHRITIS, RIGHT SHOULDER: ICD-10-CM

## 2023-10-23 PROCEDURE — 97110 THERAPEUTIC EXERCISES: CPT

## 2023-10-23 NOTE — OP THERAPY DAILY TREATMENT
Outpatient Physical Therapy  DAILY TREATMENT     [address]  [address]  [place] [address]  Phone:  [phone]  Fax:  [phone]    Date: 10/23/2023    Patient: Ashly Meyer  YOB: 1950  MRN: [id number]     Time Calculation    Start time: 1106  Stop time: 1148 Time Calculation (min): 42 minutes         Chief Complaint: Hip Problem, Back Problem, Difficulty Walking, and Shoulder Problem    Visit #: 9    SUBJECTIVE:  Patient presents to PT session and reports her typical pains. She reports no new falls. Patient reports she felt okay after last session.     OBJECTIVE:  Current objective measures:           Therapeutic Exercises (CPT 33922):     1. TrA bracing, supine, x10, 3 second holds    2. LTR with swiss ball, x3 minutes    3. supine marches, x20, B; orange theraband    4. knee fallouts, x20, B; orange theraband    5. hip adduction squeezes, x15, 3 second holds    6. supine bridges, attempted, unable to complete, nt    7. swiss ball hamstring curls, x1 minute, nt    8. NuStep, x10 minutes, level 2    9. sit<>stand, 2x 5, minimal to no UE support required throughout    10. pelvic tilts, x10    11. seated hamstring stretch, 2x 30 seconds B, nt    12. seated piriformis stretch, 2x 30 seconds, R LE, nt    13. seated calf stretch, x30 seconds, B LE; with belt assist, nt    14. standing hamstring curls, x20, B LE; B UE support, nt    15. seated heel raises, x20, B LE, nt    16. seated hip abduction, x20, B LE, pink theraband, nt    17. seated marches, x30, B LE, pink theraband, nt    18. TUG, 33.87 seconds, 10/4    19. 5x sit<>stand, UE utilized as needed, 29.63 seconds, 10/4    20. PN due 11/4      Therapeutic Exercise Summary: Access Code: [email or web address]  URL: [email or web address]  Date: 09/12/2023  Prepared by: Obdulia Torres    Exercises  - Supine Lower Trunk Rotation  - 1-2 x daily - 1-2 sets - 20 reps  - Supine Transversus Abdominis Bracing -  Hands on Stomach  - 1-2 x daily - 1 sets - 10 reps - 3 seconds hold  - Supine March  - 1-2 x daily - 2 sets - 10 reps  - Bent Knee Fallouts  - 1-2 x daily - 1-2 sets - 10 reps  - Supine Hip Adduction Isometric with Ball  - 1-2 x daily - 1-2 sets - 10 reps - 3 seconds hold    Therapeutic Treatments and Modalities:     1. Neuromuscular Re-education (CPT 22312), 2x 1 minute; feet together; mild sway with 1-2 intermittent tactile cues required throughout; demonstrates posterior/L lateral sway, nt, air ex pad, feet comfortable width apart, 2x 1 minute, nt, staggered stance weight shifting, nt, dynamic air ex pad stance while reaching outside LINDA and overhead to engage in balloon taps; x5 minutes; rest breaks as needed; demonstrates posterior lean with self correction with verbal and tactile cues from therapist; CGA-min A to maintain balance and safety, nt, // bars, lateral steps with x2 small sharon step overs each trial; x10 laps, fatigues quickly today; requires rest breaks; nt    Time-based treatments/modalities:    Physical Therapy Timed Treatment Charges  Therapeutic exercise minutes (CPT 59941): 42 minutes      Pain rating (1-10) before treatment:  6; generalized pain  Pain rating (1-10) after treatment:  reports improved pain, urated; fatigue    ASSESSMENT:   Response to treatment: Patient tolerates PT treatment well today. Patient reports improved pain after TE but fatigue. Continues to demonstrate difficulty with R LE mobility however improves with engaged TrA. At this time, patient will benefit from continued skilled PT for improved functional mobility skills.     PLAN/RECOMMENDATIONS:   Plan for treatment: therapy treatment to continue next visit.  Planned interventions for next visit: continue with current treatment.

## 2023-10-25 ENCOUNTER — APPOINTMENT (OUTPATIENT)
Dept: PHYSICAL THERAPY | Facility: REHABILITATION | Age: 73
End: 2023-10-25
Attending: FAMILY MEDICINE
Payer: MEDICARE

## 2023-10-30 ENCOUNTER — PHYSICAL THERAPY (OUTPATIENT)
Dept: PHYSICAL THERAPY | Facility: REHABILITATION | Age: 73
End: 2023-10-30
Attending: FAMILY MEDICINE
Payer: MEDICARE

## 2023-10-30 DIAGNOSIS — M19.011 PRIMARY OSTEOARTHRITIS, RIGHT SHOULDER: ICD-10-CM

## 2023-10-30 DIAGNOSIS — M25.551 BILATERAL HIP PAIN: ICD-10-CM

## 2023-10-30 DIAGNOSIS — M15.9 PRIMARY OSTEOARTHRITIS INVOLVING MULTIPLE JOINTS: ICD-10-CM

## 2023-10-30 DIAGNOSIS — M25.552 BILATERAL HIP PAIN: ICD-10-CM

## 2023-10-30 DIAGNOSIS — R26.89 BALANCE PROBLEM: ICD-10-CM

## 2023-10-30 PROCEDURE — 97110 THERAPEUTIC EXERCISES: CPT

## 2023-10-30 PROCEDURE — 97112 NEUROMUSCULAR REEDUCATION: CPT

## 2023-10-30 NOTE — OP THERAPY DAILY TREATMENT
Outpatient Physical Therapy  DAILY TREATMENT     [address]  [address]  [place] [address]  Phone:  [phone]  Fax:  [phone]    Date: 10/30/2023    Patient: Ashly Meyer  YOB: 1950  MRN: [id number]     Time Calculation    Start time: 1100  Stop time: 1145 Time Calculation (min): 45 minutes         Chief Complaint: Back Problem, Hip Problem, Loss Of Balance, and Difficulty Walking    Visit #: 10    SUBJECTIVE:  Patient reports her B LE have been sore the last few days. She does not believe she over did it at last PT session. No falls reported.     OBJECTIVE:  Current objective measures:           Therapeutic Exercises (CPT 46555):     1. TrA bracing, supine, x10, 3 second holds, nt    2. LTR with swiss ball, x3 minutes, nt    3. supine marches, x20, B; orange theraband, nt    4. knee fallouts, x20, B; orange theraband, nt    5. hip adduction squeezes, x15, 3 second holds, nt    6. supine bridges, attempted, unable to complete, nt    7. swiss ball hamstring curls, x1 minute, nt    8. NuStep, x10 minutes, level 2    9. sit<>stand, x5    10. pelvic tilts, x10, nt    11. seated hamstring stretch, 2x 30 seconds B, nt    12. seated piriformis stretch, 2x 30 seconds, R LE    13. seated calf stretch, x30 seconds, B LE; with belt assist, nt    14. standing hamstring curls, x20, B LE; B UE support, nt    15. seated heel raises, x20, B LE, nt    16. seated hip abduction, x20, B LE, pink theraband, nt    17. seated marches, x30, B LE, pink theraband, nt    18. TUG, 33.87 seconds, 10/4    19. 5x sit<>stand, UE utilized as needed, 29.63 seconds, 10/4    20. PN due 11/4      Therapeutic Exercise Summary: Access Code: [email or web address]  URL: [email or web address]  Date: 09/12/2023  Prepared by: Obdulia Torres    Exercises  - Supine Lower Trunk Rotation  - 1-2 x daily - 1-2 sets - 20 reps  - Supine Transversus Abdominis Bracing - Hands on Stomach  - 1-2 x daily  - 1 sets - 10 reps - 3 seconds hold  - Supine March  - 1-2 x daily - 2 sets - 10 reps  - Bent Knee Fallouts  - 1-2 x daily - 1-2 sets - 10 reps  - Supine Hip Adduction Isometric with Ball  - 1-2 x daily - 1-2 sets - 10 reps - 3 seconds hold    Therapeutic Treatments and Modalities:     1. Neuromuscular Re-education (CPT 82126), air ex pad, feet comfortable width apart; x1 minute; x1 minute, 15 seconds; x1 minute, mild sway demonstrated in various positions, no overt LOB, staggered stance weight shifting; 4 x 30 seconds; x2 each LE lead, dynamic air ex pad stance while reaching outside LINDA and overhead to engage in balloon taps; x5 minutes; rest breaks as needed; demonstrates posterior lean with self correction with verbal and tactile cues from therapist; CGA to maintain balance and safety, requires x1 break throughout; CGA to maintain safety, // bars, lateral steps with x2 small sharon step overs each trial; x10 laps, fatigues quickly today; requires rest breaks; nt, zeus disc, modified SLS; B LE lead, // bars lateral side steps; x6 lengths, x 30 minutes total; requires rest break and increased time throughout tasks for demonstration and verbal cues    Time-based treatments/modalities:    Physical Therapy Timed Treatment Charges  Neuromusc re-ed, balance, coor, post minutes (CPT 09280): 30 minutes  Therapeutic exercise minutes (CPT 21521): 15 minutes      Pain rating (1-10) before treatment:  6-7; generalized pain   Pain rating (1-10) after treatment:  reports slight increase in pain, not rated    ASSESSMENT:   Response to treatment: Patient tolerates PT treatment well today. Able to participate in all tasks, focus on balance activities today. Patient reports slight increase in pain, generalized pains remain overall. No overt LOB and demonstrates fair ability to weight shift and maintain safety during balance activities today. Remains a fall risk overall. Will continue skilled PT at this time for further strength  and balance however if patient appears to plateau, D/C from skilled PT may be appropriate at that time.     PLAN/RECOMMENDATIONS:   Plan for treatment: therapy treatment to continue next visit.  Planned interventions for next visit: continue with current treatment.

## 2023-11-01 ENCOUNTER — PHYSICAL THERAPY (OUTPATIENT)
Dept: PHYSICAL THERAPY | Facility: REHABILITATION | Age: 73
End: 2023-11-01
Attending: FAMILY MEDICINE
Payer: MEDICARE

## 2023-11-01 DIAGNOSIS — R26.89 BALANCE PROBLEM: ICD-10-CM

## 2023-11-01 DIAGNOSIS — M15.9 PRIMARY OSTEOARTHRITIS INVOLVING MULTIPLE JOINTS: ICD-10-CM

## 2023-11-01 DIAGNOSIS — M19.011 PRIMARY OSTEOARTHRITIS, RIGHT SHOULDER: ICD-10-CM

## 2023-11-01 DIAGNOSIS — M25.551 BILATERAL HIP PAIN: ICD-10-CM

## 2023-11-01 DIAGNOSIS — M25.552 BILATERAL HIP PAIN: ICD-10-CM

## 2023-11-01 PROCEDURE — 97110 THERAPEUTIC EXERCISES: CPT

## 2023-11-01 NOTE — OP THERAPY DAILY TREATMENT
"  Outpatient Physical Therapy  DAILY TREATMENT     [facility] Physical [address].  [address]  [place] [address]  Phone:  [phone]  Fax:  [phone]    Date: 11/01/2023    Patient: Ashly Meyer  YOB: 1950  MRN: [id number]     Time Calculation    Start time: 1100  Stop time: 1142 Time Calculation (min): 42 minutes         Chief Complaint: Hip Problem, Loss Of Balance, Difficulty Walking, and Back Problem    Visit #: 11    SUBJECTIVE:  Patient reports overall soreness today. She reports her \"legs just don't want to go\" and they hurt. She reports she has not been doing her HEP but she has the paper \"right there on my nightstand.\" Patient goals addressed today. PT and patient discussed continued plateau of progress and patient agrees that progress has plateaued. For this reason, discussed D/C of skilled PT with patient in agreement. Patient verbalizes understanding to retrieve new referral, as appropriate, if patient's status changes. Also discussed with patient mobility variations throughout treatment and how patient's mobility and progress varies based on pain and how she seems to be feeling overall. Patient agrees with variations in overall assessments and also reports her mobility varies based on how she is feeling.      Discussed continued fall risk with patient and encouraged patient to continue use of walker, which patient verbalizes understanding. Also discussed home safety with patient to decrease risk of falls, including picking up throw rugs, utilizing lights, and clearing floorways. Patient verbalizes understanding and reports she has already done these things.     OBJECTIVE:  Current objective measures:     Left Hip    Flexion: 3+  Abduction: 4-  Adduction: 4     Right Hip   Flexion: 4-  Abduction: 4+  Adduction: 4     R knee flexion: 4/5  L knee flexion: 4/5  R knee extension: 4+/5  L knee extension: 4+/5     TUG: Average: 26.21 seconds, no AD  Trial 1: " 29.21 seconds  Trial 2: 25.56 seconds  Trial 3: 23.87 seconds     5x sit<>stand: 42.46 seconds; B UE utilized as needed     Lower Extremity Functional Scale Percentage: 15       Therapeutic Exercises (CPT 33497):     1. TrA bracing, supine, x10, 3 second holds, nt    2. LTR with swiss ball, x3 minutes, nt    3. supine marches, x20, B; orange theraband, nt    4. knee fallouts, x20, B; orange theraband, nt    5. hip adduction squeezes, x15, 3 second holds, nt    6. supine bridges, attempted, unable to complete, nt    7. swiss ball hamstring curls, x1 minute, nt    8. NuStep, x10 minutes, level 2    9. sit<>stand, x5    10. pelvic tilts, x10, nt    11. seated hamstring stretch, 2x 30 seconds B, nt    12. seated piriformis stretch, 2x 30 seconds, R LE    13. seated calf stretch, x30 seconds, B LE; with belt assist, nt    14. standing hamstring curls, x20, B LE; B UE support, nt    15. seated heel raises, x20, B LE, nt    16. seated hip abduction, x20, B LE, pink theraband, nt    17. seated marches, x30, B LE, pink theraband, nt    18. TUG, 33.87 seconds, 10/4    19. 5x sit<>stand, UE utilized as needed, 29.63 seconds, 10/4      Therapeutic Exercise Summary: Access Code: [email or web address]  URL: [email or web address]  Date: 09/12/2023  Prepared by: Obdulia Torres    Exercises  - Supine Lower Trunk Rotation  - 1-2 x daily - 1-2 sets - 20 reps  - Supine Transversus Abdominis Bracing - Hands on Stomach  - 1-2 x daily - 1 sets - 10 reps - 3 seconds hold  - Supine March  - 1-2 x daily - 2 sets - 10 reps  - Bent Knee Fallouts  - 1-2 x daily - 1-2 sets - 10 reps  - Supine Hip Adduction Isometric with Ball  - 1-2 x daily - 1-2 sets - 10 reps - 3 seconds hold      Time-based treatments/modalities:    Physical Therapy Timed Treatment Charges  Therapeutic exercise minutes (CPT 69605): 42 minutes      Pain rating (1-10) before treatment:  8; generalized pain, back/B LE  Pain rating (1-10) after treatment:  8; no change in pain  reported      Goals:   Short Term Goals:   1. Patient will demonstrate independent HEP to promote increased strength and balance for improve functional activity tolerance and mobility skills. -not met; continued to encourage HEP follow through       2. Patient will demonstrate improved B LE strength to grossly 4-/5 to promote improved functional mobility skills.-progressing, overall varies based on how patient is feeling      3. Patient will demonstrate improved 5x sit<>stand to below 25 seconds, with UE utilized as needed. -not met  Short term goal time span:  4-6 weeks      Long Term Goals:    1. Patient will demonstrate improved LEFS to indicate increased quality of life.-not met; no significant improvement noted since initial evaluation (score of 9 on evaluation; score of 12 on 11/1/23)     2. Patient will demonstrate improved B LE strength to grossly 4+/5 to promote improved functional mobility skills. -not met     3. Patient will demonstrate improved TUG to 25 seconds or below to indicate improving balance. -progressing     4. Patient will report decreased generalized pain by 50% or more to allow for increased quality of life.-not met    Long term goal time span:  2-4 months    ASSESSMENT:   Response to treatment: Patient tolerates PT treatments well overall but has appeared to plateau with progress. PT and patient discussed progress of mobility and decreased pain since initial evaluation and at this time, overall progress has been inconsistent and minimal, and therefore skilled PT D/C was discussed. Patient was educated on fall risk and safety to decrease risk of falls, and verbalizes understanding. Educated patient on retrieving new referral and following up with MD as appropriate as well. Encouraged continuation of HEP. At this time, D/C skilled PT due to overall progress plateau with patient in agreement with plan and verbalizing understanding of all PT recommendations.     PLAN/RECOMMENDATIONS:   Plan for  treatment:  D/C skilled PT secondary to progress plateau. Patient in agreement with D/C and educated to retrieve new skilled PT referral, as appropriate .  Planned interventions for next visit:  Patient encouraged to continue use of FWW as patient remains a fall risk. PT also encouraged follow through of HEP and follow up with MD as appropriate .

## 2023-11-01 NOTE — OP THERAPY DISCHARGE SUMMARY
"  Outpatient Physical Therapy  DISCHARGE SUMMARY NOTE      [facility]  [address]  [address]  [place] [address]  Phone:  [phone]  Fax:  [phone]    Date of Visit: 11/01/2023    Patient: Ashly Meyer  YOB: 1950  MRN: [id number]     Referring Provider: Colton Ahn M.D.  [address] [place] [address] [address]  [place] [address]   Referring Diagnosis Primary osteoarthritis, right shoulder [M19.011];Pain in right hip [M25.551];Pain in left hip [M25.552];Polyosteoarthritis, unspecified [M15.9]         Functional Assessment Used  Lower Extremity Functional Scale Percentage: 15     Your patient is being discharged from Physical Therapy with the following comments:   Goals partially met  Progress plateau    Comments:  Patient reports overall soreness today. She reports her \"legs just don't want to go\" and they hurt. She reports she has not been doing her HEP but she has the paper \"right there on my nightstand.\" Patient goals addressed today. PT and patient discussed continued plateau of progress and patient agrees that progress has plateaued. For this reason, discussed D/C of skilled PT with patient in agreement. Patient verbalizes understanding to retrieve new referral, as appropriate, if patient's status changes. Also discussed with patient mobility variations throughout treatment and how patient's mobility and progress varies based on pain and how she seems to be feeling overall. Patient agrees with variations in overall assessments and also reports her mobility varies based on how she is feeling.       Discussed continued fall risk with patient and encouraged patient to continue use of walker, which patient verbalizes understanding. Also discussed home safety with patient to decrease risk of falls, including picking up throw rugs, utilizing lights, and clearing floorways. Patient verbalizes understanding and reports she has already done these things.     Goals: "   Short Term Goals:   1. Patient will demonstrate independent HEP to promote increased strength and balance for improve functional activity tolerance and mobility skills. -not met; continued to encourage HEP follow through       2. Patient will demonstrate improved B LE strength to grossly 4-/5 to promote improved functional mobility skills.-progressing, overall varies based on how patient is feeling      3. Patient will demonstrate improved 5x sit<>stand to below 25 seconds, with UE utilized as needed. -not met  Short term goal time span:  4-6 weeks      Long Term Goals:    1. Patient will demonstrate improved LEFS to indicate increased quality of life.-not met; no significant improvement noted since initial evaluation (score of 9 on evaluation; score of 12 on 11/1/23)     2. Patient will demonstrate improved B LE strength to grossly 4+/5 to promote improved functional mobility skills. -not met     3. Patient will demonstrate improved TUG to 25 seconds or below to indicate improving balance. -progressing     4. Patient will report decreased generalized pain by 50% or more to allow for increased quality of life.-not met    Long term goal time span:  2-4 months    ASSESSMENT:   Response to treatment: Patient tolerates PT treatments well overall but has appeared to plateau with progress. PT and patient discussed progress of mobility and decreased pain since initial evaluation and at this time, overall progress has been inconsistent and minimal, and therefore skilled PT D/C was discussed. Patient was educated on fall risk and safety to decrease risk of falls, and verbalizes understanding. Educated patient on retrieving new referral and following up with MD as appropriate as well. Encouraged continuation of HEP. At this time, D/C skilled PT due to overall progress plateau with patient in agreement with plan and verbalizing understanding of all PT recommendations.     Limitations Remaining:  Current objective measures:       Left Hip    Flexion: 3+  Abduction: 4-  Adduction: 4     Right Hip   Flexion: 4-  Abduction: 4+  Adduction: 4     R knee flexion: 4/5  L knee flexion: 4/5  R knee extension: 4+/5  L knee extension: 4+/5     TUG: Average: 26.21 seconds, no AD  Trial 1: 29.21 seconds  Trial 2: 25.56 seconds  Trial 3: 23.87 seconds     5x sit<>stand: 42.46 seconds; B UE utilized as needed      Lower Extremity Functional Scale Percentage: 15     Recommendations:  Continue with HEP. Follow up with MD as appropriate. Retrieve new PT referral as appropriate.     Thank you for allowing me to participate in this patient's care.       Obdulia Torres, PT    Date: 11/1/2023

## 2023-11-06 ENCOUNTER — HOSPITAL ENCOUNTER (OUTPATIENT)
Dept: LAB | Facility: MEDICAL CENTER | Age: 73
End: 2023-11-06
Attending: INTERNAL MEDICINE
Payer: MEDICARE

## 2023-11-06 LAB
ALBUMIN SERPL BCP-MCNC: 4.6 G/DL (ref 3.2–4.9)
ALBUMIN/GLOB SERPL: 1.4 G/DL
ALP SERPL-CCNC: 169 U/L (ref 30–99)
ALT SERPL-CCNC: 23 U/L (ref 2–50)
ANION GAP SERPL CALC-SCNC: 14 MMOL/L (ref 7–16)
AST SERPL-CCNC: 20 U/L (ref 12–45)
BILIRUB SERPL-MCNC: 0.9 MG/DL (ref 0.1–1.5)
BUN SERPL-MCNC: 26 MG/DL (ref 8–22)
CALCIUM ALBUM COR SERPL-MCNC: 9.2 MG/DL (ref 8.5–10.5)
CALCIUM SERPL-MCNC: 9.7 MG/DL (ref 8.5–10.5)
CHLORIDE SERPL-SCNC: 101 MMOL/L (ref 96–112)
CO2 SERPL-SCNC: 23 MMOL/L (ref 20–33)
CREAT SERPL-MCNC: 0.97 MG/DL (ref 0.5–1.4)
GFR SERPLBLD CREATININE-BSD FMLA CKD-EPI: 62 ML/MIN/1.73 M 2
GLOBULIN SER CALC-MCNC: 3.4 G/DL (ref 1.9–3.5)
GLUCOSE SERPL-MCNC: 125 MG/DL (ref 65–99)
POTASSIUM SERPL-SCNC: 4.2 MMOL/L (ref 3.6–5.5)
PROT SERPL-MCNC: 8 G/DL (ref 6–8.2)
SODIUM SERPL-SCNC: 138 MMOL/L (ref 135–145)
T4 FREE SERPL-MCNC: 1.64 NG/DL (ref 0.93–1.7)
TSH SERPL DL<=0.005 MIU/L-ACNC: 0.05 UIU/ML (ref 0.38–5.33)

## 2023-11-06 PROCEDURE — 84480 ASSAY TRIIODOTHYRONINE (T3): CPT

## 2023-11-06 PROCEDURE — 84443 ASSAY THYROID STIM HORMONE: CPT

## 2023-11-06 PROCEDURE — 36415 COLL VENOUS BLD VENIPUNCTURE: CPT

## 2023-11-06 PROCEDURE — 84481 FREE ASSAY (FT-3): CPT

## 2023-11-06 PROCEDURE — 80053 COMPREHEN METABOLIC PANEL: CPT

## 2023-11-06 PROCEDURE — 84439 ASSAY OF FREE THYROXINE: CPT

## 2023-11-07 LAB — T3 SERPL-MCNC: NORMAL NG/DL (ref 60–181)

## 2023-11-08 LAB — T3FREE SERPL-MCNC: 2.49 PG/ML (ref 2–4.4)

## 2023-11-27 ENCOUNTER — OFFICE VISIT (OUTPATIENT)
Dept: MEDICAL GROUP | Facility: MEDICAL CENTER | Age: 73
End: 2023-11-27
Payer: MEDICARE

## 2023-11-27 ENCOUNTER — HOSPITAL ENCOUNTER (OUTPATIENT)
Facility: MEDICAL CENTER | Age: 73
End: 2023-11-27
Attending: FAMILY MEDICINE
Payer: MEDICARE

## 2023-11-27 VITALS
HEART RATE: 76 BPM | WEIGHT: 255 LBS | SYSTOLIC BLOOD PRESSURE: 132 MMHG | DIASTOLIC BLOOD PRESSURE: 76 MMHG | HEIGHT: 67 IN | OXYGEN SATURATION: 95 % | BODY MASS INDEX: 40.02 KG/M2 | RESPIRATION RATE: 18 BRPM | TEMPERATURE: 98 F

## 2023-11-27 DIAGNOSIS — Z87.39 H/O FIBROMYALGIA: ICD-10-CM

## 2023-11-27 DIAGNOSIS — M19.011 PRIMARY OSTEOARTHRITIS, RIGHT SHOULDER: ICD-10-CM

## 2023-11-27 DIAGNOSIS — M1A.0790 CHRONIC GOUT OF FOOT, UNSPECIFIED CAUSE, UNSPECIFIED LATERALITY: ICD-10-CM

## 2023-11-27 DIAGNOSIS — E11.9 CONTROLLED TYPE 2 DIABETES MELLITUS WITHOUT COMPLICATION, WITHOUT LONG-TERM CURRENT USE OF INSULIN (HCC): ICD-10-CM

## 2023-11-27 DIAGNOSIS — M47.816 LUMBAR FACET ARTHROPATHY: ICD-10-CM

## 2023-11-27 DIAGNOSIS — M25.551 BILATERAL HIP PAIN: ICD-10-CM

## 2023-11-27 DIAGNOSIS — M25.552 BILATERAL HIP PAIN: ICD-10-CM

## 2023-11-27 DIAGNOSIS — M15.9 PRIMARY OSTEOARTHRITIS INVOLVING MULTIPLE JOINTS: ICD-10-CM

## 2023-11-27 DIAGNOSIS — M54.16 LUMBAR RADICULOPATHY: ICD-10-CM

## 2023-11-27 DIAGNOSIS — Z23 NEED FOR IMMUNIZATION AGAINST INFLUENZA: ICD-10-CM

## 2023-11-27 LAB
HBA1C MFR BLD: 6.3 % (ref ?–5.8)
POCT INT CON NEG: NEGATIVE
POCT INT CON POS: POSITIVE

## 2023-11-27 PROCEDURE — 83036 HEMOGLOBIN GLYCOSYLATED A1C: CPT | Performed by: FAMILY MEDICINE

## 2023-11-27 PROCEDURE — 82570 ASSAY OF URINE CREATININE: CPT

## 2023-11-27 PROCEDURE — 99214 OFFICE O/P EST MOD 30 MIN: CPT | Mod: 25 | Performed by: FAMILY MEDICINE

## 2023-11-27 PROCEDURE — 3075F SYST BP GE 130 - 139MM HG: CPT | Performed by: FAMILY MEDICINE

## 2023-11-27 PROCEDURE — 3078F DIAST BP <80 MM HG: CPT | Performed by: FAMILY MEDICINE

## 2023-11-27 PROCEDURE — G0008 ADMIN INFLUENZA VIRUS VAC: HCPCS | Performed by: FAMILY MEDICINE

## 2023-11-27 PROCEDURE — 90662 IIV NO PRSV INCREASED AG IM: CPT | Performed by: FAMILY MEDICINE

## 2023-11-27 PROCEDURE — 82043 UR ALBUMIN QUANTITATIVE: CPT

## 2023-11-27 PROCEDURE — 1170F FXNL STATUS ASSESSED: CPT | Performed by: FAMILY MEDICINE

## 2023-11-27 RX ORDER — LIOTHYRONINE SODIUM 5 UG/1
5 TABLET ORAL DAILY
COMMUNITY

## 2023-11-27 RX ORDER — CALCITONIN SALMON 200 [IU]/.09ML
1 SPRAY, METERED NASAL DAILY
COMMUNITY

## 2023-11-27 ASSESSMENT — FIBROSIS 4 INDEX: FIB4 SCORE: 1.63

## 2023-11-27 NOTE — PROGRESS NOTES
Chief Complaint   Patient presents with    Arthritis    Follow-Up    Diabetes Mellitus    Hip Pain    Back Pain       Subjective:     HPI:   Ashly Meyer presents today with the followin. Controlled type 2 diabetes mellitus without complication, without long-term current use of insulin (HCC)  Her A1c here today is 6.3%.  she is doing well with her diabetes overall.   She is diet controlled only at this point.    2. Bilateral hip pain  She continues to have bilateral SI joint pain, lateral hip pain and deep groin pain.  Referral to pain management placed.  Has had hip joint injections from orthopedics in the past but the benefit only lasts a few weeks.    3. Chronic gout of foot, unspecified cause, unspecified laterality  She is on allopurinol, taking daily.  Has been stable.  Continues the nabumetone bid.      4. H/O fibromyalgia  She has chronic muscle pain and spasm.  She is not able to stretch.  Pain continues to be very significant.  She did not tolerate Savella or tizanidine.  She feels the tramadol pain medication is not helpful.  She continues the nabumetone and duloxetine but still feels she is struggling.    5. Lumbar facet arthropathy/Lumbar radiculopathy  Patient does have multiple level lumbar facet arthropathy.  She is using the nabumetone, duloxetine and gabapentin to control this pain.  She does have some tramadol which she uses at nighttime but feels that during the daytime it does not help her.  She declines a renewal of the tramadol.  Patient does have osteoarthritis of multiple joints.  She feels that when she goes to physical therapy it helps that day and the next day but she does not do her home regimen and has unfortunately been discharged from PT a few months back as she was not making any progress.  She has tender muscles at multiple points.  She feels the pain is just not under control.  Pain specialty referral is placed.    6. Primary osteoarthritis involving multiple  joints  Patient does have arthritis of multiple joints.  She continues the nabumetone for this as well as duloxetine for pain.    7. Primary osteoarthritis, right shoulder  Patient does have osteoarthritis of the right shoulder.  This is a source of pain and stiffness.  She has had injections in her hips and shoulders in the past with success but not for more than a couple weeks.  Specialty referral placed.    8. 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    Primary osteoarthritis of first metatarsophalangeal (MTP) joint 05/12/2023    Urinary tract infection due to Klebsiella species 02/13/2023    Bilious vomiting with nausea 02/13/2023    Drug-induced constipation 12/26/2022    Impaired gait and mobility 12/21/2022    Ataxia 12/20/2022    Carotid artery stenosis 12/20/2022    Left bundle branch block 12/20/2022    Temporal pain 12/14/2022    History of 2019 novel coronavirus disease (COVID-19) 10/11/2022    Positive self-administered antigen test for COVID-19 10/11/2022    Trochanteric bursitis of both hips 03/03/2022    Mild persistent asthma, uncomplicated 03/03/2022    Incomplete rectal prolapse 09/03/2021    Benign essential tremor 09/03/2021    Chronic respiratory failure with hypoxia ([facility]) 06/03/2021    Controlled type 2 diabetes mellitus without complication, without long-term current use of insulin ([facility]) 06/12/2019    Chronic gout of foot 04/30/2019    Dry eye syndrome, bilateral     Rotator cuff syndrome of right shoulder and allied disorders 10/10/2018    Vitamin D deficiency 12/28/2017    Morbid obesity with BMI of 40.0-44.9, adult ([facility])     Neural foraminal stenosis of cervical spine 05/18/2017    Tinnitus of both ears 05/18/2017    Dyslipidemia, goal LDL below 130 01/06/2017    Nonalcoholic  fatty liver disease 01/05/2017    Menopausal symptoms 04/28/2016    Lumbar facet arthropathy 12/09/2015    Candidal intertrigo 06/01/2015    Elevated sed rate 12/09/2014    Osteoarthritis of left thumb 07/23/2014    Bilateral hip pain 04/23/2014    H/O fibromyalgia 03/11/2014    KERRI (obstructive sleep apnea) 03/11/2014    Peripheral neuropathy     Rectocele 11/25/2013    Pelvic floor dysfunction     Chronic constipation 10/02/2013    Nocturnal hypoxia     Lumbar radiculopathy 11/01/2011    Migraine without aura     Seasonal allergies 07/08/2011    GERD (gastroesophageal reflux disease) 12/10/2010    Osteoarthritis of multiple joints 04/13/2010    Eczema, dyshidrotic 08/03/2009    Essential hypertension 05/20/2009    Hypothyroidism due to acquired atrophy of thyroid 05/20/2009       Current medicines (including changes today)  Current Outpatient Medications   Medication Sig Dispense Refill    traMADol (ULTRAM) 50 MG Tab Take 50 mg by mouth every four hours as needed.      DULoxetine (CYMBALTA) 60 MG Cap DR Particles delayed-release capsule Take 1 Capsule by mouth every evening. 90 Capsule 3    albuterol 108 (90 Base) MCG/ACT Aero Soln inhalation aerosol Inhale 2 Puffs every 6 hours as needed for Shortness of Breath. 8.5 g 11    atorvastatin (LIPITOR) 40 MG Tab Take 1 Tablet by mouth every evening. 90 Tablet 3    pantoprazole (PROTONIX) 40 MG Tablet Delayed Response Take 1 Tablet by mouth 2 times a day. 180 Tablet 3    nabumetone (RELAFEN) 750 MG Tab Take 1 Tablet by mouth 2 times a day with meals. 180 Tablet 3    gabapentin (NEURONTIN) 800 MG tablet Take 1 Tablet by mouth 2 times a day. 180 Tablet 3    allopurinol (ZYLOPRIM) 100 MG Tab Take 1 Tablet by mouth every day. 90 Tablet 3    levothyroxine (SYNTHROID) 137 MCG Tab Take 1 Tablet by mouth every morning on an empty stomach. 90 Tablet 2    QUEtiapine (SEROQUEL) 25 MG Tab Take 1 Tablet by mouth at bedtime. 90 Tablet 3    Cholecalciferol (VITAMIN D-3) 125 MCG (5000  "UT) Tab Take 5,000 Units by mouth every evening.      VITAMIN E PO Take 1 Capsule by mouth every morning.      calcitonin, salmon, (MIACALCIN) 200 UNIT/ACT Solution Administer 1 Spray into affected nostril(S) every day.      liothyronine (CYTOMEL) 5 MCG Tab Take 5 mcg by mouth every day.       No current facility-administered medications for this visit.       Allergies   Allergen Reactions    Ace Inhibitors Cough    Benadryl Allergy Hives     Hot skin itching    Iodine Nausea     Pt received CT w/ contrast 12/20/22 pt had nausea post contrast     Lamictal Rash and Swelling     \"hot\", unable to function.  Severe rash, scarring    Savella [Kdc:Milnacipran+Ci Pigment Blue 63] Nausea, Swelling and Anxiety     Swelling, bone and muscle pain, sweating, nausea, dizziness, sleeplessness, anxiety    Savella [Milnacipran Hcl] Unspecified     Muscle aches, feet swelling    Sulfa Drugs Hives, Shortness of Breath and Anxiety     Hard breathing, shortness of breath    Butalbital-Acetaminophen Unspecified     Dizzy, can't walk, hard to focus    Cefaclor Nausea and Unspecified     Ceclor causes light headedness and dizziness    Morphine Itching     \"Redness\"    Penicillins Itching and Swelling     Skin itching and redness    Tizanidine Hcl Nausea     \"Dizziness\" hard to focus    Milnacipran Vomiting and Nausea    Amlactin Rash and Itching     Lotion causes itching, redness and burnng    Tape Rash and Itching     Skin tears, paper tape is OK per Pt       ROS: As per HPI       Objective:     /76   Pulse 76   Temp 36.7 °C (98 °F)   Resp 18   Ht 1.689 m (5' 6.5\")   Wt 116 kg (255 lb)   SpO2 95%  Body mass index is 40.54 kg/m².    Physical Exam:  Constitutional: Well-developed and well-nourished. Not diaphoretic. No distress. Lucid and fluent.  Skin: Skin is warm and dry. No rash noted.  Head: Atraumatic without lesions.  Eyes: Conjunctivae and extraocular motions are normal. Pupils are equal, round, and reactive to light. " No scleral icterus.   Ears:  External ears unremarkable.   Neck: Supple, trachea midline. No thyromegaly present. No cervical or supraclavicular lymphadenopathy. No JVD or carotid bruits appreciated  Cardiovascular: Regular rate and rhythm.  Normal S1, S2 without murmur appreciated.  Chest: Effort normal. Clear to auscultation throughout. No adventitious sounds.   Abdomen: Soft, non tender, and without distention. Active bowel sounds in all four quadrants. No rebound, guarding, masses or hepatosplenomegaly.  Extremities: No cyanosis, clubbing, erythema, nor edema.  Muscle spasm.  Neurological: Alert and oriented x 3.   Psychiatric:  Behavior, mood, and affect are appropriate.       Assessment and Plan:     73 y.o. female with the following issues:    1. Controlled type 2 diabetes mellitus without complication, without long-term current use of insulin ([facility])  POCT A1C    Microalbumin Creat Ratio Urine - Clinic Collect      2. Bilateral hip pain  Referral to Pain Management      3. Chronic gout of foot, unspecified cause, unspecified laterality  Referral to Pain Management      4. H/O fibromyalgia  Referral to Pain Management      5. Lumbar facet arthropathy  Referral to Pain Management      6. Lumbar radiculopathy  Referral to Pain Management      7. Primary osteoarthritis involving multiple joints  Referral to Pain Management      8. Primary osteoarthritis, right shoulder  Referral to Pain Management      9. Need for immunization against influenza  INFLUENZA VACCINE, HIGH DOSE (65+ ONLY)            Followup: Return in about 3 months (around 2/27/2024), or if symptoms worsen or fail to improve.

## 2023-11-28 LAB
CREAT UR-MCNC: 112.48 MG/DL
MICROALBUMIN UR-MCNC: 2.6 MG/DL
MICROALBUMIN/CREAT UR: 23 MG/G (ref 0–30)

## 2023-12-15 ENCOUNTER — OFFICE VISIT (OUTPATIENT)
Dept: PHYSICAL MEDICINE AND REHAB | Facility: MEDICAL CENTER | Age: 73
End: 2023-12-15
Payer: MEDICARE

## 2023-12-15 ENCOUNTER — HOSPITAL ENCOUNTER (OUTPATIENT)
Dept: RADIOLOGY | Facility: MEDICAL CENTER | Age: 73
End: 2023-12-15
Attending: STUDENT IN AN ORGANIZED HEALTH CARE EDUCATION/TRAINING PROGRAM
Payer: MEDICARE

## 2023-12-15 VITALS
HEIGHT: 66 IN | OXYGEN SATURATION: 94 % | WEIGHT: 254.85 LBS | BODY MASS INDEX: 40.96 KG/M2 | TEMPERATURE: 96.8 F | SYSTOLIC BLOOD PRESSURE: 148 MMHG | DIASTOLIC BLOOD PRESSURE: 80 MMHG | HEART RATE: 78 BPM

## 2023-12-15 DIAGNOSIS — M70.62 GREATER TROCHANTERIC BURSITIS OF BOTH HIPS: ICD-10-CM

## 2023-12-15 DIAGNOSIS — M47.816 LUMBAR SPONDYLOSIS: ICD-10-CM

## 2023-12-15 DIAGNOSIS — M79.7 FIBROMYALGIA: ICD-10-CM

## 2023-12-15 DIAGNOSIS — R10.31 BILATERAL GROIN PAIN: ICD-10-CM

## 2023-12-15 DIAGNOSIS — R10.32 BILATERAL GROIN PAIN: ICD-10-CM

## 2023-12-15 DIAGNOSIS — M70.61 GREATER TROCHANTERIC BURSITIS OF BOTH HIPS: ICD-10-CM

## 2023-12-15 DIAGNOSIS — G89.29 OTHER CHRONIC PAIN: ICD-10-CM

## 2023-12-15 DIAGNOSIS — R26.89 IMPAIRED GAIT AND MOBILITY: Chronic | ICD-10-CM

## 2023-12-15 PROCEDURE — 99204 OFFICE O/P NEW MOD 45 MIN: CPT | Performed by: STUDENT IN AN ORGANIZED HEALTH CARE EDUCATION/TRAINING PROGRAM

## 2023-12-15 PROCEDURE — 3077F SYST BP >= 140 MM HG: CPT | Performed by: STUDENT IN AN ORGANIZED HEALTH CARE EDUCATION/TRAINING PROGRAM

## 2023-12-15 PROCEDURE — 3079F DIAST BP 80-89 MM HG: CPT | Performed by: STUDENT IN AN ORGANIZED HEALTH CARE EDUCATION/TRAINING PROGRAM

## 2023-12-15 PROCEDURE — 1170F FXNL STATUS ASSESSED: CPT | Performed by: STUDENT IN AN ORGANIZED HEALTH CARE EDUCATION/TRAINING PROGRAM

## 2023-12-15 PROCEDURE — 1125F AMNT PAIN NOTED PAIN PRSNT: CPT | Performed by: STUDENT IN AN ORGANIZED HEALTH CARE EDUCATION/TRAINING PROGRAM

## 2023-12-15 PROCEDURE — 73522 X-RAY EXAM HIPS BI 3-4 VIEWS: CPT

## 2023-12-15 ASSESSMENT — PATIENT HEALTH QUESTIONNAIRE - PHQ9
5. POOR APPETITE OR OVEREATING: 3 - NEARLY EVERY DAY
CLINICAL INTERPRETATION OF PHQ2 SCORE: 6

## 2023-12-15 ASSESSMENT — PAIN SCALES - GENERAL: PAINLEVEL: 7=MODERATE-SEVERE PAIN

## 2023-12-15 ASSESSMENT — FIBROSIS 4 INDEX: FIB4 SCORE: 1.63

## 2023-12-15 NOTE — PROGRESS NOTES
New Patient Note    Interventional Pain and Spine  Physiatry (Physical Medicine and Rehabilitation)     Patient Name: Ashly Meyer  : 1950  Date of Service: 12/15/2023  PCP: Colton Ahn M.D.  Referring Provider: Colton Ahn M.D.    Chief Complaint:   Chief Complaint   Patient presents with    New Patient     Bilateral hip pain       HPI  HISTORY FROM INITIAL VISIT (12/15/2023):  Ashly Meyer is a 73 y.o. female who presents today with chronic pain at her low back, shoulders, hips, pelvic region, legs, and feet for decades. Pain is worst at her bilateral groin and greater trochanter. Notes hx of fibromyalgia, neuropathy, gout, R shoulder sx.    Pain right now is 8/10 on the numeric pain scale.  Pain worsens with physical activity and improves only marginally with medication. Her pain significantly interferes with ADLs.     The patient has done physical therapy for this problem, most recently last month. Denies relief from this.    Patient has tried the following medications with varied success (current meds in bold):   Nabumetone - takes the edge off  Gabapentin - takes the edge off of neuropathy  Duloxetine    Therapeutic modalities and interventional therapies to date include:  -left hip and shoulder injections at [facility] - 2-4 weeks relief    Medical history includes hypertension, KERRI, peripheral neuropathy, type 2 diabetes, essential tremor.    Psychological testing for pain as depression and pain commonly coexist and need to both be treated.     Opioid Risk Score: 16      Interpretation of Opioid Risk Score   Score 0-3 = Low risk of abuse. Do UDS at least once per year.  Score 4-7 = Moderate risk of abuse. Do UDS 1-4 times per year.  Score 8+ = High risk of abuse. Refer to specialist.    PHQ      2022     9:00 AM 2023     3:00 PM 12/15/2023    11:20 AM   Depression Screen (PHQ-2/PHQ-9)   PHQ-2 Total Score 0     PHQ-2 Total Score  6 6   PHQ-9 Total Score  20         Interpretation of PHQ-9 Total Score   Score Severity   1-4 No Depression   5-9 Mild Depression   10-14 Moderate Depression   15-19 Moderately Severe Depression   20-27 Severe Depression      Medical records review:  I reviewed the note from the referring provider Colton Ahn M.D. including the note dated 11/27/23.    ROS:   Red Flags ROS:   Fever, Chills, Sweats: Denies  Involuntary Weight Loss: Denies  Bladder Incontinence: Denies  Bowel Incontinence: denies  Saddle Anesthesia: Denies    All other systems reviewed and negative.     PMHx:   Past Medical History:   Diagnosis Date    Anginal syndrome ([facility])     Anxiety     Arthritis     Everywhere.     Atrophic rhinitis 10/3/2016    Back pain     Bronchitis 08/2021    finished 1st course of ABX to start a 2nd course soon.     Carotid artery stenosis, unilateral     moderate left carotid artery stenosis    Carpal tunnel syndrome 9/5/2011    Chronic pain     Constipation     Controlled type 2 diabetes mellitus without complication ([facility])     states borderline    Controlled type 2 diabetes mellitus without complication, without long-term current use of insulin ([facility]) 6/12/2019    Cough 08/2021    r/t bronchitis    Current severe episode of major depressive disorder without psychotic features without prior episode ([facility]) 6/3/2021    Daytime sleepiness     Degenerative disc disease     Depression 5/20/2009    Deviated nasal septum 8/1/2016    Diarrhea     and constipation    Disease of accessory sinus 10/3/2016    Dizziness     Dry eye syndrome, bilateral     Elevated sedimentation rate     history of negative temporal artery biopsy around 2006    Fatigue     Fatty liver     Fibromyalgia     confirmed by Dr. 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    Hypertension     Hypothyroidism 5/20/2009    Incipient cataract of both eyes     Incomplete rectal prolapse 9/3/2021    Insomnia     Intention tremor 6/2/2022    Migraine without aura, without mention of intractable migraine without mention of status migrainosus     Mild intermittent asthma without complication     inhalers as needed    Mixed dyslipidemia     Morbid obesity with BMI of 45.0-49.9, adult ([facility])     Morning headache     Mumps     Nasal drainage     Nocturnal hypoxia     severe, to 69% O2 sat    Obesity     Obesity hypoventilation syndrome (HCC) 5/31/2017    Pelvic floor dysfunction     Peripheral neuropathy     Pleomorphic adenoma     Polymyalgia rheumatica ([facility])     Dr. Ann    Restless leg syndrome     Ringing in ears     Rotator cuff syndrome of right shoulder and allied disorders 10/10/2018    S/P tonsillectomy     Seasonal allergies     Skin cancer 1990's    skin    Sleep apnea syndrome     she is not using CPAP, claustrophobia, uses 2-3l at night via concentrator occ 2l during daytime ([facility])    Snoring     Sore muscles     Sweat, sweating, excessive     Swelling of lower extremity     Thyroid carcinoma (HCC) 7/22/2023    Found surgically July 2023    Trochanteric bursitis of both hips 3/3/2022    Urinary incontinence     will not wear a pad    Vision loss     Weakness        PSHx:   Past Surgical History:   Procedure Laterality Date    THYROIDECTOMY TOTAL Right 8/23/2023    Procedure: RIGHT COMPLETION THYROIDECTOMY;  Surgeon: Michael Mancuso M.D.;  Location: SURGERY SAME DAY [facility];  Service: Ent    THYROIDECTOMY Left 7/17/2023    Procedure: LEFT ELOY- THYROIDECTOMY;  Surgeon: Michael Mancuso M.D.;  Location: SURGERY SAME DAY [facility];  Service: Ent    IN UPPER GI ENDOSCOPY,DIAGNOSIS N/A 08/26/2021    Procedure: GASTROSCOPY;   Surgeon: Marty Lopez M.D.;  Location: [facility];  Service: Gastroenterology    ND COLONOSCOPY,DIAGNOSTIC  08/26/2021    Procedure: COLONOSCOPY - WITH BIOPSIES.;  Surgeon: Marty Lopez M.D.;  Location: [facility];  Service: Gastroenterology    SHOULDER DECOMPRESSION ARTHROSCOPIC Right 02/01/2019    Procedure: SHOULDER DECOMPRESSION ARTHROSCOPIC - SUBACROMIAL, LABRAL DEBRIDEMENT;  Surgeon: Damaris Knutson M.D.;  Location: [facility];  Service: Orthopedics    SHOULDER ARTHROSCOPY W/ BICIPITAL TENODESIS REPAIR Right 02/01/2019    Procedure: SHOULDER ARTHROSCOPY W/ BICIPITAL TENOTOMY;  Surgeon: Damaris Knutson M.D.;  Location: [facility];  Service: Orthopedics    CARPAL TUNNEL RELEASE Right 02/01/2019    Procedure: CARPAL TUNNEL RELEASE;  Surgeon: Damaris Knutson M.D.;  Location: [facility];  Service: Orthopedics    GASTROSCOPY  02/06/2017    Procedure: GASTROSCOPY;  Surgeon: Pau Foster M.D.;  Location: SURGERY SAME DAY [facility];  Service:     COLONOSCOPY  02/06/2017    Procedure: COLONOSCOPY;  Surgeon: Pau Foster M.D.;  Location: SURGERY SAME DAY [facility];  Service:     SEPTAL RECONSTRUCTION  10/03/2016    Procedure: SEPTAL RECONSTRUCTION with  grafts ;  Surgeon: PIETER Dickson M.D.;  Location: SURGERY SAME DAY [facility];  Service:     SEPTOPLASTY N/A 08/01/2016    Procedure: SEPTOPLASTY;  Surgeon: PIETER Dickson M.D.;  Location: SURGERY SAME DAY [facility];  Service:     TURBINOPLASTY Bilateral 08/01/2016    Procedure: TURBINOPLASTY;  Surgeon: PIETER Dickson M.D.;  Location: SURGERY SAME DAY [facility];  Service:     NASAL FRACTURE REDUCTION OPEN N/A 08/01/2016    Procedure: SEPTAL FRACTURE REDUCTION OPEN;  Surgeon: PIETER Dickson M.D.;  Location: SURGERY SAME DAY [facility];  Service:     FINE NEEDLE ASPIRATION  11/16/2009    Performed by DA CALLOWAY at [facility]  [facility]    COLONOSCOPY  2006    ? unclear date    OTHER SURGICAL PROCEDURE  1998    vaginal wall repair    BLADDER SUSPENSION  1983    HYSTERECTOMY, VAGINAL  1983    ovaries are present, excessive bleeding    TUBAL LIGATION  1980    MASS EXCISION GENERAL Right 1956    had carbuncle removal R thigh    TONSILLECTOMY  1953    ANAL SPHINCTEROTOMY      anal muscle repair    CATARACT EXTRACTION WITH IOL Bilateral     CHOLECYSTECTOMY      HYSTERECTOMY LAPAROSCOPY      OTHER ORTHOPEDIC SURGERY  1962/1980/1983/1985    foot surgery     SCAR REVISION Right     thigh scar        Family Hx:   Family History   Problem Relation Age of Onset    Heart Disease Mother         Arotic vaulve replacement    GI Disease Mother         bile duct problem    Bladder cancer Mother     GI Disease Sister         celiac    Arthritis Sister     Other Sister         gout and lupus    Arthritis Sister     Kidney Disease Sister     GI Disease Sister     Bladder cancer Maternal Aunt     Arthritis Maternal Grandmother     Hypertension Maternal Grandmother     Heart Disease Maternal Grandmother     Hypertension Maternal Grandfather     Heart Disease Maternal Grandfather     Arthritis Maternal Grandfather     No Known Problems Paternal Grandmother     No Known Problems Paternal Grandfather     Cancer Brother         Larynx    Cancer Daughter         non hopskins limphoma    GI Disease Daughter     Bipolar disorder Daughter     Seizures Daughter     Bipolar disorder Daughter        Social Hx:  Social History     Socioeconomic History    Marital status:      Spouse name: Not on file    Number of children: Not on file    Years of education: Not on file    Highest education level: Not on file   Occupational History    Not on file   Tobacco Use    Smoking status: Former     Current packs/day: 0.00     Average packs/day: 1 pack/day for 45.2 years (45.2 ttl pk-yrs)     Types: Cigarettes     Start date: 1962     Quit date: 3/1/2007     Years since  "quittin.8    Smokeless tobacco: Never    Tobacco comments:     Started smoking at age 15, continued abstinance    Vaping Use    Vaping Use: Never used   Substance and Sexual Activity    Alcohol use: Not Currently    Drug use: Not Currently     Comment: CBD topical pain cream    Sexual activity: Not Currently     Partners: Male   Other Topics Concern     Service No    Blood Transfusions No    Caffeine Concern Not Asked    Occupational Exposure No    Hobby Hazards No    Sleep Concern No    Stress Concern No    Weight Concern No    Special Diet No    Back Care No    Exercise No    Bike Helmet No    Seat Belt Yes    Self-Exams No   Social History Narrative    Not on file     Social Determinants of Health     Financial Resource Strain: Not on file   Food Insecurity: Not on file   Transportation Needs: Not on file   Physical Activity: Not on file   Stress: Not on file   Social Connections: Not on file   Intimate Partner Violence: Not on file   Housing Stability: Not on file       Allergies:  Allergies   Allergen Reactions    Ace Inhibitors Cough    Benadryl Allergy Hives     Hot skin itching    Iodine Nausea     Pt received CT w/ contrast 22 pt had nausea post contrast     Lamictal Rash and Swelling     \"hot\", unable to function.  Severe rash, scarring    Savella [Kdc:Milnacipran+Ci Pigment Blue 63] Nausea, Swelling and Anxiety     Swelling, bone and muscle pain, sweating, nausea, dizziness, sleeplessness, anxiety    Savella [Milnacipran Hcl] Unspecified     Muscle aches, feet swelling    Sulfa Drugs Hives, Shortness of Breath and Anxiety     Hard breathing, shortness of breath    Butalbital-Acetaminophen Unspecified     Dizzy, can't walk, hard to focus    Cefaclor Nausea and Unspecified     Ceclor causes light headedness and dizziness    Morphine Itching     \"Redness\"    Penicillins Itching and Swelling     Skin itching and redness    Tizanidine Hcl Nausea     \"Dizziness\" hard to focus    Milnacipran " Vomiting and Nausea    Amlactin Rash and Itching     Lotion causes itching, redness and burnng    Tape Rash and Itching     Skin tears, paper tape is OK per Pt       Medications: reviewed on epic.   Outpatient Medications Marked as Taking for the 12/15/23 encounter (Office Visit) with Patricia Ho M.D.   Medication Sig Dispense Refill    Naltrexone HCl Powder Please take one 4.5mg capsule by mouth daily 135 g 0    calcitonin, salmon, (MIACALCIN) 200 UNIT/ACT Solution Administer 1 Spray into affected nostril(S) every day.      liothyronine (CYTOMEL) 5 MCG Tab Take 5 mcg by mouth every day.      traMADol (ULTRAM) 50 MG Tab Take 50 mg by mouth every four hours as needed.      DULoxetine (CYMBALTA) 60 MG Cap DR Particles delayed-release capsule Take 1 Capsule by mouth every evening. 90 Capsule 3    albuterol 108 (90 Base) MCG/ACT Aero Soln inhalation aerosol Inhale 2 Puffs every 6 hours as needed for Shortness of Breath. 8.5 g 11    atorvastatin (LIPITOR) 40 MG Tab Take 1 Tablet by mouth every evening. 90 Tablet 3    pantoprazole (PROTONIX) 40 MG Tablet Delayed Response Take 1 Tablet by mouth 2 times a day. 180 Tablet 3    nabumetone (RELAFEN) 750 MG Tab Take 1 Tablet by mouth 2 times a day with meals. 180 Tablet 3    gabapentin (NEURONTIN) 800 MG tablet Take 1 Tablet by mouth 2 times a day. 180 Tablet 3    allopurinol (ZYLOPRIM) 100 MG Tab Take 1 Tablet by mouth every day. 90 Tablet 3    levothyroxine (SYNTHROID) 137 MCG Tab Take 1 Tablet by mouth every morning on an empty stomach. 90 Tablet 2    QUEtiapine (SEROQUEL) 25 MG Tab Take 1 Tablet by mouth at bedtime. 90 Tablet 3    Cholecalciferol (VITAMIN D-3) 125 MCG (5000 UT) Tab Take 5,000 Units by mouth every evening.      VITAMIN E PO Take 1 Capsule by mouth every morning.          Current Outpatient Medications on File Prior to Visit   Medication Sig Dispense Refill    calcitonin, salmon, (MIACALCIN) 200 UNIT/ACT Solution Administer 1 Spray into affected  "nostril(S) every day.      liothyronine (CYTOMEL) 5 MCG Tab Take 5 mcg by mouth every day.      traMADol (ULTRAM) 50 MG Tab Take 50 mg by mouth every four hours as needed.      DULoxetine (CYMBALTA) 60 MG Cap DR Particles delayed-release capsule Take 1 Capsule by mouth every evening. 90 Capsule 3    albuterol 108 (90 Base) MCG/ACT Aero Soln inhalation aerosol Inhale 2 Puffs every 6 hours as needed for Shortness of Breath. 8.5 g 11    atorvastatin (LIPITOR) 40 MG Tab Take 1 Tablet by mouth every evening. 90 Tablet 3    pantoprazole (PROTONIX) 40 MG Tablet Delayed Response Take 1 Tablet by mouth 2 times a day. 180 Tablet 3    nabumetone (RELAFEN) 750 MG Tab Take 1 Tablet by mouth 2 times a day with meals. 180 Tablet 3    gabapentin (NEURONTIN) 800 MG tablet Take 1 Tablet by mouth 2 times a day. 180 Tablet 3    allopurinol (ZYLOPRIM) 100 MG Tab Take 1 Tablet by mouth every day. 90 Tablet 3    levothyroxine (SYNTHROID) 137 MCG Tab Take 1 Tablet by mouth every morning on an empty stomach. 90 Tablet 2    QUEtiapine (SEROQUEL) 25 MG Tab Take 1 Tablet by mouth at bedtime. 90 Tablet 3    Cholecalciferol (VITAMIN D-3) 125 MCG (5000 UT) Tab Take 5,000 Units by mouth every evening.      VITAMIN E PO Take 1 Capsule by mouth every morning.       No current facility-administered medications on file prior to visit.         EXAMINATION     Physical Exam:   BP (!) 148/80 (BP Location: Right arm, Patient Position: Sitting, BP Cuff Size: Adult)   Pulse 78   Temp 36 °C (96.8 °F) (Temporal)   Ht 1.676 m (5' 6\")   Wt 116 kg (254 lb 13.6 oz)   SpO2 94%     Constitutional:   Body Habitus: Body mass index is 41.13 kg/m².  Cooperation: Fully cooperates with exam  Appearance: Well-groomed, well-nourished.    Eyes: No scleral icterus to suggest severe liver disease, no proptosis to suggest severe hyperthyroidism    ENT -no obvious auditory deficits, no noticeable facial droop     Skin -no rashes or lesions noted     Respiratory-  breathing " comfortably on room air, no audible wheezing    Cardiovascular-distal extremities warm and well perfused.  No lower extremity edema is noted.     Gastrointestinal - no obvious abdominal masses, non-distended    Psychiatric- alert and oriented ×3. Normal affect.     Gait -stable steady gait    Musculoskeletal and Neuro -     tenderness to palpation at   Shoulder girdle, left  Shoulder girdle, right  Upper arm, left  Upper arm, right  Lower arm, left  Lower arm, right  Hip (buttock, trochanter), left  Hip (buttock, trochanter), right  Upper leg, left  Upper leg, right  Lower leg, left  Lower leg, right  Upper back  Lower back  Neck    Thoracic/Lumbar Spine/Sacral Spine/Hips   Inspection: No evidence of atrophy in bilateral lower extremities throughout     There is limited active range of motion with lumbar extension    Facet loading maneuver positive bilaterally    Lumbar spine /hip provocative exam maneuvers  Straight leg raise negative bilaterally  FADIR test negative bilaterally    SI joint tests  SYLVIA test negative bilaterally  Thigh thrust test negative bilaterally    Key points for the international standards for neurological classification of spinal cord injury (ISNCSCI) to light touch.   Dermatome R L   L2 2 2   L3 2 2   L4 2 2   L5 2 2   S1 2 2   S2 2 2       Motor Exam Lower Extremities  ? Myotome R L   Hip flexion L2 5 5   Knee extension L3 5 5   Ankle dorsiflexion L4 5 5   Toe extension L5 5 5   Ankle plantarflexion S1 5 5       Reflexes  ?  R L   Patella  3+ 3+   Achilles   2+ 2+     Clonus of the ankle negative bilaterally       MEDICAL DECISION MAKING    Medical records review: see under HPI section.     DATA    Labs: Personally reviewed at today's visit:     Lab Results   Component Value Date/Time    SODIUM 138 11/06/2023 01:31 PM    POTASSIUM 4.2 11/06/2023 01:31 PM    CHLORIDE 101 11/06/2023 01:31 PM    CO2 23 11/06/2023 01:31 PM    ANION 14.0 11/06/2023 01:31 PM    GLUCOSE 125 (H) 11/06/2023 01:31  PM    BUN 26 (H) 11/06/2023 01:31 PM    CREATININE 0.97 11/06/2023 01:31 PM    CREATININE 0.84 04/18/2011 03:41 PM    CALCIUM 9.7 11/06/2023 01:31 PM    ASTSGOT 20 11/06/2023 01:31 PM    ALTSGPT 23 11/06/2023 01:31 PM    TBILIRUBIN 0.9 11/06/2023 01:31 PM    ALBUMIN 4.6 11/06/2023 01:31 PM    ALBUMIN 4.51 07/22/2015 02:28 PM    TOTPROTEIN 8.0 11/06/2023 01:31 PM    TOTPROTEIN 7.90 07/22/2015 02:28 PM    GLOBULIN 3.4 11/06/2023 01:31 PM    AGRATIO 1.4 11/06/2023 01:31 PM    AGRATIO 1.1 04/18/2011 03:41 PM       Lab Results   Component Value Date/Time    PROTHROMBTM 13.8 11/16/2022 11:03 AM    INR 1.07 11/16/2022 11:03 AM        Lab Results   Component Value Date/Time    WBC 9.8 07/14/2023 12:36 PM    WBC 10.8 (H) 10/06/2009 08:45 AM    RBC 4.61 07/14/2023 12:36 PM    RBC 4.58 10/06/2009 08:45 AM    HEMOGLOBIN 13.5 07/14/2023 12:36 PM    HEMATOCRIT 41.4 07/14/2023 12:36 PM    MCV 89.8 07/14/2023 12:36 PM    MCV 91 10/06/2009 08:45 AM    MCH 29.3 07/14/2023 12:36 PM    MCH 30.5 10/06/2009 08:45 AM    MCHC 32.6 07/14/2023 12:36 PM    MPV 10.8 07/14/2023 12:36 PM    NEUTSPOLYS 67.60 12/20/2022 03:22 PM    LYMPHOCYTES 20.90 (L) 12/20/2022 03:22 PM    MONOCYTES 7.40 12/20/2022 03:22 PM    EOSINOPHILS 3.10 12/20/2022 03:22 PM    BASOPHILS 0.50 12/20/2022 03:22 PM    HYPOCHROMIA 1+ 02/04/2014 11:38 AM    ANISOCYTOSIS 1+ 08/10/2011 08:20 PM        Lab Results   Component Value Date/Time    HBA1C 6.3 (A) 11/27/2023 09:42 AM        Imaging:   I personally reviewed following images, these are my reads    X-ray pelvis 2/25/2022  Mild OA of left hip joint.  No significant OA of right hip joint.  Bilateral SI joint sclerosis. See formal radiology report for further details.      IMAGING radiology reads. I reviewed the following radiology reads     Results for orders placed during the hospital encounter of 06/04/14    MR-BRAIN-WITH & W/O    Impression  1.  No evidence of acute territorial infarct, intracranial hemorrhage or mass  lesion.  2.  Mild diffuse cerebral and cerebellar substance loss.    Results for orders placed during the hospital encounter of 12/20/22    MR-BRAIN-W/O    Impression  1.  No acute abnormality.  2.  Mild chronic microvascular ischemic disease.             Results for orders placed during the hospital encounter of 01/22/22    MR-CERVICAL SPINE-W/O    Impression  1.  Multifocal degenerative disease in the cervical spine as described above.  2.  Severe right lateral recess and right C4 neural foraminal stenosis.  3.  Severe left C5 neural foraminal stenosis.  4.  There is an approximately 9 cm sized left thyroid nodule. This is increased since the previous study. Ultrasound-guided fine-needle aspiration is recommended.  .      Results for orders placed during the hospital encounter of 03/16/17    MR-LUMBAR SPINE-W/O    Impression  1.  Severe discal and moderate endplate degenerative changes at the L5-S1 level. Minimal endplate degenerative changes noted at multiple levels in the lumbar spine.    2.  Mild lumbar spondylotic changes at the L4-5 level with minimal lumbar spondylotic changes at the L3-4 level.    3.  Minimal cyst cephalad subligamentous disc extrusion at the L2-3 level causing minimal effacement of the ventral surface of thecal sac.    4.  Borderline central canal stenosis at the L2-3 and L4-5 level secondary to facet arthropathy.      Results for orders placed during the hospital encounter of 03/16/17    MR-THORACIC SPINE-W/O    Impression  1. Minimal thoracic spondylotic changes at the T5-6, T6-7, and a T8-9 levels.    2. Mild discal degenerative changes in the mid and lower thoracic spine.    Results for orders placed during the hospital encounter of 03/16/17    MR-LUMBAR SPINE-W/O    Impression  1.  Severe discal and moderate endplate degenerative changes at the L5-S1 level. Minimal endplate degenerative changes noted at multiple levels in the lumbar spine.    2.  Mild lumbar spondylotic changes at the  L4-5 level with minimal lumbar spondylotic changes at the L3-4 level.    3.  Minimal cyst cephalad subligamentous disc extrusion at the L2-3 level causing minimal effacement of the ventral surface of thecal sac.    4.  Borderline central canal stenosis at the L2-3 and L4-5 level secondary to facet arthropathy.      Results for orders placed during the hospital encounter of 06/04/14    MR-MRA NECK-W/O    Impression  Unremarkable MR angiogram of the carotid arteries and vertebral basilar system.       Results for orders placed during the hospital encounter of 08/16/10    MR-ORBITS, FACE, NECK-WITH & W/O AND SEQUENCES                             Results for orders placed during the hospital encounter of 01/19/19    DX-ANKLE 3+ VIEWS LEFT    Impression  No evidence of fracture or dislocation.    Soft tissue swelling.   Results for orders placed during the hospital encounter of 11/02/21    DX-CERVICAL SPINE-2 OR 3 VIEWS    Impression  1.  There is mild degenerative disc disease and arthropathy at the C5-6 and C6-7 levels.     Results for orders placed in visit on 07/26/18    DX-CHEST-2 VIEWS    Impression  Low lung volumes with bibasilar atelectasis. No focal consolidation or pleural effusions.     Results for orders placed during the hospital encounter of 08/10/19    DX-ELBOW-COMPLETE 3+ RIGHT    Impression  Findings suspicious for nondisplaced radial neck fracture. Correlate with point tenderness. Follow-up radiographs in 7-10 days may be performed.     Results for orders placed during the hospital encounter of 12/20/22    DX-FOOT-2- RIGHT    Impression  No acute osseous abnormality.   Results for orders placed in visit on 10/29/13    DX-FOOT-COMPLETE 3+    Impression  Proximal phalanx right fourth toe fracture.    INTERPRETING LOCATION:  [address] [place] [address], [id number]   Results for orders placed during the hospital encounter of 12/06/18    DX-FOREARM RIGHT    Impression  Unremarkable RIGHT forearm.       Results for  orders placed during the hospital encounter of 11/17/15    DX-HIP-COMPLETE - UNILATERAL 2+ RIGHT    Impression  1. No evidence of acute fracture or dislocation.    2. Mild degenerative changes.   Results for orders placed during the hospital encounter of 11    DX-HIPS-COMPLETE - BILATERAL 3+    Impression  Minimally sclerotic hip joints.  No acute fracture identified.      Results for orders placed in visit on 13    DX-KNEES-AP BILATERAL STANDING    Impression  Negative standing knee radiograph.    INTERPRETING LOCATION:  [facility], [id number]   Results for orders placed in visit on 12    DX-KNEE COMPLETE 4+    Impression  Unremarkable right knee series.   Results for orders placed during the hospital encounter of 12/06/15    DX-LUMBAR SPINE-2 OR 3 VIEWS    Impression  1.  There has been progression of L4-5 and L5-S1 disc disease and facet disease, right greater than left.    Results for orders placed during the hospital encounter of 22    DX-PELVIS-1 OR 2 VIEWS    Impression  1.  Marked degenerative changes lower lumbar spine and mildly sclerotic SI joints.    2.  Mild degenerative changes right hip joint.           Results for orders placed during the hospital encounter of 08/10/19    DX-SHOULDER 2+ RIGHT    Impression  No acute osseous abnormality.       Results for orders placed during the hospital encounter of 08/10/19    DX-WRIST-COMPLETE 3+ RIGHT    Impression  Acute-appearing triquetral fracture.         Diagnosis  Visit Diagnoses     ICD-10-CM   1. Impaired gait and mobility  R26.89   2. Other chronic pain  G89.29   3. Fibromyalgia  M79.7   4. Greater trochanteric bursitis of both hips  M70.61    M70.62   5. Lumbar spondylosis  M47.816   6. Bilateral groin pain  R10.31    R10.32         ASSESSMENT AND PLAN:  Ashly Meyer ( 1950) is a female   Ashly was seen today for new patient.    Diagnoses and all orders for this visit:    Impaired gait and  mobility    Other chronic pain    Fibromyalgia    Greater trochanteric bursitis of both hips  -     DX-HIP-BILATERAL-WITH PELVIS-3/4 VIEWS; Future  -     Referral to Pain Clinic    Lumbar spondylosis    Bilateral groin pain  -     DX-HIP-BILATERAL-WITH PELVIS-3/4 VIEWS; Future    Other orders  -     Naltrexone HCl Powder; Please take one 4.5mg capsule by mouth daily          PLAN  Physical Therapy: The patient has done over 6 months of PT for her current pain issues.  Continue home exercise program as able.  Discussed that we may consider new referral to PT pending pain relief from injections and medications. Would likely be better with water PT which the patient has done in the past.     Diagnostic workup: Personally reviewed at today's visit:    X-ray pelvis 2/25/2022   - order new x-ray hip given bilateral groin pain    Medications:   - ok to continue gabapentin, nabumetone, and duloxetine  - She inquired about down titrating medications.  Discussed that she may trial discontinuing nabumetone and see how her pain responds   - Trial low-dose naltrexone for neuropathic pain.  30-day supply sent to mail order pharmacy discussed that low-dose naltrexone (4.5 mg daily) is currently supported by research to possibly be effective for neuropathic pain but is not currently covered by insurance.  Discussed potential side effects of nightmares and counseled her that she may elect to discontinue this medication if she feels her side effects are too severe    Interventions:   - discussed b/l greater trochanteric bursa injections under ultrasound guidance.  The risks, benefits, and alternatives to this procedure were discussed and the patient wishes to proceed with the procedure. Risks include but are not limited to damage to surrounding structures, infection, bleeding, worsening of pain which can be permanent, and weakness which can be permanent. Benefits include pain relief and improved function. Alternatives include not  doing the procedure.    - Patient reportedly had hip injections with an outside provider with relief lasting 2-4 weeks at a time    Follow-up: Next available for greater trochanteric bursa injections and x-ray hip review    Orders Placed This Encounter    DX-HIP-BILATERAL-WITH PELVIS-3/4 VIEWS    Referral to Pain Clinic    Naltrexone HCl Powder       Patricia Ho MD  Interventional Pain and Spine  Physical Medicine and Rehabilitation  [facility] [place] Ahn M.D.     The above note documents my personal evaluation of this patient. In addition, I have reviewed and confirmed with the patient and MA the supportive information documented in today's Patient Health Questionnaire and Office Note.     Please note that this dictation was created using voice recognition software. I have made every reasonable attempt to correct obvious errors, but I expect that there are errors of grammar and possibly content that I did not discover before finalizing the note.

## 2023-12-19 DIAGNOSIS — E03.4 HYPOTHYROIDISM DUE TO ACQUIRED ATROPHY OF THYROID: ICD-10-CM

## 2023-12-19 RX ORDER — LEVOTHYROXINE SODIUM 137 UG/1
137 TABLET ORAL
Qty: 90 TABLET | Refills: 2 | Status: SHIPPED | OUTPATIENT
Start: 2023-12-19

## 2024-01-05 ENCOUNTER — OFFICE VISIT (OUTPATIENT)
Dept: PHYSICAL MEDICINE AND REHAB | Facility: MEDICAL CENTER | Age: 74
End: 2024-01-05
Payer: MEDICARE

## 2024-01-05 VITALS
BODY MASS INDEX: 40.43 KG/M2 | HEIGHT: 66 IN | DIASTOLIC BLOOD PRESSURE: 78 MMHG | TEMPERATURE: 97.6 F | OXYGEN SATURATION: 94 % | WEIGHT: 251.54 LBS | SYSTOLIC BLOOD PRESSURE: 136 MMHG | HEART RATE: 92 BPM

## 2024-01-05 DIAGNOSIS — M70.62 GREATER TROCHANTERIC BURSITIS OF BOTH HIPS: ICD-10-CM

## 2024-01-05 DIAGNOSIS — M70.61 GREATER TROCHANTERIC BURSITIS OF BOTH HIPS: ICD-10-CM

## 2024-01-05 PROCEDURE — 1170F FXNL STATUS ASSESSED: CPT | Performed by: STUDENT IN AN ORGANIZED HEALTH CARE EDUCATION/TRAINING PROGRAM

## 2024-01-05 PROCEDURE — 1125F AMNT PAIN NOTED PAIN PRSNT: CPT | Performed by: STUDENT IN AN ORGANIZED HEALTH CARE EDUCATION/TRAINING PROGRAM

## 2024-01-05 PROCEDURE — 20611 DRAIN/INJ JOINT/BURSA W/US: CPT | Mod: 50 | Performed by: STUDENT IN AN ORGANIZED HEALTH CARE EDUCATION/TRAINING PROGRAM

## 2024-01-05 PROCEDURE — 3075F SYST BP GE 130 - 139MM HG: CPT | Performed by: STUDENT IN AN ORGANIZED HEALTH CARE EDUCATION/TRAINING PROGRAM

## 2024-01-05 PROCEDURE — 3078F DIAST BP <80 MM HG: CPT | Performed by: STUDENT IN AN ORGANIZED HEALTH CARE EDUCATION/TRAINING PROGRAM

## 2024-01-05 RX ORDER — DEXAMETHASONE SODIUM PHOSPHATE 4 MG/ML
4 INJECTION, SOLUTION INTRA-ARTICULAR; INTRALESIONAL; INTRAMUSCULAR; INTRAVENOUS; SOFT TISSUE ONCE
Status: SHIPPED | OUTPATIENT
Start: 2024-01-05 | End: 2024-01-08

## 2024-01-05 ASSESSMENT — PATIENT HEALTH QUESTIONNAIRE - PHQ9: CLINICAL INTERPRETATION OF PHQ2 SCORE: 0

## 2024-01-05 ASSESSMENT — PAIN SCALES - GENERAL: PAINLEVEL: 9=SEVERE PAIN

## 2024-01-05 ASSESSMENT — FIBROSIS 4 INDEX: FIB4 SCORE: 1.63

## 2024-01-05 NOTE — PROCEDURES
Patient Name: Ashly Meyer  : 1950  Date of Service: 2024    Physician/s: Patricia Ho MD    Pre-operative Diagnosis: right and left greater trochanteric bursitis    Post-operative Diagnosis: right and left greater trochanteric bursitis    Procedure: right and left greater trochanteric bursa injection ultrasound-guided    Description of procedure:    The risks, benefits, and alternatives of the procedure were reviewed and discussed with the patient.  Written informed consent was freely obtained. A pre-procedural time-out was conducted by the physician verifying patient’s identity, procedure to be performed, procedure site and side, and allergy verification. Appropriate equipment was determined to be in place for the procedure.     No sedation was used for this procedure.     In the office suite the patient was placed in a lateral position with the left side up, and the skin was prepped and draped in the usual sterile fashion. The ultrasound probe was placed over the left greater trochanter and the target for injection was marked. The skin and subcutaneous tissues were anesthetized with approximately 2 cc of 1% lidocaine.  A 25g 3.5 inch needle was placed into skin and advanced under ultrasound guidance into the greater trochanteric bursa. Following negative aspiration, approx 3mL of 1% lidocaine with 1mL of 4mg/mL dexamethasone was then injected into the bursa, and the needle was subsequently removed intact after being restyleted. The patient's hip was wiped with a 4x4 gauze, the area was cleansed with alcohol prep, and a bandaid was applied. There were no complications noted. The images were saved for permanent storage.     Then the patient was placed in a lateral position with the right side up, and the skin was prepped and draped in the usual sterile fashion. The ultrasound probe was placed over the right greater trochanter and the target for injection was marked. The skin and subcutaneous  tissues were anesthetized with approximately 2 cc of 1% lidocaine. A 25g 3.5 inch needle was placed into skin and advanced under ultrasound guidance into the greater trochanteric bursa. Following negative aspiration, approx 3mL of 1% lidocaine with 1mL of 4mg/mL dexamethasone was then injected into the bursa, and the needle was subsequently removed intact after being restyleted. The patient's hip was wiped with a 4x4 gauze, the area was cleansed with alcohol prep, and a bandaid was applied. There were no complications noted. The images were saved for permanent storage.     Patricia Ho MD  Interventional Pain and Spine  Physical Medicine and Rehabilitation  [facility]

## 2024-01-25 NOTE — PROGRESS NOTES
Follow-up patient Note    Interventional Pain and Spine  Physiatry (Physical Medicine and Rehabilitation)     Patient Name: Ashly Meyer  : 1950  Date of service: 2024    Chief Complaint:   Chief Complaint   Patient presents with    Follow-Up     Hip pain       HISTORY FROM INITIAL VISIT (12/15/2023):  Ashly Meyer is a 73 y.o. female who presents today with chronic pain at her low back, shoulders, hips, pelvic region, legs, and feet for decades. Pain is worst at her bilateral groin and greater trochanter. Notes hx of fibromyalgia, neuropathy, gout, R shoulder sx.     Pain right now is 8/10 on the numeric pain scale.  Pain worsens with physical activity and improves only marginally with medication. Her pain significantly interferes with ADLs.      The patient has done physical therapy for this problem, most recently last month. Denies relief from this.     Patient has tried the following medications with varied success (current meds in bold):   Nabumetone - takes the edge off  Gabapentin - takes the edge off of neuropathy  Duloxetine     Therapeutic modalities and interventional therapies to date include:  -left hip and shoulder injections at [facility] - 2-4 weeks relief     Medical history includes hypertension, KERRI, peripheral neuropathy, type 2 diabetes, essential tremor.    HPI  Today's visit   Ashly Meyer ( 1950) is a female with Diagnoses of Right lumbar radiculitis, Cervical radiculitis, Numbness and tingling of right arm, Cervicalgia, Myalgia, Numbness and tingling in left arm, Greater trochanteric bursitis of both hips, Impaired gait and mobility, Other chronic pain, Fibromyalgia, Lumbar spondylosis, Bilateral groin pain, Neuroforaminal stenosis of cervical spine, and Cervical spondylosis were pertinent to this visit.    Today Ashly presents for follow-up after bilateral greater trochanteric bursa injections.  She reports resolution of pain at her left greater  trochanter.  She reports 50% improvement in pain at her right greater trochanter.  She reports ongoing radiating pain from her right waistline down her lateral right thigh to her knee along with impaired sensation at this area.    She also reports bilateral neck pain and pain at her upper trapezius.  Occasionally pain radiates past her shoulder into her right arm.  She has occasional numbness and tingling in bilateral arms worse on the right.  She remembers receiving cervical epidurals many years ago and does not remember if they helped.    Reports popping in right shoulder after right shoulder surgery.    She discontinued Relafen since her last visit with me and denies significant change in her pain.  She elected not to fill the prescription for low-dose naltrexone for cost reasons.    Of note she has had worsened dizziness and has a follow-up scheduled with her primary care provider on Monday to discuss this.    Pain severity 6/10 currently  Pt denies new numbness, tingling, or weakness.    Procedure history:  - 1/5/24 right and left greater trochanteric bursa injection ultrasound-guided - resolution of left sided pain, 50% improvement in right sided pain.       ROS:   Red Flags ROS:   Fever, Chills, Sweats: Denies  Involuntary Weight Loss: Denies  Bladder Incontinence: Denies  Bowel Incontinence: denies  Saddle Anesthesia: Denies    All other systems reviewed and negative.     PMHx:   Past Medical History:   Diagnosis Date    Anginal syndrome (HCC)     Anxiety     Arthritis     Everywhere.     Atrophic rhinitis 10/3/2016    Back pain     Bronchitis 08/2021    finished 1st course of ABX to start a 2nd course soon.     Carotid artery stenosis, unilateral     moderate left carotid artery stenosis    Carpal tunnel syndrome 9/5/2011    Chronic pain     Constipation     Controlled type 2 diabetes mellitus without complication (HCC)     states borderline    Controlled type 2 diabetes mellitus without complication,  without long-term current use of insulin ([facility]) 6/12/2019    Cough 08/2021    r/t bronchitis    Current severe episode of major depressive disorder without psychotic features without prior episode ([facility]) 6/3/2021    Daytime sleepiness     Degenerative disc disease     Depression 5/20/2009    Deviated nasal septum 8/1/2016    Diarrhea     and constipation    Disease of accessory sinus 10/3/2016    Dizziness     Dry eye syndrome, bilateral     Elevated sedimentation rate     history of negative temporal artery biopsy around 2006    Fatigue     Fatty liver     Fibromyalgia     confirmed by Dr. 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    Hypertension     Hypothyroidism 5/20/2009    Incipient cataract of both eyes     Incomplete rectal prolapse 9/3/2021    Insomnia     Intention tremor 6/2/2022    Migraine without aura, without mention of intractable migraine without mention of status migrainosus     Mild intermittent asthma without complication     inhalers as needed    Mixed dyslipidemia     Morbid obesity with BMI of 45.0-49.9, adult ([facility])     Morning headache     Mumps     Nasal drainage     Nocturnal hypoxia     severe, to 69% O2 sat    Obesity     Obesity hypoventilation syndrome ([facility]) 5/31/2017    Pelvic floor dysfunction     Peripheral neuropathy     Pleomorphic adenoma     Polymyalgia rheumatica ([facility])     Dr. Ann    Restless leg syndrome     Ringing in ears     Rotator cuff syndrome of right shoulder and allied disorders 10/10/2018    S/P tonsillectomy     Seasonal allergies     Skin cancer 1990's    skin    Sleep apnea syndrome     she is not  using CPAP, claustrophobia, uses 2-3l at night via concentrator occ 2l during daytime ([facility])    Snoring     Sore muscles     Sweat, sweating, excessive     Swelling of lower extremity     Thyroid carcinoma (HCC) 7/22/2023    Found surgically July 2023    Trochanteric bursitis of both hips 3/3/2022    Urinary incontinence     will not wear a pad    Vision loss     Weakness        PSHx:   Past Surgical History:   Procedure Laterality Date    THYROIDECTOMY TOTAL Right 8/23/2023    Procedure: RIGHT COMPLETION THYROIDECTOMY;  Surgeon: Michael Mancuso M.D.;  Location: SURGERY SAME DAY [facility];  Service: Ent    THYROIDECTOMY Left 7/17/2023    Procedure: LEFT ELOY- THYROIDECTOMY;  Surgeon: Michael Mancuso M.D.;  Location: SURGERY SAME [facility];  Service: Ent    CT UPPER GI ENDOSCOPY,DIAGNOSIS N/A 08/26/2021    Procedure: GASTROSCOPY;  Surgeon: Marty Lopez M.D.;  Location: [facility];  Service: Gastroenterology    CT COLONOSCOPY,DIAGNOSTIC  08/26/2021    Procedure: COLONOSCOPY - WITH BIOPSIES.;  Surgeon: Mraty Lopez M.D.;  Location: [facility];  Service: Gastroenterology    SHOULDER DECOMPRESSION ARTHROSCOPIC Right 02/01/2019    Procedure: SHOULDER DECOMPRESSION ARTHROSCOPIC - SUBACROMIAL, LABRAL DEBRIDEMENT;  Surgeon: Damaris Knutson M.D.;  Location: [facility];  Service: Orthopedics    SHOULDER ARTHROSCOPY W/ BICIPITAL TENODESIS REPAIR Right 02/01/2019    Procedure: SHOULDER ARTHROSCOPY W/ BICIPITAL TENOTOMY;  Surgeon: Damaris Knutson M.D.;  Location: [facility];  Service: Orthopedics    CARPAL TUNNEL RELEASE Right 02/01/2019    Procedure: CARPAL TUNNEL RELEASE;  Surgeon: Damaris Knutson M.D.;  Location: [facility];  Service: Orthopedics    GASTROSCOPY  02/06/2017    Procedure: GASTROSCOPY;  Surgeon: Pau Foster M.D.;  Location: [facility];  Service:     COLONOSCOPY  02/06/2017    Procedure: COLONOSCOPY;   Surgeon: Pau Foster M.D.;  Location: SURGERY SAME DAY [facility];  Service:     SEPTAL RECONSTRUCTION  10/03/2016    Procedure: SEPTAL RECONSTRUCTION with  grafts ;  Surgeon: PIETER Dickson M.D.;  Location: SURGERY SAME DAY [facility];  Service:     SEPTOPLASTY N/A 08/01/2016    Procedure: SEPTOPLASTY;  Surgeon: PIETER Dickson M.D.;  Location: SURGERY SAME DAY [facility];  Service:     TURBINOPLASTY Bilateral 08/01/2016    Procedure: TURBINOPLASTY;  Surgeon: PIETER Dickson M.D.;  Location: SURGERY SAME DAY [facility];  Service:     NASAL FRACTURE REDUCTION OPEN N/A 08/01/2016    Procedure: SEPTAL FRACTURE REDUCTION OPEN;  Surgeon: PIETER Dickson M.D.;  Location: SURGERY SAME DAY [facility];  Service:     FINE NEEDLE ASPIRATION  11/16/2009    Performed by DA CALLOWAY at ENDOSCOPY [facility]    COLONOSCOPY  2006    ? unclear date    OTHER SURGICAL PROCEDURE  1998    vaginal wall repair    BLADDER SUSPENSION  1983    HYSTERECTOMY, VAGINAL  1983    ovaries are present, excessive bleeding    TUBAL LIGATION  1980    MASS EXCISION GENERAL Right 1956    had carbuncle removal R thigh    TONSILLECTOMY  1953    ANAL SPHINCTEROTOMY      anal muscle repair    CATARACT EXTRACTION WITH IOL Bilateral     CHOLECYSTECTOMY      HYSTERECTOMY LAPAROSCOPY      OTHER ORTHOPEDIC SURGERY  1962/1980/1983/1985    foot surgery     SCAR REVISION Right     thigh scar        Family Hx:   Family History   Problem Relation Age of Onset    Heart Disease Mother         Arotic vaulve replacement    GI Disease Mother         bile duct problem    Bladder cancer Mother     GI Disease Sister         celiac    Arthritis Sister     Other Sister         gout and lupus    Arthritis Sister     Kidney Disease Sister     GI Disease Sister     Bladder cancer Maternal Aunt     Arthritis Maternal Grandmother     Hypertension Maternal Grandmother     Heart Disease Maternal Grandmother     Hypertension Maternal  Grandfather     Heart Disease Maternal Grandfather     Arthritis Maternal Grandfather     No Known Problems Paternal Grandmother     No Known Problems Paternal Grandfather     Cancer Brother         Larynx    Cancer Daughter         non hopskins limphoma    GI Disease Daughter     Bipolar disorder Daughter     Seizures Daughter     Bipolar disorder Daughter        Social Hx:  Social History     Socioeconomic History    Marital status:      Spouse name: Not on file    Number of children: Not on file    Years of education: Not on file    Highest education level: Not on file   Occupational History    Not on file   Tobacco Use    Smoking status: Former     Current packs/day: 0.00     Average packs/day: 1 pack/day for 45.2 years (45.2 ttl pk-yrs)     Types: Cigarettes     Start date:      Quit date: 3/1/2007     Years since quittin.9    Smokeless tobacco: Never    Tobacco comments:     Started smoking at age 15, continued abstinance    Vaping Use    Vaping Use: Never used   Substance and Sexual Activity    Alcohol use: Not Currently    Drug use: Not Currently     Comment: CBD topical pain cream    Sexual activity: Not Currently     Partners: Male   Other Topics Concern     Service No    Blood Transfusions No    Caffeine Concern Not Asked    Occupational Exposure No    Hobby Hazards No    Sleep Concern No    Stress Concern No    Weight Concern No    Special Diet No    Back Care No    Exercise No    Bike Helmet No    Seat Belt Yes    Self-Exams No   Social History Narrative    Not on file     Social Determinants of Health     Financial Resource Strain: Not on file   Food Insecurity: Not on file   Transportation Needs: Not on file   Physical Activity: Not on file   Stress: Not on file   Social Connections: Not on file   Intimate Partner Violence: Not on file   Housing Stability: Not on file       Allergies:  Allergies   Allergen Reactions    Ace Inhibitors Cough    Benadryl Allergy Hives     Hot  "skin itching    Iodine Nausea     Pt received CT w/ contrast 12/20/22 pt had nausea post contrast     Lamictal Rash and Swelling     \"hot\", unable to function.  Severe rash, scarring    Savella [Kdc:Milnacipran+Ci Pigment Blue 63] Nausea, Swelling and Anxiety     Swelling, bone and muscle pain, sweating, nausea, dizziness, sleeplessness, anxiety    Savella [Milnacipran Hcl] Unspecified     Muscle aches, feet swelling    Sulfa Drugs Hives, Shortness of Breath and Anxiety     Hard breathing, shortness of breath    Butalbital-Acetaminophen Unspecified     Dizzy, can't walk, hard to focus    Cefaclor Nausea and Unspecified     Ceclor causes light headedness and dizziness    Morphine Itching     \"Redness\"    Penicillins Itching and Swelling     Skin itching and redness    Tizanidine Hcl Nausea     \"Dizziness\" hard to focus    Milnacipran Vomiting and Nausea    Amlactin Rash and Itching     Lotion causes itching, redness and burnng    Tape Rash and Itching     Skin tears, paper tape is OK per Pt       Medications: reviewed on epic.   Outpatient Medications Marked as Taking for the 1/26/24 encounter (Office Visit) with Patricia Ho M.D.   Medication Sig Dispense Refill    levothyroxine (SYNTHROID) 137 MCG Tab TAKE ONE TABLET BY MOUTH EVERY MORNING ON AN EMPTY STOMACH 90 Tablet 2    calcitonin, salmon, (MIACALCIN) 200 UNIT/ACT Solution Administer 1 Spray into affected nostril(S) every day.      liothyronine (CYTOMEL) 5 MCG Tab Take 5 mcg by mouth every day.      traMADol (ULTRAM) 50 MG Tab Take 50 mg by mouth every four hours as needed.      DULoxetine (CYMBALTA) 60 MG Cap DR Particles delayed-release capsule Take 1 Capsule by mouth every evening. 90 Capsule 3    albuterol 108 (90 Base) MCG/ACT Aero Soln inhalation aerosol Inhale 2 Puffs every 6 hours as needed for Shortness of Breath. 8.5 g 11    atorvastatin (LIPITOR) 40 MG Tab Take 1 Tablet by mouth every evening. 90 Tablet 3    pantoprazole (PROTONIX) 40 MG Tablet " Delayed Response Take 1 Tablet by mouth 2 times a day. 180 Tablet 3    nabumetone (RELAFEN) 750 MG Tab Take 1 Tablet by mouth 2 times a day with meals. 180 Tablet 3    gabapentin (NEURONTIN) 800 MG tablet Take 1 Tablet by mouth 2 times a day. 180 Tablet 3    allopurinol (ZYLOPRIM) 100 MG Tab Take 1 Tablet by mouth every day. 90 Tablet 3    QUEtiapine (SEROQUEL) 25 MG Tab Take 1 Tablet by mouth at bedtime. 90 Tablet 3    Cholecalciferol (VITAMIN D-3) 125 MCG (5000 UT) Tab Take 5,000 Units by mouth every evening.      VITAMIN E PO Take 1 Capsule by mouth every morning.          Current Outpatient Medications on File Prior to Visit   Medication Sig Dispense Refill    levothyroxine (SYNTHROID) 137 MCG Tab TAKE ONE TABLET BY MOUTH EVERY MORNING ON AN EMPTY STOMACH 90 Tablet 2    calcitonin, salmon, (MIACALCIN) 200 UNIT/ACT Solution Administer 1 Spray into affected nostril(S) every day.      liothyronine (CYTOMEL) 5 MCG Tab Take 5 mcg by mouth every day.      traMADol (ULTRAM) 50 MG Tab Take 50 mg by mouth every four hours as needed.      DULoxetine (CYMBALTA) 60 MG Cap DR Particles delayed-release capsule Take 1 Capsule by mouth every evening. 90 Capsule 3    albuterol 108 (90 Base) MCG/ACT Aero Soln inhalation aerosol Inhale 2 Puffs every 6 hours as needed for Shortness of Breath. 8.5 g 11    atorvastatin (LIPITOR) 40 MG Tab Take 1 Tablet by mouth every evening. 90 Tablet 3    pantoprazole (PROTONIX) 40 MG Tablet Delayed Response Take 1 Tablet by mouth 2 times a day. 180 Tablet 3    nabumetone (RELAFEN) 750 MG Tab Take 1 Tablet by mouth 2 times a day with meals. 180 Tablet 3    gabapentin (NEURONTIN) 800 MG tablet Take 1 Tablet by mouth 2 times a day. 180 Tablet 3    allopurinol (ZYLOPRIM) 100 MG Tab Take 1 Tablet by mouth every day. 90 Tablet 3    QUEtiapine (SEROQUEL) 25 MG Tab Take 1 Tablet by mouth at bedtime. 90 Tablet 3    Cholecalciferol (VITAMIN D-3) 125 MCG (5000 UT) Tab Take 5,000 Units by mouth every evening.  "     VITAMIN E PO Take 1 Capsule by mouth every morning.       No current facility-administered medications on file prior to visit.         EXAMINATION     Physical Exam:   /88 (BP Location: Right arm, Patient Position: Sitting, BP Cuff Size: Adult)   Pulse 88   Temp 36.6 °C (97.8 °F) (Temporal)   Ht 1.689 m (5' 6.5\")   Wt 110 kg (242 lb 8.1 oz)   SpO2 98%     Constitutional:   Body Habitus: Body mass index is 38.56 kg/m².  Cooperation: Fully cooperates with exam  Appearance: Well-groomed, well-nourished.    Eyes: No scleral icterus to suggest severe liver disease, no proptosis to suggest severe hyperthyroidism    ENT -no obvious auditory deficits, no noticeable facial droop     Skin -no rashes or lesions noted     Respiratory-  breathing comfortably on room air, no audible wheezing    Cardiovascular-distal extremities warm and well perfused.  No lower extremity edema is noted.     Gastrointestinal - no obvious abdominal masses, non-distended    Psychiatric- alert and oriented ×3. Normal affect.     Musculoskeletal      Cervical spine   Inspection: No deformities of the skin over the cervical spine. No rashes or lesions.    Spurling's sign  negative bilaterally  Cervical facet loading maneuver  negative bilaterally    No signs of muscular atrophy in bilateral upper extremities     Tenderness to palpation at paracervical muscles bilaterally, cervical facets bilaterally, and upper trapezius bilaterally.     Key points for the international standards for neurological classification of spinal cord injury (ISNCSCI) to light touch.     Dermatome R L   C4 1 1   C5 2 2   C6 2 2   C7 1 1   C8 1 1   T1 2 2   T2 2 2       Motor Exam Upper Extremities   ? Myotome R L   Shoulder abduction C5 5 5   Elbow flexion C5 5 5   Wrist extension C6 5 5   Elbow extension C7 5 5   Finger flexion C8 5 5   Finger abduction T1 5 5     tenderness to palpation at   Shoulder girdle, left  Shoulder girdle, right  Upper arm, left  Upper " arm, right  Lower arm, left  Lower arm, right  Hip (buttock, trochanter), left  Hip (buttock, trochanter), right  Upper leg, left  Upper leg, right  Lower leg, left  Lower leg, right  Upper back  Lower back  Neck     Thoracic/Lumbar Spine/Sacral Spine/Hips   Inspection: No evidence of atrophy in bilateral lower extremities throughout      There is limited active range of motion with lumbar extension     Facet loading maneuver positive bilaterally     Lumbar spine /hip provocative exam maneuvers  Straight leg raise negative bilaterally  FADIR test negative bilaterally     SI joint tests  SYLVIA test negative bilaterally  Thigh thrust test negative bilaterally     Key points for the international standards for neurological classification of spinal cord injury (ISNCSCI) to light touch.   Dermatome R L   L2 2 2   L3 2 2   L4 2 2   L5 2 2   S1 2 2   S2 2 2         Motor Exam Lower Extremities  ? Myotome R L   Hip flexion L2 5 5   Knee extension L3 5 5   Ankle dorsiflexion L4 5 5   Toe extension L5 5 5   Ankle plantarflexion S1 5 5         MEDICAL DECISION MAKING    Medical records review: see under HPI section.     DATA    Labs: No new labs available for review since last visit.   Lab Results   Component Value Date/Time    SODIUM 138 11/06/2023 01:31 PM    POTASSIUM 4.2 11/06/2023 01:31 PM    CHLORIDE 101 11/06/2023 01:31 PM    CO2 23 11/06/2023 01:31 PM    ANION 14.0 11/06/2023 01:31 PM    GLUCOSE 125 (H) 11/06/2023 01:31 PM    BUN 26 (H) 11/06/2023 01:31 PM    CREATININE 0.97 11/06/2023 01:31 PM    CREATININE 0.84 04/18/2011 03:41 PM    CALCIUM 9.7 11/06/2023 01:31 PM    ASTSGOT 20 11/06/2023 01:31 PM    ALTSGPT 23 11/06/2023 01:31 PM    TBILIRUBIN 0.9 11/06/2023 01:31 PM    ALBUMIN 4.6 11/06/2023 01:31 PM    ALBUMIN 4.51 07/22/2015 02:28 PM    TOTPROTEIN 8.0 11/06/2023 01:31 PM    TOTPROTEIN 7.90 07/22/2015 02:28 PM    GLOBULIN 3.4 11/06/2023 01:31 PM    AGRATIO 1.4 11/06/2023 01:31 PM    AGRATIO 1.1 04/18/2011 03:41  PM       Lab Results   Component Value Date/Time    PROTHROMBTM 13.8 11/16/2022 11:03 AM    INR 1.07 11/16/2022 11:03 AM        Lab Results   Component Value Date/Time    WBC 9.8 07/14/2023 12:36 PM    WBC 10.8 (H) 10/06/2009 08:45 AM    RBC 4.61 07/14/2023 12:36 PM    RBC 4.58 10/06/2009 08:45 AM    HEMOGLOBIN 13.5 07/14/2023 12:36 PM    HEMATOCRIT 41.4 07/14/2023 12:36 PM    MCV 89.8 07/14/2023 12:36 PM    MCV 91 10/06/2009 08:45 AM    MCH 29.3 07/14/2023 12:36 PM    MCH 30.5 10/06/2009 08:45 AM    MCHC 32.6 07/14/2023 12:36 PM    MPV 10.8 07/14/2023 12:36 PM    NEUTSPOLYS 67.60 12/20/2022 03:22 PM    LYMPHOCYTES 20.90 (L) 12/20/2022 03:22 PM    MONOCYTES 7.40 12/20/2022 03:22 PM    EOSINOPHILS 3.10 12/20/2022 03:22 PM    BASOPHILS 0.50 12/20/2022 03:22 PM    HYPOCHROMIA 1+ 02/04/2014 11:38 AM    ANISOCYTOSIS 1+ 08/10/2011 08:20 PM        Lab Results   Component Value Date/Time    HBA1C 6.3 (A) 11/27/2023 09:42 AM        Imaging:   I personally reviewed following images, these are my reads  X-ray pelvis 2/25/2022  Mild OA of left hip joint.  No significant OA of right hip joint.  Bilateral SI joint sclerosis. See formal radiology report for further details.     XR hip 12/15/23  No significant OA of either hip.    MRI cervical spine 1/22/2022  At C3-4 there is severe right neuroforaminal stenosis.  At C4-5 there is severe left neuroforaminal stenosis.  At C5-6 there is severe bilateral neuroforaminal stenosis.  At C6-7 there is moderate bilateral neuroforaminal stenosis. See formal radiology report for further details.    IMAGING radiology reads. I reviewed the following radiology reads     Results for orders placed during the hospital encounter of 06/04/14     MR-BRAIN-WITH & W/O     Impression  1.  No evidence of acute territorial infarct, intracranial hemorrhage or mass lesion.  2.  Mild diffuse cerebral and cerebellar substance loss.    Results for orders placed during the hospital encounter of 12/20/22      MR-BRAIN-W/O     Impression  1.  No acute abnormality.  2.  Mild chronic microvascular ischemic disease.             Results for orders placed during the hospital encounter of 01/22/22     MR-CERVICAL SPINE-W/O     Impression  1.  Multifocal degenerative disease in the cervical spine as described above.  2.  Severe right lateral recess and right C4 neural foraminal stenosis.  3.  Severe left C5 neural foraminal stenosis.  4.  There is an approximately 9 cm sized left thyroid nodule. This is increased since the previous study. Ultrasound-guided fine-needle aspiration is recommended.  .      Results for orders placed during the hospital encounter of 03/16/17     MR-LUMBAR SPINE-W/O     Impression  1.  Severe discal and moderate endplate degenerative changes at the L5-S1 level. Minimal endplate degenerative changes noted at multiple levels in the lumbar spine.     2.  Mild lumbar spondylotic changes at the L4-5 level with minimal lumbar spondylotic changes at the L3-4 level.     3.  Minimal cyst cephalad subligamentous disc extrusion at the L2-3 level causing minimal effacement of the ventral surface of thecal sac.     4.  Borderline central canal stenosis at the L2-3 and L4-5 level secondary to facet arthropathy.      Results for orders placed during the hospital encounter of 03/16/17     MR-THORACIC SPINE-W/O     Impression  1. Minimal thoracic spondylotic changes at the T5-6, T6-7, and a T8-9 levels.     2. Mild discal degenerative changes in the mid and lower thoracic spine.    Results for orders placed during the hospital encounter of 03/16/17     MR-LUMBAR SPINE-W/O     Impression  1.  Severe discal and moderate endplate degenerative changes at the L5-S1 level. Minimal endplate degenerative changes noted at multiple levels in the lumbar spine.     2.  Mild lumbar spondylotic changes at the L4-5 level with minimal lumbar spondylotic changes at the L3-4 level.     3.  Minimal cyst cephalad subligamentous disc  extrusion at the L2-3 level causing minimal effacement of the ventral surface of thecal sac.     4.  Borderline central canal stenosis at the L2-3 and L4-5 level secondary to facet arthropathy.      Results for orders placed during the hospital encounter of 06/04/14     MR-MRA NECK-W/O     Impression  Unremarkable MR angiogram of the carotid arteries and vertebral basilar system.       Results for orders placed during the hospital encounter of 08/16/10     MR-ORBITS, FACE, NECK-WITH & W/O AND SEQUENCES                              Results for orders placed during the hospital encounter of 01/19/19     DX-ANKLE 3+ VIEWS LEFT     Impression  No evidence of fracture or dislocation.     Soft tissue swelling.   Results for orders placed during the hospital encounter of 11/02/21     DX-CERVICAL SPINE-2 OR 3 VIEWS     Impression  1.  There is mild degenerative disc disease and arthropathy at the C5-6 and C6-7 levels.     Results for orders placed in visit on 07/26/18     DX-CHEST-2 VIEWS     Impression  Low lung volumes with bibasilar atelectasis. No focal consolidation or pleural effusions.     Results for orders placed during the hospital encounter of 08/10/19     DX-ELBOW-COMPLETE 3+ RIGHT     Impression  Findings suspicious for nondisplaced radial neck fracture. Correlate with point tenderness. Follow-up radiographs in 7-10 days may be performed.     Results for orders placed during the hospital encounter of 12/20/22     DX-FOOT-2- RIGHT     Impression  No acute osseous abnormality.   Results for orders placed in visit on 10/29/13     DX-FOOT-COMPLETE 3+     Impression  Proximal phalanx right fourth toe fracture.     INTERPRETING LOCATION:  [address] [place] [address], [id number]   Results for orders placed during the hospital encounter of 12/06/18     DX-FOREARM RIGHT     Impression  Unremarkable RIGHT forearm.       Results for orders placed during the hospital encounter of 11/17/15     DX-HIP-COMPLETE - UNILATERAL 2+ RIGHT      Impression  1. No evidence of acute fracture or dislocation.     2. Mild degenerative changes.   Results for orders placed during the hospital encounter of 09/02/11     DX-HIPS-COMPLETE - BILATERAL 3+     Impression  Minimally sclerotic hip joints.  No acute fracture identified.      Results for orders placed in visit on 09/23/13     DX-KNEES-AP BILATERAL STANDING     Impression  Negative standing knee radiograph.     INTERPRETING LOCATION:  [facility], [id number]   Results for orders placed in visit on 05/21/12     DX-KNEE COMPLETE 4+     Impression  Unremarkable right knee series.   Results for orders placed during the hospital encounter of 12/06/15     DX-LUMBAR SPINE-2 OR 3 VIEWS     Impression  1.  There has been progression of L4-5 and L5-S1 disc disease and facet disease, right greater than left.    Results for orders placed during the hospital encounter of 02/25/22     DX-PELVIS-1 OR 2 VIEWS     Impression  1.  Marked degenerative changes lower lumbar spine and mildly sclerotic SI joints.     2.  Mild degenerative changes right hip joint.           Results for orders placed during the hospital encounter of 08/10/19     DX-SHOULDER 2+ RIGHT     Impression  No acute osseous abnormality.       Results for orders placed during the hospital encounter of 08/10/19     DX-WRIST-COMPLETE 3+ RIGHT     Impression  Acute-appearing triquetral fracture.              Diagnosis  Visit Diagnoses     ICD-10-CM   1. Right lumbar radiculitis  M54.16   2. Cervical radiculitis  M54.12   3. Numbness and tingling of right arm  R20.0    R20.2   4. Cervicalgia  M54.2   5. Myalgia  M79.10   6. Numbness and tingling in left arm  R20.0    R20.2   7. Greater trochanteric bursitis of both hips  M70.61    M70.62   8. Impaired gait and mobility  R26.89   9. Other chronic pain  G89.29   10. Fibromyalgia  M79.7   11. Lumbar spondylosis  M47.816   12. Bilateral groin pain  R10.31    R10.32   13. Neuroforaminal stenosis of cervical  spine  M48.02   14. Cervical spondylosis  M47.812         ASSESSMENT AND PLAN:  Ashly Meyer (: 1950) is a female with      Ashly was seen today for follow-up.    Diagnoses and all orders for this visit:    Right lumbar radiculitis  -     MR-LUMBAR SPINE-W/O; Future    Cervical radiculitis  -     Referral to Physical Therapy    Numbness and tingling of right arm  -     Referral to Physical Therapy    Cervicalgia  -     Referral to Physical Therapy    Myalgia  -     Referral to Physical Therapy    Numbness and tingling in left arm  -     Referral to Physical Therapy    Greater trochanteric bursitis of both hips    Impaired gait and mobility    Other chronic pain    Fibromyalgia    Lumbar spondylosis    Bilateral groin pain    Neuroforaminal stenosis of cervical spine    Cervical spondylosis          PLAN  Physical Therapy: Referral to physical therapy for her neck pain with possible component of myalgia, facetogenic pain, and radicular pain.       Diagnostic workup: Personally reviewed at today's visit:    XR hip 12/15/23, MRI cervical spine 2022  - Order MRI lumbar spine given report of radiating pain down leg with impaired sensation, concerning for lumbar radiculitis    Medications:   - ok to continue gabapentin, nabumetone, and duloxetine  - s/p discontinuation of nabumetone without exacerbation of pain.  Discussed that at this time I do not feel that resuming nabumetone would significantly improve her pain.  She reports she had been taking nabumetone for decades  -Discussed trial of low-dose naltrexone for neuropathic pain, patient elected to defer due to cost of medication    Interventions:   -Low back injections pending MRI  - s/p b/l greater trochanteric bursa injections under ultrasound guidance with 100% improvement in pain on her left and 50% improvement in pain on her right.   - Patient reportedly had hip injections with an outside provider with relief lasting 2-4 weeks at a time      Other   - pt reports dizziness. Planning to discuss this with PCP on Mon    Follow-up: After MRI complete    Orders Placed This Encounter    MR-LUMBAR SPINE-W/O    Referral to Physical Therapy       Patricia Ho MD  Interventional Pain and Spine  Physical Medicine and Rehabilitation  [facility]      The above note documents my personal evaluation of this patient. In addition, I have reviewed and confirmed with the patient and MA the supportive information documented in today's Patient Health Questionnaire and Office Note.     Please note that this dictation was created using voice recognition software. I have made every reasonable attempt to correct obvious errors, but I expect that there are errors of grammar and possibly content that I did not discover before finalizing the note.

## 2024-01-26 ENCOUNTER — OFFICE VISIT (OUTPATIENT)
Dept: PHYSICAL MEDICINE AND REHAB | Facility: MEDICAL CENTER | Age: 74
End: 2024-01-26
Payer: MEDICARE

## 2024-01-26 VITALS
HEART RATE: 88 BPM | WEIGHT: 242.51 LBS | TEMPERATURE: 97.8 F | BODY MASS INDEX: 38.06 KG/M2 | DIASTOLIC BLOOD PRESSURE: 88 MMHG | HEIGHT: 67 IN | SYSTOLIC BLOOD PRESSURE: 136 MMHG | OXYGEN SATURATION: 98 %

## 2024-01-26 DIAGNOSIS — G89.29 OTHER CHRONIC PAIN: ICD-10-CM

## 2024-01-26 DIAGNOSIS — R20.0 NUMBNESS AND TINGLING IN LEFT ARM: ICD-10-CM

## 2024-01-26 DIAGNOSIS — R20.0 NUMBNESS AND TINGLING OF RIGHT ARM: ICD-10-CM

## 2024-01-26 DIAGNOSIS — M48.02 NEUROFORAMINAL STENOSIS OF CERVICAL SPINE: ICD-10-CM

## 2024-01-26 DIAGNOSIS — R10.32 BILATERAL GROIN PAIN: ICD-10-CM

## 2024-01-26 DIAGNOSIS — M79.10 MYALGIA: ICD-10-CM

## 2024-01-26 DIAGNOSIS — M70.61 GREATER TROCHANTERIC BURSITIS OF BOTH HIPS: ICD-10-CM

## 2024-01-26 DIAGNOSIS — M54.16 RIGHT LUMBAR RADICULITIS: ICD-10-CM

## 2024-01-26 DIAGNOSIS — R20.2 NUMBNESS AND TINGLING OF RIGHT ARM: ICD-10-CM

## 2024-01-26 DIAGNOSIS — M54.12 CERVICAL RADICULITIS: ICD-10-CM

## 2024-01-26 DIAGNOSIS — R10.31 BILATERAL GROIN PAIN: ICD-10-CM

## 2024-01-26 DIAGNOSIS — R20.2 NUMBNESS AND TINGLING IN LEFT ARM: ICD-10-CM

## 2024-01-26 DIAGNOSIS — M47.816 LUMBAR SPONDYLOSIS: ICD-10-CM

## 2024-01-26 DIAGNOSIS — M54.2 CERVICALGIA: ICD-10-CM

## 2024-01-26 DIAGNOSIS — R26.89 IMPAIRED GAIT AND MOBILITY: ICD-10-CM

## 2024-01-26 DIAGNOSIS — M70.62 GREATER TROCHANTERIC BURSITIS OF BOTH HIPS: ICD-10-CM

## 2024-01-26 DIAGNOSIS — M47.812 CERVICAL SPONDYLOSIS: ICD-10-CM

## 2024-01-26 DIAGNOSIS — M79.7 FIBROMYALGIA: ICD-10-CM

## 2024-01-26 PROCEDURE — 99214 OFFICE O/P EST MOD 30 MIN: CPT | Performed by: STUDENT IN AN ORGANIZED HEALTH CARE EDUCATION/TRAINING PROGRAM

## 2024-01-26 PROCEDURE — 3079F DIAST BP 80-89 MM HG: CPT | Performed by: STUDENT IN AN ORGANIZED HEALTH CARE EDUCATION/TRAINING PROGRAM

## 2024-01-26 PROCEDURE — 3075F SYST BP GE 130 - 139MM HG: CPT | Performed by: STUDENT IN AN ORGANIZED HEALTH CARE EDUCATION/TRAINING PROGRAM

## 2024-01-26 PROCEDURE — 1125F AMNT PAIN NOTED PAIN PRSNT: CPT | Performed by: STUDENT IN AN ORGANIZED HEALTH CARE EDUCATION/TRAINING PROGRAM

## 2024-01-26 PROCEDURE — 1170F FXNL STATUS ASSESSED: CPT | Performed by: STUDENT IN AN ORGANIZED HEALTH CARE EDUCATION/TRAINING PROGRAM

## 2024-01-26 ASSESSMENT — PAIN SCALES - GENERAL: PAINLEVEL: 6=MODERATE PAIN

## 2024-01-26 ASSESSMENT — FIBROSIS 4 INDEX: FIB4 SCORE: 1.63

## 2024-01-29 ENCOUNTER — OFFICE VISIT (OUTPATIENT)
Dept: MEDICAL GROUP | Facility: MEDICAL CENTER | Age: 74
End: 2024-01-29
Payer: MEDICARE

## 2024-01-29 VITALS
BODY MASS INDEX: 39.44 KG/M2 | TEMPERATURE: 98 F | HEIGHT: 66 IN | DIASTOLIC BLOOD PRESSURE: 80 MMHG | WEIGHT: 245.4 LBS | HEART RATE: 82 BPM | OXYGEN SATURATION: 95 % | RESPIRATION RATE: 18 BRPM | SYSTOLIC BLOOD PRESSURE: 134 MMHG

## 2024-01-29 DIAGNOSIS — M25.552 CHRONIC PAIN OF BOTH HIPS: ICD-10-CM

## 2024-01-29 DIAGNOSIS — K21.9 GASTROESOPHAGEAL REFLUX DISEASE WITHOUT ESOPHAGITIS: ICD-10-CM

## 2024-01-29 DIAGNOSIS — M25.551 CHRONIC PAIN OF BOTH HIPS: ICD-10-CM

## 2024-01-29 DIAGNOSIS — G89.29 CHRONIC PAIN OF BOTH HIPS: ICD-10-CM

## 2024-01-29 DIAGNOSIS — E11.9 CONTROLLED TYPE 2 DIABETES MELLITUS WITHOUT COMPLICATION, WITHOUT LONG-TERM CURRENT USE OF INSULIN (HCC): ICD-10-CM

## 2024-01-29 PROCEDURE — 3079F DIAST BP 80-89 MM HG: CPT | Performed by: FAMILY MEDICINE

## 2024-01-29 PROCEDURE — 92250 FUNDUS PHOTOGRAPHY W/I&R: CPT | Mod: TC | Performed by: FAMILY MEDICINE

## 2024-01-29 PROCEDURE — 3075F SYST BP GE 130 - 139MM HG: CPT | Performed by: FAMILY MEDICINE

## 2024-01-29 PROCEDURE — 1170F FXNL STATUS ASSESSED: CPT | Performed by: FAMILY MEDICINE

## 2024-01-29 PROCEDURE — 99213 OFFICE O/P EST LOW 20 MIN: CPT | Performed by: FAMILY MEDICINE

## 2024-01-29 RX ORDER — ALISKIREN 150 MG/1
150 TABLET, FILM COATED ORAL DAILY
COMMUNITY
Start: 2024-01-04

## 2024-01-29 RX ORDER — PANTOPRAZOLE SODIUM 40 MG/1
40 TABLET, DELAYED RELEASE ORAL 2 TIMES DAILY
Qty: 180 TABLET | Refills: 3 | Status: SHIPPED | OUTPATIENT
Start: 2024-01-29

## 2024-01-29 ASSESSMENT — FIBROSIS 4 INDEX: FIB4 SCORE: 1.63

## 2024-01-29 NOTE — PROGRESS NOTES
Chief Complaint   Patient presents with    Follow-Up    Hip Pain       Subjective:     HPI:   Ashly Meyer presents today with the followin. Gastroesophageal reflux disease without esophagitis  Patient is out of her pantoprazole.  I have sent that prescription to her pharmacy.  There were already prescriptions available but she wanted me to send it again.  She is having increased reflux symptoms off the medication.  - pantoprazole (PROTONIX) 40 MG Tablet Delayed Response; Take 1 Tablet by mouth 2 times a day.  Dispense: 180 Tablet; Refill: 3    2. Controlled type 2 diabetes mellitus without complication, without long-term current use of insulin ([facility])  Retinal exam done here today as she was not able to complete an eye exam last year.  - POCT Retinal Eye Exam    3. Chronic pain of both hips  Patient does have chronic hip pain.  Her rash is much better off the allopurinol than the nabumetone.  The pain seems to be pretty much stable.  She states the trochanteric bursitis injection on the left was extremely helpful.  On the right it has not been quite as helpful.        Patient Active Problem List    Diagnosis Date Noted    Primary osteoarthritis, right shoulder 08/15/2023    Thyroid carcinoma (HCC) 2023    Hurthle cell tumor 2023    Primary osteoarthritis of first metatarsophalangeal (MTP) joint 2023    Urinary tract infection due to Klebsiella species 2023    Bilious vomiting with nausea 2023    Drug-induced constipation 2022    Impaired gait and mobility 2022    Ataxia 2022    Carotid artery stenosis 2022    Left bundle branch block 2022    Temporal pain 2022    History of 2019 novel coronavirus disease (COVID-19) 10/11/2022    Positive self-administered antigen test for COVID-19 10/11/2022    Trochanteric bursitis of both hips 2022    Mild persistent asthma, uncomplicated 2022    Incomplete rectal prolapse 2021     Benign essential tremor 09/03/2021    Chronic respiratory failure with hypoxia ([facility]) 06/03/2021    Controlled type 2 diabetes mellitus without complication, without long-term current use of insulin ([facility]) 06/12/2019    Chronic gout of foot 04/30/2019    Dry eye syndrome, bilateral     Rotator cuff syndrome of right shoulder and allied disorders 10/10/2018    Vitamin D deficiency 12/28/2017    Morbid obesity with BMI of 40.0-44.9, adult ([facility])     Neural foraminal stenosis of cervical spine 05/18/2017    Tinnitus of both ears 05/18/2017    Dyslipidemia, goal LDL below 130 01/06/2017    Nonalcoholic fatty liver disease 01/05/2017    Menopausal symptoms 04/28/2016    Lumbar facet arthropathy 12/09/2015    Candidal intertrigo 06/01/2015    Elevated sed rate 12/09/2014    Osteoarthritis of left thumb 07/23/2014    Bilateral hip pain 04/23/2014    H/O fibromyalgia 03/11/2014    KERRI (obstructive sleep apnea) 03/11/2014    Peripheral neuropathy     Rectocele 11/25/2013    Pelvic floor dysfunction     Chronic constipation 10/02/2013    Nocturnal hypoxia     Lumbar radiculopathy 11/01/2011    Migraine without aura     Seasonal allergies 07/08/2011    GERD (gastroesophageal reflux disease) 12/10/2010    Osteoarthritis of multiple joints 04/13/2010    Eczema, dyshidrotic 08/03/2009    Essential hypertension 05/20/2009    Hypothyroidism due to acquired atrophy of thyroid 05/20/2009       Current medicines (including changes today)  Current Outpatient Medications   Medication Sig Dispense Refill    aliskiren (TEKTURNA) 150 MG tablet Take 150 mg by mouth every day.      pantoprazole (PROTONIX) 40 MG Tablet Delayed Response Take 1 Tablet by mouth 2 times a day. 180 Tablet 3    levothyroxine (SYNTHROID) 137 MCG Tab TAKE ONE TABLET BY MOUTH EVERY MORNING ON AN EMPTY STOMACH 90 Tablet 2    calcitonin, salmon, (MIACALCIN) 200 UNIT/ACT Solution Administer 1 Spray into affected nostril(S) every day.      liothyronine (CYTOMEL) 5 MCG Tab  "Take 5 mcg by mouth every day.      traMADol (ULTRAM) 50 MG Tab Take 50 mg by mouth every four hours as needed.      DULoxetine (CYMBALTA) 60 MG Cap DR Particles delayed-release capsule Take 1 Capsule by mouth every evening. 90 Capsule 3    albuterol 108 (90 Base) MCG/ACT Aero Soln inhalation aerosol Inhale 2 Puffs every 6 hours as needed for Shortness of Breath. 8.5 g 11    atorvastatin (LIPITOR) 40 MG Tab Take 1 Tablet by mouth every evening. 90 Tablet 3    gabapentin (NEURONTIN) 800 MG tablet Take 1 Tablet by mouth 2 times a day. 180 Tablet 3    QUEtiapine (SEROQUEL) 25 MG Tab Take 1 Tablet by mouth at bedtime. 90 Tablet 3    Cholecalciferol (VITAMIN D-3) 125 MCG (5000 UT) Tab Take 5,000 Units by mouth every evening.      VITAMIN E PO Take 1 Capsule by mouth every morning.       No current facility-administered medications for this visit.       Allergies   Allergen Reactions    Ace Inhibitors Cough    Benadryl Allergy Hives     Hot skin itching    Iodine Nausea     Pt received CT w/ contrast 12/20/22 pt had nausea post contrast     Lamictal Rash and Swelling     \"hot\", unable to function.  Severe rash, scarring    Savella [Kdc:Milnacipran+Ci Pigment Blue 63] Nausea, Swelling and Anxiety     Swelling, bone and muscle pain, sweating, nausea, dizziness, sleeplessness, anxiety    Savella [Milnacipran Hcl] Unspecified     Muscle aches, feet swelling    Sulfa Drugs Hives, Shortness of Breath and Anxiety     Hard breathing, shortness of breath    Butalbital-Acetaminophen Unspecified     Dizzy, can't walk, hard to focus    Cefaclor Nausea and Unspecified     Ceclor causes light headedness and dizziness    Morphine Itching     \"Redness\"    Penicillins Itching and Swelling     Skin itching and redness    Tizanidine Hcl Nausea     \"Dizziness\" hard to focus    Milnacipran Vomiting and Nausea    Amlactin Rash and Itching     Lotion causes itching, redness and burnng    Tape Rash and Itching     Skin tears, paper tape is OK " "per Pt       ROS: As per HPI we attempted to review medications but patient is unclear as to what she is taking and not taking.  She will go home and look at her bottles and make a list for me or take pictures of her bottles and send them to me.  Patient denies increased pain currently.       Objective:     /80   Pulse 82   Temp 36.7 °C (98 °F)   Resp 18   Ht 1.676 m (5' 6\")   Wt 111 kg (245 lb 6.4 oz)   SpO2 95%  Body mass index is 39.61 kg/m².    Physical Exam:  Constitutional: Well-developed and well-nourished. Not diaphoretic. No distress. Lucid and fluent.  Skin: Skin is warm and dry. Rash is greatly improved.  Head: Atraumatic without lesions.  Eyes: Conjunctivae and extraocular motions are normal. Pupils are equal, round, and reactive to light. No scleral icterus.   Ears:  External ears unremarkable.   Neck: Supple, trachea midline. No thyromegaly present. No cervical or supraclavicular lymphadenopathy. No JVD or carotid bruits appreciated  Cardiovascular: Regular rate and rhythm.  Normal S1, S2 without murmur appreciated.  Chest: Effort normal. Clear to auscultation throughout. No adventitious sounds.   Extremities: No cyanosis, clubbing, erythema, nor edema.   Neurological: Alert and oriented x 3.   Psychiatric:  Behavior, mood, and affect are appropriate.       Assessment and Plan:     73 y.o. female with the following issues:    1. Gastroesophageal reflux disease without esophagitis  pantoprazole (PROTONIX) 40 MG Tablet Delayed Response      2. Controlled type 2 diabetes mellitus without complication, without long-term current use of insulin ([facility])  POCT Retinal Eye Exam      3. Chronic pain of both hips              Followup: Return in about 4 months (around 5/29/2024), or if symptoms worsen or fail to improve.  "

## 2024-02-05 ENCOUNTER — APPOINTMENT (OUTPATIENT)
Dept: RADIOLOGY | Facility: MEDICAL CENTER | Age: 74
End: 2024-02-05
Attending: STUDENT IN AN ORGANIZED HEALTH CARE EDUCATION/TRAINING PROGRAM
Payer: MEDICARE

## 2024-02-20 NOTE — PATIENT INSTRUCTIONS
1-reviewed PFT  2-not requiring albuterol inhaler (maybe 1-2 year)  3-continues to benefit from oxygen use  4-reviewed covid 19 precautions:  Wear mask, social distancing, frequent handwashing, recommended flu shot  5-follow up in one year, sooner if needed         
no concerns

## 2024-02-28 ENCOUNTER — HOSPITAL ENCOUNTER (OUTPATIENT)
Dept: LAB | Facility: MEDICAL CENTER | Age: 74
End: 2024-02-28
Payer: MEDICARE

## 2024-02-28 LAB
ALBUMIN SERPL BCP-MCNC: 4 G/DL (ref 3.2–4.9)
ALBUMIN/GLOB SERPL: 1.3 G/DL
ALP SERPL-CCNC: 125 U/L (ref 30–99)
ALT SERPL-CCNC: 11 U/L (ref 2–50)
ANION GAP SERPL CALC-SCNC: 16 MMOL/L (ref 7–16)
AST SERPL-CCNC: 14 U/L (ref 12–45)
BILIRUB SERPL-MCNC: 0.8 MG/DL (ref 0.1–1.5)
BUN SERPL-MCNC: 12 MG/DL (ref 8–22)
CALCIUM ALBUM COR SERPL-MCNC: 9.1 MG/DL (ref 8.5–10.5)
CALCIUM SERPL-MCNC: 9.1 MG/DL (ref 8.5–10.5)
CHLORIDE SERPL-SCNC: 106 MMOL/L (ref 96–112)
CHOLEST SERPL-MCNC: 135 MG/DL (ref 100–199)
CO2 SERPL-SCNC: 21 MMOL/L (ref 20–33)
CREAT SERPL-MCNC: 0.83 MG/DL (ref 0.5–1.4)
FASTING STATUS PATIENT QL REPORTED: NORMAL
GFR SERPLBLD CREATININE-BSD FMLA CKD-EPI: 74 ML/MIN/1.73 M 2
GLOBULIN SER CALC-MCNC: 3 G/DL (ref 1.9–3.5)
GLUCOSE SERPL-MCNC: 119 MG/DL (ref 65–99)
HDLC SERPL-MCNC: 55 MG/DL
LDLC SERPL CALC-MCNC: 55 MG/DL
POTASSIUM SERPL-SCNC: 3.4 MMOL/L (ref 3.6–5.5)
PROT SERPL-MCNC: 7 G/DL (ref 6–8.2)
SODIUM SERPL-SCNC: 143 MMOL/L (ref 135–145)
TRIGL SERPL-MCNC: 125 MG/DL (ref 0–149)

## 2024-02-28 PROCEDURE — 36415 COLL VENOUS BLD VENIPUNCTURE: CPT

## 2024-02-28 PROCEDURE — 80053 COMPREHEN METABOLIC PANEL: CPT

## 2024-02-28 PROCEDURE — 80061 LIPID PANEL: CPT

## 2024-03-01 ENCOUNTER — APPOINTMENT (OUTPATIENT)
Dept: RADIOLOGY | Facility: MEDICAL CENTER | Age: 74
End: 2024-03-01
Attending: STUDENT IN AN ORGANIZED HEALTH CARE EDUCATION/TRAINING PROGRAM
Payer: MEDICARE

## 2024-03-08 ENCOUNTER — PHYSICAL THERAPY (OUTPATIENT)
Dept: PHYSICAL THERAPY | Facility: REHABILITATION | Age: 74
End: 2024-03-08
Attending: STUDENT IN AN ORGANIZED HEALTH CARE EDUCATION/TRAINING PROGRAM
Payer: MEDICARE

## 2024-03-08 DIAGNOSIS — R20.2 NUMBNESS AND TINGLING IN LEFT ARM: ICD-10-CM

## 2024-03-08 DIAGNOSIS — R20.2 NUMBNESS AND TINGLING OF RIGHT ARM: ICD-10-CM

## 2024-03-08 DIAGNOSIS — M79.10 MYALGIA: ICD-10-CM

## 2024-03-08 DIAGNOSIS — M54.2 CERVICALGIA: ICD-10-CM

## 2024-03-08 DIAGNOSIS — R20.0 NUMBNESS AND TINGLING OF RIGHT ARM: ICD-10-CM

## 2024-03-08 DIAGNOSIS — R20.0 NUMBNESS AND TINGLING IN LEFT ARM: ICD-10-CM

## 2024-03-08 DIAGNOSIS — M54.12 CERVICAL RADICULITIS: ICD-10-CM

## 2024-03-08 PROCEDURE — 97162 PT EVAL MOD COMPLEX 30 MIN: CPT

## 2024-03-08 ASSESSMENT — ENCOUNTER SYMPTOMS
PAIN TIMING: ALL DAY
PAIN SCALE: 9
PAIN SCALE AT LOWEST: 3
PAIN SCALE AT HIGHEST: 10
EXACERBATED BY: ACTIVITY
ALLEVIATING FACTORS: NOTHING
QUALITY: CRAMPING

## 2024-03-08 ASSESSMENT — ACTIVITIES OF DAILY LIVING (ADL): POOR_BALANCE: 1

## 2024-03-08 NOTE — OP THERAPY EVALUATION
"  Outpatient Physical Therapy  INITIAL EVALUATION    [facility] [address].  [address]  [place] [address]  Phone:  [phone]  Fax:  [phone]    Date of Evaluation: 03/08/2024    Patient: Ashly Meyer  YOB: 1950  MRN: [id number]     Referring Provider: Patricia Ho M.D.  [address]  [address]  [place]  [place] [address]   Referring Diagnosis Cervical radiculitis [M54.12];Numbness and tingling of right arm [R20.0, R20.2];Cervicalgia [M54.2];Myalgia [M79.10];Numbness and tingling in left arm [R20.0, R20.2]     Time Calculation  Start time: 1032  Stop time: 1100 Time Calculation (min): 28 minutes         Chief Complaint: Weakness, Neck Problem, and Back Problem    Visit Diagnoses     ICD-10-CM   1. Cervical radiculitis  M54.12   2. Numbness and tingling of right arm  R20.0    R20.2   3. Cervicalgia  M54.2   4. Myalgia  M79.10   5. Numbness and tingling in left arm  R20.0    R20.2       Date of onset of impairment: 1/8/2024    Subjective:   History of Present Illness:     Date of onset:  1/8/2024    Mechanism of injury:  Patient is a pleasant 73 year old female, well known to this therapist. Patient reports her neck is her main complaint of pain at this point however she reports continued hip/back pain as well. She reports she had injections to her hips which has helped but \"wore out\" about 2 weeks ago.     Patient reports she has had some falls. She reports she is using her FWW outside of the home but is unable to use in it inside. Patient reports that she is getting numbness and tinglings to B hands, headaches which are more apparent on the L side, and reports she did have some vision changes which started in December. Discussed with patient need to follow up with eye doctor, and also discussed signs/symptoms of stroke and educated patient on presenting to ER, if needed. Patient verbalizes understanding and denies acute vision changes. Patient reports " "she has a BP machine at home and PT encouraged patient to monitor.     Patient reports she takes Tramadol as needed, which she reports minimal relief.   Prior level of function:  Independent; FWW for increased safety  Headache comments: headaches, mostly L sided  Ear problems: none  Sleep disturbance:  Interrupted sleep  Pain:     Current pain ratin    At best pain rating:  3    At worst pain rating:  10    Location:  Neck pain; also has generalized pain    Quality:  Cramping (twisting)    Pain timing:  All day    Relieving factors:  Nothing    Aggravating factors:  Activity    Progression:  Worsening  Social Support:     Lives in:  One-story house ([address])    Lives with:  Alone  Hand dominance:  Right  Diagnostic Tests:     Diagnostic Tests Comments:  12/15/23, B hips:  \"IMPRESSION:  1.  No abnormality identified of either hip joint  2.  lumbar spine dextroconvex scoliosis and facet arthropathy\"      22, Cervical MRI:   \"IMPRESSION:     1.  Multifocal degenerative disease in the cervical spine as described above.  2.  Severe right lateral recess and right C4 neural foraminal stenosis.  3.  Severe left C5 neural foraminal stenosis.  4.  There is an approximately 9 cm sized left thyroid nodule. This is increased since the previous study. Ultrasound-guided fine-needle aspiration is recommended.\"    Please refer to chart for further imaging needs.     Treatments:     Current treatment:  Physical therapy  Activities of Daily Living:     Patient reported ADL status: Reports difficulty with dressing and bathing; reports that she does not have enough room for a shower chair but she does have one in her room.   Patient Goals:     Patient goals for therapy:  Decreased pain, increased motion and increased strength    Other patient goals:  Ability to do dishes; transitions from toilet      Past Medical History:   Diagnosis Date    Anginal syndrome (HCC)     Anxiety     Arthritis     Everywhere.     Atrophic rhinitis " 10/3/2016    Back pain     Bronchitis 08/2021    finished 1st course of ABX to start a 2nd course soon.     Carotid artery stenosis, unilateral     moderate left carotid artery stenosis    Carpal tunnel syndrome 9/5/2011    Chronic pain     Constipation     Controlled type 2 diabetes mellitus without complication ([facility])     states borderline    Controlled type 2 diabetes mellitus without complication, without long-term current use of insulin ([facility]) 6/12/2019    Cough 08/2021    r/t bronchitis    Current severe episode of major depressive disorder without psychotic features without prior episode ([facility]) 6/3/2021    Daytime sleepiness     Degenerative disc disease     Depression 5/20/2009    Deviated nasal septum 8/1/2016    Diarrhea     and constipation    Disease of accessory sinus 10/3/2016    Dizziness     Dry eye syndrome, bilateral     Elevated sedimentation rate     history of negative temporal artery biopsy around 2006    Fatigue     Fatty liver     Fibromyalgia     confirmed by Dr. 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    Hypertension     Hypothyroidism 5/20/2009    Incipient cataract of both eyes     Incomplete rectal prolapse 9/3/2021    Insomnia     Intention tremor 6/2/2022    Migraine without aura, without mention of intractable migraine without mention of status migrainosus     Mild intermittent asthma without complication     inhalers as needed    Mixed dyslipidemia     Morbid obesity with BMI of 45.0-49.9, adult ([facility])     Morning headache     Mumps     Nasal drainage     Nocturnal hypoxia     severe, to 69% O2 sat    Obesity      Obesity hypoventilation syndrome (HCC) 5/31/2017    Pelvic floor dysfunction     Peripheral neuropathy     Pleomorphic adenoma     Polymyalgia rheumatica (HCC)     Dr. Ann    Restless leg syndrome     Ringing in ears     Rotator cuff syndrome of right shoulder and allied disorders 10/10/2018    S/P tonsillectomy     Seasonal allergies     Skin cancer 1990's    skin    Sleep apnea syndrome     she is not using CPAP, claustrophobia, uses 2-3l at night via concentrator occ 2l during daytime ([facility])    Snoring     Sore muscles     Sweat, sweating, excessive     Swelling of lower extremity     Thyroid carcinoma (HCC) 7/22/2023    Found surgically July 2023    Trochanteric bursitis of both hips 3/3/2022    Urinary incontinence     will not wear a pad    Vision loss     Weakness      Past Surgical History:   Procedure Laterality Date    THYROIDECTOMY TOTAL Right 8/23/2023    Procedure: RIGHT COMPLETION THYROIDECTOMY;  Surgeon: Michael Mancuso M.D.;  Location: SURGERY SAME DAY [facility];  Service: Ent    THYROIDECTOMY Left 7/17/2023    Procedure: LEFT ELOY- THYROIDECTOMY;  Surgeon: [place] Mancuso M.D.;  Location: SURGERY SAME DAY [facility];  Service: Ent    [place] UPPER GI ENDOSCOPY,DIAGNOSIS N/A 08/26/2021    Procedure: GASTROSCOPY;  Surgeon: Marty Lopez M.D.;  Location: [facility];  Service: Gastroenterology    [place] COLONOSCOPY,DIAGNOSTIC  08/26/2021    Procedure: COLONOSCOPY - WITH BIOPSIES.;  Surgeon: Marty Lopez M.D.;  Location: [facility];  Service: Gastroenterology    SHOULDER DECOMPRESSION ARTHROSCOPIC Right 02/01/2019    Procedure: SHOULDER DECOMPRESSION ARTHROSCOPIC - SUBACROMIAL, LABRAL DEBRIDEMENT;  Surgeon: Damaris Knutson M.D.;  Location: [facility];  Service: Orthopedics    SHOULDER ARTHROSCOPY W/ BICIPITAL TENODESIS REPAIR Right 02/01/2019    Procedure: SHOULDER ARTHROSCOPY W/ BICIPITAL TENOTOMY;  Surgeon: Damaris Knutson M.D.;  Location: [facility];  Service: Orthopedics    CARPAL TUNNEL RELEASE Right 02/01/2019    Procedure: CARPAL TUNNEL RELEASE;  Surgeon: Damaris Knutson M.D.;  Location: SURGERY [facility];  Service: Orthopedics    GASTROSCOPY  02/06/2017    Procedure: GASTROSCOPY;  Surgeon: Pau Foster M.D.;  Location: SURGERY SAME DAY [facility];  Service:     COLONOSCOPY  02/06/2017    Procedure: COLONOSCOPY;  Surgeon: Pau Foster M.D.;  Location: SURGERY SAME DAY [facility];  Service:     SEPTAL RECONSTRUCTION  10/03/2016    Procedure: SEPTAL RECONSTRUCTION with  grafts ;  Surgeon: PIETER Dickson M.D.;  Location: SURGERY SAME DAY [facility];  Service:     SEPTOPLASTY N/A 08/01/2016    Procedure: SEPTOPLASTY;  Surgeon: PIETER Dickson M.D.;  Location: SURGERY SAME DAY [facility];  Service:     TURBINOPLASTY Bilateral 08/01/2016    Procedure: TURBINOPLASTY;  Surgeon: PIETER Dickson M.D.;  Location: SURGERY SAME DAY [facility];  Service:     NASAL FRACTURE REDUCTION OPEN N/A 08/01/2016    Procedure: SEPTAL FRACTURE REDUCTION OPEN;  Surgeon: PIETER Dickson M.D.;  Location: SURGERY SAME DAY [facility];  Service:     FINE NEEDLE ASPIRATION  11/16/2009    Performed by DA CALLOWAY at ENDOSCOPY [facility]    COLONOSCOPY  2006    ? unclear date    OTHER SURGICAL PROCEDURE  1998    vaginal wall repair    BLADDER SUSPENSION  1983    HYSTERECTOMY, VAGINAL  1983    ovaries are present, excessive bleeding    TUBAL LIGATION  1980    MASS EXCISION GENERAL Right 1956    had carbuncle removal R thigh    TONSILLECTOMY  1953    ANAL SPHINCTEROTOMY      anal muscle repair    CATARACT EXTRACTION WITH IOL Bilateral     CHOLECYSTECTOMY      HYSTERECTOMY LAPAROSCOPY      OTHER ORTHOPEDIC SURGERY  1962/1980/1983/1985    foot surgery     SCAR REVISION Right     thigh scar      Social History     Tobacco Use    Smoking status: Former     Current packs/day: 0.00     Average packs/day: 1 pack/day for  45.2 years (45.2 ttl pk-yrs)     Types: Cigarettes     Start date:      Quit date: 3/1/2007     Years since quittin.0    Smokeless tobacco: Never    Tobacco comments:     Started smoking at age 15, continued abstinance    Substance Use Topics    Alcohol use: Not Currently     Family and Occupational History     Socioeconomic History    Marital status:      Spouse name: Not on file    Number of children: Not on file    Years of education: Not on file    Highest education level: Not on file   Occupational History    Not on file       Objective     Observations   Central spine     Positive for forward head/neck and rounded shoulders.    Postural Observations  Seated posture: poor  Standing posture: poor      Shoulder Screen    Shoulder range of motion within functional limits with the following exceptions: B shoulder flexion ~110 degrees    Shoulder strength within functional limits with the following exceptions: Shoulder strength grossly decreased; B shoulder flexion 3+/5     Neurological Testing     Reflexes   Left   Vega's reflex: negative    Right   Vega's reflex: negative    Myotome testing   Cervical (left)   C1-2 (neck flexion): 3+  C3 (neck sidebend): 3+  C4 (shoulder shrug): 3+  C5 (deltoid): 3+  C6 (biceps): 3+  C7 (triceps): 3+  C8 (thumb extension): 5  T1 (intrinsics): 5    Cervical (right)   C1-2 (neck flexion): 3+  C3 (neck sidebend): 3+  C4 (shoulder shrug): 3+  C5 (deltoid): 3+  C6 (biceps): 3+  C7 (triceps): 3+  C8 (thumb extension): 5  T1 (intrinsics): 5    Dermatome testing   Cervical (left)   C3 (lateral neck): intact  C4 (top of AC joint): intact  C5 (lateral deltoid): decreased  C6 (thumb): intact  C7 (middle finger): intact  C8 (little finger): intact  T1 (ulnar antecubital): intact    Cervical (right)   C3 (lateral neck): intact  C4 (top of AC joint): intact  C5 (lateral deltoid): intact  C6 (thumb): intact  C7 (middle finger): intact  C8 (little finger): intact  T1 (ulnar  antecubital): intact    Palpation   Left   No palpable tenderness to the cervical paraspinals.   Tenderness of the biceps, lower trapezius, middle trapezius, rhomboids, thoracic paraspinals and upper trapezius.     Right   No palpable tenderness to the cervical paraspinals.   Tenderness of the biceps, lower trapezius, middle trapezius, rhomboids, thoracic paraspinals and upper trapezius.     Active Range of Motion       Upper Cervical   Flexion: decreased (50%)  Extension: decreased (50%)    Additional Active Range of Motion Details  L rotation: 13 degrees  R rotation: 50 degrees  L lateral flexion: 16 degrees  R lateral flexion: 19 degrees         Strength:      Left Shoulder   Isolated Muscles   Trapezius (upper): 3+     Right Shoulder   Isolated Muscles   Trapezius (upper): 3+     Additional Strength Details  R hip flexion: 3+/5  L hip flexion: 3+/5    Tests     Additional testing details: Deferred to follow up secondary to time constraints        Therapeutic Exercises (CPT 59169):     1. PT evaluation completed. POC and goals discussed.    20. PN due on 4/8      Time-based treatments/modalities:           Assessment, Response and Plan:   Impairments: abnormal gait, abnormal or restricted ROM, activity intolerance, impaired functional mobility, impaired balance, impaired physical strength, lacks appropriate home exercise program, limited ADL's, limited mobility, pain with function and safety issue    Assessment details:  Patient is a pleasant 73 year old female who presents to PT evaluation secondary to general pain with main complaint this date regarding neck pain and transitioning off toilet. Upon assessment, patient demonstrates significant tenderness, decreased and painful cervical AROM, and decreased strength. She also reports B hand numbness/tingling.  Patient also has a history of falls, which have not resulted in significant injury at this time. Patient will benefit from skilled PT follow up to promote  improved strength and balance, decreased cervical pain, and increased quality of life through strength, ROM, and balance training.   Barriers to therapy:  Comorbidities and age  Prognosis: fair    Goals:   Short Term Goals:   1. Patient will demonstrate independent HEP to promote increased strength and ROM for improved functional activity tolerance.     2. Patient will demonstrate improved cervical AROM by 10 degrees or more to promote decreased pain.     3. Patient will demonstrate  improved B hip flexion strength to 4/5 to promote ability to transition off toilet for increased safety.     Short term goal time span:  2-4 weeks      Long Term Goals:    1. Patient will report improved NDI by 10 or more points to indicate improved quality of life.     2. Patient will demonstrate improved cervical AROM to at least 60 degrees B to allow for improved safety with driving and daily activities.     3, Patient will be able to complete her dishes without significant increase in pain to promote continued independence and quality of life.   Long term goal time span:  6-8 weeks    Plan:   Therapy options:  Physical therapy treatment to continue  Planned therapy interventions:  Neuromuscular Re-education (CPT 29172), Manual Therapy (CPT 70983), Hot or Cold Pack Therapy (CPT 02414), E Stim Unattended (CPT 32229), Therapeutic Exercise (CPT 14478) and Therapeutic Activities (CPT 36446)  Frequency:  2x week  Duration in weeks:  8  Discussed with:  Patient      Functional Assessment Used  Neck Disability Total: 38     Referring provider co-signature:  I have reviewed this plan of care and my co-signature certifies the need for services.    Certification Period: 03/08/2024 to  05/17/24    Physician Signature: ________________________________ Date: ______________

## 2024-03-14 DIAGNOSIS — M70.61 TROCHANTERIC BURSITIS OF BOTH HIPS: ICD-10-CM

## 2024-03-14 DIAGNOSIS — M70.62 TROCHANTERIC BURSITIS OF BOTH HIPS: ICD-10-CM

## 2024-03-14 DIAGNOSIS — M15.9 PRIMARY OSTEOARTHRITIS INVOLVING MULTIPLE JOINTS: ICD-10-CM

## 2024-03-14 DIAGNOSIS — M48.02 NEURAL FORAMINAL STENOSIS OF CERVICAL SPINE: ICD-10-CM

## 2024-03-14 DIAGNOSIS — F33.41 RECURRENT MAJOR DEPRESSIVE DISORDER, IN PARTIAL REMISSION (HCC): ICD-10-CM

## 2024-03-14 DIAGNOSIS — M1A.0790 CHRONIC GOUT OF FOOT, UNSPECIFIED CAUSE, UNSPECIFIED LATERALITY: ICD-10-CM

## 2024-03-14 DIAGNOSIS — M19.011 PRIMARY OSTEOARTHRITIS OF RIGHT SHOULDER: ICD-10-CM

## 2024-03-15 ENCOUNTER — PHYSICAL THERAPY (OUTPATIENT)
Dept: PHYSICAL THERAPY | Facility: REHABILITATION | Age: 74
End: 2024-03-15
Attending: STUDENT IN AN ORGANIZED HEALTH CARE EDUCATION/TRAINING PROGRAM
Payer: MEDICARE

## 2024-03-15 DIAGNOSIS — M54.2 CERVICALGIA: ICD-10-CM

## 2024-03-15 DIAGNOSIS — R20.2 NUMBNESS AND TINGLING OF RIGHT ARM: ICD-10-CM

## 2024-03-15 DIAGNOSIS — M79.10 MYALGIA: ICD-10-CM

## 2024-03-15 DIAGNOSIS — R20.0 NUMBNESS AND TINGLING OF RIGHT ARM: ICD-10-CM

## 2024-03-15 DIAGNOSIS — R20.0 NUMBNESS AND TINGLING IN LEFT ARM: ICD-10-CM

## 2024-03-15 DIAGNOSIS — M54.12 CERVICAL RADICULITIS: ICD-10-CM

## 2024-03-15 DIAGNOSIS — R20.2 NUMBNESS AND TINGLING IN LEFT ARM: ICD-10-CM

## 2024-03-15 PROCEDURE — 97110 THERAPEUTIC EXERCISES: CPT

## 2024-03-15 NOTE — OP THERAPY DAILY TREATMENT
"  Outpatient Physical Therapy  DAILY TREATMENT     [facility] Physical Therapy [address]  [address]  [place] [address]  Phone:  [phone]  Fax:  [phone]    Date: 03/15/2024    Patient: Ashly Meyer  YOB: 1950  MRN: [id number]     Time Calculation    Start time: 1016  Stop time: 1100 Time Calculation (min): 44 minutes         Chief Complaint: Weakness, Back Problem, and Neck Problem    Visit #: 2    SUBJECTIVE:  Patient reports that she felt like she got \"kind of lost\" on her way to the clinic. She reports this hasn't happened often but that it has happened before. PT and patient discussed that patient would likely benefit speaking to PCP regarding confusion due to safety issues/concerns. Patient in agreement. No confusion noted during session. Patient reports that she ran out of her medicines but it is \"ordered now\".     Patient reports B knee/B hip/back/neck pain today of about 6-7/10.     OBJECTIVE:  Current objective measures:   Reports difficulty with supine position;   Vertebral Artery Test: seated, modified position due to patient report of difficulty laying supine; Negative B from modified, seated position  Sharp-Anni: Negative  Spurling Test: Positive, B     L SCM tightness noted on palpation     BP: 140/90; patient reports that she has a BP monitor at home, PT highly encouraged monitoring her BP; Denies dizziness and vision changes; symmetrical smile; symmetrical tongue, no UE drift, no slurring of words; discussed if BP raises or any signs/symptoms of stroke present, patient needs to go to ER/call 911/call MD; patient verbalizes understanding. Left in no apparent distress. PT messaged MD to update regarding concerns.     O2: 94%  HR 74 bpm            Therapeutic Exercises (CPT 14060):     1. Cervical AROM; yes/no/maybe, 2x10, B directions, pain free ROM    2. scapular retractions, x10; 3 second holds, reports L UE going \"numb\" with task; improved with rest   "  3. trap stretch, x30 seconds, B    4. shoulder rolls, x10, backward/x10 forward    5. shoulder shrugs, x10    20. PN due on 4/8      Therapeutic Exercise Summary: Access Code: [email or web address]  URL: [email or web address]  Date: 03/15/2024  Prepared by: Obdulia Buening    Exercises  - Neck Rotation  - 2 x daily - 2 sets - 10 reps  - Head Nods  - 2 x daily - 2 sets - 10 reps  - Neck Sidebending  - 2 x daily - 2 sets - 10 reps      Time-based treatments/modalities:    Physical Therapy Timed Treatment Charges  Therapeutic exercise minutes (CPT 92369): 44 minutes      Pain rating (1-10) before treatment:  6-7/10; B knee/B hip/back/neck   Pain rating (1-10) after treatment:  7; neck     ASSESSMENT:   Response to treatment: Patient tolerates PT treatment well today. Demonstrates limited cervical AROM, Vertebral Artery test completed from sitting position, as able. Spurlings sign is positive. Initiated cervical AROM within pain free ROM as well as stretching and postural strengthening exercises. Updated HEP. Patient will continue to benefit from skilled PT to promote decreased pain and improved functional mobility skills for increased quality of life.     PLAN/RECOMMENDATIONS:   Plan for treatment: therapy treatment to continue next visit.  Planned interventions for next visit: continue with current treatment.

## 2024-03-18 RX ORDER — TRAMADOL HYDROCHLORIDE 50 MG/1
50 TABLET ORAL EVERY 8 HOURS PRN
Qty: 90 TABLET | Refills: 0 | Status: SHIPPED | OUTPATIENT
Start: 2024-03-18 | End: 2024-04-17

## 2024-03-18 RX ORDER — QUETIAPINE FUMARATE 25 MG/1
25 TABLET, FILM COATED ORAL
Qty: 90 TABLET | Refills: 2 | Status: SHIPPED | OUTPATIENT
Start: 2024-03-18

## 2024-03-18 RX ORDER — ALLOPURINOL 100 MG/1
100 TABLET ORAL DAILY
Qty: 90 TABLET | Refills: 2 | Status: SHIPPED | OUTPATIENT
Start: 2024-03-18

## 2024-03-18 RX ORDER — NABUMETONE 750 MG/1
750 TABLET, FILM COATED ORAL 2 TIMES DAILY WITH MEALS
Qty: 180 TABLET | Refills: 2 | Status: SHIPPED | OUTPATIENT
Start: 2024-03-18

## 2024-03-18 NOTE — TELEPHONE ENCOUNTER
Patient seen in office in November.  She has been using tramadol for pain but very sparingly.  She is seeing physiatry.   I reviewed the PDMP.  Her last fill of the tramadol was in May consistent with what she has been telling me.  Patient requests a renewal of the tramadol.  No adverse events have been reported or observed.  Patient has multiple sources of well-documented pain not only documented by me but also by physiatry and orthopedics.

## 2024-03-22 ENCOUNTER — PHYSICAL THERAPY (OUTPATIENT)
Dept: PHYSICAL THERAPY | Facility: REHABILITATION | Age: 74
End: 2024-03-22
Attending: STUDENT IN AN ORGANIZED HEALTH CARE EDUCATION/TRAINING PROGRAM
Payer: MEDICARE

## 2024-03-22 DIAGNOSIS — R20.0 NUMBNESS AND TINGLING IN LEFT ARM: ICD-10-CM

## 2024-03-22 DIAGNOSIS — M79.10 MYALGIA: ICD-10-CM

## 2024-03-22 DIAGNOSIS — M54.2 CERVICALGIA: ICD-10-CM

## 2024-03-22 DIAGNOSIS — M54.12 CERVICAL RADICULITIS: ICD-10-CM

## 2024-03-22 DIAGNOSIS — R20.2 NUMBNESS AND TINGLING OF RIGHT ARM: ICD-10-CM

## 2024-03-22 DIAGNOSIS — R20.0 NUMBNESS AND TINGLING OF RIGHT ARM: ICD-10-CM

## 2024-03-22 DIAGNOSIS — R20.2 NUMBNESS AND TINGLING IN LEFT ARM: ICD-10-CM

## 2024-03-22 PROCEDURE — 97110 THERAPEUTIC EXERCISES: CPT

## 2024-03-22 NOTE — OP THERAPY DAILY TREATMENT
"  Outpatient Physical Therapy  DAILY TREATMENT     [facility] Physical [address].  [address]  [place] [address]  Phone:  [phone]  Fax:  [phone]    Date: 03/22/2024    Patient: Ashly Meyer  YOB: 1950  MRN: [id number]     Time Calculation    Start time: 1019  Stop time: 1100 Time Calculation (min): 41 minutes         Chief Complaint: Back Problem, Neck Problem, and Weakness    Visit #: 3    SUBJECTIVE:  Patient reports she worked on her kitchen a little yesterday. She reports she was able to pick her medications up from the pharmacy again.     OBJECTIVE:  Current objective measures:           Therapeutic Exercises (CPT 42985):     1. Cervical AROM; yes/no/maybe, x20, B directions, pain free ROM, improved range noted and patient reports decreased pain during task    2. scapular retractions, x10; 3 second holds, reports L UE going \"numb\" with task; improved with rest    3. trap stretch, 2x30 seconds, B    4. shoulder rolls, x10, backward/x10 forward, nt    5. shoulder shrugs, x20    6. pulleys, x5 minutes, pain free ROM    7. rows, seated, x10; orange theraband, R side more difficult than L    8. TrA bracing, seated, x10; 3 second holds    9. thoracic rotation, x10, B directions, TrA bracing    10. TrP, x1 to B Upper trap; reports no change in pain    11. modified, seated wall angels, x6, pain free ROM    20. PN due on 4/8      Therapeutic Exercise Summary: Access Code: [email or web address]  URL: [email or web address]  Date: 03/15/2024  Prepared by: Obdulia Torres    Exercises  - Neck Rotation  - 2 x daily - 2 sets - 10 reps  - Head Nods  - 2 x daily - 2 sets - 10 reps  - Neck Sidebending  - 2 x daily - 2 sets - 10 reps      Time-based treatments/modalities:    Physical Therapy Timed Treatment Charges  Therapeutic exercise minutes (CPT 21884): 41 minutes      Pain rating (1-10) before treatment:  5; generalized pain  Pain rating (1-10) after treatment:  4; generalized " pain, reports slight improvement as compared to start of session    ASSESSMENT:   Response to treatment: Patient tolerates PT treatment well today. Continues to report generalized pain which appears to be patients overall limitation. Cervical AROM is slowly improving with patient tolerating light increase in TE well today. At this time, patient will benefit from continued skilled PT for further strength and ROM for decreased cervical pain and improved quality of life.     PLAN/RECOMMENDATIONS:   Plan for treatment: therapy treatment to continue next visit.  Planned interventions for next visit: continue with current treatment.

## 2024-03-29 ENCOUNTER — PHYSICAL THERAPY (OUTPATIENT)
Dept: PHYSICAL THERAPY | Facility: REHABILITATION | Age: 74
End: 2024-03-29
Attending: STUDENT IN AN ORGANIZED HEALTH CARE EDUCATION/TRAINING PROGRAM
Payer: MEDICARE

## 2024-03-29 DIAGNOSIS — R20.0 NUMBNESS AND TINGLING OF RIGHT ARM: ICD-10-CM

## 2024-03-29 DIAGNOSIS — M54.12 CERVICAL RADICULITIS: ICD-10-CM

## 2024-03-29 DIAGNOSIS — M54.2 CERVICALGIA: ICD-10-CM

## 2024-03-29 DIAGNOSIS — R20.2 NUMBNESS AND TINGLING OF RIGHT ARM: ICD-10-CM

## 2024-03-29 DIAGNOSIS — M79.10 MYALGIA: ICD-10-CM

## 2024-03-29 DIAGNOSIS — R20.2 NUMBNESS AND TINGLING IN LEFT ARM: ICD-10-CM

## 2024-03-29 DIAGNOSIS — R20.0 NUMBNESS AND TINGLING IN LEFT ARM: ICD-10-CM

## 2024-03-29 PROCEDURE — 97110 THERAPEUTIC EXERCISES: CPT

## 2024-03-29 NOTE — OP THERAPY DAILY TREATMENT
Outpatient Physical Therapy  DAILY TREATMENT     [address]  [address]  [place] [address]  Phone:  [phone]  Fax:  [phone]    Date: 03/29/2024    Patient: Ashly Meyer  YOB: 1950  MRN: [id number]     Time Calculation    Start time: 1015  Stop time: 1055 Time Calculation (min): 40 minutes         Chief Complaint: Back Problem, Neck Problem, and Weakness    Visit #: 4    SUBJECTIVE:  Patient reports she has been doing pretty good over the last week. She reports no new falls.     OBJECTIVE:  Current objective measures:           Therapeutic Exercises (CPT 99364):     1. Cervical AROM; yes/no/maybe, x20, B directions, pain free ROM, improved range noted and patient reports decreased pain during task    2. scapular retractions, x10; 3 second holds    3. trap stretch, 2x30 seconds, B    4. seated rows, 2x10, orange theraband    6. pulleys, x5 minutes, nt    8. TrA bracing, seated, x12; 3 second holds    9. thoracic rotation, x10    10. TrP, x1 to B Upper trap; reports no change in pain, nt    11. modified, seated wall angels, x6, nt    13. NuStep, x10 minutes, 0 resistance    14. seated marches, 2x10; orange theraband    15. seated hip abduction, 2x10; orange theraband    16. sit<>stands, x5    20. PN due on 4/8      Therapeutic Exercise Summary: Access Code: [email or web address]  URL: [email or web address]  Date: 03/15/2024  Prepared by: Obdulia Torres    Exercises  - Neck Rotation  - 2 x daily - 2 sets - 10 reps  - Head Nods  - 2 x daily - 2 sets - 10 reps  - Neck Sidebending  - 2 x daily - 2 sets - 10 reps      Time-based treatments/modalities:    Physical Therapy Timed Treatment Charges  Therapeutic exercise minutes (CPT 54495): 40 minutes      Pain rating (1-10) before treatment:  1; generalized pain  Pain rating (1-10) after treatment:  3    ASSESSMENT:   Response to treatment: Patient tolerates PT treatment well today. Less pain reported this  date and improved cervical ROM is noted. Patient is also tolerating trap stretches better this date and is able to participate in light postural strengthening without significant increase in pain. At this time, patient will benefit from continued skilled PT to promote further strength and ROM for improved quality of life.      PLAN/RECOMMENDATIONS:   Plan for treatment: therapy treatment to continue next visit.  Planned interventions for next visit: continue with current treatment.

## 2024-03-31 ENCOUNTER — APPOINTMENT (OUTPATIENT)
Dept: RADIOLOGY | Facility: MEDICAL CENTER | Age: 74
End: 2024-03-31
Attending: STUDENT IN AN ORGANIZED HEALTH CARE EDUCATION/TRAINING PROGRAM
Payer: MEDICARE

## 2024-04-05 ENCOUNTER — PHYSICAL THERAPY (OUTPATIENT)
Dept: PHYSICAL THERAPY | Facility: REHABILITATION | Age: 74
End: 2024-04-05
Attending: STUDENT IN AN ORGANIZED HEALTH CARE EDUCATION/TRAINING PROGRAM
Payer: MEDICARE

## 2024-04-05 DIAGNOSIS — M54.2 CERVICALGIA: ICD-10-CM

## 2024-04-05 DIAGNOSIS — R20.0 NUMBNESS AND TINGLING OF RIGHT ARM: ICD-10-CM

## 2024-04-05 DIAGNOSIS — M54.12 CERVICAL RADICULITIS: ICD-10-CM

## 2024-04-05 DIAGNOSIS — R20.2 NUMBNESS AND TINGLING OF RIGHT ARM: ICD-10-CM

## 2024-04-05 PROCEDURE — 97110 THERAPEUTIC EXERCISES: CPT

## 2024-04-05 NOTE — OP THERAPY DAILY TREATMENT
Outpatient Physical Therapy  DAILY TREATMENT     [address]  [address]  [place] [address]  Phone:  [phone]  Fax:  [phone]    Date: 04/05/2024    Patient: Ashly Meyer  YOB: 1950  MRN: [id number]     Time Calculation    Start time: 1015  Stop time: 1100 Time Calculation (min): 45 minutes         Chief Complaint: Arm Problem and Neck Problem    Visit #: 5    SUBJECTIVE:  Patient is seen for physical therapy follow up. She reports she has been doing pretty good over the last week. She reports no new falls, She has numbness in the foot via neuropathy and gout. Her biggest complaint is her neck and upper arms are the worse.      OBJECTIVE:  Current objective measures:           Therapeutic Exercises (CPT 74687):     1. Cervical AROM; yes/no/maybe, x20, B directions, pain free ROM, improved range noted and patient reports decreased pain during task    2. scapular retractions, x10; 3 second holds    3. Nu step, x 7 min, resistance level 3    4. Seated T spine ext with ball, Sitting EOB, ball in chair, x 10    5. Mod towel snag, Pillow case cued cervical retraction, gentle rotation    6. Supine bracing and hooklying march 7. Supine shoulder range, Dowel for range    20. PN due on 4/8      Therapeutic Exercise Summary: Access Code: [email or web address]  URL: [email or web address]  Date: 03/15/2024  Prepared by: Obdulia Torres    Exercises  - Neck Rotation  - 2 x daily - 2 sets - 10 reps  - Head Nods  - 2 x daily - 2 sets - 10 reps  - Neck Sidebending  - 2 x daily - 2 sets - 10 reps      Time-based treatments/modalities:    Physical Therapy Timed Treatment Charges  Therapeutic exercise minutes (CPT 40658): 45 minutes      Pain rating (1-10) before treatment:  1; generalized pain  Pain rating (1-10) after treatment:  3    ASSESSMENT:   Response to treatment: Faizaw does well but is easily fatigued with exercise and movement. At this time, patient will  benefit from continued skilled PT to promote further strength and ROM for improved quality of life.      PLAN/RECOMMENDATIONS:   Plan for treatment: therapy treatment to continue next visit.  Planned interventions for next visit: continue with current treatment.

## 2024-04-12 ENCOUNTER — PHYSICAL THERAPY (OUTPATIENT)
Dept: PHYSICAL THERAPY | Facility: REHABILITATION | Age: 74
End: 2024-04-12
Attending: STUDENT IN AN ORGANIZED HEALTH CARE EDUCATION/TRAINING PROGRAM
Payer: MEDICARE

## 2024-04-12 DIAGNOSIS — M79.10 MYALGIA: ICD-10-CM

## 2024-04-12 DIAGNOSIS — R20.2 NUMBNESS AND TINGLING IN LEFT ARM: ICD-10-CM

## 2024-04-12 DIAGNOSIS — R20.2 NUMBNESS AND TINGLING OF RIGHT ARM: ICD-10-CM

## 2024-04-12 DIAGNOSIS — R20.0 NUMBNESS AND TINGLING OF RIGHT ARM: ICD-10-CM

## 2024-04-12 DIAGNOSIS — M54.2 CERVICALGIA: ICD-10-CM

## 2024-04-12 DIAGNOSIS — R20.0 NUMBNESS AND TINGLING IN LEFT ARM: ICD-10-CM

## 2024-04-12 DIAGNOSIS — M54.12 CERVICAL RADICULITIS: ICD-10-CM

## 2024-04-12 PROCEDURE — 97110 THERAPEUTIC EXERCISES: CPT

## 2024-04-12 NOTE — OP THERAPY PROGRESS SUMMARY
Outpatient Physical Therapy  PROGRESS SUMMARY NOTE      [facility] Physical Therapy [address]  [address]  [place] [address]  Phone:  [phone]  Fax:  [phone]    Date of Visit: 04/12/2024    Patient: Ashly Meyer  YOB: 1950  MRN: [id number]     Referring Provider: Patricia Ho M.D.  [address]  [address]  [place] [address]   Referring Diagnosis Radiculopathy, cervical region [M54.12];Cervicalgia [M54.2];Anesthesia of skin [R20.0];Paresthesia of skin [R20.2];Myalgia, unspecified site [M79.10]     Visit Diagnoses     ICD-10-CM   1. Cervical radiculitis  M54.12   2. Numbness and tingling of right arm  R20.0    R20.2   3. Cervicalgia  M54.2   4. Myalgia  M79.10   5. Numbness and tingling in left arm  R20.0    R20.2       Rehab Potential: fair    Progress Report Period: 3/8/2024-4/12/2024            Objective Findings and Assessment:   Patient progression towards goals: Goals:   Short Term Goals:   1. Patient will demonstrate independent HEP to promote increased strength and ROM for improved functional activity tolerance. - progressing, reports doing some of her HEP      2. Patient will demonstrate improved cervical AROM by 10 degrees or more to promote decreased pain. -partially met; cervical rotation, met, cervical lateral flexion, not met     3. Patient will demonstrate  improved B hip flexion strength to 4/5 to promote ability to transition off toilet for increased safety. -not met, remains painful; discontinue goal and adjust to 5x sit<>stand     Short term goal time span:  2-4 weeks      Long Term Goals:    1. Patient will report improved NDI by 10 or more points to indicate improved quality of life. -not assessed      2. Patient will demonstrate improved cervical AROM to at least 60 degrees B to allow for improved safety with driving and daily activities. -progressing well, mild pain reported, upgrade to complete B cervical rotation without pain      3,  Patient will be able to complete her dishes without significant increase in pain to promote continued independence and quality of life. -not met     Long term goal time span:  6-8 weeks      Objective findings and assessment details:   Current objective measures:      Additional Active Range of Motion Details  L rotation: 63 degrees  R rotation: 59 degrees  L lateral flexion: 24 degrees  R lateral flexion: 19 degrees      Additional Strength Details  R hip flexion: 3+/5  L hip flexion: 3+/5     5x sit<>stand: 30.7 seconds, UE support required      ASSESSMENT:   Response to treatment: Patient tolerates PT treatments well thus far. Goals addressed and updated as appropriate. Continues to report significant pain, which was limiting factor during previous PT attempts as well. Continued with cervical ROM and strengthening, as well as B shoulder ROM and strengthening, to promote improved pain and ability to complete functional mobility skills. At this time, patient will benefit from continued skilled PT for improved quality of life.       Goals:   Short Term Goals:   1. Patient will demonstrate independent HEP to promote increased strength and ROM for improved functional activity tolerance.       2. Patient will demonstrate improved cervical lateral flexion AROM by 10 degrees or more to promote decreased pain.     3. Patient will demonstrate improved 5x sit<> under 20 seconds with B UE support, as needed, to promote ability to transition off toilet for increased safety.    Short term goal time span:  4-6 weeks      Long Term Goals:    1. Patient will report improved NDI by 10 or more points to indicate improved quality of life.       2. Patient will demonstrate improved cervical AROM to at least 60 degrees B, without pain, to allow for improved safety with driving and daily activities.      3, Patient will be able to complete her dishes without significant increase in pain to promote continued independence and  quality of life.    4. Patient will report ability to drink out of her glasses at home with increased ease to promote proper dehydration and functional mobility skills.   Long term goal time span:  2-4 months    Plan:   Planned therapy interventions:  Neuromuscular Re-education (CPT 65236), Manual Therapy (CPT 57685), Therapeutic Exercise (CPT 93403), Therapeutic Activities (CPT 17344) and Gait Training (CPT 48162)  Frequency:  1x week  Duration in weeks:  12      Referring provider co-signature:  I have reviewed this plan of care and my co-signature certifies the need for services.     Certification Period: 04/12/2024 to 07/11/24    Physician Signature: ________________________________ Date: ______________

## 2024-04-12 NOTE — OP THERAPY DAILY TREATMENT
"  Outpatient Physical Therapy  DAILY TREATMENT     [facility] Physical [address].  [address]  [place] [address]  Phone:  [phone]  Fax:  [phone]    Date: 04/12/2024    Patient: Ashly Meyer  YOB: 1950  MRN: [id number]     Time Calculation    Start time: 1011  Stop time: 1056 Time Calculation (min): 45 minutes         Chief Complaint: Back Problem, Neck Problem, and Weakness    Visit #: 6    SUBJECTIVE:  Patient reports that she \"was doing okay\" after last session. She reports she felt a little stiff. Denies new falls. Patient reports her \"drinking glasses\" are \"kind of heavy to drink out of.\" Discussed updating for goal.     OBJECTIVE:  Current objective measures:     Additional Active Range of Motion Details  L rotation: 63 degrees  R rotation: 59 degrees  L lateral flexion: 24 degrees  R lateral flexion: 19 degrees     Additional Strength Details  R hip flexion: 3+/5  L hip flexion: 3+/5    5x sit<>stand: 30.7 seconds, UE support required                Therapeutic Exercises (CPT 19317):     1. Cervical AROM; yes/no/maybe, x20, B directions, pain free ROM    2. scapular retractions, x10; 3 second holds    3. Nu step, x 7 min, resistance level 3, nt    4. Seated T spine ext with ball, Sitting EOB, ball in chair, x 10    5. Mod towel snag, Pillow case cued cervical retraction, gentle rotation, x10, B direction    6. Supine bracing and hooklying march, nt    7. supine shoulder AAROM with dowel tod, flexion/abduction, x10 each; limited abduction AAROM B    8. seated bicep curls with 1# weight, x10, B, to mimic glass to mouth    19. 5x sit<>stand, 30.7 seconds, B UE support, 4/12    20. PN due on 4/8      Therapeutic Exercise Summary: Access Code: [email or web address]  URL: [email or web address]  Date: 03/15/2024  Prepared by: Obdulia Torres    Exercises  - Neck Rotation  - 2 x daily - 2 sets - 10 reps  - Head Nods  - 2 x daily - 2 sets - 10 reps  - Neck Sidebending  - " 2 x daily - 2 sets - 10 reps      Time-based treatments/modalities:    Physical Therapy Timed Treatment Charges  Therapeutic exercise minutes (CPT 51077): 45 minutes      Pain rating (1-10) before treatment:  6; generalized pain, B shoulders, neck  Pain rating (1-10) after treatment:  5, B shoulders, neck     Goals:   Short Term Goals:   1. Patient will demonstrate independent HEP to promote increased strength and ROM for improved functional activity tolerance. - progressing, reports doing some of her HEP      2. Patient will demonstrate improved cervical AROM by 10 degrees or more to promote decreased pain. -partially met; cervical rotation, met, cervical lateral flexion, not met     3. Patient will demonstrate  improved B hip flexion strength to 4/5 to promote ability to transition off toilet for increased safety. -not met, remains painful; discontinue goal and adjust to 5x sit<>stand     Short term goal time span:  2-4 weeks      Long Term Goals:    1. Patient will report improved NDI by 10 or more points to indicate improved quality of life. -not assessed      2. Patient will demonstrate improved cervical AROM to at least 60 degrees B to allow for improved safety with driving and daily activities. -progressing well, mild pain reported, upgrade to complete B cervical rotation without pain      3, Patient will be able to complete her dishes without significant increase in pain to promote continued independence and quality of life. -not met    Long term goal time span:  6-8 weeks      ASSESSMENT:   Response to treatment: Patient tolerates PT treatments well thus far. Goals addressed and updated as appropriate. Continues to report significant pain, which was limiting factor during previous PT attempts as well. Continued with cervical ROM and strengthening, as well as B shoulder ROM and strengthening, to promote improved pain and ability to complete functional mobility skills. At this time, patient will benefit from  continued skilled PT for improved quality of life.     PLAN/RECOMMENDATIONS:   Plan for treatment: therapy treatment to continue next visit.  Planned interventions for next visit: continue with current treatment.

## 2024-04-19 ENCOUNTER — PHYSICAL THERAPY (OUTPATIENT)
Dept: PHYSICAL THERAPY | Facility: REHABILITATION | Age: 74
End: 2024-04-19
Attending: STUDENT IN AN ORGANIZED HEALTH CARE EDUCATION/TRAINING PROGRAM
Payer: MEDICARE

## 2024-04-19 DIAGNOSIS — M54.12 CERVICAL RADICULITIS: ICD-10-CM

## 2024-04-19 DIAGNOSIS — M79.10 MYALGIA: ICD-10-CM

## 2024-04-19 DIAGNOSIS — R20.0 NUMBNESS AND TINGLING OF RIGHT ARM: ICD-10-CM

## 2024-04-19 DIAGNOSIS — R20.2 NUMBNESS AND TINGLING OF RIGHT ARM: ICD-10-CM

## 2024-04-19 DIAGNOSIS — R20.0 NUMBNESS AND TINGLING IN LEFT ARM: ICD-10-CM

## 2024-04-19 DIAGNOSIS — M54.2 CERVICALGIA: ICD-10-CM

## 2024-04-19 DIAGNOSIS — R20.2 NUMBNESS AND TINGLING IN LEFT ARM: ICD-10-CM

## 2024-04-19 PROCEDURE — 97110 THERAPEUTIC EXERCISES: CPT

## 2024-04-19 NOTE — OP THERAPY DAILY TREATMENT
"  Outpatient Physical Therapy  DAILY TREATMENT     [facility] Physical [address].  [address]  [place] [address]  Phone:  [phone]  Fax:  [phone]    Date: 04/19/2024    Patient: Ashly Meyer  YOB: 1950  MRN: [id number]     Time Calculation    Start time: 1017  Stop time: 1058 Time Calculation (min): 41 minutes         Chief Complaint: Back Problem, Neck Pain, and Weakness    Visit #: 7    SUBJECTIVE:  Patient presents to PT session and reports she has felt off balance this week. She denies falls. Patient reports she has continued B hand/forearm numbness/tingling that comes and goes. Patient reports that today \"isn't a great day\" in regards to her pain.     OBJECTIVE:  Current objective measures:     94%;  68 bpm  BP: 124/76            Therapeutic Exercises (CPT 08986):     1. Cervical AROM; yes/no/maybe, x10, B directions, pain free ROM    2. scapular retractions, x10; 3 second holds    3. Nu step, x 7 min, resistance level 3, nt    4. Seated T spine ext with ball, Sitting EOB, ball in chair, nt    5. Mod towel snag, Pillow case cued cervical retraction, gentle rotation, nt    6. Supine bracing and hooklying march, nt    7. supine shoulder AAROM with dowel tod, flexion/abduction, x10 each; limited abduction AAROM B, nt    8. seated bicep curls, shoulder flex/abd with 1# weight, x10, B, to mimic glass to mouth    9. chin tucks with retractions, 2x5    10. thoracic rotation, x10    11. seated pelvic tils, x10    12. seated TrA bracing, x10; 3 second holds    13. seated spine elongation, x10, verbal cues for elongation; fatigues quickly    14. sit<>stands, x3    15. B hamstring stretch, seated, x30 seconds, B    19. 5x sit<>stand, 30.7 seconds, B UE support, 4/12    20. PN due on 4/8      Therapeutic Exercise Summary: Access Code: [email or web address]  URL: [email or web address]  Date: 03/15/2024  Prepared by: Obdulia Buening    Exercises  - Neck Rotation  - 2 x daily - " 2 sets - 10 reps  - Head Nods  - 2 x daily - 2 sets - 10 reps  - Neck Sidebending  - 2 x daily - 2 sets - 10 reps      Time-based treatments/modalities:    Physical Therapy Timed Treatment Charges  Therapeutic exercise minutes (CPT 56566): 41 minutes      Pain rating (1-10) before treatment:  9; B knees, B shoulders, back; B arms; neck  (generalized pain)   Pain rating (1-10) after treatment:  no pain on exit reported     ASSESSMENT:   Response to treatment: Patient continues to report significant generalized pain, including some numbness/tingling to B forearms and hands. Continued with strength and ROM activities and initiated chin tuck with retractions to promote further cervical stability for decreased pain. At this time, patient continues to be limited by reports of pain. Will benefit from continued skilled PT for strengthening for decreased pain and improved quality of life.     PLAN/RECOMMENDATIONS:   Plan for treatment: therapy treatment to continue next visit.  Planned interventions for next visit: continue with current treatment.

## 2024-04-25 ENCOUNTER — PHYSICAL THERAPY (OUTPATIENT)
Dept: PHYSICAL THERAPY | Facility: REHABILITATION | Age: 74
End: 2024-04-25
Attending: STUDENT IN AN ORGANIZED HEALTH CARE EDUCATION/TRAINING PROGRAM
Payer: MEDICARE

## 2024-04-25 DIAGNOSIS — R20.2 NUMBNESS AND TINGLING OF RIGHT ARM: ICD-10-CM

## 2024-04-25 DIAGNOSIS — R20.0 NUMBNESS AND TINGLING OF RIGHT ARM: ICD-10-CM

## 2024-04-25 DIAGNOSIS — M54.2 CERVICALGIA: ICD-10-CM

## 2024-04-25 DIAGNOSIS — M54.12 CERVICAL RADICULITIS: ICD-10-CM

## 2024-04-25 DIAGNOSIS — R20.0 NUMBNESS AND TINGLING IN LEFT ARM: ICD-10-CM

## 2024-04-25 DIAGNOSIS — R20.2 NUMBNESS AND TINGLING IN LEFT ARM: ICD-10-CM

## 2024-04-25 DIAGNOSIS — M79.10 MYALGIA: ICD-10-CM

## 2024-04-25 PROCEDURE — 97110 THERAPEUTIC EXERCISES: CPT

## 2024-04-25 NOTE — OP THERAPY DAILY TREATMENT
"  Outpatient Physical Therapy  DAILY TREATMENT     [facility] Physical [address]  [address]  [place] [address]  Phone:  [phone]  Fax:  [phone]    Date: 04/25/2024    Patient: Ashly Meyer  YOB: 1950  MRN: [id number]     Time Calculation    Start time: 1102  Stop time: 1146 Time Calculation (min): 44 minutes         Chief Complaint: Weakness and Neck Problem    Visit #: 8    SUBJECTIVE:  Patient reports that she has had no falls. She reports her R knee has been sore. She reports her neck pain is improving overall. Patient reports she was able to complete some \"weed eating\" yesterday.     OBJECTIVE:  Current objective measures:     TMR technique utilized for L side bend (trunk); 2x 25 R side bends completed which resulted in decreased pain with L side bend and improved L side bend ROM           Therapeutic Exercises (CPT 84702):     1. Cervical AROM; yes/no/maybe, x20, B directions, pain free ROM    2. scapular retractions, x10; 3 second holds    3. Nu step, x10 min, resistance level 2    4. Seated T spine ext with ball, Sitting EOB, ball in chair, nt    5. Mod towel snag, Pillow case cued cervical retraction, gentle rotation; x10 B direction    6. Supine bracing and hooklying march, nt    7. supine shoulder AAROM with dowel tod, flexion/abduction, x10 each; limited abduction AAROM B, nt    8. seated bicep curls, shoulder flex/abd with 2# weight, 2x10, B, to mimic glass to mouth    9. chin tucks with retractions, 2x5, max verbal and demonstrative cues with mild tactile cues required    10. thoracic rotation, x10; B    11. seated pelvic tils, x10, nt    12. seated TrA bracing, x10; 3 second holds, nt    13. seated spine elongation, x10, nt    14. sit<>stands, x3, nt    15. B hamstring stretch, seated, x30 seconds, B    16. modified hip flexion stretch, seated extension, 2x30 seconds; spontaneous from patient    17. seated lateral trunk stretches with swiss ball, " x10, B    19. 5x sit<>stand, 30.7 seconds, B UE support, 4/12    20. PN due on 4/8      Therapeutic Exercise Summary: Access Code: [email or web address]  URL: [email or web address]  Date: 03/15/2024  Prepared by: Obdulia Torres    Exercises  - Neck Rotation  - 2 x daily - 2 sets - 10 reps  - Head Nods  - 2 x daily - 2 sets - 10 reps  - Neck Sidebending  - 2 x daily - 2 sets - 10 reps      Time-based treatments/modalities:    Physical Therapy Timed Treatment Charges  Therapeutic exercise minutes (CPT 77369): 44 minutes      Pain rating (1-10) before treatment:  9; R knee; L hip pain    Pain rating (1-10) after treatment:  reports unrated but improved pain     ASSESSMENT:   Response to treatment: Patient tolerates PT treatment well today. Continues to report generalized pain throughout body but states her neck pain is improving. Continued with cervical AROM and cervical/postural strengthening exercises. At this time, patient will benefit from continued skilled PT for further decreased pain and improved quality of life.     PLAN/RECOMMENDATIONS:   Plan for treatment: therapy treatment to continue next visit.  Planned interventions for next visit: continue with current treatment.

## 2024-04-26 ENCOUNTER — APPOINTMENT (OUTPATIENT)
Dept: PHYSICAL THERAPY | Facility: REHABILITATION | Age: 74
End: 2024-04-26
Attending: STUDENT IN AN ORGANIZED HEALTH CARE EDUCATION/TRAINING PROGRAM
Payer: MEDICARE

## 2024-04-26 ENCOUNTER — OFFICE VISIT (OUTPATIENT)
Dept: MEDICAL GROUP | Facility: MEDICAL CENTER | Age: 74
End: 2024-04-26
Payer: MEDICARE

## 2024-04-26 ENCOUNTER — HOSPITAL ENCOUNTER (OUTPATIENT)
Dept: LAB | Facility: MEDICAL CENTER | Age: 74
End: 2024-04-26
Attending: FAMILY MEDICINE
Payer: MEDICARE

## 2024-04-26 VITALS
HEIGHT: 67 IN | HEART RATE: 77 BPM | RESPIRATION RATE: 16 BRPM | WEIGHT: 247 LBS | BODY MASS INDEX: 38.77 KG/M2 | SYSTOLIC BLOOD PRESSURE: 132 MMHG | DIASTOLIC BLOOD PRESSURE: 74 MMHG | TEMPERATURE: 97.6 F | OXYGEN SATURATION: 98 %

## 2024-04-26 DIAGNOSIS — S76.219A STRAIN OF ADDUCTOR MUSCLE OF THIGH: ICD-10-CM

## 2024-04-26 DIAGNOSIS — I10 ESSENTIAL HYPERTENSION: ICD-10-CM

## 2024-04-26 DIAGNOSIS — C73 THYROID CARCINOMA (HCC): ICD-10-CM

## 2024-04-26 DIAGNOSIS — I65.23 BILATERAL CAROTID ARTERY STENOSIS: ICD-10-CM

## 2024-04-26 DIAGNOSIS — E55.9 VITAMIN D DEFICIENCY: ICD-10-CM

## 2024-04-26 DIAGNOSIS — K76.0 NONALCOHOLIC FATTY LIVER DISEASE: ICD-10-CM

## 2024-04-26 DIAGNOSIS — E78.5 DYSLIPIDEMIA, GOAL LDL BELOW 130: ICD-10-CM

## 2024-04-26 DIAGNOSIS — G61.9 INFLAMMATORY NEUROPATHY (HCC): ICD-10-CM

## 2024-04-26 DIAGNOSIS — G62.9 PERIPHERAL POLYNEUROPATHY: ICD-10-CM

## 2024-04-26 DIAGNOSIS — E11.9 CONTROLLED TYPE 2 DIABETES MELLITUS WITHOUT COMPLICATION, WITHOUT LONG-TERM CURRENT USE OF INSULIN (HCC): ICD-10-CM

## 2024-04-26 LAB
25(OH)D3 SERPL-MCNC: 62 NG/ML (ref 30–100)
ALBUMIN SERPL BCP-MCNC: 4.3 G/DL (ref 3.2–4.9)
ALBUMIN/GLOB SERPL: 1.3 G/DL
ALP SERPL-CCNC: 149 U/L (ref 30–99)
ALT SERPL-CCNC: 16 U/L (ref 2–50)
ANION GAP SERPL CALC-SCNC: 15 MMOL/L (ref 7–16)
AST SERPL-CCNC: 16 U/L (ref 12–45)
BILIRUB SERPL-MCNC: 0.6 MG/DL (ref 0.1–1.5)
BUN SERPL-MCNC: 26 MG/DL (ref 8–22)
CALCIUM ALBUM COR SERPL-MCNC: 9 MG/DL (ref 8.5–10.5)
CALCIUM SERPL-MCNC: 9.2 MG/DL (ref 8.5–10.5)
CHLORIDE SERPL-SCNC: 106 MMOL/L (ref 96–112)
CHOLEST SERPL-MCNC: 143 MG/DL (ref 100–199)
CO2 SERPL-SCNC: 21 MMOL/L (ref 20–33)
CREAT SERPL-MCNC: 0.89 MG/DL (ref 0.5–1.4)
ERYTHROCYTE [DISTWIDTH] IN BLOOD BY AUTOMATED COUNT: 49.2 FL (ref 35.9–50)
EST. AVERAGE GLUCOSE BLD GHB EST-MCNC: 137 MG/DL
GFR SERPLBLD CREATININE-BSD FMLA CKD-EPI: 68 ML/MIN/1.73 M 2
GLOBULIN SER CALC-MCNC: 3.3 G/DL (ref 1.9–3.5)
GLUCOSE SERPL-MCNC: 116 MG/DL (ref 65–99)
HBA1C MFR BLD: 6.4 % (ref 4–5.6)
HCT VFR BLD AUTO: 43.1 % (ref 37–47)
HDLC SERPL-MCNC: 54 MG/DL
HGB BLD-MCNC: 13.8 G/DL (ref 12–16)
LDLC SERPL CALC-MCNC: 69 MG/DL
MCH RBC QN AUTO: 29.1 PG (ref 27–33)
MCHC RBC AUTO-ENTMCNC: 32 G/DL (ref 32.2–35.5)
MCV RBC AUTO: 90.9 FL (ref 81.4–97.8)
PLATELET # BLD AUTO: 195 K/UL (ref 164–446)
PMV BLD AUTO: 11.3 FL (ref 9–12.9)
POTASSIUM SERPL-SCNC: 4 MMOL/L (ref 3.6–5.5)
PROT SERPL-MCNC: 7.6 G/DL (ref 6–8.2)
RBC # BLD AUTO: 4.74 M/UL (ref 4.2–5.4)
SODIUM SERPL-SCNC: 142 MMOL/L (ref 135–145)
TRIGL SERPL-MCNC: 101 MG/DL (ref 0–149)
TSH SERPL DL<=0.005 MIU/L-ACNC: 0.37 UIU/ML (ref 0.38–5.33)
WBC # BLD AUTO: 9.5 K/UL (ref 4.8–10.8)

## 2024-04-26 PROCEDURE — 80053 COMPREHEN METABOLIC PANEL: CPT

## 2024-04-26 PROCEDURE — 80061 LIPID PANEL: CPT

## 2024-04-26 PROCEDURE — 82306 VITAMIN D 25 HYDROXY: CPT

## 2024-04-26 PROCEDURE — 85027 COMPLETE CBC AUTOMATED: CPT

## 2024-04-26 PROCEDURE — 84443 ASSAY THYROID STIM HORMONE: CPT

## 2024-04-26 PROCEDURE — 36415 COLL VENOUS BLD VENIPUNCTURE: CPT | Mod: GA

## 2024-04-26 PROCEDURE — 83036 HEMOGLOBIN GLYCOSYLATED A1C: CPT | Mod: GA

## 2024-04-26 RX ORDER — GABAPENTIN 800 MG/1
800 TABLET ORAL
Qty: 180 TABLET | Refills: 3 | Status: SHIPPED | OUTPATIENT
Start: 2024-04-26

## 2024-04-26 RX ORDER — ATORVASTATIN CALCIUM 40 MG/1
40 TABLET, FILM COATED ORAL EVERY EVENING
Qty: 90 TABLET | Refills: 3 | Status: SHIPPED | OUTPATIENT
Start: 2024-04-26

## 2024-04-26 RX ORDER — DULOXETIN HYDROCHLORIDE 60 MG/1
60 CAPSULE, DELAYED RELEASE ORAL EVERY EVENING
Qty: 90 CAPSULE | Refills: 3 | Status: SHIPPED | OUTPATIENT
Start: 2024-04-26

## 2024-04-26 ASSESSMENT — FIBROSIS 4 INDEX: FIB4 SCORE: 1.65

## 2024-04-26 NOTE — PROGRESS NOTES
Chief Complaint   Patient presents with    Muscle Pain    Arthritis    Diabetes Mellitus    Hypertension    Dyslipidemia       Subjective:     HPI:   Ashly Meyer presents today with the followin. Strain of adductor muscle of thigh  Patient has tenderness of the sartorius of the right thigh.  Patient feels this happened when she was bending over and then pushing off.  There has been no bruising.  There is clearly tenderness to palpation.  This will likely resolve on its own.    2. Controlled type 2 diabetes mellitus without complication, without long-term current use of insulin ([facility])  Lab results show A1c of 6.4%, very good diabetes control.  Patient is diet controlled.    3. Essential hypertension  Blood pressure here today is very good.  Patient does not check blood pressure at home.  She is having good blood pressure control on the [place].  She is now off the nabumetone as she told the pain specialist she did not think it helped her.  Being off the nabumetone may have helped her blood pressure.  Follow-up lab orders discussed and placed.    4. Dyslipidemia, goal LDL below 130/Bilateral carotid artery stenosis  Patient denies chest pain, chest pressure, palpitations or exertional shortness of breath. Patient denies increased muscle aches or muscle weakness from the atorvastatin medication. Patient is a former smoker. Patient takes no aspirin daily. Patient has no history of myocardial infarction or stroke.  She does have moderate carotid artery stenosis.  Follow-up lab orders discussed and placed.    5. Nonalcoholic fatty liver disease  Patient avoids alcohol.  Patient is working on weight loss and following a better diet.  Follow-up orders discussed and placed.    6. Vitamin D deficiency  She continues vitamin D supplementation.  Follow-up orders discussed and placed.    7. Inflammatory neuropathy (HCC)  Patient is on duloxetine to help control the symptoms of her inflammatory neuropathy.  It is  somewhat helpful per patient.  I believe clinically she is doing better than I have seen her do in a long time.    8. Peripheral polyneuropathy  She is on high-dose gabapentin which she is taking twice daily.  This has been significantly helpful for her polyneuropathy.  It is a little confusing to me as it was not helpful in the past.  However, I am happy she is doing better.    9. Thyroid carcinoma (HCC)  Patient is now taking both levothyroxine and liothyronine.  Most recent TSH is at 0.05.  This was 5 months ago and patient needs it rechecked.  TSH does need to be Suppressed due to her thyroid carcinoma.  Orders discussed and placed.        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    Primary osteoarthritis of first metatarsophalangeal (MTP) joint 05/12/2023    Urinary tract infection due to Klebsiella species 02/13/2023    Bilious vomiting with nausea 02/13/2023    Drug-induced constipation 12/26/2022    Impaired gait and mobility 12/21/2022    Ataxia 12/20/2022    Carotid artery stenosis 12/20/2022    Left bundle branch block 12/20/2022    Temporal pain 12/14/2022    History of 2019 novel coronavirus disease (COVID-19) 10/11/2022    Positive self-administered antigen test for COVID-19 10/11/2022    Trochanteric bursitis of both hips 03/03/2022    Mild persistent asthma, uncomplicated 03/03/2022    Incomplete rectal prolapse 09/03/2021    Benign essential tremor 09/03/2021    Chronic respiratory failure with hypoxia ([facility]) 06/03/2021    Controlled type 2 diabetes mellitus without complication, without long-term current use of insulin ([facility]) 06/12/2019    Chronic gout of foot 04/30/2019    Dry eye syndrome, bilateral     Rotator cuff syndrome of right shoulder and allied disorders 10/10/2018    Vitamin D deficiency 12/28/2017    Morbid  obesity with BMI of 40.0-44.9, adult (HCC)     Neural foraminal stenosis of cervical spine 05/18/2017    Tinnitus of both ears 05/18/2017    Dyslipidemia, goal LDL below 130 01/06/2017    Nonalcoholic fatty liver disease 01/05/2017    Menopausal symptoms 04/28/2016    Lumbar facet arthropathy 12/09/2015    Candidal intertrigo 06/01/2015    Elevated sed rate 12/09/2014    Osteoarthritis of left thumb 07/23/2014    Bilateral hip pain 04/23/2014    H/O fibromyalgia 03/11/2014    KERRI (obstructive sleep apnea) 03/11/2014    Peripheral neuropathy     Rectocele 11/25/2013    Pelvic floor dysfunction     Chronic constipation 10/02/2013    Nocturnal hypoxia     Lumbar radiculopathy 11/01/2011    Migraine without aura     Seasonal allergies 07/08/2011    GERD (gastroesophageal reflux disease) 12/10/2010    Osteoarthritis of multiple joints 04/13/2010    Eczema, dyshidrotic 08/03/2009    Essential hypertension 05/20/2009    Hypothyroidism due to acquired atrophy of thyroid 05/20/2009       Current medicines (including changes today)  Current Outpatient Medications   Medication Sig Dispense Refill    atorvastatin (LIPITOR) 40 MG Tab Take 1 Tablet by mouth every evening. 90 Tablet 3    DULoxetine (CYMBALTA) 60 MG Cap DR Particles delayed-release capsule Take 1 Capsule by mouth every evening. 90 Capsule 3    gabapentin (NEURONTIN) 800 MG tablet Take 1 Tablet by mouth 2 times a day. 180 Tablet 3    allopurinol (ZYLOPRIM) 100 MG Tab TAKE ONE TABLET BY MOUTH ONCE DAILY 90 Tablet 2    QUEtiapine (SEROQUEL) 25 MG Tab TAKE ONE TABLET BY MOUTH AT BEDTIME 90 Tablet 2    aliskiren (TEKTURNA) 150 MG tablet Take 150 mg by mouth every day.      pantoprazole (PROTONIX) 40 MG Tablet Delayed Response Take 1 Tablet by mouth 2 times a day. 180 Tablet 3    levothyroxine (SYNTHROID) 137 MCG Tab TAKE ONE TABLET BY MOUTH EVERY MORNING ON AN EMPTY STOMACH 90 Tablet 2    calcitonin, salmon, (MIACALCIN) 200 UNIT/ACT Solution Administer 1 [place] into  "affected nostril(S) every day.      liothyronine (CYTOMEL) 5 MCG Tab Take 5 mcg by mouth every day.      albuterol 108 (90 Base) MCG/ACT Aero Soln inhalation aerosol Inhale 2 Puffs every 6 hours as needed for Shortness of Breath. 8.5 g 11    Cholecalciferol (VITAMIN D-3) 125 MCG (5000 UT) Tab Take 5,000 Units by mouth every evening.      VITAMIN E PO Take 1 Capsule by mouth every morning.       No current facility-administered medications for this visit.       Allergies   Allergen Reactions    Ace Inhibitors Cough    Benadryl Allergy Hives     Hot skin itching    Iodine Nausea     Pt received CT w/ contrast 12/20/22 pt had nausea post contrast     Lamictal Rash and Swelling     \"hot\", unable to function.  Severe rash, scarring    Savella [Kdc:Milnacipran+Ci Pigment Blue 63] Nausea, Swelling and Anxiety     Swelling, bone and muscle pain, sweating, nausea, dizziness, sleeplessness, anxiety    Savella [Milnacipran Hcl] Unspecified     Muscle aches, feet swelling    Sulfa Drugs Hives, Shortness of Breath and Anxiety     Hard breathing, shortness of breath    Butalbital-Acetaminophen Unspecified     Dizzy, can't walk, hard to focus    Cefaclor Nausea and Unspecified     Ceclor causes light headedness and dizziness    Morphine Itching     \"Redness\"    Penicillins Itching and Swelling     Skin itching and redness    Tizanidine Hcl Nausea     \"Dizziness\" hard to focus    Milnacipran Vomiting and Nausea    Amlactin Rash and Itching     Lotion causes itching, redness and burnng    Tape Rash and Itching     Skin tears, paper tape is OK per Pt       ROS: As per HPI       Objective:     /74 (BP Location: Right arm, Patient Position: Sitting, BP Cuff Size: Adult)   Pulse 77   Temp 36.4 °C (97.6 °F) (Temporal)   Resp 16   Ht 1.689 m (5' 6.5\")   Wt 112 kg (247 lb)   SpO2 98%  Body mass index is 39.27 kg/m².    Physical Exam:  Constitutional: Well-developed and well-nourished. Not diaphoretic. No distress. Lucid and " fluent.  Skin: Skin is warm and dry. No rash noted.  Head: Atraumatic without lesions.  Eyes: Conjunctivae and extraocular motions are normal. Pupils are equal, round, and reactive to light. No scleral icterus.   Ears:  External ears unremarkable. Tympanic membranes clear and intact.  Nose: Nares patent. Mucosa without edema or erythema. No discharge. No facial tenderness.  Mouth/Throat: Tongue normal. Oropharynx is clear and moist. Posterior pharynx without erythema or exudates.  Neck: Supple, trachea midline. No thyromegaly present. No cervical or supraclavicular lymphadenopathy. No JVD or carotid bruits appreciated  Cardiovascular: Regular rate and rhythm.  Normal S1, S2 without murmur appreciated.  Chest: Effort normal. Clear to auscultation throughout. No adventitious sounds.   Abdomen: Soft, non tender, and without distention. Active bowel sounds in all four quadrants. No rebound, guarding, masses or hepatosplenomegaly.  Extremities: No cyanosis, clubbing, erythema, nor edema.   Neurological: Alert and oriented x 3.  Using her front wheeled walker.  Psychiatric:  Behavior, mood, and affect are appropriate.       Assessment and Plan:     73 y.o. female with the following issues:    1. Strain of adductor muscle of thigh        2. Controlled type 2 diabetes mellitus without complication, without long-term current use of insulin (HCC)  Comp Metabolic Panel    CBC WITHOUT DIFFERENTIAL    TSH    Lipid Profile    MICROALBUMIN CREAT RATIO URINE    HEMOGLOBIN A1C      3. Essential hypertension  Comp Metabolic Panel    CBC WITHOUT DIFFERENTIAL    Lipid Profile      4. Dyslipidemia, goal LDL below 130  Comp Metabolic Panel    CBC WITHOUT DIFFERENTIAL    TSH    Lipid Profile    atorvastatin (LIPITOR) 40 MG Tab      5. Bilateral carotid artery stenosis  atorvastatin (LIPITOR) 40 MG Tab      6. Nonalcoholic fatty liver disease  Comp Metabolic Panel    CBC WITHOUT DIFFERENTIAL      7. Vitamin D deficiency  VITAMIN D,25  HYDROXY (DEFICIENCY)      8. Inflammatory neuropathy (HCC)  DULoxetine (CYMBALTA) 60 MG Cap DR Particles delayed-release capsule      9. Peripheral polyneuropathy  gabapentin (NEURONTIN) 800 MG tablet      10. Thyroid carcinoma (HCC)  TSH        States her physical therapy is very helpful.    Followup: Return in about 3 months (around 7/26/2024), or if symptoms worsen or fail to improve.

## 2024-04-27 ENCOUNTER — HOSPITAL ENCOUNTER (OUTPATIENT)
Facility: MEDICAL CENTER | Age: 74
End: 2024-04-27
Attending: FAMILY MEDICINE
Payer: MEDICARE

## 2024-04-27 LAB
CREAT UR-MCNC: 43.87 MG/DL
MICROALBUMIN UR-MCNC: <1.2 MG/DL
MICROALBUMIN/CREAT UR: NORMAL MG/G (ref 0–30)

## 2024-04-27 PROCEDURE — 82043 UR ALBUMIN QUANTITATIVE: CPT

## 2024-04-27 PROCEDURE — 82570 ASSAY OF URINE CREATININE: CPT

## 2024-05-03 ENCOUNTER — PHYSICAL THERAPY (OUTPATIENT)
Dept: PHYSICAL THERAPY | Facility: REHABILITATION | Age: 74
End: 2024-05-03
Attending: STUDENT IN AN ORGANIZED HEALTH CARE EDUCATION/TRAINING PROGRAM
Payer: MEDICARE

## 2024-05-03 DIAGNOSIS — M54.12 CERVICAL RADICULITIS: ICD-10-CM

## 2024-05-03 DIAGNOSIS — R20.2 NUMBNESS AND TINGLING OF RIGHT ARM: ICD-10-CM

## 2024-05-03 DIAGNOSIS — M54.2 CERVICALGIA: ICD-10-CM

## 2024-05-03 DIAGNOSIS — R20.0 NUMBNESS AND TINGLING IN LEFT ARM: ICD-10-CM

## 2024-05-03 DIAGNOSIS — R20.2 NUMBNESS AND TINGLING IN LEFT ARM: ICD-10-CM

## 2024-05-03 DIAGNOSIS — M79.10 MYALGIA: ICD-10-CM

## 2024-05-03 DIAGNOSIS — R20.0 NUMBNESS AND TINGLING OF RIGHT ARM: ICD-10-CM

## 2024-05-03 NOTE — OP THERAPY DAILY TREATMENT
"  Outpatient Physical Therapy  DAILY TREATMENT     [facility] [address].  [address]  [place] [address]  Phone:  [phone]  Fax:  [phone]    Date: 05/03/2024    Patient: Ashly Meyer  YOB: 1950  MRN: [id number]     Time Calculation    Start time: 1016  Stop time: 1100 Time Calculation (min): 44 minutes         Chief Complaint: Weakness, Difficulty Walking, and Neck Problem    Visit #: 9    SUBJECTIVE:  Patient reports she has been having R knee pain. She reports that she has not fallen but has \"caught\" herself. She continues to deny use for FWW inside her home for increased safety reporting she furniture grabs. Patient aware of her fall risk and PT recommendation for assistive device utilization.     Patient continues to report improvement in neck pain.     OBJECTIVE:  Current objective measures:   Tenderness to palpation to R hamstring and distal sartorius musculature this date.             Therapeutic Exercises (CPT 41374):     1. Cervical AROM; yes/no/maybe, x20, B directions, nt    2. scapular retractions, x10; 3 second holds, nt    3. Nu step, x5 min, resistance level 2    4. seated disc glider with knee flexion/extension, x15; B LE    5. Mod towel snag, Pillow case cued cervical retraction, gentle rotation; x10 B direction, requires max verbal cues for appropriate form with task    6. Supine bracing and hooklying march, nt    7. supine shoulder AAROM with dowel tod, flexion/abduction, x10 each; limited abduction AAROM B, nt    8. seated bicep curls, shoulder flex/abd with 2# weight, 2x10, B, nt    9. chin tucks with retractions, x10; 3 second holds, minimal cues for appropriate form with task; reports she is completing with HEP at home    10. thoracic rotation, x15, B    11. seated pelvic tils, x10, nt    12. seated TrA bracing, x10; 3 second holds, nt    13. seated spine elongation, x10, nt    14. sit<>stands, x3, nt    15. B hamstring stretch, " seated, 2x30 seconds, B    16. seated hamstring isometric, R LE, 5 second holds, x10    17. seated lateral trunk stretches with swiss ball, x10, B, nt    18. modified seated sartorius stretch, unable to complete even with modified, unable to complete due to body habitus/positioning/pain    19. 5x sit<>stand, 30.7 seconds, B UE support, 4/12    20. PN due on 5/12      Therapeutic Exercise Summary: Access Code: [email or web address]  URL: [email or web address]  Date: 03/15/2024  Prepared by: Obdulia Torres    Exercises  - Neck Rotation  - 2 x daily - 2 sets - 10 reps  - Head Nods  - 2 x daily - 2 sets - 10 reps  - Neck Sidebending  - 2 x daily - 2 sets - 10 reps      Time-based treatments/modalities:    Physical Therapy Timed Treatment Charges  Therapeutic exercise minutes (CPT 42296): 44 minutes      Pain rating (1-10) before treatment:  9; R knee; 6 generalized pain   Pain rating (1-10) after treatment:  reports mild improvement in pain     ASSESSMENT:   Response to treatment: Patient tolerates PT treatment well today. Patient with tenderness to palpation noted to R hamstring and distal R sartorius musculature. Reports continued improvement in neck pain but tingling sensation to B UE/hands remains. Patient reports she does feel like the tingling sensation may be happening less often. PT provided some hamstring/sartorius stretch as tolerated to promote decreased pain. Also continued to educate patient on fall risk and PT recommendation for full time assistive device utilization, though patient denies need for in the home and refuses use. At this time    PLAN/RECOMMENDATIONS:   Plan for treatment: therapy treatment to continue next visit.  Planned interventions for next visit: continue with current treatment.

## 2024-05-05 ENCOUNTER — HOSPITAL ENCOUNTER (OUTPATIENT)
Dept: RADIOLOGY | Facility: MEDICAL CENTER | Age: 74
End: 2024-05-05
Attending: STUDENT IN AN ORGANIZED HEALTH CARE EDUCATION/TRAINING PROGRAM
Payer: MEDICARE

## 2024-05-05 DIAGNOSIS — M54.16 RIGHT LUMBAR RADICULITIS: ICD-10-CM

## 2024-05-07 ENCOUNTER — PHYSICAL THERAPY (OUTPATIENT)
Dept: PHYSICAL THERAPY | Facility: REHABILITATION | Age: 74
End: 2024-05-07
Attending: STUDENT IN AN ORGANIZED HEALTH CARE EDUCATION/TRAINING PROGRAM
Payer: MEDICARE

## 2024-05-07 DIAGNOSIS — R20.0 NUMBNESS AND TINGLING OF RIGHT ARM: ICD-10-CM

## 2024-05-07 DIAGNOSIS — R20.0 NUMBNESS AND TINGLING IN LEFT ARM: ICD-10-CM

## 2024-05-07 DIAGNOSIS — M79.10 MYALGIA: ICD-10-CM

## 2024-05-07 DIAGNOSIS — M54.12 CERVICAL RADICULITIS: ICD-10-CM

## 2024-05-07 DIAGNOSIS — R20.2 NUMBNESS AND TINGLING OF RIGHT ARM: ICD-10-CM

## 2024-05-07 DIAGNOSIS — M54.2 CERVICALGIA: ICD-10-CM

## 2024-05-07 DIAGNOSIS — R20.2 NUMBNESS AND TINGLING IN LEFT ARM: ICD-10-CM

## 2024-05-07 NOTE — OP THERAPY DAILY TREATMENT
Outpatient Physical Therapy  DAILY TREATMENT     [address].  [address]  [place] [address]  Phone:  [phone]  Fax:  [phone]    Date: 05/07/2024    Patient: Ashly Meyer  YOB: 1950  MRN: [id number]     Time Calculation    Start time: 1101  Stop time: 1145 Time Calculation (min): 44 minutes         Chief Complaint: Weakness, Neck Problem, and Back Problem    Visit #: 10    SUBJECTIVE:  Patient reports that she had a lumbar MRI this weekend but she thought it was supposed to be for her neck. She reports she feels pretty good this morning. Denies falls.     OBJECTIVE:  Current objective measures:           Therapeutic Exercises (CPT 12890):     1. Cervical AROM; yes/no/maybe, x20, B directions, nt    2. scapular retractions, x10; 3 second holds    3. Nu step, x5 min, resistance level 1; warm up    4. seated disc glider with knee flexion/extension, x15; B LE, nt    5. Mod towel snag, Pillow case cued cervical retraction, gentle rotation; x10 B direction, requires max verbal cues for appropriate form with task    6. Supine bracing and hooklying march, nt    8. seated bicep curls, shoulder flex/abd with 2# weight, 2x10, B    9. chin tucks with retractions, x10; 3 second holds    10. thoracic rotation, x15, B    11. seated pelvic tils, x10, nt    12. seated TrA bracing, x10; 3 second holds    15. B hamstring stretch, seated, 2x30 seconds, B, nt    16. seated hamstring isometric, B LE, 5 second holds, x10    17. seated lateral trunk stretches with swiss ball, x10, B, nt    19. 5x sit<>stand, 30.7 seconds, B UE support, 4/12    20. PN due on 5/12      Therapeutic Exercise Summary: Access Code: [email or web address]  URL: [email or web address]  Date: 03/15/2024  Prepared by: Obdulia Torres    Exercises  - Neck Rotation  - 2 x daily - 2 sets - 10 reps  - Head Nods  - 2 x daily - 2 sets - 10 reps  - Neck Sidebending  - 2 x daily - 2 sets - 10  reps      Time-based treatments/modalities:    Physical Therapy Timed Treatment Charges  Therapeutic exercise minutes (CPT 03876): 44 minutes      Pain rating (1-10) before treatment:  4; generalized pain  Pain rating (1-10) after treatment:  5; generalized pain/low back     ASSESSMENT:   Response to treatment: Patient tolerates PT treatment well today. Patient continues to have generalized pain but reports continued improvement in cervical pain, despite continued B hand numbness and tingling remaining. At this time, patient will continue to benefit from skilled PT for further strengthening and ROM for improved quality of life.     PLAN/RECOMMENDATIONS:   Plan for treatment: therapy treatment to continue next visit.  Planned interventions for next visit: continue with current treatment.

## 2024-05-14 ENCOUNTER — PHYSICAL THERAPY (OUTPATIENT)
Dept: PHYSICAL THERAPY | Facility: REHABILITATION | Age: 74
End: 2024-05-14
Attending: STUDENT IN AN ORGANIZED HEALTH CARE EDUCATION/TRAINING PROGRAM
Payer: MEDICARE

## 2024-05-14 DIAGNOSIS — R20.0 NUMBNESS AND TINGLING OF RIGHT ARM: ICD-10-CM

## 2024-05-14 DIAGNOSIS — R20.2 NUMBNESS AND TINGLING OF RIGHT ARM: ICD-10-CM

## 2024-05-14 DIAGNOSIS — R20.0 NUMBNESS AND TINGLING IN LEFT ARM: ICD-10-CM

## 2024-05-14 DIAGNOSIS — M54.12 CERVICAL RADICULITIS: ICD-10-CM

## 2024-05-14 DIAGNOSIS — M54.2 CERVICALGIA: ICD-10-CM

## 2024-05-14 DIAGNOSIS — R20.2 NUMBNESS AND TINGLING IN LEFT ARM: ICD-10-CM

## 2024-05-14 DIAGNOSIS — M79.10 MYALGIA: ICD-10-CM

## 2024-05-14 NOTE — OP THERAPY PROGRESS SUMMARY
Outpatient Physical Therapy  PROGRESS SUMMARY NOTE      [facility] [place] [address] [address]  [address]  [place] [address]  Phone:  [phone]  Fax:  [phone]    Date of Visit: 05/14/2024    Patient: Ashly Meyer  YOB: 1950  MRN: [id number]     Referring Provider: Patricia Ho M.D.  [address]  [address]  [place] [address]   Referring Diagnosis Radiculopathy, cervical region [M54.12];Cervicalgia [M54.2];Anesthesia of skin [R20.0];Paresthesia of skin [R20.2];Myalgia, unspecified site [M79.10]     Visit Diagnoses     ICD-10-CM   1. Cervical radiculitis  M54.12   2. Numbness and tingling of right arm  R20.0    R20.2   3. Cervicalgia  M54.2   4. Myalgia  M79.10   5. Numbness and tingling in left arm  R20.0    R20.2       Rehab Potential: good    Progress Report Period: 4/12/24-5/14/24    Functional Assessment Used  Neck Disability Total: 29       Objective Findings and Assessment:   Patient progression towards goals: Goals:   Short Term Goals:   1. Patient will demonstrate independent HEP to promote increased strength and ROM for improved functional activity tolerance.  -progressing, continue     2. Patient will demonstrate improved cervical lateral flexion AROM by 10 degrees or more to promote decreased pain.- progressing, pain with R lateral flexion     3. Patient will demonstrate improved 5x sit<> under 20 seconds with B UE support, as needed, to promote ability to transition off toilet for increased safety.-not met, 48.3 seconds     Short term goal time span:  4-6 weeks      Long Term Goals:    1. Patient will report improved NDI by 10 or more points to indicate improved quality of life.  -progressing, 9 point improvement in score     2. Patient will demonstrate improved cervical AROM to at least 60 degrees B, without pain, to allow for improved safety with driving and daily activities.-partially met, moderate pain remains with R cervical rotation  "      3, Patient will be able to complete her dishes without significant increase in pain to promote continued independence and quality of life.-not met, reports continued pain with neck flexion during dishes; patient reports she tends to keep dishes building for a \"couple of weeks\" then does them over a \"couple of days.\" Discussed with patient completing more often to promote decreased time required and decreased pain with activity     4. Patient will report ability to drink out of her glasses at home with increased ease to promote proper dehydration and functional mobility skills. -progressing but reports she continues to be \"shaky\" during task     Long term goal time span:  2-4 months      Objective findings and assessment details:   Current objective measures:      L rotation: 74 degrees  R rotation: 65 degrees, moderate pain reported  L lateral flexion: 34 degrees  R lateral flexion: 24 degrees, mild pain  reported      Additional Strength Details  R hip flexion: 3/5  L hip flexion: 4-/5     5x sit<>stand: 48.3 seconds, UE support required     Neck Disability Total: 29       ASSESSMENT:   Response to treatment: Patient tolerates PT treatments well thus far. Patient demonstrates intermittent progress toward goals but reports neck pain is improving overall. Discussed current goal status with patient and continuation of therapy for another couple of visits, to determine further progression, however, if no progression is made D/C may be appropriate due to plateau. Patient verbalizes understanding and is in agreement.      Goals:   Short Term Goals:   1. Patient will demonstrate independent HEP to promote increased strength and ROM for improved functional activity tolerance.       2. Patient will demonstrate improved cervical lateral flexion AROM by 10 degrees or more to promote decreased pain.     3. Patient will demonstrate improved 5x sit<> under 20 seconds with B UE support, as needed, to promote ability " to transition off toilet for increased safety    Short term goal time span:  2-4 weeks      Long Term Goals:    1. Patient will report improved NDI by 10 or more points to indicate improved quality of life.       2. Patient will demonstrate improved cervical AROM to at least 60 degrees B, without pain, to allow for improved safety with driving and daily activities.      3, Patient will be able to complete her dishes without significant increase in pain to promote continued independence and quality of life.     4. Patient will report ability to drink out of her glasses at home with increased ease to promote proper dehydration and functional mobility skills.    Long term goal time span:  6-8 weeks    Plan:   Planned therapy interventions:  Neuromuscular Re-education (CPT 64290), Manual Therapy (CPT 91795), Gait Training (CPT 76875), Therapeutic Exercise (CPT 46746) and Therapeutic Activities (CPT 36580)  Frequency:  1x week  Duration in weeks:  8      Referring provider co-signature:  I have reviewed this plan of care and my co-signature certifies the need for services.     Certification Period: 05/14/2024 to 07/13/24    Physician Signature: ________________________________ Date: ______________

## 2024-05-14 NOTE — OP THERAPY DAILY TREATMENT
Outpatient Physical Therapy  DAILY TREATMENT     [facility] [address]  [address]  [place] [address]  Phone:  [phone]  Fax:  [phone]    Date: 05/14/2024    Patient: Ashly Meyer  YOB: 1950  MRN: [id number]     Time Calculation    Start time: 1102  Stop time: 1147 Time Calculation (min): 45 minutes         Chief Complaint: Neck Problem, Weakness, and Back Problem    Visit #: 11    SUBJECTIVE:  Patient reports that her pain is general today and about a 5/10. She reports that her neck still feels better than initially. Denies new falls. Patient reports that she feels 60% better on her good days    OBJECTIVE:  Current objective measures:     L rotation: 74 degrees  R rotation: 65 degrees, moderate pain reported  L lateral flexion: 34 degrees  R lateral flexion: 24 degrees, mild pain  reported      Additional Strength Details  R hip flexion: 3/5  L hip flexion: 4-/5     5x sit<>stand: 48.3 seconds, UE support required    Neck Disability Total: 29       Therapeutic Exercises (CPT 93338):     1. Cervical AROM; yes/no/maybe, x20, B directions    2. scapular retractions, x6; 3 second holds    3. Nu step, x5 min, resistance level 1; warm up, nt    4. seated disc glider with knee flexion/extension, x15; B LE, nt    5. Mod towel snag, Pillow case cued cervical retraction, gentle rotation; x10 B direction, nt    6. Supine bracing and hooklying march, nt    8. seated bicep curls, shoulder flex/abd with 2# weight, 2x10, B, nt    9. chin tucks with retractions, x10, tactile cues, max verbal cues, demonstration continues to be required    10. thoracic rotation, x15, B    11. seated pelvic tils, x10, nt    12. seated TrA bracing, x10; 3 second holds, nt    15. B hamstring stretch, seated, 2x30 seconds, B, nt    16. seated hamstring isometric, B LE, 5 second holds, x10, nt    17. seated lateral trunk stretches with swiss ball, x10, B, nt    19. 5x sit<>stand, 30.7  "seconds, B UE support, 4/12    20. PN due on 6/14      Therapeutic Exercise Summary: Access Code: [email or web address]  URL: [email or web address]  Date: 03/15/2024  Prepared by: Obdulia Torres    Exercises  - Neck Rotation  - 2 x daily - 2 sets - 10 reps  - Head Nods  - 2 x daily - 2 sets - 10 reps  - Neck Sidebending  - 2 x daily - 2 sets - 10 reps      Time-based treatments/modalities:    Physical Therapy Timed Treatment Charges  Therapeutic exercise minutes (CPT 96575): 45 minutes      Pain rating (1-10) before treatment:  5; generalized pain  Pain rating (1-10) after treatment:  5; generalized pain    Goals:   Short Term Goals:   1. Patient will demonstrate independent HEP to promote increased strength and ROM for improved functional activity tolerance.  -progressing, continue     2. Patient will demonstrate improved cervical lateral flexion AROM by 10 degrees or more to promote decreased pain.- progressing, pain with R lateral flexion     3. Patient will demonstrate improved 5x sit<> under 20 seconds with B UE support, as needed, to promote ability to transition off toilet for increased safety.-not met, 48.3 seconds     Short term goal time span:  4-6 weeks      Long Term Goals:    1. Patient will report improved NDI by 10 or more points to indicate improved quality of life.  -progressing, 9 point improvement in score     2. Patient will demonstrate improved cervical AROM to at least 60 degrees B, without pain, to allow for improved safety with driving and daily activities.-partially met, moderate pain remains with R cervical rotation       3, Patient will be able to complete her dishes without significant increase in pain to promote continued independence and quality of life.-not met, reports continued pain with neck flexion during dishes; patient reports she tends to keep dishes building for a \"couple of weeks\" then does them over a \"couple of days.\" Discussed with patient completing more often to " "promote decreased time required and decreased pain with activity     4. Patient will report ability to drink out of her glasses at home with increased ease to promote proper dehydration and functional mobility skills. -progressing but reports she continues to be \"shaky\" during task     Long term goal time span:  2-4 months    ASSESSMENT:   Response to treatment: Patient tolerates PT treatments well thus far. Patient demonstrates intermittent progress toward goals but reports neck pain is improving overall. Discussed current goal status with patient and continuation of therapy for another couple of visits, to determine further progression, however, if no progression is made D/C may be appropriate due to plateau. Patient verbalizes understanding and is in agreement.     PLAN/RECOMMENDATIONS:   Plan for treatment: therapy treatment to continue next visit.  Planned interventions for next visit: continue with current treatment.         "

## 2024-05-16 ENCOUNTER — APPOINTMENT (OUTPATIENT)
Dept: PHYSICAL MEDICINE AND REHAB | Facility: MEDICAL CENTER | Age: 74
End: 2024-05-16
Payer: MEDICARE

## 2024-05-16 NOTE — PROGRESS NOTES
Follow-up patient Note    Interventional Pain and Spine  Physiatry (Physical Medicine and Rehabilitation)     Patient Name: Ashly Meyer  : 1950  Date of service: 5/15/2024    Chief Complaint:   No chief complaint on file.      HISTORY FROM INITIAL VISIT (12/15/2023):  Ashly Meyer is a 73 y.o. female who presents today with chronic pain at her low back, shoulders, hips, pelvic region, legs, and feet for decades. Pain is worst at her bilateral groin and greater trochanter. Notes hx of fibromyalgia, neuropathy, gout, R shoulder sx.     Pain right now is 8/10 on the numeric pain scale.  Pain worsens with physical activity and improves only marginally with medication. Her pain significantly interferes with ADLs.      The patient has done physical therapy for this problem, most recently last month. Denies relief from this.     Patient has tried the following medications with varied success (current meds in bold):   Nabumetone - takes the edge off  Gabapentin - takes the edge off of neuropathy  Duloxetine     Therapeutic modalities and interventional therapies to date include:  -left hip and shoulder injections at [facility] - 2-4 weeks relief     Medical history includes hypertension, KERRI, peripheral neuropathy, type 2 diabetes, essential tremor.    HPI  Today's visit   Ashly Meyer ( 1950) is a female with There were no encounter diagnoses.    Today Ashly presents for MRI review    Tarted PT       follow-up after bilateral greater trochanteric bursa injections.  She reports resolution of pain at her left greater trochanter.  She reports 50% improvement in pain at her right greater trochanter.  She reports ongoing radiating pain from her right waistline down her lateral right thigh to her knee along with impaired sensation at this area.    She also reports bilateral neck pain and pain at her upper trapezius.  Occasionally pain radiates past her shoulder into her right arm.  She has  occasional numbness and tingling in bilateral arms worse on the right.  She remembers receiving cervical epidurals many years ago and does not remember if they helped.    Reports popping in right shoulder after right shoulder surgery.    She discontinued Relafen since her last visit with me and denies significant change in her pain.  She elected not to fill the prescription for low-dose naltrexone for cost reasons.    Of note she has had worsened dizziness and has a follow-up scheduled with her primary care provider on Monday to discuss this.    Pain severity 6/10 currently  Pt denies new numbness, tingling, or weakness.    Procedure history:  - 1/5/24 right and left greater trochanteric bursa injection ultrasound-guided - resolution of left sided pain, 50% improvement in right sided pain.       ROS:   Red Flags ROS:   Fever, Chills, Sweats: Denies  Involuntary Weight Loss: Denies  Bladder Incontinence: Denies  Bowel Incontinence: denies  Saddle Anesthesia: Denies    All other systems reviewed and negative.     PMHx:   Past Medical History:   Diagnosis Date    Anginal syndrome ([facility])     Anxiety     Arthritis     Everywhere.     Atrophic rhinitis 10/3/2016    Back pain     Bronchitis 08/2021    finished 1st course of ABX to start a 2nd course soon.     Carotid artery stenosis, unilateral     moderate left carotid artery stenosis    Carpal tunnel syndrome 9/5/2011    Chronic pain     Constipation     Controlled type 2 diabetes mellitus without complication ([facility])     states borderline    Controlled type 2 diabetes mellitus without complication, without long-term current use of insulin ([facility]) 6/12/2019    Cough 08/2021    r/t bronchitis    Current severe episode of major depressive disorder without psychotic features without prior episode ([facility]) 6/3/2021    Daytime sleepiness     Degenerative disc disease     Depression 5/20/2009    Deviated nasal septum 8/1/2016    Diarrhea     and constipation    Disease of accessory  sinus 10/3/2016    Dizziness     Dry eye syndrome, bilateral     Elevated sedimentation rate     history of negative temporal artery biopsy around 2006    Fatigue     Fatty liver     Fibromyalgia     confirmed by Dr. 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    Hypertension     Hypothyroidism 5/20/2009    Incipient cataract of both eyes     Incomplete rectal prolapse 9/3/2021    Insomnia     Intention tremor 6/2/2022    Migraine without aura, without mention of intractable migraine without mention of status migrainosus     Mild intermittent asthma without complication     inhalers as needed    Mixed dyslipidemia     Morbid obesity with BMI of 45.0-49.9, adult ([facility])     Morning headache     Mumps     Nasal drainage     Nocturnal hypoxia     severe, to 69% O2 sat    Obesity     Obesity hypoventilation syndrome ([facility]) 5/31/2017    Pelvic floor dysfunction     Peripheral neuropathy     Pleomorphic adenoma     Polymyalgia rheumatica ([facility])     Dr. Ann    Restless leg syndrome     Ringing in ears     Rotator cuff syndrome of right shoulder and allied disorders 10/10/2018    S/P tonsillectomy     Seasonal allergies     Skin cancer 1990's    skin    Sleep apnea syndrome     she is not using CPAP, claustrophobia, uses 2-3l at night via concentrator occ 2l during daytime ([facility])    Snoring     Sore muscles     Sweat, sweating, excessive     Swelling of lower extremity     Thyroid carcinoma ([facility]) 7/22/2023    Found surgically July 2023    Trochanteric bursitis of both hips 3/3/2022    Urinary incontinence     will not wear a pad    Vision loss      Weakness        PSHx:   Past Surgical History:   Procedure Laterality Date    THYROIDECTOMY TOTAL Right 8/23/2023    Procedure: RIGHT COMPLETION THYROIDECTOMY;  Surgeon: Michael Mancuso M.D.;  Location: SURGERY SAME DAY [facility];  Service: Ent    THYROIDECTOMY Left 7/17/2023    Procedure: LEFT ELOY- THYROIDECTOMY;  Surgeon: Michael Mancuso M.D.;  Location: SURGERY SAME DAY [facility];  Service: Ent    MN UPPER GI ENDOSCOPY,DIAGNOSIS N/A 08/26/2021    Procedure: GASTROSCOPY;  Surgeon: Marty Lopez M.D.;  Location: [facility];  Service: Gastroenterology    MN COLONOSCOPY,DIAGNOSTIC  08/26/2021    Procedure: COLONOSCOPY - WITH BIOPSIES.;  Surgeon: Marty Lopez M.D.;  Location: [facility];  Service: Gastroenterology    SHOULDER DECOMPRESSION ARTHROSCOPIC Right 02/01/2019    Procedure: SHOULDER DECOMPRESSION ARTHROSCOPIC - SUBACROMIAL, LABRAL DEBRIDEMENT;  Surgeon: Damaris Knutson M.D.;  Location: [facility];  Service: Orthopedics    SHOULDER ARTHROSCOPY W/ BICIPITAL TENODESIS REPAIR Right 02/01/2019    Procedure: SHOULDER ARTHROSCOPY W/ BICIPITAL TENOTOMY;  Surgeon: Damaris Knutson M.D.;  Location: [facility];  Service: Orthopedics    CARPAL TUNNEL RELEASE Right 02/01/2019    Procedure: CARPAL TUNNEL RELEASE;  Surgeon: Damaris Knutson M.D.;  Location: [facility];  Service: Orthopedics    GASTROSCOPY  02/06/2017    Procedure: GASTROSCOPY;  Surgeon: Pau Foster M.D.;  Location: SURGERY SAME DAY [facility];  Service:     COLONOSCOPY  02/06/2017    Procedure: COLONOSCOPY;  Surgeon: Pau Foster M.D.;  Location: SURGERY SAME DAY [facility];  Service:     SEPTAL RECONSTRUCTION  10/03/2016    Procedure: SEPTAL RECONSTRUCTION with  grafts ;  Surgeon: PIETER Dickson M.D.;  Location: SURGERY SAME DAY [facility];  Service:     SEPTOPLASTY N/A 08/01/2016    Procedure: SEPTOPLASTY;  Surgeon: PIETER Dickson M.D.;  Location:  SURGERY SAME DAY [facility];  Service:     TURBINOPLASTY Bilateral 08/01/2016    Procedure: TURBINOPLASTY;  Surgeon: PIETER Dickson M.D.;  Location: SURGERY SAME DAY [facility];  Service:     NASAL FRACTURE REDUCTION OPEN N/A 08/01/2016    Procedure: SEPTAL FRACTURE REDUCTION OPEN;  Surgeon: PIETER Dickson M.D.;  Location: SURGERY SAME DAY [facility];  Service:     FINE NEEDLE ASPIRATION  11/16/2009    Performed by DA CALLOWAY at ENDOSCOPY [facility]S    COLONOSCOPY  2006    ? unclear date    OTHER SURGICAL PROCEDURE  1998    vaginal wall repair    BLADDER SUSPENSION  1983    HYSTERECTOMY, VAGINAL  1983    ovaries are present, excessive bleeding    TUBAL LIGATION  1980    MASS EXCISION GENERAL Right 1956    had carbuncle removal R thigh    TONSILLECTOMY  1953    ANAL SPHINCTEROTOMY      anal muscle repair    CATARACT EXTRACTION WITH IOL Bilateral     CHOLECYSTECTOMY      HYSTERECTOMY LAPAROSCOPY      OTHER ORTHOPEDIC SURGERY  1962/1980/1983/1985    foot surgery     SCAR REVISION Right     thigh scar        Family Hx:   Family History   Problem Relation Age of Onset    Heart Disease Mother         Arotic vaulve replacement    GI Disease Mother         bile duct problem    Bladder cancer Mother     GI Disease Sister         celiac    Arthritis Sister     Other Sister         gout and lupus    Arthritis Sister     Kidney Disease Sister     GI Disease Sister     Bladder cancer Maternal Aunt     Arthritis Maternal Grandmother     Hypertension Maternal Grandmother     Heart Disease Maternal Grandmother     Hypertension Maternal Grandfather     Heart Disease Maternal Grandfather     Arthritis Maternal Grandfather     No Known Problems Paternal Grandmother     No Known Problems Paternal Grandfather     Cancer Brother         Larynx    Cancer Daughter         non hopskins limphoma    GI Disease Daughter     Bipolar disorder Daughter     Seizures Daughter     Bipolar disorder Daughter   "      Social Hx:  Social History     Socioeconomic History    Marital status:      Spouse name: Not on file    Number of children: Not on file    Years of education: Not on file    Highest education level: Not on file   Occupational History    Not on file   Tobacco Use    Smoking status: Former     Current packs/day: 0.00     Average packs/day: 1 pack/day for 45.2 years (45.2 ttl pk-yrs)     Types: Cigarettes     Start date:      Quit date: 3/1/2007     Years since quittin.2    Smokeless tobacco: Never    Tobacco comments:     Started smoking at age 15, continued abstinance    Vaping Use    Vaping status: Never Used   Substance and Sexual Activity    Alcohol use: Not Currently    Drug use: Not Currently     Comment: CBD topical pain cream    Sexual activity: Not Currently     Partners: Male   Other Topics Concern     Service No    Blood Transfusions No    Caffeine Concern Not Asked    Occupational Exposure No    Hobby Hazards No    Sleep Concern No    Stress Concern No    Weight Concern No    Special Diet No    Back Care No    Exercise No    Bike Helmet No    Seat Belt Yes    Self-Exams No   Social History Narrative    Not on file     Social Determinants of Health     Financial Resource Strain: Not on file   Food Insecurity: Not on file   Transportation Needs: Not on file   Physical Activity: Not on file   Stress: Not on file   Social Connections: Not on file   Intimate Partner Violence: Not on file   Housing Stability: Not on file       Allergies:  Allergies   Allergen Reactions    Ace Inhibitors Cough    Benadryl Allergy Hives     Hot skin itching    Iodine Nausea     Pt received CT w/ contrast 22 pt had nausea post contrast     Lamictal Rash and Swelling     \"hot\", unable to function.  Severe rash, scarring    Savella [Kdc:Milnacipran+Ci Pigment Blue 63] Nausea, Swelling and Anxiety     Swelling, bone and muscle pain, sweating, nausea, dizziness, sleeplessness, anxiety    Savella " "[Milnacipran Hcl] Unspecified     Muscle aches, feet swelling    Sulfa Drugs Hives, Shortness of Breath and Anxiety     Hard breathing, shortness of breath    Butalbital-Acetaminophen Unspecified     Dizzy, can't walk, hard to focus    Cefaclor Nausea and Unspecified     Ceclor causes light headedness and dizziness    Morphine Itching     \"Redness\"    Penicillins Itching and Swelling     Skin itching and redness    Tizanidine Hcl Nausea     \"Dizziness\" hard to focus    Milnacipran Vomiting and Nausea    Amlactin Rash and Itching     Lotion causes itching, redness and burnng    Tape Rash and Itching     Skin tears, paper tape is OK per Pt       Medications: reviewed on epic.   No outpatient medications have been marked as taking for the 5/16/24 encounter (Appointment) with Patricia Ho M.D..        Current Outpatient Medications on File Prior to Visit   Medication Sig Dispense Refill    atorvastatin (LIPITOR) 40 MG Tab Take 1 Tablet by mouth every evening. 90 Tablet 3    DULoxetine (CYMBALTA) 60 MG Cap DR Particles delayed-release capsule Take 1 Capsule by mouth every evening. 90 Capsule 3    gabapentin (NEURONTIN) 800 MG tablet Take 1 Tablet by mouth 2 times a day. 180 Tablet 3    allopurinol (ZYLOPRIM) 100 MG Tab TAKE ONE TABLET BY MOUTH ONCE DAILY 90 Tablet 2    QUEtiapine (SEROQUEL) 25 MG Tab TAKE ONE TABLET BY MOUTH AT BEDTIME 90 Tablet 2    aliskiren (TEKTURNA) 150 MG tablet Take 150 mg by mouth every day.      pantoprazole (PROTONIX) 40 MG Tablet Delayed Response Take 1 Tablet by mouth 2 times a day. 180 Tablet 3    levothyroxine (SYNTHROID) 137 MCG Tab TAKE ONE TABLET BY MOUTH EVERY MORNING ON AN EMPTY STOMACH 90 Tablet 2    calcitonin, salmon, (MIACALCIN) 200 UNIT/ACT Solution Administer 1 Spray into affected nostril(S) every day.      liothyronine (CYTOMEL) 5 MCG Tab Take 5 mcg by mouth every day.      albuterol 108 (90 Base) MCG/ACT Aero Soln inhalation aerosol Inhale 2 Puffs every 6 hours as needed for " Shortness of Breath. 8.5 g 11    Cholecalciferol (VITAMIN D-3) 125 MCG (5000 UT) Tab Take 5,000 Units by mouth every evening.      VITAMIN E PO Take 1 Capsule by mouth every morning.       No current facility-administered medications on file prior to visit.         EXAMINATION     Physical Exam:   There were no vitals taken for this visit.    Constitutional:   Body Habitus: There is no height or weight on file to calculate BMI.  Cooperation: Fully cooperates with exam  Appearance: Well-groomed, well-nourished.    Eyes: No scleral icterus to suggest severe liver disease, no proptosis to suggest severe hyperthyroidism    ENT -no obvious auditory deficits, no noticeable facial droop     Skin -no rashes or lesions noted     Respiratory-  breathing comfortably on room air, no audible wheezing    Cardiovascular-distal extremities warm and well perfused.  No lower extremity edema is noted.     Gastrointestinal - no obvious abdominal masses, non-distended    Psychiatric- alert and oriented ×3. Normal affect.     Musculoskeletal      Cervical spine   Inspection: No deformities of the skin over the cervical spine. No rashes or lesions.    Spurling's sign  negative bilaterally  Cervical facet loading maneuver  negative bilaterally    No signs of muscular atrophy in bilateral upper extremities     Tenderness to palpation at paracervical muscles bilaterally, cervical facets bilaterally, and upper trapezius bilaterally.     Key points for the international standards for neurological classification of spinal cord injury (ISNCSCI) to light touch.     Dermatome R L   C4 1 1   C5 2 2   C6 2 2   C7 1 1   C8 1 1   T1 2 2   T2 2 2       Motor Exam Upper Extremities   ? Myotome R L   Shoulder abduction C5 5 5   Elbow flexion C5 5 5   Wrist extension C6 5 5   Elbow extension C7 5 5   Finger flexion C8 5 5   Finger abduction T1 5 5     tenderness to palpation at   Shoulder girdle, left  Shoulder girdle, right  Upper arm, left  Upper arm,  right  Lower arm, left  Lower arm, right  Hip (buttock, trochanter), left  Hip (buttock, trochanter), right  Upper leg, left  Upper leg, right  Lower leg, left  Lower leg, right  Upper back  Lower back  Neck     Thoracic/Lumbar Spine/Sacral Spine/Hips   Inspection: No evidence of atrophy in bilateral lower extremities throughout      There is limited active range of motion with lumbar extension     Facet loading maneuver positive bilaterally     Lumbar spine /hip provocative exam maneuvers  Straight leg raise negative bilaterally  FADIR test negative bilaterally     SI joint tests  SYLVIA test negative bilaterally  Thigh thrust test negative bilaterally     Key points for the international standards for neurological classification of spinal cord injury (ISNCSCI) to light touch.   Dermatome R L   L2 2 2   L3 2 2   L4 2 2   L5 2 2   S1 2 2   S2 2 2         Motor Exam Lower Extremities  ? Myotome R L   Hip flexion L2 5 5   Knee extension L3 5 5   Ankle dorsiflexion L4 5 5   Toe extension L5 5 5   Ankle plantarflexion S1 5 5         MEDICAL DECISION MAKING    Medical records review: see under HPI section.     DATA    Labs: No new labs available for review since last visit.   Lab Results   Component Value Date/Time    SODIUM 142 04/26/2024 11:10 AM    POTASSIUM 4.0 04/26/2024 11:10 AM    CHLORIDE 106 04/26/2024 11:10 AM    CO2 21 04/26/2024 11:10 AM    ANION 15.0 04/26/2024 11:10 AM    GLUCOSE 116 (H) 04/26/2024 11:10 AM    BUN 26 (H) 04/26/2024 11:10 AM    CREATININE 0.89 04/26/2024 11:10 AM    CREATININE 0.84 04/18/2011 03:41 PM    CALCIUM 9.2 04/26/2024 11:10 AM    ASTSGOT 16 04/26/2024 11:10 AM    ALTSGPT 16 04/26/2024 11:10 AM    TBILIRUBIN 0.6 04/26/2024 11:10 AM    ALBUMIN 4.3 04/26/2024 11:10 AM    ALBUMIN 4.51 07/22/2015 02:28 PM    TOTPROTEIN 7.6 04/26/2024 11:10 AM    TOTPROTEIN 7.90 07/22/2015 02:28 PM    GLOBULIN 3.3 04/26/2024 11:10 AM    AGRATIO 1.3 04/26/2024 11:10 AM    AGRATIO 1.1 04/18/2011 03:41 PM        Lab Results   Component Value Date/Time    PROTHROMBTM 13.8 11/16/2022 11:03 AM    INR 1.07 11/16/2022 11:03 AM        Lab Results   Component Value Date/Time    WBC 9.5 04/26/2024 11:10 AM    WBC 10.8 (H) 10/06/2009 08:45 AM    RBC 4.74 04/26/2024 11:10 AM    RBC 4.58 10/06/2009 08:45 AM    HEMOGLOBIN 13.8 04/26/2024 11:10 AM    HEMATOCRIT 43.1 04/26/2024 11:10 AM    MCV 90.9 04/26/2024 11:10 AM    MCV 91 10/06/2009 08:45 AM    MCH 29.1 04/26/2024 11:10 AM    MCH 30.5 10/06/2009 08:45 AM    MCHC 32.0 (L) 04/26/2024 11:10 AM    MPV 11.3 04/26/2024 11:10 AM    NEUTSPOLYS 67.60 12/20/2022 03:22 PM    LYMPHOCYTES 20.90 (L) 12/20/2022 03:22 PM    MONOCYTES 7.40 12/20/2022 03:22 PM    EOSINOPHILS 3.10 12/20/2022 03:22 PM    BASOPHILS 0.50 12/20/2022 03:22 PM    HYPOCHROMIA 1+ 02/04/2014 11:38 AM    ANISOCYTOSIS 1+ 08/10/2011 08:20 PM        Lab Results   Component Value Date/Time    HBA1C 6.4 (H) 04/26/2024 11:10 AM        Imaging:   I personally reviewed following images, these are my reads  X-ray pelvis 2/25/2022  Mild OA of left hip joint.  No significant OA of right hip joint.  Bilateral SI joint sclerosis. See formal radiology report for further details.     XR hip 12/15/23  No significant OA of either hip.    MRI cervical spine 1/22/2022  At C3-4 there is severe right neuroforaminal stenosis.  At C4-5 there is severe left neuroforaminal stenosis.  At C5-6 there is severe bilateral neuroforaminal stenosis.  At C6-7 there is moderate bilateral neuroforaminal stenosis. See formal radiology report for further details.    IMAGING radiology reads. I reviewed the following radiology reads     Results for orders placed during the hospital encounter of 06/04/14     MR-BRAIN-WITH & W/O     Impression  1.  No evidence of acute territorial infarct, intracranial hemorrhage or mass lesion.  2.  Mild diffuse cerebral and cerebellar substance loss.    Results for orders placed during the hospital encounter of 12/20/22      MR-BRAIN-W/O     Impression  1.  No acute abnormality.  2.  Mild chronic microvascular ischemic disease.             Results for orders placed during the hospital encounter of 01/22/22     MR-CERVICAL SPINE-W/O     Impression  1.  Multifocal degenerative disease in the cervical spine as described above.  2.  Severe right lateral recess and right C4 neural foraminal stenosis.  3.  Severe left C5 neural foraminal stenosis.  4.  There is an approximately 9 cm sized left thyroid nodule. This is increased since the previous study. Ultrasound-guided fine-needle aspiration is recommended.  .      Results for orders placed during the hospital encounter of 03/16/17     MR-LUMBAR SPINE-W/O     Impression  1.  Severe discal and moderate endplate degenerative changes at the L5-S1 level. Minimal endplate degenerative changes noted at multiple levels in the lumbar spine.     2.  Mild lumbar spondylotic changes at the L4-5 level with minimal lumbar spondylotic changes at the L3-4 level.     3.  Minimal cyst cephalad subligamentous disc extrusion at the L2-3 level causing minimal effacement of the ventral surface of thecal sac.     4.  Borderline central canal stenosis at the L2-3 and L4-5 level secondary to facet arthropathy.      Results for orders placed during the hospital encounter of 03/16/17     MR-THORACIC SPINE-W/O     Impression  1. Minimal thoracic spondylotic changes at the T5-6, T6-7, and a T8-9 levels.     2. Mild discal degenerative changes in the mid and lower thoracic spine.    Results for orders placed during the hospital encounter of 03/16/17     MR-LUMBAR SPINE-W/O     Impression  1.  Severe discal and moderate endplate degenerative changes at the L5-S1 level. Minimal endplate degenerative changes noted at multiple levels in the lumbar spine.     2.  Mild lumbar spondylotic changes at the L4-5 level with minimal lumbar spondylotic changes at the L3-4 level.     3.  Minimal cyst cephalad subligamentous disc  extrusion at the L2-3 level causing minimal effacement of the ventral surface of thecal sac.     4.  Borderline central canal stenosis at the L2-3 and L4-5 level secondary to facet arthropathy.      Results for orders placed during the hospital encounter of 06/04/14     MR-MRA NECK-W/O     Impression  Unremarkable MR angiogram of the carotid arteries and vertebral basilar system.       Results for orders placed during the hospital encounter of 08/16/10     MR-ORBITS, FACE, NECK-WITH & W/O AND SEQUENCES                              Results for orders placed during the hospital encounter of 01/19/19     DX-ANKLE 3+ VIEWS LEFT     Impression  No evidence of fracture or dislocation.     Soft tissue swelling.   Results for orders placed during the hospital encounter of 11/02/21     DX-CERVICAL SPINE-2 OR 3 VIEWS     Impression  1.  There is mild degenerative disc disease and arthropathy at the C5-6 and C6-7 levels.     Results for orders placed in visit on 07/26/18     DX-CHEST-2 VIEWS     Impression  Low lung volumes with bibasilar atelectasis. No focal consolidation or pleural effusions.     Results for orders placed during the hospital encounter of 08/10/19     DX-ELBOW-COMPLETE 3+ RIGHT     Impression  Findings suspicious for nondisplaced radial neck fracture. Correlate with point tenderness. Follow-up radiographs in 7-10 days may be performed.     Results for orders placed during the hospital encounter of 12/20/22     DX-FOOT-2- RIGHT     Impression  No acute osseous abnormality.   Results for orders placed in visit on 10/29/13     DX-FOOT-COMPLETE 3+     Impression  Proximal phalanx right fourth toe fracture.     INTERPRETING LOCATION:  [address] [place] [address], [id number]   Results for orders placed during the hospital encounter of 12/06/18     DX-FOREARM RIGHT     Impression  Unremarkable RIGHT forearm.       Results for orders placed during the hospital encounter of 11/17/15     DX-HIP-COMPLETE - UNILATERAL 2+ RIGHT      Impression  1. No evidence of acute fracture or dislocation.     2. Mild degenerative changes.   Results for orders placed during the hospital encounter of 11     DX-HIPS-COMPLETE - BILATERAL 3+     Impression  Minimally sclerotic hip joints.  No acute fracture identified.      Results for orders placed in visit on 13     DX-KNEES-AP BILATERAL STANDING     Impression  Negative standing knee radiograph.     INTERPRETING LOCATION: [address] [place], [place] [address]E [place], [id number]   Results for orders placed in visit on 12     DX-KNEE COMPLETE 4+     Impression  Unremarkable right knee series.   Results for orders placed during the hospital encounter of 12/06/15     DX-LUMBAR SPINE-2 OR 3 VIEWS     Impression  1.  There has been progression of L4-5 and L5-S1 disc disease and facet disease, right greater than left.    Results for orders placed during the hospital encounter of 22     DX-PELVIS-1 OR 2 VIEWS     Impression  1.  Marked degenerative changes lower lumbar spine and mildly sclerotic SI joints.     2.  Mild degenerative changes right hip joint.           Results for orders placed during the hospital encounter of 08/10/19     DX-SHOULDER 2+ RIGHT     Impression  No acute osseous abnormality.       Results for orders placed during the hospital encounter of 08/10/19     DX-WRIST-COMPLETE 3+ RIGHT     Impression  Acute-appearing triquetral fracture.              Diagnosis  {No diagnosis found. (Refresh or delete this SmartLink)}        ASSESSMENT AND PLAN:  Ashly Meyer (: 1950) is a female with      There are no diagnoses linked to this encounter.        PLAN  Physical Therapy: Referral to physical therapy for her neck pain with possible component of myalgia, facetogenic pain, and radicular pain.       Diagnostic workup: Personally reviewed at today's visit:    XR hip 12/15/23, MRI cervical spine 2022  - Order MRI lumbar spine given report of radiating pain down leg with impaired  sensation, concerning for lumbar radiculitis    Medications:   - ok to continue gabapentin, nabumetone, and duloxetine  - s/p discontinuation of nabumetone without exacerbation of pain.  Discussed that at this time I do not feel that resuming nabumetone would significantly improve her pain.  She reports she had been taking nabumetone for decades  -Discussed trial of low-dose naltrexone for neuropathic pain, patient elected to defer due to cost of medication    Interventions:   -Low back injections pending MRI  - s/p b/l greater trochanteric bursa injections under ultrasound guidance with 100% improvement in pain on her left and 50% improvement in pain on her right.   - Patient reportedly had hip injections with an outside provider with relief lasting 2-4 weeks at a time     Other   - pt reports dizziness. Planning to discuss this with PCP on Mon    Follow-up: After MRI complete    No orders of the defined types were placed in this encounter.      Patricia Ho MD  Interventional Pain and Spine  Physical Medicine and Rehabilitation  [facility]      The above note documents my personal evaluation of this patient. In addition, I have reviewed and confirmed with the patient and MA the supportive information documented in today's Patient Health Questionnaire and Office Note.     Please note that this dictation was created using voice recognition software. I have made every reasonable attempt to correct obvious errors, but I expect that there are errors of grammar and possibly content that I did not discover before finalizing the note.

## 2024-05-23 ENCOUNTER — OFFICE VISIT (OUTPATIENT)
Dept: PHYSICAL MEDICINE AND REHAB | Facility: MEDICAL CENTER | Age: 74
End: 2024-05-23
Payer: MEDICARE

## 2024-05-23 VITALS
BODY MASS INDEX: 37.84 KG/M2 | OXYGEN SATURATION: 96 % | WEIGHT: 238 LBS | TEMPERATURE: 98.1 F | SYSTOLIC BLOOD PRESSURE: 115 MMHG | HEART RATE: 69 BPM | DIASTOLIC BLOOD PRESSURE: 82 MMHG

## 2024-05-23 DIAGNOSIS — M70.61 GREATER TROCHANTERIC BURSITIS OF BOTH HIPS: ICD-10-CM

## 2024-05-23 DIAGNOSIS — M54.12 CERVICAL RADICULITIS: ICD-10-CM

## 2024-05-23 DIAGNOSIS — M70.62 GREATER TROCHANTERIC BURSITIS OF BOTH HIPS: ICD-10-CM

## 2024-05-23 DIAGNOSIS — M47.816 LUMBAR SPONDYLOSIS: ICD-10-CM

## 2024-05-23 DIAGNOSIS — R20.0 NUMBNESS AND TINGLING OF RIGHT ARM: ICD-10-CM

## 2024-05-23 DIAGNOSIS — G89.29 OTHER CHRONIC PAIN: ICD-10-CM

## 2024-05-23 DIAGNOSIS — M25.561 CHRONIC PAIN OF RIGHT KNEE: ICD-10-CM

## 2024-05-23 DIAGNOSIS — M54.16 RIGHT LUMBAR RADICULITIS: ICD-10-CM

## 2024-05-23 DIAGNOSIS — M48.061 NEUROFORAMINAL STENOSIS OF LUMBAR SPINE: ICD-10-CM

## 2024-05-23 DIAGNOSIS — M48.02 NEUROFORAMINAL STENOSIS OF CERVICAL SPINE: ICD-10-CM

## 2024-05-23 DIAGNOSIS — M47.812 CERVICAL SPONDYLOSIS: ICD-10-CM

## 2024-05-23 DIAGNOSIS — M75.51 SUBACROMIAL BURSITIS OF RIGHT SHOULDER JOINT: ICD-10-CM

## 2024-05-23 DIAGNOSIS — G89.29 CHRONIC RIGHT SHOULDER PAIN: ICD-10-CM

## 2024-05-23 DIAGNOSIS — G89.29 CHRONIC PAIN OF RIGHT KNEE: ICD-10-CM

## 2024-05-23 DIAGNOSIS — R20.2 NUMBNESS AND TINGLING OF RIGHT ARM: ICD-10-CM

## 2024-05-23 DIAGNOSIS — M79.10 MYALGIA: ICD-10-CM

## 2024-05-23 DIAGNOSIS — Z13.31 POSITIVE DEPRESSION SCREENING: ICD-10-CM

## 2024-05-23 DIAGNOSIS — M25.511 CHRONIC RIGHT SHOULDER PAIN: ICD-10-CM

## 2024-05-23 DIAGNOSIS — M79.7 FIBROMYALGIA: ICD-10-CM

## 2024-05-23 DIAGNOSIS — M54.2 CERVICALGIA: ICD-10-CM

## 2024-05-23 DIAGNOSIS — R20.0 NUMBNESS AND TINGLING IN LEFT ARM: ICD-10-CM

## 2024-05-23 DIAGNOSIS — M25.811 IMPINGEMENT OF RIGHT SHOULDER: ICD-10-CM

## 2024-05-23 DIAGNOSIS — R20.2 NUMBNESS AND TINGLING IN LEFT ARM: ICD-10-CM

## 2024-05-23 PROCEDURE — 3079F DIAST BP 80-89 MM HG: CPT | Performed by: STUDENT IN AN ORGANIZED HEALTH CARE EDUCATION/TRAINING PROGRAM

## 2024-05-23 PROCEDURE — 3074F SYST BP LT 130 MM HG: CPT | Performed by: STUDENT IN AN ORGANIZED HEALTH CARE EDUCATION/TRAINING PROGRAM

## 2024-05-23 PROCEDURE — 99214 OFFICE O/P EST MOD 30 MIN: CPT | Performed by: STUDENT IN AN ORGANIZED HEALTH CARE EDUCATION/TRAINING PROGRAM

## 2024-05-23 PROCEDURE — 1170F FXNL STATUS ASSESSED: CPT | Performed by: STUDENT IN AN ORGANIZED HEALTH CARE EDUCATION/TRAINING PROGRAM

## 2024-05-23 ASSESSMENT — PATIENT HEALTH QUESTIONNAIRE - PHQ9
CLINICAL INTERPRETATION OF PHQ2 SCORE: 6
SUM OF ALL RESPONSES TO PHQ QUESTIONS 1-9: 22
5. POOR APPETITE OR OVEREATING: 3 - NEARLY EVERY DAY

## 2024-05-23 ASSESSMENT — FIBROSIS 4 INDEX: FIB4 SCORE: 1.52

## 2024-05-23 ASSESSMENT — PAIN SCALES - GENERAL: PAINLEVEL: 6=MODERATE PAIN

## 2024-05-23 NOTE — H&P (VIEW-ONLY)
Follow-up patient Note    Interventional Pain and Spine  Physiatry (Physical Medicine and Rehabilitation)     Patient Name: Ashly Meyer  : 1950  Date of service: 2024    Chief Complaint:   Chief Complaint   Patient presents with    Follow-Up     Right lumbar radiculitis       HISTORY FROM INITIAL VISIT (12/15/2023):  Ashly Meyer is a 73 y.o. female who presents today with chronic pain at her low back, shoulders, hips, pelvic region, legs, and feet for decades. Pain is worst at her bilateral groin and greater trochanter. Notes hx of fibromyalgia, neuropathy, gout, R shoulder sx.     Pain right now is 8/10 on the numeric pain scale.  Pain worsens with physical activity and improves only marginally with medication. Her pain significantly interferes with ADLs.      The patient has done physical therapy for this problem, most recently last month. Denies relief from this.     Patient has tried the following medications with varied success (current meds in bold):   Nabumetone - takes the edge off  Gabapentin - takes the edge off of neuropathy  Duloxetine     Therapeutic modalities and interventional therapies to date include:  -left hip and shoulder injections at [facility] - 2-4 weeks relief     Medical history includes hypertension, KERRI, peripheral neuropathy, type 2 diabetes, essential tremor.    HPI  Today's visit   Ashly Meyer ( 1950) is a female with Diagnoses of Chronic pain of right knee, Right lumbar radiculitis, Positive depression screening, Chronic right shoulder pain, Subacromial bursitis of right shoulder joint, Impingement of right shoulder, Neuroforaminal stenosis of lumbar spine, BMI 37.0-37.9, adult, Lumbar spondylosis, Cervical radiculitis, Numbness and tingling of right arm, Cervicalgia, Myalgia, Numbness and tingling in left arm, Greater trochanteric bursitis of both hips, Other chronic pain, Fibromyalgia, Neuroforaminal stenosis of cervical spine, and Cervical  "spondylosis were pertinent to this visit.    Today Ashly presents for MRI review. She reports ongoing radiating pain down both legs much worse on the right, happening 6-7 times per day.  Mainly in the right L4 and L5 dermatomal distribution.  She also reports severe right knee pain that started 3 weeks ago when she \"blew the knee out\" when she was walking.  She does not have a recent right knee x-ray.  She feels that her right knee is swollen.    She has been doing PT and notes improvement in her neck pain and upper back pain with this.  She has not done PT recently for her low back or leg pain.    Reports worsened right lateral shoulder pain. Has had surgery.  Taking gabapentin and duloxetine.    She reports improvement after bilateral greater trochanteric bursa injections although she still has pain in this area.      Pain severity 6/10 currently  Pt denies new numbness, tingling, or weakness.    Procedure history:  - 1/5/24 right and left greater trochanteric bursa injection ultrasound-guided - resolution of left sided pain, 50% improvement in right sided pain.       ROS:   Red Flags ROS:   Fever, Chills, Sweats: Denies  Involuntary Weight Loss: Denies  Bladder Incontinence: Denies  Bowel Incontinence: denies  Saddle Anesthesia: Denies    All other systems reviewed and negative.     PMHx:   Past Medical History:   Diagnosis Date    Anginal syndrome (formerly [facility])     Anxiety     Arthritis     Everywhere.     Atrophic rhinitis 10/3/2016    Back pain     Bronchitis 08/2021    finished 1st course of ABX to start a 2nd course soon.     Carotid artery stenosis, unilateral     moderate left carotid artery stenosis    Carpal tunnel syndrome 9/5/2011    Chronic pain     Constipation     Controlled type 2 diabetes mellitus without complication (formerly [facility])     states borderline    Controlled type 2 diabetes mellitus without complication, without long-term current use of insulin (formerly [facility]) 6/12/2019    Cough 08/2021    r/t bronchitis    " Current severe episode of major depressive disorder without psychotic features without prior episode ([facility]) 6/3/2021    Daytime sleepiness     Degenerative disc disease     Depression 5/20/2009    Deviated nasal septum 8/1/2016    Diarrhea     and constipation    Disease of accessory sinus 10/3/2016    Dizziness     Dry eye syndrome, bilateral     Elevated sedimentation rate     history of negative temporal artery biopsy around 2006    Fatigue     Fatty liver     Fibromyalgia     confirmed by Dr. 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    Hypertension     Hypothyroidism 5/20/2009    Incipient cataract of both eyes     Incomplete rectal prolapse 9/3/2021    Insomnia     Intention tremor 6/2/2022    Migraine without aura, without mention of intractable migraine without mention of status migrainosus     Mild intermittent asthma without complication     inhalers as needed    Mixed dyslipidemia     Morbid obesity with BMI of 45.0-49.9, adult ([facility])     Morning headache     Mumps     Nasal drainage     Nocturnal hypoxia     severe, to 69% O2 sat    Obesity     Obesity hypoventilation syndrome ([facility]) 5/31/2017    Pelvic floor dysfunction     Peripheral neuropathy     Pleomorphic adenoma     Polymyalgia rheumatica ([facility])     Dr. Ann    Restless leg syndrome     Ringing in ears     Rotator cuff syndrome of right shoulder and allied disorders 10/10/2018    S/P tonsillectomy     Seasonal allergies     Skin cancer 1990's    skin    Sleep apnea syndrome     she is not using CPAP, claustrophobia, uses 2-3l at night via concentrator occ 2l during daytime ([facility])    Snoring     Sore muscles     Sweat, sweating, excessive     Swelling of lower extremity     Thyroid carcinoma (HCC) 7/22/2023    Found surgically July 2023    Trochanteric bursitis of both hips 3/3/2022    Urinary incontinence     will not wear a pad    Vision loss     Weakness        PSHx:   Past Surgical History:   Procedure Laterality Date    THYROIDECTOMY TOTAL Right 8/23/2023    Procedure: RIGHT COMPLETION THYROIDECTOMY;  Surgeon: Michael Mancuso M.D.;  Location: SURGERY SAME DAY [facility];  Service: Ent    THYROIDECTOMY Left 7/17/2023    Procedure: LEFT ELOY- THYROIDECTOMY;  Surgeon: Michael Mancuso M.D.;  Location: SURGERY SAME DAY [facility];  Service: Ent    NV UPPER GI ENDOSCOPY,DIAGNOSIS N/A 08/26/2021    Procedure: GASTROSCOPY;  Surgeon: Marty Lopez M.D.;  Location: [facility];  Service: Gastroenterology    NV COLONOSCOPY,DIAGNOSTIC  08/26/2021    Procedure: COLONOSCOPY - WITH BIOPSIES.;  Surgeon: Marty Lopez M.D.;  Location: [facility];  Service: Gastroenterology    SHOULDER DECOMPRESSION ARTHROSCOPIC Right 02/01/2019    Procedure: SHOULDER DECOMPRESSION ARTHROSCOPIC - SUBACROMIAL, LABRAL DEBRIDEMENT;  Surgeon: Damaris Knutson M.D.;  Location: [facility];  Service: Orthopedics    SHOULDER ARTHROSCOPY W/ BICIPITAL TENODESIS REPAIR Right 02/01/2019    Procedure: SHOULDER ARTHROSCOPY W/ BICIPITAL TENOTOMY;  Surgeon: Damaris Knutson M.D.;  Location: [facility];  Service: Orthopedics    CARPAL TUNNEL RELEASE Right 02/01/2019    Procedure: CARPAL TUNNEL RELEASE;  Surgeon: Damaris Knutson M.D.;  Location: [facility];  Service: Orthopedics    GASTROSCOPY  02/06/2017    Procedure: GASTROSCOPY;  Surgeon: Pau Foster M.D.;  Location: SURGERY SAME DAY [facility];  Service:     COLONOSCOPY  02/06/2017    Procedure: COLONOSCOPY;  Surgeon: Pau Foster M.D.;  Location: SURGERY SAME DAY [facility];  Service:      SEPTAL RECONSTRUCTION  10/03/2016    Procedure: SEPTAL RECONSTRUCTION with  grafts ;  Surgeon: PIETER Dickson M.D.;  Location: SURGERY SAME DAY [facility];  Service:     SEPTOPLASTY N/A 08/01/2016    Procedure: SEPTOPLASTY;  Surgeon: PIETER Dickson M.D.;  Location: SURGERY SAME DAY [facility];  Service:     TURBINOPLASTY Bilateral 08/01/2016    Procedure: TURBINOPLASTY;  Surgeon: PIETER Dickson M.D.;  Location: SURGERY SAME DAY [facility];  Service:     NASAL FRACTURE REDUCTION OPEN N/A 08/01/2016    Procedure: SEPTAL FRACTURE REDUCTION OPEN;  Surgeon: PIETER Dickson M.D.;  Location: SURGERY SAME DAY [facility];  Service:     FINE NEEDLE ASPIRATION  11/16/2009    Performed by DA CALLOWAY at ENDOSCOPY [facility]    COLONOSCOPY  2006    ? unclear date    OTHER SURGICAL PROCEDURE  1998    vaginal wall repair    BLADDER SUSPENSION  1983    HYSTERECTOMY, VAGINAL  1983    ovaries are present, excessive bleeding    TUBAL LIGATION  1980    MASS EXCISION GENERAL Right 1956    had carbuncle removal R thigh    TONSILLECTOMY  1953    ANAL SPHINCTEROTOMY      anal muscle repair    CATARACT EXTRACTION WITH IOL Bilateral     CHOLECYSTECTOMY      HYSTERECTOMY LAPAROSCOPY      OTHER ORTHOPEDIC SURGERY  1962/1980/1983/1985    foot surgery     SCAR REVISION Right     thigh scar        Family Hx:   Family History   Problem Relation Age of Onset    Heart Disease Mother         Arotic vaulve replacement    GI Disease Mother         bile duct problem    Bladder cancer Mother     GI Disease Sister         celiac    Arthritis Sister     Other Sister         gout and lupus    Arthritis Sister     Kidney Disease Sister     GI Disease Sister     Bladder cancer Maternal Aunt     Arthritis Maternal Grandmother     Hypertension Maternal Grandmother     Heart Disease Maternal Grandmother     Hypertension Maternal Grandfather     Heart Disease Maternal Grandfather     Arthritis Maternal Grandfather      No Known Problems Paternal Grandmother     No Known Problems Paternal Grandfather     Cancer Brother         Larynx    Cancer Daughter         non hopskins limphoma    GI Disease Daughter     Bipolar disorder Daughter     Seizures Daughter     Bipolar disorder Daughter        Social Hx:  Social History     Socioeconomic History    Marital status:      Spouse name: Not on file    Number of children: Not on file    Years of education: Not on file    Highest education level: Not on file   Occupational History    Not on file   Tobacco Use    Smoking status: Former     Current packs/day: 0.00     Average packs/day: 1 pack/day for 45.2 years (45.2 ttl pk-yrs)     Types: Cigarettes     Start date:      Quit date: 3/1/2007     Years since quittin.2    Smokeless tobacco: Never    Tobacco comments:     Started smoking at age 15, continued abstinance    Vaping Use    Vaping status: Never Used   Substance and Sexual Activity    Alcohol use: Not Currently    Drug use: Not Currently     Comment: CBD topical pain cream    Sexual activity: Not Currently     Partners: Male   Other Topics Concern     Service No    Blood Transfusions No    Caffeine Concern Not Asked    Occupational Exposure No    Hobby Hazards No    Sleep Concern No    Stress Concern No    Weight Concern No    Special Diet No    Back Care No    Exercise No    Bike Helmet No    Seat Belt Yes    Self-Exams No   Social History Narrative    Not on file     Social Determinants of Health     Financial Resource Strain: Not on file   Food Insecurity: Not on file   Transportation Needs: Not on file   Physical Activity: Not on file   Stress: Not on file   Social Connections: Not on file   Intimate Partner Violence: Not on file   Housing Stability: Not on file       Allergies:  Allergies   Allergen Reactions    Ace Inhibitors Cough    Benadryl Allergy Hives     Hot skin itching    Iodine Nausea     Pt received CT w/ contrast 22 pt had nausea  "post contrast     Lamictal Rash and Swelling     \"hot\", unable to function.  Severe rash, scarring    Savella [Kdc:Milnacipran+Ci Pigment Blue 63] Nausea, Swelling and Anxiety     Swelling, bone and muscle pain, sweating, nausea, dizziness, sleeplessness, anxiety    Savella [Milnacipran Hcl] Unspecified     Muscle aches, feet swelling    Sulfa Drugs Hives, Shortness of Breath and Anxiety     Hard breathing, shortness of breath    Butalbital-Acetaminophen Unspecified     Dizzy, can't walk, hard to focus    Cefaclor Nausea and Unspecified     Ceclor causes light headedness and dizziness    Morphine Itching     \"Redness\"    Penicillins Itching and Swelling     Skin itching and redness    Tizanidine Hcl Nausea     \"Dizziness\" hard to focus    Milnacipran Vomiting and Nausea    Amlactin Rash and Itching     Lotion causes itching, redness and burnng    Tape Rash and Itching     Skin tears, paper tape is OK per Pt       Medications: reviewed on epic.   Outpatient Medications Marked as Taking for the 5/23/24 encounter (Office Visit) with Patricia Ho M.D.   Medication Sig Dispense Refill    atorvastatin (LIPITOR) 40 MG Tab Take 1 Tablet by mouth every evening. 90 Tablet 3    DULoxetine (CYMBALTA) 60 MG Cap DR Particles delayed-release capsule Take 1 Capsule by mouth every evening. 90 Capsule 3    gabapentin (NEURONTIN) 800 MG tablet Take 1 Tablet by mouth 2 times a day. 180 Tablet 3    allopurinol (ZYLOPRIM) 100 MG Tab TAKE ONE TABLET BY MOUTH ONCE DAILY 90 Tablet 2    QUEtiapine (SEROQUEL) 25 MG Tab TAKE ONE TABLET BY MOUTH AT BEDTIME 90 Tablet 2    aliskiren (TEKTURNA) 150 MG tablet Take 150 mg by mouth every day.      pantoprazole (PROTONIX) 40 MG Tablet Delayed Response Take 1 Tablet by mouth 2 times a day. 180 Tablet 3    levothyroxine (SYNTHROID) 137 MCG Tab TAKE ONE TABLET BY MOUTH EVERY MORNING ON AN EMPTY STOMACH 90 Tablet 2    calcitonin, salmon, (MIACALCIN) 200 UNIT/ACT Solution Administer 1 [place] into " affected nostril(S) every day.      liothyronine (CYTOMEL) 5 MCG Tab Take 5 mcg by mouth every day.      albuterol 108 (90 Base) MCG/ACT Aero Soln inhalation aerosol Inhale 2 Puffs every 6 hours as needed for Shortness of Breath. 8.5 g 11    Cholecalciferol (VITAMIN D-3) 125 MCG (5000 UT) Tab Take 5,000 Units by mouth every evening.      VITAMIN E PO Take 1 Capsule by mouth every morning.          Current Outpatient Medications on File Prior to Visit   Medication Sig Dispense Refill    atorvastatin (LIPITOR) 40 MG Tab Take 1 Tablet by mouth every evening. 90 Tablet 3    DULoxetine (CYMBALTA) 60 MG Cap DR Particles delayed-release capsule Take 1 Capsule by mouth every evening. 90 Capsule 3    gabapentin (NEURONTIN) 800 MG tablet Take 1 Tablet by mouth 2 times a day. 180 Tablet 3    allopurinol (ZYLOPRIM) 100 MG Tab TAKE ONE TABLET BY MOUTH ONCE DAILY 90 Tablet 2    QUEtiapine (SEROQUEL) 25 MG Tab TAKE ONE TABLET BY MOUTH AT BEDTIME 90 Tablet 2    aliskiren (TEKTURNA) 150 MG tablet Take 150 mg by mouth every day.      pantoprazole (PROTONIX) 40 MG Tablet Delayed Response Take 1 Tablet by mouth 2 times a day. 180 Tablet 3    levothyroxine (SYNTHROID) 137 MCG Tab TAKE ONE TABLET BY MOUTH EVERY MORNING ON AN EMPTY STOMACH 90 Tablet 2    calcitonin, salmon, (MIACALCIN) 200 UNIT/ACT Solution Administer 1 Spray into affected nostril(S) every day.      liothyronine (CYTOMEL) 5 MCG Tab Take 5 mcg by mouth every day.      albuterol 108 (90 Base) MCG/ACT Aero Soln inhalation aerosol Inhale 2 Puffs every 6 hours as needed for Shortness of Breath. 8.5 g 11    Cholecalciferol (VITAMIN D-3) 125 MCG (5000 UT) Tab Take 5,000 Units by mouth every evening.      VITAMIN E PO Take 1 Capsule by mouth every morning.       No current facility-administered medications on file prior to visit.         EXAMINATION     Physical Exam:   /82 (BP Location: Right arm, Patient Position: Sitting, BP Cuff Size: Adult)   Pulse 69   Temp 36.7 °C  (98.1 °F) (Temporal)   Wt 108 kg (238 lb)   SpO2 96%     Constitutional:   Body Habitus: Body mass index is 37.84 kg/m².  Cooperation: Fully cooperates with exam  Appearance: Well-groomed, well-nourished.    Eyes: No scleral icterus to suggest severe liver disease, no proptosis to suggest severe hyperthyroidism    ENT -no obvious auditory deficits, no noticeable facial droop     Skin -no rashes or lesions noted     Respiratory-  breathing comfortably on room air, no audible wheezing    Cardiovascular-distal extremities warm and well perfused.  No lower extremity edema is noted.     Gastrointestinal - no obvious abdominal masses, non-distended    Psychiatric- alert and oriented ×3. Normal affect.     Musculoskeletal    Reduced active range of motion of bilateral shoulders.  Positive [place]am[place] and empty can on right reproducing symptoms    Thoracic/Lumbar Spine/Sacral Spine/Hips   Tenderness to palpation at the right patellar facets.  Active range of motion equal on both sides. No obvious effusion at right knee compared to left.    Inspection: No evidence of atrophy in bilateral lower extremities throughout      There is limited active range of motion with lumbar extension     Facet loading maneuver positive on right, negative on left     Lumbar spine /hip provocative exam maneuvers  Straight leg raise negative bilaterally  FADIR test negative bilaterally     SI joint tests  SYLVIA test negative bilaterally  Thigh thrust test negative bilaterally     Key points for the international standards for neurological classification of spinal cord injury (ISNCSCI) to light touch.   Dermatome R L   L2 2 2   L3 2 2   L4 2 2   L5 2 2   S1 2 2   S2 2 2         Motor Exam Lower Extremities  ? Myotome R L   Hip flexion L2 5 5   Knee extension L3 5 5   Ankle dorsiflexion L4 5 5   Toe extension L5 5 5   Ankle plantarflexion S1 5 5      Previous exam  Cervical spine   Inspection: No deformities of the skin over the cervical spine. No  rashes or lesions.    Spurling's sign  negative bilaterally  Cervical facet loading maneuver  negative bilaterally    No signs of muscular atrophy in bilateral upper extremities     Tenderness to palpation at paracervical muscles bilaterally, cervical facets bilaterally, and upper trapezius bilaterally.     Key points for the international standards for neurological classification of spinal cord injury (ISNCSCI) to light touch.     Dermatome R L   C4 1 1   C5 2 2   C6 2 2   C7 1 1   C8 1 1   T1 2 2   T2 2 2       Motor Exam Upper Extremities   ? Myotome R L   Shoulder abduction C5 5 5   Elbow flexion C5 5 5   Wrist extension C6 5 5   Elbow extension C7 5 5   Finger flexion C8 5 5   Finger abduction T1 5 5     tenderness to palpation at   Shoulder girdle, left  Shoulder girdle, right  Upper arm, left  Upper arm, right  Lower arm, left  Lower arm, right  Hip (buttock, trochanter), left  Hip (buttock, trochanter), right  Upper leg, left  Upper leg, right  Lower leg, left  Lower leg, right  Upper back  Lower back  Neck    MEDICAL DECISION MAKING    Medical records review: see under HPI section.     DATA    Labs: No new labs available for review since last visit.   Lab Results   Component Value Date/Time    SODIUM 142 04/26/2024 11:10 AM    POTASSIUM 4.0 04/26/2024 11:10 AM    CHLORIDE 106 04/26/2024 11:10 AM    CO2 21 04/26/2024 11:10 AM    ANION 15.0 04/26/2024 11:10 AM    GLUCOSE 116 (H) 04/26/2024 11:10 AM    BUN 26 (H) 04/26/2024 11:10 AM    CREATININE 0.89 04/26/2024 11:10 AM    CREATININE 0.84 04/18/2011 03:41 PM    CALCIUM 9.2 04/26/2024 11:10 AM    ASTSGOT 16 04/26/2024 11:10 AM    ALTSGPT 16 04/26/2024 11:10 AM    TBILIRUBIN 0.6 04/26/2024 11:10 AM    ALBUMIN 4.3 04/26/2024 11:10 AM    ALBUMIN 4.51 07/22/2015 02:28 PM    TOTPROTEIN 7.6 04/26/2024 11:10 AM    TOTPROTEIN 7.90 07/22/2015 02:28 PM    GLOBULIN 3.3 04/26/2024 11:10 AM    AGRATIO 1.3 04/26/2024 11:10 AM    AGRATIO 1.1 04/18/2011 03:41 PM       Lab  Results   Component Value Date/Time    PROTHROMBTM 13.8 11/16/2022 11:03 AM    INR 1.07 11/16/2022 11:03 AM        Lab Results   Component Value Date/Time    WBC 9.5 04/26/2024 11:10 AM    WBC 10.8 (H) 10/06/2009 08:45 AM    RBC 4.74 04/26/2024 11:10 AM    RBC 4.58 10/06/2009 08:45 AM    HEMOGLOBIN 13.8 04/26/2024 11:10 AM    HEMATOCRIT 43.1 04/26/2024 11:10 AM    MCV 90.9 04/26/2024 11:10 AM    MCV 91 10/06/2009 08:45 AM    MCH 29.1 04/26/2024 11:10 AM    MCH 30.5 10/06/2009 08:45 AM    MCHC 32.0 (L) 04/26/2024 11:10 AM    MPV 11.3 04/26/2024 11:10 AM    NEUTSPOLYS 67.60 12/20/2022 03:22 PM    LYMPHOCYTES 20.90 (L) 12/20/2022 03:22 PM    MONOCYTES 7.40 12/20/2022 03:22 PM    EOSINOPHILS 3.10 12/20/2022 03:22 PM    BASOPHILS 0.50 12/20/2022 03:22 PM    HYPOCHROMIA 1+ 02/04/2014 11:38 AM    ANISOCYTOSIS 1+ 08/10/2011 08:20 PM        Lab Results   Component Value Date/Time    HBA1C 6.4 (H) 04/26/2024 11:10 AM        Imaging:   I personally reviewed following images, these are my reads  X-ray pelvis 2/25/2022  Mild OA of left hip joint.  No significant OA of right hip joint.  Bilateral SI joint sclerosis. See formal radiology report for further details.     XR hip 12/15/23  No significant OA of either hip.    MRI cervical spine 1/22/2022  At C3-4 there is severe right neuroforaminal stenosis.  At C4-5 there is severe left neuroforaminal stenosis.  At C5-6 there is severe bilateral neuroforaminal stenosis.  At C6-7 there is moderate bilateral neuroforaminal stenosis. See formal radiology report for further details.    MRI lumbar spine 5/5/2024  At L5-S1 there is mild bilateral neuroforaminal stenosis.  At L4-5 there is moderate right and mild left neuroforaminal stenosis.  At L3-4 there is moderate to severe bilateral neuroforaminal stenosis.  At L2-3 there is mild to moderate right and moderate left neuroforaminal stenosis.  Lumbar spondylosis at bilateral lower levels. See formal radiology report for further  details.      IMAGING radiology reads. I reviewed the following radiology reads    MRI lumbar spine 5/5/2024  FINDINGS: The lowest formed intervertebral disc will be designated L5-S1 for the purposes of this report and vertebral levels numbered accordingly.        The lumbar vertebral bodies have unremarkable height and no gross malalignment.  No acute or suspicious osseous lesion is seen.  There is mild loss of intervertebral disc height at L1-L2. There is moderate loss of intervertebral disc height with adjacent Modic type I/II degenerative endplate changes at L2-L3. There is mild to moderate loss of intervertebral disc height at L3-L4.   There is moderate loss of intervertebral disc height with adjacent Modic type I/III endplate changes at L4-L5. There is severe loss of intervertebral disc height at L5-S1.  The conus medullaris has a normal caliber course and signal intensity.     Level specific findings as follows:     L1-2: Diffuse disc bulge. Moderate RIGHT and mild LEFT facet arthropathy. Mild central canal narrowing. Moderate RIGHT and mild LEFT neural foraminal narrowing.  L2-3: Diffuse disc bulge. Moderate BILATERAL facet arthropathy. Moderate central canal narrowing. Mild to moderate RIGHT and moderate LEFT neural foraminal narrowing.  L3-4: Diffuse disc bulge. Severe RIGHT and moderate LEFT facet arthropathy. Severe central canal narrowing. Moderate to severe BILATERAL neural foraminal narrowing.  L4-5: Diffuse disc bulge. Severe RIGHT and moderate LEFT facet arthropathy. Moderate central canal narrowing. Moderate RIGHT and mild LEFT neural foraminal narrowing.  L5-S1: Diffuse disc bulge mostly lateral and anterior with associated osteophytes. Moderate BILATERAL facet arthropathy. Mild BILATERAL neural foraminal narrowing.     There are changes of BILATERAL sacroiliac arthropathy.           Soft tissues: No abdominal aortic aneurysm or soft tissue mass is seen.     IMPRESSION:     1.  Multilevel  multifactorial degenerative changes with interval progression since the prior study from March 8, 2017  2.  Severe central canal narrowing at L3-L4  3.  Areas of central canal and neural foraminal narrowing as described above     Results for orders placed during the hospital encounter of 06/04/14     MR-BRAIN-WITH & W/O     Impression  1.  No evidence of acute territorial infarct, intracranial hemorrhage or mass lesion.  2.  Mild diffuse cerebral and cerebellar substance loss.    Results for orders placed during the hospital encounter of 12/20/22     MR-BRAIN-W/O     Impression  1.  No acute abnormality.  2.  Mild chronic microvascular ischemic disease.             Results for orders placed during the hospital encounter of 01/22/22     MR-CERVICAL SPINE-W/O     Impression  1.  Multifocal degenerative disease in the cervical spine as described above.  2.  Severe right lateral recess and right C4 neural foraminal stenosis.  3.  Severe left C5 neural foraminal stenosis.  4.  There is an approximately 9 cm sized left thyroid nodule. This is increased since the previous study. Ultrasound-guided fine-needle aspiration is recommended.  .      Results for orders placed during the hospital encounter of 03/16/17     MR-LUMBAR SPINE-W/O     Impression  1.  Severe discal and moderate endplate degenerative changes at the L5-S1 level. Minimal endplate degenerative changes noted at multiple levels in the lumbar spine.     2.  Mild lumbar spondylotic changes at the L4-5 level with minimal lumbar spondylotic changes at the L3-4 level.     3.  Minimal cyst cephalad subligamentous disc extrusion at the L2-3 level causing minimal effacement of the ventral surface of thecal sac.     4.  Borderline central canal stenosis at the L2-3 and L4-5 level secondary to facet arthropathy.      Results for orders placed during the hospital encounter of 03/16/17     MR-THORACIC SPINE-W/O     Impression  1. Minimal thoracic spondylotic changes at the  T5-6, T6-7, and a T8-9 levels.     2. Mild discal degenerative changes in the mid and lower thoracic spine.    Results for orders placed during the hospital encounter of 03/16/17     MR-LUMBAR SPINE-W/O     Impression  1.  Severe discal and moderate endplate degenerative changes at the L5-S1 level. Minimal endplate degenerative changes noted at multiple levels in the lumbar spine.     2.  Mild lumbar spondylotic changes at the L4-5 level with minimal lumbar spondylotic changes at the L3-4 level.     3.  Minimal cyst cephalad subligamentous disc extrusion at the L2-3 level causing minimal effacement of the ventral surface of thecal sac.     4.  Borderline central canal stenosis at the L2-3 and L4-5 level secondary to facet arthropathy.      Results for orders placed during the hospital encounter of 06/04/14     MR-MRA NECK-W/O     Impression  Unremarkable MR angiogram of the carotid arteries and vertebral basilar system.       Results for orders placed during the hospital encounter of 08/16/10     MR-ORBITS, FACE, NECK-WITH & W/O AND SEQUENCES                              Results for orders placed during the hospital encounter of 01/19/19     DX-ANKLE 3+ VIEWS LEFT     Impression  No evidence of fracture or dislocation.     Soft tissue swelling.   Results for orders placed during the hospital encounter of 11/02/21     DX-CERVICAL SPINE-2 OR 3 VIEWS     Impression  1.  There is mild degenerative disc disease and arthropathy at the C5-6 and C6-7 levels.     Results for orders placed in visit on 07/26/18     DX-CHEST-2 VIEWS     Impression  Low lung volumes with bibasilar atelectasis. No focal consolidation or pleural effusions.     Results for orders placed during the hospital encounter of 08/10/19     DX-ELBOW-COMPLETE 3+ RIGHT     Impression  Findings suspicious for nondisplaced radial neck fracture. Correlate with point tenderness. Follow-up radiographs in 7-10 days may be performed.     Results for orders placed  during the hospital encounter of 12/20/22     DX-FOOT-2- RIGHT     Impression  No acute osseous abnormality.   Results for orders placed in visit on 10/29/13     DX-FOOT-COMPLETE 3+     Impression  Proximal phalanx right fourth toe fracture.     INTERPRETING LOCATION:  [address] [facility] [place], [address]   Results for orders placed during the hospital encounter of 12/06/18     DX-FOREARM RIGHT     Impression  Unremarkable RIGHT forearm.       Results for orders placed during the hospital encounter of 11/17/15     DX-HIP-COMPLETE - UNILATERAL 2+ RIGHT     Impression  1. No evidence of acute fracture or dislocation.     2. Mild degenerative changes.   Results for orders placed during the hospital encounter of 09/02/11     DX-HIPS-COMPLETE - BILATERAL 3+     Impression  Minimally sclerotic hip joints.  No acute fracture identified.      Results for orders placed in visit on 09/23/13     DX-KNEES-AP BILATERAL STANDING     Impression  Negative standing knee radiograph.     INTERPRETING LOCATION: [address] [facility] [place], [id number]   Results for orders placed in visit on 05/21/12     DX-KNEE COMPLETE 4+     Impression  Unremarkable right knee series.   Results for orders placed during the hospital encounter of 12/06/15     DX-LUMBAR SPINE-2 OR 3 VIEWS     Impression  1.  There has been progression of L4-5 and L5-S1 disc disease and facet disease, right greater than left.    Results for orders placed during the hospital encounter of 02/25/22     DX-PELVIS-1 OR 2 VIEWS     Impression  1.  Marked degenerative changes lower lumbar spine and mildly sclerotic SI joints.     2.  Mild degenerative changes right hip joint.           Results for orders placed during the hospital encounter of 08/10/19     DX-SHOULDER 2+ RIGHT     Impression  No acute osseous abnormality.       Results for orders placed during the hospital encounter of 08/10/19     DX-WRIST-COMPLETE 3+ RIGHT     Impression  Acute-appearing triquetral fracture.               Diagnosis  Visit Diagnoses     ICD-10-CM   1. Chronic pain of right knee  M25.561    G89.29   2. Right lumbar radiculitis  M54.16   3. Positive depression screening  Z13.31   4. Chronic right shoulder pain  M25.511    G89.29   5. Subacromial bursitis of right shoulder joint  M75.51   6. Impingement of right shoulder  M25.811   7. Neuroforaminal stenosis of lumbar spine  M48.061   8. BMI 37.0-37.9, adult  Z68.37   9. Lumbar spondylosis  M47.816   10. Cervical radiculitis  M54.12   11. Numbness and tingling of right arm  R20.0    R20.2   12. Cervicalgia  M54.2   13. Myalgia  M79.10   14. Numbness and tingling in left arm  R20.0    R20.2   15. Greater trochanteric bursitis of both hips  M70.61    M70.62   16. Other chronic pain  G89.29   17. Fibromyalgia  M79.7   18. Neuroforaminal stenosis of cervical spine  M48.02   19. Cervical spondylosis  M47.812           ASSESSMENT AND PLAN:  Ashly Meyer (: 1950) is a female with      Ashly was seen today for follow-up.    Diagnoses and all orders for this visit:    Chronic pain of right knee  -     DX-KNEE COMPLETE 4+ RIGHT; Future  -     Referral to Pain Clinic  -     Referral to Physical Therapy    Right lumbar radiculitis  -     Referral to Pain Clinic  -     Referral to Physical Therapy    Positive depression screening  -     Patient has been identified as having a positive depression screening. Appropriate orders and counseling have been given.    Chronic right shoulder pain  -     Referral to Physical Therapy    Subacromial bursitis of right shoulder joint  -     Referral to Physical Therapy    Impingement of right shoulder  -     Referral to Physical Therapy    Neuroforaminal stenosis of lumbar spine  -     Referral to Physical Therapy    BMI 37.0-37.9, adult  -     Patient identified as having weight management issue.  Appropriate orders and counseling given.    Lumbar spondylosis    Cervical radiculitis    Numbness and tingling of right  arm    Cervicalgia    Myalgia    Numbness and tingling in left arm    Greater trochanteric bursitis of both hips    Other chronic pain    Fibromyalgia    Neuroforaminal stenosis of cervical spine    Cervical spondylosis            PLAN  Physical Therapy: Referral to physical therapy for low back pain, radicular pain, right shoulder pain, and right knee pain.  She had some physical therapy visits left.  Discussed that she could mention to her physical therapist that she has an updated referral to focus on these areas.     Diagnostic workup: Personally reviewed at today's visit:  MRI lumbar spine 5/5/24.  Order x-ray right knee    Medications:   - ok to continue gabapentin, and duloxetine  - s/p discontinuation of nabumetone without exacerbation of pain.  Discussed that at this time I do not feel that resuming nabumetone would significantly improve her pain.  She reports she had been taking nabumetone for decades  -Discussed trial of low-dose naltrexone for neuropathic pain, patient elected to defer due to cost of medication    Interventions:   - right L4-5 and L5-S1 transforaminal epidural steroid injection. The risks, benefits, and alternatives to this procedure were discussed and the patient wishes to proceed with the procedure. Risks include but are not limited to damage to surrounding structures, infection, bleeding, worsening of pain which can be permanent, and weakness which can be permanent. Benefits include pain relief and improved function. Alternatives include not doing the procedure.    - right knee steroid injection under ultrasound guidance pending XR review. The risks, benefits, and alternatives to this procedure were discussed and the patient wishes to proceed with the procedure. Risks include but are not limited to damage to surrounding structures, infection, bleeding, worsening of pain which can be permanent, and weakness which can be permanent. Benefits include pain relief and improved function.  Alternatives include not doing the procedure.    - s/p b/l greater trochanteric bursa injections under ultrasound guidance with 100% improvement in pain on her left and 50% improvement in pain on her right.   - Patient reportedly had hip injections with an outside provider with relief lasting 2-4 weeks at a time     Follow-up: 3 weeks after epidural, tentatively plan for right knee steroid injection at that visit pending XR review    Orders Placed This Encounter    DX-KNEE COMPLETE 4+ RIGHT    Referral to Pain Clinic    Referral to Pain Clinic    Referral to Physical Therapy    Patient has been identified as having a positive depression screening. Appropriate orders and counseling have been given.    Patient identified as having weight management issue.  Appropriate orders and counseling given.       Patricia Ho MD  Interventional Pain and Spine  Physical Medicine and Rehabilitation  [facility]      The above note documents my personal evaluation of this patient. In addition, I have reviewed and confirmed with the patient and MA the supportive information documented in today's Patient Health Questionnaire and Office Note.     Please note that this dictation was created using voice recognition software. I have made every reasonable attempt to correct obvious errors, but I expect that there are errors of grammar and possibly content that I did not discover before finalizing the note.

## 2024-05-23 NOTE — PROGRESS NOTES
Follow-up patient Note    Interventional Pain and Spine  Physiatry (Physical Medicine and Rehabilitation)     Patient Name: Ashly Meyer  : 1950  Date of service: 2024    Chief Complaint:   Chief Complaint   Patient presents with    Follow-Up     Right lumbar radiculitis       HISTORY FROM INITIAL VISIT (12/15/2023):  Ashly Meyer is a 73 y.o. female who presents today with chronic pain at her low back, shoulders, hips, pelvic region, legs, and feet for decades. Pain is worst at her bilateral groin and greater trochanter. Notes hx of fibromyalgia, neuropathy, gout, R shoulder sx.     Pain right now is 8/10 on the numeric pain scale.  Pain worsens with physical activity and improves only marginally with medication. Her pain significantly interferes with ADLs.      The patient has done physical therapy for this problem, most recently last month. Denies relief from this.     Patient has tried the following medications with varied success (current meds in bold):   Nabumetone - takes the edge off  Gabapentin - takes the edge off of neuropathy  Duloxetine     Therapeutic modalities and interventional therapies to date include:  -left hip and shoulder injections at [facility] - 2-4 weeks relief     Medical history includes hypertension, KERRI, peripheral neuropathy, type 2 diabetes, essential tremor.    HPI  Today's visit   Ashly Meyer ( 1950) is a female with Diagnoses of Chronic pain of right knee, Right lumbar radiculitis, Positive depression screening, Chronic right shoulder pain, Subacromial bursitis of right shoulder joint, Impingement of right shoulder, Neuroforaminal stenosis of lumbar spine, BMI 37.0-37.9, adult, Lumbar spondylosis, Cervical radiculitis, Numbness and tingling of right arm, Cervicalgia, Myalgia, Numbness and tingling in left arm, Greater trochanteric bursitis of both hips, Other chronic pain, Fibromyalgia, Neuroforaminal stenosis of cervical spine, and Cervical  "spondylosis were pertinent to this visit.    Today Ashly presents for MRI review. She reports ongoing radiating pain down both legs much worse on the right, happening 6-7 times per day.  Mainly in the right L4 and L5 dermatomal distribution.  She also reports severe right knee pain that started 3 weeks ago when she \"blew the knee out\" when she was walking.  She does not have a recent right knee x-ray.  She feels that her right knee is swollen.    She has been doing PT and notes improvement in her neck pain and upper back pain with this.  She has not done PT recently for her low back or leg pain.    Reports worsened right lateral shoulder pain. Has had surgery.  Taking gabapentin and duloxetine.    She reports improvement after bilateral greater trochanteric bursa injections although she still has pain in this area.      Pain severity 6/10 currently  Pt denies new numbness, tingling, or weakness.    Procedure history:  - 1/5/24 right and left greater trochanteric bursa injection ultrasound-guided - resolution of left sided pain, 50% improvement in right sided pain.       ROS:   Red Flags ROS:   Fever, Chills, Sweats: Denies  Involuntary Weight Loss: Denies  Bladder Incontinence: Denies  Bowel Incontinence: denies  Saddle Anesthesia: Denies    All other systems reviewed and negative.     PMHx:   Past Medical History:   Diagnosis Date    Anginal syndrome ([facility])     Anxiety     Arthritis     Everywhere.     Atrophic rhinitis 10/3/2016    Back pain     Bronchitis 08/2021    finished 1st course of ABX to start a 2nd course soon.     Carotid artery stenosis, unilateral     moderate left carotid artery stenosis    Carpal tunnel syndrome 9/5/2011    Chronic pain     Constipation     Controlled type 2 diabetes mellitus without complication ([facility])     states borderline    Controlled type 2 diabetes mellitus without complication, without long-term current use of insulin ([facility]) 6/12/2019    Cough 08/2021    r/t bronchitis    " Current severe episode of major depressive disorder without psychotic features without prior episode ([facility]) 6/3/2021    Daytime sleepiness     Degenerative disc disease     Depression 5/20/2009    Deviated nasal septum 8/1/2016    Diarrhea     and constipation    Disease of accessory sinus 10/3/2016    Dizziness     Dry eye syndrome, bilateral     Elevated sedimentation rate     history of negative temporal artery biopsy around 2006    Fatigue     Fatty liver     Fibromyalgia     confirmed by Dr. 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)     Morning headache     Mumps     Nasal drainage     Nocturnal hypoxia     severe, to 69% O2 sat    Obesity     Obesity hypoventilation syndrome ([facility]) 5/31/2017    Pelvic floor dysfunction     Peripheral neuropathy     Pleomorphic adenoma     Polymyalgia rheumatica ([facility])     Dr. Ann    Restless leg syndrome     Ringing in ears     Rotator cuff syndrome of right shoulder and allied disorders 10/10/2018    S/P tonsillectomy     Seasonal allergies     Skin cancer 1990's    skin    Sleep apnea syndrome     she is not using CPAP, claustrophobia, uses 2-3l at night via concentrator occ 2l during daytime ([facility])    Snoring     Sore muscles     Sweat, sweating, excessive     Swelling of lower extremity     Thyroid carcinoma (HCC) 7/22/2023    Found surgically July 2023    Trochanteric bursitis of both hips 3/3/2022    Urinary incontinence     will not wear a pad    Vision loss     Weakness        PSHx:   Past Surgical History:   Procedure Laterality Date    THYROIDECTOMY TOTAL Right 8/23/2023    Procedure: RIGHT COMPLETION THYROIDECTOMY;  Surgeon: Michael Mancuso M.D.;  Location: SURGERY SAME DAY [facility];  Service: Ent    THYROIDECTOMY Left 7/17/2023    Procedure: LEFT ELOY- THYROIDECTOMY;  Surgeon: Michael Mancuso M.D.;  Location: SURGERY SAME DAY [facility];  Service: Ent    LA UPPER GI ENDOSCOPY,DIAGNOSIS N/A 08/26/2021    Procedure: GASTROSCOPY;  Surgeon: Marty Lopez M.D.;  Location: [facility];  Service: Gastroenterology    LA COLONOSCOPY,DIAGNOSTIC  08/26/2021    Procedure: COLONOSCOPY - WITH BIOPSIES.;  Surgeon: Marty Lopez M.D.;  Location: [facility];  Service: Gastroenterology    SHOULDER DECOMPRESSION ARTHROSCOPIC Right 02/01/2019    Procedure: SHOULDER DECOMPRESSION ARTHROSCOPIC - SUBACROMIAL, LABRAL DEBRIDEMENT;  Surgeon: Damaris Knutson M.D.;  Location: [facility];  Service: Orthopedics    SHOULDER ARTHROSCOPY W/ BICIPITAL TENODESIS REPAIR Right 02/01/2019    Procedure: SHOULDER ARTHROSCOPY W/ BICIPITAL TENOTOMY;  Surgeon: Damaris Knutson M.D.;  Location: [facility];  Service: Orthopedics    CARPAL TUNNEL RELEASE Right 02/01/2019    Procedure: CARPAL TUNNEL RELEASE;  Surgeon: Damaris Knutson M.D.;  Location: [facility];  Service: Orthopedics    GASTROSCOPY  02/06/2017    Procedure: GASTROSCOPY;  Surgeon: Pau Foster M.D.;  Location: SURGERY SAME DAY [facility];  Service:     COLONOSCOPY  02/06/2017    Procedure: COLONOSCOPY;  Surgeon: Pau Foster M.D.;  Location: SURGERY SAME DAY [facility];  Service:      SEPTAL RECONSTRUCTION  10/03/2016    Procedure: SEPTAL RECONSTRUCTION with  grafts ;  Surgeon: PIETER Dickson M.D.;  Location: SURGERY SAME DAY [facility];  Service:     SEPTOPLASTY N/A 08/01/2016    Procedure: SEPTOPLASTY;  Surgeon: PIETER Dickson M.D.;  Location: SURGERY SAME DAY [facility];  Service:     TURBINOPLASTY Bilateral 08/01/2016    Procedure: TURBINOPLASTY;  Surgeon: PIETER Dickson M.D.;  Location: SURGERY SAME DAY [facility];  Service:     NASAL FRACTURE REDUCTION OPEN N/A 08/01/2016    Procedure: SEPTAL FRACTURE REDUCTION OPEN;  Surgeon: PIETER Dickson M.D.;  Location: SURGERY SAME DAY [facility];  Service:     FINE NEEDLE ASPIRATION  11/16/2009    Performed by DA CALLOWAY at ENDOSCOPY [facility]    COLONOSCOPY  2006    ? unclear date    OTHER SURGICAL PROCEDURE  1998    vaginal wall repair    BLADDER SUSPENSION  1983    HYSTERECTOMY, VAGINAL  1983    ovaries are present, excessive bleeding    TUBAL LIGATION  1980    MASS EXCISION GENERAL Right 1956    had carbuncle removal R thigh    TONSILLECTOMY  1953    ANAL SPHINCTEROTOMY      anal muscle repair    CATARACT EXTRACTION WITH IOL Bilateral     CHOLECYSTECTOMY      HYSTERECTOMY LAPAROSCOPY      OTHER ORTHOPEDIC SURGERY  1962/1980/1983/1985    foot surgery     SCAR REVISION Right     thigh scar        Family Hx:   Family History   Problem Relation Age of Onset    Heart Disease Mother         Arotic vaulve replacement    GI Disease Mother         bile duct problem    Bladder cancer Mother     GI Disease Sister         celiac    Arthritis Sister     Other Sister         gout and lupus    Arthritis Sister     Kidney Disease Sister     GI Disease Sister     Bladder cancer Maternal Aunt     Arthritis Maternal Grandmother     Hypertension Maternal Grandmother     Heart Disease Maternal Grandmother     Hypertension Maternal Grandfather     Heart Disease Maternal Grandfather     Arthritis Maternal Grandfather      No Known Problems Paternal Grandmother     No Known Problems Paternal Grandfather     Cancer Brother         Larynx    Cancer Daughter         non hopskins limphoma    GI Disease Daughter     Bipolar disorder Daughter     Seizures Daughter     Bipolar disorder Daughter        Social Hx:  Social History     Socioeconomic History    Marital status:      Spouse name: Not on file    Number of children: Not on file    Years of education: Not on file    Highest education level: Not on file   Occupational History    Not on file   Tobacco Use    Smoking status: Former     Current packs/day: 0.00     Average packs/day: 1 pack/day for 45.2 years (45.2 ttl pk-yrs)     Types: Cigarettes     Start date:      Quit date: 3/1/2007     Years since quittin.2    Smokeless tobacco: Never    Tobacco comments:     Started smoking at age 15, continued abstinance    Vaping Use    Vaping status: Never Used   Substance and Sexual Activity    Alcohol use: Not Currently    Drug use: Not Currently     Comment: CBD topical pain cream    Sexual activity: Not Currently     Partners: Male   Other Topics Concern     Service No    Blood Transfusions No    Caffeine Concern Not Asked    Occupational Exposure No    Hobby Hazards No    Sleep Concern No    Stress Concern No    Weight Concern No    Special Diet No    Back Care No    Exercise No    Bike Helmet No    Seat Belt Yes    Self-Exams No   Social History Narrative    Not on file     Social Determinants of Health     Financial Resource Strain: Not on file   Food Insecurity: Not on file   Transportation Needs: Not on file   Physical Activity: Not on file   Stress: Not on file   Social Connections: Not on file   Intimate Partner Violence: Not on file   Housing Stability: Not on file       Allergies:  Allergies   Allergen Reactions    Ace Inhibitors Cough    Benadryl Allergy Hives     Hot skin itching    Iodine Nausea     Pt received CT w/ contrast 22 pt had nausea  "post contrast     Lamictal Rash and Swelling     \"hot\", unable to function.  Severe rash, scarring    Savella [Kdc:Milnacipran+Ci Pigment Blue 63] Nausea, Swelling and Anxiety     Swelling, bone and muscle pain, sweating, nausea, dizziness, sleeplessness, anxiety    Savella [Milnacipran Hcl] Unspecified     Muscle aches, feet swelling    Sulfa Drugs Hives, Shortness of Breath and Anxiety     Hard breathing, shortness of breath    Butalbital-Acetaminophen Unspecified     Dizzy, can't walk, hard to focus    Cefaclor Nausea and Unspecified     Ceclor causes light headedness and dizziness    Morphine Itching     \"Redness\"    Penicillins Itching and Swelling     Skin itching and redness    Tizanidine Hcl Nausea     \"Dizziness\" hard to focus    Milnacipran Vomiting and Nausea    Amlactin Rash and Itching     Lotion causes itching, redness and burnng    Tape Rash and Itching     Skin tears, paper tape is OK per Pt       Medications: reviewed on epic.   Outpatient Medications Marked as Taking for the 5/23/24 encounter (Office Visit) with Patricia Ho M.D.   Medication Sig Dispense Refill    atorvastatin (LIPITOR) 40 MG Tab Take 1 Tablet by mouth every evening. 90 Tablet 3    DULoxetine (CYMBALTA) 60 MG Cap DR Particles delayed-release capsule Take 1 Capsule by mouth every evening. 90 Capsule 3    gabapentin (NEURONTIN) 800 MG tablet Take 1 Tablet by mouth 2 times a day. 180 Tablet 3    allopurinol (ZYLOPRIM) 100 MG Tab TAKE ONE TABLET BY MOUTH ONCE DAILY 90 Tablet 2    QUEtiapine (SEROQUEL) 25 MG Tab TAKE ONE TABLET BY MOUTH AT BEDTIME 90 Tablet 2    aliskiren (TEKTURNA) 150 MG tablet Take 150 mg by mouth every day.      pantoprazole (PROTONIX) 40 MG Tablet Delayed Response Take 1 Tablet by mouth 2 times a day. 180 Tablet 3    levothyroxine (SYNTHROID) 137 MCG Tab TAKE ONE TABLET BY MOUTH EVERY MORNING ON AN EMPTY STOMACH 90 Tablet 2    calcitonin, salmon, (MIACALCIN) 200 UNIT/ACT Solution Administer 1 Girard into " affected nostril(S) every day.      liothyronine (CYTOMEL) 5 MCG Tab Take 5 mcg by mouth every day.      albuterol 108 (90 Base) MCG/ACT Aero Soln inhalation aerosol Inhale 2 Puffs every 6 hours as needed for Shortness of Breath. 8.5 g 11    Cholecalciferol (VITAMIN D-3) 125 MCG (5000 UT) Tab Take 5,000 Units by mouth every evening.      VITAMIN E PO Take 1 Capsule by mouth every morning.          Current Outpatient Medications on File Prior to Visit   Medication Sig Dispense Refill    atorvastatin (LIPITOR) 40 MG Tab Take 1 Tablet by mouth every evening. 90 Tablet 3    DULoxetine (CYMBALTA) 60 MG Cap DR Particles delayed-release capsule Take 1 Capsule by mouth every evening. 90 Capsule 3    gabapentin (NEURONTIN) 800 MG tablet Take 1 Tablet by mouth 2 times a day. 180 Tablet 3    allopurinol (ZYLOPRIM) 100 MG Tab TAKE ONE TABLET BY MOUTH ONCE DAILY 90 Tablet 2    QUEtiapine (SEROQUEL) 25 MG Tab TAKE ONE TABLET BY MOUTH AT BEDTIME 90 Tablet 2    aliskiren (TEKTURNA) 150 MG tablet Take 150 mg by mouth every day.      pantoprazole (PROTONIX) 40 MG Tablet Delayed Response Take 1 Tablet by mouth 2 times a day. 180 Tablet 3    levothyroxine (SYNTHROID) 137 MCG Tab TAKE ONE TABLET BY MOUTH EVERY MORNING ON AN EMPTY STOMACH 90 Tablet 2    calcitonin, salmon, (MIACALCIN) 200 UNIT/ACT Solution Administer 1 Spray into affected nostril(S) every day.      liothyronine (CYTOMEL) 5 MCG Tab Take 5 mcg by mouth every day.      albuterol 108 (90 Base) MCG/ACT Aero Soln inhalation aerosol Inhale 2 Puffs every 6 hours as needed for Shortness of Breath. 8.5 g 11    Cholecalciferol (VITAMIN D-3) 125 MCG (5000 UT) Tab Take 5,000 Units by mouth every evening.      VITAMIN E PO Take 1 Capsule by mouth every morning.       No current facility-administered medications on file prior to visit.         EXAMINATION     Physical Exam:   /82 (BP Location: Right arm, Patient Position: Sitting, BP Cuff Size: Adult)   Pulse 69   Temp 36.7 °C  (98.1 °F) (Temporal)   Wt 108 kg (238 lb)   SpO2 96%     Constitutional:   Body Habitus: Body mass index is 37.84 kg/m².  Cooperation: Fully cooperates with exam  Appearance: Well-groomed, well-nourished.    Eyes: No scleral icterus to suggest severe liver disease, no proptosis to suggest severe hyperthyroidism    ENT -no obvious auditory deficits, no noticeable facial droop     Skin -no rashes or lesions noted     Respiratory-  breathing comfortably on room air, no audible wheezing    Cardiovascular-distal extremities warm and well perfused.  No lower extremity edema is noted.     Gastrointestinal - no obvious abdominal masses, non-distended    Psychiatric- alert and oriented ×3. Normal affect.     Musculoskeletal    Reduced active range of motion of bilateral shoulders.  Positive Bustamante and empty can on right reproducing symptoms    Thoracic/Lumbar Spine/Sacral Spine/Hips   Tenderness to palpation at the right patellar facets.  Active range of motion equal on both sides. No obvious effusion at right knee compared to left.    Inspection: No evidence of atrophy in bilateral lower extremities throughout      There is limited active range of motion with lumbar extension     Facet loading maneuver positive on right, negative on left     Lumbar spine /hip provocative exam maneuvers  Straight leg raise negative bilaterally  FADIR test negative bilaterally     SI joint tests  SYLVIA test negative bilaterally  Thigh thrust test negative bilaterally     Key points for the international standards for neurological classification of spinal cord injury (ISNCSCI) to light touch.   Dermatome R L   L2 2 2   L3 2 2   L4 2 2   L5 2 2   S1 2 2   S2 2 2         Motor Exam Lower Extremities  ? Myotome R L   Hip flexion L2 5 5   Knee extension L3 5 5   Ankle dorsiflexion L4 5 5   Toe extension L5 5 5   Ankle plantarflexion S1 5 5      Previous exam  Cervical spine   Inspection: No deformities of the skin over the cervical spine. No  rashes or lesions.    Spurling's sign  negative bilaterally  Cervical facet loading maneuver  negative bilaterally    No signs of muscular atrophy in bilateral upper extremities     Tenderness to palpation at paracervical muscles bilaterally, cervical facets bilaterally, and upper trapezius bilaterally.     Key points for the international standards for neurological classification of spinal cord injury (ISNCSCI) to light touch.     Dermatome R L   C4 1 1   C5 2 2   C6 2 2   C7 1 1   C8 1 1   T1 2 2   T2 2 2       Motor Exam Upper Extremities   ? Myotome R L   Shoulder abduction C5 5 5   Elbow flexion C5 5 5   Wrist extension C6 5 5   Elbow extension C7 5 5   Finger flexion C8 5 5   Finger abduction T1 5 5     tenderness to palpation at   Shoulder girdle, left  Shoulder girdle, right  Upper arm, left  Upper arm, right  Lower arm, left  Lower arm, right  Hip (buttock, trochanter), left  Hip (buttock, trochanter), right  Upper leg, left  Upper leg, right  Lower leg, left  Lower leg, right  Upper back  Lower back  Neck    MEDICAL DECISION MAKING    Medical records review: see under HPI section.     DATA    Labs: No new labs available for review since last visit.   Lab Results   Component Value Date/Time    SODIUM 142 04/26/2024 11:10 AM    POTASSIUM 4.0 04/26/2024 11:10 AM    CHLORIDE 106 04/26/2024 11:10 AM    CO2 21 04/26/2024 11:10 AM    ANION 15.0 04/26/2024 11:10 AM    GLUCOSE 116 (H) 04/26/2024 11:10 AM    BUN 26 (H) 04/26/2024 11:10 AM    CREATININE 0.89 04/26/2024 11:10 AM    CREATININE 0.84 04/18/2011 03:41 PM    CALCIUM 9.2 04/26/2024 11:10 AM    ASTSGOT 16 04/26/2024 11:10 AM    ALTSGPT 16 04/26/2024 11:10 AM    TBILIRUBIN 0.6 04/26/2024 11:10 AM    ALBUMIN 4.3 04/26/2024 11:10 AM    ALBUMIN 4.51 07/22/2015 02:28 PM    TOTPROTEIN 7.6 04/26/2024 11:10 AM    TOTPROTEIN 7.90 07/22/2015 02:28 PM    GLOBULIN 3.3 04/26/2024 11:10 AM    AGRATIO 1.3 04/26/2024 11:10 AM    AGRATIO 1.1 04/18/2011 03:41 PM       Lab  Results   Component Value Date/Time    PROTHROMBTM 13.8 11/16/2022 11:03 AM    INR 1.07 11/16/2022 11:03 AM        Lab Results   Component Value Date/Time    WBC 9.5 04/26/2024 11:10 AM    WBC 10.8 (H) 10/06/2009 08:45 AM    RBC 4.74 04/26/2024 11:10 AM    RBC 4.58 10/06/2009 08:45 AM    HEMOGLOBIN 13.8 04/26/2024 11:10 AM    HEMATOCRIT 43.1 04/26/2024 11:10 AM    MCV 90.9 04/26/2024 11:10 AM    MCV 91 10/06/2009 08:45 AM    MCH 29.1 04/26/2024 11:10 AM    MCH 30.5 10/06/2009 08:45 AM    MCHC 32.0 (L) 04/26/2024 11:10 AM    MPV 11.3 04/26/2024 11:10 AM    NEUTSPOLYS 67.60 12/20/2022 03:22 PM    LYMPHOCYTES 20.90 (L) 12/20/2022 03:22 PM    MONOCYTES 7.40 12/20/2022 03:22 PM    EOSINOPHILS 3.10 12/20/2022 03:22 PM    BASOPHILS 0.50 12/20/2022 03:22 PM    HYPOCHROMIA 1+ 02/04/2014 11:38 AM    ANISOCYTOSIS 1+ 08/10/2011 08:20 PM        Lab Results   Component Value Date/Time    HBA1C 6.4 (H) 04/26/2024 11:10 AM        Imaging:   I personally reviewed following images, these are my reads  X-ray pelvis 2/25/2022  Mild OA of left hip joint.  No significant OA of right hip joint.  Bilateral SI joint sclerosis. See formal radiology report for further details.     XR hip 12/15/23  No significant OA of either hip.    MRI cervical spine 1/22/2022  At C3-4 there is severe right neuroforaminal stenosis.  At C4-5 there is severe left neuroforaminal stenosis.  At C5-6 there is severe bilateral neuroforaminal stenosis.  At C6-7 there is moderate bilateral neuroforaminal stenosis. See formal radiology report for further details.    MRI lumbar spine 5/5/2024  At L5-S1 there is mild bilateral neuroforaminal stenosis.  At L4-5 there is moderate right and mild left neuroforaminal stenosis.  At L3-4 there is moderate to severe bilateral neuroforaminal stenosis.  At L2-3 there is mild to moderate right and moderate left neuroforaminal stenosis.  Lumbar spondylosis at bilateral lower levels. See formal radiology report for further  details.      IMAGING radiology reads. I reviewed the following radiology reads    MRI lumbar spine 5/5/2024  FINDINGS: The lowest formed intervertebral disc will be designated L5-S1 for the purposes of this report and vertebral levels numbered accordingly.        The lumbar vertebral bodies have unremarkable height and no gross malalignment.  No acute or suspicious osseous lesion is seen.  There is mild loss of intervertebral disc height at L1-L2. There is moderate loss of intervertebral disc height with adjacent Modic type I/II degenerative endplate changes at L2-L3. There is mild to moderate loss of intervertebral disc height at L3-L4.   There is moderate loss of intervertebral disc height with adjacent Modic type I/III endplate changes at L4-L5. There is severe loss of intervertebral disc height at L5-S1.  The conus medullaris has a normal caliber course and signal intensity.     Level specific findings as follows:     L1-2: Diffuse disc bulge. Moderate RIGHT and mild LEFT facet arthropathy. Mild central canal narrowing. Moderate RIGHT and mild LEFT neural foraminal narrowing.  L2-3: Diffuse disc bulge. Moderate BILATERAL facet arthropathy. Moderate central canal narrowing. Mild to moderate RIGHT and moderate LEFT neural foraminal narrowing.  L3-4: Diffuse disc bulge. Severe RIGHT and moderate LEFT facet arthropathy. Severe central canal narrowing. Moderate to severe BILATERAL neural foraminal narrowing.  L4-5: Diffuse disc bulge. Severe RIGHT and moderate LEFT facet arthropathy. Moderate central canal narrowing. Moderate RIGHT and mild LEFT neural foraminal narrowing.  L5-S1: Diffuse disc bulge mostly lateral and anterior with associated osteophytes. Moderate BILATERAL facet arthropathy. Mild BILATERAL neural foraminal narrowing.     There are changes of BILATERAL sacroiliac arthropathy.           Soft tissues: No abdominal aortic aneurysm or soft tissue mass is seen.     IMPRESSION:     1.  Multilevel  multifactorial degenerative changes with interval progression since the prior study from March 8, 2017  2.  Severe central canal narrowing at L3-L4  3.  Areas of central canal and neural foraminal narrowing as described above     Results for orders placed during the hospital encounter of 06/04/14     MR-BRAIN-WITH & W/O     Impression  1.  No evidence of acute territorial infarct, intracranial hemorrhage or mass lesion.  2.  Mild diffuse cerebral and cerebellar substance loss.    Results for orders placed during the hospital encounter of 12/20/22     MR-BRAIN-W/O     Impression  1.  No acute abnormality.  2.  Mild chronic microvascular ischemic disease.             Results for orders placed during the hospital encounter of 01/22/22     MR-CERVICAL SPINE-W/O     Impression  1.  Multifocal degenerative disease in the cervical spine as described above.  2.  Severe right lateral recess and right C4 neural foraminal stenosis.  3.  Severe left C5 neural foraminal stenosis.  4.  There is an approximately 9 cm sized left thyroid nodule. This is increased since the previous study. Ultrasound-guided fine-needle aspiration is recommended.  .      Results for orders placed during the hospital encounter of 03/16/17     MR-LUMBAR SPINE-W/O     Impression  1.  Severe discal and moderate endplate degenerative changes at the L5-S1 level. Minimal endplate degenerative changes noted at multiple levels in the lumbar spine.     2.  Mild lumbar spondylotic changes at the L4-5 level with minimal lumbar spondylotic changes at the L3-4 level.     3.  Minimal cyst cephalad subligamentous disc extrusion at the L2-3 level causing minimal effacement of the ventral surface of thecal sac.     4.  Borderline central canal stenosis at the L2-3 and L4-5 level secondary to facet arthropathy.      Results for orders placed during the hospital encounter of 03/16/17     MR-THORACIC SPINE-W/O     Impression  1. Minimal thoracic spondylotic changes at the  T5-6, T6-7, and a T8-9 levels.     2. Mild discal degenerative changes in the mid and lower thoracic spine.    Results for orders placed during the hospital encounter of 03/16/17     MR-LUMBAR SPINE-W/O     Impression  1.  Severe discal and moderate endplate degenerative changes at the L5-S1 level. Minimal endplate degenerative changes noted at multiple levels in the lumbar spine.     2.  Mild lumbar spondylotic changes at the L4-5 level with minimal lumbar spondylotic changes at the L3-4 level.     3.  Minimal cyst cephalad subligamentous disc extrusion at the L2-3 level causing minimal effacement of the ventral surface of thecal sac.     4.  Borderline central canal stenosis at the L2-3 and L4-5 level secondary to facet arthropathy.      Results for orders placed during the hospital encounter of 06/04/14     MR-MRA NECK-W/O     Impression  Unremarkable MR angiogram of the carotid arteries and vertebral basilar system.       Results for orders placed during the hospital encounter of 08/16/10     MR-ORBITS, FACE, NECK-WITH & W/O AND SEQUENCES                              Results for orders placed during the hospital encounter of 01/19/19     DX-ANKLE 3+ VIEWS LEFT     Impression  No evidence of fracture or dislocation.     Soft tissue swelling.   Results for orders placed during the hospital encounter of 11/02/21     DX-CERVICAL SPINE-2 OR 3 VIEWS     Impression  1.  There is mild degenerative disc disease and arthropathy at the C5-6 and C6-7 levels.     Results for orders placed in visit on 07/26/18     DX-CHEST-2 VIEWS     Impression  Low lung volumes with bibasilar atelectasis. No focal consolidation or pleural effusions.     Results for orders placed during the hospital encounter of 08/10/19     DX-ELBOW-COMPLETE 3+ RIGHT     Impression  Findings suspicious for nondisplaced radial neck fracture. Correlate with point tenderness. Follow-up radiographs in 7-10 days may be performed.     Results for orders placed  during the hospital encounter of 12/20/22     DX-FOOT-2- RIGHT     Impression  No acute osseous abnormality.   Results for orders placed in visit on 10/29/13     DX-FOOT-COMPLETE 3+     Impression  Proximal phalanx right fourth toe fracture.     INTERPRETING LOCATION:  [address] [facility] [place], [address]   Results for orders placed during the hospital encounter of 12/06/18     DX-FOREARM RIGHT     Impression  Unremarkable RIGHT forearm.       Results for orders placed during the hospital encounter of 11/17/15     DX-HIP-COMPLETE - UNILATERAL 2+ RIGHT     Impression  1. No evidence of acute fracture or dislocation.     2. Mild degenerative changes.   Results for orders placed during the hospital encounter of 09/02/11     DX-HIPS-COMPLETE - BILATERAL 3+     Impression  Minimally sclerotic hip joints.  No acute fracture identified.      Results for orders placed in visit on 09/23/13     DX-KNEES-AP BILATERAL STANDING     Impression  Negative standing knee radiograph.     INTERPRETING LOCATION: [address] [facility] [place], [address]   Results for orders placed in visit on 05/21/12     DX-KNEE COMPLETE 4+     Impression  Unremarkable right knee series.   Results for orders placed during the hospital encounter of 12/06/15     DX-LUMBAR SPINE-2 OR 3 VIEWS     Impression  1.  There has been progression of L4-5 and L5-S1 disc disease and facet disease, right greater than left.    Results for orders placed during the hospital encounter of 02/25/22     DX-PELVIS-1 OR 2 VIEWS     Impression  1.  Marked degenerative changes lower lumbar spine and mildly sclerotic SI joints.     2.  Mild degenerative changes right hip joint.           Results for orders placed during the hospital encounter of 08/10/19     DX-SHOULDER 2+ RIGHT     Impression  No acute osseous abnormality.       Results for orders placed during the hospital encounter of 08/10/19     DX-WRIST-COMPLETE 3+ RIGHT     Impression  Acute-appearing triquetral fracture.               Diagnosis  Visit Diagnoses     ICD-10-CM   1. Chronic pain of right knee  M25.561    G89.29   2. Right lumbar radiculitis  M54.16   3. Positive depression screening  Z13.31   4. Chronic right shoulder pain  M25.511    G89.29   5. Subacromial bursitis of right shoulder joint  M75.51   6. Impingement of right shoulder  M25.811   7. Neuroforaminal stenosis of lumbar spine  M48.061   8. BMI 37.0-37.9, adult  Z68.37   9. Lumbar spondylosis  M47.816   10. Cervical radiculitis  M54.12   11. Numbness and tingling of right arm  R20.0    R20.2   12. Cervicalgia  M54.2   13. Myalgia  M79.10   14. Numbness and tingling in left arm  R20.0    R20.2   15. Greater trochanteric bursitis of both hips  M70.61    M70.62   16. Other chronic pain  G89.29   17. Fibromyalgia  M79.7   18. Neuroforaminal stenosis of cervical spine  M48.02   19. Cervical spondylosis  M47.812           ASSESSMENT AND PLAN:  Ashly Meyer (: 1950) is a female with      Ashly was seen today for follow-up.    Diagnoses and all orders for this visit:    Chronic pain of right knee  -     DX-KNEE COMPLETE 4+ RIGHT; Future  -     Referral to Pain Clinic  -     Referral to Physical Therapy    Right lumbar radiculitis  -     Referral to Pain Clinic  -     Referral to Physical Therapy    Positive depression screening  -     Patient has been identified as having a positive depression screening. Appropriate orders and counseling have been given.    Chronic right shoulder pain  -     Referral to Physical Therapy    Subacromial bursitis of right shoulder joint  -     Referral to Physical Therapy    Impingement of right shoulder  -     Referral to Physical Therapy    Neuroforaminal stenosis of lumbar spine  -     Referral to Physical Therapy    BMI 37.0-37.9, adult  -     Patient identified as having weight management issue.  Appropriate orders and counseling given.    Lumbar spondylosis    Cervical radiculitis    Numbness and tingling of right  arm    Cervicalgia    Myalgia    Numbness and tingling in left arm    Greater trochanteric bursitis of both hips    Other chronic pain    Fibromyalgia    Neuroforaminal stenosis of cervical spine    Cervical spondylosis            PLAN  Physical Therapy: Referral to physical therapy for low back pain, radicular pain, right shoulder pain, and right knee pain.  She had some physical therapy visits left.  Discussed that she could mention to her physical therapist that she has an updated referral to focus on these areas.     Diagnostic workup: Personally reviewed at today's visit:  MRI lumbar spine 5/5/24.  Order x-ray right knee    Medications:   - ok to continue gabapentin, and duloxetine  - s/p discontinuation of nabumetone without exacerbation of pain.  Discussed that at this time I do not feel that resuming nabumetone would significantly improve her pain.  She reports she had been taking nabumetone for decades  -Discussed trial of low-dose naltrexone for neuropathic pain, patient elected to defer due to cost of medication    Interventions:   - right L4-5 and L5-S1 transforaminal epidural steroid injection. The risks, benefits, and alternatives to this procedure were discussed and the patient wishes to proceed with the procedure. Risks include but are not limited to damage to surrounding structures, infection, bleeding, worsening of pain which can be permanent, and weakness which can be permanent. Benefits include pain relief and improved function. Alternatives include not doing the procedure.    - right knee steroid injection under ultrasound guidance pending XR review. The risks, benefits, and alternatives to this procedure were discussed and the patient wishes to proceed with the procedure. Risks include but are not limited to damage to surrounding structures, infection, bleeding, worsening of pain which can be permanent, and weakness which can be permanent. Benefits include pain relief and improved function.  Alternatives include not doing the procedure.    - s/p b/l greater trochanteric bursa injections under ultrasound guidance with 100% improvement in pain on her left and 50% improvement in pain on her right.   - Patient reportedly had hip injections with an outside provider with relief lasting 2-4 weeks at a time     Follow-up: 3 weeks after epidural, tentatively plan for right knee steroid injection at that visit pending XR review    Orders Placed This Encounter    DX-KNEE COMPLETE 4+ RIGHT    Referral to Pain Clinic    Referral to Pain Clinic    Referral to Physical Therapy    Patient has been identified as having a positive depression screening. Appropriate orders and counseling have been given.    Patient identified as having weight management issue.  Appropriate orders and counseling given.       Patricia Ho MD  Interventional Pain and Spine  Physical Medicine and Rehabilitation  [facility]      The above note documents my personal evaluation of this patient. In addition, I have reviewed and confirmed with the patient and MA the supportive information documented in today's Patient Health Questionnaire and Office Note.     Please note that this dictation was created using voice recognition software. I have made every reasonable attempt to correct obvious errors, but I expect that there are errors of grammar and possibly content that I did not discover before finalizing the note.

## 2024-05-24 ENCOUNTER — HOSPITAL ENCOUNTER (OUTPATIENT)
Dept: RADIOLOGY | Facility: MEDICAL CENTER | Age: 74
End: 2024-05-24
Attending: STUDENT IN AN ORGANIZED HEALTH CARE EDUCATION/TRAINING PROGRAM
Payer: MEDICARE

## 2024-05-24 DIAGNOSIS — M25.561 CHRONIC PAIN OF RIGHT KNEE: ICD-10-CM

## 2024-05-24 DIAGNOSIS — G89.29 CHRONIC PAIN OF RIGHT KNEE: ICD-10-CM

## 2024-05-30 ENCOUNTER — APPOINTMENT (OUTPATIENT)
Dept: PHYSICAL THERAPY | Facility: REHABILITATION | Age: 74
End: 2024-05-30
Attending: STUDENT IN AN ORGANIZED HEALTH CARE EDUCATION/TRAINING PROGRAM
Payer: MEDICARE

## 2024-05-31 ENCOUNTER — PHYSICAL THERAPY (OUTPATIENT)
Dept: PHYSICAL THERAPY | Facility: REHABILITATION | Age: 74
End: 2024-05-31
Attending: STUDENT IN AN ORGANIZED HEALTH CARE EDUCATION/TRAINING PROGRAM
Payer: MEDICARE

## 2024-05-31 DIAGNOSIS — M54.12 CERVICAL RADICULITIS: ICD-10-CM

## 2024-05-31 DIAGNOSIS — R20.0 NUMBNESS AND TINGLING IN LEFT ARM: ICD-10-CM

## 2024-05-31 DIAGNOSIS — R20.2 NUMBNESS AND TINGLING OF RIGHT ARM: ICD-10-CM

## 2024-05-31 DIAGNOSIS — R20.0 NUMBNESS AND TINGLING OF RIGHT ARM: ICD-10-CM

## 2024-05-31 DIAGNOSIS — M79.10 MYALGIA: ICD-10-CM

## 2024-05-31 DIAGNOSIS — R20.2 NUMBNESS AND TINGLING IN LEFT ARM: ICD-10-CM

## 2024-05-31 DIAGNOSIS — M54.2 CERVICALGIA: ICD-10-CM

## 2024-05-31 NOTE — OP THERAPY DAILY TREATMENT
"  Outpatient Physical Therapy  DAILY TREATMENT     [facility] [address]  [address]  [place] [address]  Phone:  [phone]  Fax:  [phone]    Date: 05/31/2024    Patient: Ashly Meyer  YOB: 1950  MRN: [id number]     Time Calculation    Start time: 1057  Stop time: 1138 Time Calculation (min): 41 minutes         Chief Complaint: Neck Problem, Weakness, and Back Problem    Visit #: 12    SUBJECTIVE:  Patient reports that her ERICK has been down her throat so she is doing more than she feels like she should. She reports her pain has been has been \"semi semi\" all over. She reports her neck gets tired but otherwise has improved in comparison to starting PT. She reports that Dr. Ho did send a new referral for various diagnoses as well, which PT and patient discussed transitioning to in the next visit or two.     OBJECTIVE:  Current objective measures:            Therapeutic Exercises (CPT 70103):     1. Cervical AROM; yes/no/maybe, x20, B directions    2. scapular retractions, x10; 3 second holds    3. sit<>stands, x10    4. seated disc glider with knee flexion/extension, x15; B LE    5. Mod towel snag, Pillow case cued cervical retraction, gentle rotation; x15 B direction    6. shoulder flexion, with dowel tod, semi fowlers position, 2 x10    7. shoulder abduction, R UE, with dowel otd, semi folwers position, x10, limited to ~90 degrees abduction on R    8. seated bicep curls, shoulder flex/abd with 2# weight, 2x10, B, nt    9. chin tucks with retractions, x6, improved form this date; difficulty relaxing shoulders    10. thoracic rotation, x15, B, verbal cues required for trunk rotation rather than cervical rotation    11. seated pelvic tils, x10, nt    12. seated TrA bracing, x10; 3 second holds, nt    13. shoulder ER/IR with dowel tod, semi fowlers position, x10    15. B hamstring stretch, seated, 2x30 seconds, B, nt    16. seated hamstring isometric, B LE, 5 " second holds, x10, nt    17. seated lateral trunk stretches with swiss ball, x10, B, nt    19. 5x sit<>stand, 30.7 seconds, B UE support, 4/12    20. PN due on 6/14      Therapeutic Exercise Summary: Access Code: [email or web address]  URL: [email or web address]  Date: 03/15/2024  Prepared by: Obdulia Buening    Exercises  - Neck Rotation  - 2 x daily - 2 sets - 10 reps  - Head Nods  - 2 x daily - 2 sets - 10 reps  - Neck Sidebending  - 2 x daily - 2 sets - 10 reps      Time-based treatments/modalities:    Physical Therapy Timed Treatment Charges  Therapeutic exercise minutes (CPT 86764): 41 minutes      Pain rating (1-10) before treatment:  6; generalized pain  Pain rating (1-10) after treatment:  8; generalized pain; reports slight increase     ASSESSMENT:   Response to treatment: Patient tolerates PT treatment well today. Continues to report consistent generalized pain. PT provided encouragement for compliance with HEP and increased safety secondary to patient's fall risk. Will continue to promote decreased pain through strengthening for improved quality of life.     PLAN/RECOMMENDATIONS:   Plan for treatment: therapy treatment to continue next visit.  Planned interventions for next visit: continue with current treatment.

## 2024-06-03 RX ORDER — NABUMETONE 750 MG/1
750 TABLET, FILM COATED ORAL 2 TIMES DAILY
COMMUNITY

## 2024-06-07 ENCOUNTER — HOSPITAL ENCOUNTER (OUTPATIENT)
Facility: REHABILITATION | Age: 74
End: 2024-06-07
Attending: STUDENT IN AN ORGANIZED HEALTH CARE EDUCATION/TRAINING PROGRAM | Admitting: STUDENT IN AN ORGANIZED HEALTH CARE EDUCATION/TRAINING PROGRAM
Payer: MEDICARE

## 2024-06-07 ENCOUNTER — APPOINTMENT (OUTPATIENT)
Dept: RADIOLOGY | Facility: REHABILITATION | Age: 74
End: 2024-06-07
Attending: STUDENT IN AN ORGANIZED HEALTH CARE EDUCATION/TRAINING PROGRAM
Payer: MEDICARE

## 2024-06-07 VITALS
BODY MASS INDEX: 38.06 KG/M2 | RESPIRATION RATE: 18 BRPM | TEMPERATURE: 91.7 F | DIASTOLIC BLOOD PRESSURE: 65 MMHG | HEIGHT: 67 IN | WEIGHT: 242.51 LBS | SYSTOLIC BLOOD PRESSURE: 148 MMHG | OXYGEN SATURATION: 93 % | HEART RATE: 61 BPM

## 2024-06-07 PROCEDURE — 64484 NJX AA&/STRD TFRM EPI L/S EA: CPT

## 2024-06-07 PROCEDURE — 700117 HCHG RX CONTRAST REV CODE 255

## 2024-06-07 PROCEDURE — 700111 HCHG RX REV CODE 636 W/ 250 OVERRIDE (IP): Mod: JZ

## 2024-06-07 PROCEDURE — 64483 NJX AA&/STRD TFRM EPI L/S 1: CPT

## 2024-06-07 RX ORDER — LIDOCAINE HYDROCHLORIDE 10 MG/ML
INJECTION, SOLUTION EPIDURAL; INFILTRATION; INTRACAUDAL; PERINEURAL
Status: COMPLETED
Start: 2024-06-07 | End: 2024-06-07

## 2024-06-07 RX ORDER — DEXAMETHASONE SODIUM PHOSPHATE 10 MG/ML
INJECTION, SOLUTION INTRAMUSCULAR; INTRAVENOUS
Status: COMPLETED
Start: 2024-06-07 | End: 2024-06-07

## 2024-06-07 RX ADMIN — IOHEXOL 5 ML: 240 INJECTION, SOLUTION INTRATHECAL; INTRAVASCULAR; INTRAVENOUS; ORAL at 09:03

## 2024-06-07 RX ADMIN — LIDOCAINE HYDROCHLORIDE 10 ML: 10 INJECTION, SOLUTION EPIDURAL; INFILTRATION; INTRACAUDAL; PERINEURAL at 09:03

## 2024-06-07 RX ADMIN — DEXAMETHASONE SODIUM PHOSPHATE 10 MG: 10 INJECTION, SOLUTION INTRAMUSCULAR; INTRAVENOUS at 09:03

## 2024-06-07 ASSESSMENT — PAIN DESCRIPTION - PAIN TYPE
TYPE: CHRONIC PAIN

## 2024-06-07 ASSESSMENT — FIBROSIS 4 INDEX: FIB4 SCORE: 1.52

## 2024-06-07 NOTE — INTERVAL H&P NOTE
H&P reviewed. The patient was examined and there are no changes to the H&P.  She has a documented allergy to contrast which dates back to 20 years ago when she felt drowsy and nauseous after receiving contrast. Per chart review the patient has been able to tolerate iohexol contrast in 8/2023 without issues.    We discussed risks, benefits, and alternatives to using contrast for our procedure today and she elects to proceed with using contrast.    Patricia Ho MD  Interventional Pain and Spine  Physical Medicine and Rehabilitation  [facility]

## 2024-06-07 NOTE — OP REPORT
Date of Service: 6/7/2024     Patient: Ashly Meyer 74 y.o. female     MRN: [id number]     Physician/s: Patricia Ho MD    Pre-operative Diagnosis: Lumbar radiculopathy    Post-operative Diagnosis: Lumbar radiculopathy    Procedure: Lumbar Transforaminal Epidural Steroid Injection at the  right  L5-S1 level. Attempted and aborted injection at the right L4-5 level.    Description of procedure:    The risks, benefits, and alternatives of the procedure were reviewed and discussed with the patient.  Written informed consent was freely obtained. A pre-procedural time-out was conducted by the physician verifying patient’s identity, procedure to be performed, procedure site and side, and allergy verification. Appropriate equipment was determined to be in place for the procedure.     The patient's vital signs were carefully monitored before, throughout, and after the procedure.     In the fluoroscopy suite the patient was placed in a prone position, a pillow placed underneath their umbilicus. The skin was prepped and draped in the usual sterile fashion.     The fluoroscope was placed over the lumbar spine and adjusted into the proper AP/Oblique view to enter the transforaminal space just adjacent to the pedicle at the levels below. The targets for injection were then marked at the right L4-5. A 27g 1.5 inch needle was placed into the marked site, and approximately 1mL of 1% Lidocaine was injected subcutaneously into the epidermal and dermal layers. The needle was removed intact.  A 22g 5 inch spinal needle was then placed and advanced under fluoroscopic guidance in an oblique view towards the epidural space of the levels noted above. The needle position was confirmed to not be past the 6 o'clock position in the AP view. Due to the presence of osteophytes it was unable to be advanced into the foramen on lateral view despite multiple attempts.    The fluoroscope was was then adjusted over the lumbar spine and adjusted  into the proper AP/Oblique view to enter the transforaminal space adjacent to the pedicle at the RIGHT  L5-S1. A 27g 1.5 inch needle was placed into the marked site, and 1mL of 1% Lidocaine was injected subcutaneously into the epidermal and dermal layers. The needle was removed intact.  A 22g 5 inch spinal needle was then placed and advanced under fluoroscopic guidance in an oblique view towards the epidural space of the levels noted above. The needle position was confirmed to not be past the 6 o'clock position in the AP view and it was in the neuroforamen in the lateral view.     Under live fluoroscopic guidance in the AP view, contrast dye was used to highlight the epidural space spread of the L5-S1 level above. Contrast dye was injected at the right L4-5 level. It did not highlight the epidural space at the right L4-5 level and the patient denied radicular sensation with the injection of 1% lidocaine at this level. The needle at the right L4-5 level was then removed.    Final fluoroscopic images were saved.  Following negative aspiration, 1mL of 1% lidocaine preservative free with 1mL of 10mg/mL of dexamethasone was then injected at the L5-S1 level , and the needles were removed intact after being restyleted. The patient's back was covered with a 4x4 gauze, the area was cleansed with sterile normal saline, and a dressing was applied. There were no complications noted.     The patient was then evaluated post-procedure, and was hemodynamically stable prior to leaving the post-operative care unit.     Follow-up as scheduled    Patricia Ho MD  Interventional Pain and Spine  Physical Medicine and Rehabilitation  [facility]    Pain score prior to injection: 4/10 on NRS  Pain score immediately after injection: 3/10 on NRS    CPT codes  Transforaminal epidural injection- lumbar or sacral (first level):  62085

## 2024-06-20 ENCOUNTER — APPOINTMENT (OUTPATIENT)
Dept: PHYSICAL THERAPY | Facility: REHABILITATION | Age: 74
End: 2024-06-20
Attending: ORTHOPAEDIC SURGERY
Payer: MEDICARE

## 2024-06-25 ENCOUNTER — APPOINTMENT (RX ONLY)
Dept: URBAN - METROPOLITAN AREA CLINIC 22 | Facility: CLINIC | Age: 74
Setting detail: DERMATOLOGY
End: 2024-06-25

## 2024-06-25 DIAGNOSIS — Z71.89 OTHER SPECIFIED COUNSELING: ICD-10-CM

## 2024-06-25 DIAGNOSIS — L82.1 OTHER SEBORRHEIC KERATOSIS: ICD-10-CM

## 2024-06-25 DIAGNOSIS — D18.0 HEMANGIOMA: ICD-10-CM

## 2024-06-25 DIAGNOSIS — Z85.828 PERSONAL HISTORY OF OTHER MALIGNANT NEOPLASM OF SKIN: ICD-10-CM

## 2024-06-25 DIAGNOSIS — L259 CONTACT DERMATITIS AND OTHER ECZEMA, UNSPECIFIED CAUSE: ICD-10-CM

## 2024-06-25 DIAGNOSIS — L81.4 OTHER MELANIN HYPERPIGMENTATION: ICD-10-CM

## 2024-06-25 PROBLEM — D18.01 HEMANGIOMA OF SKIN AND SUBCUTANEOUS TISSUE: Status: ACTIVE | Noted: 2024-06-25

## 2024-06-25 PROBLEM — L30.8 OTHER SPECIFIED DERMATITIS: Status: ACTIVE | Noted: 2024-06-25

## 2024-06-25 PROCEDURE — ? ADDITIONAL NOTES

## 2024-06-25 PROCEDURE — ? COUNSELING

## 2024-06-25 PROCEDURE — ? SUNSCREEN RECOMMENDATIONS

## 2024-06-25 PROCEDURE — 99213 OFFICE O/P EST LOW 20 MIN: CPT

## 2024-06-25 ASSESSMENT — LOCATION SIMPLE DESCRIPTION DERM
LOCATION SIMPLE: RIGHT UPPER BACK
LOCATION SIMPLE: NOSE
LOCATION SIMPLE: RIGHT THIGH
LOCATION SIMPLE: LEFT FOREARM
LOCATION SIMPLE: ABDOMEN

## 2024-06-25 ASSESSMENT — LOCATION ZONE DERM
LOCATION ZONE: NOSE
LOCATION ZONE: TRUNK
LOCATION ZONE: ARM
LOCATION ZONE: LEG

## 2024-06-25 ASSESSMENT — LOCATION DETAILED DESCRIPTION DERM
LOCATION DETAILED: RIGHT MID-UPPER BACK
LOCATION DETAILED: LEFT LATERAL ABDOMEN
LOCATION DETAILED: NASAL TIP
LOCATION DETAILED: RIGHT ANTERIOR DISTAL THIGH
LOCATION DETAILED: LEFT PROXIMAL DORSAL FOREARM

## 2024-06-25 NOTE — PROCEDURE: ADDITIONAL NOTES
Render Risk Assessment In Note?: no
Detail Level: Simple
Additional Notes: Discussed using cetaphil/cerave cream.  Moderate xerosis.    Declines rx as it is mild and not really bothering her.

## 2024-06-26 ENCOUNTER — HOSPITAL ENCOUNTER (OUTPATIENT)
Facility: MEDICAL CENTER | Age: 74
End: 2024-06-26
Attending: UROLOGY
Payer: MEDICARE

## 2024-06-26 PROCEDURE — 87086 URINE CULTURE/COLONY COUNT: CPT

## 2024-06-29 LAB
BACTERIA UR CULT: NORMAL
SIGNIFICANT IND 70042: NORMAL
SITE SITE: NORMAL
SOURCE SOURCE: NORMAL

## 2024-07-01 ENCOUNTER — APPOINTMENT (OUTPATIENT)
Dept: PHYSICAL THERAPY | Facility: REHABILITATION | Age: 74
End: 2024-07-01
Attending: ORTHOPAEDIC SURGERY
Payer: MEDICARE

## 2024-07-01 DIAGNOSIS — M54.16 LUMBAR RADICULOPATHY, RIGHT: ICD-10-CM

## 2024-07-01 DIAGNOSIS — R20.2 NUMBNESS AND TINGLING OF RIGHT ARM: ICD-10-CM

## 2024-07-01 DIAGNOSIS — M48.061 NEURAL FORAMINAL STENOSIS OF LUMBAR SPINE: ICD-10-CM

## 2024-07-01 DIAGNOSIS — M54.12 CERVICAL RADICULITIS: ICD-10-CM

## 2024-07-01 DIAGNOSIS — R20.0 NUMBNESS AND TINGLING OF RIGHT ARM: ICD-10-CM

## 2024-07-01 DIAGNOSIS — R20.0 NUMBNESS AND TINGLING IN LEFT ARM: ICD-10-CM

## 2024-07-01 DIAGNOSIS — R20.2 NUMBNESS AND TINGLING IN LEFT ARM: ICD-10-CM

## 2024-07-01 DIAGNOSIS — M79.10 MYALGIA: ICD-10-CM

## 2024-07-01 DIAGNOSIS — M54.2 CERVICALGIA: ICD-10-CM

## 2024-07-01 PROCEDURE — 97530 THERAPEUTIC ACTIVITIES: CPT

## 2024-07-03 ENCOUNTER — PHYSICAL THERAPY (OUTPATIENT)
Dept: PHYSICAL THERAPY | Facility: REHABILITATION | Age: 74
End: 2024-07-03
Attending: ORTHOPAEDIC SURGERY
Payer: MEDICARE

## 2024-07-03 DIAGNOSIS — M48.061 NEURAL FORAMINAL STENOSIS OF LUMBAR SPINE: ICD-10-CM

## 2024-07-03 DIAGNOSIS — M54.12 CERVICAL RADICULITIS: ICD-10-CM

## 2024-07-03 DIAGNOSIS — R20.0 NUMBNESS AND TINGLING IN LEFT ARM: ICD-10-CM

## 2024-07-03 DIAGNOSIS — M79.10 MYALGIA: ICD-10-CM

## 2024-07-03 DIAGNOSIS — R20.2 NUMBNESS AND TINGLING IN LEFT ARM: ICD-10-CM

## 2024-07-03 DIAGNOSIS — M54.16 LUMBAR RADICULOPATHY, RIGHT: ICD-10-CM

## 2024-07-03 DIAGNOSIS — R20.0 NUMBNESS AND TINGLING OF RIGHT ARM: ICD-10-CM

## 2024-07-03 DIAGNOSIS — M54.2 CERVICALGIA: ICD-10-CM

## 2024-07-03 DIAGNOSIS — R20.2 NUMBNESS AND TINGLING OF RIGHT ARM: ICD-10-CM

## 2024-07-03 PROCEDURE — 97110 THERAPEUTIC EXERCISES: CPT

## 2024-07-09 ENCOUNTER — PHYSICAL THERAPY (OUTPATIENT)
Dept: PHYSICAL THERAPY | Facility: REHABILITATION | Age: 74
End: 2024-07-09
Attending: ORTHOPAEDIC SURGERY
Payer: MEDICARE

## 2024-07-09 DIAGNOSIS — M54.2 CERVICALGIA: ICD-10-CM

## 2024-07-09 DIAGNOSIS — R20.0 NUMBNESS AND TINGLING OF RIGHT ARM: ICD-10-CM

## 2024-07-09 DIAGNOSIS — R20.0 NUMBNESS AND TINGLING IN LEFT ARM: ICD-10-CM

## 2024-07-09 DIAGNOSIS — M79.10 MYALGIA: ICD-10-CM

## 2024-07-09 DIAGNOSIS — R20.2 NUMBNESS AND TINGLING OF RIGHT ARM: ICD-10-CM

## 2024-07-09 DIAGNOSIS — M54.16 LUMBAR RADICULOPATHY, RIGHT: ICD-10-CM

## 2024-07-09 DIAGNOSIS — M48.061 NEURAL FORAMINAL STENOSIS OF LUMBAR SPINE: ICD-10-CM

## 2024-07-09 DIAGNOSIS — M54.12 CERVICAL RADICULITIS: ICD-10-CM

## 2024-07-09 DIAGNOSIS — R20.2 NUMBNESS AND TINGLING IN LEFT ARM: ICD-10-CM

## 2024-07-09 PROCEDURE — 97110 THERAPEUTIC EXERCISES: CPT

## 2024-07-15 ENCOUNTER — HOSPITAL ENCOUNTER (OUTPATIENT)
Facility: MEDICAL CENTER | Age: 74
End: 2024-07-15
Attending: UROLOGY
Payer: MEDICARE

## 2024-07-15 PROCEDURE — 87086 URINE CULTURE/COLONY COUNT: CPT

## 2024-07-16 ENCOUNTER — PHYSICAL THERAPY (OUTPATIENT)
Dept: PHYSICAL THERAPY | Facility: REHABILITATION | Age: 74
End: 2024-07-16
Attending: ORTHOPAEDIC SURGERY
Payer: MEDICARE

## 2024-07-16 DIAGNOSIS — M54.12 CERVICAL RADICULITIS: ICD-10-CM

## 2024-07-16 DIAGNOSIS — M79.10 MYALGIA: ICD-10-CM

## 2024-07-16 DIAGNOSIS — R20.2 NUMBNESS AND TINGLING IN LEFT ARM: ICD-10-CM

## 2024-07-16 DIAGNOSIS — M54.16 LUMBAR RADICULOPATHY, RIGHT: ICD-10-CM

## 2024-07-16 DIAGNOSIS — R20.2 NUMBNESS AND TINGLING OF RIGHT ARM: ICD-10-CM

## 2024-07-16 DIAGNOSIS — R20.0 NUMBNESS AND TINGLING IN LEFT ARM: ICD-10-CM

## 2024-07-16 DIAGNOSIS — M54.2 CERVICALGIA: ICD-10-CM

## 2024-07-16 DIAGNOSIS — R20.0 NUMBNESS AND TINGLING OF RIGHT ARM: ICD-10-CM

## 2024-07-16 DIAGNOSIS — M48.061 NEURAL FORAMINAL STENOSIS OF LUMBAR SPINE: ICD-10-CM

## 2024-07-16 PROCEDURE — 97110 THERAPEUTIC EXERCISES: CPT

## 2024-07-18 LAB
BACTERIA UR CULT: NORMAL
SIGNIFICANT IND 70042: NORMAL
SITE SITE: NORMAL
SOURCE SOURCE: NORMAL

## 2024-07-26 ENCOUNTER — OFFICE VISIT (OUTPATIENT)
Dept: MEDICAL GROUP | Facility: MEDICAL CENTER | Age: 74
End: 2024-07-26
Payer: MEDICARE

## 2024-07-26 VITALS
HEIGHT: 65 IN | SYSTOLIC BLOOD PRESSURE: 130 MMHG | WEIGHT: 240.3 LBS | RESPIRATION RATE: 16 BRPM | TEMPERATURE: 98 F | BODY MASS INDEX: 40.04 KG/M2 | HEART RATE: 65 BPM | DIASTOLIC BLOOD PRESSURE: 74 MMHG | OXYGEN SATURATION: 94 %

## 2024-07-26 DIAGNOSIS — R19.7 DIARRHEA, UNSPECIFIED TYPE: ICD-10-CM

## 2024-07-26 DIAGNOSIS — R51.9 HEADACHE, CHRONIC DAILY: ICD-10-CM

## 2024-07-26 DIAGNOSIS — F43.21 SITUATIONAL DEPRESSION: ICD-10-CM

## 2024-07-26 DIAGNOSIS — R19.8 ALTERNATING CONSTIPATION AND DIARRHEA: ICD-10-CM

## 2024-07-26 DIAGNOSIS — K92.1 FRANK BLOOD IN STOOL: ICD-10-CM

## 2024-07-26 DIAGNOSIS — E11.9 CONTROLLED TYPE 2 DIABETES MELLITUS WITHOUT COMPLICATION, WITHOUT LONG-TERM CURRENT USE OF INSULIN (HCC): ICD-10-CM

## 2024-07-26 PROCEDURE — 1170F FXNL STATUS ASSESSED: CPT | Performed by: FAMILY MEDICINE

## 2024-07-26 PROCEDURE — 3075F SYST BP GE 130 - 139MM HG: CPT | Performed by: FAMILY MEDICINE

## 2024-07-26 PROCEDURE — 99214 OFFICE O/P EST MOD 30 MIN: CPT | Performed by: FAMILY MEDICINE

## 2024-07-26 PROCEDURE — 3078F DIAST BP <80 MM HG: CPT | Performed by: FAMILY MEDICINE

## 2024-07-26 RX ORDER — ARIPIPRAZOLE 2 MG/1
2 TABLET ORAL DAILY
Qty: 90 TABLET | Refills: 3 | Status: SHIPPED | OUTPATIENT
Start: 2024-07-26

## 2024-07-26 ASSESSMENT — FIBROSIS 4 INDEX: FIB4 SCORE: 1.52

## 2024-08-01 ENCOUNTER — APPOINTMENT (OUTPATIENT)
Dept: PHYSICAL THERAPY | Facility: REHABILITATION | Age: 74
End: 2024-08-01
Attending: ORTHOPAEDIC SURGERY
Payer: MEDICARE

## 2024-08-01 DIAGNOSIS — M54.16 LUMBAR RADICULOPATHY, RIGHT: ICD-10-CM

## 2024-08-01 DIAGNOSIS — R20.0 NUMBNESS AND TINGLING IN LEFT ARM: ICD-10-CM

## 2024-08-01 DIAGNOSIS — R20.2 NUMBNESS AND TINGLING IN LEFT ARM: ICD-10-CM

## 2024-08-01 DIAGNOSIS — R20.0 NUMBNESS AND TINGLING OF RIGHT ARM: ICD-10-CM

## 2024-08-01 DIAGNOSIS — M79.10 MYALGIA: ICD-10-CM

## 2024-08-01 DIAGNOSIS — M48.061 NEURAL FORAMINAL STENOSIS OF LUMBAR SPINE: ICD-10-CM

## 2024-08-01 DIAGNOSIS — M54.2 CERVICALGIA: ICD-10-CM

## 2024-08-01 DIAGNOSIS — M54.12 CERVICAL RADICULITIS: ICD-10-CM

## 2024-08-01 DIAGNOSIS — R20.2 NUMBNESS AND TINGLING OF RIGHT ARM: ICD-10-CM

## 2024-08-01 PROCEDURE — 97530 THERAPEUTIC ACTIVITIES: CPT

## 2024-08-01 NOTE — OP THERAPY DAILY TREATMENT
"  Outpatient Physical Therapy  DAILY TREATMENT     [facility] Therapy [address].  [address]  [place] [address]  Phone:  [phone]  Fax:  [phone]    Date: 08/01/2024    Patient: Ashly Meyer  YOB: 1950  MRN: [id number]     Time Calculation    Start time: 1432  Stop time: 1513 Time Calculation (min): 41 minutes         Chief Complaint: Back Problem, Neck Problem, and Weakness    Visit #: 17    SUBJECTIVE:  Patient reports that she was sick all last week and didn't do any of her HEP. She reports she is having a \"lot of joint and hip pain\".  PT and patient discussed current progress with PT and symptoms. Patient reports \"some days she thinks it helps then other days come and she feels like it isn't helping.\" Overall, limited progress has been made. For these reasons, PT and patient discussed D/C from skilled PT v continuing x4-6 more visits. Patient requests to D/C at this time. Discussed retrieving new referral as needed/appropriate.     OBJECTIVE:  Current objective measures: Discussed with patient completing flexion wall walks at home as she reports she feels like overhead reaching has worsened.Educated on why this is important to add to HEP. Demonstrated for patient.  Patient verbalizes understanding. Encouraged completion of HEP secondary to patient report of limited follow through throughout POC.     Cervical ROM:    L rotation: 64 degrees, mild pain reported   R rotation: 70 degrees, mild pain reported  L lateral flexion: 20 degrees, mild pain reported  R lateral flexion: 13 degrees, mild pain  reported      Additional Strength Details  R hip flexion: 4-/5  L hip flexion: 3+/5     5x sit<>stand: 19.66 seconds, mild UE support required intermittently      Neck Disability Total: 27  Oswestry Low Back Pain Disability Total Score: 58       Therapeutic Exercises (CPT 98752):     1. Cervical AROM; yes/no/maybe, x12, B directions, nt    2. scapular retractions, x10; " 3 second holds, nt    3. sit<>stands, x7, nt    4. seated disc glider with knee flexion/extension, x15; B LE, nt    7. semi fowlers, hip adduction squeezes, x10; 10 second holds, nt    8. semi fowlers marches, x20; B; pink theraband, nt    9. chin tucks with retractions, x15; semifowlers with cues from pillow for improved form, nt    10. thoracic rotation, x15, B, nt    11. semifowlers pelvic tilts, x15, nt    12. semifowlers TrA bracing, x15; 3 second holds, nt    13. semifowlers LTR, x20; B directions, nt    14. semifowlers hip abduction, x20; pink theraband; B, nt    15. B hamstring stretch, seated, 2x30 seconds, B, nt    16. seated hamstring isometric, B LE, 5 second holds, x10, nt    17. seated lateral trunk stretches with swiss ball, x10, B, nt    18. NuStep, level 2; x10 minutes, nt    19. 5x sit<>stand, 19.66 seconds, 8/1      Therapeutic Exercise Summary: Access Code: [email or web address]  URL: [email or web address]  Date: 03/15/2024  Prepared by: Obdulia Buening    Exercises  - Neck Rotation  - 2 x daily - 2 sets - 10 reps  - Head Nods  - 2 x daily - 2 sets - 10 reps  - Neck Sidebending  - 2 x daily - 2 sets - 10 reps    Access Code: [email or web address]  URL: [email or web address]  Date: 07/16/2024  Prepared by: Obdulia Buening    Exercises  - Hooklying Clamshell with Resistance  - 1 x daily - 1 sets - 15 reps  - Supine Hip Adduction Isometric with Ball  - 1 x daily - 1 sets - 10 reps - 10 seconds hold  - Supine March with Resistance Band  - 1 x daily - 1 sets - 15 reps      Time-based treatments/modalities:    Physical Therapy Timed Treatment Charges  Therapeutic activity minutes (CPT 25223): 41 minutes      Pain rating (1-10) before treatment:  5; generalized pain  Pain rating (1-10) after treatment:  5; generalized pain      Goals:   Short Term Goals:   1. Patient will demonstrate independent HEP to promote increased strength and ROM for improved functional activity tolerance.-not met; patient inconsistent with  HEP follow through     2. Patient will demonstrate improved cervical lateral flexion AROM by 10 degrees or more to promote decreased pain.-not met      3. Patient will demonstrate improved 5x sit<> under 20 seconds with B UE support, as needed, to promote ability to transition off toilet for increased safety-MET; 19.66 seconds, minimal UE support     Short term goal time span:  2-4 weeks      Long Term Goals:    1. Patient will report improved NDI by 10 or more points to indicate improved quality of life. -not met     2. Patient will demonstrate improved TONY by 10 or more points to indicate improved quality of life. -not met     3. Patient will demonstrate improved cervical AROM to at least 60 degrees B, without pain, to allow for improved safety with driving and daily activities. -MET     4. Patient will be able to complete her dishes without significant increase in pain to promote continued independence and quality of life.-not met      5. Patient will report ability to drink out of her glasses at home with increased ease to promote proper dehydration and functional mobility skills.-progressing     Long term goal time span:  6-8 weeks    ASSESSMENT:   Response to treatment: Patient tolerates PT treatments fair overall. Assessed goals this date and discussed current progress with patient's continued increased pain reports. Patient requests D/C due to limited progress overall with therapy at this time. Discussed continuing HEP, updated HEP to include flexion wall walks, and encouraged increased completion to promote improved pain and mobility. Patient verbalizes understanding. Patient aware to retrieve new referral as needed.     PLAN/RECOMMENDATIONS:   Plan for treatment:  D/C due to progress plateau. Patient with minimal progress with PT treatment as this time. Patient and PT discussed current progress and patient requests D/C due to minimal progress/continued pain, overall plateau. Patient encouraged to  speak with MD for alternative routes to manage pain at this time. Educated to continue HEP as able and to retrieve new referral as needed/appropriate .  Planned interventions for next visit:  D/C skilled PT due to progress plateau and patient reporting she does not feel like PT is really helping. Patient requests D/C after discussion regarding current progress and patient's continued pain. Will D/C per patient request in consideration of limited progress .

## 2024-08-01 NOTE — OP THERAPY DISCHARGE SUMMARY
Outpatient Physical Therapy  DISCHARGE SUMMARY NOTE      [facility] Physical Therapy [address].  [address]  [place] [address]  Phone:  [phone]  Fax:  [phone]    Date of Visit: 08/01/2024    Patient: Ashly Meyer  YOB: 1950  MRN: [id number]     Referring Provider: Patricia Ho M.D.  [address]  [address]  [place] [address]   Referring Diagnosis Encounter for other specified aftercare [Z51.89]         Functional Assessment Used  Neck Disability Total: 27  Oswestry Low Back Pain Disability Total Score: 58     Your patient is being discharged from Physical Therapy with the following comments:   Goals partially met  Progress plateau  Minimal progress with PT noted. Patient requests D/C at this time due to continued pain reports.     Comments:    Current objective measures: Discussed with patient completing flexion wall walks at home as she reports she feels like overhead reaching has worsened.Educated on why this is important to add to HEP. Demonstrated for patient.  Patient verbalizes understanding. Encouraged completion of HEP secondary to patient report of limited follow through throughout POC.      Cervical ROM:    L rotation: 64 degrees, mild pain reported   R rotation: 70 degrees, mild pain reported  L lateral flexion: 20 degrees, mild pain reported  R lateral flexion: 13 degrees, mild pain  reported      Additional Strength Details  R hip flexion: 4-/5  L hip flexion: 3+/5     5x sit<>stand: 19.66 seconds, mild UE support required intermittently       Neck Disability Total: 27  Oswestry Low Back Pain Disability Total Score: 58     Goals:   Short Term Goals:   1. Patient will demonstrate independent HEP to promote increased strength and ROM for improved functional activity tolerance.-not met; patient inconsistent with HEP follow through     2. Patient will demonstrate improved cervical lateral flexion AROM by 10 degrees or more to promote decreased  pain.-not met      3. Patient will demonstrate improved 5x sit<> under 20 seconds with B UE support, as needed, to promote ability to transition off toilet for increased safety-MET; 19.66 seconds, minimal UE support     Short term goal time span:  2-4 weeks      Long Term Goals:    1. Patient will report improved NDI by 10 or more points to indicate improved quality of life. -not met     2. Patient will demonstrate improved TONY by 10 or more points to indicate improved quality of life. -not met     3. Patient will demonstrate improved cervical AROM to at least 60 degrees B, without pain, to allow for improved safety with driving and daily activities. -MET     4. Patient will be able to complete her dishes without significant increase in pain to promote continued independence and quality of life.-not met      5. Patient will report ability to drink out of her glasses at home with increased ease to promote proper dehydration and functional mobility skills.-progressing     Long term goal time span:  6-8 weeks    ASSESSMENT:   Response to treatment: Patient tolerates PT treatments fair overall. Assessed goals this date and discussed current progress with patient's continued increased pain reports. Patient requests D/C due to limited progress overall with therapy at this time. Discussed continuing HEP, updated HEP to include flexion wall walks, and encouraged increased completion to promote improved pain and mobility. Patient verbalizes understanding. Patient aware to retrieve new referral as needed.     PLAN/RECOMMENDATIONS:   Plan for treatment: D/C due to progress plateau. Patient with minimal progress with PT treatment as this time. Patient and PT discussed current progress and patient requests D/C due to minimal progress/continued pain, overall plateau. Patient encouraged to speak with MD for alternative routes to manage pain at this time. Educated to continue HEP as able and to retrieve new referral as  needed/appropriate.  Planned interventions for next visit: D/C skilled PT due to progress plateau and patient reporting she does not feel like PT is really helping. Patient requests D/C after discussion regarding current progress and patient's continued pain. Will D/C per patient request in consideration of limited progress.     Limitations Remaining:  Generalized pain, ROM and strength deficits.     Recommendations:  Follow up with MD. Continue HEP as able.     Thank you for allowing me to participate in this patient's care.       Obdulia Torres, PT    Date: 8/1/2024

## 2024-08-07 ENCOUNTER — APPOINTMENT (OUTPATIENT)
Dept: PHYSICAL THERAPY | Facility: REHABILITATION | Age: 74
End: 2024-08-07
Attending: ORTHOPAEDIC SURGERY
Payer: MEDICARE

## 2024-08-14 ENCOUNTER — APPOINTMENT (OUTPATIENT)
Dept: PHYSICAL THERAPY | Facility: REHABILITATION | Age: 74
End: 2024-08-14
Attending: ORTHOPAEDIC SURGERY
Payer: MEDICARE

## 2024-08-14 ENCOUNTER — TELEPHONE (OUTPATIENT)
Dept: PHYSICAL MEDICINE AND REHAB | Facility: MEDICAL CENTER | Age: 74
End: 2024-08-14

## 2024-08-14 ENCOUNTER — OFFICE VISIT (OUTPATIENT)
Dept: PHYSICAL MEDICINE AND REHAB | Facility: MEDICAL CENTER | Age: 74
End: 2024-08-14
Payer: MEDICARE

## 2024-08-14 VITALS
HEIGHT: 66 IN | WEIGHT: 251.1 LBS | TEMPERATURE: 97 F | BODY MASS INDEX: 40.36 KG/M2 | HEART RATE: 83 BPM | DIASTOLIC BLOOD PRESSURE: 98 MMHG | SYSTOLIC BLOOD PRESSURE: 150 MMHG

## 2024-08-14 DIAGNOSIS — M79.10 MYALGIA: ICD-10-CM

## 2024-08-14 DIAGNOSIS — M79.7 FIBROMYALGIA: ICD-10-CM

## 2024-08-14 DIAGNOSIS — R10.32 BILATERAL GROIN PAIN: ICD-10-CM

## 2024-08-14 DIAGNOSIS — M75.51 SUBACROMIAL BURSITIS OF RIGHT SHOULDER JOINT: ICD-10-CM

## 2024-08-14 DIAGNOSIS — M25.511 CHRONIC RIGHT SHOULDER PAIN: ICD-10-CM

## 2024-08-14 DIAGNOSIS — M48.02 NEUROFORAMINAL STENOSIS OF CERVICAL SPINE: ICD-10-CM

## 2024-08-14 DIAGNOSIS — R10.31 BILATERAL GROIN PAIN: ICD-10-CM

## 2024-08-14 DIAGNOSIS — Z91.81 RISK FOR FALLS: ICD-10-CM

## 2024-08-14 DIAGNOSIS — R20.0 NUMBNESS AND TINGLING OF RIGHT ARM: ICD-10-CM

## 2024-08-14 DIAGNOSIS — Z13.31 POSITIVE DEPRESSION SCREENING: ICD-10-CM

## 2024-08-14 DIAGNOSIS — M54.12 CERVICAL RADICULITIS: ICD-10-CM

## 2024-08-14 DIAGNOSIS — G89.29 CHRONIC RIGHT SHOULDER PAIN: ICD-10-CM

## 2024-08-14 DIAGNOSIS — R26.89 IMPAIRED GAIT AND MOBILITY: ICD-10-CM

## 2024-08-14 DIAGNOSIS — M70.62 GREATER TROCHANTERIC BURSITIS OF BOTH HIPS: ICD-10-CM

## 2024-08-14 DIAGNOSIS — M17.11 LOCALIZED OSTEOARTHRITIS OF RIGHT KNEE: ICD-10-CM

## 2024-08-14 DIAGNOSIS — M70.61 GREATER TROCHANTERIC BURSITIS OF BOTH HIPS: ICD-10-CM

## 2024-08-14 DIAGNOSIS — G89.29 OTHER CHRONIC PAIN: ICD-10-CM

## 2024-08-14 DIAGNOSIS — R20.2 NUMBNESS AND TINGLING OF RIGHT ARM: ICD-10-CM

## 2024-08-14 DIAGNOSIS — M54.16 RIGHT LUMBAR RADICULITIS: ICD-10-CM

## 2024-08-14 DIAGNOSIS — G89.29 CHRONIC PAIN OF RIGHT KNEE: ICD-10-CM

## 2024-08-14 DIAGNOSIS — R20.2 NUMBNESS AND TINGLING IN LEFT ARM: ICD-10-CM

## 2024-08-14 DIAGNOSIS — M48.061 NEUROFORAMINAL STENOSIS OF LUMBAR SPINE: ICD-10-CM

## 2024-08-14 DIAGNOSIS — R20.0 NUMBNESS AND TINGLING IN LEFT ARM: ICD-10-CM

## 2024-08-14 DIAGNOSIS — M47.816 LUMBAR SPONDYLOSIS: ICD-10-CM

## 2024-08-14 DIAGNOSIS — M25.561 CHRONIC PAIN OF RIGHT KNEE: ICD-10-CM

## 2024-08-14 DIAGNOSIS — M54.2 CERVICALGIA: ICD-10-CM

## 2024-08-14 DIAGNOSIS — M47.812 CERVICAL SPONDYLOSIS: ICD-10-CM

## 2024-08-14 DIAGNOSIS — M25.811 IMPINGEMENT OF RIGHT SHOULDER: ICD-10-CM

## 2024-08-14 PROCEDURE — 99214 OFFICE O/P EST MOD 30 MIN: CPT | Performed by: STUDENT IN AN ORGANIZED HEALTH CARE EDUCATION/TRAINING PROGRAM

## 2024-08-14 PROCEDURE — 3077F SYST BP >= 140 MM HG: CPT | Performed by: STUDENT IN AN ORGANIZED HEALTH CARE EDUCATION/TRAINING PROGRAM

## 2024-08-14 PROCEDURE — 1170F FXNL STATUS ASSESSED: CPT | Performed by: STUDENT IN AN ORGANIZED HEALTH CARE EDUCATION/TRAINING PROGRAM

## 2024-08-14 PROCEDURE — 3080F DIAST BP >= 90 MM HG: CPT | Performed by: STUDENT IN AN ORGANIZED HEALTH CARE EDUCATION/TRAINING PROGRAM

## 2024-08-14 PROCEDURE — 1125F AMNT PAIN NOTED PAIN PRSNT: CPT | Performed by: STUDENT IN AN ORGANIZED HEALTH CARE EDUCATION/TRAINING PROGRAM

## 2024-08-14 ASSESSMENT — PATIENT HEALTH QUESTIONNAIRE - PHQ9
SUM OF ALL RESPONSES TO PHQ QUESTIONS 1-9: 14
5. POOR APPETITE OR OVEREATING: 1 - SEVERAL DAYS
CLINICAL INTERPRETATION OF PHQ2 SCORE: 6

## 2024-08-14 ASSESSMENT — FIBROSIS 4 INDEX: FIB4 SCORE: 1.52

## 2024-08-14 ASSESSMENT — PAIN SCALES - GENERAL: PAINLEVEL: 7=MODERATE-SEVERE PAIN

## 2024-08-14 NOTE — PROGRESS NOTES
Follow-up patient Note    Interventional Pain and Spine  Physiatry (Physical Medicine and Rehabilitation)     Patient Name: Ashly Meyer  : 1950  Date of service: 2024    Chief Complaint:   Chief Complaint   Patient presents with    Follow-Up     Chronic pain of right knee       HISTORY FROM INITIAL VISIT (12/15/2023):  Ashly Meyer is a 73 y.o. female who presents today with chronic pain at her low back, shoulders, hips, pelvic region, legs, and feet for decades. Pain is worst at her bilateral groin and greater trochanter. Notes hx of fibromyalgia, neuropathy, gout, R shoulder sx.     Pain right now is 8/10 on the numeric pain scale.  Pain worsens with physical activity and improves only marginally with medication. Her pain significantly interferes with ADLs.      The patient has done physical therapy for this problem, most recently last month. Denies relief from this.     Patient has tried the following medications with varied success (current meds in bold):   Nabumetone - takes the edge off  Gabapentin - takes the edge off of neuropathy  Duloxetine     Therapeutic modalities and interventional therapies to date include:  -left hip and shoulder injections at [facility] - 2-4 weeks relief     Medical history includes hypertension, KERRI, peripheral neuropathy, type 2 diabetes, essential tremor.    HPI  Today's visit   Ashly Meyer ( 1950) is a female with Diagnoses of Risk for falls, Positive depression screening, Greater trochanteric bursitis of both hips, Chronic pain of right knee, Right lumbar radiculitis, Chronic right shoulder pain, Subacromial bursitis of right shoulder joint, Impingement of right shoulder, Neuroforaminal stenosis of lumbar spine, Lumbar spondylosis, Numbness and tingling of right arm, Cervical radiculitis, Cervicalgia, Myalgia, Numbness and tingling in left arm, Fibromyalgia, Other chronic pain, Neuroforaminal stenosis of cervical spine, Cervical  "spondylosis, Bilateral groin pain, Impaired gait and mobility, and Localized osteoarthritis of right knee were pertinent to this visit.    Today Ashly presents after  6/7/2024 right L4-5 and L5-S1 transforaminal epidural steroid injection - improvement x 2 weeks.    Reports worst pain from the \"neck all the way down.\"  Also with recurrence of pain at bilateral greater trochanters. Ongoing right knee pain. Would like to consider injections.  Stopped PT due to no relief.    Pain severity 7/10 currently  Pt denies new numbness, tingling, or weakness.    Procedure history:  - 1/5/24 right and left greater trochanteric bursa injection ultrasound-guided - resolution of left sided pain, 50% improvement in right sided pain.   -6/7/2024 right L4-5 and L5-S1 transforaminal epidural steroid injection - 2 weeks of relief      ROS:   Red Flags ROS:   Fever, Chills, Sweats: Denies  Involuntary Weight Loss: Denies  Bladder Incontinence: Denies  Bowel Incontinence: denies  Saddle Anesthesia: Denies    All other systems reviewed and negative.     PMHx:   Past Medical History:   Diagnosis Date    Anginal syndrome (Formerly [facility])     Anxiety     Arthritis     Everywhere.     Atrophic rhinitis 10/3/2016    Back pain     Bronchitis 08/2021    finished 1st course of ABX to start a 2nd course soon.     Carotid artery stenosis, unilateral     moderate left carotid artery stenosis    Carpal tunnel syndrome 9/5/2011    Chronic pain     Constipation     Controlled type 2 diabetes mellitus without complication (Formerly [facility])     states borderline    Controlled type 2 diabetes mellitus without complication, without long-term current use of insulin (Formerly [facility]) 6/12/2019    Cough 08/2021    r/t bronchitis    Current severe episode of major depressive disorder without psychotic features without prior episode (Formerly [facility]) 6/3/2021    Daytime sleepiness     Degenerative disc disease     Depression 5/20/2009    Deviated nasal septum 8/1/2016    Diarrhea     and constipation    " Disease of accessory sinus 10/3/2016    Dizziness     Dry eye syndrome, bilateral     Elevated sedimentation rate     history of negative temporal artery biopsy around 2006    Fatigue     Fatty liver     Fibromyalgia     confirmed by Dr. 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    Hypertension     Hypothyroidism 5/20/2009    Incipient cataract of both eyes     Incomplete rectal prolapse 9/3/2021    Insomnia     Intention tremor 6/2/2022    Migraine without aura, without mention of intractable migraine without mention of status migrainosus     Mild intermittent asthma without complication     inhalers as needed    Mixed dyslipidemia     Morbid obesity with BMI of 45.0-49.9, adult (Formerly [facility])     Morning headache     Mumps     Nasal drainage     Nocturnal hypoxia     severe, to 69% O2 sat    Obesity     Obesity hypoventilation syndrome (Formerly [facility]) 5/31/2017    Pelvic floor dysfunction     Peripheral neuropathy     Pleomorphic adenoma     Polymyalgia rheumatica (Formerly [facility])     Dr. Ann    Restless leg syndrome     Ringing in ears     Rotator cuff syndrome of right shoulder and allied disorders 10/10/2018    S/P tonsillectomy     Seasonal allergies     Skin cancer 1990's    skin    Sleep apnea syndrome     she is not using CPAP, claustrophobia, uses 2-3l at night via concentrator occ 2l during daytime ([employer])    Snoring     Sore muscles     Sweat, sweating, excessive     Swelling of lower extremity     Thyroid carcinoma (Formerly [facility]) 7/22/2023    Found surgically July 2023    Trochanteric bursitis of both hips 3/3/2022    Urinary incontinence     will not wear a pad     Vision loss     Weakness        PSHx:   Past Surgical History:   Procedure Laterality Date    INJ,EPI ANES/STER LUM/SAC ADDL Right 6/7/2024    Procedure: RIGHT L4-5 and L5-S1 transforaminal epidural steroid injection with fluoroscopic guidance;  Surgeon: Patricia Ho M.D.;  [facility];  Service: Pain Management    THYROIDECTOMY TOTAL Right 8/23/2023    Procedure: RIGHT COMPLETION THYROIDECTOMY;  Surgeon: Michael Mancuso M.D.;  Location: SURGERY SAME DAY [facility];  Service: Ent    THYROIDECTOMY Left 7/17/2023    Procedure: LEFT ELOY- THYROIDECTOMY;  Surgeon: Michael Mancuso M.D.;  Location: SURGERY SAME DAY [facility];  Service: Ent    NE UPPER GI ENDOSCOPY,DIAGNOSIS N/A 08/26/2021    Procedure: GASTROSCOPY;  Surgeon: Marty Lopez M.D.;  Location: [facility];  Service: Gastroenterology    NE COLONOSCOPY,DIAGNOSTIC  08/26/2021    Procedure: COLONOSCOPY - WITH BIOPSIES.;  Surgeon: Marty Lopez M.D.;  Location: [facility];  Service: Gastroenterology    SHOULDER DECOMPRESSION ARTHROSCOPIC Right 02/01/2019    Procedure: SHOULDER DECOMPRESSION ARTHROSCOPIC - SUBACROMIAL, LABRAL DEBRIDEMENT;  Surgeon: Damaris Knutson M.D.;  Location: [facility];  Service: Orthopedics    SHOULDER ARTHROSCOPY W/ BICIPITAL TENODESIS REPAIR Right 02/01/2019    Procedure: SHOULDER ARTHROSCOPY W/ BICIPITAL TENOTOMY;  Surgeon: Damaris Knutson M.D.;  Location: [facility];  Service: Orthopedics    CARPAL TUNNEL RELEASE Right 02/01/2019    Procedure: CARPAL TUNNEL RELEASE;  Surgeon: Damaris Knutson M.D.;  Location: [facility];  Service: Orthopedics    GASTROSCOPY  02/06/2017    Procedure: GASTROSCOPY;  Surgeon: Pau Foster M.D.;  Location: SURGERY SAME DAY [facility];  Service:     COLONOSCOPY  02/06/2017    Procedure: COLONOSCOPY;  Surgeon: Pau Foster M.D.;  Location: SURGERY SAME DAY [facility];  Service:     SEPTAL  RECONSTRUCTION  10/03/2016    Procedure: SEPTAL RECONSTRUCTION with  grafts ;  Surgeon: PIETER Dickson M.D.;  Location: SURGERY SAME DAY [facility];  Service:     SEPTOPLASTY N/A 08/01/2016    Procedure: SEPTOPLASTY;  Surgeon: PIETER Dickson M.D.;  Location: SURGERY SAME DAY [facility];  Service:     TURBINOPLASTY Bilateral 08/01/2016    Procedure: TURBINOPLASTY;  Surgeon: PIETER Dickson M.D.;  Location: SURGERY SAME DAY [facility];  Service:     NASAL FRACTURE REDUCTION OPEN N/A 08/01/2016    Procedure: SEPTAL FRACTURE REDUCTION OPEN;  Surgeon: PIETER Dickson M.D.;  Location: SURGERY SAME DAY [facility];  Service:     FINE NEEDLE ASPIRATION  11/16/2009    Performed by DA CALLOWAY at ENDOSCOPY [facility]    COLONOSCOPY  2006    ? unclear date    OTHER SURGICAL PROCEDURE  1998    vaginal wall repair    BLADDER SUSPENSION  1983    HYSTERECTOMY, VAGINAL  1983    ovaries are present, excessive bleeding    TUBAL LIGATION  1980    MASS EXCISION GENERAL Right 1956    had carbuncle removal R thigh    TONSILLECTOMY  1953    ANAL SPHINCTEROTOMY      anal muscle repair    CATARACT EXTRACTION WITH IOL Bilateral     CHOLECYSTECTOMY      HYSTERECTOMY LAPAROSCOPY      OTHER ORTHOPEDIC SURGERY  1962/1980/1983/1985    foot surgery     SCAR REVISION Right     thigh scar        Family Hx:   Family History   Problem Relation Age of Onset    Heart Disease Mother         Arotic vaulve replacement    GI Disease Mother         bile duct problem    Bladder cancer Mother     GI Disease Sister         celiac    Arthritis Sister     Other Sister         gout and lupus    Arthritis Sister     Kidney Disease Sister     GI Disease Sister     Bladder cancer Maternal Aunt     Arthritis Maternal Grandmother     Hypertension Maternal Grandmother     Heart Disease Maternal Grandmother     Hypertension Maternal Grandfather     Heart Disease Maternal Grandfather     Arthritis Maternal Grandfather     No  Known Problems Paternal Grandmother     No Known Problems Paternal Grandfather     Cancer Brother         Larynx    Cancer Daughter         non hopskins limphoma    GI Disease Daughter     Bipolar disorder Daughter     Seizures Daughter     Bipolar disorder Daughter        Social Hx:  Social History     Socioeconomic History    Marital status:      Spouse name: Not on file    Number of children: Not on file    Years of education: Not on file    Highest education level: Not on file   Occupational History    Not on file   Tobacco Use    Smoking status: Former     Current packs/day: 0.00     Average packs/day: 1 pack/day for 45.2 years (45.2 ttl pk-yrs)     Types: Cigarettes     Start date:      Quit date: 3/1/2007     Years since quittin.4    Smokeless tobacco: Never    Tobacco comments:     Started smoking at age 15, continued abstinance    Vaping Use    Vaping status: Never Used   Substance and Sexual Activity    Alcohol use: Not Currently    Drug use: Not Currently     Comment: CBD topical pain cream    Sexual activity: Not Currently     Partners: Male   Other Topics Concern     Service No    Blood Transfusions No    Caffeine Concern Not Asked    Occupational Exposure No    Hobby Hazards No    Sleep Concern No    Stress Concern No    Weight Concern No    Special Diet No    Back Care No    Exercise No    Bike Helmet No    Seat Belt Yes    Self-Exams No   Social History Narrative    Not on file     Social Determinants of Health     Financial Resource Strain: Not on file   Food Insecurity: Not on file   Transportation Needs: Not on file   Physical Activity: Not on file   Stress: Not on file   Social Connections: Not on file   Intimate Partner Violence: Not on file   Housing Stability: Not on file       Allergies:  Allergies   Allergen Reactions    Ace Inhibitors Cough    Benadryl Allergy Hives     Hot skin itching    Iodine Nausea     Pt received CT w/ contrast 22 pt had nausea post  "contrast     Lamictal Rash and Swelling     \"hot\", unable to function.  Severe rash, scarring    Savella [Kdc:Milnacipran+Ci Pigment Blue 63] Nausea, Swelling and Anxiety     Swelling, bone and muscle pain, sweating, nausea, dizziness, sleeplessness, anxiety    Savella [Milnacipran Hcl] Unspecified     Muscle aches, feet swelling    Sulfa Drugs Hives, Shortness of Breath and Anxiety     Hard breathing, shortness of breath    Butalbital-Acetaminophen Unspecified     Dizzy, can't walk, hard to focus    Cefaclor Nausea and Unspecified     Ceclor causes light headedness and dizziness    Morphine Itching     \"Redness\"    Penicillins Itching and Swelling     Skin itching and redness    Tizanidine Hcl Nausea     \"Dizziness\" hard to focus    Milnacipran Vomiting and Nausea    Amlactin Rash and Itching     Lotion causes itching, redness and burnng    Tape Rash and Itching     Skin tears, paper tape is OK per Pt       Medications: reviewed on epic.   Outpatient Medications Marked as Taking for the 8/14/24 encounter (Office Visit) with Patricia Ho M.D.   Medication Sig Dispense Refill    ARIPiprazole (ABILIFY) 2 MG tablet Take 1 Tablet by mouth every day. 90 Tablet 3    atorvastatin (LIPITOR) 40 MG Tab Take 1 Tablet by mouth every evening. 90 Tablet 3    DULoxetine (CYMBALTA) 60 MG Cap DR Particles delayed-release capsule Take 1 Capsule by mouth every evening. 90 Capsule 3    gabapentin (NEURONTIN) 800 MG tablet Take 1 Tablet by mouth 2 times a day. 180 Tablet 3    allopurinol (ZYLOPRIM) 100 MG Tab TAKE ONE TABLET BY MOUTH ONCE DAILY 90 Tablet 2    QUEtiapine (SEROQUEL) 25 MG Tab TAKE ONE TABLET BY MOUTH AT BEDTIME 90 Tablet 2    aliskiren (TEKTURNA) 150 MG tablet Take 150 mg by mouth every day.      pantoprazole (PROTONIX) 40 MG Tablet Delayed Response Take 1 Tablet by mouth 2 times a day. 180 Tablet 3    levothyroxine (SYNTHROID) 137 MCG Tab TAKE ONE TABLET BY MOUTH EVERY MORNING ON AN EMPTY STOMACH 90 Tablet 2    " calcitonin, salmon, (MIACALCIN) 200 UNIT/ACT Solution Administer 1 Spray into affected nostril(S) every day.      liothyronine (CYTOMEL) 5 MCG Tab Take 5 mcg by mouth every day.      albuterol 108 (90 Base) MCG/ACT Aero Soln inhalation aerosol Inhale 2 Puffs every 6 hours as needed for Shortness of Breath. 8.5 g 11    Cholecalciferol (VITAMIN D-3) 125 MCG (5000 UT) Tab Take 5,000 Units by mouth every evening.      VITAMIN E PO Take 1 Capsule by mouth every morning.          Current Outpatient Medications on File Prior to Visit   Medication Sig Dispense Refill    ARIPiprazole (ABILIFY) 2 MG tablet Take 1 Tablet by mouth every day. 90 Tablet 3    atorvastatin (LIPITOR) 40 MG Tab Take 1 Tablet by mouth every evening. 90 Tablet 3    DULoxetine (CYMBALTA) 60 MG Cap DR Particles delayed-release capsule Take 1 Capsule by mouth every evening. 90 Capsule 3    gabapentin (NEURONTIN) 800 MG tablet Take 1 Tablet by mouth 2 times a day. 180 Tablet 3    allopurinol (ZYLOPRIM) 100 MG Tab TAKE ONE TABLET BY MOUTH ONCE DAILY 90 Tablet 2    QUEtiapine (SEROQUEL) 25 MG Tab TAKE ONE TABLET BY MOUTH AT BEDTIME 90 Tablet 2    aliskiren (TEKTURNA) 150 MG tablet Take 150 mg by mouth every day.      pantoprazole (PROTONIX) 40 MG Tablet Delayed Response Take 1 Tablet by mouth 2 times a day. 180 Tablet 3    levothyroxine (SYNTHROID) 137 MCG Tab TAKE ONE TABLET BY MOUTH EVERY MORNING ON AN EMPTY STOMACH 90 Tablet 2    calcitonin, salmon, (MIACALCIN) 200 UNIT/ACT Solution Administer 1 Spray into affected nostril(S) every day.      liothyronine (CYTOMEL) 5 MCG Tab Take 5 mcg by mouth every day.      albuterol 108 (90 Base) MCG/ACT Aero Soln inhalation aerosol Inhale 2 Puffs every 6 hours as needed for Shortness of Breath. 8.5 g 11    Cholecalciferol (VITAMIN D-3) 125 MCG (5000 UT) Tab Take 5,000 Units by mouth every evening.      VITAMIN E PO Take 1 Capsule by mouth every morning.       No current facility-administered medications on file prior  "to visit.         EXAMINATION     Physical Exam:   BP (!) 150/98 (BP Location: Right arm, Patient Position: Sitting, BP Cuff Size: Large adult)   Pulse 83   Temp 36.1 °C (97 °F) (Temporal)   Ht 1.676 m (5' 6\")   Wt 114 kg (251 lb 1.7 oz)     Constitutional:   Body Habitus: Body mass index is 40.53 kg/m².  Cooperation: Fully cooperates with exam  Appearance: Well-groomed, well-nourished.    Eyes: No scleral icterus to suggest severe liver disease, no proptosis to suggest severe hyperthyroidism    ENT -no obvious auditory deficits, no noticeable facial droop     Skin -no rashes or lesions noted     Respiratory-  breathing comfortably on room air, no audible wheezing    Cardiovascular-distal extremities warm and well perfused.  No lower extremity edema is noted.     Gastrointestinal - no obvious abdominal masses, non-distended    Psychiatric- alert and oriented ×3. Normal affect.     Musculoskeletal  tenderness to palpation at bilateral paraspinal muscles along length of spine with hypersensitivity to light touch    Thoracic/Lumbar Spine/Sacral Spine/Hips   Tenderness to palpation at the right patellar facets.  Active range of motion equal on both sides. No obvious effusion at right knee compared to left.      Previous exam  Inspection: No evidence of atrophy in bilateral lower extremities throughout      There is limited active range of motion with lumbar extension     Facet loading maneuver positive on right, negative on left     Lumbar spine /hip provocative exam maneuvers  Straight leg raise negative bilaterally  FADIR test negative bilaterally     SI joint tests  SYLVIA test negative bilaterally  Thigh thrust test negative bilaterally     Key points for the international standards for neurological classification of spinal cord injury (ISNCSCI) to light touch.   Dermatome R L   L2 2 2   L3 2 2   L4 2 2   L5 2 2   S1 2 2   S2 2 2         Motor Exam Lower Extremities  ? Myotome R L   Hip flexion L2 5 5   Knee " extension L3 5 5   Ankle dorsiflexion L4 5 5   Toe extension L5 5 5   Ankle plantarflexion S1 5 5      Cervical spine   Inspection: No deformities of the skin over the cervical spine. No rashes or lesions.    Spurling's sign  negative bilaterally  Cervical facet loading maneuver  negative bilaterally    No signs of muscular atrophy in bilateral upper extremities     Tenderness to palpation at paracervical muscles bilaterally, cervical facets bilaterally, and upper trapezius bilaterally.     Key points for the international standards for neurological classification of spinal cord injury (ISNCSCI) to light touch.     Dermatome R L   C4 1 1   C5 2 2   C6 2 2   C7 1 1   C8 1 1   T1 2 2   T2 2 2       Motor Exam Upper Extremities   ? Myotome R L   Shoulder abduction C5 5 5   Elbow flexion C5 5 5   Wrist extension C6 5 5   Elbow extension C7 5 5   Finger flexion C8 5 5   Finger abduction T1 5 5     tenderness to palpation at   Shoulder girdle, left  Shoulder girdle, right  Upper arm, left  Upper arm, right  Lower arm, left  Lower arm, right  Hip (buttock, trochanter), left  Hip (buttock, trochanter), right  Upper leg, left  Upper leg, right  Lower leg, left  Lower leg, right  Upper back  Lower back  Neck    MEDICAL DECISION MAKING    Medical records review: see under HPI section.     DATA    Labs: No new labs available for review since last visit.   Lab Results   Component Value Date/Time    SODIUM 142 04/26/2024 11:10 AM    POTASSIUM 4.0 04/26/2024 11:10 AM    CHLORIDE 106 04/26/2024 11:10 AM    CO2 21 04/26/2024 11:10 AM    ANION 15.0 04/26/2024 11:10 AM    GLUCOSE 116 (H) 04/26/2024 11:10 AM    BUN 26 (H) 04/26/2024 11:10 AM    CREATININE 0.89 04/26/2024 11:10 AM    CREATININE 0.84 04/18/2011 03:41 PM    CALCIUM 9.2 04/26/2024 11:10 AM    ASTSGOT 16 04/26/2024 11:10 AM    ALTSGPT 16 04/26/2024 11:10 AM    TBILIRUBIN 0.6 04/26/2024 11:10 AM    ALBUMIN 4.3 04/26/2024 11:10 AM    ALBUMIN 4.51 07/22/2015 02:28 PM     TOTPROTEIN 7.6 04/26/2024 11:10 AM    TOTPROTEIN 7.90 07/22/2015 02:28 PM    GLOBULIN 3.3 04/26/2024 11:10 AM    AGRATIO 1.3 04/26/2024 11:10 AM    AGRATIO 1.1 04/18/2011 03:41 PM       Lab Results   Component Value Date/Time    PROTHROMBTM 13.8 11/16/2022 11:03 AM    INR 1.07 11/16/2022 11:03 AM        Lab Results   Component Value Date/Time    WBC 9.5 04/26/2024 11:10 AM    WBC 10.8 (H) 10/06/2009 08:45 AM    RBC 4.74 04/26/2024 11:10 AM    RBC 4.58 10/06/2009 08:45 AM    HEMOGLOBIN 13.8 04/26/2024 11:10 AM    HEMATOCRIT 43.1 04/26/2024 11:10 AM    MCV 90.9 04/26/2024 11:10 AM    MCV 91 10/06/2009 08:45 AM    MCH 29.1 04/26/2024 11:10 AM    MCH 30.5 10/06/2009 08:45 AM    MCHC 32.0 (L) 04/26/2024 11:10 AM    MPV 11.3 04/26/2024 11:10 AM    NEUTSPOLYS 67.60 12/20/2022 03:22 PM    LYMPHOCYTES 20.90 (L) 12/20/2022 03:22 PM    MONOCYTES 7.40 12/20/2022 03:22 PM    EOSINOPHILS 3.10 12/20/2022 03:22 PM    BASOPHILS 0.50 12/20/2022 03:22 PM    HYPOCHROMIA 1+ 02/04/2014 11:38 AM    ANISOCYTOSIS 1+ 08/10/2011 08:20 PM        Lab Results   Component Value Date/Time    HBA1C 6.4 (H) 04/26/2024 11:10 AM        Imaging:   I personally reviewed following images, these are my reads  X-ray right knee 5/24/2024  Mild medial compartment OA. See formal radiology report for further details.        X-ray pelvis 2/25/2022  Mild OA of left hip joint.  No significant OA of right hip joint.  Bilateral SI joint sclerosis. See formal radiology report for further details.     XR hip 12/15/23  No significant OA of either hip.    MRI cervical spine 1/22/2022  At C3-4 there is severe right neuroforaminal stenosis.  At C4-5 there is severe left neuroforaminal stenosis.  At C5-6 there is severe bilateral neuroforaminal stenosis.  At C6-7 there is moderate bilateral neuroforaminal stenosis. See formal radiology report for further details.    MRI lumbar spine 5/5/2024  At L5-S1 there is mild bilateral neuroforaminal stenosis.  At L4-5 there is  moderate right and mild left neuroforaminal stenosis.  At L3-4 there is moderate to severe bilateral neuroforaminal stenosis.  At L2-3 there is mild to moderate right and moderate left neuroforaminal stenosis.  Lumbar spondylosis at bilateral lower levels. See formal radiology report for further details.      IMAGING radiology reads. I reviewed the following radiology reads    MRI lumbar spine 5/5/2024  FINDINGS: The lowest formed intervertebral disc will be designated L5-S1 for the purposes of this report and vertebral levels numbered accordingly.        The lumbar vertebral bodies have unremarkable height and no gross malalignment.  No acute or suspicious osseous lesion is seen.  There is mild loss of intervertebral disc height at L1-L2. There is moderate loss of intervertebral disc height with adjacent Modic type I/II degenerative endplate changes at L2-L3. There is mild to moderate loss of intervertebral disc height at L3-L4.   There is moderate loss of intervertebral disc height with adjacent Modic type I/III endplate changes at L4-L5. There is severe loss of intervertebral disc height at L5-S1.  The conus medullaris has a normal caliber course and signal intensity.     Level specific findings as follows:     L1-2: Diffuse disc bulge. Moderate RIGHT and mild LEFT facet arthropathy. Mild central canal narrowing. Moderate RIGHT and mild LEFT neural foraminal narrowing.  L2-3: Diffuse disc bulge. Moderate BILATERAL facet arthropathy. Moderate central canal narrowing. Mild to moderate RIGHT and moderate LEFT neural foraminal narrowing.  L3-4: Diffuse disc bulge. Severe RIGHT and moderate LEFT facet arthropathy. Severe central canal narrowing. Moderate to severe BILATERAL neural foraminal narrowing.  L4-5: Diffuse disc bulge. Severe RIGHT and moderate LEFT facet arthropathy. Moderate central canal narrowing. Moderate RIGHT and mild LEFT neural foraminal narrowing.  L5-S1: Diffuse disc bulge mostly lateral and  anterior with associated osteophytes. Moderate BILATERAL facet arthropathy. Mild BILATERAL neural foraminal narrowing.     There are changes of BILATERAL sacroiliac arthropathy.           Soft tissues: No abdominal aortic aneurysm or soft tissue mass is seen.     IMPRESSION:     1.  Multilevel multifactorial degenerative changes with interval progression since the prior study from March 8, 2017  2.  Severe central canal narrowing at L3-L4  3.  Areas of central canal and neural foraminal narrowing as described above     Results for orders placed during the hospital encounter of 06/04/14     MR-BRAIN-WITH & W/O     Impression  1.  No evidence of acute territorial infarct, intracranial hemorrhage or mass lesion.  2.  Mild diffuse cerebral and cerebellar substance loss.    Results for orders placed during the hospital encounter of 12/20/22     MR-BRAIN-W/O     Impression  1.  No acute abnormality.  2.  Mild chronic microvascular ischemic disease.             Results for orders placed during the hospital encounter of 01/22/22     MR-CERVICAL SPINE-W/O     Impression  1.  Multifocal degenerative disease in the cervical spine as described above.  2.  Severe right lateral recess and right C4 neural foraminal stenosis.  3.  Severe left C5 neural foraminal stenosis.  4.  There is an approximately 9 cm sized left thyroid nodule. This is increased since the previous study. Ultrasound-guided fine-needle aspiration is recommended.  .      Results for orders placed during the hospital encounter of 03/16/17     MR-LUMBAR SPINE-W/O     Impression  1.  Severe discal and moderate endplate degenerative changes at the L5-S1 level. Minimal endplate degenerative changes noted at multiple levels in the lumbar spine.     2.  Mild lumbar spondylotic changes at the L4-5 level with minimal lumbar spondylotic changes at the L3-4 level.     3.  Minimal cyst cephalad subligamentous disc extrusion at the L2-3 level causing minimal effacement of  the ventral surface of thecal sac.     4.  Borderline central canal stenosis at the L2-3 and L4-5 level secondary to facet arthropathy.      Results for orders placed during the hospital encounter of 03/16/17     MR-THORACIC SPINE-W/O     Impression  1. Minimal thoracic spondylotic changes at the T5-6, T6-7, and a T8-9 levels.     2. Mild discal degenerative changes in the mid and lower thoracic spine.    Results for orders placed during the hospital encounter of 03/16/17     MR-LUMBAR SPINE-W/O     Impression  1.  Severe discal and moderate endplate degenerative changes at the L5-S1 level. Minimal endplate degenerative changes noted at multiple levels in the lumbar spine.     2.  Mild lumbar spondylotic changes at the L4-5 level with minimal lumbar spondylotic changes at the L3-4 level.     3.  Minimal cyst cephalad subligamentous disc extrusion at the L2-3 level causing minimal effacement of the ventral surface of thecal sac.     4.  Borderline central canal stenosis at the L2-3 and L4-5 level secondary to facet arthropathy.      Results for orders placed during the hospital encounter of 06/04/14     MR-MRA NECK-W/O     Impression  Unremarkable MR angiogram of the carotid arteries and vertebral basilar system.       Results for orders placed during the hospital encounter of 08/16/10     MR-ORBITS, FACE, NECK-WITH & W/O AND SEQUENCES                              Results for orders placed during the hospital encounter of 01/19/19     DX-ANKLE 3+ VIEWS LEFT     Impression  No evidence of fracture or dislocation.     Soft tissue swelling.   Results for orders placed during the hospital encounter of 11/02/21     DX-CERVICAL SPINE-2 OR 3 VIEWS     Impression  1.  There is mild degenerative disc disease and arthropathy at the C5-6 and C6-7 levels.     Results for orders placed in visit on 07/26/18     DX-CHEST-2 VIEWS     Impression  Low lung volumes with bibasilar atelectasis. No focal consolidation or pleural  effusions.     Results for orders placed during the hospital encounter of 08/10/19     DX-ELBOW-COMPLETE 3+ RIGHT     Impression  Findings suspicious for nondisplaced radial neck fracture. Correlate with point tenderness. Follow-up radiographs in 7-10 days may be performed.     Results for orders placed during the hospital encounter of 12/20/22     DX-FOOT-2- RIGHT     Impression  No acute osseous abnormality.   Results for orders placed in visit on 10/29/13     DX-FOOT-COMPLETE 3+     Impression  Proximal phalanx right fourth toe fracture.     INTERPRETING LOCATION:  [address] [facility] [place], [address]   Results for orders placed during the hospital encounter of 12/06/18     DX-FOREARM RIGHT     Impression  Unremarkable RIGHT forearm.       Results for orders placed during the hospital encounter of 11/17/15     DX-HIP-COMPLETE - UNILATERAL 2+ RIGHT     Impression  1. No evidence of acute fracture or dislocation.     2. Mild degenerative changes.   Results for orders placed during the hospital encounter of 09/02/11     DX-HIPS-COMPLETE - BILATERAL 3+     Impression  Minimally sclerotic hip joints.  No acute fracture identified.      Results for orders placed in visit on 09/23/13     DX-KNEES-AP BILATERAL STANDING     Impression  Negative standing knee radiograph.     INTERPRETING LOCATION: [address] [place] [address] [place], [address]   Results for orders placed in visit on 05/21/12     DX-KNEE COMPLETE 4+     Impression  Unremarkable right knee series.   Results for orders placed during the hospital encounter of 12/06/15     DX-LUMBAR SPINE-2 OR 3 VIEWS     Impression  1.  There has been progression of L4-5 and L5-S1 disc disease and facet disease, right greater than left.    Results for orders placed during the hospital encounter of 02/25/22     DX-PELVIS-1 OR 2 VIEWS     Impression  1.  Marked degenerative changes lower lumbar spine and mildly sclerotic SI joints.     2.  Mild degenerative changes right hip joint.           Results  for orders placed during the hospital encounter of 08/10/19     DX-SHOULDER 2+ RIGHT     Impression  No acute osseous abnormality.       Results for orders placed during the hospital encounter of 08/10/19     DX-WRIST-COMPLETE 3+ RIGHT     Impression  Acute-appearing triquetral fracture.              Diagnosis  Visit Diagnoses     ICD-10-CM   1. Risk for falls  Z91.81   2. Positive depression screening  Z13.31   3. Greater trochanteric bursitis of both hips  M70.61    M70.62   4. Chronic pain of right knee  M25.561    G89.29   5. Right lumbar radiculitis  M54.16   6. Chronic right shoulder pain  M25.511    G89.29   7. Subacromial bursitis of right shoulder joint  M75.51   8. Impingement of right shoulder  M25.811   9. Neuroforaminal stenosis of lumbar spine  M48.061   10. Lumbar spondylosis  M47.816   11. Numbness and tingling of right arm  R20.0    R20.2   12. Cervical radiculitis  M54.12   13. Cervicalgia  M54.2   14. Myalgia  M79.10   15. Numbness and tingling in left arm  R20.0    R20.2   16. Fibromyalgia  M79.7   17. Other chronic pain  G89.29   18. Neuroforaminal stenosis of cervical spine  M48.02   19. Cervical spondylosis  M47.812   20. Bilateral groin pain  R10.31    R10.32   21. Impaired gait and mobility  R26.89   22. Localized osteoarthritis of right knee  M17.11             ASSESSMENT AND PLAN:  Ashly Meyer (: 1950) is a female with diffuse chronic pain likely overall consistent with fibromyalgia pain, with also component of bilateral greater trochanteric bursitis and right intra-articular knee pain     Ashly was seen today for follow-up.    Diagnoses and all orders for this visit:    Risk for falls  -     Patient identified as fall risk.  Appropriate orders and counseling given.    Positive depression screening  -     Patient has been identified as having a positive depression screening. Appropriate orders and counseling have been given.    Greater trochanteric bursitis of both  hips  -     Referral to Pain Clinic    Chronic pain of right knee    Right lumbar radiculitis    Chronic right shoulder pain    Subacromial bursitis of right shoulder joint    Impingement of right shoulder    Neuroforaminal stenosis of lumbar spine    Lumbar spondylosis    Numbness and tingling of right arm    Cervical radiculitis    Cervicalgia    Myalgia    Numbness and tingling in left arm    Fibromyalgia    Other chronic pain    Neuroforaminal stenosis of cervical spine    Cervical spondylosis    Bilateral groin pain    Impaired gait and mobility    Localized osteoarthritis of right knee              PLAN  Physical Therapy: Previously referred to physical therapy.  Recently discharged due to plateaued improvement    Diagnostic workup: Personally reviewed at today's visit: X-ray right knee 5/24/2024  Mild medial compartment OA.  No significant OA of the lateral and patellofemoral compartments.    Medications:   - continue gabapentin, and duloxetine  - s/p discontinuation of nabumetone without exacerbation of pain.  Discussed that at this time I do not feel that resuming nabumetone would significantly improve her pain.  She reports she had been taking nabumetone for decades  -Discussed trial of low-dose naltrexone for neuropathic pain, patient elected to defer due to cost of medication    Interventions:   - s/p right L4-5 and L5-S1 transforaminal epidural steroid injection with no significant relief  -Discussed right knee steroid injection under ultrasound guidance.  Consider this depending on her degree of pain after bilateral greater trochanteric bursa steroid injections  -  b/l greater trochanteric bursa injections under ultrasound guidance at next visit given recurrence of pain.  With this injection, she previously had 100% improvement in pain on her left and 50% improvement in pain on her right. The risks, benefits, and alternatives to this procedure were discussed and the patient wishes to proceed with the  procedure. Risks include but are not limited to damage to surrounding structures, infection, bleeding, worsening of pain which can be permanent, and weakness which can be permanent. Benefits include pain relief and improved function. Alternatives include not doing the procedure.    -Discussed trial of trigger point injections targeting her bilateral paraspinal pain.  For now in favor of bilateral greater trochanteric bursa steroid injections as above  - Patient reportedly had hip injections with an outside provider with relief lasting 2-4 weeks at a time     Follow-up: Next available for bilateral greater trochanteric bursa steroid injections    Orders Placed This Encounter    Referral to Pain Clinic    Patient has been identified as having a positive depression screening. Appropriate orders and counseling have been given.    Patient identified as fall risk.  Appropriate orders and counseling given.       Patricia Ho MD  Interventional Pain and Spine  Physical Medicine and Rehabilitation  [facility]      The above note documents my personal evaluation of this patient. In addition, I have reviewed and confirmed with the patient and MA the supportive information documented in today's Patient Health Questionnaire and Office Note.     Please note that this dictation was created using voice recognition software. I have made every reasonable attempt to correct obvious errors, but I expect that there are errors of grammar and possibly content that I did not discover before finalizing the note.

## 2024-08-14 NOTE — TELEPHONE ENCOUNTER
VOICEMAIL  1. Caller Name: Ashly                      Call Back Number: [phone]      2. Message: Patient would like her wrist/thumb checked. She is interested in getting an injection, possible Carpel Tunnel    3. Patient approves office to leave a detailed voicemail/MyChart message: N\A

## 2024-08-19 ENCOUNTER — HOSPITAL ENCOUNTER (OUTPATIENT)
Dept: LAB | Facility: MEDICAL CENTER | Age: 74
End: 2024-08-19
Attending: FAMILY MEDICINE
Payer: MEDICARE

## 2024-08-19 ENCOUNTER — HOSPITAL ENCOUNTER (OUTPATIENT)
Dept: RADIOLOGY | Facility: MEDICAL CENTER | Age: 74
End: 2024-08-19
Attending: FAMILY MEDICINE
Payer: MEDICARE

## 2024-08-19 ENCOUNTER — OFFICE VISIT (OUTPATIENT)
Dept: MEDICAL GROUP | Facility: MEDICAL CENTER | Age: 74
End: 2024-08-19
Payer: MEDICARE

## 2024-08-19 VITALS
RESPIRATION RATE: 14 BRPM | HEIGHT: 66 IN | SYSTOLIC BLOOD PRESSURE: 130 MMHG | OXYGEN SATURATION: 94 % | DIASTOLIC BLOOD PRESSURE: 70 MMHG | HEART RATE: 73 BPM | TEMPERATURE: 98 F | BODY MASS INDEX: 39.7 KG/M2 | WEIGHT: 247 LBS

## 2024-08-19 DIAGNOSIS — R19.8 ALTERNATING CONSTIPATION AND DIARRHEA: ICD-10-CM

## 2024-08-19 DIAGNOSIS — Z12.31 ENCOUNTER FOR SCREENING MAMMOGRAM FOR BREAST CANCER: ICD-10-CM

## 2024-08-19 DIAGNOSIS — M25.571 PAIN AND SWELLING OF RIGHT ANKLE: ICD-10-CM

## 2024-08-19 DIAGNOSIS — M25.471 PAIN AND SWELLING OF RIGHT ANKLE: ICD-10-CM

## 2024-08-19 DIAGNOSIS — R11.14 BILIOUS VOMITING WITH NAUSEA: ICD-10-CM

## 2024-08-19 DIAGNOSIS — M1A.0790 CHRONIC GOUT OF FOOT, UNSPECIFIED CAUSE, UNSPECIFIED LATERALITY: ICD-10-CM

## 2024-08-19 DIAGNOSIS — K21.00 GASTROESOPHAGEAL REFLUX DISEASE WITH ESOPHAGITIS WITHOUT HEMORRHAGE: ICD-10-CM

## 2024-08-19 DIAGNOSIS — J40 BRONCHITIS: ICD-10-CM

## 2024-08-19 LAB
ALBUMIN SERPL BCP-MCNC: 4.2 G/DL (ref 3.2–4.9)
ALBUMIN/GLOB SERPL: 1.2 G/DL
ALP SERPL-CCNC: 164 U/L (ref 30–99)
ALT SERPL-CCNC: 22 U/L (ref 2–50)
ANION GAP SERPL CALC-SCNC: 16 MMOL/L (ref 7–16)
AST SERPL-CCNC: 21 U/L (ref 12–45)
BASOPHILS # BLD AUTO: 0.3 % (ref 0–1.8)
BASOPHILS # BLD: 0.04 K/UL (ref 0–0.12)
BILIRUB SERPL-MCNC: 0.9 MG/DL (ref 0.1–1.5)
BUN SERPL-MCNC: 24 MG/DL (ref 8–22)
CALCIUM ALBUM COR SERPL-MCNC: 9 MG/DL (ref 8.5–10.5)
CALCIUM SERPL-MCNC: 9.2 MG/DL (ref 8.5–10.5)
CHLORIDE SERPL-SCNC: 102 MMOL/L (ref 96–112)
CO2 SERPL-SCNC: 24 MMOL/L (ref 20–33)
CREAT SERPL-MCNC: 0.95 MG/DL (ref 0.5–1.4)
EOSINOPHIL # BLD AUTO: 0.21 K/UL (ref 0–0.51)
EOSINOPHIL NFR BLD: 1.8 % (ref 0–6.9)
ERYTHROCYTE [DISTWIDTH] IN BLOOD BY AUTOMATED COUNT: 50.9 FL (ref 35.9–50)
GFR SERPLBLD CREATININE-BSD FMLA CKD-EPI: 63 ML/MIN/1.73 M 2
GLOBULIN SER CALC-MCNC: 3.6 G/DL (ref 1.9–3.5)
GLUCOSE SERPL-MCNC: 108 MG/DL (ref 65–99)
HCT VFR BLD AUTO: 46 % (ref 37–47)
HGB BLD-MCNC: 14.6 G/DL (ref 12–16)
IMM GRANULOCYTES # BLD AUTO: 0.02 K/UL (ref 0–0.11)
IMM GRANULOCYTES NFR BLD AUTO: 0.2 % (ref 0–0.9)
LYMPHOCYTES # BLD AUTO: 3.03 K/UL (ref 1–4.8)
LYMPHOCYTES NFR BLD: 25.5 % (ref 22–41)
MCH RBC QN AUTO: 29.9 PG (ref 27–33)
MCHC RBC AUTO-ENTMCNC: 31.7 G/DL (ref 32.2–35.5)
MCV RBC AUTO: 94.1 FL (ref 81.4–97.8)
MONOCYTES # BLD AUTO: 0.91 K/UL (ref 0–0.85)
MONOCYTES NFR BLD AUTO: 7.6 % (ref 0–13.4)
NEUTROPHILS # BLD AUTO: 7.69 K/UL (ref 1.82–7.42)
NEUTROPHILS NFR BLD: 64.6 % (ref 44–72)
NRBC # BLD AUTO: 0 K/UL
NRBC BLD-RTO: 0 /100 WBC (ref 0–0.2)
PLATELET # BLD AUTO: 220 K/UL (ref 164–446)
PMV BLD AUTO: 10.6 FL (ref 9–12.9)
POTASSIUM SERPL-SCNC: 4.1 MMOL/L (ref 3.6–5.5)
PROT SERPL-MCNC: 7.8 G/DL (ref 6–8.2)
RBC # BLD AUTO: 4.89 M/UL (ref 4.2–5.4)
SODIUM SERPL-SCNC: 142 MMOL/L (ref 135–145)
URATE SERPL-MCNC: 7.5 MG/DL (ref 1.9–8.2)
WBC # BLD AUTO: 11.9 K/UL (ref 4.8–10.8)

## 2024-08-19 PROCEDURE — 80053 COMPREHEN METABOLIC PANEL: CPT

## 2024-08-19 PROCEDURE — 85025 COMPLETE CBC W/AUTO DIFF WBC: CPT

## 2024-08-19 PROCEDURE — 99214 OFFICE O/P EST MOD 30 MIN: CPT | Performed by: FAMILY MEDICINE

## 2024-08-19 PROCEDURE — 3075F SYST BP GE 130 - 139MM HG: CPT | Performed by: FAMILY MEDICINE

## 2024-08-19 PROCEDURE — 1170F FXNL STATUS ASSESSED: CPT | Performed by: FAMILY MEDICINE

## 2024-08-19 PROCEDURE — 3078F DIAST BP <80 MM HG: CPT | Performed by: FAMILY MEDICINE

## 2024-08-19 PROCEDURE — 36415 COLL VENOUS BLD VENIPUNCTURE: CPT

## 2024-08-19 PROCEDURE — 73610 X-RAY EXAM OF ANKLE: CPT | Mod: RT

## 2024-08-19 PROCEDURE — 84550 ASSAY OF BLOOD/URIC ACID: CPT

## 2024-08-19 RX ORDER — TAMSULOSIN HYDROCHLORIDE 0.4 MG/1
0.4 CAPSULE ORAL DAILY
COMMUNITY

## 2024-08-19 RX ORDER — DOXYCYCLINE HYCLATE 100 MG
100 TABLET ORAL 2 TIMES DAILY
Qty: 14 TABLET | Refills: 0 | Status: SHIPPED | OUTPATIENT
Start: 2024-08-19 | End: 2024-08-30

## 2024-08-19 RX ORDER — SUCRALFATE 1 G/1
1 TABLET ORAL
Qty: 90 TABLET | Refills: 1 | Status: SHIPPED | OUTPATIENT
Start: 2024-08-19

## 2024-08-19 ASSESSMENT — FIBROSIS 4 INDEX: FIB4 SCORE: 1.52

## 2024-08-19 NOTE — PROGRESS NOTES
Chief Complaint   Patient presents with    Foot Swelling     RT foot     GI Problem       Subjective:     HPI:   Ashly Meyer presents today with the followin. Pain and swelling of right ankle  Patient notes increased pain and swelling of the right ankle over the last month.  Denies any trauma.  Denies bruising.  X-ray order discussed and placed.     2. Bilious vomiting with nausea  Patient notes nausea and vomiting.  The vomiting is rare.  Patient states this is similar to stomach acid problems from the past.  Patient states she is taking her pantoprazole regularly but she and her daughter will recheck when she gets home.  I am adding sucralfate.  Follow-up lab order discussed and placed.  - sucralfate (CARAFATE) 1 GM Tab; Take 1 Tablet by mouth 3 times a day before meals.  Dispense: 90 Tablet; Refill: 1  - CBC WITH DIFFERENTIAL; Future    3. Alternating constipation and diarrhea  Patient has alternating constipation and diarrhea.  The diarrhea is often explosive and accompanied by abdominal cramping.  Denies visible blood.  Lab order discussed and placed.    - sucralfate (CARAFATE) 1 GM Tab; Take 1 Tablet by mouth 3 times a day before meals.  Dispense: 90 Tablet; Refill: 1  - CBC WITH DIFFERENTIAL; Future    4. Gastroesophageal reflux disease with esophagitis without hemorrhage  Patient does have gastroesophageal reflux disease for which she is on pantoprazole.  Patient is not sure of the names of her medications.  Sucralfate trial is discussed to be taken in addition to the pantoprazole.  Lab orders discussed and placed.  - sucralfate (CARAFATE) 1 GM Tab; Take 1 Tablet by mouth 3 times a day before meals.  Dispense: 90 Tablet; Refill: 1  - CBC WITH DIFFERENTIAL; Future  - Comp Metabolic Panel; Future    5. Chronic gout of foot, unspecified cause, unspecified laterality  Patient does have significant pain of the right great toe and somewhat less in the left great toe.  There is moderate swelling  though this does not appear classic for gout.  Lab orders discussed and placed.  She is supposedly taking allopurinol but is by no means sure.  She and her daughter will double check.  - URIC ACID; Future  - Comp Metabolic Panel; Future    6. Bronchitis  For over 2 weeks her phlegm has been copious, yellow/green.  Denies shortness of breath.  Does feel she has some chest pressure and rattling.  Appetite has been reduced.  Patient has numerous allergies.  Doxycycline order is discussed and placed.  Patient does have an albuterol rescue inhaler available to be taken as needed.  - doxycycline (VIBRAMYCIN) 100 MG Tab; Take 1 Tablet by mouth 2 times a day for 7 days.  Dispense: 14 Tablet; Refill: 0    Mammogram order discussed and placed.    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    Primary osteoarthritis of first metatarsophalangeal (MTP) joint 05/12/2023    Urinary tract infection due to Klebsiella species 02/13/2023    Bilious vomiting with nausea 02/13/2023    Drug-induced constipation 12/26/2022    Impaired gait and mobility 12/21/2022    Ataxia 12/20/2022    Carotid artery stenosis 12/20/2022    Left bundle branch block 12/20/2022    Temporal pain 12/14/2022    History of 2019 novel coronavirus disease (COVID-19) 10/11/2022    Positive self-administered antigen test for COVID-19 10/11/2022    Trochanteric bursitis of both hips 03/03/2022    Mild persistent asthma, uncomplicated 03/03/2022    Incomplete rectal prolapse 09/03/2021    Benign essential tremor 09/03/2021    Chronic respiratory failure with hypoxia ([facility]) 06/03/2021    Controlled type 2 diabetes mellitus without complication, without long-term current use of insulin ([facility]) 06/12/2019     Chronic gout of foot 04/30/2019    Dry eye syndrome, bilateral     Rotator cuff syndrome of right shoulder and allied disorders 10/10/2018    Vitamin D deficiency 12/28/2017    Morbid obesity with BMI of 40.0-44.9, adult (HCC)     Neural foraminal stenosis of cervical spine 05/18/2017    Tinnitus of both ears 05/18/2017    Dyslipidemia, goal LDL below 130 01/06/2017    Nonalcoholic fatty liver disease 01/05/2017    Menopausal symptoms 04/28/2016    Lumbar facet arthropathy 12/09/2015    Candidal intertrigo 06/01/2015    Elevated sed rate 12/09/2014    Osteoarthritis of left thumb 07/23/2014    Bilateral hip pain 04/23/2014    H/O fibromyalgia 03/11/2014    KERRI (obstructive sleep apnea) 03/11/2014    Peripheral neuropathy     Rectocele 11/25/2013    Pelvic floor dysfunction     Chronic constipation 10/02/2013    Nocturnal hypoxia     Lumbar radiculopathy 11/01/2011    Migraine without aura     Seasonal allergies 07/08/2011    GERD (gastroesophageal reflux disease) 12/10/2010    Osteoarthritis of multiple joints 04/13/2010    Eczema, dyshidrotic 08/03/2009    Essential hypertension 05/20/2009    Hypothyroidism due to acquired atrophy of thyroid 05/20/2009       Current medicines (including changes today)  Current Outpatient Medications   Medication Sig Dispense Refill    sucralfate (CARAFATE) 1 GM Tab Take 1 Tablet by mouth 3 times a day before meals. 90 Tablet 1    doxycycline (VIBRAMYCIN) 100 MG Tab Take 1 Tablet by mouth 2 times a day for 7 days. 14 Tablet 0    tamsulosin (FLOMAX) 0.4 MG capsule Take 0.4 mg by mouth every day.      ARIPiprazole (ABILIFY) 2 MG tablet Take 1 Tablet by mouth every day. 90 Tablet 3    atorvastatin (LIPITOR) 40 MG Tab Take 1 Tablet by mouth every evening. 90 Tablet 3    DULoxetine (CYMBALTA) 60 MG Cap DR Particles delayed-release capsule Take 1 Capsule by mouth every evening. 90 Capsule 3    gabapentin (NEURONTIN) 800 MG tablet Take 1 Tablet by mouth 2 times a day. 180 Tablet 3     "allopurinol (ZYLOPRIM) 100 MG Tab TAKE ONE TABLET BY MOUTH ONCE DAILY 90 Tablet 2    QUEtiapine (SEROQUEL) 25 MG Tab TAKE ONE TABLET BY MOUTH AT BEDTIME 90 Tablet 2    aliskiren (TEKTURNA) 150 MG tablet Take 150 mg by mouth every day.      pantoprazole (PROTONIX) 40 MG Tablet Delayed Response Take 1 Tablet by mouth 2 times a day. 180 Tablet 3    levothyroxine (SYNTHROID) 137 MCG Tab TAKE ONE TABLET BY MOUTH EVERY MORNING ON AN EMPTY STOMACH 90 Tablet 2    calcitonin, salmon, (MIACALCIN) 200 UNIT/ACT Solution Administer 1 Spray into affected nostril(S) every day.      albuterol 108 (90 Base) MCG/ACT Aero Soln inhalation aerosol Inhale 2 Puffs every 6 hours as needed for Shortness of Breath. 8.5 g 11    Cholecalciferol (VITAMIN D-3) 125 MCG (5000 UT) Tab Take 5,000 Units by mouth every evening.      VITAMIN E PO Take 1 Capsule by mouth every morning.       No current facility-administered medications for this visit.       Allergies   Allergen Reactions    Ace Inhibitors Cough    Benadryl Allergy Hives     Hot skin itching    Iodine Nausea     Pt received CT w/ contrast 12/20/22 pt had nausea post contrast     Lamictal Rash and Swelling     \"hot\", unable to function.  Severe rash, scarring    Savella [Kdc:Milnacipran+Ci Pigment Blue 63] Nausea, Swelling and Anxiety     Swelling, bone and muscle pain, sweating, nausea, dizziness, sleeplessness, anxiety    Savella [Milnacipran Hcl] Unspecified     Muscle aches, feet swelling    Sulfa Drugs Hives, Shortness of Breath and Anxiety     Hard breathing, shortness of breath    Butalbital-Acetaminophen Unspecified     Dizzy, can't walk, hard to focus    Cefaclor Nausea and Unspecified     Ceclor causes light headedness and dizziness    Morphine Itching     \"Redness\"    Penicillins Itching and Swelling     Skin itching and redness    Tizanidine Hcl Nausea     \"Dizziness\" hard to focus    Milnacipran Vomiting and Nausea    Amlactin Rash and Itching     Lotion causes itching, " "redness and burnng    Tape Rash and Itching     Skin tears, paper tape is OK per Pt       ROS: As per HPI       Objective:     /70   Pulse 73   Temp 36.7 °C (98 °F)   Resp 14   Ht 1.676 m (5' 6\")   Wt 112 kg (247 lb)   SpO2 94%  Body mass index is 39.87 kg/m².    Physical Exam:  Constitutional: Well-developed and well-nourished. Not diaphoretic. No distress. Lucid and fluent.  Skin: Skin is warm and dry. No rash noted.  Head: Atraumatic without lesions.  Eyes: Conjunctivae and extraocular motions are normal. Pupils are equal, round, and reactive to light. No scleral icterus.   Ears:  External ears unremarkable.   Neck: Supple, trachea midline. No thyromegaly present. No cervical or supraclavicular lymphadenopathy. No JVD or carotid bruits appreciated  Cardiovascular: Regular rate and rhythm.  Normal S1, S2 without murmur appreciated.  Chest: Effort normal. Wet rhonchi and wheezing all lung fields.  Fairly good air movement.  Abdomen: Soft, non tender, and without distention. Active bowel sounds in all four quadrants. No rebound, guarding, masses or hepatosplenomegaly.  Extremities: No cyanosis, clubbing, erythema, nor edema.   Neurological: Alert and oriented x 3. No tremor appreciated.  Psychiatric:  Behavior, mood, and affect are appropriate.       Assessment and Plan:     74 y.o. female with the following issues:    1. Pain and swelling of right ankle  DX-ANKLE 3+ VIEWS RIGHT      2. Bilious vomiting with nausea  sucralfate (CARAFATE) 1 GM Tab    CBC WITH DIFFERENTIAL      3. Alternating constipation and diarrhea  sucralfate (CARAFATE) 1 GM Tab    CBC WITH DIFFERENTIAL      4. Gastroesophageal reflux disease with esophagitis without hemorrhage  sucralfate (CARAFATE) 1 GM Tab    CBC WITH DIFFERENTIAL    Comp Metabolic Panel      5. Chronic gout of foot, unspecified cause, unspecified laterality  URIC ACID    Comp Metabolic Panel      6. Bronchitis  doxycycline (VIBRAMYCIN) 100 MG Tab    "         Followup: Return in about 3 months (around 11/19/2024), or if symptoms worsen or fail to improve.

## 2024-08-20 ENCOUNTER — APPOINTMENT (OUTPATIENT)
Dept: PHYSICAL THERAPY | Facility: REHABILITATION | Age: 74
End: 2024-08-20
Attending: STUDENT IN AN ORGANIZED HEALTH CARE EDUCATION/TRAINING PROGRAM
Payer: MEDICARE

## 2024-08-20 RX ORDER — ALLOPURINOL 300 MG/1
300 TABLET ORAL DAILY
Qty: 90 TABLET | Refills: 3 | Status: SHIPPED | OUTPATIENT
Start: 2024-08-20

## 2024-08-21 ENCOUNTER — OFFICE VISIT (OUTPATIENT)
Dept: PHYSICAL MEDICINE AND REHAB | Facility: MEDICAL CENTER | Age: 74
End: 2024-08-21
Payer: MEDICARE

## 2024-08-21 VITALS
BODY MASS INDEX: 40.14 KG/M2 | HEIGHT: 66 IN | DIASTOLIC BLOOD PRESSURE: 92 MMHG | HEART RATE: 70 BPM | TEMPERATURE: 97.8 F | OXYGEN SATURATION: 95 % | SYSTOLIC BLOOD PRESSURE: 130 MMHG | WEIGHT: 249.78 LBS

## 2024-08-21 DIAGNOSIS — R20.2 NUMBNESS AND TINGLING IN LEFT ARM: ICD-10-CM

## 2024-08-21 DIAGNOSIS — M25.811 IMPINGEMENT OF RIGHT SHOULDER: ICD-10-CM

## 2024-08-21 DIAGNOSIS — M54.16 RIGHT LUMBAR RADICULITIS: ICD-10-CM

## 2024-08-21 DIAGNOSIS — M79.10 MYALGIA: ICD-10-CM

## 2024-08-21 DIAGNOSIS — M25.511 CHRONIC RIGHT SHOULDER PAIN: ICD-10-CM

## 2024-08-21 DIAGNOSIS — Z91.81 RISK FOR FALLS: ICD-10-CM

## 2024-08-21 DIAGNOSIS — Z13.31 POSITIVE DEPRESSION SCREENING: ICD-10-CM

## 2024-08-21 DIAGNOSIS — M47.816 LUMBAR SPONDYLOSIS: ICD-10-CM

## 2024-08-21 DIAGNOSIS — M79.642 BILATERAL HAND PAIN: ICD-10-CM

## 2024-08-21 DIAGNOSIS — R20.2 NUMBNESS AND TINGLING OF RIGHT ARM: ICD-10-CM

## 2024-08-21 DIAGNOSIS — R20.0 NUMBNESS AND TINGLING OF RIGHT ARM: ICD-10-CM

## 2024-08-21 DIAGNOSIS — G89.29 CHRONIC PAIN OF RIGHT KNEE: ICD-10-CM

## 2024-08-21 DIAGNOSIS — M79.7 FIBROMYALGIA: ICD-10-CM

## 2024-08-21 DIAGNOSIS — M75.51 SUBACROMIAL BURSITIS OF RIGHT SHOULDER JOINT: ICD-10-CM

## 2024-08-21 DIAGNOSIS — M70.62 GREATER TROCHANTERIC BURSITIS OF BOTH HIPS: ICD-10-CM

## 2024-08-21 DIAGNOSIS — M70.61 GREATER TROCHANTERIC BURSITIS OF BOTH HIPS: ICD-10-CM

## 2024-08-21 DIAGNOSIS — R20.0 NUMBNESS AND TINGLING IN LEFT ARM: ICD-10-CM

## 2024-08-21 DIAGNOSIS — M54.2 CERVICALGIA: ICD-10-CM

## 2024-08-21 DIAGNOSIS — M17.11 LOCALIZED OSTEOARTHRITIS OF RIGHT KNEE: ICD-10-CM

## 2024-08-21 DIAGNOSIS — M79.641 BILATERAL HAND PAIN: ICD-10-CM

## 2024-08-21 DIAGNOSIS — M54.12 CERVICAL RADICULITIS: ICD-10-CM

## 2024-08-21 DIAGNOSIS — G89.29 OTHER CHRONIC PAIN: ICD-10-CM

## 2024-08-21 DIAGNOSIS — M48.061 NEUROFORAMINAL STENOSIS OF LUMBAR SPINE: ICD-10-CM

## 2024-08-21 DIAGNOSIS — R26.89 IMPAIRED GAIT AND MOBILITY: ICD-10-CM

## 2024-08-21 DIAGNOSIS — M25.561 CHRONIC PAIN OF RIGHT KNEE: ICD-10-CM

## 2024-08-21 DIAGNOSIS — G89.29 CHRONIC RIGHT SHOULDER PAIN: ICD-10-CM

## 2024-08-21 PROCEDURE — 3080F DIAST BP >= 90 MM HG: CPT | Performed by: STUDENT IN AN ORGANIZED HEALTH CARE EDUCATION/TRAINING PROGRAM

## 2024-08-21 PROCEDURE — 20611 DRAIN/INJ JOINT/BURSA W/US: CPT | Mod: 50 | Performed by: STUDENT IN AN ORGANIZED HEALTH CARE EDUCATION/TRAINING PROGRAM

## 2024-08-21 PROCEDURE — 1125F AMNT PAIN NOTED PAIN PRSNT: CPT | Performed by: STUDENT IN AN ORGANIZED HEALTH CARE EDUCATION/TRAINING PROGRAM

## 2024-08-21 PROCEDURE — 99214 OFFICE O/P EST MOD 30 MIN: CPT | Mod: 25 | Performed by: STUDENT IN AN ORGANIZED HEALTH CARE EDUCATION/TRAINING PROGRAM

## 2024-08-21 PROCEDURE — 3075F SYST BP GE 130 - 139MM HG: CPT | Performed by: STUDENT IN AN ORGANIZED HEALTH CARE EDUCATION/TRAINING PROGRAM

## 2024-08-21 PROCEDURE — 1170F FXNL STATUS ASSESSED: CPT | Performed by: STUDENT IN AN ORGANIZED HEALTH CARE EDUCATION/TRAINING PROGRAM

## 2024-08-21 RX ORDER — DEXAMETHASONE SODIUM PHOSPHATE 4 MG/ML
4 INJECTION, SOLUTION INTRA-ARTICULAR; INTRALESIONAL; INTRAMUSCULAR; INTRAVENOUS; SOFT TISSUE ONCE
Status: COMPLETED | OUTPATIENT
Start: 2024-08-21 | End: 2024-08-21

## 2024-08-21 RX ADMIN — DEXAMETHASONE SODIUM PHOSPHATE 4 MG: 4 INJECTION, SOLUTION INTRA-ARTICULAR; INTRALESIONAL; INTRAMUSCULAR; INTRAVENOUS; SOFT TISSUE at 09:01

## 2024-08-21 ASSESSMENT — PATIENT HEALTH QUESTIONNAIRE - PHQ9
CLINICAL INTERPRETATION OF PHQ2 SCORE: 6
5. POOR APPETITE OR OVEREATING: 2 - MORE THAN HALF THE DAYS
SUM OF ALL RESPONSES TO PHQ QUESTIONS 1-9: 22

## 2024-08-21 ASSESSMENT — PAIN SCALES - GENERAL: PAINLEVEL: 7=MODERATE-SEVERE PAIN

## 2024-08-21 ASSESSMENT — FIBROSIS 4 INDEX: FIB4 SCORE: 1.51

## 2024-08-21 NOTE — PROCEDURES
Patient Name: Ashly Meyer  : 1950  Date of Service: 2024    Physician/s: Patricia Ho MD    Pre-operative Diagnosis: right and left greater trochanteric bursitis    Post-operative Diagnosis: right and left greater trochanteric bursitis    Procedure: right and left greater trochanteric bursa injection ultrasound-guided    Description of procedure:    The risks, benefits, and alternatives of the procedure were reviewed and discussed with the patient.  Written informed consent was freely obtained. A pre-procedural time-out was conducted by the physician verifying patient’s identity, procedure to be performed, procedure site and side, and allergy verification. Appropriate equipment was determined to be in place for the procedure.     No sedation was used for this procedure.     In the office suite the patient was placed in a lateral position with the left side up, and the skin was prepped and draped in the usual sterile fashion. The ultrasound probe was placed over the left greater trochanter and the target for injection was marked. The skin and subcutaneous tissues were anesthetized with approximately 2 cc of 1% lidocaine.  A 22g 3.5 inch needle was placed into skin and advanced under ultrasound guidance into the greater trochanteric bursa. Following negative aspiration, approx 3mL of 1% lidocaine with 1mL of 4mg/mL dexamethasone was then injected into the bursa, and the needle was subsequently removed intact after being restyleted. The patient's hip was wiped with a 4x4 gauze, the area was cleansed with alcohol prep, and a bandaid was applied. There were no complications noted. The images were saved for permanent storage.     Then the patient was placed in a lateral position with the right side up, and the skin was prepped and draped in the usual sterile fashion. The ultrasound probe was placed over the right greater trochanter and the target for injection was marked. The skin and subcutaneous  tissues were anesthetized with approximately 2 cc of 1% lidocaine. A 22g 3.5 inch needle was placed into skin and advanced under ultrasound guidance into the greater trochanteric bursa. Following negative aspiration, approx 3mL of 1% lidocaine with 1mL of 4mg/mL dexamethasone was then injected into the bursa, and the needle was subsequently removed intact after being restyleted. The patient's hip was wiped with a 4x4 gauze, the area was cleansed with alcohol prep, and a bandaid was applied. There were no complications noted. The images were saved for permanent storage.     Patricia Ho MD  Interventional Pain and Spine  Physical Medicine and Rehabilitation  [facility]

## 2024-08-21 NOTE — PROGRESS NOTES
Follow-up patient Note    Interventional Pain and Spine  Physiatry (Physical Medicine and Rehabilitation)     Patient Name: Ashly Meyer  : 1950  Date of service: 2024    Chief Complaint:   Chief Complaint   Patient presents with    Follow-Up     Chronic pain of right knee       HISTORY FROM INITIAL VISIT (12/15/2023):  Ashly Meyer is a 73 y.o. female who presents today with chronic pain at her low back, shoulders, hips, pelvic region, legs, and feet for decades. Pain is worst at her bilateral groin and greater trochanter. Notes hx of fibromyalgia, neuropathy, gout, R shoulder sx.     Pain right now is 8/10 on the numeric pain scale.  Pain worsens with physical activity and improves only marginally with medication. Her pain significantly interferes with ADLs.      The patient has done physical therapy for this problem, most recently last month. Denies relief from this.     Patient has tried the following medications with varied success (current meds in bold):   Nabumetone - takes the edge off  Gabapentin - takes the edge off of neuropathy  Duloxetine     Therapeutic modalities and interventional therapies to date include:  -left hip and shoulder injections at [facility] - 2-4 weeks relief     Medical history includes hypertension, KERRI, peripheral neuropathy, type 2 diabetes, essential tremor.    HPI  Today's visit   Ashly Meyer ( 1950) is a female with Diagnoses of Greater trochanteric bursitis of both hips, Bilateral hand pain, Risk for falls, Positive depression screening, Chronic pain of right knee, Right lumbar radiculitis, Chronic right shoulder pain, Impingement of right shoulder, Subacromial bursitis of right shoulder joint, Lumbar spondylosis, Numbness and tingling of right arm, Neuroforaminal stenosis of lumbar spine, Cervical radiculitis, Cervicalgia, Myalgia, Numbness and tingling in left arm, Fibromyalgia, and Other chronic pain were pertinent to this visit.    Today  Ashly presents for bilateral greater trochanteric bursa steroid injections.  She reports ongoing pain at her bilateral thumbs and wrists.  She would like to obtain x-rays.  She also reports pain along her paraspinal muscles and would like to consider trigger point injections.     Pain severity 7/10 currently  Pt denies new numbness, tingling, or weakness.    Procedure history:  - 1/5/24 right and left greater trochanteric bursa injection ultrasound-guided - resolution of left sided pain, 50% improvement in right sided pain.   -6/7/2024 right L4-5 and L5-S1 transforaminal epidural steroid injection - 2 weeks of relief  - 08/21/24 right and left greater trochanteric bursa injection ultrasound-guided      ROS:   Red Flags ROS:   Fever, Chills, Sweats: Denies  Involuntary Weight Loss: Denies  Bladder Incontinence: Denies  Bowel Incontinence: denies  Saddle Anesthesia: Denies    All other systems reviewed and negative.     PMHx:   Past Medical History:   Diagnosis Date    Anginal syndrome ([facility])     Anxiety     Arthritis     Everywhere.     Atrophic rhinitis 10/3/2016    Back pain     Bronchitis 08/2021    finished 1st course of ABX to start a 2nd course soon.     Carotid artery stenosis, unilateral     moderate left carotid artery stenosis    Carpal tunnel syndrome 9/5/2011    Chronic pain     Constipation     Controlled type 2 diabetes mellitus without complication ([facility])     states borderline    Controlled type 2 diabetes mellitus without complication, without long-term current use of insulin ([facility]) 6/12/2019    Cough 08/2021    r/t bronchitis    Current severe episode of major depressive disorder without psychotic features without prior episode ([facility]) 6/3/2021    Daytime sleepiness     Degenerative disc disease     Depression 5/20/2009    Deviated nasal septum 8/1/2016    Diarrhea     and constipation    Disease of accessory sinus 10/3/2016    Dizziness     Dry eye syndrome, bilateral     Elevated sedimentation rate      history of negative temporal artery biopsy around 2006    Fatigue     Fatty liver     Fibromyalgia     confirmed by Dr. 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    Hypertension     Hypothyroidism 5/20/2009    Incipient cataract of both eyes     Incomplete rectal prolapse 9/3/2021    Insomnia     Intention tremor 6/2/2022    Migraine without aura, without mention of intractable migraine without mention of status migrainosus     Mild intermittent asthma without complication     inhalers as needed    Mixed dyslipidemia     Morbid obesity with BMI of 45.0-49.9, adult ([facility])     Morning headache     Mumps     Nasal drainage     Nocturnal hypoxia     severe, to 69% O2 sat    Obesity     Obesity hypoventilation syndrome ([facility]) 5/31/2017    Pelvic floor dysfunction     Peripheral neuropathy     Pleomorphic adenoma     Polymyalgia rheumatica ([facility])     Dr. Ann    Restless leg syndrome     Ringing in ears     Rotator cuff syndrome of right shoulder and allied disorders 10/10/2018    S/P tonsillectomy     Seasonal allergies     Skin cancer 1990's    skin    Sleep apnea syndrome     she is not using CPAP, claustrophobia, uses 2-3l at night via concentrator occ 2l during daytime ([facility])    Snoring     Sore muscles     Sweat, sweating, excessive     Swelling of lower extremity     Thyroid carcinoma (HCC) 7/22/2023    Found surgically July 2023    Trochanteric bursitis of both hips 3/3/2022    Urinary incontinence     will not wear a pad    Vision loss     Weakness        PSHx:   Past Surgical History:   Procedure Laterality Date    INJ,EPI ANES/STER  LUM/SAC ADDL Right 6/7/2024    Procedure: RIGHT L4-5 and L5-S1 transforaminal epidural steroid injection with fluoroscopic guidance;  Surgeon: Patricia Ho M.D.;  Location[facility];  Service: Pain Management    THYROIDECTOMY TOTAL Right 8/23/2023    Procedure: RIGHT COMPLETION THYROIDECTOMY;  Surgeon: Michael Mancuso M.D.;  Location: SURGERY SAME DAY [facility];  Service: Ent    THYROIDECTOMY Left 7/17/2023    Procedure: LEFT ELOY- THYROIDECTOMY;  Surgeon: Michael Mancuso M.D.;  Location: SURGERY SAME DAY [facility];  Service: Ent    IA UPPER GI ENDOSCOPY,DIAGNOSIS N/A 08/26/2021    Procedure: GASTROSCOPY;  Surgeon: Marty Lopez M.D.;  Location: [facility];  Service: Gastroenterology    IA COLONOSCOPY,DIAGNOSTIC  08/26/2021    Procedure: COLONOSCOPY - WITH BIOPSIES.;  Surgeon: Marty Lopez M.D.;  Location: [facility];  Service: Gastroenterology    SHOULDER DECOMPRESSION ARTHROSCOPIC Right 02/01/2019    Procedure: SHOULDER DECOMPRESSION ARTHROSCOPIC - SUBACROMIAL, LABRAL DEBRIDEMENT;  Surgeon: Damaris Knutson M.D.;  Location: [facility];  Service: Orthopedics    SHOULDER ARTHROSCOPY W/ BICIPITAL TENODESIS REPAIR Right 02/01/2019    Procedure: SHOULDER ARTHROSCOPY W/ BICIPITAL TENOTOMY;  Surgeon: Damaris Knutson M.D.;  Location: [facility];  Service: Orthopedics    CARPAL TUNNEL RELEASE Right 02/01/2019    Procedure: CARPAL TUNNEL RELEASE;  Surgeon: Damaris Knutson M.D.;  Location: [facility];  Service: Orthopedics    GASTROSCOPY  02/06/2017    Procedure: GASTROSCOPY;  Surgeon: Pau Foster M.D.;  Location: SURGERY SAME DAY [facility];  Service:     COLONOSCOPY  02/06/2017    Procedure: COLONOSCOPY;  Surgeon: Pau Foster M.D.;  Location: SURGERY SAME DAY [facility];  Service:     SEPTAL RECONSTRUCTION  10/03/2016    Procedure: SEPTAL RECONSTRUCTION with  grafts ;  Surgeon: PIETER Dickson  M.D.;  Location: SURGERY SAME DAY [facility];  Service:     SEPTOPLASTY N/A 08/01/2016    Procedure: SEPTOPLASTY;  Surgeon: PIETER Dickson M.D.;  Location: SURGERY SAME DAY [facility];  Service:     TURBINOPLASTY Bilateral 08/01/2016    Procedure: TURBINOPLASTY;  Surgeon: PIETER Dickson M.D.;  Location: SURGERY SAME DAY [facility];  Service:     NASAL FRACTURE REDUCTION OPEN N/A 08/01/2016    Procedure: SEPTAL FRACTURE REDUCTION OPEN;  Surgeon: PIETER Dickson M.D.;  Location: SURGERY SAME DAY [facility];  Service:     FINE NEEDLE ASPIRATION  11/16/2009    Performed by DA CALLOWAY at ENDOSCOPY [facility]    COLONOSCOPY  2006    ? unclear date    OTHER SURGICAL PROCEDURE  1998    vaginal wall repair    BLADDER SUSPENSION  1983    HYSTERECTOMY, VAGINAL  1983    ovaries are present, excessive bleeding    TUBAL LIGATION  1980    MASS EXCISION GENERAL Right 1956    had carbuncle removal R thigh    TONSILLECTOMY  1953    ANAL SPHINCTEROTOMY      anal muscle repair    CATARACT EXTRACTION WITH IOL Bilateral     CHOLECYSTECTOMY      HYSTERECTOMY LAPAROSCOPY      OTHER ORTHOPEDIC SURGERY  1962/1980/1983/1985    foot surgery     SCAR REVISION Right     thigh scar        Family Hx:   Family History   Problem Relation Age of Onset    Heart Disease Mother         Arotic vaulve replacement    GI Disease Mother         bile duct problem    Bladder cancer Mother     GI Disease Sister         celiac    Arthritis Sister     Other Sister         gout and lupus    Arthritis Sister     Kidney Disease Sister     GI Disease Sister     Bladder cancer Maternal Aunt     Arthritis Maternal Grandmother     Hypertension Maternal Grandmother     Heart Disease Maternal Grandmother     Hypertension Maternal Grandfather     Heart Disease Maternal Grandfather     Arthritis Maternal Grandfather     No Known Problems Paternal Grandmother     No Known Problems Paternal Grandfather     Cancer Brother         Larynx     "Cancer Daughter         non hopskins limphoma    GI Disease Daughter     Bipolar disorder Daughter     Seizures Daughter     Bipolar disorder Daughter        Social Hx:  Social History     Socioeconomic History    Marital status:      Spouse name: Not on file    Number of children: Not on file    Years of education: Not on file    Highest education level: Not on file   Occupational History    Not on file   Tobacco Use    Smoking status: Former     Current packs/day: 0.00     Average packs/day: 1 pack/day for 45.2 years (45.2 ttl pk-yrs)     Types: Cigarettes     Start date:      Quit date: 3/1/2007     Years since quittin.4    Smokeless tobacco: Never    Tobacco comments:     Started smoking at age 15, continued abstinance    Vaping Use    Vaping status: Never Used   Substance and Sexual Activity    Alcohol use: Not Currently    Drug use: Not Currently     Comment: CBD topical pain cream    Sexual activity: Not Currently     Partners: Male   Other Topics Concern     Service No    Blood Transfusions No    Caffeine Concern Not Asked    Occupational Exposure No    Hobby Hazards No    Sleep Concern No    Stress Concern No    Weight Concern No    Special Diet No    Back Care No    Exercise No    Bike Helmet No    Seat Belt Yes    Self-Exams No   Social History Narrative    Not on file     Social Determinants of Health     Financial Resource Strain: Not on file   Food Insecurity: Not on file   Transportation Needs: Not on file   Physical Activity: Not on file   Stress: Not on file   Social Connections: Not on file   Intimate Partner Violence: Not on file   Housing Stability: Not on file       Allergies:  Allergies   Allergen Reactions    Ace Inhibitors Cough    Benadryl Allergy Hives     Hot skin itching    Iodine Nausea     Pt received CT w/ contrast 22 pt had nausea post contrast     Lamictal Rash and Swelling     \"hot\", unable to function.  Severe rash, scarring    Savella " "[Kdc:Milnacipran+Ci Pigment Blue 63] Nausea, Swelling and Anxiety     Swelling, bone and muscle pain, sweating, nausea, dizziness, sleeplessness, anxiety    Savella [Milnacipran Hcl] Unspecified     Muscle aches, feet swelling    Sulfa Drugs Hives, Shortness of Breath and Anxiety     Hard breathing, shortness of breath    Butalbital-Acetaminophen Unspecified     Dizzy, can't walk, hard to focus    Cefaclor Nausea and Unspecified     Ceclor causes light headedness and dizziness    Morphine Itching     \"Redness\"    Penicillins Itching and Swelling     Skin itching and redness    Tizanidine Hcl Nausea     \"Dizziness\" hard to focus    Milnacipran Vomiting and Nausea    Amlactin Rash and Itching     Lotion causes itching, redness and burnng    Tape Rash and Itching     Skin tears, paper tape is OK per Pt       Medications: reviewed on epic.   Outpatient Medications Marked as Taking for the 8/21/24 encounter (Office Visit) with Patricia Ho M.D.   Medication Sig Dispense Refill    allopurinol (ZYLOPRIM) 300 MG Tab Take 1 Tablet by mouth every day. 90 Tablet 3    sucralfate (CARAFATE) 1 GM Tab Take 1 Tablet by mouth 3 times a day before meals. 90 Tablet 1    tamsulosin (FLOMAX) 0.4 MG capsule Take 0.4 mg by mouth every day.      doxycycline (VIBRAMYCIN) 100 MG Tab Take 1 Tablet by mouth 2 times a day for 7 days. 14 Tablet 0    ARIPiprazole (ABILIFY) 2 MG tablet Take 1 Tablet by mouth every day. 90 Tablet 3    atorvastatin (LIPITOR) 40 MG Tab Take 1 Tablet by mouth every evening. 90 Tablet 3    DULoxetine (CYMBALTA) 60 MG Cap DR Particles delayed-release capsule Take 1 Capsule by mouth every evening. 90 Capsule 3    gabapentin (NEURONTIN) 800 MG tablet Take 1 Tablet by mouth 2 times a day. 180 Tablet 3    QUEtiapine (SEROQUEL) 25 MG Tab TAKE ONE TABLET BY MOUTH AT BEDTIME 90 Tablet 2    aliskiren (TEKTURNA) 150 MG tablet Take 150 mg by mouth every day.      pantoprazole (PROTONIX) 40 MG Tablet Delayed Response Take 1 " Tablet by mouth 2 times a day. 180 Tablet 3    levothyroxine (SYNTHROID) 137 MCG Tab TAKE ONE TABLET BY MOUTH EVERY MORNING ON AN EMPTY STOMACH 90 Tablet 2    calcitonin, salmon, (MIACALCIN) 200 UNIT/ACT Solution Administer 1 Spray into affected nostril(S) every day.      albuterol 108 (90 Base) MCG/ACT Aero Soln inhalation aerosol Inhale 2 Puffs every 6 hours as needed for Shortness of Breath. 8.5 g 11    Cholecalciferol (VITAMIN D-3) 125 MCG (5000 UT) Tab Take 5,000 Units by mouth every evening.      VITAMIN E PO Take 1 Capsule by mouth every morning.          Current Outpatient Medications on File Prior to Visit   Medication Sig Dispense Refill    allopurinol (ZYLOPRIM) 300 MG Tab Take 1 Tablet by mouth every day. 90 Tablet 3    sucralfate (CARAFATE) 1 GM Tab Take 1 Tablet by mouth 3 times a day before meals. 90 Tablet 1    tamsulosin (FLOMAX) 0.4 MG capsule Take 0.4 mg by mouth every day.      doxycycline (VIBRAMYCIN) 100 MG Tab Take 1 Tablet by mouth 2 times a day for 7 days. 14 Tablet 0    ARIPiprazole (ABILIFY) 2 MG tablet Take 1 Tablet by mouth every day. 90 Tablet 3    atorvastatin (LIPITOR) 40 MG Tab Take 1 Tablet by mouth every evening. 90 Tablet 3    DULoxetine (CYMBALTA) 60 MG Cap DR Particles delayed-release capsule Take 1 Capsule by mouth every evening. 90 Capsule 3    gabapentin (NEURONTIN) 800 MG tablet Take 1 Tablet by mouth 2 times a day. 180 Tablet 3    QUEtiapine (SEROQUEL) 25 MG Tab TAKE ONE TABLET BY MOUTH AT BEDTIME 90 Tablet 2    aliskiren (TEKTURNA) 150 MG tablet Take 150 mg by mouth every day.      pantoprazole (PROTONIX) 40 MG Tablet Delayed Response Take 1 Tablet by mouth 2 times a day. 180 Tablet 3    levothyroxine (SYNTHROID) 137 MCG Tab TAKE ONE TABLET BY MOUTH EVERY MORNING ON AN EMPTY STOMACH 90 Tablet 2    calcitonin, salmon, (MIACALCIN) 200 UNIT/ACT Solution Administer 1 Spray into affected nostril(S) every day.      albuterol 108 (90 Base) MCG/ACT Aero Soln inhalation aerosol  "Inhale 2 Puffs every 6 hours as needed for Shortness of Breath. 8.5 g 11    Cholecalciferol (VITAMIN D-3) 125 MCG (5000 UT) Tab Take 5,000 Units by mouth every evening.      VITAMIN E PO Take 1 Capsule by mouth every morning.       No current facility-administered medications on file prior to visit.         EXAMINATION     Physical Exam:   BP (!) 130/92 (BP Location: Right arm, Patient Position: Sitting, BP Cuff Size: Adult)   Pulse 70   Temp 36.6 °C (97.8 °F) (Temporal)   Ht 1.676 m (5' 6\")   Wt 113 kg (249 lb 12.5 oz)   SpO2 95%     Constitutional:   Body Habitus: Body mass index is 40.32 kg/m².  Cooperation: Fully cooperates with exam  Appearance: Well-groomed, well-nourished.    Eyes: No scleral icterus to suggest severe liver disease, no proptosis to suggest severe hyperthyroidism    ENT -no obvious auditory deficits, no noticeable facial droop     Skin -no rashes or lesions noted     Respiratory-  breathing comfortably on room air, no audible wheezing    Cardiovascular-distal extremities warm and well perfused.  No lower extremity edema is noted.     Gastrointestinal - no obvious abdominal masses, non-distended    Psychiatric- alert and oriented ×3. Normal affect.     Musculoskeletal  tenderness to palpation at bilateral paraspinal muscles along length of spine with hypersensitivity to light touch    Negative bilateral CMC grind test.  Tenderness to palpation at bilateral first MCP joint.    Tenderness to palpation of bilateral greater trochanteric bursae      Previous exam  Thoracic/Lumbar Spine/Sacral Spine/Hips   Tenderness to palpation at the right patellar facets.  Active range of motion equal on both sides. No obvious effusion at right knee compared to left.    Inspection: No evidence of atrophy in bilateral lower extremities throughout      There is limited active range of motion with lumbar extension     Facet loading maneuver positive on right, negative on left     Lumbar spine /hip provocative " exam maneuvers  Straight leg raise negative bilaterally  FADIR test negative bilaterally     SI joint tests  SYLVIA test negative bilaterally  Thigh thrust test negative bilaterally     Key points for the international standards for neurological classification of spinal cord injury (ISNCSCI) to light touch.   Dermatome R L   L2 2 2   L3 2 2   L4 2 2   L5 2 2   S1 2 2   S2 2 2         Motor Exam Lower Extremities  ? Myotome R L   Hip flexion L2 5 5   Knee extension L3 5 5   Ankle dorsiflexion L4 5 5   Toe extension L5 5 5   Ankle plantarflexion S1 5 5      Cervical spine   Inspection: No deformities of the skin over the cervical spine. No rashes or lesions.    Spurling's sign  negative bilaterally  Cervical facet loading maneuver  negative bilaterally    No signs of muscular atrophy in bilateral upper extremities     Tenderness to palpation at paracervical muscles bilaterally, cervical facets bilaterally, and upper trapezius bilaterally.     Key points for the international standards for neurological classification of spinal cord injury (ISNCSCI) to light touch.     Dermatome R L   C4 1 1   C5 2 2   C6 2 2   C7 1 1   C8 1 1   T1 2 2   T2 2 2       Motor Exam Upper Extremities   ? Myotome R L   Shoulder abduction C5 5 5   Elbow flexion C5 5 5   Wrist extension C6 5 5   Elbow extension C7 5 5   Finger flexion C8 5 5   Finger abduction T1 5 5     tenderness to palpation at   Shoulder girdle, left  Shoulder girdle, right  Upper arm, left  Upper arm, right  Lower arm, left  Lower arm, right  Hip (buttock, trochanter), left  Hip (buttock, trochanter), right  Upper leg, left  Upper leg, right  Lower leg, left  Lower leg, right  Upper back  Lower back  Neck    MEDICAL DECISION MAKING    Medical records review: see under HPI section.     DATA    Labs: No new labs available for review since last visit.   Lab Results   Component Value Date/Time    SODIUM 142 08/19/2024 04:23 PM    POTASSIUM 4.1 08/19/2024 04:23 PM    CHLORIDE  102 08/19/2024 04:23 PM    CO2 24 08/19/2024 04:23 PM    ANION 16.0 08/19/2024 04:23 PM    GLUCOSE 108 (H) 08/19/2024 04:23 PM    BUN 24 (H) 08/19/2024 04:23 PM    CREATININE 0.95 08/19/2024 04:23 PM    CREATININE 0.84 04/18/2011 03:41 PM    CALCIUM 9.2 08/19/2024 04:23 PM    ASTSGOT 21 08/19/2024 04:23 PM    ALTSGPT 22 08/19/2024 04:23 PM    TBILIRUBIN 0.9 08/19/2024 04:23 PM    ALBUMIN 4.2 08/19/2024 04:23 PM    ALBUMIN 4.51 07/22/2015 02:28 PM    TOTPROTEIN 7.8 08/19/2024 04:23 PM    TOTPROTEIN 7.90 07/22/2015 02:28 PM    GLOBULIN 3.6 (H) 08/19/2024 04:23 PM    AGRATIO 1.2 08/19/2024 04:23 PM    AGRATIO 1.1 04/18/2011 03:41 PM       Lab Results   Component Value Date/Time    PROTHROMBTM 13.8 11/16/2022 11:03 AM    INR 1.07 11/16/2022 11:03 AM        Lab Results   Component Value Date/Time    WBC 11.9 (H) 08/19/2024 04:23 PM    WBC 10.8 (H) 10/06/2009 08:45 AM    RBC 4.89 08/19/2024 04:23 PM    RBC 4.58 10/06/2009 08:45 AM    HEMOGLOBIN 14.6 08/19/2024 04:23 PM    HEMATOCRIT 46.0 08/19/2024 04:23 PM    MCV 94.1 08/19/2024 04:23 PM    MCV 91 10/06/2009 08:45 AM    MCH 29.9 08/19/2024 04:23 PM    MCH 30.5 10/06/2009 08:45 AM    MCHC 31.7 (L) 08/19/2024 04:23 PM    MPV 10.6 08/19/2024 04:23 PM    NEUTSPOLYS 64.60 08/19/2024 04:23 PM    LYMPHOCYTES 25.50 08/19/2024 04:23 PM    MONOCYTES 7.60 08/19/2024 04:23 PM    EOSINOPHILS 1.80 08/19/2024 04:23 PM    BASOPHILS 0.30 08/19/2024 04:23 PM    HYPOCHROMIA 1+ 02/04/2014 11:38 AM    ANISOCYTOSIS 1+ 08/10/2011 08:20 PM        Lab Results   Component Value Date/Time    HBA1C 6.4 (H) 04/26/2024 11:10 AM        Imaging:   I personally reviewed following images, these are my reads  X-ray right knee 5/24/2024  Mild medial compartment OA. See formal radiology report for further details.        X-ray pelvis 2/25/2022  Mild OA of left hip joint.  No significant OA of right hip joint.  Bilateral SI joint sclerosis. See formal radiology report for further details.     XR hip  12/15/23  No significant OA of either hip.    MRI cervical spine 1/22/2022  At C3-4 there is severe right neuroforaminal stenosis.  At C4-5 there is severe left neuroforaminal stenosis.  At C5-6 there is severe bilateral neuroforaminal stenosis.  At C6-7 there is moderate bilateral neuroforaminal stenosis. See formal radiology report for further details.    MRI lumbar spine 5/5/2024  At L5-S1 there is mild bilateral neuroforaminal stenosis.  At L4-5 there is moderate right and mild left neuroforaminal stenosis.  At L3-4 there is moderate to severe bilateral neuroforaminal stenosis.  At L2-3 there is mild to moderate right and moderate left neuroforaminal stenosis.  Lumbar spondylosis at bilateral lower levels. See formal radiology report for further details.      IMAGING radiology reads. I reviewed the following radiology reads    MRI lumbar spine 5/5/2024  FINDINGS: The lowest formed intervertebral disc will be designated L5-S1 for the purposes of this report and vertebral levels numbered accordingly.        The lumbar vertebral bodies have unremarkable height and no gross malalignment.  No acute or suspicious osseous lesion is seen.  There is mild loss of intervertebral disc height at L1-L2. There is moderate loss of intervertebral disc height with adjacent Modic type I/II degenerative endplate changes at L2-L3. There is mild to moderate loss of intervertebral disc height at L3-L4.   There is moderate loss of intervertebral disc height with adjacent Modic type I/III endplate changes at L4-L5. There is severe loss of intervertebral disc height at L5-S1.  The conus medullaris has a normal caliber course and signal intensity.     Level specific findings as follows:     L1-2: Diffuse disc bulge. Moderate RIGHT and mild LEFT facet arthropathy. Mild central canal narrowing. Moderate RIGHT and mild LEFT neural foraminal narrowing.  L2-3: Diffuse disc bulge. Moderate BILATERAL facet arthropathy. Moderate central canal  narrowing. Mild to moderate RIGHT and moderate LEFT neural foraminal narrowing.  L3-4: Diffuse disc bulge. Severe RIGHT and moderate LEFT facet arthropathy. Severe central canal narrowing. Moderate to severe BILATERAL neural foraminal narrowing.  L4-5: Diffuse disc bulge. Severe RIGHT and moderate LEFT facet arthropathy. Moderate central canal narrowing. Moderate RIGHT and mild LEFT neural foraminal narrowing.  L5-S1: Diffuse disc bulge mostly lateral and anterior with associated osteophytes. Moderate BILATERAL facet arthropathy. Mild BILATERAL neural foraminal narrowing.     There are changes of BILATERAL sacroiliac arthropathy.           Soft tissues: No abdominal aortic aneurysm or soft tissue mass is seen.     IMPRESSION:     1.  Multilevel multifactorial degenerative changes with interval progression since the prior study from March 8, 2017  2.  Severe central canal narrowing at L3-L4  3.  Areas of central canal and neural foraminal narrowing as described above     Results for orders placed during the hospital encounter of 06/04/14     MR-BRAIN-WITH & W/O     Impression  1.  No evidence of acute territorial infarct, intracranial hemorrhage or mass lesion.  2.  Mild diffuse cerebral and cerebellar substance loss.    Results for orders placed during the hospital encounter of 12/20/22     MR-BRAIN-W/O     Impression  1.  No acute abnormality.  2.  Mild chronic microvascular ischemic disease.             Results for orders placed during the hospital encounter of 01/22/22     MR-CERVICAL SPINE-W/O     Impression  1.  Multifocal degenerative disease in the cervical spine as described above.  2.  Severe right lateral recess and right C4 neural foraminal stenosis.  3.  Severe left C5 neural foraminal stenosis.  4.  There is an approximately 9 cm sized left thyroid nodule. This is increased since the previous study. Ultrasound-guided fine-needle aspiration is recommended.  .      Results for orders placed during the  hospital encounter of 03/16/17     MR-LUMBAR SPINE-W/O     Impression  1.  Severe discal and moderate endplate degenerative changes at the L5-S1 level. Minimal endplate degenerative changes noted at multiple levels in the lumbar spine.     2.  Mild lumbar spondylotic changes at the L4-5 level with minimal lumbar spondylotic changes at the L3-4 level.     3.  Minimal cyst cephalad subligamentous disc extrusion at the L2-3 level causing minimal effacement of the ventral surface of thecal sac.     4.  Borderline central canal stenosis at the L2-3 and L4-5 level secondary to facet arthropathy.      Results for orders placed during the hospital encounter of 03/16/17     MR-THORACIC SPINE-W/O     Impression  1. Minimal thoracic spondylotic changes at the T5-6, T6-7, and a T8-9 levels.     2. Mild discal degenerative changes in the mid and lower thoracic spine.    Results for orders placed during the hospital encounter of 03/16/17     MR-LUMBAR SPINE-W/O     Impression  1.  Severe discal and moderate endplate degenerative changes at the L5-S1 level. Minimal endplate degenerative changes noted at multiple levels in the lumbar spine.     2.  Mild lumbar spondylotic changes at the L4-5 level with minimal lumbar spondylotic changes at the L3-4 level.     3.  Minimal cyst cephalad subligamentous disc extrusion at the L2-3 level causing minimal effacement of the ventral surface of thecal sac.     4.  Borderline central canal stenosis at the L2-3 and L4-5 level secondary to facet arthropathy.      Results for orders placed during the hospital encounter of 06/04/14     MR-MRA NECK-W/O     Impression  Unremarkable MR angiogram of the carotid arteries and vertebral basilar system.       Results for orders placed during the hospital encounter of 08/16/10     MR-ORBITS, FACE, NECK-WITH & W/O AND SEQUENCES                              Results for orders placed during the hospital encounter of 01/19/19     DX-ANKLE 3+ VIEWS LEFT      Impression  No evidence of fracture or dislocation.     Soft tissue swelling.   Results for orders placed during the hospital encounter of 11/02/21     DX-CERVICAL SPINE-2 OR 3 VIEWS     Impression  1.  There is mild degenerative disc disease and arthropathy at the C5-6 and C6-7 levels.     Results for orders placed in visit on 07/26/18     DX-CHEST-2 VIEWS     Impression  Low lung volumes with bibasilar atelectasis. No focal consolidation or pleural effusions.     Results for orders placed during the hospital encounter of 08/10/19     DX-ELBOW-COMPLETE 3+ RIGHT     Impression  Findings suspicious for nondisplaced radial neck fracture. Correlate with point tenderness. Follow-up radiographs in 7-10 days may be performed.     Results for orders placed during the hospital encounter of 12/20/22     DX-FOOT-2- RIGHT     Impression  No acute osseous abnormality.   Results for orders placed in visit on 10/29/13     DX-FOOT-COMPLETE 3+     Impression  Proximal phalanx right fourth toe fracture.     INTERPRETING LOCATION:  [address] [place] [address] GREG RANK[place], [id number]   Results for orders placed during the hospital encounter of 12/06/18     DX-FOREARM RIGHT     Impression  Unremarkable RIGHT forearm.       Results for orders placed during the hospital encounter of 11/17/15     DX-HIP-COMPLETE - UNILATERAL 2+ RIGHT     Impression  1. No evidence of acute fracture or dislocation.     2. Mild degenerative changes.   Results for orders placed during the hospital encounter of 09/02/11     DX-HIPS-COMPLETE - BILATERAL 3+     Impression  Minimally sclerotic hip joints.  No acute fracture identified.      Results for orders placed in visit on 09/23/13     DX-KNEES-AP BILATERAL STANDING     Impression  Negative standing knee radiograph.     INTERPRETING LOCATION: [address] [place] [address] GREG RANKIN, [id number]   Results for orders placed in visit on 05/21/12     DX-KNEE COMPLETE 4+     Impression  Unremarkable right knee series.   Results for orders placed during  the hospital encounter of 12/06/15     DX-LUMBAR SPINE-2 OR 3 VIEWS     Impression  1.  There has been progression of L4-5 and L5-S1 disc disease and facet disease, right greater than left.    Results for orders placed during the hospital encounter of 22     DX-PELVIS-1 OR 2 VIEWS     Impression  1.  Marked degenerative changes lower lumbar spine and mildly sclerotic SI joints.     2.  Mild degenerative changes right hip joint.           Results for orders placed during the hospital encounter of 08/10/19     DX-SHOULDER 2+ RIGHT     Impression  No acute osseous abnormality.       Results for orders placed during the hospital encounter of 08/10/19     DX-WRIST-COMPLETE 3+ RIGHT     Impression  Acute-appearing triquetral fracture.              Diagnosis  Visit Diagnoses     ICD-10-CM   1. Greater trochanteric bursitis of both hips  M70.61    M70.62   2. Bilateral hand pain  M79.641    M79.642   3. Risk for falls  Z91.81   4. Positive depression screening  Z13.31   5. Chronic pain of right knee  M25.561    G89.29   6. Right lumbar radiculitis  M54.16   7. Chronic right shoulder pain  M25.511    G89.29   8. Impingement of right shoulder  M25.811   9. Subacromial bursitis of right shoulder joint  M75.51   10. Lumbar spondylosis  M47.816   11. Numbness and tingling of right arm  R20.0    R20.2   12. Neuroforaminal stenosis of lumbar spine  M48.061   13. Cervical radiculitis  M54.12   14. Cervicalgia  M54.2   15. Myalgia  M79.10   16. Numbness and tingling in left arm  R20.0    R20.2   17. Fibromyalgia  M79.7   18. Other chronic pain  G89.29             ASSESSMENT AND PLAN:  Ashly Meyer (: 1950) is a female with diffuse chronic pain likely overall consistent with fibromyalgia pain, with also component of bilateral greater trochanteric bursitis, myalgia pain, and right intra-articular knee pain     Ashly was seen today for follow-up.    Diagnoses and all orders for this visit:    Greater trochanteric  bursitis of both hips  -     Consent for all Surgical, Special Diagnostic or Therapeutic Procedures    Bilateral hand pain  -     Cancel: DX-JOINT SURVEY-HANDS SINGLE VIEW; Future  -     DX-HAND 3+ RIGHT  -     DX-HAND 3+ LEFT    Risk for falls  -     Patient identified as fall risk.  Appropriate orders and counseling given.    Positive depression screening  -     Patient has been identified as having a positive depression screening. Appropriate orders and counseling have been given.    Chronic pain of right knee    Right lumbar radiculitis    Chronic right shoulder pain    Impingement of right shoulder    Subacromial bursitis of right shoulder joint    Lumbar spondylosis    Numbness and tingling of right arm    Neuroforaminal stenosis of lumbar spine    Cervical radiculitis    Cervicalgia    Myalgia    Numbness and tingling in left arm    Fibromyalgia    Other chronic pain    Other orders  -     dexamethasone (Decadron) injection 4 mg  -     dexamethasone (Decadron) injection 4 mg              PLAN  Physical Therapy: Previously referred to physical therapy.  Recently discharged due to plateaued improvement    Diagnostic workup: Order x-ray hands    Medications:   - continue gabapentin, and duloxetine  - s/p discontinuation of nabumetone without exacerbation of pain.  Discussed that at this time I do not feel that resuming nabumetone would significantly improve her pain.  She reports she had been taking nabumetone for decades  -Discussed trial of low-dose naltrexone for neuropathic pain, patient elected to defer due to cost of medication    Interventions:   - s/p right L4-5 and L5-S1 transforaminal epidural steroid injection with no significant relief  -Discussed right knee steroid injection under ultrasound guidance.  Consider this depending on her degree of pain after bilateral greater trochanteric bursa steroid injections  -  b/l greater trochanteric bursa injections under ultrasound guidance today given  recurrence of pain.  With this injection, she previously had 100% improvement in pain on her left and 50% improvement in pain on her right. The risks, benefits, and alternatives to this procedure were discussed and the patient wishes to proceed with the procedure. Risks include but are not limited to damage to surrounding structures, infection, bleeding, worsening of pain which can be permanent, and weakness which can be permanent. Benefits include pain relief and improved function. Alternatives include not doing the procedure.    - Trigger point injections under ultrasound guidance targeting bilateral paraspinal muscle pain. The risks, benefits, and alternatives to this procedure were discussed and the patient wishes to proceed with the procedure. Risks include but are not limited to damage to surrounding structures, infection, bleeding, worsening of pain which can be permanent, and collapsed lung. Benefits include pain relief and improved function. Alternatives include not doing the procedure.  - Patient reportedly had hip injections with an outside provider with relief lasting 2-4 weeks at a time     Follow-up: Next available for trigger point injections for bilateral paraspinal muscle pain, plan to review x-ray hands at that visit    Orders Placed This Encounter    DX-HAND 3+ RIGHT    DX-HAND 3+ LEFT    Patient has been identified as having a positive depression screening. Appropriate orders and counseling have been given.    Patient identified as fall risk.  Appropriate orders and counseling given.    dexamethasone (Decadron) injection 4 mg    dexamethasone (Decadron) injection 4 mg    Consent for all Surgical, Special Diagnostic or Therapeutic Procedures       Patricia Ho MD  Interventional Pain and Spine  Physical Medicine and Rehabilitation  [facility]      The above note documents my personal evaluation of this patient. In addition, I have reviewed and confirmed with the patient and MA the  supportive information documented in today's Patient Health Questionnaire and Office Note.     Please note that this dictation was created using voice recognition software. I have made every reasonable attempt to correct obvious errors, but I expect that there are errors of grammar and possibly content that I did not discover before finalizing the note.

## 2024-08-22 DIAGNOSIS — N64.4 PAIN OF BOTH BREASTS: ICD-10-CM

## 2024-08-26 ENCOUNTER — APPOINTMENT (OUTPATIENT)
Dept: PHYSICAL THERAPY | Facility: REHABILITATION | Age: 74
End: 2024-08-26
Attending: ORTHOPAEDIC SURGERY
Payer: MEDICARE

## 2024-08-27 ENCOUNTER — HOSPITAL ENCOUNTER (OUTPATIENT)
Dept: RADIOLOGY | Facility: MEDICAL CENTER | Age: 74
End: 2024-08-27
Attending: STUDENT IN AN ORGANIZED HEALTH CARE EDUCATION/TRAINING PROGRAM
Payer: MEDICARE

## 2024-08-27 PROCEDURE — 73130 X-RAY EXAM OF HAND: CPT | Mod: LT

## 2024-08-27 PROCEDURE — 73130 X-RAY EXAM OF HAND: CPT | Mod: RT

## 2024-08-28 ENCOUNTER — OFFICE VISIT (OUTPATIENT)
Dept: PHYSICAL MEDICINE AND REHAB | Facility: MEDICAL CENTER | Age: 74
End: 2024-08-28
Payer: MEDICARE

## 2024-08-28 VITALS
WEIGHT: 246.91 LBS | HEART RATE: 77 BPM | SYSTOLIC BLOOD PRESSURE: 168 MMHG | TEMPERATURE: 97.9 F | BODY MASS INDEX: 39.68 KG/M2 | OXYGEN SATURATION: 92 % | HEIGHT: 66 IN | DIASTOLIC BLOOD PRESSURE: 84 MMHG

## 2024-08-28 DIAGNOSIS — M79.10 MYALGIA: ICD-10-CM

## 2024-08-28 ASSESSMENT — PATIENT HEALTH QUESTIONNAIRE - PHQ9: CLINICAL INTERPRETATION OF PHQ2 SCORE: 0

## 2024-08-28 ASSESSMENT — FIBROSIS 4 INDEX: FIB4 SCORE: 1.51

## 2024-08-28 ASSESSMENT — PAIN SCALES - GENERAL: PAINLEVEL: 7=MODERATE-SEVERE PAIN

## 2024-08-28 NOTE — PROCEDURES
Patient Name: Ashly Meyer  : 1950  Date of Service: 2024    Physician/s: Patricia Ho MD    Pre-operative Diagnosis: Myalgia (M79.1)    Post-operative Diagnosis: Myalgia (M79.1)    Procedure: trigger point injections of the following muscles: bilateral upper trapezius, R gluteus yue, bilateral lumbar paraspinal muscles    Description of procedure:    The risks, benefits, and alternatives of the procedure were reviewed and discussed with the patient.  Written informed consent was freely obtained. A pre-procedural time-out was conducted by the physician verifying patient’s identity, procedure to be performed, procedure site and side, and allergy verification. Appropriate equipment was determined to be in place for the procedure.     In the office suite exam room the patient was placed in a prone position and the skin areas for injection over the above muscles were marked. A total of 9 areas of pain were identified for injection. The areas of pain were then prepped and draped in the usual sterile fashion. A solution was prepared with 5 mL of 1% lidocaine and 5 mL of 0.5% bupivacaine. Ultrasound was confirmed to view the adjacent structures for blood vessels and nerves and to confirm the needle path was not within the structures. A 27g needle was placed into each of the markings at the areas above under ultrasound guidance with an out of plane approach. After negative aspiration, approximately 0.8-1.5 mL of the above solution was injected. The needle was removed intact after each trigger point injection, and the patient's back was covered with a 4x4 gauze, the area was cleansed with sterile normal saline, and a dressing was applied. There were no complications noted. The images were uploaded to our media tab for permanent storage.    Patricia Ho MD  Interventional Pain and Spine  Physical Medicine and Rehabilitation  [facility]

## 2024-08-30 DIAGNOSIS — J40 BRONCHITIS: ICD-10-CM

## 2024-08-30 RX ORDER — DOXYCYCLINE HYCLATE 100 MG
100 TABLET ORAL 2 TIMES DAILY
Qty: 14 TABLET | Refills: 0 | Status: SHIPPED | OUTPATIENT
Start: 2024-08-30 | End: 2024-09-06

## 2024-09-03 ENCOUNTER — APPOINTMENT (OUTPATIENT)
Dept: RADIOLOGY | Facility: MEDICAL CENTER | Age: 74
End: 2024-09-03
Attending: FAMILY MEDICINE
Payer: MEDICARE

## 2024-09-04 DIAGNOSIS — E03.4 HYPOTHYROIDISM DUE TO ACQUIRED ATROPHY OF THYROID: ICD-10-CM

## 2024-09-05 RX ORDER — LEVOTHYROXINE SODIUM 137 UG/1
137 TABLET ORAL
Qty: 90 TABLET | Refills: 2 | Status: SHIPPED | OUTPATIENT
Start: 2024-09-05

## 2024-09-11 ENCOUNTER — APPOINTMENT (OUTPATIENT)
Dept: PHYSICAL THERAPY | Facility: REHABILITATION | Age: 74
End: 2024-09-11
Attending: ORTHOPAEDIC SURGERY
Payer: MEDICARE

## 2024-09-16 ENCOUNTER — HOSPITAL ENCOUNTER (OUTPATIENT)
Dept: RADIOLOGY | Facility: MEDICAL CENTER | Age: 74
End: 2024-09-16
Attending: FAMILY MEDICINE
Payer: MEDICARE

## 2024-09-16 DIAGNOSIS — N64.4 PAIN OF BOTH BREASTS: ICD-10-CM

## 2024-09-16 PROCEDURE — G0279 TOMOSYNTHESIS, MAMMO: HCPCS

## 2024-09-18 ENCOUNTER — APPOINTMENT (OUTPATIENT)
Dept: PHYSICAL THERAPY | Facility: REHABILITATION | Age: 74
End: 2024-09-18
Attending: ORTHOPAEDIC SURGERY
Payer: MEDICARE

## 2024-09-25 ENCOUNTER — APPOINTMENT (OUTPATIENT)
Dept: PHYSICAL THERAPY | Facility: REHABILITATION | Age: 74
End: 2024-09-25
Attending: ORTHOPAEDIC SURGERY
Payer: MEDICARE

## 2024-10-02 ENCOUNTER — APPOINTMENT (OUTPATIENT)
Dept: PHYSICAL THERAPY | Facility: REHABILITATION | Age: 74
End: 2024-10-02
Attending: ORTHOPAEDIC SURGERY
Payer: MEDICARE

## 2024-11-12 ENCOUNTER — APPOINTMENT (OUTPATIENT)
Dept: MEDICAL GROUP | Facility: MEDICAL CENTER | Age: 74
End: 2024-11-12
Payer: MEDICARE

## 2024-11-12 VITALS
WEIGHT: 245.81 LBS | DIASTOLIC BLOOD PRESSURE: 84 MMHG | HEIGHT: 66 IN | TEMPERATURE: 98 F | BODY MASS INDEX: 39.51 KG/M2 | OXYGEN SATURATION: 94 % | SYSTOLIC BLOOD PRESSURE: 134 MMHG | RESPIRATION RATE: 16 BRPM | HEART RATE: 67 BPM

## 2024-11-12 DIAGNOSIS — M47.816 LUMBAR FACET ARTHROPATHY: ICD-10-CM

## 2024-11-12 DIAGNOSIS — Z23 NEED FOR VACCINATION: ICD-10-CM

## 2024-11-12 DIAGNOSIS — R33.9 INCOMPLETE BLADDER EMPTYING: ICD-10-CM

## 2024-11-12 DIAGNOSIS — M54.16 LUMBAR RADICULOPATHY: ICD-10-CM

## 2024-11-12 DIAGNOSIS — E11.9 CONTROLLED TYPE 2 DIABETES MELLITUS WITHOUT COMPLICATION, WITHOUT LONG-TERM CURRENT USE OF INSULIN (HCC): ICD-10-CM

## 2024-11-12 PROBLEM — B96.89 URINARY TRACT INFECTION DUE TO KLEBSIELLA SPECIES: Status: RESOLVED | Noted: 2023-02-13 | Resolved: 2024-11-12

## 2024-11-12 PROBLEM — N39.0 URINARY TRACT INFECTION DUE TO KLEBSIELLA SPECIES: Status: RESOLVED | Noted: 2023-02-13 | Resolved: 2024-11-12

## 2024-11-12 LAB
HBA1C MFR BLD: 5.9 % (ref ?–5.8)
POCT INT CON NEG: NEGATIVE
POCT INT CON POS: POSITIVE

## 2024-11-12 PROCEDURE — 90662 IIV NO PRSV INCREASED AG IM: CPT

## 2024-11-12 PROCEDURE — 99214 OFFICE O/P EST MOD 30 MIN: CPT | Mod: 25 | Performed by: FAMILY MEDICINE

## 2024-11-12 PROCEDURE — 3079F DIAST BP 80-89 MM HG: CPT | Performed by: FAMILY MEDICINE

## 2024-11-12 PROCEDURE — 3075F SYST BP GE 130 - 139MM HG: CPT | Performed by: FAMILY MEDICINE

## 2024-11-12 PROCEDURE — 83036 HEMOGLOBIN GLYCOSYLATED A1C: CPT | Performed by: FAMILY MEDICINE

## 2024-11-12 PROCEDURE — G0008 ADMIN INFLUENZA VIRUS VAC: HCPCS

## 2024-11-12 PROCEDURE — 1170F FXNL STATUS ASSESSED: CPT | Performed by: FAMILY MEDICINE

## 2024-11-12 RX ORDER — TAMSULOSIN HYDROCHLORIDE 0.4 MG/1
0.4 CAPSULE ORAL DAILY
Qty: 90 CAPSULE | Refills: 3 | Status: SHIPPED | OUTPATIENT
Start: 2024-11-12

## 2024-11-12 ASSESSMENT — FIBROSIS 4 INDEX: FIB4 SCORE: 1.51

## 2024-11-12 NOTE — PROGRESS NOTES
Diabetes Focused Exam:    Chief Complaint   Patient presents with    Diabetes Follow-up    Medication Refill      Subjective:   HPI  Ashly Meyer is a 74 y.o. female who presents for follow up of chronic conditions of diabetes mellitus and hyperlipidemia. She indicates that she is feeling well and denies any symptoms referable to the above diagnoses. Specifically denies chest pain, palpitations, dyspnea, orthopnea, PND or peripheral edema. Also denies polyuria, polydipsia, urinary complaints, abdominal complaints, myalgias, numbness, weakness or other related symptoms.     Stopped tamsulosin and another medication from urology.  She feels they did help and would like to resume.  Sent renewal for tamsulosin.  She will start that.  She will see urology next month and discussed resuming the second medication.    Is taking some tramadol for her back pain as needed.  Has plenty of tramadol at this point.  Not needing a new prescription at this time.    The patient is not taking ASA   she is taking all other medications as prescribed other than urology medication as above. Patient denies any side effects of medication.  DM: A1c goal <7  Glucose monitoring frequency: declines  Hypoglycemic episodes none clinically  Diabetic complications: none  ACR Albumin/Creatinine Ratio goal <30   Does not have HTN: Blood pressure goal <140/<80. Currently Rx ACE/ARB: Not Indicated  Hyperlipidemia:Cholesterol goal LDL <100, total/HDL <5. Currently Rx Statin: Yes  Last eye exam 1/2024  Denies visual blurring, double vision, eye pain and floaters  Last monofilament foot exam: 7/2024 she has chronic foot pain and numbness, denies calluses, ulcers      See medications and orders placed in encounter report.  Past medical history, family history, social history reviewed and updated as documented in medical record.    Current medications including changes today:  Current Outpatient Medications   Medication Sig Dispense Refill     "tamsulosin (FLOMAX) 0.4 MG capsule Take 1 Capsule by mouth every day. 90 Capsule 3    levothyroxine (SYNTHROID) 137 MCG Tab TAKE ONE TABLET BY MOUTH EVERY MORNING on an empty stomach 90 Tablet 2    allopurinol (ZYLOPRIM) 300 MG Tab Take 1 Tablet by mouth every day. 90 Tablet 3    sucralfate (CARAFATE) 1 GM Tab Take 1 Tablet by mouth 3 times a day before meals. 90 Tablet 1    ARIPiprazole (ABILIFY) 2 MG tablet Take 1 Tablet by mouth every day. 90 Tablet 3    atorvastatin (LIPITOR) 40 MG Tab Take 1 Tablet by mouth every evening. 90 Tablet 3    DULoxetine (CYMBALTA) 60 MG Cap DR Particles delayed-release capsule Take 1 Capsule by mouth every evening. 90 Capsule 3    gabapentin (NEURONTIN) 800 MG tablet Take 1 Tablet by mouth 2 times a day. 180 Tablet 3    QUEtiapine (SEROQUEL) 25 MG Tab TAKE ONE TABLET BY MOUTH AT BEDTIME 90 Tablet 2    aliskiren (TEKTURNA) 150 MG tablet Take 150 mg by mouth every day.      pantoprazole (PROTONIX) 40 MG Tablet Delayed Response Take 1 Tablet by mouth 2 times a day. 180 Tablet 3    calcitonin, salmon, (MIACALCIN) 200 UNIT/ACT Solution Administer 1 Spray into affected nostril(S) every day.      albuterol 108 (90 Base) MCG/ACT Aero Soln inhalation aerosol Inhale 2 Puffs every 6 hours as needed for Shortness of Breath. 8.5 g 11    Cholecalciferol (VITAMIN D-3) 125 MCG (5000 UT) Tab Take 5,000 Units by mouth every evening.      VITAMIN E PO Take 1 Capsule by mouth every morning.       No current facility-administered medications for this visit.     Allergies:   Allergies   Allergen Reactions    Ace Inhibitors Cough    Benadryl Allergy Hives     Hot skin itching    Iodine Nausea     Pt received CT w/ contrast 12/20/22 pt had nausea post contrast     Lamictal Rash and Swelling     \"hot\", unable to function.  Severe rash, scarring    Savella [Kdc:Milnacipran+Ci Pigment Blue 63] Nausea, Swelling and Anxiety     Swelling, bone and muscle pain, sweating, nausea, dizziness, sleeplessness, anxiety " "   Savella [Milnacipran Hcl] Unspecified     Muscle aches, feet swelling    Sulfa Drugs Hives, Shortness of Breath and Anxiety     Hard breathing, shortness of breath    Butalbital-Acetaminophen Unspecified     Dizzy, can't walk, hard to focus    Cefaclor Nausea and Unspecified     Ceclor causes light headedness and dizziness    Morphine Itching     \"Redness\"    Penicillins Itching and Swelling     Skin itching and redness    Tizanidine Hcl Nausea     \"Dizziness\" hard to focus    Milnacipran Vomiting and Nausea    Amlactin Rash and Itching     Lotion causes itching, redness and burnng    Tape Rash and Itching     Skin tears, paper tape is OK per Pt      Social History     Tobacco Use    Smoking status: Former     Current packs/day: 0.00     Average packs/day: 1 pack/day for 45.2 years (45.2 ttl pk-yrs)     Types: Cigarettes     Start date:      Quit date: 3/1/2007     Years since quittin.7    Smokeless tobacco: Never    Tobacco comments:     Started smoking at age 15, continued abstinance    Vaping Use    Vaping status: Never Used   Substance Use Topics    Alcohol use: Not Currently    Drug use: Not Currently     Comment: CBD topical pain cream     Exercise: she is using her walker, limited  Health Maintenance/Immunizations: discussed, flu vaccine today    ROS  Pertinent  ROS findings as above. Denies chest pain or shortness of breath     Objective:     OBJECTIVE:  /84   Pulse 67   Temp 36.7 °C (98 °F)   Resp 16   Ht 1.676 m (5' 6\")   Wt 112 kg (245 lb 13 oz)   SpO2 94%   BMI 39.68 kg/m²  Body mass index is 39.68 kg/m². BMI: moderately obese  General: No apparent distress, conversant, cooperative and pleasant with the examination.  Psych: Alert and oriented x4, judgment and insight normal  Neck: No JVD or bruits, no adenopathy, supple  Thyroid: normal to inspection and palpation  Lungs: negative findings: normal respiratory rate and rhythm, lungs clear to auscultation  Heart: negative findings: " regular rate and rhythm, S1 normal, S2 normal, 2/6 systolic aortic murmur, no clicks, or gallops  Abdomen: Soft, nontender, no hepatosplenomegaly or masses, normal bowel sounds  Skin: No cyanosis, no lesions or ulcers.  Scaly rash right anterior shin, no ulceration. Skin is very dry.  Extremities: No cyanosis clubbing or edema.     POC labs:   Lab Results   Component Value Date/Time    POCGLUCOSE 105 (H) 08/26/2021 08:01 AM          Last labs    Lab Results   Component Value Date/Time    CHOLSTRLTOT 143 04/26/2024 11:10 AM    LDL 69 04/26/2024 11:10 AM    HDL 54 04/26/2024 11:10 AM    TRIGLYCERIDE 101 04/26/2024 11:10 AM       Lab Results   Component Value Date/Time    SODIUM 142 08/19/2024 04:23 PM    POTASSIUM 4.1 08/19/2024 04:23 PM    GLUCOSE 108 (H) 08/19/2024 04:23 PM    BUNCREATRAT 27 12/03/2021 10:01 AM    BUNCREATRAT 25 04/18/2011 03:41 PM    GLOMRATE >59 01/14/2011 10:30 AM     Lab Results   Component Value Date/Time    HBA1C 5.9 (A) 11/12/2024 09:45 AM    HBA1C 6.4 (H) 04/26/2024 11:10 AM    HBA1C 6.3 (A) 11/27/2023 09:42 AM     Lab Results   Component Value Date/Time    MALBCRT see below 04/27/2024 09:30 AM    MICROALBUR <1.2 04/27/2024 09:30 AM    MICRALB 52.2 12/03/2021 10:01 AM          Assessment/Plan:   Medications, refills, and referrals per orders.   1. Controlled type 2 diabetes mellitus without complication, without long-term current use of insulin (HCC)  POCT Hemoglobin A1C      2. Incomplete bladder emptying  tamsulosin (FLOMAX) 0.4 MG capsule      3. Lumbar facet arthropathy        4. Lumbar radiculopathy        5. Need for vaccination  Influenza Vaccine, High Dose (65+ Only)        DM2 A1c is at goal   Patient to monitor sugars: declines   Discussed diet, exercise, disease management and weight loss goals.   Education and advise provided today:All medications, side effects and compliance (discussed carefully)  Annual eye examinations at Ophthalmology  Diabetic diet discussed in detail,  written exchange diet given  Glycohemoglobin and other lab monitoring  Labs immediately prior to next visit  Long term diabetic complications  Low cholesterol diet, weight control and daily exercise    Followup:   Do labs and RTC 3 months, sooner should new symptoms or problems arise.    Plan echocardiogram again next year, discussed.

## 2025-02-14 ENCOUNTER — RESULTS FOLLOW-UP (OUTPATIENT)
Dept: MEDICAL GROUP | Facility: MEDICAL CENTER | Age: 75
End: 2025-02-14

## 2025-02-14 ENCOUNTER — OFFICE VISIT (OUTPATIENT)
Dept: MEDICAL GROUP | Facility: MEDICAL CENTER | Age: 75
End: 2025-02-14
Payer: MEDICARE

## 2025-02-14 VITALS
TEMPERATURE: 96.8 F | WEIGHT: 241 LBS | SYSTOLIC BLOOD PRESSURE: 126 MMHG | BODY MASS INDEX: 38.73 KG/M2 | OXYGEN SATURATION: 96 % | DIASTOLIC BLOOD PRESSURE: 78 MMHG | RESPIRATION RATE: 12 BRPM | HEIGHT: 66 IN | HEART RATE: 62 BPM

## 2025-02-14 DIAGNOSIS — E03.4 HYPOTHYROIDISM DUE TO ACQUIRED ATROPHY OF THYROID: ICD-10-CM

## 2025-02-14 DIAGNOSIS — F33.2 SEVERE EPISODE OF RECURRENT MAJOR DEPRESSIVE DISORDER, WITHOUT PSYCHOTIC FEATURES (HCC): ICD-10-CM

## 2025-02-14 DIAGNOSIS — G61.9 INFLAMMATORY NEUROPATHY (HCC): ICD-10-CM

## 2025-02-14 DIAGNOSIS — K21.9 GASTROESOPHAGEAL REFLUX DISEASE WITHOUT ESOPHAGITIS: ICD-10-CM

## 2025-02-14 DIAGNOSIS — R33.9 INCOMPLETE BLADDER EMPTYING: ICD-10-CM

## 2025-02-14 DIAGNOSIS — E11.9 CONTROLLED TYPE 2 DIABETES MELLITUS WITHOUT COMPLICATION, WITHOUT LONG-TERM CURRENT USE OF INSULIN (HCC): ICD-10-CM

## 2025-02-14 DIAGNOSIS — G62.9 PERIPHERAL POLYNEUROPATHY: ICD-10-CM

## 2025-02-14 LAB
HBA1C MFR BLD: 5.9 % (ref ?–5.8)
POCT INT CON NEG: NEGATIVE
POCT INT CON POS: POSITIVE

## 2025-02-14 PROCEDURE — 99214 OFFICE O/P EST MOD 30 MIN: CPT | Performed by: FAMILY MEDICINE

## 2025-02-14 PROCEDURE — 3078F DIAST BP <80 MM HG: CPT | Performed by: FAMILY MEDICINE

## 2025-02-14 PROCEDURE — 83036 HEMOGLOBIN GLYCOSYLATED A1C: CPT | Performed by: FAMILY MEDICINE

## 2025-02-14 PROCEDURE — 1170F FXNL STATUS ASSESSED: CPT | Performed by: FAMILY MEDICINE

## 2025-02-14 PROCEDURE — 3074F SYST BP LT 130 MM HG: CPT | Performed by: FAMILY MEDICINE

## 2025-02-14 RX ORDER — DULOXETIN HYDROCHLORIDE 60 MG/1
60 CAPSULE, DELAYED RELEASE ORAL EVERY EVENING
Qty: 90 CAPSULE | Refills: 3 | Status: SHIPPED | OUTPATIENT
Start: 2025-02-14

## 2025-02-14 RX ORDER — TAMSULOSIN HYDROCHLORIDE 0.4 MG/1
0.4 CAPSULE ORAL DAILY
Qty: 90 CAPSULE | Refills: 3 | Status: SHIPPED | OUTPATIENT
Start: 2025-02-14

## 2025-02-14 RX ORDER — LEVOTHYROXINE SODIUM 125 UG/1
125 TABLET ORAL
Qty: 90 TABLET | Refills: 3 | Status: SHIPPED | OUTPATIENT
Start: 2025-02-14

## 2025-02-14 RX ORDER — GABAPENTIN 800 MG/1
800 TABLET ORAL
Qty: 180 TABLET | Refills: 3 | Status: SHIPPED | OUTPATIENT
Start: 2025-02-14

## 2025-02-14 RX ORDER — PANTOPRAZOLE SODIUM 40 MG/1
40 TABLET, DELAYED RELEASE ORAL 2 TIMES DAILY
Qty: 180 TABLET | Refills: 3 | Status: SHIPPED | OUTPATIENT
Start: 2025-02-14

## 2025-02-14 ASSESSMENT — PATIENT HEALTH QUESTIONNAIRE - PHQ9
CLINICAL INTERPRETATION OF PHQ2 SCORE: 6
SUM OF ALL RESPONSES TO PHQ QUESTIONS 1-9: 18
5. POOR APPETITE OR OVEREATING: 3 - NEARLY EVERY DAY

## 2025-02-14 ASSESSMENT — FIBROSIS 4 INDEX: FIB4 SCORE: 1.51

## 2025-02-14 NOTE — PROGRESS NOTES
Diabetes Focused Exam:    Chief Complaint   Patient presents with    Follow-Up     Go over meds     Medication Refill    Diabetes Mellitus    Hypertension      Subjective:   HPI  Ashly Meyer is a 74 y.o. female who presents for follow up of chronic conditions of diabetes mellitus, hypertension and hyperlipidemia. She indicates that she is feeling well and denies any symptoms referable to the above diagnoses. Specifically denies chest pain, palpitations, dyspnea, orthopnea, PND or peripheral edema. Also denies polyuria, polydipsia, urinary complaints, abdominal complaints, myalgias, numbness, weakness or other related symptoms.     Memory today seems worse than usual.  Has stopped all her medications as her accounts were hacked and cannot afford.  Her bank account has been emptied twice.  Unclear from her responses if she has discussed this with her bank.  She did change the branch, thinks they are more helpful.   Wants to know what medications she can not take.  Discussed the medication I feel is most critical but it all has a purpose.    Having flashes of light in her peripheral vision bilaterally.  No current appointment with neurology.  Is getting lightheaded.  Denies vertigo.  Blood pressure today a little low.    The patient is not taking ASA  she is not taking her medications. Patient denies any side effects of medication.  DM: A1c goal <7  Glucose monitoring frequency: declines, not really needed  Hypoglycemic episodes none clinically  Diabetic complications: none  ACR Albumin/Creatinine Ratio goal <30   HTN: Blood pressure goal <140/<80. Currently Rx ACE/ARB: Not clearly.    Hyperlipidemia:Cholesterol goal LDL <100, total/HDL <5. Currently Rx Statin: Yes  Last eye exam overdue, will get retinal photo here today.   Denies visual blurring, double vision, eye pain and floaters  Last monofilament foot exam: 7/2024 Denies new or increased foot pain, numbness, calluses, ulcers      See medications and  "orders placed in encounter report.  Past medical history, family history, social history reviewed and updated as documented in medical record.  Current medications including changes today:  Current Outpatient Medications   Medication Sig Dispense Refill    levothyroxine (SYNTHROID) 125 MCG Tab Take 1 Tablet by mouth every morning on an empty stomach. 90 Tablet 3    gabapentin (NEURONTIN) 800 MG tablet Take 1 Tablet by mouth 2 times a day. 180 Tablet 3    pantoprazole (PROTONIX) 40 MG Tablet Delayed Response Take 1 Tablet by mouth 2 times a day. 180 Tablet 3    tamsulosin (FLOMAX) 0.4 MG capsule Take 1 Capsule by mouth every day. 90 Capsule 3    DULoxetine (CYMBALTA) 60 MG Cap DR Particles delayed-release capsule Take 1 Capsule by mouth every evening. 90 Capsule 3    allopurinol (ZYLOPRIM) 300 MG Tab Take 1 Tablet by mouth every day. 90 Tablet 3    ARIPiprazole (ABILIFY) 2 MG tablet Take 1 Tablet by mouth every day. 90 Tablet 3    atorvastatin (LIPITOR) 40 MG Tab Take 1 Tablet by mouth every evening. 90 Tablet 3    QUEtiapine (SEROQUEL) 25 MG Tab TAKE ONE TABLET BY MOUTH AT BEDTIME 90 Tablet 2    aliskiren (TEKTURNA) 150 MG tablet Take 150 mg by mouth every day.      calcitonin, salmon, (MIACALCIN) 200 UNIT/ACT Solution Administer 1 Spray into affected nostril(S) every day.      albuterol 108 (90 Base) MCG/ACT Aero Soln inhalation aerosol Inhale 2 Puffs every 6 hours as needed for Shortness of Breath. 8.5 g 11    Cholecalciferol (VITAMIN D-3) 125 MCG (5000 UT) Tab Take 5,000 Units by mouth every evening.      VITAMIN E PO Take 1 Capsule by mouth every morning.       No current facility-administered medications for this visit.     Allergies:   Allergies   Allergen Reactions    Ace Inhibitors Cough    Benadryl Allergy Hives     Hot skin itching    Iodine Nausea     Pt received CT w/ contrast 12/20/22 pt had nausea post contrast     Lamictal Rash and Swelling     \"hot\", unable to function.  Severe rash, scarring    " "Savella [Kdc:Milnacipran+Ci Pigment Blue 63] Nausea, Swelling and Anxiety     Swelling, bone and muscle pain, sweating, nausea, dizziness, sleeplessness, anxiety    Savella [Milnacipran Hcl] Unspecified     Muscle aches, feet swelling    Sulfa Drugs Hives, Shortness of Breath and Anxiety     Hard breathing, shortness of breath    Butalbital-Acetaminophen Unspecified     Dizzy, can't walk, hard to focus    Cefaclor Nausea and Unspecified     Ceclor causes light headedness and dizziness    Morphine Itching     \"Redness\"    Penicillins Itching and Swelling     Skin itching and redness    Tizanidine Hcl Nausea     \"Dizziness\" hard to focus    Milnacipran Vomiting and Nausea    Amlactin Rash and Itching     Lotion causes itching, redness and burnng    Tape Rash and Itching     Skin tears, paper tape is OK per Pt      Social History     Tobacco Use    Smoking status: Former     Current packs/day: 0.00     Average packs/day: 1 pack/day for 45.2 years (45.2 ttl pk-yrs)     Types: Cigarettes     Start date:      Quit date: 3/1/2007     Years since quittin.9    Smokeless tobacco: Never    Tobacco comments:     Started smoking at age 15, continued abstinance    Vaping Use    Vaping status: Never Used   Substance Use Topics    Alcohol use: Not Currently    Drug use: Not Currently     Comment: CBD topical pain cream     Exercise: using walker, limited  Health Maintenance/Immunizations: discussed, is overdue for a few vaccines.    ROS  Pertinent  ROS findings as above.  Denies chest pain.  Does have thoracic and lumbar muscle spasms.      Objective:     OBJECTIVE:  /78   Pulse 62   Temp 36 °C (96.8 °F) (Temporal)   Resp 12   Ht 1.676 m (5' 6\")   Wt 109 kg (241 lb)   SpO2 96%   BMI 38.90 kg/m²  Body mass index is 38.9 kg/m². BMI: moderately obese  General: No apparent distress, conversant, cooperative and pleasant with the examination.  Psych: Alert and oriented x4, judgment and insight normal  Neck: No JVD " or bruits, no adenopathy, supple  Thyroid: normal to inspection and palpation  Lungs: negative findings: normal respiratory rate and rhythm, lungs clear to auscultation  Heart: negative findings: regular rate and rhythm, S1 normal, S2 normal, no murmurs, clicks, or gallops  Abdomen: Soft, nontender, no hepatosplenomegaly or masses, normal bowel sounds  Skin: No rashes, no cyanosis, no lesions or ulcers  Extremities: No cyanosis clubbing or edema.     Overdue for eye examination, will do retinal photograph here today.    POC labs today:   Lab Results   Component Value Date/Time    POCGLUCOSE 105 (H) 08/26/2021 08:01 AM          Last labs    Lab Results   Component Value Date/Time    CHOLSTRLTOT 143 04/26/2024 11:10 AM    LDL 69 04/26/2024 11:10 AM    HDL 54 04/26/2024 11:10 AM    TRIGLYCERIDE 101 04/26/2024 11:10 AM       Lab Results   Component Value Date/Time    SODIUM 142 08/19/2024 04:23 PM    POTASSIUM 4.1 08/19/2024 04:23 PM    GLUCOSE 108 (H) 08/19/2024 04:23 PM    BUNCREATRAT 27 12/03/2021 10:01 AM    BUNCREATRAT 25 04/18/2011 03:41 PM    GLOMRATE >59 01/14/2011 10:30 AM     Lab Results   Component Value Date/Time    HBA1C 5.9 (A) 02/14/2025 11:22 AM    HBA1C 5.9 (A) 11/12/2024 09:45 AM    HBA1C 6.4 (H) 04/26/2024 11:10 AM     Lab Results   Component Value Date/Time    MALBCRT see below 04/27/2024 09:30 AM    MICROALBUR <1.2 04/27/2024 09:30 AM    MICRALB 52.2 12/03/2021 10:01 AM          Assessment/Plan:   Medications, refills, and referrals per orders.   1. Controlled type 2 diabetes mellitus without complication, without long-term current use of insulin (HCC)  POCT Hemoglobin A1C    POCT Retinal Eye Exam      2. Hypothyroidism due to acquired atrophy of thyroid  levothyroxine (SYNTHROID) 125 MCG Tab      3. Peripheral polyneuropathy  gabapentin (NEURONTIN) 800 MG tablet      4. Gastroesophageal reflux disease without esophagitis  pantoprazole (PROTONIX) 40 MG Tablet Delayed Response      5. Incomplete  bladder emptying  tamsulosin (FLOMAX) 0.4 MG capsule      6. Inflammatory neuropathy (HCC)  DULoxetine (CYMBALTA) 60 MG Cap DR Particles delayed-release capsule      7. Severe episode of recurrent major depressive disorder, without psychotic features (HCC)  Patient has been identified as having a positive depression screening. Appropriate orders and counseling have been given.        DM2 A1c is at goal   Patient to monitor sugars: declines    Discussed diet, exercise, disease management and weight loss goals.   Education and advise provided today:All medications, side effects and compliance (discussed carefully)  Annual eye examinations at Ophthalmology  Diabetic diet discussed in detail, written exchange diet given  Glycohemoglobin and other lab monitoring  Labs immediately prior to next visit  Long term diabetic complications  Low cholesterol diet, weight control and daily exercise    Followup:   Do labs and RTC 4 months, sooner should new symptoms or problems arise.

## 2025-05-01 ENCOUNTER — HOSPITAL ENCOUNTER (OUTPATIENT)
Facility: MEDICAL CENTER | Age: 75
End: 2025-05-01
Attending: UROLOGY
Payer: MEDICARE

## 2025-05-01 PROCEDURE — 87086 URINE CULTURE/COLONY COUNT: CPT

## 2025-05-01 PROCEDURE — 87077 CULTURE AEROBIC IDENTIFY: CPT

## 2025-05-01 PROCEDURE — 87186 SC STD MICRODIL/AGAR DIL: CPT

## 2025-06-16 ENCOUNTER — OFFICE VISIT (OUTPATIENT)
Dept: MEDICAL GROUP | Facility: MEDICAL CENTER | Age: 75
End: 2025-06-16
Payer: MEDICARE

## 2025-06-16 VITALS
RESPIRATION RATE: 14 BRPM | HEIGHT: 66 IN | WEIGHT: 245 LBS | HEART RATE: 64 BPM | TEMPERATURE: 97.2 F | DIASTOLIC BLOOD PRESSURE: 74 MMHG | OXYGEN SATURATION: 96 % | SYSTOLIC BLOOD PRESSURE: 122 MMHG | BODY MASS INDEX: 39.37 KG/M2

## 2025-06-16 DIAGNOSIS — C73 THYROID CARCINOMA (HCC): ICD-10-CM

## 2025-06-16 DIAGNOSIS — E78.5 DYSLIPIDEMIA, GOAL LDL BELOW 130: ICD-10-CM

## 2025-06-16 DIAGNOSIS — K21.00 GASTROESOPHAGEAL REFLUX DISEASE WITH ESOPHAGITIS WITHOUT HEMORRHAGE: ICD-10-CM

## 2025-06-16 DIAGNOSIS — E03.4 HYPOTHYROIDISM DUE TO ACQUIRED ATROPHY OF THYROID: ICD-10-CM

## 2025-06-16 DIAGNOSIS — M95.2 FRONTAL BONE DEFORMITY: Primary | ICD-10-CM

## 2025-06-16 DIAGNOSIS — F32.2 CURRENT SEVERE EPISODE OF MAJOR DEPRESSIVE DISORDER WITHOUT PSYCHOTIC FEATURES WITHOUT PRIOR EPISODE (HCC): ICD-10-CM

## 2025-06-16 DIAGNOSIS — E55.9 VITAMIN D DEFICIENCY: ICD-10-CM

## 2025-06-16 DIAGNOSIS — F43.21 SITUATIONAL DEPRESSION: ICD-10-CM

## 2025-06-16 DIAGNOSIS — I65.23 BILATERAL CAROTID ARTERY STENOSIS: ICD-10-CM

## 2025-06-16 DIAGNOSIS — E11.9 CONTROLLED TYPE 2 DIABETES MELLITUS WITHOUT COMPLICATION, WITHOUT LONG-TERM CURRENT USE OF INSULIN (HCC): ICD-10-CM

## 2025-06-16 DIAGNOSIS — R27.0 ATAXIA: Chronic | ICD-10-CM

## 2025-06-16 DIAGNOSIS — M1A.0790 CHRONIC GOUT OF FOOT, UNSPECIFIED CAUSE, UNSPECIFIED LATERALITY: ICD-10-CM

## 2025-06-16 PROCEDURE — 3074F SYST BP LT 130 MM HG: CPT | Performed by: FAMILY MEDICINE

## 2025-06-16 PROCEDURE — 3078F DIAST BP <80 MM HG: CPT | Performed by: FAMILY MEDICINE

## 2025-06-16 PROCEDURE — 1170F FXNL STATUS ASSESSED: CPT | Performed by: FAMILY MEDICINE

## 2025-06-16 PROCEDURE — 99214 OFFICE O/P EST MOD 30 MIN: CPT | Performed by: FAMILY MEDICINE

## 2025-06-16 RX ORDER — ALLOPURINOL 300 MG/1
300 TABLET ORAL DAILY
Qty: 90 TABLET | Refills: 3 | Status: SHIPPED | OUTPATIENT
Start: 2025-06-16

## 2025-06-16 RX ORDER — FESOTERODINE FUMARATE 8 MG/1
8 TABLET, FILM COATED, EXTENDED RELEASE ORAL DAILY
COMMUNITY
Start: 2025-05-01

## 2025-06-16 RX ORDER — ATORVASTATIN CALCIUM 40 MG/1
40 TABLET, FILM COATED ORAL EVERY EVENING
Qty: 90 TABLET | Refills: 3 | Status: SHIPPED | OUTPATIENT
Start: 2025-06-16

## 2025-06-16 RX ORDER — ARIPIPRAZOLE 2 MG/1
2 TABLET ORAL DAILY
Qty: 90 TABLET | Refills: 3 | Status: SHIPPED | OUTPATIENT
Start: 2025-06-16

## 2025-06-16 ASSESSMENT — FIBROSIS 4 INDEX: FIB4 SCORE: 1.53

## 2025-06-16 NOTE — PROGRESS NOTES
Diabetes Focused Exam:    Chief Complaint   Patient presents with    Diabetes Follow-up    Depression    Head Injury      Subjective:   HPI  Ashly Meyer is a 75 y.o. female who presents for follow up of chronic conditions of diabetes mellitus and hyperlipidemia. She indicates that she is feeling well and denies any symptoms referable to the above diagnoses. Specifically denies chest pain, palpitations, dyspnea, orthopnea, PND or peripheral edema. Also denies polyuria, polydipsia, urinary complaints, abdominal complaints, myalgias, numbness, weakness or other related symptoms.    She has severe depression with anger and has had this for many years.  We have tried many standard treatment.  I feel that it is important that she see psychiatry and work with psychiatry on this problem.  Her daughter also suggested counseling but my suspicion is this is a primarily biochemical problem.  Patient voices willingness to see psychiatry.  Referral is placed.    Unsteady on her feet, trouble picking them up.  Her gait is somewhat shuffling.  She found the gabapentin did not help the nerve pain.  She stopped it.  She has also stopped her thyroid medication as well as her allopurinol for gout, cholesterol medication and duloxetine.  Have strongly asked her to resume the levothyroxine and the duloxetine at a minimum.     The patient is not taking ASA   she is not taking her medications other than bladder pill.  Discussed that in my opinion she needs to go back of thyroid medication and her antidepressants.  Patient denies any side effects of medication.  DM: A1c goal <7  Glucose monitoring frequency: not checking  Hypoglycemic episodes none noted.  Diabetic complications: none  ACR Albumin/Creatinine Ratio goal <30   HTN: Blood pressure goal <140/<80. Currently Rx ACE/ARB: yes but unclear if she is taking this  Hyperlipidemia:Cholesterol goal LDL <100, total/HDL <5. Currently Rx Statin: Yes but not taking  Last eye exam  "1/2025 but needs to be seen soon.  Denies visual blurring, double vision, eye pain and floaters  Last monofilament foot exam: 7/2024  Denies foot pain, numbness, calluses, ulcers      See medications and orders placed in encounter report.  Past medical history, family history, social history reviewed and updated as documented in medical record.  Current medications including changes today:  Current Medications[1]  Allergies: Allergies[2]   Social History[3]  Exercise: limited, using her walker today  Health Maintenance/Immunizations: discussed    ROS  Pertinent  ROS findings as above. Denies chest pain or shortness of breath     Objective:     OBJECTIVE:  /74   Pulse 64   Temp 36.2 °C (97.2 °F) (Temporal)   Resp 14   Ht 1.676 m (5' 6\")   Wt 111 kg (245 lb)   SpO2 96%   BMI 39.54 kg/m²  Body mass index is 39.54 kg/m². BMI: moderately obese  General: No apparent distress, conversant, cooperative and pleasant with the examination.  Psych: Alert and oriented x4, judgment and insight normal  Neck: No JVD or bruits, no adenopathy, supple  Thyroid: normal to inspection and palpation  Lungs: negative findings: normal respiratory rate and rhythm, lungs clear to auscultation  Heart: negative findings: regular rate and rhythm, S1 normal, S2 normal, no murmurs, clicks, or gallops  Abdomen: Soft, nontender, no hepatosplenomegaly or masses, normal bowel sounds  Skin: No rashes, no cyanosis, no lesions or ulcers  Extremities: No cyanosis clubbing or edema.      POC labs:   Lab Results   Component Value Date/Time    POCGLUCOSE 105 (H) 08/26/2021 08:01 AM          Last labs    Lab Results   Component Value Date/Time    CHOLSTRLTOT 143 04/26/2024 11:10 AM    LDL 69 04/26/2024 11:10 AM    HDL 54 04/26/2024 11:10 AM    TRIGLYCERIDE 101 04/26/2024 11:10 AM       Lab Results   Component Value Date/Time    SODIUM 142 08/19/2024 04:23 PM    POTASSIUM 4.1 08/19/2024 04:23 PM    GLUCOSE 108 (H) 08/19/2024 04:23 PM    BUNCREATRAT " 27 12/03/2021 10:01 AM    BUNCREATRAT 25 04/18/2011 03:41 PM    GLOMRATE >59 01/14/2011 10:30 AM     Lab Results   Component Value Date/Time    HBA1C 5.9 (A) 02/14/2025 11:22 AM    HBA1C 5.9 (A) 11/12/2024 09:45 AM    HBA1C 6.4 (H) 04/26/2024 11:10 AM     Lab Results   Component Value Date/Time    MALBCRT see below 04/27/2024 09:30 AM    MICROALBUR <1.2 04/27/2024 09:30 AM    MICRALB 52.2 12/03/2021 10:01 AM          Assessment/Plan:   Medications, refills, and referrals per orders.   1. Frontal bone deformity  DX-SKULL-COMPLETE 4+      2. Controlled type 2 diabetes mellitus without complication, without long-term current use of insulin (HCC)  MICROALBUMIN CREAT RATIO URINE    HEMOGLOBIN A1C    Referral to Ophthalmology      3. Dyslipidemia, goal LDL below 130  Comp Metabolic Panel    CBC WITHOUT DIFFERENTIAL    TSH    VITAMIN D,25 HYDROXY (DEFICIENCY)    Lipid Profile    atorvastatin (LIPITOR) 40 MG Tab      4. Bilateral carotid artery stenosis  atorvastatin (LIPITOR) 40 MG Tab      5. Hypothyroidism due to acquired atrophy of thyroid  TSH      6. Thyroid carcinoma (HCC)  TSH      7. Vitamin D deficiency  VITAMIN D,25 HYDROXY (DEFICIENCY)      8. Ataxia        9. Gastroesophageal reflux disease with esophagitis without hemorrhage  Comp Metabolic Panel    CBC WITHOUT DIFFERENTIAL      10. Chronic gout of foot, unspecified cause, unspecified laterality  allopurinol (ZYLOPRIM) 300 MG Tab    URIC ACID      11. Situational depression  ARIPiprazole (ABILIFY) 2 MG tablet      12. Current severe episode of major depressive disorder without psychotic features without prior episode (HCC)  ARIPiprazole (ABILIFY) 2 MG tablet    Referral to Behavioral Health        DM2 A1c is at goal  lab orders placed  Patient to monitor sugars: declines   Discussed diet, exercise, disease management and weight loss goals.   Education and advise provided today:All medications, side effects and compliance (discussed carefully)  Annual eye  examinations at Ophthalmology  Diabetic diet discussed in detail, written exchange diet given  Foot care discussed and Podiatry visits  Glycohemoglobin and other lab monitoring  Labs immediately prior to next visit  Long term diabetic complications  Low cholesterol diet, weight control and daily exercise    Followup:   Do labs and RTC 4 months, sooner should new symptoms or problems arise.               [1]   Current Outpatient Medications   Medication Sig Dispense Refill    TOVIAZ 8 MG TABLET SR 24 HR Take 8 mg by mouth every day.      allopurinol (ZYLOPRIM) 300 MG Tab Take 1 Tablet by mouth every day. 90 Tablet 3    ARIPiprazole (ABILIFY) 2 MG tablet Take 1 Tablet by mouth every day. 90 Tablet 3    atorvastatin (LIPITOR) 40 MG Tab Take 1 Tablet by mouth every evening. 90 Tablet 3    levothyroxine (SYNTHROID) 125 MCG Tab Take 1 Tablet by mouth every morning on an empty stomach. 90 Tablet 3    pantoprazole (PROTONIX) 40 MG Tablet Delayed Response Take 1 Tablet by mouth 2 times a day. 180 Tablet 3    tamsulosin (FLOMAX) 0.4 MG capsule Take 1 Capsule by mouth every day. 90 Capsule 3    DULoxetine (CYMBALTA) 60 MG Cap DR Particles delayed-release capsule Take 1 Capsule by mouth every evening. 90 Capsule 3    aliskiren (TEKTURNA) 150 MG tablet Take 150 mg by mouth every day.      calcitonin, salmon, (MIACALCIN) 200 UNIT/ACT Solution Administer 1 Spray into affected nostril(S) every day.      albuterol 108 (90 Base) MCG/ACT Aero Soln inhalation aerosol Inhale 2 Puffs every 6 hours as needed for Shortness of Breath. 8.5 g 11    Cholecalciferol (VITAMIN D-3) 125 MCG (5000 UT) Tab Take 5,000 Units by mouth every evening.      VITAMIN E PO Take 1 Capsule by mouth every morning.       No current facility-administered medications for this visit.   [2]   Allergies  Allergen Reactions    Ace Inhibitors Cough    Benadryl Allergy Hives     Hot skin itching    Iodine Nausea     Pt received CT w/ contrast 12/20/22 pt had nausea post  "contrast     Lamictal Rash and Swelling     \"hot\", unable to function.  Severe rash, scarring    Savella [Kdc:Milnacipran+Ci Pigment Blue 63] Nausea, Swelling and Anxiety     Swelling, bone and muscle pain, sweating, nausea, dizziness, sleeplessness, anxiety    Savella [Milnacipran Hcl] Unspecified     Muscle aches, feet swelling    Sulfa Drugs Hives, Shortness of Breath and Anxiety     Hard breathing, shortness of breath    Butalbital-Acetaminophen Unspecified     Dizzy, can't walk, hard to focus    Cefaclor Nausea and Unspecified     Ceclor causes light headedness and dizziness    Morphine Itching     \"Redness\"    Penicillins Itching and Swelling     Skin itching and redness    Tizanidine Hcl Nausea     \"Dizziness\" hard to focus    Milnacipran Vomiting and Nausea    Amlactin Rash and Itching     Lotion causes itching, redness and burnng    Tape Rash and Itching     Skin tears, paper tape is OK per Pt   [3]   Social History  Tobacco Use    Smoking status: Former     Current packs/day: 0.00     Average packs/day: 1 pack/day for 45.2 years (45.2 ttl pk-yrs)     Types: Cigarettes     Start date:      Quit date: 3/1/2007     Years since quittin.3    Smokeless tobacco: Never    Tobacco comments:     Started smoking at age 15, continued abstinance    Vaping Use    Vaping status: Never Used   Substance Use Topics    Alcohol use: Not Currently    Drug use: Not Currently     Comment: CBD topical pain cream     "

## 2025-06-20 NOTE — Clinical Note
REFERRAL APPROVAL NOTICE         Sent on June 20, 2025                   Ashly Meyer  [address]  [place] [place] [facility] [address]                   Dear Ms. Meyer,    After a careful review of the medical information and benefit coverage, [facility] has processed your referral. See below for additional details.    If applicable, you must be actively enrolled with your insurance for coverage of the authorized service. If you have any questions regarding your coverage, please contact your insurance directly.    REFERRAL INFORMATION   Referral #:  [id number]  Referred-To Department    Referred-By Provider:  Behavioral Health    Colton Ahn M.D.   Behavioral Health Outpatient      [address]  [place] [facility] [address]  [phone] [address] [facility] [address] [facility] [address]  [facility] [id number] [phone]    Referral Start Date:  06/16/2025  Referral End Date:   06/16/2026             SCHEDULING  If you do not already have an appointment, please call [phone] to make an appointment.     MORE INFORMATION  If you do not already have a [employer] account, sign up at: [email or web address]  You can access your medical information, make appointments, see lab results, billing information, and more.  If you have questions regarding this referral, please contact  the [facility] Referrals department at:             [phone]. Monday - Friday 8:00AM - 5:00PM.     Sincerely,    [facility]

## 2025-06-20 NOTE — Clinical Note
REFERRAL APPROVAL NOTICE         Sent on June 20, 2025                   Ashly Meyer  [address]  [place] [address]                   Dear Ms. Meyer,    After a careful review of the medical information and benefit coverage, [facility] has processed your referral. See below for additional details.    If applicable, you must be actively enrolled with your insurance for coverage of the authorized service. If you have any questions regarding your coverage, please contact your insurance directly.    REFERRAL INFORMATION   Referral #:  [id number]  Referred-To Provider    Referred-By Provider:  Ophthalmology    ELYSIA Ackerman CURTIS A      [address] [place] [address] [address]  [place] [address]  [phone] [address] [place] [address]  [phone]    Referral Start Date:  06/16/2025  Referral End Date:   06/16/2026             SCHEDULING  If you do not already have an appointment, please call [phone] to make an appointment.     MORE INFORMATION  If you do not already have a [employer] account, sign up at: [email or web address]  You can access your medical information, make appointments, see lab results, billing information, and more.  If you have questions regarding this referral, please contact  the [facility] Referrals department at:             [phone]. Monday - Friday 8:00AM - 5:00PM.     Sincerely,    [facility]

## 2025-06-27 ENCOUNTER — APPOINTMENT (OUTPATIENT)
Dept: URBAN - METROPOLITAN AREA CLINIC 22 | Facility: CLINIC | Age: 75
Setting detail: DERMATOLOGY
End: 2025-06-27

## 2025-06-27 DIAGNOSIS — Z71.89 OTHER SPECIFIED COUNSELING: ICD-10-CM

## 2025-06-27 DIAGNOSIS — L82.1 OTHER SEBORRHEIC KERATOSIS: ICD-10-CM

## 2025-06-27 DIAGNOSIS — D22 MELANOCYTIC NEVI: ICD-10-CM

## 2025-06-27 DIAGNOSIS — L82.0 INFLAMED SEBORRHEIC KERATOSIS: ICD-10-CM

## 2025-06-27 DIAGNOSIS — Z85.828 PERSONAL HISTORY OF OTHER MALIGNANT NEOPLASM OF SKIN: ICD-10-CM

## 2025-06-27 DIAGNOSIS — L30.4 ERYTHEMA INTERTRIGO: ICD-10-CM

## 2025-06-27 DIAGNOSIS — D18.0 HEMANGIOMA: ICD-10-CM

## 2025-06-27 DIAGNOSIS — L81.4 OTHER MELANIN HYPERPIGMENTATION: ICD-10-CM

## 2025-06-27 DIAGNOSIS — L57.0 ACTINIC KERATOSIS: ICD-10-CM

## 2025-06-27 DIAGNOSIS — L259 CONTACT DERMATITIS AND OTHER ECZEMA, UNSPECIFIED CAUSE: ICD-10-CM | Status: STABLE

## 2025-06-27 PROBLEM — D22.5 MELANOCYTIC NEVI OF TRUNK: Status: ACTIVE | Noted: 2025-06-27

## 2025-06-27 PROBLEM — D18.01 HEMANGIOMA OF SKIN AND SUBCUTANEOUS TISSUE: Status: ACTIVE | Noted: 2025-06-27

## 2025-06-27 PROBLEM — L30.8 OTHER SPECIFIED DERMATITIS: Status: ACTIVE | Noted: 2025-06-27

## 2025-06-27 PROCEDURE — ? PRESCRIPTION

## 2025-06-27 PROCEDURE — ? SUNSCREEN RECOMMENDATIONS

## 2025-06-27 PROCEDURE — ? LIQUID NITROGEN

## 2025-06-27 PROCEDURE — ? COUNSELING

## 2025-06-27 PROCEDURE — ? ADDITIONAL NOTES

## 2025-06-27 RX ORDER — TRIAMCINOLONE ACETONIDE 1 MG/G
OINTMENT TOPICAL
Qty: 453.6 | Refills: 1 | Status: CANCELLED
Stop reason: CLARIF

## 2025-06-27 RX ORDER — KETOCONAZOLE 20 MG/G
CREAM TOPICAL
Qty: 60 | Refills: 6 | Status: ERX | COMMUNITY
Start: 2025-06-27

## 2025-06-27 RX ADMIN — KETOCONAZOLE: 20 CREAM TOPICAL at 00:00

## 2025-06-27 ASSESSMENT — LOCATION DETAILED DESCRIPTION DERM
LOCATION DETAILED: MIDDLE STERNUM
LOCATION DETAILED: RIGHT MID-UPPER BACK
LOCATION DETAILED: LEFT RIB CAGE
LOCATION DETAILED: RIGHT INFERIOR PREAURICULAR CHEEK
LOCATION DETAILED: LEFT ACHILLES SKIN
LOCATION DETAILED: PERIUMBILICAL SKIN
LOCATION DETAILED: LEFT LATERAL ABDOMEN
LOCATION DETAILED: RIGHT ACHILLES SKIN
LOCATION DETAILED: NASAL TIP
LOCATION DETAILED: RIGHT ANTERIOR DISTAL THIGH
LOCATION DETAILED: RIGHT RADIAL DORSAL HAND
LOCATION DETAILED: NASAL SUPRATIP
LOCATION DETAILED: RIGHT RIB CAGE
LOCATION DETAILED: LEFT INFERIOR UPPER BACK
LOCATION DETAILED: LEFT ULNAR DORSAL HAND
LOCATION DETAILED: LEFT PROXIMAL DORSAL FOREARM

## 2025-06-27 ASSESSMENT — LOCATION SIMPLE DESCRIPTION DERM
LOCATION SIMPLE: LEFT HAND
LOCATION SIMPLE: LEFT UPPER BACK
LOCATION SIMPLE: LEFT ACHILLES SKIN
LOCATION SIMPLE: RIGHT CHEEK
LOCATION SIMPLE: RIGHT THIGH
LOCATION SIMPLE: CHEST
LOCATION SIMPLE: NOSE
LOCATION SIMPLE: RIGHT HAND
LOCATION SIMPLE: ABDOMEN
LOCATION SIMPLE: LEFT FOREARM
LOCATION SIMPLE: RIGHT UPPER BACK
LOCATION SIMPLE: RIGHT ACHILLES SKIN

## 2025-06-27 ASSESSMENT — LOCATION ZONE DERM
LOCATION ZONE: NOSE
LOCATION ZONE: LEG
LOCATION ZONE: ARM
LOCATION ZONE: FACE
LOCATION ZONE: TRUNK
LOCATION ZONE: HAND

## 2025-06-28 ENCOUNTER — TELEPHONE (OUTPATIENT)
Dept: MEDICAL GROUP | Facility: MEDICAL CENTER | Age: 75
End: 2025-06-28
Payer: MEDICARE

## 2025-06-28 ENCOUNTER — HOSPITAL ENCOUNTER (OUTPATIENT)
Dept: RADIOLOGY | Facility: MEDICAL CENTER | Age: 75
End: 2025-06-28
Attending: FAMILY MEDICINE
Payer: MEDICARE

## 2025-06-28 ENCOUNTER — HOSPITAL ENCOUNTER (OUTPATIENT)
Dept: LAB | Facility: MEDICAL CENTER | Age: 75
End: 2025-06-28
Attending: FAMILY MEDICINE
Payer: MEDICARE

## 2025-06-28 ENCOUNTER — RESULTS FOLLOW-UP (OUTPATIENT)
Dept: MEDICAL GROUP | Facility: MEDICAL CENTER | Age: 75
End: 2025-06-28

## 2025-06-28 DIAGNOSIS — E03.4 HYPOTHYROIDISM DUE TO ACQUIRED ATROPHY OF THYROID: ICD-10-CM

## 2025-06-28 DIAGNOSIS — M1A.0790 CHRONIC GOUT OF FOOT, UNSPECIFIED CAUSE, UNSPECIFIED LATERALITY: ICD-10-CM

## 2025-06-28 DIAGNOSIS — E78.5 DYSLIPIDEMIA, GOAL LDL BELOW 130: ICD-10-CM

## 2025-06-28 DIAGNOSIS — M95.2 FRONTAL BONE DEFORMITY: ICD-10-CM

## 2025-06-28 DIAGNOSIS — E55.9 VITAMIN D DEFICIENCY: ICD-10-CM

## 2025-06-28 DIAGNOSIS — K21.00 GASTROESOPHAGEAL REFLUX DISEASE WITH ESOPHAGITIS WITHOUT HEMORRHAGE: ICD-10-CM

## 2025-06-28 DIAGNOSIS — C73 THYROID CARCINOMA (HCC): ICD-10-CM

## 2025-06-28 DIAGNOSIS — E11.9 CONTROLLED TYPE 2 DIABETES MELLITUS WITHOUT COMPLICATION, WITHOUT LONG-TERM CURRENT USE OF INSULIN (HCC): ICD-10-CM

## 2025-06-28 LAB
25(OH)D3 SERPL-MCNC: 35 NG/ML (ref 30–100)
ALBUMIN SERPL BCP-MCNC: 4.5 G/DL (ref 3.2–4.9)
ALBUMIN/GLOB SERPL: 1.2 G/DL
ALP SERPL-CCNC: 84 U/L (ref 30–99)
ALT SERPL-CCNC: 15 U/L (ref 2–50)
ANION GAP SERPL CALC-SCNC: 14 MMOL/L (ref 7–16)
AST SERPL-CCNC: 24 U/L (ref 12–45)
BILIRUB SERPL-MCNC: 1.2 MG/DL (ref 0.1–1.5)
BUN SERPL-MCNC: 23 MG/DL (ref 8–22)
CALCIUM ALBUM COR SERPL-MCNC: 9.2 MG/DL (ref 8.5–10.5)
CALCIUM SERPL-MCNC: 9.6 MG/DL (ref 8.5–10.5)
CHLORIDE SERPL-SCNC: 100 MMOL/L (ref 96–112)
CHOLEST SERPL-MCNC: 331 MG/DL (ref 100–199)
CO2 SERPL-SCNC: 25 MMOL/L (ref 20–33)
CREAT SERPL-MCNC: 1.44 MG/DL (ref 0.5–1.4)
CREAT UR-MCNC: 107 MG/DL
ERYTHROCYTE [DISTWIDTH] IN BLOOD BY AUTOMATED COUNT: 54.9 FL (ref 35.9–50)
EST. AVERAGE GLUCOSE BLD GHB EST-MCNC: 128 MG/DL
GFR SERPLBLD CREATININE-BSD FMLA CKD-EPI: 38 ML/MIN/1.73 M 2
GLOBULIN SER CALC-MCNC: 3.7 G/DL (ref 1.9–3.5)
GLUCOSE SERPL-MCNC: 104 MG/DL (ref 65–99)
HBA1C MFR BLD: 6.1 % (ref 4–5.6)
HCT VFR BLD AUTO: 47.3 % (ref 37–47)
HDLC SERPL-MCNC: 109 MG/DL
HGB BLD-MCNC: 15.3 G/DL (ref 12–16)
LDLC SERPL CALC-MCNC: 198 MG/DL
MCH RBC QN AUTO: 30.7 PG (ref 27–33)
MCHC RBC AUTO-ENTMCNC: 32.3 G/DL (ref 32.2–35.5)
MCV RBC AUTO: 95 FL (ref 81.4–97.8)
MICROALBUMIN UR-MCNC: 4.9 MG/DL
MICROALBUMIN/CREAT UR: 46 MG/G (ref 0–30)
PLATELET # BLD AUTO: 158 K/UL (ref 164–446)
PMV BLD AUTO: 11.8 FL (ref 9–12.9)
POTASSIUM SERPL-SCNC: 3.9 MMOL/L (ref 3.6–5.5)
PROT SERPL-MCNC: 8.2 G/DL (ref 6–8.2)
RBC # BLD AUTO: 4.98 M/UL (ref 4.2–5.4)
SODIUM SERPL-SCNC: 139 MMOL/L (ref 135–145)
TRIGL SERPL-MCNC: 120 MG/DL (ref 0–149)
TSH SERPL-ACNC: 92.4 UIU/ML (ref 0.38–5.33)
URATE SERPL-MCNC: 8.8 MG/DL (ref 1.9–8.2)
WBC # BLD AUTO: 8.9 K/UL (ref 4.8–10.8)

## 2025-06-28 PROCEDURE — 80061 LIPID PANEL: CPT

## 2025-06-28 PROCEDURE — 84550 ASSAY OF BLOOD/URIC ACID: CPT

## 2025-06-28 PROCEDURE — 83036 HEMOGLOBIN GLYCOSYLATED A1C: CPT | Mod: GA

## 2025-06-28 PROCEDURE — 80053 COMPREHEN METABOLIC PANEL: CPT

## 2025-06-28 PROCEDURE — 85027 COMPLETE CBC AUTOMATED: CPT

## 2025-06-28 PROCEDURE — 84443 ASSAY THYROID STIM HORMONE: CPT

## 2025-06-28 PROCEDURE — 70260 X-RAY EXAM OF SKULL: CPT

## 2025-06-28 PROCEDURE — 36415 COLL VENOUS BLD VENIPUNCTURE: CPT

## 2025-06-28 PROCEDURE — 82306 VITAMIN D 25 HYDROXY: CPT

## 2025-06-28 PROCEDURE — 82043 UR ALBUMIN QUANTITATIVE: CPT

## 2025-06-28 PROCEDURE — 82570 ASSAY OF URINE CREATININE: CPT

## 2025-06-29 NOTE — TELEPHONE ENCOUNTER
Tried to reach patient on the phone regarding wildly abnormal lab results today particularly her TSH.  I believe the very high TSH is the reason that she had such lethargy and fatigue on the last visit but also her kidney function declined, cholesterol increased dramatically.  I have left a detailed message on her phone asking her to take her thyroid medication every morning on an empty stomach and wait at least half an hour before putting anything else in her stomach including medications.  I have also left a message with her daughter who patient has designated as emergency contact.  I was not able to reach either of them in person.  I have sent a [facility] [place] message concerning this as well.

## 2025-07-03 DIAGNOSIS — E03.4 HYPOTHYROIDISM DUE TO ACQUIRED ATROPHY OF THYROID: ICD-10-CM

## 2025-07-03 RX ORDER — LEVOTHYROXINE SODIUM 125 UG/1
125 TABLET ORAL
Qty: 90 TABLET | Refills: 3 | Status: SHIPPED | OUTPATIENT
Start: 2025-07-03

## 2025-07-03 NOTE — TELEPHONE ENCOUNTER
Received request via: Patient    Was the patient seen in the last year in this department? Yes    Does the patient have an active prescription (recently filled or refills available) for medication(s) requested? No    Ph[facility] Name[facility]     Does the patient have [facility] Plus and need 100-day supply? (This applies to ALL medications) Patient does not have SCP

## 2025-07-09 DIAGNOSIS — M70.61 TROCHANTERIC BURSITIS OF BOTH HIPS: ICD-10-CM

## 2025-07-09 DIAGNOSIS — M48.02 NEURAL FORAMINAL STENOSIS OF CERVICAL SPINE: ICD-10-CM

## 2025-07-09 DIAGNOSIS — M70.62 TROCHANTERIC BURSITIS OF BOTH HIPS: ICD-10-CM

## 2025-07-09 DIAGNOSIS — M15.0 PRIMARY OSTEOARTHRITIS INVOLVING MULTIPLE JOINTS: ICD-10-CM

## 2025-07-09 DIAGNOSIS — M19.011 PRIMARY OSTEOARTHRITIS OF RIGHT SHOULDER: ICD-10-CM

## 2025-07-09 RX ORDER — TRAMADOL HYDROCHLORIDE 50 MG/1
50 TABLET ORAL EVERY 8 HOURS PRN
Qty: 90 TABLET | Refills: 2 | Status: SHIPPED | OUTPATIENT
Start: 2025-07-09 | End: 2025-10-07

## 2025-07-09 NOTE — TELEPHONE ENCOUNTER
Seen 6/16/2025.  Uses tramadol as needed for pain.  No adverse events.  PDMP review shows no inconsistencies.  Renewal sent.

## 2025-07-15 ENCOUNTER — TELEPHONE (OUTPATIENT)
Dept: MEDICAL GROUP | Facility: MEDICAL CENTER | Age: 75
End: 2025-07-15
Payer: MEDICARE

## 2025-07-15 NOTE — TELEPHONE ENCOUNTER
VOICEMAIL  1. Caller Name: Ashly Meyer                       Call Back Number: There are no phone numbers on file.     2. Message: Patient called and stated she was having a lot of pressure on her chest specifically on her left side.   I called patient and advised to go to the ER or UC.     3. Patient approves office to leave a detailed voicemail/MyChart message: N\A

## 2025-07-21 ENCOUNTER — APPOINTMENT (OUTPATIENT)
Dept: RADIOLOGY | Facility: MEDICAL CENTER | Age: 75
End: 2025-07-21
Attending: EMERGENCY MEDICINE
Payer: MEDICARE

## 2025-07-21 ENCOUNTER — HOSPITAL ENCOUNTER (EMERGENCY)
Facility: MEDICAL CENTER | Age: 75
End: 2025-07-21
Attending: EMERGENCY MEDICINE
Payer: MEDICARE

## 2025-07-21 VITALS
DIASTOLIC BLOOD PRESSURE: 94 MMHG | WEIGHT: 235 LBS | TEMPERATURE: 98.8 F | BODY MASS INDEX: 37.77 KG/M2 | HEART RATE: 67 BPM | OXYGEN SATURATION: 94 % | HEIGHT: 66 IN | RESPIRATION RATE: 16 BRPM | SYSTOLIC BLOOD PRESSURE: 182 MMHG

## 2025-07-21 DIAGNOSIS — R17 ELEVATED BILIRUBIN: ICD-10-CM

## 2025-07-21 DIAGNOSIS — M54.9 PAIN, UPPER BACK: ICD-10-CM

## 2025-07-21 DIAGNOSIS — R07.89 ATYPICAL CHEST PAIN: Primary | ICD-10-CM

## 2025-07-21 DIAGNOSIS — I44.7 LEFT BUNDLE BRANCH BLOCK: ICD-10-CM

## 2025-07-21 LAB
ALBUMIN SERPL BCP-MCNC: 4.4 G/DL (ref 3.2–4.9)
ALBUMIN/GLOB SERPL: 1.2 G/DL
ALP SERPL-CCNC: 85 U/L (ref 30–99)
ALT SERPL-CCNC: 12 U/L (ref 2–50)
ANION GAP SERPL CALC-SCNC: 18 MMOL/L (ref 7–16)
APPEARANCE UR: CLEAR
AST SERPL-CCNC: 22 U/L (ref 12–45)
BACTERIA #/AREA URNS HPF: ABNORMAL /HPF
BASOPHILS # BLD AUTO: 0.5 % (ref 0–1.8)
BASOPHILS # BLD: 0.05 K/UL (ref 0–0.12)
BILIRUB SERPL-MCNC: 1.9 MG/DL (ref 0.1–1.5)
BILIRUB UR QL STRIP.AUTO: NEGATIVE
BUN SERPL-MCNC: 18 MG/DL (ref 8–22)
CALCIUM ALBUM COR SERPL-MCNC: 9.2 MG/DL (ref 8.5–10.5)
CALCIUM SERPL-MCNC: 9.5 MG/DL (ref 8.5–10.5)
CASTS URNS QL MICRO: ABNORMAL /LPF (ref 0–2)
CHLORIDE SERPL-SCNC: 102 MMOL/L (ref 96–112)
CO2 SERPL-SCNC: 19 MMOL/L (ref 20–33)
COLOR UR: YELLOW
CREAT SERPL-MCNC: 1.16 MG/DL (ref 0.5–1.4)
EKG IMPRESSION: NORMAL
EKG IMPRESSION: NORMAL
EOSINOPHIL # BLD AUTO: 0.04 K/UL (ref 0–0.51)
EOSINOPHIL NFR BLD: 0.4 % (ref 0–6.9)
EPITHELIAL CELLS 1715: ABNORMAL /HPF (ref 0–5)
ERYTHROCYTE [DISTWIDTH] IN BLOOD BY AUTOMATED COUNT: 54.3 FL (ref 35.9–50)
GFR SERPLBLD CREATININE-BSD FMLA CKD-EPI: 49 ML/MIN/1.73 M 2
GLOBULIN SER CALC-MCNC: 3.7 G/DL (ref 1.9–3.5)
GLUCOSE SERPL-MCNC: 99 MG/DL (ref 65–99)
GLUCOSE UR STRIP.AUTO-MCNC: NEGATIVE MG/DL
HCT VFR BLD AUTO: 42.2 % (ref 37–47)
HGB BLD-MCNC: 14.1 G/DL (ref 12–16)
IMM GRANULOCYTES # BLD AUTO: 0.04 K/UL (ref 0–0.11)
IMM GRANULOCYTES NFR BLD AUTO: 0.4 % (ref 0–0.9)
KETONES UR STRIP.AUTO-MCNC: NEGATIVE MG/DL
LEUKOCYTE ESTERASE UR QL STRIP.AUTO: ABNORMAL
LIPASE SERPL-CCNC: 37 U/L (ref 11–82)
LYMPHOCYTES # BLD AUTO: 2.03 K/UL (ref 1–4.8)
LYMPHOCYTES NFR BLD: 19.6 % (ref 22–41)
MCH RBC QN AUTO: 31.1 PG (ref 27–33)
MCHC RBC AUTO-ENTMCNC: 33.4 G/DL (ref 32.2–35.5)
MCV RBC AUTO: 93 FL (ref 81.4–97.8)
MICRO URNS: ABNORMAL
MONOCYTES # BLD AUTO: 0.68 K/UL (ref 0–0.85)
MONOCYTES NFR BLD AUTO: 6.6 % (ref 0–13.4)
NEUTROPHILS # BLD AUTO: 7.53 K/UL (ref 1.82–7.42)
NEUTROPHILS NFR BLD: 72.5 % (ref 44–72)
NITRITE UR QL STRIP.AUTO: NEGATIVE
NRBC # BLD AUTO: 0 K/UL
NRBC BLD-RTO: 0 /100 WBC (ref 0–0.2)
NT-PROBNP SERPL IA-MCNC: 92 PG/ML (ref 0–125)
PH UR STRIP.AUTO: 5.5 [PH] (ref 5–8)
PLATELET # BLD AUTO: 167 K/UL (ref 164–446)
PMV BLD AUTO: 11.2 FL (ref 9–12.9)
POTASSIUM SERPL-SCNC: 3.5 MMOL/L (ref 3.6–5.5)
PROT SERPL-MCNC: 8.1 G/DL (ref 6–8.2)
PROT UR QL STRIP: NEGATIVE MG/DL
RBC # BLD AUTO: 4.54 M/UL (ref 4.2–5.4)
RBC # URNS HPF: ABNORMAL /HPF (ref 0–2)
RBC UR QL AUTO: NEGATIVE
SODIUM SERPL-SCNC: 139 MMOL/L (ref 135–145)
SP GR UR STRIP.AUTO: 1.01
TROPONIN T SERPL-MCNC: 13 NG/L (ref 6–19)
TROPONIN T SERPL-MCNC: 13 NG/L (ref 6–19)
UROBILINOGEN UR STRIP.AUTO-MCNC: 0.2 EU/DL
WBC # BLD AUTO: 10.4 K/UL (ref 4.8–10.8)
WBC #/AREA URNS HPF: ABNORMAL /HPF

## 2025-07-21 PROCEDURE — 84484 ASSAY OF TROPONIN QUANT: CPT | Mod: 91

## 2025-07-21 PROCEDURE — 700105 HCHG RX REV CODE 258: Performed by: EMERGENCY MEDICINE

## 2025-07-21 PROCEDURE — 80053 COMPREHEN METABOLIC PANEL: CPT

## 2025-07-21 PROCEDURE — 71045 X-RAY EXAM CHEST 1 VIEW: CPT

## 2025-07-21 PROCEDURE — 700117 HCHG RX CONTRAST REV CODE 255: Performed by: EMERGENCY MEDICINE

## 2025-07-21 PROCEDURE — 85025 COMPLETE CBC W/AUTO DIFF WBC: CPT

## 2025-07-21 PROCEDURE — 71275 CT ANGIOGRAPHY CHEST: CPT

## 2025-07-21 PROCEDURE — 99285 EMERGENCY DEPT VISIT HI MDM: CPT

## 2025-07-21 PROCEDURE — 83880 ASSAY OF NATRIURETIC PEPTIDE: CPT

## 2025-07-21 PROCEDURE — 36415 COLL VENOUS BLD VENIPUNCTURE: CPT

## 2025-07-21 PROCEDURE — 83690 ASSAY OF LIPASE: CPT

## 2025-07-21 PROCEDURE — 81001 URINALYSIS AUTO W/SCOPE: CPT

## 2025-07-21 PROCEDURE — 93005 ELECTROCARDIOGRAM TRACING: CPT | Mod: TC

## 2025-07-21 PROCEDURE — 87086 URINE CULTURE/COLONY COUNT: CPT

## 2025-07-21 PROCEDURE — 93005 ELECTROCARDIOGRAM TRACING: CPT | Mod: TC | Performed by: EMERGENCY MEDICINE

## 2025-07-21 RX ORDER — SODIUM CHLORIDE, SODIUM LACTATE, POTASSIUM CHLORIDE, CALCIUM CHLORIDE 600; 310; 30; 20 MG/100ML; MG/100ML; MG/100ML; MG/100ML
1000 INJECTION, SOLUTION INTRAVENOUS ONCE
Status: COMPLETED | OUTPATIENT
Start: 2025-07-21 | End: 2025-07-21

## 2025-07-21 RX ORDER — METHOCARBAMOL 750 MG/1
750 TABLET, FILM COATED ORAL 3 TIMES DAILY
Qty: 9 TABLET | Refills: 0 | Status: SHIPPED | OUTPATIENT
Start: 2025-07-21 | End: 2025-07-24

## 2025-07-21 RX ADMIN — IOHEXOL 78 ML: 350 INJECTION, SOLUTION INTRAVENOUS at 17:00

## 2025-07-21 RX ADMIN — SODIUM CHLORIDE, POTASSIUM CHLORIDE, SODIUM LACTATE AND CALCIUM CHLORIDE 1000 ML: 600; 310; 30; 20 INJECTION, SOLUTION INTRAVENOUS at 16:49

## 2025-07-21 ASSESSMENT — FIBROSIS 4 INDEX: FIB4 SCORE: 2.94

## 2025-07-21 ASSESSMENT — HEART SCORE
HEART SCORE: 4
TROPONIN: LESS THAN OR EQUAL TO NORMAL LIMIT
RISK FACTORS: 1-2 RISK FACTORS
ECG: NON-SPECIFIC REPOLARIZATION DISTURBANCE
AGE: 65+
HISTORY: SLIGHTLY SUSPICIOUS

## 2025-07-21 NOTE — ED PROVIDER NOTES
"ED Provider Note    CHIEF COMPLAINT  Chief Complaint   Patient presents with    Pain     X6wks Pain on L side of upper body. Denies arm or leg pain   Denies SOB or weakness.        EXTERNAL RECORDS REVIEWED  Outpatient Notes there was communication from patient's primary care physician on June 28, 2025 noting that she had very high TSH which could be causing her lethargy and fatigue. Primary care doctor was very adamant the patient take her thyroid medications as prescribed.    HPI/ROS  LIMITATION TO HISTORY   Select: : None  OUTSIDE HISTORIAN(S):  Family daughter at bedside and said that patient's primary care physician was upset that she had gone off most of her medications, but that patient is taking her thyroid medicine as instructed by Dr. Ahn.    Ashly Meyer is a 75 y.o. female who presents to the ER complaining of chest pain and mid back pain which has been going on for the last 6 weeks.  Patient says the pain has been constant unrelenting.  It gets worse when she takes a deep breath or when she moves.  She does not feel short of breath.  She has not had any cough.  No coughing up blood.  She says that sometimes she has nausea vomiting and diarrhea but she cannot say exactly when she had the symptoms.  No recent falls although there was some report that she might of slid out of bed a week ago.  Patient cannot recall if there was any falls, trauma or injury prior to the onset of her symptoms 6 weeks ago.  She believes that the symptoms might of even been going on longer than 6 weeks, but she uses the 6-week verna as a fairly clear timeline as when it really did start.  She saw her primary care physician several weeks ago and reports that she did not bring up this issue to her primary care physician.  The patient says \"she is mad at me because I stop my medicines.\"  However, she was instructed to get back on her thyroid medication as her thyroid function is quite abnormal.  The patient has been taking " "her thyroid medicine and her \"bladder medicine\" as prescribed.  When asked what other medicine she is taking she says \"it is all in my chart.\"  No fevers or chills.  Patient denies abdominal pain right now but says sometimes she does have abdominal pain.  Chest pain and back pain does not get worse when she eats.  She says she is not eating as much is normal.  She her mobility is at baseline.  She contacted her primary care physician to let her know about the pain she has been having in her chest and back, but was told to come to the ER as they \"could not do anything in the office.  The patient's daughter says she is only noted about this pain for a week.  The patient says she came in today because \"she was waiting for her daughter to be able to have some time to take her to the ER.\"  Nothing new or different in terms of the pain today when compared to the last 6 weeks.  Gallbladder has already been removed.  Patient reports she is taking her thyroid medication as instructed by her primary care physician.  She is also taking her allopurinol and her tramadol.  She has been on tramadol for a long time for her chronic pain.    PAST MEDICAL HISTORY   has a past medical history of Anginal syndrome ([facility]), Anxiety, Arthritis, Atrophic rhinitis (10/3/2016), Back pain, Bronchitis (08/2021), Carotid artery stenosis, unilateral, Carpal tunnel syndrome (9/5/2011), Chronic pain, Constipation, Controlled type 2 diabetes mellitus without complication ([facility]), Controlled type 2 diabetes mellitus without complication, without long-term current use of insulin ([facility]) (6/12/2019), Cough (08/2021), Current severe episode of major depressive disorder without psychotic features without prior episode ([facility]) (6/3/2021), Daytime sleepiness, Degenerative disc disease, Depression (5/20/2009), Deviated nasal septum (8/1/2016), Diarrhea, Disease of accessory sinus (10/3/2016), Dizziness, Dry eye syndrome, bilateral, Elevated sedimentation rate, " Fatigue, Fatty liver, Fibromyalgia, Frequent headaches, GERD (gastroesophageal reflux disease), GOUT (5/20/2009), H/O foot surgery, Hearing difficulty, Heart burn, Hemorrhagic disorder (HCC) (2016), Hiatus hernia syndrome, History of 2019 novel coronavirus disease (COVID-19) (10/11/2022), History of anemia, 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), Hypertension, Hypothyroidism (5/20/2009), Incipient cataract of both eyes, Incomplete rectal prolapse (9/3/2021), Insomnia, Intention tremor (6/2/2022), Migraine without aura, without mention of intractable migraine without mention of status migrainosus, Mild intermittent asthma without complication, Mixed dyslipidemia, Morbid obesity with BMI of 45.0-49.9, adult ([facility]), Morning headache, Mumps, Nasal drainage, Nocturnal hypoxia, Obesity, Obesity hypoventilation syndrome ([facility]) (5/31/2017), Pelvic floor dysfunction, Peripheral neuropathy, Pleomorphic adenoma, Polymyalgia rheumatica ([facility]), Restless leg syndrome, Ringing in ears, Rotator cuff syndrome of right shoulder and allied disorders (10/10/2018), S/P tonsillectomy, Seasonal allergies, Skin cancer (1990's), Sleep apnea syndrome, Snoring, Sore muscles, Sweat, sweating, excessive, Swelling of lower extremity, Thyroid carcinoma ([facility]) (7/22/2023), Trochanteric bursitis of both hips (3/3/2022), Urinary incontinence, Vision loss, and Weakness.    SURGICAL HISTORY   has a past surgical history that includes [facility] stat fine needle aspiration (11/16/2009); tonsillectomy (1953); bladder suspension (1983); other orthopedic surgery (1962/1980/1983/1985); hysterectomy, vaginal (1983); colonoscopy (2006); septoplasty (N/A, 08/01/2016); turbinoplasty (Bilateral, 08/01/2016); nasal fracture reduction open (N/A, 08/01/2016); septal reconstruction (10/03/2016); gastroscopy (02/06/2017); colonoscopy (02/06/2017); hysterectomy laparoscopy; shoulder decompression arthroscopic (Right, 02/01/2019); shoulder arthroscopy w/ bicipital tenodesis repair  (Right, 2019); carpal tunnel release (Right, 2019); cataract extraction with iol (Bilateral); cholecystectomy; other surgical procedure (); anal sphincterotomy; tubal ligation (); mass excision general (Right, ); scar revision (Right); upper gi endoscopy,diagnosis (N/A, 2021); colonoscopy-flexible (2021); thyroidectomy (Left, 2023); thyroidectomy total (Right, 2023); and inj,epi anes/ster lum/sac addl (Right, 2024).    FAMILY HISTORY  Family History   Problem Relation Age of Onset    Heart Disease Mother         Arotic vaulve replacement    GI Disease Mother         bile duct problem    Bladder cancer Mother     GI Disease Sister         celiac    Arthritis Sister     Other Sister         gout and lupus    Arthritis Sister     Kidney Disease Sister     GI Disease Sister     Bladder cancer Maternal Aunt     Arthritis Maternal Grandmother     Hypertension Maternal Grandmother     Heart Disease Maternal Grandmother     Hypertension Maternal Grandfather     Heart Disease Maternal Grandfather     Arthritis Maternal Grandfather     No Known Problems Paternal Grandmother     No Known Problems Paternal Grandfather     Cancer Brother         Larynx    Cancer Daughter         non hopskins limphoma    GI Disease Daughter     Bipolar disorder Daughter     Seizures Daughter     Bipolar disorder Daughter        SOCIAL HISTORY  Social History     Tobacco Use    Smoking status: Former     Current packs/day: 0.00     Average packs/day: 1 pack/day for 45.2 years (45.2 ttl pk-yrs)     Types: Cigarettes     Start date:      Quit date: 3/1/2007     Years since quittin.4    Smokeless tobacco: Never    Tobacco comments:     Started smoking at age 15, continued abstinance    Vaping Use    Vaping status: Never Used   Substance and Sexual Activity    Alcohol use: Not Currently    Drug use: Not Currently     Comment: CBD topical pain cream    Sexual activity: Not Currently      "Partners: Male       CURRENT MEDICATIONS  Home Medications       Reviewed by Albertina Esquivel R.N. (Registered Nurse) on 07/21/25 at 1130  Med List Status: Not Addressed     Medication Last Dose Status   albuterol 108 (90 Base) MCG/ACT Aero Soln inhalation aerosol  Active   aliskiren (TEKTURNA) 150 MG tablet  Active   allopurinol (ZYLOPRIM) 300 MG Tab  Active   ARIPiprazole (ABILIFY) 2 MG tablet  Active   atorvastatin (LIPITOR) 40 MG Tab  Active   calcitonin, salmon, (MIACALCIN) 200 UNIT/ACT Solution  Active   Cholecalciferol (VITAMIN D-3) 125 MCG (5000 UT) Tab  Active   DULoxetine (CYMBALTA) 60 MG Cap DR Particles delayed-release capsule  Active   levothyroxine (SYNTHROID) 125 MCG Tab  Active   pantoprazole (PROTONIX) 40 MG Tablet Delayed Response  Active   tamsulosin (FLOMAX) 0.4 MG capsule  Active   TOVIAZ 8 MG TABLET SR 24 HR  Active   traMADol (ULTRAM) 50 MG Tab  Active   VITAMIN E PO  Active                    ALLERGIES  Allergies[1]    PHYSICAL EXAM  VITAL SIGNS: BP (!) 182/94   Pulse 67   Temp 37.1 °C (98.8 °F) (Temporal)   Resp 16   Ht 1.676 m (5' 6\")   Wt 107 kg (235 lb)   SpO2 94%   BMI 37.93 kg/m²    Constitutional:  Well developed, well nourished; No acute distress   HENT: Normocephalic, Atraumatic, Bilateral external ears normal, slightly dry mucous membranes  Eyes: PERRL, EOMI, Conjunctiva normal, No discharge.   Neck: Normal range of motion, supple, nontender  Lymphatic: No lymphadenopathy noted.   Cardiovascular: Distant heart sounds.  Unable to appreciate if any murmurs rubs or gallops are present.  Normal heart rate, Normal rhythm,   Thorax & Lungs: Decreased breath sounds throughout with some crackles at bilateral bases.  No chest tenderness. No crepitus or subQ air  Abdomen: soft, good bowel sounds, no guarding no rebound, no masses, no pulsatile mass, no tenderness, no distention  Skin: Warm, Dry, No erythema, No rash.   Back: No tenderness, No CVA tenderness.   Extremities: 2+ dp " and pt pulses bilateral LEs;  Nontender; no pretibial edema  Neurologic: Alert & oriented x 4, clear speech,   Psychiatric: appropriate, normal affect     EKG/LABS  Results for orders placed or performed during the hospital encounter of 25   EKG    Collection Time: 25 11:18 AM   Result Value Ref Range    Report       [facility] Emergency Dept.    Test Date:  2025  Pt Name:    MAYELIN DUARTE               Department: ER  MRN:        [id number]                      Room:  Gender:     Female                       Technician: [id number]  :        1950                   Requested By:ER TRIAGE PROTOCOL  Order #:    [id number]                    Reading MD:    Measurements  Intervals                                Axis  Rate:       71                           P:          -22  KY:         173                          QRS:        -37  QRSD:       153                          T:          133  QT:         458  QTc:        498    Interpretive Statements  Sinus rhythm  Left bundle branch block  Compared to ECG 2023 12:43:18  Left bundle-branch block now present  Left ventricular hypertrophy no longer present  Myocardial infarct finding no longer present     CBC with Differential    Collection Time: 25 11:44 AM   Result Value Ref Range    WBC 10.4 4.8 - 10.8 K/uL    RBC 4.54 4.20 - 5.40 M/uL    Hemoglobin 14.1 12.0 - 16.0 g/dL    Hematocrit 42.2 37.0 - 47.0 %    MCV 93.0 81.4 - 97.8 fL    MCH 31.1 27.0 - 33.0 pg    MCHC 33.4 32.2 - 35.5 g/dL    RDW 54.3 (H) 35.9 - 50.0 fL    Platelet Count 167 164 - 446 K/uL    MPV 11.2 9.0 - 12.9 fL    Neutrophils-Polys 72.50 (H) 44.00 - 72.00 %    Lymphocytes 19.60 (L) 22.00 - 41.00 %    Monocytes 6.60 0.00 - 13.40 %    Eosinophils 0.40 0.00 - 6.90 %    Basophils 0.50 0.00 - 1.80 %    Immature Granulocytes 0.40 0.00 - 0.90 %    Nucleated RBC 0.00 0.00 - 0.20 /100 WBC    Neutrophils (Absolute) 7.53 (H) 1.82 - 7.42 K/uL    Lymphs (Absolute) 2.03  1.00 - 4.80 K/uL    Monos (Absolute) 0.68 0.00 - 0.85 K/uL    Eos (Absolute) 0.04 0.00 - 0.51 K/uL    Baso (Absolute) 0.05 0.00 - 0.12 K/uL    Immature Granulocytes (abs) 0.04 0.00 - 0.11 K/uL    NRBC (Absolute) 0.00 K/uL   Complete Metabolic Panel (CMP)    Collection Time: 07/21/25 11:44 AM   Result Value Ref Range    Sodium 139 135 - 145 mmol/L    Potassium 3.5 (L) 3.6 - 5.5 mmol/L    Chloride 102 96 - 112 mmol/L    Co2 19 (L) 20 - 33 mmol/L    Anion Gap 18.0 (H) 7.0 - 16.0    Glucose 99 65 - 99 mg/dL    Bun 18 8 - 22 mg/dL    Creatinine 1.16 0.50 - 1.40 mg/dL    Calcium 9.5 8.5 - 10.5 mg/dL    Correct Calcium 9.2 8.5 - 10.5 mg/dL    AST(SGOT) 22 12 - 45 U/L    ALT(SGPT) 12 2 - 50 U/L    Alkaline Phosphatase 85 30 - 99 U/L    Total Bilirubin 1.9 (H) 0.1 - 1.5 mg/dL    Albumin 4.4 3.2 - 4.9 g/dL    Total Protein 8.1 6.0 - 8.2 g/dL    Globulin 3.7 (H) 1.9 - 3.5 g/dL    A-G Ratio 1.2 g/dL   proBrain Natriuretic Peptide, NT (BNP)    Collection Time: 07/21/25 11:44 AM   Result Value Ref Range    NT-proBNP 92 0 - 125 pg/mL   Troponins NOW    Collection Time: 07/21/25 11:44 AM   Result Value Ref Range    Troponin T 13 6 - 19 ng/L   LIPASE    Collection Time: 07/21/25 11:44 AM   Result Value Ref Range    Lipase 37 11 - 82 U/L   ESTIMATED GFR    Collection Time: 07/21/25 11:44 AM   Result Value Ref Range    GFR (CKD-EPI) 49 (A) >60 mL/min/1.73 m 2   URINALYSIS (UA)    Collection Time: 07/21/25  1:25 PM    Specimen: Urine   Result Value Ref Range    Color Yellow     Character Clear     Specific Gravity 1.013 <1.035    Ph 5.5 5.0 - 8.0    Glucose Negative Negative mg/dL    Ketones Negative Negative mg/dL    Protein Negative Negative mg/dL    Bilirubin Negative Negative    Urobilinogen, Urine 0.2 <=1.0 EU/dL    Nitrite Negative Negative    Leukocyte Esterase Trace (A) Negative    Occult Blood Negative Negative    Micro Urine Req Microscopic    URINE MICROSCOPIC (W/UA)    Collection Time: 07/21/25  1:25 PM   Result Value Ref  Range    WBC 6-10 (A) /hpf    RBC 0-2 0 - 2 /hpf    Bacteria None Seen None /hpf    Epithelial Cells 0-2 0 - 5 /hpf    Urine Casts 0-2 0 - 2 /lpf   Troponins in two (2) hours    Collection Time: 25  3:22 PM   Result Value Ref Range    Troponin T 13 6 - 19 ng/L   EKG    Collection Time: 25  5:36 PM   Result Value Ref Range    Report       [facility] Emergency Dept.    Test Date:  2025  Pt Name:    MAYELIN DUARTE               Department: ER  MRN:        [id number]                      Room:  Gender:     Female                       Technician: [id number]  :        1950                   Requested By:ER TRIAGE PROTOCOL  Order #:    [id number]                    Reading MD: Yoly Shepherd    Measurements  Intervals                                Axis  Rate:       71                           P:          -22  MS:         173                          QRS:        -37  QRSD:       153                          T:          133  QT:         458  QTc:        498    Interpretive Statements  Sinus rhythm rate 71  No obvious ST elevation or depression  Left bundle branch block  Compared to ECG 2023 12:43:18  Left bundle-branch block now present  Left ventricular hypertrophy no longer present  Myocardial infarct finding no longer present  Electronically Signed On 2025 17:36:12 PDT by Yoly Shepherd       *Note: Due to a large number of results and/or encounters for the requested time period, some results have not been displayed. A complete set of results can be found in Results Review.      I have independently interpreted this EKG    RADIOLOGY/PROCEDURES   I have independently interpreted the diagnostic imaging associated with this visit and am waiting the final reading from the radiologist.     My preliminary interpretation is as follows: ER MD is reviewed the patient's chest x-ray.  She appears to have some atelectasis or scarring at bilateral bases.  No obvious  infiltrate.    Radiologist interpretation:  CT-CTA CHEST PULMONARY ARTERY W/ RECONS   Final Result      1.  No CT evidence for pulmonary emboli.   2.  Mild bilateral atelectasis.   3.  No pneumonia or pneumothorax.               DX-CHEST-PORTABLE (1 VIEW)   Final Result      Hypoinflation with bibasilar atelectasis.          COURSE & MEDICAL DECISION MAKING    ASSESSMENT, COURSE AND PLAN  Care Narrative: Patient presents to the ER complaining of chest pain and mid back pain which has been constant unrelenting for the last 6 weeks, perhaps longer.  Pain gets worse with deep breathing.  He gets worse with certain movements.  It does not get worse when she eats.  It is not gone away at any time in the last 6 weeks.  No increased pain with eating.  No abdominal pain.  She has not had nausea or vomiting.  She does not feel short of breath.  No hemoptysis.  No cough.  She has no pain or swelling in her legs.  Chest x-ray was unremarkable except for some bibasilar atelectasis.  Vital signs are normal stable with exception of some elevated blood pressure.  EKG shows a left bundle branch block which does look new when compared to July 2023.  However, close review of the EKG from July 2023 reveals some widening of the QRS complexes and several of the leads suggesting perhaps early development of left bundle at that time.  She has not had any EKGs between July 2023 and now.  Therefore, it is difficult to tell exactly when she developed this left bundle.  Patient's chest pain is extremely atypical.  It gets worse with deep breathing and movement.  Troponin x 2 are negative.  At this time no concern for ischemia, STEMI or non-STEMI.  No Sgarbossa criteria on her EKG.  I think she can follow-up as an outpatient with cardiology this week to get an outpatient workup for left bundle branch block.  I suspect her chest pain is related to a musculoskeletal etiology, especially given the increased pain with deep breathing and moving.   "However, given the pleuritic component of the chest pain I did do a CT scan of the chest to be sure there was no pulmonary embolism, pleural effusion, pneumonia, or pneumothorax.  CT scan of the chest is negative.  Patient's bilirubin was a little bit high at 1.9 today.  This is slightly off of baseline.  She also looked to be a little bit dehydrated today when compared to baseline.  I have suggested that she drink fluids to stay well-hydrated and follow-up with Dr. Ahn, her primary care physician, to arrange for repeat bilirubin as well as to follow-up on the urine culture result which was sent today.  Patient is on tramadol for pain at home.  I will send her home with prescription for Robaxin as I think she may have a musculoskeletal component of this chest and back pain which has been constant and ongoing for the last 6 weeks.  I have also suggested an icy hot pack to help.  She cannot use the lidocaine patches as that \"rips off her skin.\"  Patient has no abdominal pain.  No tenderness in the abdomen.  Low suspicion for abdominal pathology as cause of her chest and back pain.  Lipase is normal.  No concern for pancreatitis.  The patient is well-appearing.  She is not in any distress.  I think she is safe and stable for outpatient management discharge home.  Patient has been given strict return precautions and discharge instructions and she understands treatment plan and follow-up.    CHEST PAIN:   HEART Score for Major Cardiac Events  HEART Score     History: Slightly suspicious  ECG: Non-specific repolarization disturbance  Age: 65+  Risk Factors: 1-2 risk factors  Troponin: Less than or equal to normal limit    Heart Score: 4    Total Score   0-3 Points = Low Score, risk of MACE 0.9-1.7%.  4-6 Points = Moderate Score, risk of MACE 12-16.6%  7-10 Points = High Score, risk of MACE 50-65%          ADDITIONAL PROBLEMS MANAGED  Problem #1: Chest pain and mid back pain which has been constant unrelenting x at least " 6 weeks.    DISPOSITION AND DISCUSSIONS  I have discussed management of the patient with the following physicians and ERICKA's: None    Discussion of management with other \[facility] [place] [facility]\"" or appropriate source(s): None     Escalation of care considered, and ultimately not performed:acute inpatient care management, however at this time, the patient is most appropriate for outpatient management.  Patient has been complaining of atypical chest pain which gets worse with deep breathing and movement for 6 weeks.  This pain has been constant unrelenting.  She has had 2 negative troponins here in the ER after 6 weeks of constant and unrelenting chest and mid back pain.  No concern for cardiac ischemia at this time.  She was found to have a left bundle branch block today on EKG.  This is new when compared to 2023 although close review of the EKG from 2023 does reveal what looks like a slight widening of some of her QRS and several of the leads which could have been the beginning of an left bundle.  It is hard to know when she actually developed a full left bundle, but I do not think it is anything that she needs to be admitted to the hospital for today, especially having atypical chest pain for the last 6 weeks which has been constant unrelenting with completely normal troponins x 2 today.  I think she can follow-up with cardiology as an outpatient to evaluate this left bundle branch block.  I do not think she needs to be admitted for inpatient workup.  I placed a referral to cardiology in the [facility] system and have asked the patient to follow-up with cardiology this week.  Patient and daughter understand Importance of follow-up with cardiology outpatient for her left bundle branch block.    Barriers to care at this time, including but not limited to: None known.     Decision tools and prescription drugs considered including, but not limited to: Antibiotics patient has 6-10 WBCs and trace leukocyte on her urinalysis.  No nitrite.  No  "bacteria.  Low suspicion for UTI at this time but I have cultured her urine.  Will hold off on antibiotics for now..    FINAL DIAGNOSIS  1. Atypical chest pain Acute   2. Pain, upper back Acute   3. Elevated bilirubin Acute   4. Left bundle branch block Acute        This dictation has been created using voice recognition software. The accuracy of the dictation is limited by the abilities of the software. I expect there may be some errors of grammar and possibly content. I made every attempt to manually correct the errors within my dictation. However, errors related to voice recognition software may still exist and should be interpreted within the appropriate context.     Electronically signed by: Yoly Shepherd M.D., 7/21/2025 12:17 PM           [1]   Allergies  Allergen Reactions    Ace Inhibitors Cough    Benadryl Allergy Hives     Hot skin itching    Iodine Nausea     Pt received CT w/ contrast 12/20/22 pt had nausea post contrast     Lamictal Rash and Swelling     \"hot\", unable to function.  Severe rash, scarring    Savella [Kdc:Milnacipran+Ci Pigment Blue 63] Nausea, Swelling and Anxiety     Swelling, bone and muscle pain, sweating, nausea, dizziness, sleeplessness, anxiety    Savella [Milnacipran Hcl] Unspecified     Muscle aches, feet swelling    Sulfa Drugs Hives, Shortness of Breath and Anxiety     Hard breathing, shortness of breath    Butalbital-Acetaminophen Unspecified     Dizzy, can't walk, hard to focus    Cefaclor Nausea and Unspecified     Ceclor causes light headedness and dizziness    Morphine Itching     \"Redness\"    Penicillins Itching and Swelling     Skin itching and redness    Tizanidine Hcl Nausea     \"Dizziness\" hard to focus    Milnacipran Vomiting and Nausea    Amlactin Rash and Itching     Lotion causes itching, redness and burnng    Tape Rash and Itching     Skin tears, paper tape is OK per Pt     "

## 2025-07-21 NOTE — ED TRIAGE NOTES
"Chief Complaint   Patient presents with    Pain     X6wks Pain on L side of upper body. Denies arm or leg pain   Denies SOB or weakness.      BP (!) 171/98   Pulse 75   Temp 36 °C (96.8 °F) (Temporal)   Resp 18   Ht 1.676 m (5' 6\")   Wt 107 kg (235 lb)   SpO2 94%   BMI 37.93 kg/m²     Pt is alert/oriented and follows commands. Pt speaking in full sentences and responds appropriately to questions. No acute distress noted in triage and respirations are even and unlabored.      Pt placed in lobby and educated on triage process. Pt encouraged to alert staff for any changes in condition.    "

## 2025-07-22 NOTE — DISCHARGE INSTRUCTIONS
Follow-up with your primary care physician within the next 1 to 2 days.  Please call for appointment.  Please ask your primary care physician to recheck your bilirubin level as it was mildly elevated today.  Additionally, urine culture was ordered today.  Your primary care physician will be able to follow-up on the results.    Also follow-up with one of the cardiologists here at [facility] this week for your left bundle branch block..  A referral has been placed on your behalf.  If you have not heard from the schedulers by tomorrow midday, please call for appointment time.    Return to the ER for any worsening chest pain, changing chest pain, dizziness or lightheadedness, nausea, vomiting, unexplained sweating, shortness of breath, cough, coughing up rust colored phlegm or blood, abdominal pain, pain with urination, cloudy or foul-smelling urine, blood in urine, or for any concerns/worsening.

## 2025-07-22 NOTE — ED NOTES
PIV removed. Patient provided discharge instructions and medication information. Patient verbalizes understanding and denies any further questions. Patient instructed to follow up with cardiology and return if condition worsens. No distress noted.

## 2025-07-23 LAB
BACTERIA UR CULT: NORMAL
SIGNIFICANT IND 70042: NORMAL
SITE SITE: NORMAL
SOURCE SOURCE: NORMAL

## 2025-07-25 ENCOUNTER — TELEPHONE (OUTPATIENT)
Dept: HEALTH INFORMATION MANAGEMENT | Facility: OTHER | Age: 75
End: 2025-07-25
Payer: MEDICARE

## (undated) DEVICE — CANNULA O2 COMFORT SOFT EAR ADULT 7 FT TUBING (50/CA)

## (undated) DEVICE — SODIUM CHL. IRRIGATION 0.9% 3000ML (4EA/CA 65CA/PF)

## (undated) DEVICE — PROBE PRASS STAND STIMULATING (5EA/PK)

## (undated) DEVICE — WATER IRRIGATION STERILE 1000ML (12EA/CA)

## (undated) DEVICE — GLOVE BIOGEL SZ 7.5 SURGICAL PF LTX - (50PR/BX 4BX/CA)

## (undated) DEVICE — GOWN WARMING STANDARD FLEX - (30/CA)

## (undated) DEVICE — SYRINGE SAFETY 5 ML 18 GA X 1-1/2 BLUNT LL (100/BX 4BX/CA)

## (undated) DEVICE — CIRCUIT VENTILATOR PEDIATRIC WITH FILTER  (20EA/CS)

## (undated) DEVICE — SYRINGE SAFETY 10 ML 18 GA X 1 1/2 BLUNT LL (100/BX 4BX/CA)

## (undated) DEVICE — BAG, SPONGE COUNT 50600

## (undated) DEVICE — MASK OXYGEN VNYL ADLT MED CONC WITH 7 FOOT TUBING  - (50EA/CA)

## (undated) DEVICE — SUCTION INSTRUMENT YANKAUER BULBOUS TIP W/O VENT (50EA/CA)

## (undated) DEVICE — TUBE CONNECTING SUCTION - CLEAR PLASTIC STERILE 72 IN (50EA/CA)

## (undated) DEVICE — SUTURE GENERAL

## (undated) DEVICE — GLOVE BIOGEL INDICATOR SZ 7SURGICAL PF LTX - (50/BX 4BX/CA)

## (undated) DEVICE — SODIUM CHL IRRIGATION 0.9% 1000ML (12EA/CA)

## (undated) DEVICE — ELECTRODE 850 FOAM ADHESIVE - HYDROGEL RADIOTRNSPRNT (50/PK)

## (undated) DEVICE — LEAD SET 6 DISP. EKG NIHON KOHDEN

## (undated) DEVICE — LACTATED RINGERS INJ 1000 ML - (14EA/CA 60CA/PF)

## (undated) DEVICE — PROTECTOR ULNA NERVE - (36PR/CA)

## (undated) DEVICE — PEN SKIN MARKER W/RULER - (50EA/BX)

## (undated) DEVICE — SPONGE GAUZE NON-STERILE 4X4 - (2000/CA 10PK/CA)

## (undated) DEVICE — KIT  I.V. START (100EA/CA)

## (undated) DEVICE — SET LEADWIRE 5 LEAD BEDSIDE DISPOSABLE ECG (1SET OF 5/EA)

## (undated) DEVICE — CORDS BIPOLAR COAGULATION - 12FT STERILE DISP. (10EA/BX)

## (undated) DEVICE — SYRINGE DISP. 60 CC LL - (30/BX, 12BX/CA)**WHEN THESE ARE GONE ORDER #500206**

## (undated) DEVICE — CANNULA THREADED 5X75 (5EA/BX)

## (undated) DEVICE — TUBE NG SALEM SUMP 14FR (50EA/BX)

## (undated) DEVICE — TUBE E-T HI-LO CUFF 7.0MM (10EA/PK)

## (undated) DEVICE — ELECTRODE DUAL RETURN W/ CORD - (50/PK)

## (undated) DEVICE — CANISTER SUCTION 3000ML MECHANICAL FILTER AUTO SHUTOFF MEDI-VAC NONSTERILE LF DISP  (40EA/CA)

## (undated) DEVICE — TUBE E-T HI-LO CUFF 6.5MM (10EA/BX)

## (undated) DEVICE — GLOVE, LITE (PAIR)

## (undated) DEVICE — GLOVE BIOGEL INDICATOR SZ 8.5 SURGICAL PF LTX - (50/BX 4BX/CA)

## (undated) DEVICE — BITE BLOCK ADULT 60FR (100EA/CA)

## (undated) DEVICE — DRAPE U SPLIT IMP 54 X 76 - (24/CA)

## (undated) DEVICE — TUBING CLEARLINK DUO-VENT - C-FLO (48EA/CA)

## (undated) DEVICE — FIBRILLAR SURGICEL 4X4 - 10/CA

## (undated) DEVICE — BASIN EMESIS DISP. - (250/CA)

## (undated) DEVICE — FORCEP RADIAL JAW 4 STANDARD CAPACITY W/NEEDLE 240CM (40EA/BX)

## (undated) DEVICE — SLEEVE VASO CALF MED - (10PR/CA)

## (undated) DEVICE — SET EXTENSION WITH 2 PORTS (48EA/CA) ***PART #2C8610 IS A SUBSTITUTE*****

## (undated) DEVICE — SLEEVE, VASO, THIGH, MED

## (undated) DEVICE — SPONGE GAUZESTER 4 X 4 4PLY - (128PK/CA)

## (undated) DEVICE — TOURNIQUET CUFF 24 X 4 ONE PORT - STERILE (10/BX)

## (undated) DEVICE — SENSOR SPO2 ADULT LNCS ADTX (20/BX) ORDER ITEM #19593

## (undated) DEVICE — PACK ENT OR - (2EA/CA)

## (undated) DEVICE — SENSOR OXIMETER ADULT SPO2 RD SET (20EA/BX)

## (undated) DEVICE — PADDING CAST 3 IN STERILE - 3 X 4 YDS (24EA/CA)

## (undated) DEVICE — CANISTER SUCTION RIGID RED 1500CC (40EA/CA)

## (undated) DEVICE — CATHETER IV 20 GA X 1-1/4 ---SURG.& SDS ONLY--- (50EA/BX)

## (undated) DEVICE — HUMID-VENT HEAT AND MOISTURE EXCHANGE- (50/BX)

## (undated) DEVICE — CHLORAPREP 26 ML APPLICATOR - ORANGE TINT(25/CA)

## (undated) DEVICE — SUTURE 3-0 ETHILON FS-1 - (36/BX) 30 INCH

## (undated) DEVICE — TUBE SUCTION YANKAUER  1/4 X 6FT (20EA/CA)"

## (undated) DEVICE — KIT ANESTHESIA W/CIRCUIT & 3/LT BAG W/FILTER (20EA/CA)

## (undated) DEVICE — DRAPE SHOULDER FLUID CONTROL - 77 X 85 (10/CA)

## (undated) DEVICE — TUBING PUMP WITH CONNECTOR REDEUCE (1EA)

## (undated) DEVICE — PAD PREP 24 X 48 CUFFED - (100/CA)

## (undated) DEVICE — DRESSING ABDOMINAL PAD STERILE 8 X 10" (360EA/CA)"

## (undated) DEVICE — SUTURE 2-0 SILK FS (12EA/BX)

## (undated) DEVICE — DRAPE IOBAN II INCISE 23X17 - (10EA/BX 4BX/CA)

## (undated) DEVICE — TUBE E-T HI-LO CUFF 8.5MM (10EA/PK)

## (undated) DEVICE — CANNULA DIVIDED ADULT CO2 - SAMPLE W/FEMALE CONNCT (25/CA)

## (undated) DEVICE — ABLATOR WAND SERFAS 90-S CRUISE

## (undated) DEVICE — NEPTUNE 4 PORT MANIFOLD - (20/PK)

## (undated) DEVICE — TOWEL STOP TIMEOUT SAFETY FLAG (40EA/CA)

## (undated) DEVICE — SYRINGE SAFETY 3 ML 18 GA X 1 1/2 BLUNT LL (100/BX 8BX/CA)

## (undated) DEVICE — SUTURE 2-0 SILK 12 X 18" (36PK/BX)"

## (undated) DEVICE — SLEEVE SHOULDER DISP(ARTHREX) - (6/BX)

## (undated) DEVICE — PACK SHOULDER ARTHROSCOPY SM - (2EA/CA)

## (undated) DEVICE — GLOVE BIOGEL INDICATOR SZ 8 SURGICAL PF LTX - (50/BX 4BX/CA)

## (undated) DEVICE — PADDING CAST 4 IN STERILE - 4 X 4 YDS (24/CA)

## (undated) DEVICE — GLOVE BIOGEL SZ 6.5 SURGICAL PF LTX (50PR/BX 4BX/CA)

## (undated) DEVICE — SENSOR SPO2 NEO LNCS ADHESIVE (20/BX) SEE USER NOTES

## (undated) DEVICE — SUTURE 3-0 VICRYL PLUS SH - 8X 18 INCH (12/BX)

## (undated) DEVICE — SUTURE 4-0 ETHILON FS-2 18 (36PK/BX)"

## (undated) DEVICE — CLIP SM INTNL HRZN TI ESCP LGT - (24EA/PK 25PK/BX)

## (undated) DEVICE — TUBE E-T HI-LO CUFF 8.0MM (10EA/PK)

## (undated) DEVICE — GLOVE 7.0 LF PF PROTEXIS (50PR/BX)

## (undated) DEVICE — BANDAGE ELASTIC 4 IN X 5 YDS - LATEX FREE(10/BX 5BX/CA)

## (undated) DEVICE — DRESSING TRANSPARENT FILM TEGADERM 2.375 X 2.75"  (100EA/BX)"

## (undated) DEVICE — DRAPE MAGNETIC (INSTRA-MAG) - (30/CA)

## (undated) DEVICE — PACK UPPER EXTREMITY SM OR - (3/CA)

## (undated) DEVICE — PLASTER ROLL 3X3YD XTRA FAST - 2-4 MIN (12EA/BX 6BX/CA)"

## (undated) DEVICE — KIT ROOM DECONTAMINATION

## (undated) DEVICE — CLIP MED INTNL HRZN TI ESCP - (25/BX)

## (undated) DEVICE — DRAPE STRLE REG TOWEL 18X24 - (10/BX 4BX/CA)"

## (undated) DEVICE — DRESSING XEROFORM 1X8 - (50/BX 4BX/CA)

## (undated) DEVICE — TAPE CLOTH MEDIPORE 6 INCH - (12RL/CA)

## (undated) DEVICE — MASK ANESTHESIA ADULT  - (100/CA)

## (undated) DEVICE — GLOVE BIOGEL INDICATOR SZ 7.5 SURGICAL PF LTX - (50PR/BX 4BX/CA)

## (undated) DEVICE — CATHETER IV SAFETY 22 GA X 1 (50EA/BX)

## (undated) DEVICE — SUTURE 5-0 MONOCRYL PLUS PC-3 - (12/BX)

## (undated) DEVICE — GLOVE BIOGEL SZ 7 SURGICAL PF LTX - (50PR/BX 4BX/CA)

## (undated) DEVICE — MAT PATIENT POSITIONING PREVALON (10EA/CA)

## (undated) DEVICE — TUBING PATIENT W/CONNECTOR REDEUCE (1EA)

## (undated) DEVICE — GOWN SURGICAL X-LARGE ULTRA - FILM-REINFORCED (20/CA)

## (undated) DEVICE — SYRINGE ASEPTO - (50EA/CA

## (undated) DEVICE — GLOVE BIOGEL SZ 8 SURGICAL PF LTX - (50PR/BX 4BX/CA)

## (undated) DEVICE — SUTURE 3-0 SILK 12 X 18 IN - (36/BX)

## (undated) DEVICE — CATHETER IV SAFETY 20 GA X 1-1/4 (50/BX)

## (undated) DEVICE — BOVIE BLADE COATED &INSULATED - 25/PK

## (undated) DEVICE — HEAD HOLDER JUNIOR/ADULT

## (undated) DEVICE — SHAVER, 5.5 RESECTOR

## (undated) DEVICE — SPONGE PEANUT - (5/PK 50PK/CA)

## (undated) DEVICE — KIT CUSTOM PROCEDURE SINGLE FOR ENDO  (15/CA)

## (undated) DEVICE — SHAVER4.0 AGGRESSIVE + FORMLA (5EA/BX)

## (undated) DEVICE — GOWN SURGEONS LARGE - (32/CA)

## (undated) DEVICE — TRAY SRGPRP PVP IOD WT PRP - (20/CA)

## (undated) DEVICE — MASK WITH FACE SHIELD (25/BX 4BX/CA)

## (undated) DEVICE — GLOVE BIOGEL SZ 8.5 SURGICAL PF LTX - (50PR/BX 4BX/CA)

## (undated) DEVICE — TUBE E-T HI-LO CUFF 7.5MM (10EA/PK)

## (undated) DEVICE — SHEAR HS FOCUS 9CM CVD - (6/BX)

## (undated) DEVICE — BANDAGE ELASTIC 3 X 5 YDS - STERILE VELCRO (25/CA)LATEX